# Patient Record
Sex: MALE | Race: WHITE | Employment: OTHER | URBAN - METROPOLITAN AREA
[De-identification: names, ages, dates, MRNs, and addresses within clinical notes are randomized per-mention and may not be internally consistent; named-entity substitution may affect disease eponyms.]

---

## 2017-07-05 PROBLEM — L03.012 CELLULITIS OF LEFT MIDDLE FINGER: Status: ACTIVE | Noted: 2017-07-05

## 2018-05-02 ENCOUNTER — HOSPITAL ENCOUNTER (OUTPATIENT)
Dept: CT IMAGING | Age: 53
Discharge: HOME | End: 2018-05-02
Attending: INTERNAL MEDICINE | Admitting: INTERNAL MEDICINE

## 2018-05-02 DIAGNOSIS — R91.8 LUNG MASS: ICD-10-CM

## 2018-05-27 PROBLEM — A41.9 SEPSIS WITHOUT ACUTE ORGAN DYSFUNCTION (HCC): Status: ACTIVE | Noted: 2018-05-27

## 2018-06-21 PROBLEM — M79.673 FOOT PAIN: Status: ACTIVE | Noted: 2018-06-21

## 2018-07-03 ENCOUNTER — HOSPITAL ENCOUNTER (OUTPATIENT)
Dept: WOUND CARE | Age: 53
Discharge: HOME | End: 2018-07-03
Attending: NURSE PRACTITIONER | Admitting: NURSE PRACTITIONER

## 2018-07-03 VITALS — TEMPERATURE: 98.1 F | RESPIRATION RATE: 16 BRPM

## 2018-07-03 DIAGNOSIS — E08.621 DIABETIC ULCER OF RIGHT MIDFOOT ASSOCIATED WITH DIABETES MELLITUS DUE TO UNDERLYING CONDITION, WITH NECROSIS OF MUSCLE (HCC): Primary | ICD-10-CM

## 2018-07-03 DIAGNOSIS — L97.413 DIABETIC ULCER OF RIGHT MIDFOOT ASSOCIATED WITH DIABETES MELLITUS DUE TO UNDERLYING CONDITION, WITH NECROSIS OF MUSCLE (HCC): Primary | ICD-10-CM

## 2018-07-03 DIAGNOSIS — Z89.421 S/P AMPUTATION OF LESSER TOE, RIGHT (HCC): ICD-10-CM

## 2018-07-03 PROCEDURE — 99213 OFFICE O/P EST LOW 20 MIN: CPT | Performed by: NURSE PRACTITIONER

## 2018-07-03 RX ORDER — ACETAMINOPHEN 500 MG
1000 TABLET ORAL EVERY 6 HOURS PRN
COMMUNITY
End: 2019-06-03

## 2018-07-03 RX ORDER — FLUOXETINE HYDROCHLORIDE 20 MG/1
20 CAPSULE ORAL DAILY
COMMUNITY
End: 2019-06-03

## 2018-07-03 RX ORDER — OXYCODONE HYDROCHLORIDE AND ACETAMINOPHEN 5; 325 MG/1; MG/1
2 TABLET ORAL EVERY 8 HOURS PRN
COMMUNITY
End: 2019-05-15 | Stop reason: ALTCHOICE

## 2018-07-03 RX ORDER — SULFAMETHOXAZOLE AND TRIMETHOPRIM 80; 16 MG/ML; MG/ML
INJECTION INTRAVENOUS DAILY
COMMUNITY
End: 2019-06-03

## 2018-07-03 ASSESSMENT — PAIN DESCRIPTION - PROGRESSION: CLINICAL_PROGRESSION: NOT CHANGED

## 2018-07-03 ASSESSMENT — PAIN DESCRIPTION - ONSET: ONSET: ON-GOING

## 2018-07-03 ASSESSMENT — PAIN DESCRIPTION - PAIN TYPE: TYPE: CHRONIC PAIN

## 2018-07-03 ASSESSMENT — PAIN DESCRIPTION - FREQUENCY: FREQUENCY: CONTINUOUS

## 2018-07-03 ASSESSMENT — PAIN DESCRIPTION - LOCATION: LOCATION: FOOT

## 2018-07-03 NOTE — PROGRESS NOTES
[Ketorolac Tromethamine] Other (See Comments)     Sweats        MEDICATIONS    Current Outpatient Prescriptions on File Prior to Encounter   Medication Sig Dispense Refill    metFORMIN (GLUCOPHAGE) 1000 MG tablet Take 1 tablet by mouth 2 times daily (with meals) 60 tablet 3    tiZANidine (ZANAFLEX) 4 MG tablet Take 4 mg by mouth nightly as needed      pregabalin (LYRICA) 100 MG capsule Take 1 capsule by mouth 2 times daily (Patient taking differently: Take 75 mg by mouth 2 times daily. [place] ) 60 capsule 0    traZODone (DESYREL) 100 MG tablet Take 100 mg by mouth nightly as needed       docusate sodium (COLACE) 100 MG capsule Take 100 mg by mouth daily as needed for Constipation      aspirin 81 MG chewable tablet Take 1 tablet by mouth daily 30 tablet 2    atorvastatin (LIPITOR) 20 MG tablet Take 1 tablet by mouth nightly 30 tablet 3    glyBURIDE (DIABETA) 5 MG tablet Take 1 tablet by mouth 2 times daily (with meals) 60 tablet 1    escitalopram (LEXAPRO) 20 MG tablet Take 20 mg by mouth daily      albuterol sulfate  (90 Base) MCG/ACT inhaler Inhale 2 puffs into the lungs every 6 hours as needed for Wheezing or Shortness of Breath (or cough) Please include spacer with instructions for use.  1 Inhaler 0    naproxen (NAPROSYN) 500 MG tablet Take 1 tablet by mouth 2 times daily as needed for Pain 30 tablet 0     Current Facility-Administered Medications on File Prior to Encounter   Medication Dose Route Frequency Provider Last Rate Last Dose    lidocaine (XYLOCAINE) 4 % external solution   Topical Once Deb Paci, DPM           PROBLEM LIST    Patient Active Problem List   Diagnosis    Finger infection right index finger    Mallet finger    Diabetic foot infection ([address]    Sepsis ([address]    Uncontrolled diabetes mellitus with complications ([address]    Diabetes with ulcer of foot ([address]    Ulcer of other part of foot    Diabetes mellitus with ulcer of heel ([address]    Obesity, Class III, BMI 40-49.9 Addressed This Visit     HBO-WD-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 We would be grateful if you would take the time to fill this survey out. Wound care order history:     ADI's: N/A   Vascular studies: ARTERIAL STUDIES COMPLETE  DONE 5/28/2018                                                   Imaging:                                                                     Date    Cultures:                                                                    Date    Labs/ HbA1c:   8.7                                                          Date 6/22/2018   Grafts:                                                                        Date    HBO:  DISCUSSED POSSIBLE OPTION 7/3/2018   Antibiotics: 7/3/2018-BACTRIM IVPB DAILY              Earlier Wound care treatments:                Authorizations:      Consults:                                                                    Date     Primary care physician: DR Theron CARVAJAL    Continuing wound care orders and information:              Residence:  HOME              Continue home health care with: [place] [address] wound-care supplies will be provided by: [address] [facility] [place]   DME provider: [facility]   Compression with   Off loading: ELEVATE  / OFF-LOADING SHOE                                                               Wound Medications:      Wound cleansing:      Do not scrub or use excessive force. Wash hands with soap and water before and after dressing changes. Prior to applying a clean dressing, cleanse wound with normal saline,          wound cleanser, or mild soap and water. Ask the physician or nurse before getting the wound(s) wet in a shower       Daily Wound management:    Keep weight off wounds and reposition every 2 hours. Avoid standing for long periods of time. Apply wraps/stockings in AM and remove at bedtime. If swelling is present, elevate legs to the level of the heart or above for 30  minutes 4-5 times a day and/or when sitting. When taking antibiotics take entire prescription as ordered by physician do not stop taking until medicine is all gone. Ivy Dobbins (PODIATRIST) DID SURGERY LAST Wednesday AT [facility] [phone] (PHONE) [phone] Urv[address] Sparks)                                    Orders for this week: 7/3/2018    DISCUSSED HBO AND PROPER OFF-LOADING PROCEDURES!!!      RIGHT LATERAL FOOT / RIGHT PLANTAR-- PAINT WITH BETADINE, COVER WITH  ABD, CONFORM AND TAPE. CHANGE DAILY. Follow up with Dr. Mary Gomez in 1 week at the wound care center    Call [phone] for any questions or concerns.     Date__________   Time____________                      Treatment Note Wound 07/03/18 #16 RIGHT LATERAL FOOT SURGICAL INCISION (ONSET 6/25/2018)-Dressing/Treatment:  (BETADINE, COVER WITH  ABD, CONFORM AND TAPE)  Wound 07/03/18 #17 RIGHT PLANTER SURGICAL INCISION (ONSET 6/25/18)-Dressing/Treatment:  (BETADINE, COVER WITH  ABD, CONFORM AND TAPE)    Written Patient Dismissal Instructions Given            Electronically signed by JULITA Sharpe CNP on 7/3/2018 at 5:35 PM

## 2018-07-10 ENCOUNTER — HOSPITAL ENCOUNTER (OUTPATIENT)
Dept: WOUND CARE | Age: 53
Discharge: HOME | End: 2018-07-10
Attending: PODIATRIST | Admitting: PODIATRIST

## 2018-07-10 VITALS
DIASTOLIC BLOOD PRESSURE: 70 MMHG | RESPIRATION RATE: 18 BRPM | HEART RATE: 97 BPM | TEMPERATURE: 97.3 F | SYSTOLIC BLOOD PRESSURE: 113 MMHG

## 2018-07-10 DIAGNOSIS — L97.413 DIABETIC ULCER OF RIGHT MIDFOOT ASSOCIATED WITH DIABETES MELLITUS DUE TO UNDERLYING CONDITION, WITH NECROSIS OF MUSCLE (HCC): ICD-10-CM

## 2018-07-10 DIAGNOSIS — E08.621 DIABETIC ULCER OF RIGHT MIDFOOT ASSOCIATED WITH DIABETES MELLITUS DUE TO UNDERLYING CONDITION, WITH NECROSIS OF MUSCLE (HCC): ICD-10-CM

## 2018-07-10 DIAGNOSIS — A41.9 SEPSIS WITHOUT ACUTE ORGAN DYSFUNCTION (HCC): ICD-10-CM

## 2018-07-10 DIAGNOSIS — Z89.421 S/P AMPUTATION OF LESSER TOE, RIGHT (HCC): ICD-10-CM

## 2018-07-10 DIAGNOSIS — L97.509 DIABETES MELLITUS DUE TO UNDERLYING CONDITION WITH FOOT ULCER, WITHOUT LONG-TERM CURRENT USE OF INSULIN (HCC): Primary | Chronic | ICD-10-CM

## 2018-07-10 DIAGNOSIS — E08.621 DIABETES MELLITUS DUE TO UNDERLYING CONDITION WITH FOOT ULCER, WITHOUT LONG-TERM CURRENT USE OF INSULIN (HCC): Primary | Chronic | ICD-10-CM

## 2018-07-10 ASSESSMENT — PAIN DESCRIPTION - PROGRESSION: CLINICAL_PROGRESSION: NOT CHANGED

## 2018-07-10 ASSESSMENT — PAIN DESCRIPTION - DESCRIPTORS: DESCRIPTORS: THROBBING

## 2018-07-10 ASSESSMENT — PAIN DESCRIPTION - ONSET: ONSET: ON-GOING

## 2018-07-10 ASSESSMENT — PAIN DESCRIPTION - ORIENTATION: ORIENTATION: RIGHT

## 2018-07-10 ASSESSMENT — PAIN DESCRIPTION - PAIN TYPE: TYPE: CHRONIC PAIN

## 2018-07-10 ASSESSMENT — PAIN DESCRIPTION - LOCATION: LOCATION: FOOT

## 2018-07-10 ASSESSMENT — PAIN SCALES - GENERAL: PAINLEVEL_OUTOF10: 8

## 2018-07-10 ASSESSMENT — PAIN DESCRIPTION - FREQUENCY: FREQUENCY: CONTINUOUS

## 2018-07-10 NOTE — PROGRESS NOTES
Procedure Laterality Date    COLONOSCOPY  1990's    DEBRIDEMENT  8/25/2014    left foot    ENDOSCOPY, COLON, DIAGNOSTIC  06-    FINGER SURGERY  9-11-11    Right Index Finger    FINGER SURGERY  01-16-12    RIght Index Finger-Removal of loose body, Reconstruction of Mallet Finger    FOOT SURGERY Right 06/01/2018    I&D right foot    HEMORRHOID SURGERY  2008    OTHER SURGICAL HISTORY Left 10/22/2013    I & D left foot    OTHER SURGICAL HISTORY  07/07/2017    I&D fingers X2 left hand. I&D left forearm    OTHER SURGICAL HISTORY Left 07/08/2017    left hand debridement, left forearm incision and drainage     OTHER SURGICAL HISTORY Left 07/10/2017    Fingers I&D    OTHER SURGICAL HISTORY Left 07/14/2017    middle finger amputation revision    ROTATOR CUFF REPAIR  Last Done 7/18/2011    Left, Done Four Times       FAMILY HISTORY    Family History   Problem Relation Age of Onset    Kidney Disease Mother         \"Kidney Failure, On Dialysis\"    Early Death Mother 39    Diabetes Father         \"Type II\"       SOCIAL HISTORY    Social History   Substance Use Topics    Smoking status: Never Smoker    Smokeless tobacco: Current User     Types: Snuff      Comment: \"Chew Grizzly Snuff Daily\"    Alcohol use No       ALLERGIES    Allergies   Allergen Reactions    Robaxin [Methocarbamol] Swelling    Rocephin [Ceftriaxone Sodium-Lidocaine] Hives     Noted on 10/26 - developed extremity swelling and hives. No anaphylaxis.  Cantaloupe (Diagnostic) Other (See Comments)    Aspirin Nausea Only    Darvon [Propoxyphene] Nausea And Vomiting    Toradol [Ketorolac Tromethamine] Other (See Comments)     Sweats        MEDICATIONS    Current Outpatient Prescriptions on File Prior to Encounter   Medication Sig Dispense Refill    oxyCODONE-acetaminophen (PERCOCET) 5-325 MG per tablet Take 2 tablets by mouth every 8 hours as needed for Pain. Nikkie Lal acetaminophen (TYLENOL) 500 MG tablet Take 1,000 mg by mouth every care.  Dermatologic exam: Visual inspection of the periwound reveals the skin to be edematous. Wound exam:  see wound description below     All active wounds listed below with today's date are evaluated      Wound 07/03/18 #16 RIGHT LATERAL FOOT SURGICAL INCISION (ONSET 6/25/2018) POST SURGICAL (Active)   Wound Image   7/3/2018  3:06 PM   Wound Type Wound 7/10/2018  3:06 PM   Wound Other 7/10/2018  3:06 PM   Dressing Status Clean;Dry; Intact 7/10/2018  4:03 PM   Dressing Changed Changed/New 7/10/2018  4:03 PM   Wound Cleansed Rinsed/Irrigated with saline 7/10/2018  3:06 PM   Wound Length (cm) 9 cm 7/10/2018  3:06 PM   Wound Width (cm) 1 cm 7/10/2018  3:06 PM   Wound Depth (cm)  0.1 7/10/2018  3:06 PM   Calculated Wound Size (cm^2) (l*w) 9 cm^2 7/10/2018  3:06 PM   Change in Wound Size % (l*w) 33.33 7/10/2018  3:06 PM   Distance Tunneling (cm) 0 cm 7/10/2018  3:06 PM   Tunneling Position ___ O'Clock 0 7/10/2018  3:06 PM   Undermining Starts ___ O'Clock 0 7/10/2018  3:06 PM   Undermining Ends___ O'Clock 0 7/10/2018  3:06 PM   Undermining Maxium Distance (cm) 0 7/10/2018  3:06 PM   Wound Assessment Pink;Yellow 7/10/2018  3:06 PM   Drainage Amount Large 7/10/2018  3:06 PM   Drainage Description Serosanguinous 7/10/2018  3:06 PM   Odor None 7/10/2018  3:06 PM   Margins Defined edges 7/10/2018  3:06 PM   Thu-wound Assessment Pink 7/10/2018  3:06 PM   Non-staged Wound Description Full thickness 7/10/2018  3:06 PM   [place]%Wound Bed 50 7/10/2018  3:06 PM   Red%Wound Bed 0 7/10/2018  3:06 PM   Yellow%Wound Bed 50 7/10/2018  3:06 PM   Black%Wound Bed 0 7/10/2018  3:06 PM   Purple%Wound Bed 0 7/10/2018  3:06 PM   Other%Wound Bed 0 7/10/2018  3:06 PM   Number of days: 7       Wound 07/03/18 #17 RIGHT PLANTER SURGICAL INCISION (ONSET 6/25/18)  POST SURGICAL (Active)   Wound Image   7/3/2018  3:06 PM   Wound Type Incision 7/10/2018  3:06 PM   Wound Other 7/10/2018  3:06 PM   Dressing Status Clean;Dry; Intact 7/10/2018  4:03 PM Dressing Changed Changed/New 7/10/2018  4:03 PM   Wound Cleansed Rinsed/Irrigated with saline 7/10/2018  3:06 PM   Wound Length (cm) 2 cm 7/10/2018  3:06 PM   Wound Width (cm) 0.2 cm 7/10/2018  3:06 PM   Wound Depth (cm)  0.1 7/10/2018  3:06 PM   Calculated Wound Size (cm^2) (l*w) 0.4 cm^2 7/10/2018  3:06 PM   Change in Wound Size % (l*w) 0 7/10/2018  3:06 PM   Distance Tunneling (cm) 0 cm 7/10/2018  3:06 PM   Tunneling Position ___ O'Clock 0 7/10/2018  3:06 PM   Undermining Starts ___ O'Clock 0 7/10/2018  3:06 PM   Undermining Ends___ O'Clock 0 7/10/2018  3:06 PM   Undermining Max[place] Distance (cm) 0 7/10/2018  3:06 PM   Wound Assessment Pink;Dry 7/10/2018  3:06 PM   Drainage Amount Small 7/10/2018  3:06 PM   Drainage Description Serosanguinous 7/10/2018  3:06 PM   Odor None 7/10/2018  3:06 PM   Margins Attached edges; Defined edges 7/10/2018  3:06 PM   Thu-wound Assessment [place] 7/10/2018  3:06 PM   Non-staged Wound Description Full thickness 7/10/2018  3:06 PM   [place]%Wound Bed 100 7/10/2018  3:06 PM   Red%Wound Bed 0 7/10/2018  3:06 PM   Yellow%Wound Bed 0 7/10/2018  3:06 PM   Black%Wound Bed 0 7/10/2018  3:06 PM   Purple%Wound Bed 0 7/10/2018  3:06 PM   Other%Wound Bed 0 7/10/2018  3:06 PM   Number of days: 7     Treatment: sutures removed today. 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 This is the first visit in years. .      Plan:     Discharge Instructions       PHYSICIAN ORDERS AND DISCHARGE INSTRUCTIONS    FAX TO [facility]     NOTE: Upon discharge from the [address], you will receive a patient experience

## 2018-07-10 NOTE — PLAN OF CARE
Problem: Pressure Ulcer:  Intervention: Assess signs and symptoms of infection  See flow sheet. Problem: Wound:  Intervention: Assess pain status  See flow sheet. Intervention: Assess wound size, appearance and drainage  See flow sheet. Intervention: Assess pedal pulses bilaterally if patient has a foot or leg ulcer  See flow sheet. Goal: Will show signs of wound healing; wound closure and no evidence of infection  Will show signs of wound healing; wound closure and no evidence of infection   Outcome: Ongoing  See flow sheet.

## 2018-07-17 ENCOUNTER — HOSPITAL ENCOUNTER (OUTPATIENT)
Dept: WOUND CARE | Age: 53
Discharge: HOME | End: 2018-07-17
Attending: PODIATRIST | Admitting: PODIATRIST

## 2018-07-24 ENCOUNTER — HOSPITAL ENCOUNTER (OUTPATIENT)
Dept: WOUND CARE | Age: 53
Discharge: HOME | End: 2018-07-24
Attending: PODIATRIST | Admitting: PODIATRIST

## 2018-07-24 VITALS
RESPIRATION RATE: 16 BRPM | TEMPERATURE: 98.4 F | DIASTOLIC BLOOD PRESSURE: 73 MMHG | SYSTOLIC BLOOD PRESSURE: 101 MMHG | HEART RATE: 96 BPM

## 2018-07-24 DIAGNOSIS — Z89.421 S/P AMPUTATION OF LESSER TOE, RIGHT (HCC): Primary | ICD-10-CM

## 2018-07-24 DIAGNOSIS — L97.413 DIABETIC ULCER OF RIGHT MIDFOOT ASSOCIATED WITH DIABETES MELLITUS DUE TO UNDERLYING CONDITION, WITH NECROSIS OF MUSCLE (HCC): ICD-10-CM

## 2018-07-24 DIAGNOSIS — L97.523 CHRONIC ULCER OF LEFT FOOT WITH NECROSIS OF MUSCLE (HCC): ICD-10-CM

## 2018-07-24 DIAGNOSIS — E08.621 DIABETIC ULCER OF RIGHT MIDFOOT ASSOCIATED WITH DIABETES MELLITUS DUE TO UNDERLYING CONDITION, WITH NECROSIS OF MUSCLE (HCC): ICD-10-CM

## 2018-07-24 ASSESSMENT — PAIN DESCRIPTION - PROGRESSION: CLINICAL_PROGRESSION: NOT CHANGED

## 2018-07-24 ASSESSMENT — PAIN SCALES - GENERAL: PAINLEVEL_OUTOF10: 4

## 2018-07-24 ASSESSMENT — PAIN DESCRIPTION - DESCRIPTORS: DESCRIPTORS: ACHING;SHOOTING;STABBING

## 2018-07-24 ASSESSMENT — PAIN DESCRIPTION - PAIN TYPE: TYPE: CHRONIC PAIN

## 2018-07-24 ASSESSMENT — PAIN DESCRIPTION - ONSET: ONSET: ON-GOING

## 2018-07-24 ASSESSMENT — PAIN DESCRIPTION - LOCATION: LOCATION: FOOT

## 2018-07-24 ASSESSMENT — PAIN DESCRIPTION - ORIENTATION: ORIENTATION: RIGHT

## 2018-07-24 ASSESSMENT — PAIN DESCRIPTION - FREQUENCY: FREQUENCY: INTERMITTENT

## 2018-07-24 NOTE — PROGRESS NOTES
PAST SURGICAL HISTORY    Past Surgical History:   Procedure Laterality Date    COLONOSCOPY  1990's    DEBRIDEMENT  8/25/2014    left foot    ENDOSCOPY, COLON, DIAGNOSTIC  06-    FINGER SURGERY  9-11-11    Right Index Finger    FINGER SURGERY  01-16-12    RIght Index Finger-Removal of loose body, Reconstruction of Mallet Finger    FOOT SURGERY Right 06/01/2018    I&D right foot    HEMORRHOID SURGERY  2008    OTHER SURGICAL HISTORY Left 10/22/2013    I & D left foot    OTHER SURGICAL HISTORY  07/07/2017    I&D fingers X2 left hand. I&D left forearm    OTHER SURGICAL HISTORY Left 07/08/2017    left hand debridement, left forearm incision and drainage     OTHER SURGICAL HISTORY Left 07/10/2017    Fingers I&D    OTHER SURGICAL HISTORY Left 07/14/2017    middle finger amputation revision    ROTATOR CUFF REPAIR  Last Done 7/18/2011    Left, Done Four Times       FAMILY HISTORY    Family History   Problem Relation Age of Onset    Kidney Disease Mother         \"Kidney Failure, On Dialysis\"    Early Death Mother 39    Diabetes Father         \"Type II\"       SOCIAL HISTORY    Social History   Substance Use Topics    Smoking status: Never Smoker    Smokeless tobacco: Current User     Types: Snuff      Comment: \"Chew Grizzly Snuff Daily\"    Alcohol use No       ALLERGIES    Allergies   Allergen Reactions    Robaxin [Methocarbamol] Swelling    Rocephin [Ceftriaxone Sodium-Lidocaine] Hives     Noted on 10/26 - developed extremity swelling and hives. No anaphylaxis.     Cantaloupe (Diagnostic) Other (See Comments)    Aspirin Nausea Only    Darvon [Propoxyphene] Nausea And Vomiting    Toradol [Ketorolac Tromethamine] Other (See Comments)     Sweats        MEDICATIONS    Current Outpatient Prescriptions on File Prior to Encounter   Medication Sig Dispense Refill    FLUoxetine (PROZAC) 20 MG capsule Take 20 mg by mouth daily      docusate sodium (COLACE) 100 MG capsule Take 100 mg by mouth daily prescription as ordered by physician do not stop taking until medicine is all gone.                 Verner Ruder (PODIATRIST) DID SURGERY LAST Wednesday 6/27/2018 AT [facility] [phone] (PHONE) [phone] ([facility]                                     Orders for this week: 7/10/2018     SUTURES REMOVED ON 7/10/2018        DISCUSSED HBO AND PROPER OFF-LOADING PROCEDURES! !!        RIGHT LATERAL FOOT / RIGHT PLANTAR-- PAINT WITH BETADINE, COVER WITH  ABD, CONFORM AND TAPE. LOOSELY WRAP WITH COBAN. CHANGE DAILY.              FOLLOW UP WITH DR BOLTON IN HIS OFFICE  FOR DIABETIC SHOE [phone]  Follow up with Dr. Corbin Vasquez in 1 week at the wound care center     Call [phone] for any questions or concerns.     Date__________   Time____________                             Treatment Note      Written Patient Dismissal Instructions Given            Electronically signed by Dania Schlatter, DPM on 7/24/2018 at 3:21 PM

## 2018-07-24 NOTE — PLAN OF CARE
Problem: Wound:  Intervention: Assess pain status  SEE FLOW SHEET. Intervention: Assess wound size, appearance and drainage  SEE FLOW SHEET. Intervention: Assess pedal pulses bilaterally if patient has a foot or leg ulcer  SEE FLOW SHEET.

## 2018-09-23 ENCOUNTER — HOSPITAL ENCOUNTER (EMERGENCY)
Age: 53
Discharge: HOME | End: 2018-09-23
Attending: EMERGENCY MEDICINE
Payer: MEDICARE

## 2018-09-23 ENCOUNTER — APPOINTMENT (OUTPATIENT)
Dept: GENERAL RADIOLOGY | Age: 53
End: 2018-09-23
Payer: MEDICARE

## 2018-09-23 VITALS
TEMPERATURE: 97.8 F | DIASTOLIC BLOOD PRESSURE: 73 MMHG | HEIGHT: 72 IN | HEART RATE: 82 BPM | WEIGHT: 240 LBS | OXYGEN SATURATION: 99 % | BODY MASS INDEX: 32.51 KG/M2 | SYSTOLIC BLOOD PRESSURE: 120 MMHG | RESPIRATION RATE: 16 BRPM

## 2018-09-23 DIAGNOSIS — S43.401A SPRAIN OF RIGHT SHOULDER, UNSPECIFIED SHOULDER SPRAIN TYPE, INITIAL ENCOUNTER: Primary | ICD-10-CM

## 2018-09-23 PROCEDURE — 99283 EMERGENCY DEPT VISIT LOW MDM: CPT

## 2018-09-23 PROCEDURE — 73030 X-RAY EXAM OF SHOULDER: CPT

## 2018-09-23 RX ORDER — TRAMADOL HYDROCHLORIDE 50 MG/1
25 TABLET ORAL EVERY 8 HOURS PRN
Qty: 9 TABLET | Refills: 0 | Status: SHIPPED | OUTPATIENT
Start: 2018-09-23 | End: 2018-09-26

## 2018-09-23 ASSESSMENT — PAIN SCALES - GENERAL: PAINLEVEL_OUTOF10: 9

## 2018-09-23 ASSESSMENT — PAIN DESCRIPTION - FREQUENCY: FREQUENCY: CONTINUOUS

## 2018-09-23 ASSESSMENT — PAIN DESCRIPTION - PAIN TYPE: TYPE: ACUTE PAIN

## 2018-09-23 ASSESSMENT — PAIN DESCRIPTION - ORIENTATION: ORIENTATION: RIGHT

## 2018-09-23 ASSESSMENT — PAIN DESCRIPTION - DESCRIPTORS: DESCRIPTORS: THROBBING;ACHING

## 2018-09-23 ASSESSMENT — PAIN DESCRIPTION - ONSET: ONSET: SUDDEN

## 2018-09-23 ASSESSMENT — PAIN DESCRIPTION - LOCATION: LOCATION: SHOULDER

## 2018-09-23 ASSESSMENT — PAIN DESCRIPTION - PROGRESSION: CLINICAL_PROGRESSION: NOT CHANGED

## 2018-09-23 NOTE — ED PROVIDER NOTES
Triage Chief Complaint:   Shoulder Injury    [place]:  Robin Parsons is a 47 y.o. male that presents with right shoulder pain after a fall. Patient states that he was walking down the stairs when he tripped. He reached out to grab the railing but grabbed underneath the railing catching the piece that holds the railing to the wall. He was able to hold onto this as he continued to slide down the stairs pulling his shoulder backwards. He did not feel it dislocated but he thought he felt something tear in his right shoulder. Any movement of the shoulder causes some pain. No numbness tingling or weakness in the arm. Range of motion states is limited secondary to pain. Did not hit his head.     ROS:  General:  No fevers, no chills, no weakness  Eyes:  No recent vison changes, no discharge  ENT:  No sore throat, no nasal congestion, no hearing changes  Cardiovascular:  No chest pain, no palpitations  Respiratory:  No shortness of breath, no cough, no wheezing  Gastrointestinal:  No pain, no nausea, no vomiting, no diarrhea  Musculoskeletal:  No muscle pain, no joint pain  Skin:  No rash, no pruritis, no easy bruising  Neurologic:  No speech problems, no headache, no extremity numbness, no extremity tingling, no extremity weakness, right shoulder pain  Psychiatric:  No anxiety  Genitourinary:  No dysuria, no hematuria  Endocrine:  No unexpected weight gain, no unexpected weight loss  Extremities:  no edema, no pain    Past Medical History:   Diagnosis Date    Anesthesia     \"Problems coming out of the anesthesia\"    Anxiety     Arthritis     Asthma     \"As a kid but outgrew this\"    Chronic back pain     Depression     Dizziness     Edema     Fibromyalgia     Fracture     Headache(784.0)     Hemorrhoid     Hernia, abdominal     Hx MRSA infection     positive culture 8/2014 from left foot    Migraine     Nausea & vomiting     Neuropathy     Obesity, Class III, BMI 40-49.9 (morbid obesity) [facility]     Osteomyel[facility] [address]     hx finger    Pneumonia 1995    Poor circulation     Restless leg syndrome     Shortness of breath on exertion     6/14/2016- pt denies any sob with this interview    Type II or unspecified type diabetes mellitus with other specified manifestations, not stated as uncontrolled 4/8/2014    Type II or unspecified type diabetes mellitus without mention of complication, not stated as uncontrolled DX 2007    Ulcer of other part of foot 4/8/2014    'ulcer on left foot healed all up\"     Past Surgical History:   Procedure Laterality Date    COLONOSCOPY  1990's    DEBRIDEMENT  8/25/2014    left foot    ENDOSCOPY, COLON, DIAGNOSTIC  06-    FINGER SURGERY  9-11-11    Right Index Finger    FINGER SURGERY  01-16-12    RIght Index Finger-Removal of loose body, Reconstruction of Mallet Finger    FOOT SURGERY Right 06/01/2018    I&D right foot    HEMORRHOID SURGERY  2008    OTHER SURGICAL HISTORY Left 10/22/2013    I & D left foot    OTHER SURGICAL HISTORY  07/07/2017    I&D fingers X2 left hand. I&D left forearm    OTHER SURGICAL HISTORY Left 07/08/2017    left hand debridement, left forearm incision and drainage     OTHER SURGICAL HISTORY Left 07/10/2017    Fingers I&D    OTHER SURGICAL HISTORY Left 07/14/2017    middle finger amputation revision    ROTATOR CUFF REPAIR  Last Done 7/18/2011    Left, Done Four Times     Family History   Problem Relation Age of Onset    Kidney Disease Mother         \"Kidney Failure, On Dialysis\"   Terrell Rocha Early Death Mother 39    Diabetes Father         \"Type II\"     Social History     Social History    Marital status:      Spouse name: N/A    Number of children: 4    Years of education: N/A     Occupational History    Not on file.      Social History Main Topics    Smoking status: Never Smoker    Smokeless tobacco: Current User     Types: Snuff      Comment: \"Chew Grizzly Snuff Daily\"    Alcohol use No    Drug use: Yes     Types: Marijuana  Sexual activity: Not on file     Other Topics Concern    Not on file     Social History Narrative    No narrative on file     No current facility-administered medications for this encounter. Current Outpatient Prescriptions   Medication Sig Dispense Refill    traMADol (ULTRAM) 50 MG tablet Take 0.5 tablets by mouth every 8 hours as needed for Pain for up to 3 days. Iza Cai 9 tablet 0    oxyCODONE-acetaminophen (PERCOCET) 5-325 MG per tablet Take 2 tablets by mouth every 8 hours as needed for Pain. Darci Choi acetaminophen (TYLENOL) 500 MG tablet Take 1,000 mg by mouth every 6 hours as needed for Pain      FLUoxetine (PROZAC) 20 MG capsule Take 20 mg by mouth daily      sulfamethoxazole-trimethoprim (BACTRIM) 400-80 MG/5ML injection Infuse intravenously daily      docusate sodium (COLACE) 100 MG capsule Take 100 mg by mouth daily as needed for Constipation      aspirin 81 MG chewable tablet Take 1 tablet by mouth daily 30 tablet 2    metFORMIN (GLUCOPHAGE) 1000 MG tablet Take 1 tablet by mouth 2 times daily (with meals) 60 tablet 3    atorvastatin (LIPITOR) 20 MG tablet Take 1 tablet by mouth nightly 30 tablet 3    glyBURIDE (DIABETA) 5 MG tablet Take 1 tablet by mouth 2 times daily (with meals) 60 tablet 1    escitalopram (LEXAPRO) 20 MG tablet Take 20 mg by mouth daily      tiZANidine (ZANAFLEX) 4 MG tablet Take 4 mg by mouth nightly as needed      albuterol sulfate  (90 Base) MCG/ACT inhaler Inhale 2 puffs into the lungs every 6 hours as needed for Wheezing or Shortness of Breath (or cough) Please include spacer with instructions for use. 1 Inhaler 0    naproxen (NAPROSYN) 500 MG tablet Take 1 tablet by mouth 2 times daily as needed for Pain 30 tablet 0    pregabalin (LYRICA) 100 MG capsule Take 1 capsule by mouth 2 times daily (Patient taking differently: Take 75 mg by mouth 2 times daily. Iza Cai ) 60 capsule 0    traZODone (DESYREL) 100 MG tablet Take 100 mg by mouth nightly as needed

## 2018-11-01 ENCOUNTER — HOSPITAL ENCOUNTER (EMERGENCY)
Age: 53
Discharge: HOME | End: 2018-11-02
Payer: MEDICARE

## 2018-11-01 DIAGNOSIS — R10.84 GENERALIZED ABDOMINAL PAIN: Primary | ICD-10-CM

## 2018-11-01 PROCEDURE — 80053 COMPREHEN METABOLIC PANEL: CPT

## 2018-11-01 PROCEDURE — 81001 URINALYSIS AUTO W/SCOPE: CPT

## 2018-11-01 PROCEDURE — 83690 ASSAY OF LIPASE: CPT

## 2018-11-01 PROCEDURE — 99284 EMERGENCY DEPT VISIT MOD MDM: CPT

## 2018-11-01 PROCEDURE — 85025 COMPLETE CBC W/AUTO DIFF WBC: CPT

## 2018-11-01 ASSESSMENT — PAIN SCALES - GENERAL: PAINLEVEL_OUTOF10: 9

## 2018-11-01 ASSESSMENT — PAIN DESCRIPTION - PAIN TYPE: TYPE: ACUTE PAIN

## 2018-11-01 ASSESSMENT — PAIN DESCRIPTION - LOCATION: LOCATION: ABDOMEN

## 2018-11-02 ENCOUNTER — APPOINTMENT (OUTPATIENT)
Dept: CT IMAGING | Age: 53
End: 2018-11-02
Payer: MEDICARE

## 2018-11-02 VITALS
DIASTOLIC BLOOD PRESSURE: 98 MMHG | RESPIRATION RATE: 16 BRPM | WEIGHT: 240 LBS | TEMPERATURE: 98.6 F | HEIGHT: 72 IN | SYSTOLIC BLOOD PRESSURE: 125 MMHG | BODY MASS INDEX: 32.51 KG/M2 | HEART RATE: 78 BPM | OXYGEN SATURATION: 95 %

## 2018-11-02 LAB
ALBUMIN SERPL-MCNC: 4.3 GM/DL (ref 3.4–5)
ALP BLD-CCNC: 107 IU/L (ref 40–129)
ALT SERPL-CCNC: 5 U/L (ref 10–40)
ANION GAP SERPL CALCULATED.3IONS-SCNC: 11 MMOL/L (ref 4–16)
AST SERPL-CCNC: 11 IU/L (ref 15–37)
BACTERIA: NEGATIVE /HPF
BASOPHILS ABSOLUTE: 0.1 K/CU MM
BASOPHILS RELATIVE PERCENT: 0.9 % (ref 0–1)
BILIRUB SERPL-MCNC: 0.6 MG/DL (ref 0–1)
BILIRUBIN URINE: NEGATIVE MG/DL
BLOOD, URINE: NEGATIVE
BUN BLDV-MCNC: 9 MG/DL (ref 6–23)
CALCIUM SERPL-MCNC: 9.4 MG/DL (ref 8.3–10.6)
CHLORIDE BLD-SCNC: 97 MMOL/L (ref 99–110)
CLARITY: CLEAR
CO2: 26 MMOL/L (ref 21–32)
COLOR: YELLOW
CREAT SERPL-MCNC: 0.9 MG/DL (ref 0.9–1.3)
DIFFERENTIAL TYPE: ABNORMAL
EOSINOPHILS ABSOLUTE: 0.2 K/CU MM
EOSINOPHILS RELATIVE PERCENT: 2.6 % (ref 0–3)
GFR AFRICAN AMERICAN: >60 ML/MIN/1.73M2
GFR NON-AFRICAN AMERICAN: >60 ML/MIN/1.73M2
GLUCOSE BLD-MCNC: 245 MG/DL (ref 70–99)
GLUCOSE, URINE: >500 MG/DL
HCT VFR BLD CALC: 47.9 % (ref 42–52)
HEMOGLOBIN: 15.7 GM/DL (ref 13.5–18)
IMMATURE NEUTROPHIL %: 0.3 % (ref 0–0.43)
KETONES, URINE: NEGATIVE MG/DL
LEUKOCYTE ESTERASE, URINE: NEGATIVE
LIPASE: 32 IU/L (ref 13–60)
LYMPHOCYTES ABSOLUTE: 2.8 K/CU MM
LYMPHOCYTES RELATIVE PERCENT: 36.2 % (ref 24–44)
MCH RBC QN AUTO: 28.6 PG (ref 27–31)
MCHC RBC AUTO-ENTMCNC: 32.8 % (ref 32–36)
MCV RBC AUTO: 87.4 FL (ref 78–100)
MONOCYTES ABSOLUTE: 0.6 K/CU MM
MONOCYTES RELATIVE PERCENT: 7.9 % (ref 0–4)
MUCUS: ABNORMAL HPF
NITRITE URINE, QUANTITATIVE: NEGATIVE
NUCLEATED RBC %: 0 %
PDW BLD-RTO: 13.8 % (ref 11.7–14.9)
PH, URINE: 5 (ref 5–8)
PLATELET # BLD: 263 K/CU MM (ref 140–440)
PMV BLD AUTO: 10.3 FL (ref 7.5–11.1)
POTASSIUM SERPL-SCNC: 4.4 MMOL/L (ref 3.5–5.1)
PROTEIN UA: NEGATIVE MG/DL
RBC # BLD: 5.48 M/CU MM (ref 4.6–6.2)
RBC URINE: ABNORMAL /HPF (ref 0–3)
SEGMENTED NEUTROPHILS ABSOLUTE COUNT: 4 K/CU MM
SEGMENTED NEUTROPHILS RELATIVE PERCENT: 52.1 % (ref 36–66)
SODIUM BLD-SCNC: 134 MMOL/L (ref 135–145)
SPECIFIC GRAVITY UA: 1.03 (ref 1–1.03)
TOTAL IMMATURE NEUTOROPHIL: 0.02 K/CU MM
TOTAL NUCLEATED RBC: 0 K/CU MM
TOTAL PROTEIN: 7.5 GM/DL (ref 6.4–8.2)
TRICHOMONAS: ABNORMAL /HPF
UROBILINOGEN, URINE: 1 MG/DL (ref 0.2–1)
WBC # BLD: 7.7 K/CU MM (ref 4–10.5)
WBC UA: ABNORMAL /HPF (ref 0–2)

## 2018-11-02 PROCEDURE — 96374 THER/PROPH/DIAG INJ IV PUSH: CPT

## 2018-11-02 PROCEDURE — 96372 THER/PROPH/DIAG INJ SC/IM: CPT

## 2018-11-02 PROCEDURE — 6360000004 HC RX CONTRAST MEDICATION: Performed by: PHYSICIAN ASSISTANT

## 2018-11-02 PROCEDURE — 74177 CT ABD & PELVIS W/CONTRAST: CPT

## 2018-11-02 PROCEDURE — 6360000002 HC RX W HCPCS: Performed by: PHYSICIAN ASSISTANT

## 2018-11-02 PROCEDURE — 6370000000 HC RX 637 (ALT 250 FOR IP): Performed by: PHYSICIAN ASSISTANT

## 2018-11-02 RX ORDER — SUCRALFATE 1 G/1
1 TABLET ORAL 4 TIMES DAILY
Qty: 40 TABLET | Refills: 0 | Status: SHIPPED | OUTPATIENT
Start: 2018-11-02 | End: 2019-06-03

## 2018-11-02 RX ORDER — DICYCLOMINE HYDROCHLORIDE 10 MG/ML
20 INJECTION INTRAMUSCULAR ONCE
Status: COMPLETED | OUTPATIENT
Start: 2018-11-02 | End: 2018-11-02

## 2018-11-02 RX ORDER — OMEPRAZOLE 40 MG/1
40 CAPSULE, DELAYED RELEASE ORAL DAILY
Qty: 30 CAPSULE | Refills: 0 | Status: SHIPPED | OUTPATIENT
Start: 2018-11-02 | End: 2019-06-10

## 2018-11-02 RX ORDER — ONDANSETRON 4 MG/1
4 TABLET, ORALLY DISINTEGRATING ORAL EVERY 6 HOURS
Qty: 20 TABLET | Refills: 0 | Status: SHIPPED | OUTPATIENT
Start: 2018-11-02 | End: 2019-10-17

## 2018-11-02 RX ORDER — MAGNESIUM HYDROXIDE/ALUMINUM HYDROXICE/SIMETHICONE 120; 1200; 1200 MG/30ML; MG/30ML; MG/30ML
30 SUSPENSION ORAL ONCE
Status: COMPLETED | OUTPATIENT
Start: 2018-11-02 | End: 2018-11-02

## 2018-11-02 RX ORDER — DICYCLOMINE HYDROCHLORIDE 10 MG/1
20 CAPSULE ORAL
Qty: 40 CAPSULE | Refills: 0 | Status: SHIPPED | OUTPATIENT
Start: 2018-11-02 | End: 2019-06-03

## 2018-11-02 RX ORDER — ONDANSETRON 2 MG/ML
4 INJECTION INTRAMUSCULAR; INTRAVENOUS ONCE
Status: COMPLETED | OUTPATIENT
Start: 2018-11-02 | End: 2018-11-02

## 2018-11-02 RX ADMIN — LIDOCAINE HYDROCHLORIDE 15 ML: 20 SOLUTION ORAL; TOPICAL at 00:28

## 2018-11-02 RX ADMIN — IOPAMIDOL 75 ML: 755 INJECTION, SOLUTION INTRAVENOUS at 01:12

## 2018-11-02 RX ADMIN — ONDANSETRON HYDROCHLORIDE 4 MG: 2 INJECTION, SOLUTION INTRAMUSCULAR; INTRAVENOUS at 00:29

## 2018-11-02 RX ADMIN — DICYCLOMINE HYDROCHLORIDE 20 MG: 20 INJECTION, SOLUTION INTRAMUSCULAR at 00:29

## 2018-11-02 RX ADMIN — ALUMINUM HYDROXIDE, MAGNESIUM HYDROXIDE, AND SIMETHICONE 30 ML: 200; 200; 20 SUSPENSION ORAL at 00:28

## 2018-11-02 NOTE — ED PROVIDER NOTES
diverticulitis, incarcerated hernia, pancreatitis, performed bowel, uti, and others   -  Work-up included:  see above  -  ED treatment included:  GI cocktail, Bentyl, Zofran  -  Results discussed with patient. CT shows no acute abnormalities. Stable LLL pulmonary nodule. No leukocytosis. Feels improved with medications. Will start on Omeprazole, Carafate, Zofran, and Bentyl--referred to GI for further evaluation. Return here as needed for new/worsening symptoms. He is agreeable with plan of care and disposition.  -  Disposition:  Home    FINAL IMPRESSION    1.  Generalized abdominal pain              Heide Rios PA-C  11/02/18 0728

## 2018-12-17 ENCOUNTER — HOSPITAL ENCOUNTER (EMERGENCY)
Age: 53
Discharge: OTHER INSTITUTION | End: 2018-12-17
Attending: EMERGENCY MEDICINE
Payer: MEDICARE

## 2018-12-17 ENCOUNTER — APPOINTMENT (OUTPATIENT)
Dept: GENERAL RADIOLOGY | Age: 53
End: 2018-12-17
Payer: MEDICARE

## 2018-12-17 VITALS
SYSTOLIC BLOOD PRESSURE: 124 MMHG | HEART RATE: 84 BPM | BODY MASS INDEX: 32.51 KG/M2 | WEIGHT: 240 LBS | HEIGHT: 72 IN | DIASTOLIC BLOOD PRESSURE: 87 MMHG | TEMPERATURE: 98.4 F | RESPIRATION RATE: 17 BRPM | OXYGEN SATURATION: 99 %

## 2018-12-17 DIAGNOSIS — M86.9 OSTEOMYELITIS OF LEFT HAND, UNSPECIFIED TYPE (HCC): ICD-10-CM

## 2018-12-17 DIAGNOSIS — L08.9 FINGER INFECTION: Primary | ICD-10-CM

## 2018-12-17 LAB
ALBUMIN SERPL-MCNC: 4.3 GM/DL (ref 3.4–5)
ALP BLD-CCNC: 110 IU/L (ref 40–129)
ALT SERPL-CCNC: 7 U/L (ref 10–40)
ANION GAP SERPL CALCULATED.3IONS-SCNC: 14 MMOL/L (ref 4–16)
AST SERPL-CCNC: 12 IU/L (ref 15–37)
BASOPHILS ABSOLUTE: 0.1 K/CU MM
BASOPHILS RELATIVE PERCENT: 0.6 % (ref 0–1)
BILIRUB SERPL-MCNC: 0.4 MG/DL (ref 0–1)
BUN BLDV-MCNC: 7 MG/DL (ref 6–23)
CALCIUM SERPL-MCNC: 8.9 MG/DL (ref 8.3–10.6)
CHLORIDE BLD-SCNC: 98 MMOL/L (ref 99–110)
CO2: 23 MMOL/L (ref 21–32)
CREAT SERPL-MCNC: 0.5 MG/DL (ref 0.9–1.3)
DIFFERENTIAL TYPE: ABNORMAL
EOSINOPHILS ABSOLUTE: 0.1 K/CU MM
EOSINOPHILS RELATIVE PERCENT: 1.7 % (ref 0–3)
GFR AFRICAN AMERICAN: >60 ML/MIN/1.73M2
GFR NON-AFRICAN AMERICAN: >60 ML/MIN/1.73M2
GLUCOSE BLD-MCNC: 248 MG/DL (ref 70–99)
HCT VFR BLD CALC: 46.6 % (ref 42–52)
HEMOGLOBIN: 14.9 GM/DL (ref 13.5–18)
IMMATURE NEUTROPHIL %: 0.4 % (ref 0–0.43)
LACTATE: 1.2 MMOL/L (ref 0.4–2)
LYMPHOCYTES ABSOLUTE: 1.4 K/CU MM
LYMPHOCYTES RELATIVE PERCENT: 17.9 % (ref 24–44)
MCH RBC QN AUTO: 28.4 PG (ref 27–31)
MCHC RBC AUTO-ENTMCNC: 32 % (ref 32–36)
MCV RBC AUTO: 88.8 FL (ref 78–100)
MONOCYTES ABSOLUTE: 0.4 K/CU MM
MONOCYTES RELATIVE PERCENT: 5.2 % (ref 0–4)
NUCLEATED RBC %: 0 %
PDW BLD-RTO: 13.4 % (ref 11.7–14.9)
PLATELET # BLD: 244 K/CU MM (ref 140–440)
PMV BLD AUTO: 10.8 FL (ref 7.5–11.1)
POTASSIUM SERPL-SCNC: 4.3 MMOL/L (ref 3.5–5.1)
RBC # BLD: 5.25 M/CU MM (ref 4.6–6.2)
SEGMENTED NEUTROPHILS ABSOLUTE COUNT: 6 K/CU MM
SEGMENTED NEUTROPHILS RELATIVE PERCENT: 74.2 % (ref 36–66)
SODIUM BLD-SCNC: 135 MMOL/L (ref 135–145)
TOTAL IMMATURE NEUTOROPHIL: 0.03 K/CU MM
TOTAL NUCLEATED RBC: 0 K/CU MM
TOTAL PROTEIN: 6.8 GM/DL (ref 6.4–8.2)
WBC # BLD: 8.1 K/CU MM (ref 4–10.5)

## 2018-12-17 PROCEDURE — 80053 COMPREHEN METABOLIC PANEL: CPT

## 2018-12-17 PROCEDURE — 36415 COLL VENOUS BLD VENIPUNCTURE: CPT

## 2018-12-17 PROCEDURE — 96376 TX/PRO/DX INJ SAME DRUG ADON: CPT

## 2018-12-17 PROCEDURE — 96375 TX/PRO/DX INJ NEW DRUG ADDON: CPT

## 2018-12-17 PROCEDURE — 96365 THER/PROPH/DIAG IV INF INIT: CPT

## 2018-12-17 PROCEDURE — 73140 X-RAY EXAM OF FINGER(S): CPT

## 2018-12-17 PROCEDURE — 6360000002 HC RX W HCPCS: Performed by: PHYSICIAN ASSISTANT

## 2018-12-17 PROCEDURE — 85025 COMPLETE CBC W/AUTO DIFF WBC: CPT

## 2018-12-17 PROCEDURE — 83605 ASSAY OF LACTIC ACID: CPT

## 2018-12-17 PROCEDURE — 99284 EMERGENCY DEPT VISIT MOD MDM: CPT

## 2018-12-17 PROCEDURE — 2580000003 HC RX 258: Performed by: PHYSICIAN ASSISTANT

## 2018-12-17 PROCEDURE — 87073 CULTURE BACTERIA ANAEROBIC: CPT

## 2018-12-17 PROCEDURE — 87071 CULTURE AEROBIC QUANT OTHER: CPT

## 2018-12-17 PROCEDURE — 87077 CULTURE AEROBIC IDENTIFY: CPT

## 2018-12-17 PROCEDURE — 6360000002 HC RX W HCPCS: Performed by: EMERGENCY MEDICINE

## 2018-12-17 PROCEDURE — 87186 SC STD MICRODIL/AGAR DIL: CPT

## 2018-12-17 PROCEDURE — 96367 TX/PROPH/DG ADDL SEQ IV INF: CPT

## 2018-12-17 RX ORDER — MORPHINE SULFATE 4 MG/ML
4 INJECTION, SOLUTION INTRAMUSCULAR; INTRAVENOUS EVERY 30 MIN PRN
Status: DISCONTINUED | OUTPATIENT
Start: 2018-12-17 | End: 2018-12-18 | Stop reason: HOSPADM

## 2018-12-17 RX ORDER — VANCOMYCIN HYDROCHLORIDE 1 G/200ML
1000 INJECTION, SOLUTION INTRAVENOUS ONCE
Status: DISCONTINUED | OUTPATIENT
Start: 2018-12-17 | End: 2018-12-17

## 2018-12-17 RX ADMIN — MORPHINE SULFATE 4 MG: 4 INJECTION, SOLUTION INTRAMUSCULAR; INTRAVENOUS at 21:35

## 2018-12-17 RX ADMIN — VANCOMYCIN HYDROCHLORIDE 2000 MG: 1 INJECTION, POWDER, LYOPHILIZED, FOR SOLUTION INTRAVENOUS at 22:25

## 2018-12-17 RX ADMIN — TAZOBACTAM SODIUM AND PIPERACILLIN SODIUM 3.38 G: 375; 3 INJECTION, SOLUTION INTRAVENOUS at 21:00

## 2018-12-17 RX ADMIN — MORPHINE SULFATE 4 MG: 4 INJECTION, SOLUTION INTRAMUSCULAR; INTRAVENOUS at 23:35

## 2018-12-17 ASSESSMENT — PAIN SCALES - GENERAL
PAINLEVEL_OUTOF10: 10
PAINLEVEL_OUTOF10: 9
PAINLEVEL_OUTOF10: 9

## 2018-12-18 NOTE — ED PROVIDER NOTES
eMERGENCY dEPARTMENT eNCOUnter      PCP: [id number]    Chief Complaint   Patient presents with    Wound Infection     left ring finger       HPI    Ryann Gunn is a 47 y.o. male who presents with Infection to his left hand ring finger. Patient does have history of uncontrolled diabetes does have history of multiple amputations of the past.  Reports that he cut his finger on a sink around 2 weeks ago. Has some tinnitus tissues exposed and increased pain and swelling to the finger. Admits to some subjective fevers yesterday. Does have recurrent history of cellulitic infections. Per review of his chart his tetanus is up-to-date.         REVIEW OF SYSTEMS    Constitutional:  Denies weight loss or weakness   HENT:  Denies sore throat or ear pain   Cardiovascular:  Denies chest pain, palpitations   Respiratory:  Denies cough or shortness of breath    GI:  Denies abdominal pain, nausea, vomiting, or diarrhea  :  Denies any urinary symptoms  Musculoskeletal:  Denies back pain,   Skin:  Denies rash  Finger infection  Neurologic:  Denies headache, focal weakness or sensory changes   Endocrine:  Denies polyuria or polydypsia   Lymphatic:  Denies swollen glands     All other review of systems are negative  See HPI and nursing notes for additional information     PAST MEDICAL AND SURGICAL HISTORY    Past Medical History:   Diagnosis Date    Anesthesia     \"Problems coming out of the anesthesia\"    Anxiety     Arthritis     Asthma     \"As a kid but outgrew this\"    Chronic back pain     Depression     Dizziness     Edema     Fibromyalgia     Fracture     Headache(784.0)     Hemorrhoid     Hernia, abdominal     Hx MRSA infection     positive culture 8/2014 from left foot    Migraine     Nausea & vomiting     Neuropathy     Obesity, Class III, BMI 40-49.9 (morbid obesity) (HCC)     Osteomyelitis (HCC)     hx finger    Pneumonia 1995    Poor circulation     Restless leg - 100 FL    MCH 28.4 27 - 31 PG    MCHC 32.0 32.0 - 36.0 %    RDW 13.4 11.7 - 14.9 %    Platelets 064 134 - 138 K/CU MM    MPV 10.8 7.5 - 11.1 FL    Differential Type AUTOMATED DIFFERENTIAL     Segs Relative 74.2 (H) 36 - 66 %    Lymphocytes % 17.9 (L) 24 - 44 %    Monocytes % 5.2 (H) 0 - 4 %    Eosinophils % 1.7 0 - 3 %    Basophils % 0.6 0 - 1 %    Segs Absolute 6.0 K/CU MM    Lymphocytes # 1.4 K/CU MM    Monocytes # 0.4 K/CU MM    Eosinophils # 0.1 K/CU MM    Basophils # 0.1 K/CU MM    Nucleated RBC % 0.0 %    Total Nucleated RBC 0.0 K/CU MM    Total Immature Neutrophil 0.03 K/CU MM    Immature Neutrophil % 0.4 0 - 0.43 %   CMP   Result Value Ref Range    Sodium 135 135 - 145 MMOL/L    Potassium 4.3 3.5 - 5.1 MMOL/L    Chloride 98 (L) 99 - 110 mMol/L    CO2 23 21 - 32 MMOL/L    BUN 7 6 - 23 MG/DL    CREATININE 0.5 (L) 0.9 - 1.3 MG/DL    Glucose 248 (H) 70 - 99 MG/DL    Calcium 8.9 8.3 - 10.6 MG/DL    Alb 4.3 3.4 - 5.0 GM/DL    Total Protein 6.8 6.4 - 8.2 GM/DL    Total Bilirubin 0.4 0.0 - 1.0 MG/DL    ALT 7 (L) 10 - 40 U/L    AST 12 (L) 15 - 37 IU/L    Alkaline Phosphatase 110 40 - 129 IU/L    GFR Non-African American >60 >60 mL/min/1.73m2    GFR African American >60 >60 mL/min/1.73m2    Anion Gap 14 4 - 16   Lactic Acid, Plasma   Result Value Ref Range    Lactate 1.2 0.4 - 2.0 mMOL/L       RADIOLOGY       XR FINGER LEFT (MIN 2 VIEWS) (Final result)   Result time 12/17/18 20:34:12   Final result by Homar Gandhi MD (12/17/18 20:34:12)                Impression:    1. Diffuse soft tissue swelling  2. Irregular appearance of the tuft at the tip of the 4th digit.  Causes of  acro-osteolysis should be investigated. 3. Small laceration along the radial aspect at the level of the middle phalanx  4. Linear radiopaque foreign body suspected overlying the distal phalanx 1st  digit. Narrative:    EXAMINATION:  3 XRAY VIEWS OF THE LEFT FINGERS    12/17/2018 7:23 pm    COMPARISON:  None.     HISTORY:  ORDERING

## 2018-12-21 LAB
CULTURE: NORMAL
Lab: NORMAL
ORGANISM: NORMAL
REPORT STATUS: NORMAL
SPECIMEN: NORMAL

## 2018-12-29 ENCOUNTER — HOSPITAL ENCOUNTER (EMERGENCY)
Age: 53
Discharge: ANOTHER HOSPITAL | End: 2018-12-29
Attending: EMERGENCY MEDICINE
Payer: MEDICARE

## 2018-12-29 VITALS
RESPIRATION RATE: 16 BRPM | HEART RATE: 102 BPM | DIASTOLIC BLOOD PRESSURE: 75 MMHG | SYSTOLIC BLOOD PRESSURE: 127 MMHG | TEMPERATURE: 98.7 F | OXYGEN SATURATION: 97 %

## 2018-12-29 DIAGNOSIS — T81.49XA SURGICAL WOUND INFECTION: Primary | ICD-10-CM

## 2018-12-29 DIAGNOSIS — L03.114 CELLULITIS OF HAND, LEFT: ICD-10-CM

## 2018-12-29 PROCEDURE — 99283 EMERGENCY DEPT VISIT LOW MDM: CPT

## 2018-12-29 PROCEDURE — 6360000002 HC RX W HCPCS: Performed by: PHYSICIAN ASSISTANT

## 2018-12-29 PROCEDURE — 2580000003 HC RX 258: Performed by: PHYSICIAN ASSISTANT

## 2018-12-29 PROCEDURE — 96374 THER/PROPH/DIAG INJ IV PUSH: CPT

## 2018-12-29 PROCEDURE — 96375 TX/PRO/DX INJ NEW DRUG ADDON: CPT

## 2018-12-29 RX ORDER — MORPHINE SULFATE 4 MG/ML
2 INJECTION, SOLUTION INTRAMUSCULAR; INTRAVENOUS
Status: DISCONTINUED | OUTPATIENT
Start: 2018-12-29 | End: 2018-12-29 | Stop reason: HOSPADM

## 2018-12-29 RX ORDER — ONDANSETRON 2 MG/ML
4 INJECTION INTRAMUSCULAR; INTRAVENOUS ONCE
Status: COMPLETED | OUTPATIENT
Start: 2018-12-29 | End: 2018-12-29

## 2018-12-29 RX ADMIN — VANCOMYCIN HYDROCHLORIDE 2000 MG: 500 INJECTION, POWDER, LYOPHILIZED, FOR SOLUTION INTRAVENOUS at 16:37

## 2018-12-29 RX ADMIN — ONDANSETRON 4 MG: 2 INJECTION INTRAMUSCULAR; INTRAVENOUS at 16:37

## 2018-12-29 RX ADMIN — MORPHINE SULFATE 2 MG: 4 INJECTION INTRAVENOUS at 16:37

## 2018-12-29 ASSESSMENT — PAIN SCALES - GENERAL
PAINLEVEL_OUTOF10: 10
PAINLEVEL_OUTOF10: 10

## 2018-12-29 ASSESSMENT — PAIN DESCRIPTION - PAIN TYPE: TYPE: ACUTE PAIN

## 2019-01-06 ASSESSMENT — PAIN DESCRIPTION - ORIENTATION: ORIENTATION: LEFT

## 2019-01-06 ASSESSMENT — PAIN DESCRIPTION - LOCATION: LOCATION: HAND

## 2019-01-06 ASSESSMENT — PAIN SCALES - GENERAL: PAINLEVEL_OUTOF10: 9

## 2019-01-06 ASSESSMENT — PAIN DESCRIPTION - PAIN TYPE: TYPE: ACUTE PAIN

## 2019-01-07 ENCOUNTER — HOSPITAL ENCOUNTER (EMERGENCY)
Age: 54
Discharge: ANOTHER HOSPITAL | End: 2019-01-07
Attending: EMERGENCY MEDICINE
Payer: MEDICARE

## 2019-01-07 ENCOUNTER — APPOINTMENT (OUTPATIENT)
Dept: CT IMAGING | Age: 54
End: 2019-01-07
Payer: MEDICARE

## 2019-01-07 VITALS
WEIGHT: 260 LBS | HEART RATE: 72 BPM | HEIGHT: 71 IN | OXYGEN SATURATION: 99 % | DIASTOLIC BLOOD PRESSURE: 47 MMHG | SYSTOLIC BLOOD PRESSURE: 114 MMHG | RESPIRATION RATE: 16 BRPM | BODY MASS INDEX: 36.4 KG/M2 | TEMPERATURE: 98.2 F

## 2019-01-07 DIAGNOSIS — L08.9 WOUND INFECTION: Primary | ICD-10-CM

## 2019-01-07 DIAGNOSIS — T14.8XXA WOUND INFECTION: Primary | ICD-10-CM

## 2019-01-07 LAB
ALBUMIN SERPL-MCNC: 3.3 GM/DL (ref 3.4–5)
ALP BLD-CCNC: 80 IU/L (ref 40–128)
ALT SERPL-CCNC: 9 U/L (ref 10–40)
ANION GAP SERPL CALCULATED.3IONS-SCNC: 9 MMOL/L (ref 4–16)
AST SERPL-CCNC: 13 IU/L (ref 15–37)
BASOPHILS ABSOLUTE: 0 K/CU MM
BASOPHILS RELATIVE PERCENT: 0.6 % (ref 0–1)
BILIRUB SERPL-MCNC: 0.2 MG/DL (ref 0–1)
BUN BLDV-MCNC: 8 MG/DL (ref 6–23)
CALCIUM SERPL-MCNC: 8.5 MG/DL (ref 8.3–10.6)
CHLORIDE BLD-SCNC: 102 MMOL/L (ref 99–110)
CO2: 29 MMOL/L (ref 21–32)
CREAT SERPL-MCNC: 0.7 MG/DL (ref 0.9–1.3)
DIFFERENTIAL TYPE: ABNORMAL
EOSINOPHILS ABSOLUTE: 0.2 K/CU MM
EOSINOPHILS RELATIVE PERCENT: 3.8 % (ref 0–3)
ERYTHROCYTE SEDIMENTATION RATE: 60 MM/HR (ref 0–20)
GFR AFRICAN AMERICAN: >60 ML/MIN/1.73M2
GFR NON-AFRICAN AMERICAN: >60 ML/MIN/1.73M2
GLUCOSE BLD-MCNC: 139 MG/DL (ref 70–99)
HCT VFR BLD CALC: 32.3 % (ref 42–52)
HEMOGLOBIN: 10 GM/DL (ref 13.5–18)
HIGH SENSITIVE C-REACTIVE PROTEIN: 19.9 MG/L
IMMATURE NEUTROPHIL %: 0.6 % (ref 0–0.43)
LACTATE: 1.3 MMOL/L (ref 0.4–2)
LYMPHOCYTES ABSOLUTE: 1.7 K/CU MM
LYMPHOCYTES RELATIVE PERCENT: 33.2 % (ref 24–44)
MCH RBC QN AUTO: 28.2 PG (ref 27–31)
MCHC RBC AUTO-ENTMCNC: 31 % (ref 32–36)
MCV RBC AUTO: 91.2 FL (ref 78–100)
MONOCYTES ABSOLUTE: 0.5 K/CU MM
MONOCYTES RELATIVE PERCENT: 9 % (ref 0–4)
NUCLEATED RBC %: 0 %
PDW BLD-RTO: 13 % (ref 11.7–14.9)
PLATELET # BLD: 240 K/CU MM (ref 140–440)
PMV BLD AUTO: 9.5 FL (ref 7.5–11.1)
POTASSIUM SERPL-SCNC: 4.1 MMOL/L (ref 3.5–5.1)
RBC # BLD: 3.54 M/CU MM (ref 4.6–6.2)
SEGMENTED NEUTROPHILS ABSOLUTE COUNT: 2.6 K/CU MM
SEGMENTED NEUTROPHILS RELATIVE PERCENT: 52.8 % (ref 36–66)
SODIUM BLD-SCNC: 140 MMOL/L (ref 135–145)
TOTAL CK: 69 IU/L (ref 38–174)
TOTAL IMMATURE NEUTOROPHIL: 0.03 K/CU MM
TOTAL NUCLEATED RBC: 0 K/CU MM
TOTAL PROTEIN: 6.3 GM/DL (ref 6.4–8.2)
WBC # BLD: 5 K/CU MM (ref 4–10.5)

## 2019-01-07 PROCEDURE — 86141 C-REACTIVE PROTEIN HS: CPT

## 2019-01-07 PROCEDURE — 85652 RBC SED RATE AUTOMATED: CPT

## 2019-01-07 PROCEDURE — 96376 TX/PRO/DX INJ SAME DRUG ADON: CPT

## 2019-01-07 PROCEDURE — 85025 COMPLETE CBC W/AUTO DIFF WBC: CPT

## 2019-01-07 PROCEDURE — 80053 COMPREHEN METABOLIC PANEL: CPT

## 2019-01-07 PROCEDURE — 96375 TX/PRO/DX INJ NEW DRUG ADDON: CPT

## 2019-01-07 PROCEDURE — 99284 EMERGENCY DEPT VISIT MOD MDM: CPT

## 2019-01-07 PROCEDURE — 36415 COLL VENOUS BLD VENIPUNCTURE: CPT

## 2019-01-07 PROCEDURE — 96365 THER/PROPH/DIAG IV INF INIT: CPT

## 2019-01-07 PROCEDURE — 87040 BLOOD CULTURE FOR BACTERIA: CPT

## 2019-01-07 PROCEDURE — 6360000002 HC RX W HCPCS: Performed by: EMERGENCY MEDICINE

## 2019-01-07 PROCEDURE — 6360000004 HC RX CONTRAST MEDICATION: Performed by: EMERGENCY MEDICINE

## 2019-01-07 PROCEDURE — 83605 ASSAY OF LACTIC ACID: CPT

## 2019-01-07 PROCEDURE — 82550 ASSAY OF CK (CPK): CPT

## 2019-01-07 PROCEDURE — 73201 CT UPPER EXTREMITY W/DYE: CPT

## 2019-01-07 RX ORDER — MORPHINE SULFATE 4 MG/ML
4 INJECTION, SOLUTION INTRAMUSCULAR; INTRAVENOUS ONCE
Status: COMPLETED | OUTPATIENT
Start: 2019-01-07 | End: 2019-01-07

## 2019-01-07 RX ORDER — VANCOMYCIN HYDROCHLORIDE 1 G/200ML
1000 INJECTION, SOLUTION INTRAVENOUS ONCE
Status: COMPLETED | OUTPATIENT
Start: 2019-01-07 | End: 2019-01-07

## 2019-01-07 RX ADMIN — IOPAMIDOL 75 ML: 755 INJECTION, SOLUTION INTRAVENOUS at 03:01

## 2019-01-07 RX ADMIN — VANCOMYCIN HYDROCHLORIDE 1000 MG: 1 INJECTION, SOLUTION INTRAVENOUS at 03:15

## 2019-01-07 RX ADMIN — MORPHINE SULFATE 4 MG: 4 INJECTION INTRAVENOUS at 04:26

## 2019-01-07 RX ADMIN — MORPHINE SULFATE 4 MG: 4 INJECTION INTRAVENOUS at 01:43

## 2019-01-07 ASSESSMENT — PAIN SCALES - GENERAL
PAINLEVEL_OUTOF10: 8
PAINLEVEL_OUTOF10: 6
PAINLEVEL_OUTOF10: 9

## 2019-01-12 LAB
CULTURE: NORMAL
CULTURE: NORMAL
Lab: NORMAL
Lab: NORMAL
REPORT STATUS: NORMAL
REPORT STATUS: NORMAL
SPECIMEN: NORMAL
SPECIMEN: NORMAL

## 2019-01-14 ENCOUNTER — HOSPITAL ENCOUNTER (OUTPATIENT)
Age: 54
Setting detail: SPECIMEN
Discharge: HOME | End: 2019-01-14
Payer: MEDICARE

## 2019-01-14 LAB
ALBUMIN SERPL-MCNC: 3.7 GM/DL (ref 3.4–5)
ALP BLD-CCNC: 95 IU/L (ref 40–129)
ALT SERPL-CCNC: 11 U/L (ref 10–40)
ANION GAP SERPL CALCULATED.3IONS-SCNC: 13 MMOL/L (ref 4–16)
AST SERPL-CCNC: 13 IU/L (ref 15–37)
BASOPHILS ABSOLUTE: 0.1 K/CU MM
BASOPHILS RELATIVE PERCENT: 0.8 % (ref 0–1)
BILIRUB SERPL-MCNC: 0.3 MG/DL (ref 0–1)
BUN BLDV-MCNC: 9 MG/DL (ref 6–23)
CALCIUM SERPL-MCNC: 8.9 MG/DL (ref 8.3–10.6)
CHLORIDE BLD-SCNC: 98 MMOL/L (ref 99–110)
CO2: 29 MMOL/L (ref 21–32)
CREAT SERPL-MCNC: 0.7 MG/DL (ref 0.9–1.3)
DIFFERENTIAL TYPE: ABNORMAL
EOSINOPHILS ABSOLUTE: 0.3 K/CU MM
EOSINOPHILS RELATIVE PERCENT: 3.8 % (ref 0–3)
GFR AFRICAN AMERICAN: >60 ML/MIN/1.73M2
GFR NON-AFRICAN AMERICAN: >60 ML/MIN/1.73M2
GLUCOSE BLD-MCNC: 146 MG/DL (ref 70–99)
HCT VFR BLD CALC: 38.1 % (ref 42–52)
HEMOGLOBIN: 11.3 GM/DL (ref 13.5–18)
IMMATURE NEUTROPHIL %: 0.8 % (ref 0–0.43)
LYMPHOCYTES ABSOLUTE: 2.3 K/CU MM
LYMPHOCYTES RELATIVE PERCENT: 32.6 % (ref 24–44)
MCH RBC QN AUTO: 27.6 PG (ref 27–31)
MCHC RBC AUTO-ENTMCNC: 29.7 % (ref 32–36)
MCV RBC AUTO: 93.2 FL (ref 78–100)
MONOCYTES ABSOLUTE: 0.5 K/CU MM
MONOCYTES RELATIVE PERCENT: 7 % (ref 0–4)
NUCLEATED RBC %: 0 %
PDW BLD-RTO: 13.7 % (ref 11.7–14.9)
PLATELET # BLD: 377 K/CU MM (ref 140–440)
PMV BLD AUTO: 9.8 FL (ref 7.5–11.1)
POTASSIUM SERPL-SCNC: 4.6 MMOL/L (ref 3.5–5.1)
RBC # BLD: 4.09 M/CU MM (ref 4.6–6.2)
SEGMENTED NEUTROPHILS ABSOLUTE COUNT: 3.9 K/CU MM
SEGMENTED NEUTROPHILS RELATIVE PERCENT: 55 % (ref 36–66)
SODIUM BLD-SCNC: 140 MMOL/L (ref 135–145)
TOTAL IMMATURE NEUTOROPHIL: 0.06 K/CU MM
TOTAL NUCLEATED RBC: 0 K/CU MM
TOTAL PROTEIN: 6.7 GM/DL (ref 6.4–8.2)
VANCOMYCIN TROUGH: 5.1 UG/ML (ref 10–20)
WBC # BLD: 7.1 K/CU MM (ref 4–10.5)

## 2019-01-14 PROCEDURE — 80202 ASSAY OF VANCOMYCIN: CPT

## 2019-01-14 PROCEDURE — 80053 COMPREHEN METABOLIC PANEL: CPT

## 2019-01-14 PROCEDURE — 85025 COMPLETE CBC W/AUTO DIFF WBC: CPT

## 2019-01-17 ENCOUNTER — HOSPITAL ENCOUNTER (OUTPATIENT)
Age: 54
Setting detail: SPECIMEN
Discharge: HOME | End: 2019-01-17
Payer: MEDICARE

## 2019-01-17 LAB
ALBUMIN SERPL-MCNC: 3.8 GM/DL (ref 3.4–5)
ALP BLD-CCNC: 86 IU/L (ref 40–129)
ALT SERPL-CCNC: 12 U/L (ref 10–40)
ANION GAP SERPL CALCULATED.3IONS-SCNC: 12 MMOL/L (ref 4–16)
AST SERPL-CCNC: 17 IU/L (ref 15–37)
BILIRUB SERPL-MCNC: 0.3 MG/DL (ref 0–1)
BUN BLDV-MCNC: 11 MG/DL (ref 6–23)
CALCIUM SERPL-MCNC: 9 MG/DL (ref 8.3–10.6)
CHLORIDE BLD-SCNC: 99 MMOL/L (ref 99–110)
CO2: 24 MMOL/L (ref 21–32)
CREAT SERPL-MCNC: 0.7 MG/DL (ref 0.9–1.3)
GFR AFRICAN AMERICAN: >60 ML/MIN/1.73M2
GFR NON-AFRICAN AMERICAN: >60 ML/MIN/1.73M2
GLUCOSE BLD-MCNC: 176 MG/DL (ref 70–99)
HCT VFR BLD CALC: 37.2 % (ref 42–52)
HEMOGLOBIN: 11.3 GM/DL (ref 13.5–18)
MCH RBC QN AUTO: 27.6 PG (ref 27–31)
MCHC RBC AUTO-ENTMCNC: 30.4 % (ref 32–36)
MCV RBC AUTO: 90.7 FL (ref 78–100)
PDW BLD-RTO: 13.8 % (ref 11.7–14.9)
PLATELET # BLD: 326 K/CU MM (ref 140–440)
PMV BLD AUTO: 9.8 FL (ref 7.5–11.1)
POTASSIUM SERPL-SCNC: 4.3 MMOL/L (ref 3.5–5.1)
RBC # BLD: 4.1 M/CU MM (ref 4.6–6.2)
SODIUM BLD-SCNC: 135 MMOL/L (ref 135–145)
TOTAL PROTEIN: 6.7 GM/DL (ref 6.4–8.2)
VANCOMYCIN TROUGH: 9.8 UG/ML (ref 10–20)
WBC # BLD: 5.1 K/CU MM (ref 4–10.5)

## 2019-01-17 PROCEDURE — 85027 COMPLETE CBC AUTOMATED: CPT

## 2019-01-17 PROCEDURE — 80202 ASSAY OF VANCOMYCIN: CPT

## 2019-01-17 PROCEDURE — 80053 COMPREHEN METABOLIC PANEL: CPT

## 2019-01-21 ENCOUNTER — HOSPITAL ENCOUNTER (OUTPATIENT)
Age: 54
Setting detail: SPECIMEN
Discharge: HOME | End: 2019-01-21
Payer: MEDICARE

## 2019-01-21 LAB
ALBUMIN SERPL-MCNC: 4.4 GM/DL (ref 3.4–5)
ALP BLD-CCNC: 98 IU/L (ref 40–129)
ALT SERPL-CCNC: 9 U/L (ref 10–40)
ANION GAP SERPL CALCULATED.3IONS-SCNC: 15 MMOL/L (ref 4–16)
AST SERPL-CCNC: 13 IU/L (ref 15–37)
BILIRUB SERPL-MCNC: 0.4 MG/DL (ref 0–1)
BUN BLDV-MCNC: 9 MG/DL (ref 6–23)
CALCIUM SERPL-MCNC: 9.1 MG/DL (ref 8.3–10.6)
CHLORIDE BLD-SCNC: 98 MMOL/L (ref 99–110)
CO2: 24 MMOL/L (ref 21–32)
CREAT SERPL-MCNC: 0.7 MG/DL (ref 0.9–1.3)
GFR AFRICAN AMERICAN: >60 ML/MIN/1.73M2
GFR NON-AFRICAN AMERICAN: >60 ML/MIN/1.73M2
GLUCOSE BLD-MCNC: 223 MG/DL (ref 70–99)
HCT VFR BLD CALC: 41.7 % (ref 42–52)
HEMOGLOBIN: 13 GM/DL (ref 13.5–18)
MCH RBC QN AUTO: 27.8 PG (ref 27–31)
MCHC RBC AUTO-ENTMCNC: 31.2 % (ref 32–36)
MCV RBC AUTO: 89.3 FL (ref 78–100)
PDW BLD-RTO: 14.3 % (ref 11.7–14.9)
PLATELET # BLD: 326 K/CU MM (ref 140–440)
PMV BLD AUTO: 10.2 FL (ref 7.5–11.1)
POTASSIUM SERPL-SCNC: 4.4 MMOL/L (ref 3.5–5.1)
RBC # BLD: 4.67 M/CU MM (ref 4.6–6.2)
SODIUM BLD-SCNC: 137 MMOL/L (ref 135–145)
TOTAL PROTEIN: 7.5 GM/DL (ref 6.4–8.2)
VANCOMYCIN TROUGH: 7.1 UG/ML (ref 10–20)
WBC # BLD: 7.1 K/CU MM (ref 4–10.5)

## 2019-01-21 PROCEDURE — 80202 ASSAY OF VANCOMYCIN: CPT

## 2019-01-21 PROCEDURE — 85027 COMPLETE CBC AUTOMATED: CPT

## 2019-01-21 PROCEDURE — 80053 COMPREHEN METABOLIC PANEL: CPT

## 2019-01-24 ENCOUNTER — HOSPITAL ENCOUNTER (OUTPATIENT)
Age: 54
Setting detail: SPECIMEN
Discharge: HOME | End: 2019-01-24
Payer: MEDICARE

## 2019-01-24 LAB
ALBUMIN SERPL-MCNC: 4 GM/DL (ref 3.4–5)
ALP BLD-CCNC: 114 IU/L (ref 40–129)
ALT SERPL-CCNC: 7 U/L (ref 10–40)
ANION GAP SERPL CALCULATED.3IONS-SCNC: 14 MMOL/L (ref 4–16)
AST SERPL-CCNC: 11 IU/L (ref 15–37)
BILIRUB SERPL-MCNC: 0.3 MG/DL (ref 0–1)
BUN BLDV-MCNC: 13 MG/DL (ref 6–23)
CALCIUM SERPL-MCNC: 9.6 MG/DL (ref 8.3–10.6)
CHLORIDE BLD-SCNC: 101 MMOL/L (ref 99–110)
CO2: 23 MMOL/L (ref 21–32)
CREAT SERPL-MCNC: 0.7 MG/DL (ref 0.9–1.3)
GFR AFRICAN AMERICAN: >60 ML/MIN/1.73M2
GFR NON-AFRICAN AMERICAN: >60 ML/MIN/1.73M2
GLUCOSE BLD-MCNC: 309 MG/DL (ref 70–99)
HCT VFR BLD CALC: 41.4 % (ref 42–52)
HEMOGLOBIN: 12.7 GM/DL (ref 13.5–18)
MCH RBC QN AUTO: 27.9 PG (ref 27–31)
MCHC RBC AUTO-ENTMCNC: 30.7 % (ref 32–36)
MCV RBC AUTO: 91 FL (ref 78–100)
PDW BLD-RTO: 14.4 % (ref 11.7–14.9)
PLATELET # BLD: 260 K/CU MM (ref 140–440)
PMV BLD AUTO: 9.9 FL (ref 7.5–11.1)
POTASSIUM SERPL-SCNC: 4.6 MMOL/L (ref 3.5–5.1)
RBC # BLD: 4.55 M/CU MM (ref 4.6–6.2)
SODIUM BLD-SCNC: 138 MMOL/L (ref 135–145)
TOTAL PROTEIN: 7 GM/DL (ref 6.4–8.2)
VANCOMYCIN TROUGH: 11 UG/ML (ref 10–20)
WBC # BLD: 6.8 K/CU MM (ref 4–10.5)

## 2019-01-24 PROCEDURE — 80202 ASSAY OF VANCOMYCIN: CPT

## 2019-01-24 PROCEDURE — 80053 COMPREHEN METABOLIC PANEL: CPT

## 2019-01-24 PROCEDURE — 85027 COMPLETE CBC AUTOMATED: CPT

## 2019-02-05 ENCOUNTER — HOSPITAL ENCOUNTER (EMERGENCY)
Age: 54
Discharge: HOME | End: 2019-02-06
Attending: EMERGENCY MEDICINE
Payer: MEDICARE

## 2019-02-05 ENCOUNTER — APPOINTMENT (OUTPATIENT)
Dept: GENERAL RADIOLOGY | Age: 54
End: 2019-02-05
Payer: MEDICARE

## 2019-02-05 ENCOUNTER — APPOINTMENT (OUTPATIENT)
Dept: CT IMAGING | Age: 54
End: 2019-02-05
Payer: MEDICARE

## 2019-02-05 DIAGNOSIS — Z51.89 VISIT FOR WOUND CHECK: Primary | ICD-10-CM

## 2019-02-05 DIAGNOSIS — Z48.02 VISIT FOR SUTURE REMOVAL: ICD-10-CM

## 2019-02-05 DIAGNOSIS — T22.291A SECOND DEGREE BURN OF MULTIPLE SITES OF RIGHT SHOULDER AND UPPER EXTREMITY EXCEPT WRIST AND HAND, INITIAL ENCOUNTER: ICD-10-CM

## 2019-02-05 LAB
ALBUMIN SERPL-MCNC: 4.1 GM/DL (ref 3.4–5)
ALP BLD-CCNC: 98 IU/L (ref 40–128)
ALT SERPL-CCNC: 7 U/L (ref 10–40)
ANION GAP SERPL CALCULATED.3IONS-SCNC: 12 MMOL/L (ref 4–16)
AST SERPL-CCNC: 14 IU/L (ref 15–37)
BASOPHILS ABSOLUTE: 0.1 K/CU MM
BASOPHILS RELATIVE PERCENT: 0.9 % (ref 0–1)
BILIRUB SERPL-MCNC: 0.4 MG/DL (ref 0–1)
BUN BLDV-MCNC: 10 MG/DL (ref 6–23)
CALCIUM SERPL-MCNC: 9.1 MG/DL (ref 8.3–10.6)
CHLORIDE BLD-SCNC: 95 MMOL/L (ref 99–110)
CO2: 25 MMOL/L (ref 21–32)
CREAT SERPL-MCNC: 0.8 MG/DL (ref 0.9–1.3)
DIFFERENTIAL TYPE: ABNORMAL
EOSINOPHILS ABSOLUTE: 0.2 K/CU MM
EOSINOPHILS RELATIVE PERCENT: 2.4 % (ref 0–3)
GFR AFRICAN AMERICAN: >60 ML/MIN/1.73M2
GFR NON-AFRICAN AMERICAN: >60 ML/MIN/1.73M2
GLUCOSE BLD-MCNC: 258 MG/DL (ref 70–99)
HCT VFR BLD CALC: 41.3 % (ref 42–52)
HEMOGLOBIN: 13.1 GM/DL (ref 13.5–18)
IMMATURE NEUTROPHIL %: 0.2 % (ref 0–0.43)
LYMPHOCYTES ABSOLUTE: 2.1 K/CU MM
LYMPHOCYTES RELATIVE PERCENT: 33.3 % (ref 24–44)
MCH RBC QN AUTO: 28.3 PG (ref 27–31)
MCHC RBC AUTO-ENTMCNC: 31.7 % (ref 32–36)
MCV RBC AUTO: 89.2 FL (ref 78–100)
MONOCYTES ABSOLUTE: 0.4 K/CU MM
MONOCYTES RELATIVE PERCENT: 6.9 % (ref 0–4)
NUCLEATED RBC %: 0 %
PDW BLD-RTO: 14.7 % (ref 11.7–14.9)
PLATELET # BLD: 209 K/CU MM (ref 140–440)
PMV BLD AUTO: 10.4 FL (ref 7.5–11.1)
POTASSIUM SERPL-SCNC: 4 MMOL/L (ref 3.5–5.1)
RBC # BLD: 4.63 M/CU MM (ref 4.6–6.2)
SEGMENTED NEUTROPHILS ABSOLUTE COUNT: 3.6 K/CU MM
SEGMENTED NEUTROPHILS RELATIVE PERCENT: 56.3 % (ref 36–66)
SODIUM BLD-SCNC: 132 MMOL/L (ref 135–145)
TOTAL IMMATURE NEUTOROPHIL: 0.01 K/CU MM
TOTAL NUCLEATED RBC: 0 K/CU MM
TOTAL PROTEIN: 6.6 GM/DL (ref 6.4–8.2)
WBC # BLD: 6.3 K/CU MM (ref 4–10.5)

## 2019-02-05 PROCEDURE — 87073 CULTURE BACTERIA ANAEROBIC: CPT

## 2019-02-05 PROCEDURE — 87186 SC STD MICRODIL/AGAR DIL: CPT

## 2019-02-05 PROCEDURE — 80053 COMPREHEN METABOLIC PANEL: CPT

## 2019-02-05 PROCEDURE — 2500000003 HC RX 250 WO HCPCS: Performed by: EMERGENCY MEDICINE

## 2019-02-05 PROCEDURE — 85025 COMPLETE CBC W/AUTO DIFF WBC: CPT

## 2019-02-05 PROCEDURE — 87077 CULTURE AEROBIC IDENTIFY: CPT

## 2019-02-05 PROCEDURE — 73201 CT UPPER EXTREMITY W/DYE: CPT

## 2019-02-05 PROCEDURE — 87071 CULTURE AEROBIC QUANT OTHER: CPT

## 2019-02-05 PROCEDURE — 99284 EMERGENCY DEPT VISIT MOD MDM: CPT

## 2019-02-05 PROCEDURE — 73130 X-RAY EXAM OF HAND: CPT

## 2019-02-05 PROCEDURE — 6370000000 HC RX 637 (ALT 250 FOR IP): Performed by: EMERGENCY MEDICINE

## 2019-02-05 RX ORDER — ONDANSETRON 4 MG/1
4 TABLET, ORALLY DISINTEGRATING ORAL ONCE
Status: COMPLETED | OUTPATIENT
Start: 2019-02-05 | End: 2019-02-05

## 2019-02-05 RX ORDER — ACETAMINOPHEN 500 MG
1000 TABLET ORAL ONCE
Status: DISCONTINUED | OUTPATIENT
Start: 2019-02-05 | End: 2019-02-06 | Stop reason: HOSPADM

## 2019-02-05 RX ADMIN — ONDANSETRON 4 MG: 4 TABLET, ORALLY DISINTEGRATING ORAL at 23:25

## 2019-02-05 RX ADMIN — SILVER SULFADIAZINE: 10 CREAM TOPICAL at 23:31

## 2019-02-05 ASSESSMENT — PAIN SCALES - GENERAL: PAINLEVEL_OUTOF10: 9

## 2019-02-05 ASSESSMENT — ENCOUNTER SYMPTOMS
EYES NEGATIVE: 1
GASTROINTESTINAL NEGATIVE: 1
RESPIRATORY NEGATIVE: 1
ALLERGIC/IMMUNOLOGIC NEGATIVE: 1

## 2019-02-06 VITALS
WEIGHT: 260 LBS | TEMPERATURE: 98.4 F | RESPIRATION RATE: 16 BRPM | BODY MASS INDEX: 38.51 KG/M2 | HEIGHT: 69 IN | SYSTOLIC BLOOD PRESSURE: 142 MMHG | OXYGEN SATURATION: 97 % | HEART RATE: 92 BPM | DIASTOLIC BLOOD PRESSURE: 88 MMHG

## 2019-02-06 PROCEDURE — 6360000004 HC RX CONTRAST MEDICATION: Performed by: EMERGENCY MEDICINE

## 2019-02-06 RX ORDER — CLINDAMYCIN HYDROCHLORIDE 150 MG/1
450 CAPSULE ORAL 3 TIMES DAILY
Qty: 90 CAPSULE | Refills: 0 | Status: SHIPPED | OUTPATIENT
Start: 2019-02-06 | End: 2019-02-16

## 2019-02-06 RX ADMIN — IOPAMIDOL 80 ML: 755 INJECTION, SOLUTION INTRAVENOUS at 00:03

## 2019-02-10 LAB
CULTURE: NORMAL
Lab: NORMAL
ORGANISM: NORMAL
ORGANISM: NORMAL
REPORT STATUS: NORMAL
SPECIMEN: NORMAL

## 2019-04-08 ENCOUNTER — APPOINTMENT (OUTPATIENT)
Dept: ULTRASOUND IMAGING | Age: 54
End: 2019-04-08
Payer: MEDICARE

## 2019-04-08 ENCOUNTER — HOSPITAL ENCOUNTER (EMERGENCY)
Age: 54
Discharge: HOME | End: 2019-04-08
Attending: EMERGENCY MEDICINE
Payer: MEDICARE

## 2019-04-08 ENCOUNTER — APPOINTMENT (OUTPATIENT)
Dept: GENERAL RADIOLOGY | Age: 54
End: 2019-04-08
Payer: MEDICARE

## 2019-04-08 ENCOUNTER — APPOINTMENT (OUTPATIENT)
Dept: CT IMAGING | Age: 54
End: 2019-04-08
Payer: MEDICARE

## 2019-04-08 VITALS
TEMPERATURE: 98.4 F | HEIGHT: 71 IN | HEART RATE: 72 BPM | WEIGHT: 260 LBS | OXYGEN SATURATION: 99 % | RESPIRATION RATE: 16 BRPM | SYSTOLIC BLOOD PRESSURE: 112 MMHG | DIASTOLIC BLOOD PRESSURE: 70 MMHG | BODY MASS INDEX: 36.4 KG/M2

## 2019-04-08 DIAGNOSIS — L03.119 CELLULITIS OF LOWER EXTREMITY, UNSPECIFIED LATERALITY: Primary | ICD-10-CM

## 2019-04-08 LAB
ALBUMIN SERPL-MCNC: 3.7 GM/DL (ref 3.4–5)
ALP BLD-CCNC: 100 IU/L (ref 40–129)
ALT SERPL-CCNC: 8 U/L (ref 10–40)
ANION GAP SERPL CALCULATED.3IONS-SCNC: 10 MMOL/L (ref 4–16)
AST SERPL-CCNC: 18 IU/L (ref 15–37)
BASOPHILS ABSOLUTE: 0 K/CU MM
BASOPHILS RELATIVE PERCENT: 0.7 % (ref 0–1)
BILIRUB SERPL-MCNC: 0.3 MG/DL (ref 0–1)
BUN BLDV-MCNC: 8 MG/DL (ref 6–23)
CALCIUM SERPL-MCNC: 8.5 MG/DL (ref 8.3–10.6)
CHLORIDE BLD-SCNC: 95 MMOL/L (ref 99–110)
CO2: 26 MMOL/L (ref 21–32)
CREAT SERPL-MCNC: 0.6 MG/DL (ref 0.9–1.3)
DIFFERENTIAL TYPE: ABNORMAL
EOSINOPHILS ABSOLUTE: 0.2 K/CU MM
EOSINOPHILS RELATIVE PERCENT: 4 % (ref 0–3)
GFR AFRICAN AMERICAN: >60 ML/MIN/1.73M2
GFR NON-AFRICAN AMERICAN: >60 ML/MIN/1.73M2
GLUCOSE BLD-MCNC: 231 MG/DL (ref 70–99)
HCT VFR BLD CALC: 43.1 % (ref 42–52)
HEMOGLOBIN: 13.5 GM/DL (ref 13.5–18)
IMMATURE NEUTROPHIL %: 0.5 % (ref 0–0.43)
LYMPHOCYTES ABSOLUTE: 2 K/CU MM
LYMPHOCYTES RELATIVE PERCENT: 36.3 % (ref 24–44)
MCH RBC QN AUTO: 28 PG (ref 27–31)
MCHC RBC AUTO-ENTMCNC: 31.3 % (ref 32–36)
MCV RBC AUTO: 89.4 FL (ref 78–100)
MONOCYTES ABSOLUTE: 0.5 K/CU MM
MONOCYTES RELATIVE PERCENT: 9.8 % (ref 0–4)
NUCLEATED RBC %: 0 %
PDW BLD-RTO: 14.3 % (ref 11.7–14.9)
PLATELET # BLD: 241 K/CU MM (ref 140–440)
PMV BLD AUTO: 10 FL (ref 7.5–11.1)
POTASSIUM SERPL-SCNC: 4.5 MMOL/L (ref 3.5–5.1)
RBC # BLD: 4.82 M/CU MM (ref 4.6–6.2)
SEGMENTED NEUTROPHILS ABSOLUTE COUNT: 2.7 K/CU MM
SEGMENTED NEUTROPHILS RELATIVE PERCENT: 48.7 % (ref 36–66)
SODIUM BLD-SCNC: 131 MMOL/L (ref 135–145)
TOTAL IMMATURE NEUTOROPHIL: 0.03 K/CU MM
TOTAL NUCLEATED RBC: 0 K/CU MM
TOTAL PROTEIN: 7 GM/DL (ref 6.4–8.2)
WBC # BLD: 5.5 K/CU MM (ref 4–10.5)

## 2019-04-08 PROCEDURE — 73701 CT LOWER EXTREMITY W/DYE: CPT

## 2019-04-08 PROCEDURE — 93970 EXTREMITY STUDY: CPT

## 2019-04-08 PROCEDURE — 87040 BLOOD CULTURE FOR BACTERIA: CPT

## 2019-04-08 PROCEDURE — 6360000004 HC RX CONTRAST MEDICATION: Performed by: EMERGENCY MEDICINE

## 2019-04-08 PROCEDURE — 80053 COMPREHEN METABOLIC PANEL: CPT

## 2019-04-08 PROCEDURE — 73030 X-RAY EXAM OF SHOULDER: CPT

## 2019-04-08 PROCEDURE — 96374 THER/PROPH/DIAG INJ IV PUSH: CPT

## 2019-04-08 PROCEDURE — 99284 EMERGENCY DEPT VISIT MOD MDM: CPT

## 2019-04-08 PROCEDURE — 73590 X-RAY EXAM OF LOWER LEG: CPT

## 2019-04-08 PROCEDURE — 85025 COMPLETE CBC W/AUTO DIFF WBC: CPT

## 2019-04-08 PROCEDURE — 6360000002 HC RX W HCPCS: Performed by: EMERGENCY MEDICINE

## 2019-04-08 RX ORDER — OXYCODONE HYDROCHLORIDE AND ACETAMINOPHEN 5; 325 MG/1; MG/1
2 TABLET ORAL ONCE
Status: DISCONTINUED | OUTPATIENT
Start: 2019-04-08 | End: 2019-04-09 | Stop reason: HOSPADM

## 2019-04-08 RX ORDER — CEPHALEXIN 500 MG/1
500 CAPSULE ORAL 2 TIMES DAILY
Qty: 28 CAPSULE | Refills: 0 | Status: SHIPPED | OUTPATIENT
Start: 2019-04-08 | End: 2019-04-22

## 2019-04-08 RX ORDER — ONDANSETRON 4 MG/1
4 TABLET, ORALLY DISINTEGRATING ORAL ONCE
Status: DISCONTINUED | OUTPATIENT
Start: 2019-04-08 | End: 2019-04-09 | Stop reason: HOSPADM

## 2019-04-08 RX ORDER — MORPHINE SULFATE 4 MG/ML
4 INJECTION, SOLUTION INTRAMUSCULAR; INTRAVENOUS ONCE
Status: COMPLETED | OUTPATIENT
Start: 2019-04-08 | End: 2019-04-08

## 2019-04-08 RX ORDER — SODIUM CHLORIDE 0.9 % (FLUSH) 0.9 %
10 SYRINGE (ML) INJECTION
Status: DISCONTINUED | OUTPATIENT
Start: 2019-04-08 | End: 2019-04-09 | Stop reason: HOSPADM

## 2019-04-08 RX ORDER — OXYCODONE HYDROCHLORIDE AND ACETAMINOPHEN 5; 325 MG/1; MG/1
1 TABLET ORAL ONCE
Status: DISCONTINUED | OUTPATIENT
Start: 2019-04-08 | End: 2019-04-08

## 2019-04-08 RX ORDER — SULFAMETHOXAZOLE AND TRIMETHOPRIM 800; 160 MG/1; MG/1
1 TABLET ORAL 2 TIMES DAILY
Qty: 1 TABLET | Refills: 0 | Status: SHIPPED | OUTPATIENT
Start: 2019-04-08 | End: 2019-04-22

## 2019-04-08 RX ADMIN — MORPHINE SULFATE 4 MG: 4 INJECTION INTRAVENOUS at 20:31

## 2019-04-08 RX ADMIN — IOPAMIDOL 80 ML: 755 INJECTION, SOLUTION INTRAVENOUS at 21:48

## 2019-04-08 ASSESSMENT — PAIN DESCRIPTION - ORIENTATION
ORIENTATION_2: LEFT
ORIENTATION: RIGHT
ORIENTATION: RIGHT
ORIENTATION_2: LEFT

## 2019-04-08 ASSESSMENT — PAIN DESCRIPTION - LOCATION
LOCATION: ARM
LOCATION_2: LEG
LOCATION_2: LEG
LOCATION: ARM

## 2019-04-08 ASSESSMENT — PAIN DESCRIPTION - ONSET
ONSET: ON-GOING
ONSET_2: ON-GOING

## 2019-04-08 ASSESSMENT — PAIN DESCRIPTION - INTENSITY
RATING_2: 7
RATING_2: 8

## 2019-04-08 ASSESSMENT — PAIN DESCRIPTION - PAIN TYPE: TYPE: ACUTE PAIN

## 2019-04-08 ASSESSMENT — PAIN SCALES - GENERAL
PAINLEVEL_OUTOF10: 7
PAINLEVEL_OUTOF10: 10

## 2019-04-08 ASSESSMENT — PAIN DESCRIPTION - PROGRESSION
CLINICAL_PROGRESSION_2: NOT CHANGED
CLINICAL_PROGRESSION: GRADUALLY IMPROVING

## 2019-04-08 NOTE — ED NOTES
Three attempts made for IV access but was unsuccessful therefore US IV will be attempted when pt returns from imaging.        Donna Briceño RN  04/08/19 [id number]

## 2019-04-09 NOTE — ED PROVIDER NOTES
Triage Chief Complaint:   Arm Injury (right) and Leg Swelling (left)    [place]:  Orestes Morales is a 46 y.o. male that presents to the emergency department with bilateral lower extremity redness and swelling. He reports it progressively got worse now endorsing left lower extremity swelling and pain. Endorse fever yesterday and some chills. Having difficulty with ambulation. On review systems also endorsing right shoulder pain after trying to lift his arm he felt a pop in his right shoulder and now having pain with range of motion. Denies any new numbness or tingling. Currently not on any blood thinners.       ROS:  General:  No fevers, no chills, no weakness  Eyes:  No recent vison changes, no discharge  ENT:  No sore throat, no nasal congestion, no hearing changes  Cardiovascular:  No chest pain, no palpitations  Respiratory:  No shortness of breath, no cough, no wheezing  Gastrointestinal:  No pain, no nausea, no vomiting, no diarrhea  Musculoskeletal:  No muscle pain, +joint pain  Skin:  No rash, no pruritis, no easy bruising  Neurologic:  No speech problems, no headache, no extremity numbness, no extremity tingling, no extremity weakness  Psychiatric:  No anxiety  Genitourinary:  No dysuria, no hematuria  Endocrine:  No unexpected weight gain, no unexpected weight loss  Extremities:  +edema, +pain    Past Medical History:   Diagnosis Date    Anesthesia     \"Problems coming out of the anesthesia\"    Anxiety     Arthritis     Asthma     \"As a kid but outgrew this\"    Chronic back pain     Depression     Dizziness     Edema     Fibromyalgia     Fracture     Headache(784.0)     Hemorrhoid     Hernia, abdominal     Hx MRSA infection     positive culture 8/2014 from left foot    Migraine     Nausea & vomiting     Neuropathy     Obesity, Class III, BMI 40-49.9 (morbid obesity) (HCC)     Osteomyelitis (HCC)     hx finger    Pneumonia 1995    Poor circulation     Restless leg syndrome     Shortness of breath on exertion     6/14/2016- pt denies any sob with this interview    Type II or unspecified type diabetes mellitus with other specified manifestations, not stated as uncontrolled 4/8/2014    Type II or unspecified type diabetes mellitus without mention of complication, not stated as uncontrolled DX 2007    Ulcer of other part of foot 4/8/2014    'ulcer on left foot healed all up\"     Past Surgical History:   Procedure Laterality Date    COLONOSCOPY  1990's    DEBRIDEMENT  8/25/2014    left foot    ENDOSCOPY, COLON, DIAGNOSTIC  06-    FINGER SURGERY  9-11-11    Right Index Finger    FINGER SURGERY  01-16-12    RIght Index Finger-Removal of loose body, Reconstruction of Mallet Finger    FOOT SURGERY Right 06/01/2018    I&D right foot    HEMORRHOID SURGERY  2008    OTHER SURGICAL HISTORY Left 10/22/2013    I & D left foot    OTHER SURGICAL HISTORY  07/07/2017    I&D fingers X2 left hand.  I&D left forearm    OTHER SURGICAL HISTORY Left 07/08/2017    left hand debridement, left forearm incision and drainage     OTHER SURGICAL HISTORY Left 07/10/2017    Fingers I&D    OTHER SURGICAL HISTORY Left 07/14/2017    middle finger amputation revision    ROTATOR CUFF REPAIR  Last Done 7/18/2011    Left, Done Four Times     Family History   Problem Relation Age of Onset    Kidney Disease Mother         \"Kidney Failure, On Dialysis\"   [facility] Early Death Mother 39    Diabetes Father         \"Type II\"     Social History     Socioeconomic History    Marital status:      Spouse name: Not on file    Number of children: 3    Years of education: Not on file    Highest education level: Not on file   Occupational History    Not on file   Social Needs    Financial resource strain: Not on file    Food insecurity:     Worry: Not on file     Inability: Not on file    Transportation needs:     Medical: Not on file     Non-medical: Not on file   Tobacco Use    Smoking status: Current Every Day Smoker    Smokeless tobacco: Current User     Types: Snuff    Tobacco comment: \"Chew Grizzly Snuff Daily\"   Substance and Sexual Activity    Alcohol use: No    Drug use: Yes     Types: Marijuana    Sexual activity: Not on file   Lifestyle    Physical activity:     Days per week: Not on file     Minutes per session: Not on file    Stress: Not on file   Relationships    Social connections:     Talks on phone: Not on file     Gets together: Not on file     Attends Gnosticist service: Not on file     Active member of club or organization: Not on file     Attends meetings of clubs or organizations: Not on file     Relationship status: Not on file    Intimate partner violence:     Fear of current or ex partner: Not on file     Emotionally abused: Not on file     Physically abused: Not on file     Forced sexual activity: Not on file   Other Topics Concern    Not on file   Social History Narrative    Not on file     No current facility-administered medications for this encounter. Current Outpatient Medications   Medication Sig Dispense Refill    cephALEXin (KEFLEX) 500 MG capsule Take 1 capsule by mouth 2 times daily for 14 days 28 capsule 0    sulfamethoxazole-trimethoprim (BACTRIM DS) 800-160 MG per tablet Take 1 tablet by mouth 2 times daily for 14 days 1 tablet 0    omeprazole (PRILOSEC) 40 MG delayed release capsule Take 1 capsule by mouth daily 30 capsule 0    sucralfate (CARAFATE) 1 GM tablet Take 1 tablet by mouth 4 times daily 40 tablet 0    ondansetron (ZOFRAN ODT) 4 MG disintegrating tablet Take 1 tablet by mouth every 6 hours 20 tablet 0    dicyclomine (BENTYL) 10 MG capsule Take 2 capsules by mouth 4 times daily (before meals and nightly) 40 capsule 0    oxyCODONE-acetaminophen (PERCOCET) 5-325 MG per tablet Take 2 tablets by mouth every 8 hours as needed for Pain. [employer] acetaminophen (TYLENOL) 500 MG tablet Take 1,000 mg by mouth every 6 hours as needed for Pain      FLUoxetine (PROZAC) 20 MG capsule Take 20 mg by mouth daily      sulfamethoxazole-trimethoprim (BACTRIM) 400-80 MG/5ML injection Infuse intravenously daily      docusate sodium (COLACE) 100 MG capsule Take 100 mg by mouth daily as needed for Constipation      aspirin 81 MG chewable tablet Take 1 tablet by mouth daily 30 tablet 2    metFORMIN (GLUCOPHAGE) 1000 MG tablet Take 1 tablet by mouth 2 times daily (with meals) 60 tablet 3    atorvastatin (LIPITOR) 20 MG tablet Take 1 tablet by mouth nightly 30 tablet 3    glyBURIDE (DIABETA) 5 MG tablet Take 1 tablet by mouth 2 times daily (with meals) 60 tablet 1    escitalopram (LEXAPRO) 20 MG tablet Take 20 mg by mouth daily      tiZANidine (ZANAFLEX) 4 MG tablet Take 4 mg by mouth nightly as needed      albuterol sulfate  (90 Base) MCG/ACT inhaler Inhale 2 puffs into the lungs every 6 hours as needed for Wheezing or Shortness of Breath (or cough) Please include spacer with instructions for use. 1 Inhaler 0    naproxen (NAPROSYN) 500 MG tablet Take 1 tablet by mouth 2 times daily as needed for Pain 30 tablet 0    pregabalin (LYRICA) 100 MG capsule Take 1 capsule by mouth 2 times daily (Patient taking differently: Take 75 mg by mouth 2 times daily. Sara January ) 60 capsule 0    traZODone (DESYREL) 100 MG tablet Take 100 mg by mouth nightly as needed        Facility-Administered Medications Ordered in Other Encounters   Medication Dose Route Frequency Provider Last Rate Last Dose    lidocaine (XYLOCAINE) 4 % external solution   Topical Once Malena Xiong DPM         Allergies   Allergen Reactions    Robaxin [Methocarbamol] Swelling    Rocephin [Ceftriaxone Sodium-Lidocaine] Hives     Noted on 10/26 - developed extremity swelling and hives. No anaphylaxis.     Cantaloupe (Diagnostic) Other (See Comments)    Aspirin Nausea Only    Darvon [Propoxyphene] Nausea And Vomiting    Toradol [Ketorolac Tromethamine] Other (See Comments)     Sweats        Nursing Notes Reviewed    Physical Exam:  ED Triage Vitals [04/08/19 1602]   Enc Vitals Group      BP (!) 144/89      Pulse 75      Resp 17      Temp 98.4 °F (36.9 °C)      Temp Source Oral      SpO2 99 %      Weight 260 lb (117.9 kg)      Height 5' 11\" (1.803 m)      Head Circumference       Peak Flow       Pain Score       Pain Loc       Pain Edu? Excl. in [address]? My pulse ox interpretation is - normal    General appearance:  No acute distress. Skin:  Warm. Dry. Eye:  Extraocular movements intact. Ears, nose, mouth and throat:  Oral mucosa moist   Neck:  Trachea midline. Extremity: Left lower extremity markedly larger than right lower extremity. Erythema on bilateral lower legs tracking up the knee. Non-pitting edema noted. Tender and warm to touch. Right shoulder no deformity noted. Range of motion intact. Tenderness to anterior shoulder near greater trochanter. No erythema noted. Heart:  Regular rate and rhythm, normal S1 & S2, no extra heart sounds. Perfusion:  intact  Respiratory:  Lungs clear to auscultation bilaterally. Respirations nonlabored. Abdominal:  Normal bowel sounds. Soft. Nontender. Non distended. Back:  No CVA tenderness to palpation     Neurological:  Alert and oriented times 3. No focal neuro deficits.              Psychiatric:  Appropriate    I have reviewed and interpreted all of the currently available lab results from this visit (if applicable):  Results for orders placed or performed during the hospital encounter of 04/08/19   Culture blood 2   Result Value Ref Range    Specimen BLOOD     Special Requests NONE     Culture NO GROWTH AT 72 HOURS    CBC Auto Differential   Result Value Ref Range    WBC 5.5 4.0 - 10.5 K/CU MM    RBC 4.82 4.6 - 6.2 M/CU MM    Hemoglobin 13.5 13.5 - 18.0 GM/DL    Hematocrit 43.1 42 - 52 %    MCV 89.4 78 - 100 FL    MCH 28.0 27 - 31 PG    MCHC 31.3 (L) 32.0 - 36.0 %    RDW 14.3 11.7 - 14.9 %    Platelets 192 463 - 096 K/CU MM    MPV 10.0 7.5 - 11.1 FL Differential Type AUTOMATED DIFFERENTIAL     Segs Relative 48.7 36 - 66 %    Lymphocytes % 36.3 24 - 44 %    Monocytes % 9.8 (H) 0 - 4 %    Eosinophils % 4.0 (H) 0 - 3 %    Basophils % 0.7 0 - 1 %    Segs Absolute 2.7 K/CU MM    Lymphocytes # 2.0 K/CU MM    Monocytes # 0.5 K/CU MM    Eosinophils # 0.2 K/CU MM    Basophils # 0.0 K/CU MM    Nucleated RBC % 0.0 %    Total Nucleated RBC 0.0 K/CU MM    Total Immature Neutrophil 0.03 K/CU MM    Immature Neutrophil % 0.5 (H) 0 - 0.43 %   CMP   Result Value Ref Range    Sodium 131 (L) 135 - 145 MMOL/L    Potassium 4.5 3.5 - 5.1 MMOL/L    Chloride 95 (L) 99 - 110 mMol/L    CO2 26 21 - 32 MMOL/L    BUN 8 6 - 23 MG/DL    CREATININE 0.6 (L) 0.9 - 1.3 MG/DL    Glucose 231 (H) 70 - 99 MG/DL    Calcium 8.5 8.3 - 10.6 MG/DL    Alb 3.7 3.4 - 5.0 GM/DL    Total Protein 7.0 6.4 - 8.2 GM/DL    Total Bilirubin 0.3 0.0 - 1.0 MG/DL    ALT 8 (L) 10 - 40 U/L    AST 18 15 - 37 IU/L    Alkaline Phosphatase 100 40 - 129 IU/L    GFR Non-African American >60 >60 mL/min/1.73m2    GFR African American >60 >60 mL/min/1.73m2    Anion Gap 10 4 - 16      Radiographs (if obtained):  [] The following radiograph was interpreted by myself in the absence of a radiologist:   [] Radiologist's Report Reviewed:  CT TIBIA FIBULA LEFT W CONTRAST   Final Result   Diffuse subcutaneous edema and soft tissue swelling. No focal drainable   fluid collection. VL DUP LOWER EXTREMITY VENOUS BILATERAL   Final Result   No evidence of DVT in either lower extremity. XR SHOULDER RIGHT (MIN 2 VIEWS)   Final Result   1. No acute bony or joint abnormality in the right tibia and fibula, left   tibia and fibula or right shoulder   2. Degenerative changes seen in the ankles bilaterally         XR TIBIA FIBULA LEFT (2 VIEWS)   Final Result   1. No acute bony or joint abnormality in the right tibia and fibula, left   tibia and fibula or right shoulder   2.  Degenerative changes seen in the ankles bilaterally XR TIBIA FIBULA RIGHT (2 VIEWS)   Final Result   1. No acute bony or joint abnormality in the right tibia and fibula, left   tibia and fibula or right shoulder   2. Degenerative changes seen in the ankles bilaterally               EKG (if obtained): (All EKG's are interpreted by myself in the absence of a cardiologist)    Chart review shows recent radiographs:  Xr Shoulder Right (min 2 Views)    Result Date: 4/8/2019  EXAMINATION: 4 XRAY VIEWS OF THE LEFT TIBIA AND FIBULA; 4 XRAY VIEWS OF THE RIGHT TIBIA AND FIBULA; 3 XRAY VIEWS OF THE RIGHT SHOULDER 4/8/2019 6:11 pm COMPARISON: None. HISTORY: ORDERING SYSTEM PROVIDED HISTORY: leg pain TECHNOLOGIST PROVIDED HISTORY: Reason for exam:->leg pain Ordering Physician Provided Reason for Exam: edema lower right and left legs; ORDERING SYSTEM PROVIDED HISTORY: pain TECHNOLOGIST PROVIDED HISTORY: Reason for exam:->pain Ordering Physician Provided Reason for Exam: edema lower right and left legs ; ORDERING SYSTEM PROVIDED HISTORY: right shoulder pain TECHNOLOGIST PROVIDED HISTORY: Reason for exam:->right shoulder pain Ordering Physician Provided Reason for Exam: right shoulder pain after helping lift wife out of wheelchair, hx tears FINDINGS: Left tibia and fibula: Anatomic alignment. No fracture. No destructive bony abnormality. Degenerative changes seen in the ankle. Right shoulder: Anatomic alignment. AC joint degenerative changes. No fractures. Right tibia and fibula: Anatomic alignment. No fracture. Degenerative changes seen in the ankle. 1. No acute bony or joint abnormality in the right tibia and fibula, left tibia and fibula or right shoulder 2. Degenerative changes seen in the ankles bilaterally     Xr Tibia Fibula Left (2 Views)    Result Date: 4/8/2019  EXAMINATION: 4 XRAY VIEWS OF THE LEFT TIBIA AND FIBULA; 4 XRAY VIEWS OF THE RIGHT TIBIA AND FIBULA; 3 XRAY VIEWS OF THE RIGHT SHOULDER 4/8/2019 6:11 pm COMPARISON: None.  HISTORY: ORDERING SYSTEM PROVIDED HISTORY: leg pain TECHNOLOGIST PROVIDED HISTORY: Reason for exam:->leg pain Ordering Physician Provided Reason for Exam: edema lower right and left legs; ORDERING SYSTEM PROVIDED HISTORY: pain TECHNOLOGIST PROVIDED HISTORY: Reason for exam:->pain Ordering Physician Provided Reason for Exam: edema lower right and left legs ; ORDERING SYSTEM PROVIDED HISTORY: right shoulder pain TECHNOLOGIST PROVIDED HISTORY: Reason for exam:->right shoulder pain Ordering Physician Provided Reason for Exam: right shoulder pain after helping lift wife out of wheelchair, hx tears FINDINGS: Left tibia and fibula: Anatomic alignment. No fracture. No destructive bony abnormality. Degenerative changes seen in the ankle. Right shoulder: Anatomic alignment. AC joint degenerative changes. No fractures. Right tibia and fibula: Anatomic alignment. No fracture. Degenerative changes seen in the ankle. 1. No acute bony or joint abnormality in the right tibia and fibula, left tibia and fibula or right shoulder 2. Degenerative changes seen in the ankles bilaterally     Xr Tibia Fibula Right (2 Views)    Result Date: 4/8/2019  EXAMINATION: 4 XRAY VIEWS OF THE LEFT TIBIA AND FIBULA; 4 XRAY VIEWS OF THE RIGHT TIBIA AND FIBULA; 3 XRAY VIEWS OF THE RIGHT SHOULDER 4/8/2019 6:11 pm COMPARISON: None. HISTORY: ORDERING SYSTEM PROVIDED HISTORY: leg pain TECHNOLOGIST PROVIDED HISTORY: Reason for exam:->leg pain Ordering Physician Provided Reason for Exam: edema lower right and left legs; ORDERING SYSTEM PROVIDED HISTORY: pain TECHNOLOGIST PROVIDED HISTORY: Reason for exam:->pain Ordering Physician Provided Reason for Exam: edema lower right and left legs ; ORDERING SYSTEM PROVIDED HISTORY: right shoulder pain TECHNOLOGIST PROVIDED HISTORY: Reason for exam:->right shoulder pain Ordering Physician Provided Reason for Exam: right shoulder pain after helping lift wife out of wheelchair, hx tears FINDINGS: Left tibia and fibula:  Anatomic antibiotics and a prescription for diabetic infections. I see no indication for admission given results and physical presentation. Return precautions were given. Clinical Impression:  1. Cellulitis of lower extremity, unspecified laterality      Disposition referral (if applicable): [address] [place] [address]  [phone]          Disposition medications (if applicable):  Discharge Medication List as of 4/8/2019 10:49 PM      START taking these medications    Details   cephALEXin (KEFLEX) 500 MG capsule Take 1 capsule by mouth 2 times daily for 14 days, Disp-28 capsule, R-0Print      sulfamethoxazole-trimethoprim (BACTRIM DS) 800-160 MG per tablet Take 1 tablet by mouth 2 times daily for 14 days, Disp-1 tablet, R-0Print             Comment: Please note this report has been produced using speech recognition software and may contain errors related to that system including errors in grammar, punctuation, and spelling, as well as words and phrases that may be inappropriate. If there are any questions or concerns please feel free to contact the dictating provider for clarification.         Kassandra Peralta MD  04/11/19 8758

## 2019-04-13 LAB
CULTURE: NORMAL
Lab: NORMAL
SPECIMEN: NORMAL

## 2019-05-08 ENCOUNTER — HOSPITAL ENCOUNTER (EMERGENCY)
Age: 54
Discharge: HOME | End: 2019-05-08
Payer: MEDICARE

## 2019-05-08 VITALS
BODY MASS INDEX: 47.2 KG/M2 | HEART RATE: 89 BPM | WEIGHT: 250 LBS | HEIGHT: 61 IN | OXYGEN SATURATION: 98 % | SYSTOLIC BLOOD PRESSURE: 133 MMHG | RESPIRATION RATE: 18 BRPM | TEMPERATURE: 97.6 F | DIASTOLIC BLOOD PRESSURE: 82 MMHG

## 2019-05-08 DIAGNOSIS — S91.109A OPEN WOUND OF TOE, INITIAL ENCOUNTER: Primary | ICD-10-CM

## 2019-05-08 PROCEDURE — 99282 EMERGENCY DEPT VISIT SF MDM: CPT

## 2019-05-08 PROCEDURE — 6370000000 HC RX 637 (ALT 250 FOR IP): Performed by: PHYSICIAN ASSISTANT

## 2019-05-08 RX ORDER — CLINDAMYCIN HYDROCHLORIDE 300 MG/1
300 CAPSULE ORAL 3 TIMES DAILY
Qty: 30 CAPSULE | Refills: 0 | Status: SHIPPED | OUTPATIENT
Start: 2019-05-08 | End: 2019-05-18

## 2019-05-08 RX ORDER — DIAPER,BRIEF,INFANT-TODD,DISP
EACH MISCELLANEOUS ONCE
Status: COMPLETED | OUTPATIENT
Start: 2019-05-08 | End: 2019-05-08

## 2019-05-08 RX ORDER — CLINDAMYCIN HYDROCHLORIDE 150 MG/1
300 CAPSULE ORAL ONCE
Status: COMPLETED | OUTPATIENT
Start: 2019-05-08 | End: 2019-05-08

## 2019-05-08 RX ORDER — LIDOCAINE 4 G/G
1 PATCH TOPICAL ONCE
Status: DISCONTINUED | OUTPATIENT
Start: 2019-05-08 | End: 2019-05-09 | Stop reason: HOSPADM

## 2019-05-08 RX ORDER — LIDOCAINE 50 MG/G
1 PATCH TOPICAL DAILY
Qty: 30 PATCH | Refills: 0 | Status: SHIPPED | OUTPATIENT
Start: 2019-05-08 | End: 2019-06-03

## 2019-05-08 RX ADMIN — BACITRACIN ZINC 1 G: 500 OINTMENT TOPICAL at 22:51

## 2019-05-08 RX ADMIN — CLINDAMYCIN HYDROCHLORIDE 300 MG: 150 CAPSULE ORAL at 22:51

## 2019-05-08 ASSESSMENT — PAIN DESCRIPTION - PAIN TYPE: TYPE: ACUTE PAIN

## 2019-05-08 ASSESSMENT — PAIN DESCRIPTION - ORIENTATION: ORIENTATION: LEFT

## 2019-05-08 ASSESSMENT — PAIN DESCRIPTION - LOCATION: LOCATION: TOE (COMMENT WHICH ONE)

## 2019-05-09 NOTE — DISCHARGE INSTR - COC
Continuity of Care Form    Patient Name: Vidya Johnson   :  1965  MRN:  [id number]    Admit date:  2019  Discharge date:  ***    Code Status Order: Prior   Advance Directives:     Admitting Physician:  No admitting provider for patient encounter. PCP: Sulma Gudino    Discharging Nurse: [facility] Unit/Room#: ED01/ED-01  Discharging Unit Phone Number: ***    Emergency Contact:   Extended Emergency Contact Information  Primary Emergency Contact: [place], [address] [facility] Lauren Clinton of [address] [place] [address] Phone: [phone]  Relation: Child    Past Surgical History:  Past Surgical History:   Procedure Laterality Date    COLONOSCOPY      DEBRIDEMENT  2014    left foot    ENDOSCOPY, COLON, DIAGNOSTIC  2016    FINGER SURGERY  11    Right Index Finger    FINGER SURGERY  12    RIght Index Finger-Removal of loose body, Reconstruction of Mallet Finger    FOOT SURGERY Right 2018    I&D right foot    HEMORRHOID SURGERY      OTHER SURGICAL HISTORY Left 10/22/2013    I & D left foot    OTHER SURGICAL HISTORY  2017    I&D fingers X2 left hand.  I&D left forearm    OTHER SURGICAL HISTORY Left 2017    left hand debridement, left forearm incision and drainage     OTHER SURGICAL HISTORY Left 07/10/2017    Fingers I&D    OTHER SURGICAL HISTORY Left 2017    middle finger amputation revision    ROTATOR CUFF REPAIR  Last Done 2011    Left, Done Four Times       Immunization History:   Immunization History   Administered Date(s) Administered    Influenza Vaccine, unspecified formulation 10/19/2015, 2016    Influenza Virus Vaccine 10/23/2013    Pneumococcal Polysaccharide (Skjkgptyc00) 10/23/2013    Tdap (Boostrix, Adacel) 2016       Active Problems:  Patient Active Problem List   Diagnosis Code    Finger infection right index finger L08.9    Mallet finger M20.019    Diabetic foot infection ([address] 19 250 lb (113.4 kg)     Mental Status:  {IP PT MENTAL STATUS:11185}    IV Access:  { ROHAN IV ACCESS:436812034}    Nursing Mobility/ADLs:  Walking   {CHP DME SLQL:280399172}  Transfer  {CHP DME UBWM:951570789}  Bathing  {CHP DME VSWX:150339044}  Dressing  {CHP DME VGXS:172214649}  Toileting  {CHP DME KIVL:358376794}  Feeding  {CHP DME MNVY:277232294}  Med Admin  {P DME NEDT:199368212}  Med Delivery   { ROHAN MED Delivery:778239713}    Wound Care Documentation and Therapy:        Elimination:  Continence:   · Bowel: {YES / XN:48430}  · Bladder: {YES / KU:35638}  Urinary Catheter: {Urinary Catheter:602433938}   Colostomy/Ileostomy/Ileal Conduit: {YES / JA:29984}       Date of Last BM: ***  No intake or output data in the 24 hours ending 19 2240  No intake/output data recorded.     Safety Concerns:     [address] Safety Concerns:136485437}    Impairments/Disabilities:      [address] Impairments/Disabilities:288952576}    Nutrition Therapy:  Current Nutrition Therapy:   [address] Diet List:113864943}    Routes of Feeding: {P DME Other Feedings:869912770}  Liquids: {Slp liquid thickness:88326}  Daily Fluid Restriction: {CHP DME Yes amt example:369042614}  Last Modified Barium Swallow with Video (Video Swallowing Test): {Done Not Done GHSL:025669068}    Treatments at the Time of Hospital Discharge:   Respiratory Treatments: ***  Oxygen Therapy:  {Therapy; copd oxygen:05519}  Ventilator:    {[facility] Vent KKSI:824322456}    Rehab Therapies: {THERAPEUTIC INTERVENTION:6908179957}  Weight Bearing Status/Restrictions: 508 99 Fahrenheit Weight Bearin}  Other Medical Equipment (for information only, NOT a DME order):  {EQUIPMENT:493170738}  Other Treatments: ***    Patient's personal belongings (please select all that are sent with patient):  {[facility] DME Belongings:732349074}    RN SIGNATURE:  {Esignature:810697111}    CASE MANAGEMENT/SOCIAL WORK SECTION    Inpatient Status Date: ***    Readmission Risk Assessment Score:  Readmission Risk              Risk of Unplanned Readmission:        0           Discharging to Facility/ Agency   · Name:   · Address:  · Phone:  · Fax:    Dialysis Facility (if applicable)   · Name:  · Address:  · Dialysis Schedule:  · Phone:  · Fax:    / signature: {Esiwarrenature:073316305}    PHYSICIAN SECTION    Prognosis: {Prognosis:1678029077}    Condition at Discharge: [address] Patient Condition:026543074}    Rehab Potential (if transferring to Rehab): {Prognosis:6381322138}    Recommended Labs or Other Treatments After Discharge: ***    Physician Certification: I certify the above information and transfer of Robbie Turner  is necessary for the continuing treatment of the diagnosis listed and that he requires {Admit to Appropriate Level of Care:80262} for {GREATER/LESS:562964676} 30 days.      Update Admission H&P: {[facility]}    PHYSICIAN SIGNATURE:  {Esignature:926474986}

## 2019-05-09 NOTE — ED PROVIDER NOTES
eMERGENCY dEPARTMENT eNCOUnter        PCP: Kermit Kenny    CHIEF COMPLAINT    Chief Complaint   Patient presents with    Laceration     left great toe        HPI    Talya Lynch is a 46 y.o. male who presents with concerns for laceration to his left great toe. Onset was prior to arrival. Patient states he is concerned because he is diabetic. Patient is actually to see wound care quite a bit in the past. Has multiple partial finger amputations. Patient states his daughter noted that he had a cut to his toe and suggested he come in. Unsure what he stepped on. Denies any other injuries or trauma. No distal numbness, tingling, weakness, functional/motor deficit. Pt denies foreign body sensation. Up to date Tetanus Status    REVIEW OF SYSTEMS    General:   Denies symptoms preceding injury. Skin: + Laceration. SEE HPI  Musculoskeletal:  No distal numbness, tingling. No obvious tendon or motor deficits. Denies any other musculoskeletal injuries or skin trauma.     All other review of systems are negative  See HPI and nursing notes for additional information     PAST MEDICAL & SURGICAL HISTORY    Past Medical History:   Diagnosis Date    Anesthesia     \"Problems coming out of the anesthesia\"    Anxiety     Arthritis     Asthma     \"As a kid but outgrew this\"    Chronic back pain     Depression     Dizziness     Edema     Fibromyalgia     Fracture     Headache(784.0)     Hemorrhoid     Hernia, abdominal     Hx MRSA infection     positive culture 8/2014 from left foot    Migraine     Nausea & vomiting     Neuropathy     Obesity, Class III, BMI 40-49.9 (morbid obesity) ([facility] [address]     Osteomyelitis (HCC)     hx finger    Pneumonia 1995    Poor circulation     Restless leg syndrome     Shortness of breath on exertion     6/14/2016- pt denies any sob with this interview    Type II or unspecified type diabetes mellitus with other specified manifestations, not stated as uncontrolled 4/8/2014    Type II or unspecified type diabetes mellitus without mention of complication, not stated as uncontrolled DX 2007    Ulcer of other part of foot 4/8/2014    'ulcer on left foot healed all up\"     Past Surgical History:   Procedure Laterality Date    COLONOSCOPY  1990's    DEBRIDEMENT  8/25/2014    left foot    ENDOSCOPY, COLON, DIAGNOSTIC  06-    FINGER SURGERY  9-11-11    Right Index Finger    FINGER SURGERY  01-16-12    RIght Index Finger-Removal of loose body, Reconstruction of Mallet Finger    FOOT SURGERY Right 06/01/2018    I&D right foot    HEMORRHOID SURGERY  2008    OTHER SURGICAL HISTORY Left 10/22/2013    I & D left foot    OTHER SURGICAL HISTORY  07/07/2017    I&D fingers X2 left hand. I&D left forearm    OTHER SURGICAL HISTORY Left 07/08/2017    left hand debridement, left forearm incision and drainage     OTHER SURGICAL HISTORY Left 07/10/2017    Fingers I&D    OTHER SURGICAL HISTORY Left 07/14/2017    middle finger amputation revision    ROTATOR CUFF REPAIR  Last Done 7/18/2011    Left, Done Four Times       CURRENT MEDICATIONS    Current Outpatient Rx   Medication Sig Dispense Refill    clindamycin (CLEOCIN) 300 MG capsule Take 1 capsule by mouth 3 times daily for 10 days 30 capsule 0    lidocaine (LIDODERM) 5 % Place 1 patch onto the skin daily 12 hours on, 12 hours off. 30 patch 0    omeprazole (PRILOSEC) 40 MG delayed release capsule Take 1 capsule by mouth daily 30 capsule 0    sucralfate (CARAFATE) 1 GM tablet Take 1 tablet by mouth 4 times daily 40 tablet 0    ondansetron (ZOFRAN ODT) 4 MG disintegrating tablet Take 1 tablet by mouth every 6 hours 20 tablet 0    dicyclomine (BENTYL) 10 MG capsule Take 2 capsules by mouth 4 times daily (before meals and nightly) 40 capsule 0    oxyCODONE-acetaminophen (PERCOCET) 5-325 MG per tablet Take 2 tablets by mouth every 8 hours as needed for Pain. Felicitas Arnold acetaminophen (TYLENOL) 500 MG tablet Take 1,000 mg by mouth every 6 hours as needed for Pain      FLUoxetine (PROZAC) 20 MG capsule Take 20 mg by mouth daily      sulfamethoxazole-trimethoprim (BACTRIM) 400-80 MG/5ML injection Infuse intravenously daily      docusate sodium (COLACE) 100 MG capsule Take 100 mg by mouth daily as needed for Constipation      aspirin 81 MG chewable tablet Take 1 tablet by mouth daily 30 tablet 2    metFORMIN (GLUCOPHAGE) 1000 MG tablet Take 1 tablet by mouth 2 times daily (with meals) 60 tablet 3    atorvastatin (LIPITOR) 20 MG tablet Take 1 tablet by mouth nightly 30 tablet 3    glyBURIDE (DIABETA) 5 MG tablet Take 1 tablet by mouth 2 times daily (with meals) 60 tablet 1    escitalopram (LEXAPRO) 20 MG tablet Take 20 mg by mouth daily      tiZANidine (ZANAFLEX) 4 MG tablet Take 4 mg by mouth nightly as needed      albuterol sulfate  (90 Base) MCG/ACT inhaler Inhale 2 puffs into the lungs every 6 hours as needed for Wheezing or Shortness of Breath (or cough) Please include spacer with instructions for use. 1 Inhaler 0    naproxen (NAPROSYN) 500 MG tablet Take 1 tablet by mouth 2 times daily as needed for Pain 30 tablet 0    pregabalin (LYRICA) 100 MG capsule Take 1 capsule by mouth 2 times daily (Patient taking differently: Take 75 mg by mouth 2 times daily. Hector Ravin ) 60 capsule 0    traZODone (DESYREL) 100 MG tablet Take 100 mg by mouth nightly as needed          ALLERGIES    Allergies   Allergen Reactions    Robaxin [Methocarbamol] Swelling    Rocephin [Ceftriaxone Sodium-Lidocaine] Hives     Noted on 10/26 - developed extremity swelling and hives. No anaphylaxis.     Cantaloupe (Diagnostic) Other (See Comments)    Aspirin Nausea Only    Darvon [Propoxyphene] Nausea And Vomiting    Toradol [Ketorolac Tromethamine] Other (See Comments)     Sweats        SOCIAL & FAMILY HISTORY    Social History     Socioeconomic History    Marital status:      Spouse name: None    Number of children: 4    Years of education: None    Highest education level: None   Occupational History    None   Social Needs    Financial resource strain: None    Food insecurity:     Worry: None     Inability: None    Transportation needs:     Medical: None     Non-medical: None   Tobacco Use    Smoking status: Current Every Day Smoker    Smokeless tobacco: Current User     Types: Snuff    Tobacco comment: \"Chew Grizzly Snuff Daily\"   Substance and Sexual Activity    Alcohol use: No    Drug use: Yes     Types: Marijuana    Sexual activity: None   Lifestyle    Physical activity:     Days per week: None     Minutes per session: None    Stress: None   Relationships    Social connections:     Talks on phone: None     Gets together: None     Attends Caodaism service: None     Active member of club or organization: None     Attends meetings of clubs or organizations: None     Relationship status: None    Intimate partner violence:     Fear of current or ex partner: None     Emotionally abused: None     Physically abused: None     Forced sexual activity: None   Other Topics Concern    None   Social History Narrative    None     Family History   Problem Relation Age of Onset    Kidney Disease Mother         \"Kidney Failure, On Dialysis\"    Early Death Mother 39    Diabetes Father         \"Type II\"           PHYSICAL EXAM    VITAL SIGNS: /82   Pulse 89   Temp 97.6 °F (36.4 °C)   Resp 18   Ht 5' 1\" (1.549 m)   Wt 250 lb (113.4 kg)   SpO2 98%   BMI 47.24 kg/m²   Constitutional:  Well developed, Appears comfortable. Elevated BMI  HEENT:  Normocephalic, Atraumatic. PERRL, EOMI. Ear canals, nasal passages, oropharynx clear of blood or clear fluid. Musculoskeletal:  No gross deformities. No motor deficits. Distal sensation and capillary refill intact. Vascular: Distal pulses and capillary refill intact. Integument:    + Appears to be a callus to the left great toe that has been deroofed. Not actual deep laceration into the dermis.  This callus is hanging on by just a small attachment point. Underneath this the skin appears to be friable but not actively bleeding. No evidence of surrounding redness or swelling or drainage noted. This is at the plantar aspect of the great toe. Superficial in nature, no evidence of deep soft tissue injury distally neurovascular intact. Full range of motion of all joints in the foot and ankle. Soft compartments to the left lower extremity. Neurologic:  Awake and alert, normal flow of speech. CN 2-12 intact. Psychiatric: Cooperative, pleasant affect        ED COURSE & MEDICAL DECISION MAKING       Vital signs and nursing notes reviewed during ED course. I have independently evaluated this patient. All pertinent Lab data and radiographic results reviewed with patient at bedside. The patient and/or the family were informed of the results of any tests/labs/imaging, the treatment plan, and time was allotted to answer questions. I discussed possibility of infection, retained foreign body, tendon injury, nerve injury. Patient presents as above. Is afebrile 98% on room air. Not tachycardic. Patient presents with wound to the left great toe patient was concerned about laceration actually appears to be a direct callus. Patient has significant history of ear infections. Dissection seen by us previously for cellulitis that he states is greatly resolved. Patient has recently been on Bactrim and Keflex therefore we'll prophylactically put patient on clindamycin discussed expectant management, wound care. Patient is well-known to the wound care clinic. Discussed with him calling tomorrow to make an appointment to be rechecked by them this week to reevaluate the wound to make sure that is healing and not getting worse. These be closed, this is a callus to fall off over time. See no evidence of active infection or cellulitis at this time.     Of no patient did mention while he was here that he has had problems with his right shoulder ongoing for months to years. He was wondering if that helps and give follow-up to an orthopedist. I did refer her to an orthopedist and provided Lidoderm patches because he is to use these for pain relief and he seemed to help quite a bit. This is not new or change. This is been chronic in nature. Clinical  IMPRESSION    1. Open wound of toe, initial encounter        Wound care instructions discussed with patient today. Wound check in 2-3 days. Diagnosis and plan discussed in detail with patient who understands and agrees. Return to emergency Department precautions were discussed in detail with patient who understands and agrees. Comment: Please note this report has been produced using speech recognition software and may contain errors related to that system including errors in grammar, punctuation, and spelling, as well as words and phrases that may be inappropriate. If there are any questions or concerns please feel free to contact the dictating provider for clarification.          Meg German PA-C  05/08/19 [id number]

## 2019-05-15 ENCOUNTER — OFFICE VISIT (OUTPATIENT)
Dept: ORTHOPEDIC SURGERY | Age: 54
End: 2019-05-15
Payer: MEDICARE

## 2019-05-15 VITALS
WEIGHT: 283 LBS | HEART RATE: 88 BPM | OXYGEN SATURATION: 99 % | HEIGHT: 71 IN | RESPIRATION RATE: 14 BRPM | BODY MASS INDEX: 39.62 KG/M2

## 2019-05-15 DIAGNOSIS — M75.101 TEAR OF RIGHT ROTATOR CUFF, UNSPECIFIED TEAR EXTENT: Primary | ICD-10-CM

## 2019-05-15 PROCEDURE — 4004F PT TOBACCO SCREEN RCVD TLK: CPT | Performed by: PHYSICIAN ASSISTANT

## 2019-05-15 PROCEDURE — 3017F COLORECTAL CA SCREEN DOC REV: CPT | Performed by: PHYSICIAN ASSISTANT

## 2019-05-15 PROCEDURE — G8427 DOCREV CUR MEDS BY ELIG CLIN: HCPCS | Performed by: PHYSICIAN ASSISTANT

## 2019-05-15 PROCEDURE — 99202 OFFICE O/P NEW SF 15 MIN: CPT | Performed by: PHYSICIAN ASSISTANT

## 2019-05-15 PROCEDURE — G8417 CALC BMI ABV UP PARAM F/U: HCPCS | Performed by: PHYSICIAN ASSISTANT

## 2019-05-15 RX ORDER — HYDROCODONE BITARTRATE AND ACETAMINOPHEN 5; 325 MG/1; MG/1
1 TABLET ORAL 2 TIMES DAILY PRN
Qty: 14 TABLET | Refills: 0 | Status: SHIPPED | OUTPATIENT
Start: 2019-05-15 | End: 2019-05-22

## 2019-05-15 NOTE — PROGRESS NOTES
I reviewed and agree with the portions of the HPI, review of systems, vital documentation and plan performed by my staff and have added/addended where appropriate. CHIEF COMPLAINT: Right shoulder pain        History:    Severa Gemma is a 46 y.o. right handed male referred by emergency department for evaluation and treatment of Right shoulder pain. This is evaluated as a personal injury. Patient states that he was lifting wife in her wheelchair and felt a popping, and heard a cracking in the shoulder. He has pain radiating down his deltoid to his elbow. The pain is described as aching, sharp and constant. The pain began several weeks ago. There was not an injury. Pain is rated as a 9/10 . Pain is located biceps tendon. The symptoms are worse with lifting. Symptoms improve with cold. Limited activities include lifting. severe weakness reported. The patient does not report night pain. The patient has not had PT. The patient has not had an injection. The patient has tried NSAIDs. The patient has tried ice. 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 Prior rotator cuff surgery on the left shoulder ×3. Outside reports reviewed:  ER records.     Past Medical History:   Diagnosis Date    Anesthesia     \"Problems coming out of the anesthesia\"    Anxiety     Arthritis     Asthma     \"As a kid but outgrew this\"    Chronic back pain     Depression     Dizziness     Edema     Fibromyalgia     Fracture     Headache(784.0)     Hemorrhoid     Hernia, abdominal     Hx MRSA infection     positive culture 8/2014 from left foot    Migraine     Nausea & vomiting     Neuropathy     Obesity, Class III, BMI 40-49.9 (morbid obesity) ([facility] [address]     Osteomyelitis (HCC)     hx finger    Pneumonia 1995    Poor circulation     Restless leg syndrome     Shortness of breath on exertion     6/14/2016- pt denies any sob with this interview    Type II or unspecified type diabetes mellitus with other specified manifestations, not stated as uncontrolled 4/8/2014    Type II or unspecified type diabetes mellitus without mention of complication, not stated as uncontrolled DX 2007    Ulcer of other part of foot 4/8/2014    'ulcer on left foot healed all up\"       Past Surgical History:   Procedure Laterality Date    COLONOSCOPY  1990's    DEBRIDEMENT  8/25/2014    left foot    ENDOSCOPY, COLON, DIAGNOSTIC  06-    FINGER SURGERY  9-11-11    Right Index Finger    FINGER SURGERY  01-16-12    RIght Index Finger-Removal of loose body, Reconstruction of Mallet Finger    FOOT SURGERY Right 06/01/2018    I&D right foot    HEMORRHOID SURGERY  2008    OTHER SURGICAL HISTORY Left 10/22/2013    I & D left foot    OTHER SURGICAL HISTORY  07/07/2017    I&D fingers X2 left hand.  I&D left forearm    OTHER SURGICAL HISTORY Left 07/08/2017    left hand debridement, left forearm incision and drainage     OTHER SURGICAL HISTORY Left 07/10/2017    Fingers I&D    OTHER SURGICAL HISTORY Left 07/14/2017    middle finger amputation revision    ROTATOR CUFF REPAIR  Last Done 7/18/2011    Left, Done Four Times       Current Outpatient Medications on File Prior to Visit   Medication Sig Dispense Refill    omeprazole (PRILOSEC) 40 MG delayed release capsule Take 1 capsule by mouth daily 30 capsule 0    ondansetron (ZOFRAN ODT) 4 MG disintegrating tablet Take 1 tablet by mouth every 6 hours 20 tablet 0    acetaminophen (TYLENOL) 500 MG tablet Take 1,000 mg by mouth every 6 hours as needed for Pain      FLUoxetine (PROZAC) 20 MG capsule Take 20 mg by mouth daily      docusate sodium (COLACE) 100 MG capsule Take 100 mg by mouth daily as needed for Constipation      metFORMIN (GLUCOPHAGE) 1000 MG tablet Take 1 tablet by mouth 2 times daily (with meals) 60 tablet 3    atorvastatin (LIPITOR) 20 MG tablet Take 1 tablet by mouth nightly 30 tablet 3    escitalopram (LEXAPRO) 20 MG tablet Take 20 mg by mouth daily      tiZANidine (ZANAFLEX) 4 MG tablet Take 4 mg by mouth nightly as needed      pregabalin (LYRICA) 100 MG capsule Take 1 capsule by mouth 2 times daily (Patient taking differently: Take 75 mg by mouth 2 times daily. Sussy Patel ) 60 capsule 0    traZODone (DESYREL) 100 MG tablet Take 100 mg by mouth nightly as needed       clindamycin (CLEOCIN) 300 MG capsule Take 1 capsule by mouth 3 times daily for 10 days 30 capsule 0    lidocaine (LIDODERM) 5 % Place 1 patch onto the skin daily 12 hours on, 12 hours off. 30 patch 0    sucralfate (CARAFATE) 1 GM tablet Take 1 tablet by mouth 4 times daily 40 tablet 0    dicyclomine (BENTYL) 10 MG capsule Take 2 capsules by mouth 4 times daily (before meals and nightly) 40 capsule 0    oxyCODONE-acetaminophen (PERCOCET) 5-325 MG per tablet Take 2 tablets by mouth every 8 hours as needed for Pain. [place] sulfamethoxazole-trimethoprim (BACTRIM) 400-80 MG/5ML injection Infuse intravenously daily      aspirin 81 MG chewable tablet Take 1 tablet by mouth daily 30 tablet 2    albuterol sulfate  (90 Base) MCG/ACT inhaler Inhale 2 puffs into the lungs every 6 hours as needed for Wheezing or Shortness of Breath (or cough) Please include spacer with instructions for use. 1 Inhaler 0    naproxen (NAPROSYN) 500 MG tablet Take 1 tablet by mouth 2 times daily as needed for Pain 30 tablet 0     No current facility-administered medications on file prior to visit. Allergies   Allergen Reactions    Robaxin [Methocarbamol] Swelling    Rocephin [Ceftriaxone Sodium-Lidocaine] Hives     Noted on 10/26 - developed extremity swelling and hives. No anaphylaxis.     Cantaloupe (Diagnostic) Other (See Comments)    Aspirin Nausea Only    Darvon [Propoxyphene] Nausea And Vomiting    Toradol [Ketorolac Tromethamine] Other (See Comments)     Sweats        Social History     Socioeconomic History    Marital status:      Spouse name: Not on file    Number of children: 4  Years of education: Not on file    Highest education level: Not on file   Occupational History    Not on file   Social Needs    Financial resource strain: Not on file    Food insecurity:     Worry: Not on file     Inability: Not on file    Transportation needs:     Medical: Not on file     Non-medical: Not on file   Tobacco Use    Smoking status: Current Every Day Smoker    Smokeless tobacco: Current User     Types: Snuff    Tobacco comment: \"Chew Grizzly Snuff Daily\"   Substance and Sexual Activity    Alcohol use: No    Drug use: Yes     Types: Marijuana    Sexual activity: Not on file   Lifestyle    Physical activity:     Days per week: Not on file     Minutes per session: Not on file    Stress: Not on file   Relationships    Social connections:     Talks on phone: Not on file     Gets together: Not on file     Attends Worship service: Not on file     Active member of club or organization: Not on file     Attends meetings of clubs or organizations: Not on file     Relationship status: Not on file    Intimate partner violence:     Fear of current or ex partner: Not on file     Emotionally abused: Not on file     Physically abused: Not on file     Forced sexual activity: Not on file   Other Topics Concern    Not on file   Social History Narrative    Not on file       Family History   Problem Relation Age of Onset    Kidney Disease Mother         \"Kidney Failure, On Dialysis\"    Early Death Mother 39    Diabetes Father         \"Type II\"       Review of Systems:   Review of Systems   Constitutional: Negative for chills and fever. Musculoskeletal: Positive for arthralgias and myalgias. Negative for neck pain. Skin: Negative for rash and wound. Neurological: Negative for numbness.              Physical Examination:      Vital signs:  Pulse 88   Resp 14   Ht 5' 11\" (1.803 m)   Wt 283 lb (128.4 kg)   SpO2 99%   BMI 39.47 kg/m²    General:   alert, cooperative and no distress   Right Shoulder   Active ROM:   forward flexion 110 degrees, external rotation 30°, internal rotation lateral hip      Passive ROM:  forward flexion 170°      Joint Tenderness:   lateral acromial, deltoid muscle   Neer:   positive   [place]:   positive   Strength:   4/5     Drop-arm test:   positive     There are no skin lesions, cellulitis, or extreme edema in the upper extremities. Sensation is grossly intact to light touch bilaterally upper extremity. The patient has warm and well-perfused Bilateral upper extremities with brisk capillary refill. Imaging   Review of shoulder from ER visit    XR shoulder right ( 2 views)  Impression   1. No acute bony or joint abnormality in the right tibia and fibula, left   tibia and fibula or right shoulder   2.  Degenerative changes seen in the ankles bilaterally             Right Shoulder MRI: ordered, but not yet obtained      Assessment:      Right shoulder rotator cuff tear      Plan:    MRI of right shoulder  Avoid overhead lifting pushing or pulling with right arm until MRI  Office once MRI complete for follow-up visit  Take medication up to twice a day as needed

## 2019-05-22 ENCOUNTER — HOSPITAL ENCOUNTER (OUTPATIENT)
Dept: MRI IMAGING | Age: 54
Discharge: HOME | End: 2019-05-22
Payer: MEDICARE

## 2019-05-22 DIAGNOSIS — M75.101 TEAR OF RIGHT ROTATOR CUFF, UNSPECIFIED TEAR EXTENT: ICD-10-CM

## 2019-05-22 PROCEDURE — 73221 MRI JOINT UPR EXTREM W/O DYE: CPT

## 2019-05-30 ENCOUNTER — TELEPHONE (OUTPATIENT)
Dept: ORTHOPEDIC SURGERY | Age: 54
End: 2019-05-30

## 2019-05-31 ENCOUNTER — TELEPHONE (OUTPATIENT)
Dept: ORTHOPEDIC SURGERY | Age: 54
End: 2019-05-31

## 2019-05-31 NOTE — TELEPHONE ENCOUNTER
Patient states that he is in so much pain and he needs pain medication to he is going to the ED for pain medication.  Please Advise

## 2019-06-03 ENCOUNTER — OFFICE VISIT (OUTPATIENT)
Dept: ORTHOPEDIC SURGERY | Age: 54
End: 2019-06-03
Payer: MEDICARE

## 2019-06-03 VITALS — BODY MASS INDEX: 35.21 KG/M2 | HEIGHT: 72 IN | RESPIRATION RATE: 16 BRPM | WEIGHT: 260 LBS

## 2019-06-03 DIAGNOSIS — M75.101 TEAR OF RIGHT ROTATOR CUFF, UNSPECIFIED TEAR EXTENT: Primary | ICD-10-CM

## 2019-06-03 PROCEDURE — G8417 CALC BMI ABV UP PARAM F/U: HCPCS | Performed by: ORTHOPAEDIC SURGERY

## 2019-06-03 PROCEDURE — 99203 OFFICE O/P NEW LOW 30 MIN: CPT | Performed by: ORTHOPAEDIC SURGERY

## 2019-06-03 PROCEDURE — G8427 DOCREV CUR MEDS BY ELIG CLIN: HCPCS | Performed by: ORTHOPAEDIC SURGERY

## 2019-06-03 PROCEDURE — 20610 DRAIN/INJ JOINT/BURSA W/O US: CPT | Performed by: ORTHOPAEDIC SURGERY

## 2019-06-03 PROCEDURE — 4004F PT TOBACCO SCREEN RCVD TLK: CPT | Performed by: ORTHOPAEDIC SURGERY

## 2019-06-03 PROCEDURE — 3017F COLORECTAL CA SCREEN DOC REV: CPT | Performed by: ORTHOPAEDIC SURGERY

## 2019-06-03 ASSESSMENT — ENCOUNTER SYMPTOMS
SHORTNESS OF BREATH: 0
COLOR CHANGE: 0

## 2019-06-03 NOTE — PROGRESS NOTES
Subjective:      Patient ID: Stone Nichols is a 46 y.o. male. Patient states he has burning and pain in the right shoulder. He has hand injuries from working in the yard and has bandages on both hands. Patient states he is not getting any pain relief despite stating he is taking ibuprofen. Patient did not bring med list but medication reviewed with patient and 8 medications removed as patient states he is not taking. Allergies updated. Patient is very lethargic today. He states that he has continued having a sharp, stabbing pain in the top of his right shoulder which will radiate down his right arm for the past 2 months. He states that now he keeps his arm to the side because of the pain. Patient denies any new injury to the involved extremity/ joint, denies numbness or tingling in the involved extremity and denies fever or chills. Review of Systems   Constitutional: Negative for activity change, chills and fever. Respiratory: Negative for shortness of breath. Cardiovascular: Negative for chest pain. Musculoskeletal: Positive for arthralgias and myalgias. Negative for gait problem and joint swelling. Skin: Negative for color change, pallor, rash and wound. Neurological: Positive for weakness. Negative for numbness. Objective:   Physical Exam   Constitutional: He is oriented to person, place, and time. Vital signs are normal. He appears well-developed and well-nourished. HENT:   Head: Normocephalic and atraumatic. Eyes: Pupils are equal, round, and reactive to light. Neck: Normal range of motion. Pulmonary/Chest: Effort normal.   Musculoskeletal: He exhibits tenderness. He exhibits no edema or deformity. Right shoulder: He exhibits decreased range of motion, tenderness, crepitus, pain and decreased strength. He exhibits no bony tenderness, no swelling, no effusion, no deformity, no laceration, no spasm and normal pulse.         Left shoulder: He exhibits normal

## 2019-06-03 NOTE — PROGRESS NOTES
Patient states he has burning and pain in the right shoulder. He has hand injuries from working in the yard and has bandages on both hands. Patient states he is not getting any pain relief despite stating he is taking ibuprofen. Patient did not bring med list but medication reviewed with patient and 8 medications removed as patient states he is not taking. Allergies updated. Patient is very lethargic today. MRI  Impression   1. 10 x 8 mm intermediate-grade partial-thickness articular surface tear at   the junction of the posterior supraspinatus and anterior infraspinatus tendon   footprints.  No full-thickness component or retraction.  Normal muscle   bellies. 2. High-grade partial-thickness articular surface versus interstitial tear of   the subscapularis tendon footprint.  No full-thickness component or   retraction.  Normal muscle belly. 3. Medial displacement of the intact long head biceps tendon out of the   bicipital groove and either deep to or within the substance of the   subscapularis tendon. 4. Mild glenohumeral degenerative changes. 5. Moderate acromioclavicular degenerative changes.

## 2019-06-10 ENCOUNTER — ANESTHESIA EVENT (OUTPATIENT)
Dept: OPERATING ROOM | Age: 54
DRG: 264 | End: 2019-06-10
Payer: MEDICARE

## 2019-06-10 ENCOUNTER — APPOINTMENT (OUTPATIENT)
Dept: GENERAL RADIOLOGY | Age: 54
DRG: 264 | End: 2019-06-10
Payer: MEDICARE

## 2019-06-10 ENCOUNTER — ANESTHESIA (OUTPATIENT)
Dept: OPERATING ROOM | Age: 54
DRG: 264 | End: 2019-06-10
Payer: MEDICARE

## 2019-06-10 ENCOUNTER — APPOINTMENT (OUTPATIENT)
Dept: ULTRASOUND IMAGING | Age: 54
DRG: 264 | End: 2019-06-10
Payer: MEDICARE

## 2019-06-10 ENCOUNTER — HOSPITAL ENCOUNTER (INPATIENT)
Age: 54
LOS: 4 days | Discharge: HOME HEALTH | DRG: 264 | End: 2019-06-14
Attending: EMERGENCY MEDICINE | Admitting: INTERNAL MEDICINE
Payer: MEDICARE

## 2019-06-10 VITALS — DIASTOLIC BLOOD PRESSURE: 60 MMHG | OXYGEN SATURATION: 100 % | SYSTOLIC BLOOD PRESSURE: 93 MMHG

## 2019-06-10 DIAGNOSIS — L08.9 DIABETIC FOOT INFECTION (HCC): ICD-10-CM

## 2019-06-10 DIAGNOSIS — L03.116 CELLULITIS OF LEFT LOWER EXTREMITY: Primary | ICD-10-CM

## 2019-06-10 DIAGNOSIS — D72.829 LEUKOCYTOSIS, UNSPECIFIED TYPE: ICD-10-CM

## 2019-06-10 DIAGNOSIS — E11.628 DIABETIC FOOT INFECTION (HCC): ICD-10-CM

## 2019-06-10 DIAGNOSIS — R59.0 INGUINAL LYMPHADENOPATHY: ICD-10-CM

## 2019-06-10 DIAGNOSIS — L97.509 DIABETES MELLITUS DUE TO UNDERLYING CONDITION WITH FOOT ULCER, WITHOUT LONG-TERM CURRENT USE OF INSULIN (HCC): Chronic | ICD-10-CM

## 2019-06-10 DIAGNOSIS — E08.621 DIABETES MELLITUS DUE TO UNDERLYING CONDITION WITH FOOT ULCER, WITHOUT LONG-TERM CURRENT USE OF INSULIN (HCC): Chronic | ICD-10-CM

## 2019-06-10 LAB
ALBUMIN SERPL-MCNC: 3.5 GM/DL (ref 3.4–5)
ALP BLD-CCNC: 110 IU/L (ref 40–128)
ALT SERPL-CCNC: 9 U/L (ref 10–40)
ANION GAP SERPL CALCULATED.3IONS-SCNC: 15 MMOL/L (ref 4–16)
AST SERPL-CCNC: 13 IU/L (ref 15–37)
BASOPHILS ABSOLUTE: 0 K/CU MM
BASOPHILS RELATIVE PERCENT: 0.2 % (ref 0–1)
BILIRUB SERPL-MCNC: 0.6 MG/DL (ref 0–1)
BUN BLDV-MCNC: 14 MG/DL (ref 6–23)
CALCIUM SERPL-MCNC: 9.5 MG/DL (ref 8.3–10.6)
CHLORIDE BLD-SCNC: 96 MMOL/L (ref 99–110)
CO2: 21 MMOL/L (ref 21–32)
CREAT SERPL-MCNC: 0.6 MG/DL (ref 0.9–1.3)
DIFFERENTIAL TYPE: ABNORMAL
EOSINOPHILS ABSOLUTE: 0.1 K/CU MM
EOSINOPHILS RELATIVE PERCENT: 0.4 % (ref 0–3)
GFR AFRICAN AMERICAN: >60 ML/MIN/1.73M2
GFR NON-AFRICAN AMERICAN: >60 ML/MIN/1.73M2
GLUCOSE BLD-MCNC: 228 MG/DL (ref 70–99)
GLUCOSE BLD-MCNC: 261 MG/DL (ref 70–99)
HCT VFR BLD CALC: 41.2 % (ref 42–52)
HEMOGLOBIN: 13 GM/DL (ref 13.5–18)
IMMATURE NEUTROPHIL %: 0.7 % (ref 0–0.43)
LACTATE: 1.3 MMOL/L (ref 0.4–2)
LACTIC ACID, SEPSIS: 0.8 MMOL/L (ref 0.5–1.9)
LYMPHOCYTES ABSOLUTE: 0.9 K/CU MM
LYMPHOCYTES RELATIVE PERCENT: 6.9 % (ref 24–44)
MCH RBC QN AUTO: 27.6 PG (ref 27–31)
MCHC RBC AUTO-ENTMCNC: 31.6 % (ref 32–36)
MCV RBC AUTO: 87.5 FL (ref 78–100)
MONOCYTES ABSOLUTE: 0.8 K/CU MM
MONOCYTES RELATIVE PERCENT: 6.1 % (ref 0–4)
NUCLEATED RBC %: 0 %
PDW BLD-RTO: 14.4 % (ref 11.7–14.9)
PLATELET # BLD: 189 K/CU MM (ref 140–440)
PMV BLD AUTO: 10 FL (ref 7.5–11.1)
POTASSIUM SERPL-SCNC: 4.2 MMOL/L (ref 3.5–5.1)
RBC # BLD: 4.71 M/CU MM (ref 4.6–6.2)
SEGMENTED NEUTROPHILS ABSOLUTE COUNT: 11 K/CU MM
SEGMENTED NEUTROPHILS RELATIVE PERCENT: 85.7 % (ref 36–66)
SODIUM BLD-SCNC: 132 MMOL/L (ref 135–145)
TOTAL IMMATURE NEUTOROPHIL: 0.09 K/CU MM
TOTAL NUCLEATED RBC: 0 K/CU MM
TOTAL PROTEIN: 7.2 GM/DL (ref 6.4–8.2)
WBC # BLD: 12.8 K/CU MM (ref 4–10.5)

## 2019-06-10 PROCEDURE — 0JBR0ZZ EXCISION OF LEFT FOOT SUBCUTANEOUS TISSUE AND FASCIA, OPEN APPROACH: ICD-10-PCS | Performed by: SURGERY

## 2019-06-10 PROCEDURE — 85025 COMPLETE CBC W/AUTO DIFF WBC: CPT

## 2019-06-10 PROCEDURE — 36415 COLL VENOUS BLD VENIPUNCTURE: CPT

## 2019-06-10 PROCEDURE — 2500000003 HC RX 250 WO HCPCS: Performed by: SURGERY

## 2019-06-10 PROCEDURE — 3600000012 HC SURGERY LEVEL 2 ADDTL 15MIN: Performed by: SURGERY

## 2019-06-10 PROCEDURE — 87205 SMEAR GRAM STAIN: CPT

## 2019-06-10 PROCEDURE — 96375 TX/PRO/DX INJ NEW DRUG ADDON: CPT

## 2019-06-10 PROCEDURE — 1200000000 HC SEMI PRIVATE

## 2019-06-10 PROCEDURE — 3700000000 HC ANESTHESIA ATTENDED CARE: Performed by: SURGERY

## 2019-06-10 PROCEDURE — 83605 ASSAY OF LACTIC ACID: CPT

## 2019-06-10 PROCEDURE — 3700000001 HC ADD 15 MINUTES (ANESTHESIA): Performed by: SURGERY

## 2019-06-10 PROCEDURE — 96376 TX/PRO/DX INJ SAME DRUG ADON: CPT

## 2019-06-10 PROCEDURE — 87147 CULTURE TYPE IMMUNOLOGIC: CPT

## 2019-06-10 PROCEDURE — 87073 CULTURE BACTERIA ANAEROBIC: CPT

## 2019-06-10 PROCEDURE — 96367 TX/PROPH/DG ADDL SEQ IV INF: CPT

## 2019-06-10 PROCEDURE — 2580000003 HC RX 258: Performed by: ANESTHESIOLOGY

## 2019-06-10 PROCEDURE — 87071 CULTURE AEROBIC QUANT OTHER: CPT

## 2019-06-10 PROCEDURE — 2580000003 HC RX 258: Performed by: PHYSICIAN ASSISTANT

## 2019-06-10 PROCEDURE — 6370000000 HC RX 637 (ALT 250 FOR IP): Performed by: INTERNAL MEDICINE

## 2019-06-10 PROCEDURE — 6360000002 HC RX W HCPCS: Performed by: PHYSICIAN ASSISTANT

## 2019-06-10 PROCEDURE — 96361 HYDRATE IV INFUSION ADD-ON: CPT

## 2019-06-10 PROCEDURE — 96365 THER/PROPH/DIAG IV INF INIT: CPT

## 2019-06-10 PROCEDURE — 87186 SC STD MICRODIL/AGAR DIL: CPT

## 2019-06-10 PROCEDURE — 87077 CULTURE AEROBIC IDENTIFY: CPT

## 2019-06-10 PROCEDURE — 99285 EMERGENCY DEPT VISIT HI MDM: CPT

## 2019-06-10 PROCEDURE — 2709999900 HC NON-CHARGEABLE SUPPLY: Performed by: SURGERY

## 2019-06-10 PROCEDURE — 2580000003 HC RX 258

## 2019-06-10 PROCEDURE — 80053 COMPREHEN METABOLIC PANEL: CPT

## 2019-06-10 PROCEDURE — 2580000003 HC RX 258: Performed by: INTERNAL MEDICINE

## 2019-06-10 PROCEDURE — 6360000002 HC RX W HCPCS: Performed by: ANESTHESIOLOGY

## 2019-06-10 PROCEDURE — 93971 EXTREMITY STUDY: CPT

## 2019-06-10 PROCEDURE — 2500000003 HC RX 250 WO HCPCS: Performed by: INTERNAL MEDICINE

## 2019-06-10 PROCEDURE — 87040 BLOOD CULTURE FOR BACTERIA: CPT

## 2019-06-10 PROCEDURE — 3600000002 HC SURGERY LEVEL 2 BASE: Performed by: SURGERY

## 2019-06-10 PROCEDURE — 82962 GLUCOSE BLOOD TEST: CPT

## 2019-06-10 PROCEDURE — 73630 X-RAY EXAM OF FOOT: CPT

## 2019-06-10 RX ORDER — SODIUM CHLORIDE 9 MG/ML
INJECTION, SOLUTION INTRAVENOUS
Status: DISPENSED
Start: 2019-06-10 | End: 2019-06-11

## 2019-06-10 RX ORDER — SODIUM CHLORIDE 9 MG/ML
INJECTION, SOLUTION INTRAVENOUS CONTINUOUS
Status: DISCONTINUED | OUTPATIENT
Start: 2019-06-10 | End: 2019-06-13

## 2019-06-10 RX ORDER — HYDROMORPHONE HCL 110MG/55ML
0.5 PATIENT CONTROLLED ANALGESIA SYRINGE INTRAVENOUS
Status: DISCONTINUED | OUTPATIENT
Start: 2019-06-10 | End: 2019-06-14 | Stop reason: HOSPADM

## 2019-06-10 RX ORDER — MIDAZOLAM HYDROCHLORIDE 1 MG/ML
INJECTION INTRAMUSCULAR; INTRAVENOUS PRN
Status: DISCONTINUED | OUTPATIENT
Start: 2019-06-10 | End: 2019-06-10 | Stop reason: SDUPTHER

## 2019-06-10 RX ORDER — SODIUM CHLORIDE 9 MG/ML
INJECTION, SOLUTION INTRAVENOUS
Status: COMPLETED
Start: 2019-06-10 | End: 2019-06-10

## 2019-06-10 RX ORDER — ONDANSETRON 4 MG/1
4 TABLET, ORALLY DISINTEGRATING ORAL EVERY 6 HOURS
Status: DISCONTINUED | OUTPATIENT
Start: 2019-06-10 | End: 2019-06-14 | Stop reason: HOSPADM

## 2019-06-10 RX ORDER — NICOTINE POLACRILEX 4 MG
15 LOZENGE BUCCAL PRN
Status: DISCONTINUED | OUTPATIENT
Start: 2019-06-10 | End: 2019-06-14 | Stop reason: HOSPADM

## 2019-06-10 RX ORDER — ONDANSETRON 2 MG/ML
4 INJECTION INTRAMUSCULAR; INTRAVENOUS EVERY 30 MIN PRN
Status: DISCONTINUED | OUTPATIENT
Start: 2019-06-10 | End: 2019-06-14 | Stop reason: HOSPADM

## 2019-06-10 RX ORDER — FENTANYL CITRATE 50 UG/ML
50 INJECTION, SOLUTION INTRAMUSCULAR; INTRAVENOUS
Status: COMPLETED | OUTPATIENT
Start: 2019-06-10 | End: 2019-06-10

## 2019-06-10 RX ORDER — DEXTROSE MONOHYDRATE 25 G/50ML
12.5 INJECTION, SOLUTION INTRAVENOUS PRN
Status: DISCONTINUED | OUTPATIENT
Start: 2019-06-10 | End: 2019-06-14 | Stop reason: HOSPADM

## 2019-06-10 RX ORDER — HYDROMORPHONE HCL 110MG/55ML
0.5 PATIENT CONTROLLED ANALGESIA SYRINGE INTRAVENOUS EVERY 4 HOURS PRN
Status: DISCONTINUED | OUTPATIENT
Start: 2019-06-10 | End: 2019-06-14 | Stop reason: HOSPADM

## 2019-06-10 RX ORDER — BACITRACIN, NEOMYCIN, POLYMYXIN B 400; 3.5; 5 [USP'U]/G; MG/G; [USP'U]/G
OINTMENT TOPICAL 2 TIMES DAILY
Status: DISCONTINUED | OUTPATIENT
Start: 2019-06-11 | End: 2019-06-14 | Stop reason: HOSPADM

## 2019-06-10 RX ORDER — 0.9 % SODIUM CHLORIDE 0.9 %
30 INTRAVENOUS SOLUTION INTRAVENOUS ONCE
Status: COMPLETED | OUTPATIENT
Start: 2019-06-10 | End: 2019-06-10

## 2019-06-10 RX ORDER — DEXTROSE MONOHYDRATE 50 MG/ML
100 INJECTION, SOLUTION INTRAVENOUS PRN
Status: DISCONTINUED | OUTPATIENT
Start: 2019-06-10 | End: 2019-06-14 | Stop reason: HOSPADM

## 2019-06-10 RX ORDER — LIDOCAINE HYDROCHLORIDE 10 MG/ML
INJECTION, SOLUTION EPIDURAL; INFILTRATION; INTRACAUDAL; PERINEURAL
Status: COMPLETED | OUTPATIENT
Start: 2019-06-10 | End: 2019-06-10

## 2019-06-10 RX ORDER — MORPHINE SULFATE 4 MG/ML
4 INJECTION, SOLUTION INTRAMUSCULAR; INTRAVENOUS EVERY 4 HOURS PRN
Status: DISCONTINUED | OUTPATIENT
Start: 2019-06-10 | End: 2019-06-14 | Stop reason: HOSPADM

## 2019-06-10 RX ORDER — SODIUM CHLORIDE 9 MG/ML
INJECTION, SOLUTION INTRAVENOUS CONTINUOUS PRN
Status: DISCONTINUED | OUTPATIENT
Start: 2019-06-10 | End: 2019-06-10 | Stop reason: SDUPTHER

## 2019-06-10 RX ORDER — TRAZODONE HYDROCHLORIDE 50 MG/1
50 TABLET ORAL NIGHTLY PRN
Status: DISCONTINUED | OUTPATIENT
Start: 2019-06-10 | End: 2019-06-14 | Stop reason: HOSPADM

## 2019-06-10 RX ORDER — PROPOFOL 10 MG/ML
INJECTION, EMULSION INTRAVENOUS PRN
Status: DISCONTINUED | OUTPATIENT
Start: 2019-06-10 | End: 2019-06-10 | Stop reason: SDUPTHER

## 2019-06-10 RX ORDER — PROCHLORPERAZINE EDISYLATE 5 MG/ML
10 INJECTION INTRAMUSCULAR; INTRAVENOUS EVERY 6 HOURS PRN
Status: DISCONTINUED | OUTPATIENT
Start: 2019-06-10 | End: 2019-06-14 | Stop reason: HOSPADM

## 2019-06-10 RX ORDER — OXYCODONE HYDROCHLORIDE 5 MG/1
5 TABLET ORAL EVERY 4 HOURS PRN
Status: DISCONTINUED | OUTPATIENT
Start: 2019-06-10 | End: 2019-06-11

## 2019-06-10 RX ORDER — FENTANYL CITRATE 50 UG/ML
INJECTION, SOLUTION INTRAMUSCULAR; INTRAVENOUS PRN
Status: DISCONTINUED | OUTPATIENT
Start: 2019-06-10 | End: 2019-06-10 | Stop reason: SDUPTHER

## 2019-06-10 RX ORDER — ESCITALOPRAM OXALATE 10 MG/1
20 TABLET ORAL DAILY
Status: DISCONTINUED | OUTPATIENT
Start: 2019-06-11 | End: 2019-06-14 | Stop reason: HOSPADM

## 2019-06-10 RX ORDER — LIDOCAINE HYDROCHLORIDE 20 MG/ML
INJECTION, SOLUTION INTRAVENOUS PRN
Status: DISCONTINUED | OUTPATIENT
Start: 2019-06-10 | End: 2019-06-10 | Stop reason: SDUPTHER

## 2019-06-10 RX ORDER — ONDANSETRON 2 MG/ML
4 INJECTION INTRAMUSCULAR; INTRAVENOUS EVERY 6 HOURS PRN
Status: DISCONTINUED | OUTPATIENT
Start: 2019-06-10 | End: 2019-06-14 | Stop reason: HOSPADM

## 2019-06-10 RX ORDER — TIZANIDINE 4 MG/1
4 TABLET ORAL EVERY 8 HOURS PRN
Status: DISCONTINUED | OUTPATIENT
Start: 2019-06-10 | End: 2019-06-14 | Stop reason: HOSPADM

## 2019-06-10 RX ORDER — MORPHINE SULFATE 4 MG/ML
4 INJECTION, SOLUTION INTRAMUSCULAR; INTRAVENOUS ONCE
Status: DISCONTINUED | OUTPATIENT
Start: 2019-06-10 | End: 2019-06-14 | Stop reason: HOSPADM

## 2019-06-10 RX ORDER — ACETAMINOPHEN 325 MG/1
650 TABLET ORAL EVERY 4 HOURS PRN
Status: DISCONTINUED | OUTPATIENT
Start: 2019-06-10 | End: 2019-06-14 | Stop reason: HOSPADM

## 2019-06-10 RX ORDER — PREGABALIN 75 MG/1
75 CAPSULE ORAL 2 TIMES DAILY
Status: DISCONTINUED | OUTPATIENT
Start: 2019-06-10 | End: 2019-06-14 | Stop reason: HOSPADM

## 2019-06-10 RX ADMIN — ONDANSETRON 4 MG: 2 INJECTION INTRAMUSCULAR; INTRAVENOUS at 19:30

## 2019-06-10 RX ADMIN — MIDAZOLAM HYDROCHLORIDE 2 MG: 1 INJECTION, SOLUTION INTRAMUSCULAR; INTRAVENOUS at 21:18

## 2019-06-10 RX ADMIN — SODIUM CHLORIDE: 9 INJECTION, SOLUTION INTRAVENOUS at 22:10

## 2019-06-10 RX ADMIN — INSULIN LISPRO 1 UNITS: 100 INJECTION, SOLUTION INTRAVENOUS; SUBCUTANEOUS at 22:41

## 2019-06-10 RX ADMIN — FENTANYL CITRATE 100 MCG: 50 INJECTION INTRAMUSCULAR; INTRAVENOUS at 21:18

## 2019-06-10 RX ADMIN — FENTANYL CITRATE 50 MCG: 50 INJECTION INTRAMUSCULAR; INTRAVENOUS at 18:20

## 2019-06-10 RX ADMIN — VANCOMYCIN HYDROCHLORIDE 2000 MG: 1 INJECTION, POWDER, LYOPHILIZED, FOR SOLUTION INTRAVENOUS at 20:16

## 2019-06-10 RX ADMIN — Medication 3537 ML: at 19:07

## 2019-06-10 RX ADMIN — PROPOFOL 150 MG: 10 INJECTION, EMULSION INTRAVENOUS at 21:18

## 2019-06-10 RX ADMIN — DEXTROSE MONOHYDRATE 600 MG: 50 INJECTION, SOLUTION INTRAVENOUS at 23:47

## 2019-06-10 RX ADMIN — ONDANSETRON 4 MG: 2 INJECTION INTRAMUSCULAR; INTRAVENOUS at 18:20

## 2019-06-10 RX ADMIN — PREGABALIN 75 MG: 75 CAPSULE ORAL at 22:41

## 2019-06-10 RX ADMIN — FENTANYL CITRATE 50 MCG: 50 INJECTION INTRAMUSCULAR; INTRAVENOUS at 19:15

## 2019-06-10 RX ADMIN — SODIUM CHLORIDE: 900 INJECTION INTRAVENOUS at 21:09

## 2019-06-10 RX ADMIN — SODIUM CHLORIDE 3537 ML: 9 INJECTION, SOLUTION INTRAVENOUS at 19:07

## 2019-06-10 RX ADMIN — OXYCODONE HYDROCHLORIDE 5 MG: 5 TABLET ORAL at 22:41

## 2019-06-10 RX ADMIN — LIDOCAINE HYDROCHLORIDE 100 MG: 20 INJECTION, SOLUTION INTRAVENOUS at 21:18

## 2019-06-10 ASSESSMENT — PAIN DESCRIPTION - ORIENTATION
ORIENTATION: RIGHT;LEFT
ORIENTATION: LEFT;RIGHT
ORIENTATION: LEFT

## 2019-06-10 ASSESSMENT — PULMONARY FUNCTION TESTS
PIF_VALUE: 1
PIF_VALUE: 0
PIF_VALUE: 0
PIF_VALUE: 1
PIF_VALUE: 0
PIF_VALUE: 1
PIF_VALUE: 1
PIF_VALUE: 0
PIF_VALUE: 1
PIF_VALUE: 0
PIF_VALUE: 1
PIF_VALUE: 1
PIF_VALUE: 0

## 2019-06-10 ASSESSMENT — PAIN SCALES - GENERAL
PAINLEVEL_OUTOF10: 10
PAINLEVEL_OUTOF10: 8
PAINLEVEL_OUTOF10: 10
PAINLEVEL_OUTOF10: 8
PAINLEVEL_OUTOF10: 10

## 2019-06-10 ASSESSMENT — PAIN DESCRIPTION - LOCATION
LOCATION: FOOT;LEG
LOCATION: LEG;FOOT
LOCATION: LEG;FOOT

## 2019-06-10 ASSESSMENT — PAIN DESCRIPTION - DESCRIPTORS
DESCRIPTORS: STABBING
DESCRIPTORS: STABBING;SHARP

## 2019-06-10 ASSESSMENT — PAIN DESCRIPTION - ONSET: ONSET: ON-GOING

## 2019-06-10 ASSESSMENT — PAIN DESCRIPTION - PROGRESSION: CLINICAL_PROGRESSION: NOT CHANGED

## 2019-06-10 ASSESSMENT — LIFESTYLE VARIABLES: SMOKING_STATUS: 1

## 2019-06-10 ASSESSMENT — PAIN DESCRIPTION - PAIN TYPE
TYPE: ACUTE PAIN

## 2019-06-10 ASSESSMENT — PAIN DESCRIPTION - FREQUENCY
FREQUENCY: CONTINUOUS
FREQUENCY: CONTINUOUS

## 2019-06-10 NOTE — ED PROVIDER NOTES
I independently examined and evaluated Tiki Duque. In brief their history revealed patient with subacute cut to the bottom of his left foot approximately 2-1/2 weeks ago while walking outside in his backyard barefoot. She reports that wounds have been slowly festering and worsening. Over the last 24 to 36 hours patient reports that his left foot into his left leg has gotten markedly swollen. Patient is currently having severe pain that is constant and sharp localized to left lower extremity distally. No new numbness or weakness but patient with baseline neuropathy. Patient does have poorly controlled diabetes and reports that he has been under a lot of stress recently and that is because his blood sugars to be high. Patient does follow with surgeon for his foot wounds. Their focused exam revealed the patient is afebrile, tachycardic but otherwise stable on room air. The patient appears age appropriate, appears well-hydrated, well-nourished. Appears deconditioned and malodorous and disheveled. Mucous membranes are moist. Speech is clear. Breathing is unlabored. Speaking clearly full sentences. Scattered diabetic ulcerations and wounds to bilateral feet and bilateral hands. Large ulcerative wound with some surrounding wet gangrenous changes  just proximal to the first toe of the left foot. Diffuse erythema from the left foot to the left proximal shin with point tenderness to palpation. No palpable crepitance. Skin is dry. Mental status is normal. The patient moves all extremities and is without facial droop.      Results for orders placed or performed during the hospital encounter of 06/10/19   CBC auto diff   Result Value Ref Range    WBC 12.8 (H) 4.0 - 10.5 K/CU MM    RBC 4.71 4.6 - 6.2 M/CU MM    Hemoglobin 13.0 (L) 13.5 - 18.0 GM/DL    Hematocrit 41.2 (L) 42 - 52 %    MCV 87.5 78 - 100 FL    MCH 27.6 27 - 31 PG    MCHC 31.6 (L) 32.0 - 36.0 %    RDW 14.4 11.7 - 14.9 %    Platelets [phone] - hospitalist team and admitted to medicine following surgical debridement. Questions sought and answered with the patient. They voice understanding and agree with plan. CRITICAL CARE NOTE:  There was a high probability of clinically significant life-threatening deterioration of the patient's condition requiring my urgent intervention due to necrotizing infection. IV broad-spectrum antibiotic administration, IV fluid administration, telemetry monitoring and subspecialty consultation was performed to address this. Total critical care time is AT LEAST 35 minutes. This includes vital sign monitoring, pulse oximetry monitoring, telemetry monitoring, clinical response to the IV medications, reviewing the nursing notes, consultation time, dictation/documentation time, and interpretation of the lab work. This time excludes time spent performing procedures and separately billable procedures and family discussion time. All diagnostic, treatment, and disposition decisions were made by myself in conjunction with the Advanced Practice Provider. For all further details of the patient's emergency department visit, please see the Advanced Practice Provider's documentation.        Kristopher Good MD  06/11/19 [id number]

## 2019-06-10 NOTE — ED TRIAGE NOTES
Pt reports possible wound infection to L foot, pain to L inner thigh. Pt also reports fever, and chills.

## 2019-06-10 NOTE — H&P
HISTORY AND PHYSICAL  (Hospitalist, Internal Medicine)  IDENTIFYING INFORMATION   PATIENT:  Mciheal Fofana  MRN:  [id number]  ADMIT DATE: 6/10/2019  TIME OF EVALUATION: 6/10/2019 7:55 PM    CHIEF COMPLAINT   Fever/chills/Left foot swelling, pain, erythema    HISTORY OF PRESENT ILLNESS   Micheal Fofana is a 46 y.o. male with a past medical history of DMII c/b peripheral neuropathy, obesity who presents with 1-2 days hx of progressive Fever/chills/Left foot swelling, pain, erythema. Approx 2-3 weeks ago, he was out barefooted in the shed when he stepped on a glass with laceration over his left plantar surface of his forefoot. The laceration did not improve and over the last 2 days, developed rapid and progressive ascending swelling, erythema and local throbbing pain. Associated with subjective fever , chills and nausea/emesis.  On review, he stubbed his right big toe recently      PMH listed below:    PAST MEDICAL, SURGICAL, FAMILY, and SOCIAL HISTORY     Past Medical History:   Diagnosis Date    Anesthesia     \"Problems coming out of the anesthesia\"    Anxiety     Arthritis     Asthma     \"As a kid but outgrew this\"    Chronic back pain     Depression     Dizziness     Edema     Fibromyalgia     Fracture     Headache(784.0)     Hemorrhoid     Hernia, abdominal     Hx MRSA infection     positive culture 8/2014 from left foot    Migraine     Nausea & vomiting     Neuropathy     Obesity, Class III, BMI 40-49.9 (morbid obesity) ([address]     Osteomyelitis ([facility] [address]     hx finger    Pneumonia 1995    Poor circulation     Restless leg syndrome     Shortness of breath on exertion     6/14/2016- pt denies any sob with this interview    Type II or unspecified type diabetes mellitus with other specified manifestations, not stated as uncontrolled 4/8/2014    Type II or unspecified type diabetes mellitus without mention of complication, not stated as uncontrolled DX 2007    Ulcer of other part of foot 4/8/2014 'ulcer on left foot healed all up\"     Past Surgical History:   Procedure Laterality Date    COLONOSCOPY  1990's    DEBRIDEMENT  8/25/2014    left foot    ENDOSCOPY, COLON, DIAGNOSTIC  06-    FINGER SURGERY  9-11-11    Right Index Finger    FINGER SURGERY  01-16-12    RIght Index Finger-Removal of loose body, Reconstruction of Mallet Finger    FOOT SURGERY Right 06/01/2018    I&D right foot    HEMORRHOID SURGERY  2008    OTHER SURGICAL HISTORY Left 10/22/2013    I & D left foot    OTHER SURGICAL HISTORY  07/07/2017    I&D fingers X2 left hand.  I&D left forearm    OTHER SURGICAL HISTORY Left 07/08/2017    left hand debridement, left forearm incision and drainage     OTHER SURGICAL HISTORY Left 07/10/2017    Fingers I&D    OTHER SURGICAL HISTORY Left 07/14/2017    middle finger amputation revision    ROTATOR CUFF REPAIR  Last Done 7/18/2011    Left, Done Four Times     Family History   Problem Relation Age of Onset    Kidney Disease Mother         \"Kidney Failure, On Dialysis\"   [facility] Early Death Mother 39    Diabetes Father         \"Type II\"     Family Hx of HTN  Family Hx as reviewed above, otherwise non-contributory  Social History     Socioeconomic History    Marital status:      Spouse name: None    Number of children: 4    Years of education: None    Highest education level: None   Occupational History    None   Social Needs    Financial resource strain: None    Food insecurity:     Worry: None     Inability: None    Transportation needs:     Medical: None     Non-medical: None   Tobacco Use    Smoking status: Current Every Day Smoker    Smokeless tobacco: Current User     Types: Snuff    Tobacco comment: \"Chew Grizzly Snuff Daily\"   Substance and Sexual Activity    Alcohol use: No    Drug use: Yes     Types: Marijuana    Sexual activity: None   Lifestyle    Physical activity:     Days per week: None     Minutes per session: None    Stress: None   Relationships    Social connections:     Talks on phone: None     Gets together: None     Attends Denominational service: None     Active member of club or organization: None     Attends meetings of clubs or organizations: None     Relationship status: None    Intimate partner violence:     Fear of current or ex partner: None     Emotionally abused: None     Physically abused: None     Forced sexual activity: None   Other Topics Concern    None   Social History Narrative    None       MEDICATIONS   Medications Prior to Admission  Not in a hospital admission.     Current Medications  Current Facility-Administered Medications   Medication Dose Route Frequency Provider Last Rate Last Dose    ondansetron (ZOFRAN) injection 4 mg  4 mg Intravenous Q30 Min PRN Solo Chapman PA-C   4 mg at 06/10/19 1930    0.9 % sodium chloride bolus  30 mL/kg Intravenous Once Solo Chapman PA-C 3,537 mL/hr at 06/10/19 1907 3,537 mL at 06/10/19 1907    vancomycin (VANCOCIN) 2,000 mg in dextrose 5 % 500 mL IVPB  2,000 mg Intravenous Once Celanese SALOME Villasenor        morphine sulfate (PF) injection 4 mg  4 mg Intravenous Once Jonatan Bustamante MD   Stopped at 06/10/19 1925    HYDROmorphone (DILAUDID) injection 0.5 mg  0.5 mg Intravenous Q2H PRN Solo Chapman PA-C        clindamycin (CLEOCIN) 600 mg in dextrose 5 % 50 mL IVPB  600 mg Intravenous Q8H Emi Gracia MD        [START ON 6/11/2019] piperacillin-tazobactam (ZOSYN) 3.375 g in dextrose 5 % 50 mL IVPB (mini-bag)  3.375 g Intravenous Q6H Emi Gracia MD        0.9 % sodium chloride infusion   Intravenous Continuous Emi Gracia MD        ondansetron [facility]) injection 4 mg  4 mg Intravenous Q6H PRN Emi Gracia MD        prochlorperazine (COMPAZINE) injection 10 mg  10 mg Intravenous Q6H PRANA Gracia MD        morphine sulfate (PF) injection 4 mg  4 mg Intravenous Q4H PRANA Gracia MD        HYDROmorphone (DILAUDID) injection 0.5 mg  0.5 mg Intravenous Q4H PRANA Gracia MD  oxyCODONE (ROXICODONE) immediate release tablet 5 mg  5 mg Oral Q4H PRN Devorah Sanchez MD        acetaminophen (TYLENOL) tablet 650 mg  650 mg Oral Q4H PRN Devorah Sanchez MD        [START ON 6/11/2019] enoxaparin (LOVENOX) injection 40 mg  40 mg Subcutaneous Daily Devorah Sanchez MD        insulin lispro (HUMALOG) injection vial 0-6 Units  0-6 Units Subcutaneous TID WC Devorah Sanchez MD        insulin lispro (HUMALOG) injection vial 0-3 Units  0-3 Units Subcutaneous Nightly Devorah Sanchez MD        glucose (GLUTOSE) 40 % oral gel 15 g  15 g Oral PRN Devorah Sanchez MD        dextrose 50 % IV solution  12.5 g Intravenous PRN Devorah Sanchez MD        glucagon (rDNA) injection 1 mg  1 mg Intramuscular PRN Devorah Sanchez MD        dextrose 5 % solution  100 mL/hr Intravenous PRN Devorah Sanchez MD         Current Outpatient Medications   Medication Sig Dispense Refill    omeprazole (PRILOSEC) 40 MG delayed release capsule Take 1 capsule by mouth daily 30 capsule 0    ondansetron (ZOFRAN ODT) 4 MG disintegrating tablet Take 1 tablet by mouth every 6 hours 20 tablet 0    metFORMIN (GLUCOPHAGE) 1000 MG tablet Take 1 tablet by mouth 2 times daily (with meals) 60 tablet 3    escitalopram (LEXAPRO) 20 MG tablet Take 20 mg by mouth daily      tiZANidine (ZANAFLEX) 4 MG tablet Take 4 mg by mouth nightly as needed      naproxen (NAPROSYN) 500 MG tablet Take 1 tablet by mouth 2 times daily as needed for Pain 30 tablet 0    pregabalin (LYRICA) 100 MG capsule Take 1 capsule by mouth 2 times daily (Patient taking differently: Take 75 mg by mouth 2 times daily. Dewane Newness ) 60 capsule 0    traZODone (DESYREL) 100 MG tablet Take 100 mg by mouth nightly as needed            Allergies  Allergies   Allergen Reactions    Robaxin [Methocarbamol] Swelling    Rocephin [Ceftriaxone Sodium-Lidocaine] Hives     Noted on 10/26 - developed extremity swelling and hives. No anaphylaxis.     Sulfa Antibiotics Hives     Noted on 10/26 - developed extremity swelling and hives. No anaphylaxis.  Cantaloupe (Diagnostic) Other (See Comments)    Ceftriaxone Hives     Tolerated Zosyn well 1/2/2019     Aspirin Nausea Only    Propoxyphene Nausea And Vomiting    Toradol [Ketorolac Tromethamine] Other (See Comments)     Sweats        REVIEW OF SYSTEMS   Within above limitations. 14 point review of systems reviewed. Pertinent positive or negative as per HPI or otherwise negative per 14 point systems review. PHYSICAL EXAM     Wt Readings from Last 3 Encounters:   06/10/19 260 lb (117.9 kg)   06/03/19 260 lb (117.9 kg)   05/15/19 283 lb (128.4 kg)       Blood pressure 126/64, pulse 113, temperature 99.4 °F (37.4 °C), temperature source Oral, resp. rate 19, height 5' 9\" (1.753 m), weight 260 lb (117.9 kg), SpO2 99 %. General - AAO x 3  Psych - Appropriate affect/speech. No agitation  Eyes - Eye lids intact. No scleral icterus  ENT - Lips wnl. External ear clear/dry/intact. No thyromegaly on inspection  Neuro - No gross peripheral or central neuro deficits on inspection  Heart - Sinus. RRR. S1 and S2 present. No elevated JVD appreciated  Lung - Adequate air entry b/l, No crackles/wheezes appreciated  GI - Soft. No guarding/rigidity. BS+   - No CVA/suprapubic tenderness or palpable bladder distension  Skin - Left open wound along the plantar surface of left forefoot associated with +2 edema and ascending erythema.  Right big toe bandaged    LABS AND IMAGING   CBC  [unfilled]    Last 3 Hemoglobin  Lab Results   Component Value Date    HGB 13.0 06/10/2019    HGB 13.5 04/08/2019    HGB 13.1 02/05/2019     Last 3 WBC/ANC  Lab Results   Component Value Date    WBC 12.8 06/10/2019    WBC 5.5 04/08/2019    WBC 6.3 02/05/2019     No components found for: GRNLOCTYABS  Last 3 Platelets  No results found for: PLATELET  Chemistry  [unfilled]  [unfilled]  No results found for: LDH  Coagulation Studies  Lab Results   Component Value Date    INR 1.15 08/31/2014 Liver Function Studies  Lab Results   Component Value Date    ALT 9 06/10/2019    AST 13 06/10/2019    ALKPHOS 110 06/10/2019       Recent Imaging    Deborah Moise #[id number] (RILEY:[id number]) (46 y.o. M) (Adm: 06/10/19)    [address], [address]. \Hospital Has a New Name and Outlook.\"" [id number]         Imaging Results (last 7 days)          Procedure Component Value Ref Range Date/Time     XR FOOT LEFT (MIN 3 VIEWS) [[id number]] Collected: 06/10/19 1914     Order Status: Completed Updated: 06/10/19 1930     Narrative:       EXAMINATION:  3 XRAY VIEWS OF THE LEFT FOOT    6/10/2019 6:11 pm    COMPARISON:  Left foot radiograph May 13, 2017    HISTORY:  ORDERING SYSTEM PROVIDED HISTORY: redness, swelling, eval for gas or  osteomyelitis  TECHNOLOGIST PROVIDED HISTORY:  Reason for exam:->redness, swelling, eval for gas or osteomyelitis  Ordering Physician Provided Reason for Exam: redness, swelling, eval for gas  or osteomyelitis    FINDINGS:  Three views of the left foot demonstrate diffuse soft tissue edema. Juan Alberto Burns is  an apparent ulceration along the plantar foot at the level of the heel. Small amount of gas is seen associated with this ulceration.  No gas  identified throughout the remainder of the foot.  The osseous structures  appear intact.  No radiographic evidence for osteomyelitis.  Mild-to-moderate  degenerative change of the 1st MTP joint and moderate osteoarthritis of the  hindfoot and midfoot articulations.  Large plantar calcaneal enthesophyte  with multiple well corticated fragment ossicles extending along the expected  course of the proximal plantar fascia.     Impression:       1. Diffuse soft tissue edema with apparent ulceration along the plantar foot  at the level of the heel.  There is a small amount of associated gas at the  ulceration site.  No gas identified throughout the remainder of the foot.   2. No radiographic evidence for osteomyelitis.     VL DUP LOWER EXTREMITY VENOUS LEFT [[id number]] Collected: 06/10/19 1915     Order Status: Completed Updated: 06/10/19 1918     Narrative:       EXAMINATION:  DUPLEX VENOUS ULTRASOUND OF THE LEFT LOWER EXTREMITY 6/10/2019 6:18 pm    TECHNIQUE:  Duplex ultrasound and Doppler images were obtained of the left lower  extremity. COMPARISON:  None. HISTORY:  ORDERING SYSTEM PROVIDED HISTORY: pain in leg up to groin  TECHNOLOGIST PROVIDED HISTORY:  Reason for exam:->pain in leg up to groin  Acuity: Acute  Type of Exam: Initial  Relevant Medical/Surgical History: REDNESS SWELLING WARMTH LLE - OPEN WOUNDS  SEEPAGE AND LARGE OPEN WOUND BOTTOM OF GREAT TOE    FINDINGS:  The visualized veins of the left lower extremity are patent and free of  echogenic thrombus. The veins are normally compressible and have normal  phasic flow. Images demonstrate subcutaneous edema.  Incidentally noted left inguinal  lymph node, measuring up to 1.2 cm in short axis.     Impression:       No evidence of DVT in the left lower extremity.     Subcutaneous edema.  Left inguinal lymph nodes, measuring up to 1.2 cm in  short axis, may be reactive.             Relevant labs and imaging reviewed    ASSESSMENT AND PLAN       Ascending left foot infection in setting of DMII associated predisposition  - ED d/w surgery to eval for necrotizing infection  - consult ID  - empiric IV vanco - pharmacy to dose, clinda, zosyn for now  - IVF, NPO pending surgery eval  - IV pain, IV anti-emetic   - blood cx pend in the ED    DMII  - ISS for now    Lovenox ppx    Case d/w ED physician    [address] [place] [address], Internal Medicine  6/10/2019 at 7:55 PM

## 2019-06-10 NOTE — ED NOTES
Went in to room to start IV for patient as Alem Deng, Primary RN is at lunch. Pt states, \"You only get one shot to get my IV. Was able to obtain IV access successfully on first attempt. Dilliner, lactic and blood cultures drawn at this time.       Henrietta Humphreys RN  06/10/19 0661

## 2019-06-11 LAB
ANION GAP SERPL CALCULATED.3IONS-SCNC: 11 MMOL/L (ref 4–16)
BASOPHILS ABSOLUTE: 0 K/CU MM
BASOPHILS RELATIVE PERCENT: 0.3 % (ref 0–1)
BUN BLDV-MCNC: 11 MG/DL (ref 6–23)
CALCIUM SERPL-MCNC: 8 MG/DL (ref 8.3–10.6)
CHLORIDE BLD-SCNC: 99 MMOL/L (ref 99–110)
CO2: 23 MMOL/L (ref 21–32)
CREAT SERPL-MCNC: 0.6 MG/DL (ref 0.9–1.3)
DIFFERENTIAL TYPE: ABNORMAL
EOSINOPHILS ABSOLUTE: 0.1 K/CU MM
EOSINOPHILS RELATIVE PERCENT: 1.1 % (ref 0–3)
GFR AFRICAN AMERICAN: >60 ML/MIN/1.73M2
GFR NON-AFRICAN AMERICAN: >60 ML/MIN/1.73M2
GLUCOSE BLD-MCNC: 191 MG/DL (ref 70–99)
GLUCOSE BLD-MCNC: 212 MG/DL (ref 70–99)
GLUCOSE BLD-MCNC: 217 MG/DL (ref 70–99)
GLUCOSE BLD-MCNC: 254 MG/DL (ref 70–99)
HCT VFR BLD CALC: 34.5 % (ref 42–52)
HEMOGLOBIN: ABNORMAL GM/DL (ref 13.5–18)
IMMATURE NEUTROPHIL %: 0.8 % (ref 0–0.43)
LACTATE: 0.9 MMOL/L (ref 0.4–2)
LYMPHOCYTES ABSOLUTE: 1.2 K/CU MM
LYMPHOCYTES RELATIVE PERCENT: 13.2 % (ref 24–44)
MAGNESIUM: 1.6 MG/DL (ref 1.8–2.4)
MCH RBC QN AUTO: 27.6 PG (ref 27–31)
MCHC RBC AUTO-ENTMCNC: 31 % (ref 32–36)
MCV RBC AUTO: 88.9 FL (ref 78–100)
MONOCYTES ABSOLUTE: 0.7 K/CU MM
MONOCYTES RELATIVE PERCENT: 7.8 % (ref 0–4)
NUCLEATED RBC %: 0 %
PDW BLD-RTO: 14.6 % (ref 11.7–14.9)
PLATELET # BLD: 163 K/CU MM (ref 140–440)
PMV BLD AUTO: 10.2 FL (ref 7.5–11.1)
POTASSIUM SERPL-SCNC: 4 MMOL/L (ref 3.5–5.1)
PROCALCITONIN: 0.43
RBC # BLD: 3.88 M/CU MM (ref 4.6–6.2)
SEGMENTED NEUTROPHILS ABSOLUTE COUNT: 7 K/CU MM
SEGMENTED NEUTROPHILS RELATIVE PERCENT: 76.8 % (ref 36–66)
SODIUM BLD-SCNC: 133 MMOL/L (ref 135–145)
TOTAL IMMATURE NEUTOROPHIL: 0.07 K/CU MM
TOTAL NUCLEATED RBC: 0 K/CU MM
WBC # BLD: 9.1 K/CU MM (ref 4–10.5)

## 2019-06-11 PROCEDURE — 80048 BASIC METABOLIC PNL TOTAL CA: CPT

## 2019-06-11 PROCEDURE — 83605 ASSAY OF LACTIC ACID: CPT

## 2019-06-11 PROCEDURE — 36415 COLL VENOUS BLD VENIPUNCTURE: CPT

## 2019-06-11 PROCEDURE — 2500000003 HC RX 250 WO HCPCS: Performed by: INTERNAL MEDICINE

## 2019-06-11 PROCEDURE — 2580000003 HC RX 258: Performed by: INTERNAL MEDICINE

## 2019-06-11 PROCEDURE — 6360000002 HC RX W HCPCS: Performed by: SURGERY

## 2019-06-11 PROCEDURE — 85025 COMPLETE CBC W/AUTO DIFF WBC: CPT

## 2019-06-11 PROCEDURE — 82962 GLUCOSE BLOOD TEST: CPT

## 2019-06-11 PROCEDURE — 6370000000 HC RX 637 (ALT 250 FOR IP): Performed by: HOSPITALIST

## 2019-06-11 PROCEDURE — 83735 ASSAY OF MAGNESIUM: CPT

## 2019-06-11 PROCEDURE — 6370000000 HC RX 637 (ALT 250 FOR IP): Performed by: INTERNAL MEDICINE

## 2019-06-11 PROCEDURE — 1200000000 HC SEMI PRIVATE

## 2019-06-11 PROCEDURE — 2580000003 HC RX 258: Performed by: SURGERY

## 2019-06-11 PROCEDURE — 6360000002 HC RX W HCPCS: Performed by: INTERNAL MEDICINE

## 2019-06-11 PROCEDURE — 99213 OFFICE O/P EST LOW 20 MIN: CPT

## 2019-06-11 PROCEDURE — 84145 PROCALCITONIN (PCT): CPT

## 2019-06-11 RX ORDER — OXYCODONE HYDROCHLORIDE 10 MG/1
10 TABLET ORAL EVERY 4 HOURS PRN
Status: DISCONTINUED | OUTPATIENT
Start: 2019-06-11 | End: 2019-06-14 | Stop reason: HOSPADM

## 2019-06-11 RX ORDER — OXYCODONE HYDROCHLORIDE 5 MG/1
5 TABLET ORAL EVERY 4 HOURS PRN
Status: DISCONTINUED | OUTPATIENT
Start: 2019-06-11 | End: 2019-06-14 | Stop reason: HOSPADM

## 2019-06-11 RX ORDER — LACTOBACILLUS RHAMNOSUS GG 10B CELL
1 CAPSULE ORAL
Status: DISCONTINUED | OUTPATIENT
Start: 2019-06-11 | End: 2019-06-14 | Stop reason: HOSPADM

## 2019-06-11 RX ADMIN — MAGNESIUM SULFATE HEPTAHYDRATE 3 G: 500 INJECTION, SOLUTION INTRAMUSCULAR; INTRAVENOUS at 09:12

## 2019-06-11 RX ADMIN — PIPERACILLIN SODIUM,TAZOBACTAM SODIUM 3.38 G: 3; .375 INJECTION, POWDER, FOR SOLUTION INTRAVENOUS at 12:56

## 2019-06-11 RX ADMIN — BACITRACIN ZINC, NEOMYCIN SULFATE, POLYMYXIN B SULFATE 2 G: 3.5; 5000; 4 OINTMENT TOPICAL at 08:55

## 2019-06-11 RX ADMIN — OXYCODONE HYDROCHLORIDE 10 MG: 10 TABLET ORAL at 20:31

## 2019-06-11 RX ADMIN — PREGABALIN 75 MG: 75 CAPSULE ORAL at 20:31

## 2019-06-11 RX ADMIN — ONDANSETRON 4 MG: 4 TABLET, ORALLY DISINTEGRATING ORAL at 08:56

## 2019-06-11 RX ADMIN — DEXTROSE MONOHYDRATE 600 MG: 50 INJECTION, SOLUTION INTRAVENOUS at 09:13

## 2019-06-11 RX ADMIN — INSULIN LISPRO 2 UNITS: 100 INJECTION, SOLUTION INTRAVENOUS; SUBCUTANEOUS at 09:17

## 2019-06-11 RX ADMIN — ONDANSETRON 4 MG: 2 INJECTION INTRAMUSCULAR; INTRAVENOUS at 20:30

## 2019-06-11 RX ADMIN — PIPERACILLIN SODIUM,TAZOBACTAM SODIUM 3.38 G: 3; .375 INJECTION, POWDER, FOR SOLUTION INTRAVENOUS at 02:37

## 2019-06-11 RX ADMIN — OXYCODONE HYDROCHLORIDE 5 MG: 5 TABLET ORAL at 02:45

## 2019-06-11 RX ADMIN — INSULIN LISPRO 2 UNITS: 100 INJECTION, SOLUTION INTRAVENOUS; SUBCUTANEOUS at 20:32

## 2019-06-11 RX ADMIN — VANCOMYCIN HYDROCHLORIDE 1750 MG: 5 INJECTION, POWDER, LYOPHILIZED, FOR SOLUTION INTRAVENOUS at 09:13

## 2019-06-11 RX ADMIN — PIPERACILLIN SODIUM,TAZOBACTAM SODIUM 3.38 G: 3; .375 INJECTION, POWDER, FOR SOLUTION INTRAVENOUS at 06:44

## 2019-06-11 RX ADMIN — ENOXAPARIN SODIUM 40 MG: 100 INJECTION SUBCUTANEOUS at 08:55

## 2019-06-11 RX ADMIN — ESCITALOPRAM OXALATE 20 MG: 10 TABLET ORAL at 08:56

## 2019-06-11 RX ADMIN — PREGABALIN 75 MG: 75 CAPSULE ORAL at 08:56

## 2019-06-11 RX ADMIN — BACITRACIN ZINC, NEOMYCIN SULFATE, POLYMYXIN B SULFATE 1 G: 3.5; 5000; 4 OINTMENT TOPICAL at 00:13

## 2019-06-11 RX ADMIN — VANCOMYCIN HYDROCHLORIDE 1750 MG: 5 INJECTION, POWDER, LYOPHILIZED, FOR SOLUTION INTRAVENOUS at 22:07

## 2019-06-11 RX ADMIN — PIPERACILLIN SODIUM,TAZOBACTAM SODIUM 3.38 G: 3; .375 INJECTION, POWDER, FOR SOLUTION INTRAVENOUS at 20:31

## 2019-06-11 RX ADMIN — ONDANSETRON 4 MG: 2 INJECTION INTRAMUSCULAR; INTRAVENOUS at 15:07

## 2019-06-11 RX ADMIN — BACITRACIN ZINC, NEOMYCIN SULFATE, POLYMYXIN B SULFATE 2 G: 3.5; 5000; 4 OINTMENT TOPICAL at 20:31

## 2019-06-11 RX ADMIN — Medication 1 CAPSULE: at 12:58

## 2019-06-11 RX ADMIN — OXYCODONE HYDROCHLORIDE 10 MG: 10 TABLET ORAL at 12:53

## 2019-06-11 ASSESSMENT — PAIN SCALES - GENERAL
PAINLEVEL_OUTOF10: 9
PAINLEVEL_OUTOF10: 0
PAINLEVEL_OUTOF10: 9
PAINLEVEL_OUTOF10: 9

## 2019-06-11 NOTE — CONSULTS
[address] [address] Continence Nurse  Consult Note       Stan Perez  AGE: 46 y.o.    GENDER: male  : 1965  TODAY'S DATE:  2019    Subjective:     Reason for Evaluation and Assessment:  Wound care assessment left foot s/p surgical debridement, rt gr toe, right and left fingers      Stan Perez is a 46 y.o. male referred by:   [x] Physician  [] Nursing  [] Other:     Wound Identification:  Wound Type: diabetic and non-healing surgical  Contributing Factors: diabetes        PAST MEDICAL HISTORY        Diagnosis Date    Anesthesia     \"Problems coming out of the anesthesia\"    Anxiety     Arthritis     Asthma     \"As a kid but outgrew this\"    Chronic back pain     Depression     Dizziness     Edema     Fibromyalgia     Fracture     Headache(784.0)     Hemorrhoid     Hernia, abdominal     Hx MRSA infection     positive culture 2014 from left foot    Migraine     Nausea & vomiting     Neuropathy     Obesity, Class III, BMI 40-49.9 (morbid obesity) ([facility] [address]     Osteomyelitis (HCC)     hx finger    Pneumonia     Poor circulation     Restless leg syndrome     Shortness of breath on exertion     2016- pt denies any sob with this interview    Type II or unspecified type diabetes mellitus with other specified manifestations, not stated as uncontrolled 2014    Type II or unspecified type diabetes mellitus without mention of complication, not stated as uncontrolled DX     Ulcer of other part of foot 2014    'ulcer on left foot healed all up\"       PAST SURGICAL HISTORY    Past Surgical History:   Procedure Laterality Date    COLONOSCOPY      DEBRIDEMENT  2014    left foot    ENDOSCOPY, COLON, DIAGNOSTIC  2016    FINGER SURGERY  11    Right Index Finger    FINGER SURGERY  12    RIght Index Finger-Removal of loose body, Reconstruction of Mallet Finger    FOOT DEBRIDEMENT Left 6/10/2019    FOOT DEBRIDEMENT INCISION AND DRAINAGE performed by Jagjit Hernandez MD at [facility]. Right 06/01/2018    I&D right foot    HEMORRHOID SURGERY  2008    OTHER SURGICAL HISTORY Left 10/22/2013    I & D left foot    OTHER SURGICAL HISTORY  07/07/2017    I&D fingers X2 left hand. I&D left forearm    OTHER SURGICAL HISTORY Left 07/08/2017    left hand debridement, left forearm incision and drainage     OTHER SURGICAL HISTORY Left 07/10/2017    Fingers I&D    OTHER SURGICAL HISTORY Left 07/14/2017    middle finger amputation revision    ROTATOR CUFF REPAIR  Last Done 7/18/2011    Left, Done Four Times       FAMILY HISTORY    Family History   Problem Relation Age of Onset    Kidney Disease Mother         \"Kidney Failure, On Dialysis\"    Early Death Mother 39    Diabetes Father         \"Type II\"       SOCIAL HISTORY    Social History     Tobacco Use    Smoking status: Current Every Day Smoker    Smokeless tobacco: Current User     Types: Snuff    Tobacco comment: \"Chew Grizzly Snuff Daily\"   Substance Use Topics    Alcohol use: No    Drug use: Yes     Types: Marijuana       ALLERGIES    Allergies   Allergen Reactions    Robaxin [Methocarbamol] Swelling    Rocephin [Ceftriaxone Sodium-Lidocaine] Hives     Noted on 10/26 - developed extremity swelling and hives. No anaphylaxis.  Sulfa Antibiotics Hives     Noted on 10/26 - developed extremity swelling and hives. No anaphylaxis.  Cantaloupe (Diagnostic) Other (See Comments)    Ceftriaxone Hives     Tolerated Zosyn well 1/2/2019     Aspirin Nausea Only    Propoxyphene Nausea And Vomiting    Toradol [Ketorolac Tromethamine] Other (See Comments)     Sweats        MEDICATIONS    No current facility-administered medications on file prior to encounter.       Current Outpatient Medications on File Prior to Encounter   Medication Sig Dispense Refill    Dulaglutide (TRULICITY) 4.66 OO/6.1IJ SOPN Inject 0.75 mg into the skin once a week      sertraline (ZOLOFT) 50 MG tablet Take 50 mg by edges 6/11/2019  3:00 PM   Thu-wound Assessment Intact 6/11/2019  3:00 PM   Non-staged Wound Description Full thickness 6/11/2019  3:00 PM   Red%Wound Bed 40 6/11/2019  3:00 PM   Yellow%Wound Bed 30 6/11/2019  3:00 PM   Black%Wound Bed 30 6/11/2019  3:00 PM   Number of days: 0       Wound 06/10/19 Toe (Comment  which one) Plantar;Left open wound noted to plantar aspect of left great toe  (Active)   Wound Diabetic Jiang 1 6/11/2019  3:00 PM   Dressing Status Clean;Dry; Intact 6/11/2019  3:00 PM   Dressing Changed Changed/New 6/11/2019  3:00 PM   Wound Cleansed Rinsed/Irrigated with saline 6/11/2019  3:00 PM   Wound Length (cm) 1 cm 6/11/2019  3:00 PM   Wound Width (cm) 0.8 cm 6/11/2019  3:00 PM   Wound Depth (cm) 0.1 cm 6/11/2019  3:00 PM   Wound Surface Area (cm^2) 0.8 cm^2 6/11/2019  3:00 PM   Wound Volume (cm^3) 0.08 cm^3 6/11/2019  3:00 PM   Distance Tunneling (cm) 0 cm 6/11/2019  3:00 PM   Tunneling Position ___ O'Clock 0 6/11/2019  3:00 PM   Undermining Starts ___ O'Clock 0 6/11/2019  3:00 PM   Undermining Ends___ O'Clock 0 6/11/2019  3:00 PM   Undermining Maxium Distance (cm) 0 6/11/2019  3:00 PM   Wound Assessment Pink;Yellow 6/11/2019  3:00 PM   Drainage Amount Small 6/11/2019  3:00 PM   Drainage Description Serosanguinous; Yellow 6/11/2019  3:00 PM   Odor None 6/11/2019  3:00 PM   Margins Defined edges 6/11/2019  3:00 PM   Thu-wound Assessment Calloused 6/11/2019  3:00 PM   Non-staged Wound Description Full thickness 6/11/2019  3:00 PM   [place]%Wound Bed 50 6/11/2019  3:00 PM   Yellow%Wound Bed 50 6/11/2019  3:00 PM   Number of days: 0       Wound 06/10/19 Toe (Comment  which one) Plantar;Right great toe (Active)   Wound Diabetic Jiang 1 6/11/2019  3:00 PM   Dressing Status Clean;Dry; Intact 6/11/2019  3:00 PM   Dressing Changed Changed/New 6/11/2019  3:00 PM   Wound Length (cm) 1.2 cm 6/11/2019  3:00 PM   Wound Width (cm) 1.4 cm 6/11/2019  3:00 PM   Wound Depth (cm) 0.1 cm 6/11/2019  3:00 PM   Wound Surface Area (cm^2) 1.68 cm^2 6/11/2019  3:00 PM   Wound Volume (cm^3) 0.17 cm^3 6/11/2019  3:00 PM   Distance Tunneling (cm) 0 cm 6/11/2019  3:00 PM   Tunneling Position ___ O'Clock 0 6/11/2019  3:00 PM   Undermining Starts ___ O'Clock 0 6/11/2019  3:00 PM   Undermining Ends___ O'Clock 0 6/11/2019  3:00 PM   Undermining Maxium Distance (cm) 0 6/11/2019  3:00 PM   Wound Assessment Pink;Yellow 6/11/2019  3:00 PM   Drainage Amount Small 6/11/2019  3:00 PM   Drainage Description Serosanguinous 6/11/2019  3:00 PM   Odor None 6/11/2019  3:00 PM   Margins Defined edges 6/11/2019  3:00 PM   Thu-wound Assessment Dry 6/11/2019  3:00 PM   Non-staged Wound Description Full thickness 6/11/2019  3:00 PM   [place]%Wound Bed 50 6/11/2019  3:00 PM   Yellow%Wound Bed 50 6/11/2019  3:00 PM   Number of days: 0       Wound 06/10/19 Finger (Comment which one) Right first finger (Active)   Wound Diabetic Jiang 1 6/11/2019  3:00 PM   Dressing Status Clean;Dry; Intact 6/11/2019  3:00 PM   Dressing Changed Changed/New 6/11/2019  3:00 PM   Wound Cleansed Rinsed/Irrigated with saline 6/11/2019  3:00 PM   Wound Length (cm) 2.5 cm 6/11/2019  3:00 PM   Wound Width (cm) 2.5 cm 6/11/2019  3:00 PM   Wound Depth (cm) 0.3 cm 6/11/2019  3:00 PM   Wound Surface Area (cm^2) 6.25 cm^2 6/11/2019  3:00 PM   Wound Volume (cm^3) 1.88 cm^3 6/11/2019  3:00 PM   Distance Tunneling (cm) 0 cm 6/11/2019  3:00 PM   Tunneling Position ___ O'Clock 0 6/11/2019  3:00 PM   Undermining Starts ___ O'Clock 0 6/11/2019  3:00 PM   Undermining Ends___ O'Clock 0 6/11/2019  3:00 PM   Undermining Maxium Distance (cm) 0 6/11/2019  3:00 PM   Wound Assessment Pink;Yellow 6/11/2019  3:00 PM   Drainage Amount Small 6/11/2019  3:00 PM   Drainage Description Serosanguinous 6/11/2019  3:00 PM   Odor None 6/11/2019  3:00 PM   Margins Defined edges 6/11/2019  3:00 PM   Thu-wound Assessment Calloused 6/11/2019  3:00 PM   Non-staged Wound Description Full thickness 6/11/2019  3:00 PM   [place]%Wound Bed 50 6/11/2019  3:00 PM   Yellow%Wound Bed 50 6/11/2019  3:00 PM   Number of days: 0       Wound 06/10/19 Finger (Comment which one) Right 2nd finger  (Active)   Wound Diabetic Jiang 1 6/11/2019  3:00 PM   Dressing Status Clean;Dry; Intact 6/11/2019  3:00 PM   Dressing Changed Changed/New 6/11/2019  3:00 PM   Wound Cleansed Rinsed/Irrigated with saline 6/11/2019  3:00 PM   Wound Length (cm) 3.5 cm 6/11/2019  3:00 PM   Wound Width (cm) 2 cm 6/11/2019  3:00 PM   Wound Depth (cm) 0.2 cm 6/11/2019  3:00 PM   Wound Surface Area (cm^2) 7 cm^2 6/11/2019  3:00 PM   Wound Volume (cm^3) 1.4 cm^3 6/11/2019  3:00 PM   Distance Tunneling (cm) 0 cm 6/11/2019  3:00 PM   Tunneling Position ___ O'Clock 0 6/11/2019  3:00 PM   Undermining Starts ___ O'Clock 0 6/11/2019  3:00 PM   Undermining Ends___ O'Clock 0 6/11/2019  3:00 PM   Undermining Maxium Distance (cm) 0 6/11/2019  3:00 PM   Wound Assessment Pink;Yellow 6/11/2019  3:00 PM   Drainage Amount Moderate 6/11/2019  3:00 PM   Drainage Description Serosanguinous 6/11/2019  3:00 PM   Odor None 6/11/2019  3:00 PM   Margins Defined edges 6/11/2019  3:00 PM   Thu-wound Assessment Calloused 6/11/2019  3:00 PM   Non-staged Wound Description Full thickness 6/11/2019  3:00 PM   [place]%Wound Bed 50 6/11/2019  3:00 PM   Yellow%Wound Bed 50 6/11/2019  3:00 PM   Number of days: 0       Wound 06/11/19 left plantar toward heel (Active)   Wound Non-Healing Surgical 6/11/2019  3:00 PM   Dressing Status Clean;Dry; Intact 6/11/2019  3:00 PM   Dressing Changed Changed/New 6/11/2019  3:00 PM   Wound Cleansed Rinsed/Irrigated with saline 6/11/2019  3:00 PM   Wound Length (cm) 2.5 cm 6/11/2019  3:00 PM   Wound Width (cm) 3.3 cm 6/11/2019  3:00 PM   Wound Depth (cm) 1.5 cm 6/11/2019  3:00 PM   Wound Surface Area (cm^2) 8.25 cm^2 6/11/2019  3:00 PM   Wound Volume (cm^3) 12.38 cm^3 6/11/2019  3:00 PM   Distance Tunneling (cm) 0 cm 6/11/2019  3:00 PM   Tunneling Position ___ O'Clock 0 6/11/2019  3:00 PM Undermining Starts ___ O'Clock 0 6/11/2019  3:00 PM   Undermining Ends___ O'Clock 0 6/11/2019  3:00 PM   Undermining Maxium Distance (cm) 0 6/11/2019  3:00 PM   Wound Assessment Pink;Red 6/11/2019  3:00 PM   Drainage Amount Large 6/11/2019  3:00 PM   Drainage Description Serosanguinous 6/11/2019  3:00 PM   Odor None 6/11/2019  3:00 PM   Margins Defined edges 6/11/2019  3:00 PM   Thu-wound Assessment Dry 6/11/2019  3:00 PM   Non-staged Wound Description Full thickness 6/11/2019  3:00 PM   [place]%Wound Bed 50 6/11/2019  3:00 PM   Red%Wound Bed 50 6/11/2019  3:00 PM   Number of days: 0       Wound 06/11/19 left medial heel (Active)   Wound Non-Healing Surgical 6/11/2019  3:00 PM   Dressing Status Clean;Dry; Intact 6/11/2019  3:00 PM   Dressing Changed Changed/New 6/11/2019  3:00 PM   Wound Cleansed Rinsed/Irrigated with saline 6/11/2019  3:00 PM   Wound Length (cm) 2.1 cm 6/11/2019  3:00 PM   Wound Width (cm) 1.2 cm 6/11/2019  3:00 PM   Wound Depth (cm) 1 cm 6/11/2019  3:00 PM   Wound Surface Area (cm^2) 2.52 cm^2 6/11/2019  3:00 PM   Wound Volume (cm^3) 2.52 cm^3 6/11/2019  3:00 PM   Distance Tunneling (cm) 0 cm 6/11/2019  3:00 PM   Tunneling Position ___ O'Clock 0 6/11/2019  3:00 PM   Undermining Starts ___ O'Clock 0 6/11/2019  3:00 PM   Undermining Ends___ O'Clock 0 6/11/2019  3:00 PM   Undermining Maxium Distance (cm) 0 6/11/2019  3:00 PM   Wound Assessment Pink;Yellow 6/11/2019  3:00 PM   Drainage Amount Moderate 6/11/2019  3:00 PM   Drainage Description Serosanguinous 6/11/2019  3:00 PM   Odor None 6/11/2019  3:00 PM   Margins Defined edges 6/11/2019  3:00 PM   Thu-wound Assessment Intact 6/11/2019  3:00 PM   Non-staged Wound Description Full thickness 6/11/2019  3:00 PM   [place]%Wound Bed 50 6/11/2019  3:00 PM   Yellow%Wound Bed 50 6/11/2019  3:00 PM   Number of days: 0       Wound 06/11/19 left little finger cluster (Active)   Wound Diabetic [place] 1 6/11/2019  3:00 PM   Dressing Status Clean;Dry; Intact 6/11/2019  3:00 PM   Dressing Changed Changed/New 6/11/2019  3:00 PM   Wound Cleansed Rinsed/Irrigated with saline 6/11/2019  3:00 PM   Wound Length (cm) 4.5 cm 6/11/2019  3:00 PM   Wound Width (cm) 1.7 cm 6/11/2019  3:00 PM   Wound Depth (cm) 0.3 cm 6/11/2019  3:00 PM   Wound Surface Area (cm^2) 7.65 cm^2 6/11/2019  3:00 PM   Wound Volume (cm^3) 2.3 cm^3 6/11/2019  3:00 PM   Distance Tunneling (cm) 0 cm 6/11/2019  3:00 PM   Tunneling Position ___ O'Clock 0 6/11/2019  3:00 PM   Undermining Starts ___ O'Clock 0 6/11/2019  3:00 PM   Undermining Ends___ O'Clock 0 6/11/2019  3:00 PM   Undermining Maxium Distance (cm) 0 6/11/2019  3:00 PM   Wound Assessment Pink;Yellow 6/11/2019  3:00 PM   Drainage Amount Moderate 6/11/2019  3:00 PM   Drainage Description Serosanguinous 6/11/2019  3:00 PM   Odor None 6/11/2019  3:00 PM   Margins Defined edges 6/11/2019  3:00 PM   Thu-wound Assessment Calloused 6/11/2019  3:00 PM   Non-staged Wound Description Full thickness 6/11/2019  3:00 PM   [place]%Wound Bed 50 6/11/2019  3:00 PM   Yellow%Wound Bed 50 6/11/2019  3:00 PM   Number of days: 0       Wound 06/11/19 left middle finger dorsal (Active)   Wound Diabetic Jiang 1 6/11/2019  3:00 PM   Dressing Status Clean;Dry; Intact 6/11/2019  3:00 PM   Dressing Changed Changed/New 6/11/2019  3:00 PM   Wound Cleansed Rinsed/Irrigated with saline 6/11/2019  3:00 PM   Wound Length (cm) 2.5 cm 6/11/2019  3:00 PM   Wound Width (cm) 3 cm 6/11/2019  3:00 PM   Wound Depth (cm) 0.1 cm 6/11/2019  3:00 PM   Wound Surface Area (cm^2) 7.5 cm^2 6/11/2019  3:00 PM   Wound Volume (cm^3) 0.75 cm^3 6/11/2019  3:00 PM   Distance Tunneling (cm) 0 cm 6/11/2019  3:00 PM   Tunneling Position ___ O'Clock 0 6/11/2019  3:00 PM   Undermining Starts ___ O'Clock 0 6/11/2019  3:00 PM   Undermining Ends___ O'Clock 0 6/11/2019  3:00 PM   Undermining Maxium Distance (cm) 0 6/11/2019  3:00 PM   Wound Assessment Pink 6/11/2019  3:00 PM   Drainage Amount Small 6/11/2019  3:00 PM Drainage Description Serosanguinous 6/11/2019  3:00 PM   Odor None 6/11/2019  3:00 PM   Margins Defined edges 6/11/2019  3:00 PM   Thu-wound Assessment Intact 6/11/2019  3:00 PM   Non-staged Wound Description Full thickness 6/11/2019  3:00 PM   [place]%Wound Bed 100 6/11/2019  3:00 PM   Number of days: 0       Wound 06/11/19 left first finger  (Active)   Wound Diabetic Jiang 1 6/11/2019  3:00 PM   Dressing Status Clean;Dry; Intact 6/11/2019  3:00 PM   Dressing Changed Changed/New 6/11/2019  3:00 PM   Wound Cleansed Rinsed/Irrigated with saline 6/11/2019  3:00 PM   Wound Length (cm) 1 cm 6/11/2019  3:00 PM   Wound Width (cm) 1 cm 6/11/2019  3:00 PM   Wound Depth (cm) 0.1 cm 6/11/2019  3:00 PM   Wound Surface Area (cm^2) 1 cm^2 6/11/2019  3:00 PM   Wound Volume (cm^3) 0.1 cm^3 6/11/2019  3:00 PM   Distance Tunneling (cm) 0 cm 6/11/2019  3:00 PM   Tunneling Position ___ O'Clock 0 6/11/2019  3:00 PM   Undermining Starts ___ O'Clock 0 6/11/2019  3:00 PM   Undermining Ends___ O'Clock 0 6/11/2019  3:00 PM   Undermining Maxium Distance (cm) 0 6/11/2019  3:00 PM   Wound Assessment Pink;Yellow 6/11/2019  3:00 PM   Drainage Amount Moderate 6/11/2019  3:00 PM   Drainage Description Serosanguinous 6/11/2019  3:00 PM   Odor None 6/11/2019  3:00 PM   Margins Defined edges 6/11/2019  3:00 PM   Thu-wound Assessment Calloused 6/11/2019  3:00 PM   Non-staged Wound Description Full thickness 6/11/2019  3:00 PM   [place]%Wound Bed 50 6/11/2019  3:00 PM   Yellow%Wound Bed 50 6/11/2019  3:00 PM   Number of days: 0       Wound 06/11/19 left thumb  (Active)   Wound Diabetic Jiang 1 6/11/2019  3:00 PM   Dressing Status Clean;Dry; Intact 6/11/2019  3:00 PM   Dressing Changed Changed/New 6/11/2019  3:00 PM   Wound Cleansed Rinsed/Irrigated with saline 6/11/2019  3:00 PM   Wound Length (cm) 0.7 cm 6/11/2019  3:00 PM   Wound Width (cm) 1 cm 6/11/2019  3:00 PM   Wound Depth (cm) 0.1 cm 6/11/2019  3:00 PM   Wound Surface Area (cm^2) 0.7 cm^2 6/11/2019  3:00 PM   Wound Volume (cm^3) 0.07 cm^3 6/11/2019  3:00 PM   Distance Tunneling (cm) 0 cm 6/11/2019  3:00 PM   Tunneling Position ___ O'Clock 0 6/11/2019  3:00 PM   Undermining Starts ___ O'Clock 0 6/11/2019  3:00 PM   Undermining Ends___ O'Clock 0 6/11/2019  3:00 PM   Undermining Maxium Distance (cm) 0 6/11/2019  3:00 PM   Wound Assessment Pink;Yellow 6/11/2019  3:00 PM   Drainage Amount Moderate 6/11/2019  3:00 PM   Drainage Description Serosanguinous 6/11/2019  3:00 PM   Odor None 6/11/2019  3:00 PM   Margins Defined edges 6/11/2019  3:00 PM   Thu-wound Assessment Calloused 6/11/2019  3:00 PM   Non-staged Wound Description Full thickness 6/11/2019  3:00 PM   [place]%Wound Bed 50 6/11/2019  3:00 PM   Yellow%Wound Bed 50 6/11/2019  3:00 PM   Number of days: 0       Response to treatment:  Well tolerated by patient.      Pain Assessment:  Severity:  0  Quality of pain: none  Wound Pain Timing/Severity:   Premedicated: no    Plan:     Plan of Care: Wound 06/10/19 Foot Plantar;Left base of great toe -Dressing/Treatment: (betadine moist gauze abd kerlix tape)  Wound 06/10/19 Toe (Comment  which one) Plantar;Left open wound noted to plantar aspect of left great toe -Dressing/Treatment: (betadine conform tape )  Wound 06/10/19 Toe (Comment  which one) Plantar;Right great toe-Dressing/Treatment: (betadine conform tape )  Wound 06/10/19 Finger (Comment which one) Right first finger-Dressing/Treatment: (aquacel ag conform tape )  Wound 06/10/19 Finger (Comment which one) Right 2nd finger -Dressing/Treatment: (aquacel ag conform tape )  Wound 06/11/19 left plantar toward heel-Dressing/Treatment: (betadine moist gauze abd kerlix tape )  Wound 06/11/19 left medial heel-Dressing/Treatment: (betadine moist gauze abd kerlix tape )  Wound 06/11/19 left little finger cluster-Dressing/Treatment: (AQUACEL AG CONFORM TAPE)  Wound 06/11/19 left middle finger dorsal-Dressing/Treatment: (aquacel ag conform tape )  Wound 06/11/19 left first finger -Dressing/Treatment: (aquacel ag conform tape )  Wound 06/11/19 left thumb -Dressing/Treatment: (aquacel ag conform tape )    Specialty Bed Required : no  [] Low Air Loss   [] Pressure Redistribution  [] Fluid Immersion  [] Bariatric  [] Total Pressure Relief  [] Other:     Discharge Plan:  Placement for patient upon discharge: to be determined  Hospice Care:no  Patient appropriate for Outpatient [address] [facility] [facility] [address]: pt will follow mireya     Patient/Caregiver Teaching:  Level of patient/caregiver understanding able to: patient verbalized understanding        Electronically signed by Hanh Dobbins.  MEY Cano,  on 6/11/2019 at 4:18 PM

## 2019-06-11 NOTE — CONSULTS
PHARMACY VANCOMYCIN MONITORING SERVICE    Genevieve Alaniz is a 46 y.o. male started on vancomycin for wounds on bottom of left foot. Pharmacy consulted by Dr. Dina Antonio for monitoring and adjustment. Indication for treatment: Extremity wounds and feet sores  Goal trough: 10-15 mcg/mL  Other antimicrobials: Piperacillin-Tazobactam & Clindamycin     Ht Readings from Last 1 Encounters:   06/10/19 5' 9\" (1.753 m)     Wt Readings from Last 3 Encounters:   06/10/19 260 lb (117.9 kg)   06/03/19 260 lb (117.9 kg)   05/15/19 283 lb (128.4 kg)        Pertinent Laboratory Values:   Temp Readings from Last 3 Encounters:   06/10/19 99.4 °F (37.4 °C) (Oral)   05/08/19 97.6 °F (36.4 °C)   04/08/19 98.4 °F (36.9 °C) (Oral)     Recent Labs     06/10/19  1802   WBC 12.8*   LACTATE 1.3     Recent Labs     06/10/19  1802   BUN 14   CREATININE 0.6*     Estimated Creatinine Clearance: 178 mL/min (A) (based on SCr of 0.6 mg/dL (L)). No intake or output data in the 24 hours ending 06/10/19 2005    Pertinent Cultures:  Date    Source    Results  6/10/19  Blood    Collected    Previous vancomycin levels:  TROUGH:  No results for input(s): VANCOTROUGH in the last 72 hours. RANDOM:  No results for input(s): VANCORANDOM in the last 72 hours. Assessment/Plan:  Administer loading dose of vancomycin 2,000mg. Schedule vancomycin regimen of 1,750mg q12h and obtain trough prior to 5th dose. Pharmacy will continue to monitor patient and adjust therapy as indicated. Linwood Barajas SCHEDULED FOR 6-13-19 @ 9473V    Thank you for the consult.   Pop Koch RPh  6/10/2019 8:05 PM

## 2019-06-11 NOTE — PROGRESS NOTES
PHARMACY VANCOMYCIN MONITORING SERVICE    Nikunj Issa is a 46 y.o. male started on vancomycin for wounds on bottom of left foot. Pharmacy consulted by Dr. Brian Mary for monitoring and adjustment. Indication for treatment: Extremity wounds and feet sores  Goal trough: 10-15 mcg/mL  Other antimicrobials: Piperacillin-Tazobactam & Clindamycin     Ht Readings from Last 1 Encounters:   06/10/19 5' 9\" (1.753 m)     Wt Readings from Last 3 Encounters:   06/10/19 260 lb (117.9 kg)   06/03/19 260 lb (117.9 kg)   05/15/19 283 lb (128.4 kg)        Pertinent Laboratory Values:   Temp Readings from Last 3 Encounters:   06/11/19 97.5 °F (36.4 °C) (Oral)   05/08/19 97.6 °F (36.4 °C)   04/08/19 98.4 °F (36.9 °C) (Oral)     Recent Labs     06/10/19  1802 06/11/19  0552   WBC 12.8* 9.1   LACTATE 1.3 0.9     Recent Labs     06/10/19  1802 06/11/19  0552   BUN 14 11   CREATININE 0.6* 0.6*     Estimated Creatinine Clearance: 178 mL/min (A) (based on SCr of 0.6 mg/dL (L)). Intake/Output Summary (Last 24 hours) at 6/11/2019 1154  Last data filed at 6/10/2019 2147  Gross per 24 hour   Intake 200 ml   Output 5 ml   Net 195 ml       Pertinent Cultures:  Date    Source    Results  6/10/19  Blood    Collected  6/10/19                       Wound, Left Foot                    Pending    Previous vancomycin levels:  TROUGH:  No results for input(s): VANCOTROUGH in the last 72 hours. RANDOM:  No results for input(s): VANCORANDOM in the last 72 hours. Assessment/Plan:  WBC wnl today, patient remains afebrile. SCr is stable, low UOP. Day(s) of therapy: # 2  Cultures:  NGTD. Continue Vancomycin 1750mg IVPB every 12 hours. Pharmacy will continue to monitor patient and adjust therapy as indicated. VANCOMYCIN TROUGH SCHEDULED FOR 6-13-19 @ 0049. Thank you for the consult.     Flora Holloway [place]  6/11/2019 11:54 AM

## 2019-06-11 NOTE — ANESTHESIA POSTPROCEDURE EVALUATION
Department of Anesthesiology  Postprocedure Note    Patient: Andrae Tong  MRN: [id number]  YOB: 1965  Date of evaluation: 6/10/2019  Time:  9:49 PM     Procedure Summary     Date:  06/10/19 Room / Location:  [place] [id number] / [facility] OR    Anesthesia Start:  2109 Anesthesia Stop:  2140    Procedure:  FOOT DEBRIDEMENT INCISION AND DRAINAGE (Left ) Diagnosis:  (foot necrotizing fascitis)    Surgeon:  Osiris Aragon MD Responsible Provider:  Mariama Deras DO    Anesthesia Type:  MAC ASA Status:  3 - Emergent          Anesthesia Type: MAC    Ban Phase I:      Ban Phase II:      Last vitals: Reviewed and per EMR flowsheets.        Anesthesia Post Evaluation    Patient location during evaluation: floor  Patient participation: complete - patient participated  Level of consciousness: awake and alert  Airway patency: patent  Nausea & Vomiting: no nausea  Complications: no  Cardiovascular status: hemodynamically stable  Respiratory status: acceptable  Hydration status: euvolemic

## 2019-06-11 NOTE — ED NOTES
Report called to [facility] on 1N at this time. Patient awaiting OR availability.       Yolanda Link RN  06/10/19 9548

## 2019-06-11 NOTE — CONSULTS
[address], [address] [facility]                                  CONSULTATION    PATIENT NAME: Gildardo Varner                       :        1965  MED REC NO:   [id number]                          ROOM:       1569  ACCOUNT NO:   [de-identified]                           ADMIT DATE: 06/10/2019  PROVIDER:     Grant Constantino MD    CONSULT DATE:  06/10/2019    CHIEF COMPLAINT AND HISTORY OF PRESENT ILLNESS:  The patient is a  79-year-old  male who has a history of insulin-dependent  diabetes mellitus along with peripheral neuropathy and obesity. He  presented to the emergency room late this evening due to a couple of  days' history of progressive left foot swelling, erythematous changes,  along with pain. The redness now has extended all the way up to the  knee level and he states he was barefoot and out in the yard and he had  stepped on glass which lacerated the left plantar surface of his foot. He now also reports throbbing pain. The patient has a prior history of  noncompliance, multiple medical comorbidities, he has already had a  right fifth toe amputation within the [facility] [address]. He also has  had some finger amputations as well. He also has a bandage on the right  great toe which he said he stubbed and caused an abrasion. He is  running low grade fevers, slightly tachycardic and does have  leukocytosis of almost 13,000. His blood glucose level was 261. He did  get a left foot x-ray along with an ultrasound of the left lower  extremity and the ultrasound revealed no evidence of any DVT, but he did  have some left inguinal lymphadenopathy and he does also report that he  has some pain in his groin area due to his swollen lymph nodes. The  left foot x-ray did reveal some diffuse soft tissue edema with the  ulceration along the plantar surface of the foot toward the heel.   There  is also a small amount of associated gas at the ulceration site. There  is no radiographic evidence of any osteomyelitis. Mild to moderate  degenerative change is also noted some moderate osteoarthritis of the  hindfoot and midfoot. Based upon examination of the left foot and the  ascending cellulitis, I am concerned that he could have a necrotizing  soft tissue infection occurring within the plantar aspect of his foot. He has already been started on Zosyn along with vancomycin and he was  additionally given a dose of clindamycin. PAST MEDICAL HISTORY:  Anxiety, arthritis, insulin-dependent diabetes  mellitus, fibromyalgia, obesity, restless legs syndrome. PAST SURGICAL HISTORY:  He has had a colonoscopy, prior left foot  debridements, finger amputations bilaterally, hemorrhoidal surgery,  rotator cuff repair. FAMILY HISTORY:  Positive for kidney disease, diabetes. SOCIAL HISTORY:  , every day smoker. Denies alcohol or IV drug  abuse. MEDICATIONS:  Please refer to the medication reconciliation sheet. ALLERGIES:  ROBAXIN ROCEPHIN SULFA ANTIBIOTICS, CEFTRIAXONE, ASPIRIN,  PROPOXYPHENE and TORADOL. REVIEW OF SYSTEMS:  Within the above limitations. A 14 point review of  systems was reviewed. Pertinent positives and negatives are per the  history present illness or otherwise negative for the 14-point system  review. PHYSICAL EXAMINATION:  VITAL SIGNS:  His temperature is 99.4, respirations 16, pulse 103, blood  pressure 111/68, O2 sat is 100% on room air. GENERAL:  No acute distress. Alert, awake and oriented x3. HEAD, EARS, EYES, NOSE AND THROAT:  Normocephalic, atraumatic. Pupils  equal, round, and reactive to light. Extraocular motions intact. Trachea is midline. No lymphadenopathy. Anicteric sclerae. Moist  mucous membranes. NECK:  Supple. HEART:  Sinus tachycardia. Positive S1, S2. No murmurs, rubs or  gallops. CHEST:  Clear to auscultation bilaterally.   No rales, rhonchi, wheezes  or crackles. ABDOMEN:  Obese. No rebound or guarding. Nontender. No CVA  tenderness. No hernias, pulsations, or fluid shifts. Left inguinal  lymphadenopathy appreciated. EXTREMITIES:  Macerated plantar ulcer on the left foot with very  edematous tissue, painful to the touch, erythematous changes over the  foot, ankle and leg extending up to the knee area. I cannot appreciate  any subcutaneous crepitus. Palpable bilateral femoral and popliteal  pulses. ASSESSMENT AND PLAN:  A 21-year-old  male who presents with a  severe skin and soft tissue infection with ulceration of the left foot  due to his diabetes mellitus. I am concerned about possible necrotizing  soft tissue infection and will take him to the operating room tonight  for incision and drainage along with debridement of all necrotic tissue. We will continue with triple antibiotic therapy. He last ate this a.m. I have informed the patient that he needs surgery. His operative  consent has been signed and placed in the chart. I did discuss all  risks, benefits and alternatives of the procedure to the patient. He is  in agreement to proceed with surgical intervention.         Seun Palomo MD    D: 06/10/2019 20:36:49       T: 06/10/2019 20:40:17     [email or web address]  Job#: [id number]     Doc#: [id number]    CC:  <>

## 2019-06-11 NOTE — ED PROVIDER NOTES
Patient Identification  Larry Landis is a 46 y.o. male    Chief Complaint  Wound Check (left foot infection); Fever; Emesis; and Chills      HPI  (History provided by patient)  This is a 46 y.o. male who was brought in by self for chief complaint of wound check, fevers and chills, vomiting. Patient reports that a month ago he was out in the lawn barefoot, cut his left foot at this time. Was seen here, wound is clean, states that \"was doing well until yesterday when he suddenly developed quickly worsening pain, redness, swelling in his left foot that is now just below his left knee. He also notes pain in his left inner thigh but denies rash or swelling in this area. He states that he has had subjective fevers and chills. He has vomited a few times today. REVIEW OF SYSTEMS    Constitutional:  + subjective fever, chills  HENT:  Denies sore throat or ear pain   Eyes: Denies vision changes, eye pain  Cardiovascular:  Denies chest pain, syncope  Respiratory:  Denies shortness of breath, cough   GI:  Denies abdominal pain, nausea, vomiting  :  Denies dysuria, discharge  Musculoskeletal:  Denies back pain.  + foot, lower leg, inner thigh pain. Skin:  + rapidly spreading rash  Neurologic:  Denies headache, focal weakness, or sensory changes     See HPI and nursing notes for additional information     I have reviewed the following nursing documentation:  Allergies: Allergies   Allergen Reactions    Robaxin [Methocarbamol] Swelling    Rocephin [Ceftriaxone Sodium-Lidocaine] Hives     Noted on 10/26 - developed extremity swelling and hives. No anaphylaxis.  Sulfa Antibiotics Hives     Noted on 10/26 - developed extremity swelling and hives. No anaphylaxis.     Cantaloupe (Diagnostic) Other (See Comments)    Ceftriaxone Hives     Tolerated Zosyn well 1/2/2019     Aspirin Nausea Only    Propoxyphene Nausea And Vomiting    Toradol [Ketorolac Tromethamine] Other (See Comments)     Sweats        Past medical history:  has a past medical history of Anesthesia, Anxiety, Arthritis, Asthma, Chronic back pain, Depression, Dizziness, Edema, Fibromyalgia, Fracture, Headache(784.0), Hemorrhoid, Hernia, abdominal, MRSA infection, Migraine, Nausea & vomiting, Neuropathy, Obesity, Class III, BMI 40-49.9 (morbid obesity) ([facility] [address] Osteomyelitis ([facility] [address] Pneumonia (1995), Poor circulation, Restless leg syndrome, Shortness of breath on exertion, Type II or unspecified type diabetes mellitus with other specified manifestations, not stated as uncontrolled (4/8/2014), Type II or unspecified type diabetes mellitus without mention of complication, not stated as uncontrolled (DX 2007), and Ulcer of other part of foot (4/8/2014). Past surgical history:  has a past surgical history that includes Rotator cuff repair (Last Done 7/18/2011); Finger surgery (9-11-11); Colonoscopy (1990's); Hemorrhoid surgery (2008); Finger surgery (01-16-12); other surgical history (Left, 10/22/2013); debridement (8/25/2014); Endoscopy, colon, diagnostic (06-); other surgical history (07/07/2017); other surgical history (Left, 07/08/2017); other surgical history (Left, 07/10/2017); other surgical history (Left, 07/14/2017); and Foot surgery (Right, 06/01/2018). Home medications:   Prior to Admission medications    Medication Sig Start Date End Date Taking? Authorizing Provider   Dulaglutide (TRULICITY) 8.72 MH/7.3LN SOPN Inject 0.75 mg into the skin once a week   Yes Historical Provider, MD   sertraline (ZOLOFT) 50 MG tablet Take 50 mg by mouth daily   Yes Historical Provider, MD   metFORMIN (GLUCOPHAGE) 1000 MG tablet Take 1 tablet by mouth 2 times daily (with meals) 6/5/18  Yes Helio Sherman MD   tiZANidine (ZANAFLEX) 4 MG tablet Take 4 mg by mouth nightly as needed   Yes Historical Provider, MD   pregabalin (LYRICA) 100 MG capsule Take 1 capsule by mouth 2 times daily  Patient taking differently: Take 75 mg by mouth 2 times daily.  . 10/23/16  Yes Suman Garcia Kb Bhakta MD   traZODone (DESYREL) 100 MG tablet Take 100 mg by mouth nightly as needed    Yes Historical Provider, MD   ondansetron (ZOFRAN ODT) 4 MG disintegrating tablet Take 1 tablet by mouth every 6 hours 11/2/18   Nico Robertson PA-C       Social history:  reports that he has been smoking. His smokeless tobacco use includes snuff. He reports that he has current or past drug history. Drug: Marijuana. He reports that he does not drink alcohol. Family history:    Family History   Problem Relation Age of Onset    Kidney Disease Mother         \"Kidney Failure, On Dialysis\"    Early Death Mother 39    Diabetes Father         \"Type II\"         Exam  /68   Pulse 103   Temp 99.4 °F (37.4 °C) (Oral)   Resp 16   Ht 5' 9\" (1.753 m)   Wt 260 lb (117.9 kg)   SpO2 100%   BMI 38.40 kg/m²   Nursing note and vitals reviewed. Constitutional: Well developed, well nourished. No acute distress. HENT:      Head: Normocephalic and atraumatic. Ears: External ears normal.      Nose: Nose normal.     Mouth: Membrane mucosa moist and pink. No posterior oropharynx erythema or tonsillar edema  Eyes: Anicteric sclera. No discharge, PERRL  Neck: Supple. Trachea midline. Cardiovascular: RRR, no murmurs, rubs, or gallops, radial pulses 2+ bilaterally. Unable to palpate dorsalis pedis pulses on left secondary to edema. Able to obtain with ultrasound. Pulmonary/Chest: Effort normal. No respiratory distress. CTAB. No stridor. No wheezes. No rales. Abdominal: Soft. Nontender to palpation. No distension. No guarding, rebound tenderness, or evidence of ascites. : No CVA tenderness. Musculoskeletal: Moves all extremities. No gross deformity. Neurological: Alert and oriented to person, place, and time. Normal muscle tone. Skin: Warm and dry. Moderate pitting edema noted throughout the left foot up to the left proximal shin.   There is diffuse erythema circumferentially around the entire left foot and left lower leg. No crepitus noted. Several ulcers noted on the plantar aspect of the left foot, the ulcer overlying the distal first metatarsal shows some skin pallor and blackening concerning for necrosis. No overlying skin changes, swelling noted with inspection of the left groin. No palpable lymph nodes noted. Psychiatric: Normal mood and affect. Behavior is normal.      Radiographs (if obtained):  [] The following radiograph was interpreted by myself in the absence of a radiologist:   [x] Radiologist's Report Reviewed:  XR FOOT LEFT (MIN 3 VIEWS)   Final Result   1. Diffuse soft tissue edema with apparent ulceration along the plantar foot   at the level of the heel. There is a small amount of associated gas at the   ulceration site. No gas identified throughout the remainder of the foot. 2. No radiographic evidence for osteomyelitis. VL DUP LOWER EXTREMITY VENOUS LEFT   Preliminary Result   No evidence of DVT in the left lower extremity. Subcutaneous edema. Left inguinal lymph nodes, measuring up to 1.2 cm in   short axis, may be reactive.                 Labs  Results for orders placed or performed during the hospital encounter of 06/10/19   CBC auto diff   Result Value Ref Range    WBC 12.8 (H) 4.0 - 10.5 K/CU MM    RBC 4.71 4.6 - 6.2 M/CU MM    Hemoglobin 13.0 (L) 13.5 - 18.0 GM/DL    Hematocrit 41.2 (L) 42 - 52 %    MCV 87.5 78 - 100 FL    MCH 27.6 27 - 31 PG    MCHC 31.6 (L) 32.0 - 36.0 %    RDW 14.4 11.7 - 14.9 %    Platelets 182 859 - 600 K/CU MM    MPV 10.0 7.5 - 11.1 FL    Differential Type AUTOMATED DIFFERENTIAL     Segs Relative 85.7 (H) 36 - 66 %    Lymphocytes % 6.9 (L) 24 - 44 %    Monocytes % 6.1 (H) 0 - 4 %    Eosinophils % 0.4 0 - 3 %    Basophils % 0.2 0 - 1 %    Segs Absolute 11.0 K/CU MM    Lymphocytes # 0.9 K/CU MM    Monocytes # 0.8 K/CU MM    Eosinophils # 0.1 K/CU MM    Basophils # 0.0 K/CU MM    Nucleated RBC % 0.0 %    Total Nucleated RBC 0.0 K/CU MM    Total Immature Neutrophil 0.09 K/CU MM    Immature Neutrophil % 0.7 (H) 0 - 0.43 %   CMP   Result Value Ref Range    Sodium 132 (L) 135 - 145 MMOL/L    Potassium 4.2 3.5 - 5.1 MMOL/L    Chloride 96 (L) 99 - 110 mMol/L    CO2 21 21 - 32 MMOL/L    BUN 14 6 - 23 MG/DL    CREATININE 0.6 (L) 0.9 - 1.3 MG/DL    Glucose 261 (H) 70 - 99 MG/DL    Calcium 9.5 8.3 - 10.6 MG/DL    Alb 3.5 3.4 - 5.0 GM/DL    Total Protein 7.2 6.4 - 8.2 GM/DL    Total Bilirubin 0.6 0.0 - 1.0 MG/DL    ALT 9 (L) 10 - 40 U/L    AST 13 (L) 15 - 37 IU/L    Alkaline Phosphatase 110 40 - 128 IU/L    GFR Non-African American >60 >60 mL/min/1.73m2    GFR African American >60 >60 mL/min/1.73m2    Anion Gap 15 4 - 16   Lactic Acid, Plasma   Result Value Ref Range    Lactate 1.3 0.4 - 2.0 mMOL/L         MDM  Patient presents for rapidly worsening wound infection with cellulitis of left lower leg and foot. There is an area of necrosis on the plantar aspect of the distal first metatarsal.  No crepitus. X-ray shows a small area of gas in ulcer but is otherwise only showing edema. Patient started on Zosyn and vancomycin. Consult was placed to Dr. Kailyn Motley, discussed history and physical exam and labs, discussed my concern for necrotizing fasciitis. He did evaluate patient in the ED and is planning to take patient to or for wound debridement. Patient being given normal saline bolus for possible underlying sepsis given his tachycardia and leukocytosis. Assessment and plan discussed with patient who understands and agrees. This patient was also seen and evaluated by Dr. Randall Naylor. Final Impression  1. Cellulitis of left lower extremity    2. Leukocytosis, unspecified type    3. Inguinal lymphadenopathy        Blood pressure 111/68, pulse 103, temperature 99.4 °F (37.4 °C), temperature source Oral, resp. rate 16, height 5' 9\" (1.753 m), weight 260 lb (117.9 kg), SpO2 100 %. Disposition:  Admit to operating room in fair condition.         Patient was given

## 2019-06-11 NOTE — OP NOTE
[address] [place] [address], [address] [place] [address]                                OPERATIVE REPORT    PATIENT NAME: Lucas Bush                       :        1965  MED REC NO:   [id number]                          ROOM:       9062  ACCOUNT NO:   [de-identified]                           ADMIT DATE: 06/10/2019  PROVIDER:     Alfred Piedra MD    DATE OF PROCEDURE:  06/10/2019    PREOPERATIVE DIAGNOSIS:  Diabetic left foot infection. POSTOPERATIVE DIAGNOSIS:  Diabetic left foot infection. PROCEDURE PERFORMED:  Incision and drainage of left deep foot abscess  with excisional debridement measuring 10 cm in three separate areas. SURGEON:  Alfred Piedra MD    ANESTHESIA:  Monitored anesthesia care with local analgesic. BIOPSY/SPECIMEN:  Aerobic and anaerobic cultures were taken within the  tissue. COMPLICATIONS:  None. DRAINS:  None. ESTIMATED BLOOD LOSS:  Negligible. INTRAOPERATIVE FINDINGS:  As above. INDICATION FOR PROCEDURE:  The patient is a 62-year-old  male  who has diabetes mellitus and presented with a severe left foot  infection and ascending cellulitis. There were three separate areas of  concern with blistering for a skin and soft tissue infection. Based  upon his clinical examination, leukocytosis, and physical examination; I  recommended we proceed to the operating room for incision and drainage  of the abscesses along with excisional debridement of dead skin and soft  tissue. I did discuss all the risks, benefits, and alternatives of the  procedure in detail with the patient. He was in agreement to proceed  with surgical intervention. His operative consent was signed and placed  upon the chart. DESCRIPTION OF PROCEDURE:  The patient was brought to the operating room  in the [facility], placed in the supine position. Conscious sedation was  then given per Anesthesia.   The patient's left foot was then sterilely  prepped and draped in a standard surgical fashion. An universal timeout  was performed verifying the patient, date of birth, site of surgery, and  we were all in agreement to proceed. There was blistering present on  the plantar aspect near the first metatarsophalangeal joint space also  in the midfoot, the arch, and also on the medial aspect of the heel. All of these areas then had local injected into the skin and  subcutaneous tissues. I then used a 15-blade scalpel to incise all  these areas and the only area which really had purulent fluid was the  space at the first metatarsophalangeal joint region. I excisionally  debrided this area as well and this went all the way down to the plantar  fascia. I did have to debride necrotic soft tissue with a 15-blade  scalpel. This did track into the forefoot to about 2 inches. I then  also excisionally debrided the midfoot area and also the heel region. These were smaller than the area in distal most portion of the foot. Once all of the dead tissue was completely excised, I then copiously  irrigated out, made sure it was hemostatic with Bovie electrocautery,  and then packed each space with Betadine-soaked gauze. ABD dressings  along with the Kerlix wrap were then applied. The patient was then  awoken from conscious sedation and transported to the recovery room in  satisfactory and stable condition.         Randa Fuentes MD    D: 06/10/2019 21:46:58       T: 06/11/2019 0:04:07     [email or web address]  Job#: [id number]     Doc#: [id number]    CC:

## 2019-06-11 NOTE — PROGRESS NOTES
Progress Note (Hospitalist, Internal Medicine)  IDENTIFYING INFORMATION   PATIENT:  Darling Mendez  MRN:  [id number]  ADMIT DATE: 6/10/2019  TIME OF EVALUATION: 6/11/2019 12:33 PM      HISTORY OF PRESENT ILLNESS   Darling Mendez is a 46 y.o. male with a past medical history of DMII c/b peripheral neuropathy, obesity who presents with 1-2 days hx of progressive Fever/chills/Left foot swelling, pain, erythema. Approx 2-3 weeks ago, he was out barefooted in the shed when he stepped on a glass with laceration over his left plantar surface of his forefoot. The laceration did not improve and over the last 2 days, developed rapid and progressive ascending swelling, erythema and local throbbing pain. Associated with subjective fever , chills and nausea/emesis. On review, he stubbed his right big toe recently      SUBJECTIVE     Significant improvement in erythema of his left foot  Need more pain control. 5mg oxy not sufficient for adequate relief    MEDICATIONS   Medications Prior to Admission  Medications Prior to Admission: Dulaglutide (TRULICITY) 3.31 OX/9.3GY SOPN, Inject 0.75 mg into the skin once a week  sertraline (ZOLOFT) 50 MG tablet, Take 50 mg by mouth daily  metFORMIN (GLUCOPHAGE) 1000 MG tablet, Take 1 tablet by mouth 2 times daily (with meals)  tiZANidine (ZANAFLEX) 4 MG tablet, Take 4 mg by mouth nightly as needed  pregabalin (LYRICA) 100 MG capsule, Take 1 capsule by mouth 2 times daily (Patient taking differently: Take 75 mg by mouth 2 times daily. Allayne Joselyn )  traZODone (DESYREL) 100 MG tablet, Take 100 mg by mouth nightly as needed   ondansetron (ZOFRAN ODT) 4 MG disintegrating tablet, Take 1 tablet by mouth every 6 hours    Current Medications  Current Facility-Administered Medications   Medication Dose Route Frequency Provider Last Rate Last Dose    magnesium sulfate 3 g in dextrose 5 % 100 mL IVPB  3 g Intravenous Once Ronny Call MD 25 mL/hr at 06/11/19 0912 3 g at 06/11/19 0912    oxyCODONE (Giacomo Sparks) immediate release tablet 5 mg  5 mg Oral Q4H PRN Alejandro Weaver MD        Or   [facility] oxyCODONE HCl (OXY-IR) immediate release tablet 10 mg  10 mg Oral Q4H PRN Alejandro Weaver MD        ondansetron [facility]) injection 4 mg  4 mg Intravenous Q30 Min PRN Alejandro Weaver MD   4 mg at 06/10/19 1930    morphine sulfate (PF) injection 4 mg  4 mg Intravenous Once Alejandro Weaver MD   Stopped at 06/10/19 1925    HYDROmorphone (DILAUDID) injection 0.5 mg  0.5 mg Intravenous Q2H PRN Alejandro Weaver MD        clindamycin (CLEOCIN) 600 mg in dextrose 5 % 50 mL IVPB  600 mg Intravenous Q8H Alejandro Weaver  mL/hr at 06/11/19 0913 600 mg at 06/11/19 0913    piperacillin-tazobactam (ZOSYN) 3.375 g in dextrose 5 % 50 mL IVPB (mini-bag)  3.375 g Intravenous Q6H Alejandro Weaver MD   Stopped at 06/11/19 0857    0.9 % sodium chloride infusion   Intravenous Continuous Alejandro Weaver  mL/hr at 06/10/19 2210      ondansetron (ZOFRAN) injection 4 mg  4 mg Intravenous Q6H PRN Alejandro Weaver MD        prochlorperazine (COMPAZINE) injection 10 mg  10 mg Intravenous Q6H PRN Alejandro Weaver MD        morphine sulfate (PF) injection 4 mg  4 mg Intravenous Q4H PRANA Weaver MD        HYDROmorphone (DILAUDID) injection 0.5 mg  0.5 mg Intravenous Q4H PRN Alejandro Weaver MD        acetaminophen (TYLENOL) tablet 650 mg  650 mg Oral Q4H PRANA Weaver MD        enoxaparin (LOVENOX) injection 40 mg  40 mg Subcutaneous Daily Alejandro Weaver MD   40 mg at 06/11/19 0855    insulin lispro (HUMALOG) injection vial 0-6 Units  0-6 Units Subcutaneous TID WC Alejandro Weaver MD   2 Units at 06/11/19 0917    insulin lispro (HUMALOG) injection vial 0-3 Units  0-3 Units Subcutaneous Nightly Alejandro Weaver MD   1 Units at 06/10/19 3216    glucose (GLUTOSE) 40 % oral gel 15 g  15 g Oral PRANA Weaver MD        dextrose 50 % IV solution  12.5 g Intravenous YUN Weaver MD        glucagon (rDNA) injection 1 mg  1 mg Intramuscular YUN Weaver MD  dextrose 5 % solution  100 mL/hr Intravenous PRN Thor MD Mallika        escitalopram (LEXAPRO) tablet 20 mg  20 mg Oral Daily Thor Tena MD   20 mg at 06/11/19 0856    pregabalin (LYRICA) capsule 75 mg  75 mg Oral BID Thor Tena MD   75 mg at 06/11/19 0856    ondansetron (ZOFRAN-ODT) disintegrating tablet 4 mg  4 mg Oral Q6H Thor Class, MD   4 mg at 06/11/19 0856    traZODone (DESYREL) tablet 50 mg  50 mg Oral Nightly PRN Thor Tena MD        tiZANidine (ZANAFLEX) tablet 4 mg  4 mg Oral Q8H PRN Thor Class, MD        vancomycin (VANCOCIN) 1,750 mg in dextrose 5 % 500 mL IVPB  1,750 mg Intravenous Q12H Trini Merritt  mL/hr at 06/11/19 0913 1,750 mg at 06/11/19 0913    neomycin-bacitracin-polymyxin (NEOSPORIN) ointment   Topical BID Nunu Enriquez MD   2 g at 06/11/19 0855         Allergies  Allergies   Allergen Reactions    Robaxin [Methocarbamol] Swelling    Rocephin [Ceftriaxone Sodium-Lidocaine] Hives     Noted on 10/26 - developed extremity swelling and hives. No anaphylaxis.  Sulfa Antibiotics Hives     Noted on 10/26 - developed extremity swelling and hives. No anaphylaxis.  Cantaloupe (Diagnostic) Other (See Comments)    Ceftriaxone Hives     Tolerated Zosyn well 1/2/2019     Aspirin Nausea Only    Propoxyphene Nausea And Vomiting    Toradol [Ketorolac Tromethamine] Other (See Comments)     Sweats        REVIEW OF SYSTEMS     Within above limitations. 14 point review of systems reviewed. Pertinent positive or negative as per HPI or otherwise negative per 14 point systems review. Reviewed 6/11/2019 at 12:33 PM    PHYSICAL EXAM       Blood pressure 111/78, pulse 85, temperature 97.5 °F (36.4 °C), temperature source Oral, resp. rate 19, height 5' 9\" (1.753 m), weight 260 lb (117.9 kg), SpO2 97 %. General - AAO x 3  Psych - Appropriate affect/speech. No agitation  Eyes - Eye lids intact. No scleral icterus  ENT - Lips wnl. External ear clear/dry/intact.  No thyromegaly on inspection  Neuro - No gross peripheral or central neuro deficits on inspection  Heart - Sinus. RRR. S1 and S2 present. No elevated JVD appreciated  Lung - Adequate air entry b/l, No crackles/wheezes appreciated  GI - Soft. No guarding/rigidity. BS+   - No CVA/suprapubic tenderness or palpable bladder distension  Skin - Left open wound along the plantar surface of left forefoot associated with +2 edema and ascending erythema that is improving. Left foot and Right big toe bandaged           LABS AND IMAGING   CBC  [unfilled]    Last 3 Hemoglobin  Lab Results   Component Value Date    HGB  06/11/2019     10.7  HGB DECREASE CALLED TO RAFAELA BAKER RN ON 6/11/19 AT 0635 BY KJ HAYWOOD  HGB DECREASE CALLED TO RAFAELA BAKER RN ON 6/11/19 AT 0635 BY KJ LIZ CLS  RESULTS READ BACK      HGB 13.0 06/10/2019    HGB 13.5 04/08/2019     Last 3 WBC/ANC  Lab Results   Component Value Date    WBC 9.1 06/11/2019    WBC 12.8 06/10/2019    WBC 5.5 04/08/2019     No components found for: GRNLOCTYABS  Last 3 Platelets  No results found for: PLATELET  Chemistry  [unfilled]  [unfilled]  No results found for: LDH  Coagulation Studies  Lab Results   Component Value Date    INR 1.15 08/31/2014     Liver Function Studies  Lab Results   Component Value Date    ALT 9 06/10/2019    AST 13 06/10/2019    ALKPHOS 110 06/10/2019       Recent Imaging    [facility] #[id number] ([id number]) (46 y.o.  M) (Adm: 06/10/19)    [facility] [id number]         Imaging Results (last 7 days)          Procedure Component Value Ref Range Date/Time     XR FOOT LEFT (MIN 3 VIEWS) [[id number]] Collected: 06/10/19 1914     Order Status: Completed Updated: 06/10/19 1930     Narrative:       EXAMINATION:  3 XRAY VIEWS OF THE LEFT FOOT    6/10/2019 6:11 pm    COMPARISON:  Left foot radiograph May 13, 2017    HISTORY:  ORDERING SYSTEM PROVIDED HISTORY: redness, swelling, eval for gas or  osteomyelitis  TECHNOLOGIST PROVIDED HISTORY:  Reason for exam:->redness, swelling, eval for gas or osteomyelitis  Ordering Physician Provided Reason for Exam: redness, swelling, eval for gas  or osteomyelitis    FINDINGS:  Three views of the left foot demonstrate diffuse soft tissue edema. Donovan Morning is  an apparent ulceration along the plantar foot at the level of the heel. Small amount of gas is seen associated with this ulceration.  No gas  identified throughout the remainder of the foot.  The osseous structures  appear intact.  No radiographic evidence for osteomyelitis.  Mild-to-moderate  degenerative change of the 1st MTP joint and moderate osteoarthritis of the  hindfoot and midfoot articulations.  Large plantar calcaneal enthesophyte  with multiple well corticated fragment ossicles extending along the expected  course of the proximal plantar fascia.     Impression:       1. Diffuse soft tissue edema with apparent ulceration along the plantar foot  at the level of the heel.  There is a small amount of associated gas at the  ulceration site.  No gas identified throughout the remainder of the foot. 2. No radiographic evidence for osteomyelitis.     VL DUP LOWER EXTREMITY VENOUS LEFT [[id number]] Collected: 06/10/19 1915     Order Status: Completed Updated: 06/10/19 1918     Narrative:       EXAMINATION:  DUPLEX VENOUS ULTRASOUND OF THE LEFT LOWER EXTREMITY 6/10/2019 6:18 pm    TECHNIQUE:  Duplex ultrasound and Doppler images were obtained of the left lower  extremity. COMPARISON:  None. HISTORY:  ORDERING SYSTEM PROVIDED HISTORY: pain in leg up to groin  TECHNOLOGIST PROVIDED HISTORY:  Reason for exam:->pain in leg up to groin  Acuity: Acute  Type of Exam: Initial  Relevant Medical/Surgical History: REDNESS SWELLING WARMTH LLE - OPEN WOUNDS  SEEPAGE AND LARGE OPEN WOUND BOTTOM OF GREAT TOE    FINDINGS:  The visualized veins of the left lower extremity are patent and free of  echogenic thrombus.  The veins are normally compressible and have normal  phasic flow.    Images demonstrate subcutaneous edema.  Incidentally noted left inguinal  lymph node, measuring up to 1.2 cm in short axis.     Impression:       No evidence of DVT in the left lower extremity. Subcutaneous edema.  Left inguinal lymph nodes, measuring up to 1.2 cm in  short axis, may be reactive.             Relevant labs and imaging reviewed    ASSESSMENT AND PLAN          Ascending left foot infection in setting of DMII associated with neuropathy and prior puncture wound  - ED d/w surgery to eval for necrotizing infection  - underwent urgent I&D 6/10  - consult ID for antibiotic management  - meanwhile, will keep broad coverage of empiric IV vanco - pharmacy to dose, clinda, zosyn for now.  May consider de-escalating clinda  - decreased IVF  - IV pain, IV anti-emetic - attempt to transition to PO agents   - blood cx pend in the ED  - IV Mg replacement     DMII c/b diabetic neuropathy  - ISS for now          [address] [facility], Internal Medicine  6/11/2019 at 12:33 PM

## 2019-06-11 NOTE — ANESTHESIA PRE PROCEDURE
Department of Anesthesiology  Preprocedure Note       Name:  Sascha Lizarraga   Age:  46 y.o.  :  1965                                          MRN:  [id number]         Date:  6/10/2019      Surgeon: Anthony Landrum):  Van Delaney MD    Procedure: FOOT LESION BIOPSY EXCISION (N/A )    Medications prior to admission:   Prior to Admission medications    Medication Sig Start Date End Date Taking? Authorizing Provider   Dulaglutide (TRULICITY) 3.48 HJ/0.3AS SOPN Inject 0.75 mg into the skin once a week   Yes Historical Provider, MD   sertraline (ZOLOFT) 50 MG tablet Take 50 mg by mouth daily   Yes Historical Provider, MD   metFORMIN (GLUCOPHAGE) 1000 MG tablet Take 1 tablet by mouth 2 times daily (with meals) 18  Yes Stefan Cobb MD   tiZANidine (ZANAFLEX) 4 MG tablet Take 4 mg by mouth nightly as needed   Yes Historical Provider, MD   pregabalin (LYRICA) 100 MG capsule Take 1 capsule by mouth 2 times daily  Patient taking differently: Take 75 mg by mouth 2 times daily.  . 10/23/16  Yes Kellee Alvarenga MD   traZODone (DESYREL) 100 MG tablet Take 100 mg by mouth nightly as needed    Yes Historical Provider, MD   ondansetron (ZOFRAN ODT) 4 MG disintegrating tablet Take 1 tablet by mouth every 6 hours 18   Robbie Higgins PA-C       Current medications:    Current Facility-Administered Medications   Medication Dose Route Frequency Provider Last Rate Last Dose    ondansetron (ZOFRAN) injection 4 mg  4 mg Intravenous Q30 Min PRN Cara Chapman PA-C   4 mg at 06/10/19 193    vancomycin (VANCOCIN) 2,000 mg in dextrose 5 % 500 mL IVPB  2,000 mg Intravenous Once Cara Chapman PA-C 250 mL/hr at 06/10/19 2016 2,000 mg at 06/10/19 2016    morphine sulfate (PF) injection 4 mg  4 mg Intravenous Once Jazmyne Valdez MD   Stopped at 06/10/19 1925    HYDROmorphone (DILAUDID) injection 0.5 mg  0.5 mg Intravenous Q2H PRN Cara Chapman PA-C        clindamycin (CLEOCIN) 600 mg in dextrose 5 % 50 mL IVPB  600 mg Intravenous Q8H Trell Pena MD        [START ON 6/11/2019] piperacillin-tazobactam (ZOSYN) 3.375 g in dextrose 5 % 50 mL IVPB (mini-bag)  3.375 g Intravenous Q6H Trell Pena MD        0.9 % sodium chloride infusion   Intravenous Continuous Trell Pena MD        ondansetron [facility]) injection 4 mg  4 mg Intravenous Q6H PRN Trell Pena MD        prochlorperazine (COMPAZINE) injection 10 mg  10 mg Intravenous Q6H PRN Trell Pena MD        morphine sulfate (PF) injection 4 mg  4 mg Intravenous Q4H PRN Trell Pena MD        HYDROmorphone (DILAUDID) injection 0.5 mg  0.5 mg Intravenous Q4H PRN Trell Pena MD        oxyCODONE (ROXICODONE) immediate release tablet 5 mg  5 mg Oral Q4H PRN Trell Pena MD        acetaminophen (TYLENOL) tablet 650 mg  650 mg Oral Q4H PRN Trell Pena MD        [START ON 6/11/2019] enoxaparin (LOVENOX) injection 40 mg  40 mg Subcutaneous Daily Trell Pena MD        insulin lispro (HUMALOG) injection vial 0-6 Units  0-6 Units Subcutaneous TID WC Trell Pena MD        insulin lispro (HUMALOG) injection vial 0-3 Units  0-3 Units Subcutaneous Nightly Trell Pena MD        glucose (GLUTOSE) 40 % oral gel 15 g  15 g Oral PRN Trell Pena MD        dextrose 50 % IV solution  12.5 g Intravenous PRN Trell Pena MD        glucagon (rDNA) injection 1 mg  1 mg Intramuscular PRN Trell Pena MD        dextrose 5 % solution  100 mL/hr Intravenous PRN Trell Pena MD       [place] [START ON 6/11/2019] vancomycin (VANCOCIN) 1,750 mg in dextrose 5 % 500 mL IVPB  1,750 mg Intravenous Q12H Trell Pena MD         Current Outpatient Medications   Medication Sig Dispense Refill    Dulaglutide (TRULICITY) 0.88 NV/0.3NP SOPN Inject 0.75 mg into the skin once a week      sertraline (ZOLOFT) 50 MG tablet Take 50 mg by mouth daily      metFORMIN (GLUCOPHAGE) 1000 MG tablet Take 1 tablet by mouth 2 times daily (with meals) 60 tablet 3    tiZANidine (ZANAFLEX) 4 MG tablet Take 4 mg by mouth nightly as needed      pregabalin (LYRICA) 100 MG capsule Take 1 capsule by mouth 2 times daily (Patient taking differently: Take 75 mg by mouth 2 times daily. Remona Mellow ) 60 capsule 0    traZODone (DESYREL) 100 MG tablet Take 100 mg by mouth nightly as needed       ondansetron (ZOFRAN ODT) 4 MG disintegrating tablet Take 1 tablet by mouth every 6 hours 20 tablet 0       Allergies: Allergies   Allergen Reactions    Robaxin [Methocarbamol] Swelling    Rocephin [Ceftriaxone Sodium-Lidocaine] Hives     Noted on 10/26 - developed extremity swelling and hives. No anaphylaxis.  Sulfa Antibiotics Hives     Noted on 10/26 - developed extremity swelling and hives. No anaphylaxis.     Cantaloupe (Diagnostic) Other (See Comments)    Ceftriaxone Hives     Tolerated Zosyn well 1/2/2019     Aspirin Nausea Only    Propoxyphene Nausea And Vomiting    Toradol [Ketorolac Tromethamine] Other (See Comments)     Sweats        Problem List:    Patient Active Problem List   Diagnosis Code    Finger infection right index finger L08.9    Mallet finger M20.019    Diabetic foot infection ([address] E11.628, L08.9    Sepsis ([address] A41.9    Uncontrolled diabetes mellitus with complications ([address] B77.0, E11.65    Diabetes with ulcer of foot ([address] E11.621, L97.509    Ulcer of other part of foot L97.509    Diabetes mellitus with ulcer of heel ([address] E11.621, L97.409    Obesity, Class III, BMI 40-49.9 (morbid obesity) ([address] E66.01    Chronic ulcer of left foot with necrosis of muscle ([facility]) L97.523    Skin ulcer of toe of left foot with fat layer exposed ([address] L97.522    Chemical dependency ([facility]) F19.20    Chronic pain syndrome G89.4    Drug abuse ([facility]) F19.10    Cellulitis of left middle finger L03.012    Sepsis without acute organ dysfunction ([facility]) A41.9    Cellulitis of right lower extremity L03.115    Abscess L02.91    HBO-WD-Diabetic ulcer of right midfoot associated with diabetes mellitus due to underlying condition, with necrosis of muscle ([facility] E08.621, L97.413    Foot pain M79.673    WD-S/P amputation of lesser toe, right ([facility] Z89.421       Past Medical History:        Diagnosis Date    Anesthesia     \"Problems coming out of the anesthesia\"    Anxiety     Arthritis     Asthma     \"As a kid but outgrew this\"    Chronic back pain     Depression     Dizziness     Edema     Fibromyalgia     Fracture     Headache(784.0)     Hemorrhoid     Hernia, abdominal     Hx MRSA infection     positive culture 8/2014 from left foot    Migraine     Nausea & vomiting     Neuropathy     Obesity, Class III, BMI 40-49.9 (morbid obesity) ([facility]     [facility]     hx finger    Pneumonia 1995    Poor circulation     Restless leg syndrome     Shortness of breath on exertion     6/14/2016- pt denies any sob with this interview    Type II or unspecified type diabetes mellitus with other specified manifestations, not stated as uncontrolled 4/8/2014    Type II or unspecified type diabetes mellitus without mention of complication, not stated as uncontrolled DX 2007    Ulcer of other part of foot 4/8/2014    'ulcer on left foot healed all up\"       Past Surgical History:        Procedure Laterality Date    COLONOSCOPY  1990's    DEBRIDEMENT  8/25/2014    left foot    ENDOSCOPY, COLON, DIAGNOSTIC  06-    FINGER SURGERY  9-11-11    Right Index Finger    FINGER SURGERY  01-16-12    RIght Index Finger-Removal of loose body, Reconstruction of Mallet Finger    FOOT SURGERY Right 06/01/2018    I&D right foot    HEMORRHOID SURGERY  2008    OTHER SURGICAL HISTORY Left 10/22/2013    I & D left foot    OTHER SURGICAL HISTORY  07/07/2017    I&D fingers X2 left hand.  I&D left forearm    OTHER SURGICAL HISTORY Left 07/08/2017    left hand debridement, left forearm incision and drainage     OTHER SURGICAL HISTORY Left 07/10/2017    Fingers I&D    OTHER SURGICAL HISTORY Left 07/14/2017    middle finger amputation revision    ROTATOR CUFF REPAIR  Last Done 7/18/2011    Left, Done Four Times       Social History:    Social History     Tobacco Use    Smoking status: Current Every Day Smoker    Smokeless tobacco: Current User     Types: Snuff    Tobacco comment: \"Chew Grizzly Snuff Daily\"   Substance Use Topics    Alcohol use: No                                Ready to quit: Not Answered  Counseling given: Not Answered  Comment: \"Chew Grizzly Snuff Daily\"      Vital Signs (Current):   Vitals:    06/10/19 1645 06/10/19 1803 06/10/19 1926 06/10/19 2000   BP: 136/82 126/64 117/68 111/68   Pulse: 118 113 100 103   Resp: 18 19 17 16   Temp: 99.4 °F (37.4 °C)      TempSrc: Oral      SpO2: 95% 99% 95% 100%   Weight: 260 lb (117.9 kg)      Height: 5' 9\" (1.753 m)                                                 BP Readings from Last 3 Encounters:   06/10/19 111/68   05/08/19 133/82   04/08/19 112/70       NPO Status: Time of last liquid consumption: 1730                        Time of last solid consumption: 0700                        Date of last liquid consumption: 06/10/19                        Date of last solid food consumption: 06/10/19    BMI:   Wt Readings from Last 3 Encounters:   06/10/19 260 lb (117.9 kg)   06/03/19 260 lb (117.9 kg)   05/15/19 283 lb (128.4 kg)     Body mass index is 38.4 kg/m².     CBC:   Lab Results   Component Value Date    WBC 12.8 06/10/2019    RBC 4.71 06/10/2019    HGB 13.0 06/10/2019    HCT 41.2 06/10/2019    MCV 87.5 06/10/2019    RDW 14.4 06/10/2019     06/10/2019       CMP:   Lab Results   Component Value Date     06/10/2019    K 4.2 06/10/2019    CL 96 06/10/2019    CO2 21 06/10/2019    BUN 14 06/10/2019    CREATININE 0.6 06/10/2019    GFRAA >60 06/10/2019    LABGLOM >60 06/10/2019    GLUCOSE 261 06/10/2019    PROT 7.2 06/10/2019    PROT 7.3 09/10/2011    CALCIUM 9.5 06/10/2019    BILITOT 0.6 06/10/2019    ALKPHOS 110 06/10/2019    AST 13 06/10/2019    ALT 9 06/10/2019       POC Tests: No results for input(s): POCGLU, POCNA, POCK, POCCL, POCBUN, POCHEMO, POCHCT in the last 72 hours. Coags:   Lab Results   Component Value Date    PROTIME 12.6 08/31/2014    PROTIME 11.2 09/11/2011    INR 1.15 08/31/2014    APTT 26.2 03/17/2014       HCG (If Applicable): No results found for: PREGTESTUR, PREGSERUM, HCG, HCGQUANT     ABGs: No results found for: PHART, PO2ART, LPT1JCX, EME2EDC, BEART, N6PHRPCY     Type & Screen (If Applicable):  No results found for: LABABO, [address]    Anesthesia Evaluation     history of anesthetic complications: PONV and postop delirium. Airway: Mallampati: III  TM distance: >3 FB   Neck ROM: full  Mouth opening: > = 3 FB Dental:      Comment: Poor upper and lower dentition    Pulmonary:   (+) asthma: current smoker                           Cardiovascular:  Exercise tolerance: poor (<4 METS),                     Neuro/Psych:   (+) neuromuscular disease (rls, fibromyalgia):, headaches: migraine headaches, depression/anxiety             GI/Hepatic/Renal: Neg GI/Hepatic/Renal ROS            Endo/Other:    (+) DiabetesType II DM, poorly controlled, , : arthritis: OA., .                 Abdominal:           Vascular:   + PVD, aortic or cerebral, . Anesthesia Plan      MAC     ASA 3 - emergent       Induction: intravenous. Anesthetic plan and risks discussed with patient.                       Ashley Spears DO   6/10/2019

## 2019-06-11 NOTE — PROGRESS NOTES
cost, therapy complete etc.):   Escitalopram no history of patient being on this med, (ordered by admitting physician)  Naproxen last fill date 01/06/19  Omeprazole last fill date 11/03/18    Medications requiring reconciliation with provider:    Sertraline 50 mg daily (added)  Escitalopram no history of patient being on this med, (ordered by admitting physician)  Trulicity weekly (added)    Other Comments:  Medication list reviewed with patient and insurance claims verified. Trulicity weekly injection updated. Tizanidine ordered for TID patient states he only takes this at HS. Patient states he doesn't always take his medications as scheduled. States he has several medications left in his bottles.     To my knowledge the above medication history is accurate as of 6/10/2019 8:34 PM.   Lui Solomon CPhT   6/10/2019 8:34 PM

## 2019-06-12 LAB
ANION GAP SERPL CALCULATED.3IONS-SCNC: 11 MMOL/L (ref 4–16)
BASOPHILS ABSOLUTE: 0 K/CU MM
BASOPHILS RELATIVE PERCENT: 0.3 % (ref 0–1)
BUN BLDV-MCNC: 9 MG/DL (ref 6–23)
CALCIUM SERPL-MCNC: 8.4 MG/DL (ref 8.3–10.6)
CHLORIDE BLD-SCNC: 100 MMOL/L (ref 99–110)
CO2: 24 MMOL/L (ref 21–32)
CREAT SERPL-MCNC: 0.6 MG/DL (ref 0.9–1.3)
DIFFERENTIAL TYPE: ABNORMAL
EOSINOPHILS ABSOLUTE: 0.2 K/CU MM
EOSINOPHILS RELATIVE PERCENT: 2.2 % (ref 0–3)
ERYTHROCYTE SEDIMENTATION RATE: 91 MM/HR (ref 0–20)
GFR AFRICAN AMERICAN: >60 ML/MIN/1.73M2
GFR NON-AFRICAN AMERICAN: >60 ML/MIN/1.73M2
GLUCOSE BLD-MCNC: 184 MG/DL (ref 70–99)
GLUCOSE BLD-MCNC: 192 MG/DL (ref 70–99)
GLUCOSE BLD-MCNC: 197 MG/DL (ref 70–99)
GLUCOSE BLD-MCNC: 211 MG/DL (ref 70–99)
GLUCOSE BLD-MCNC: 238 MG/DL (ref 70–99)
HCT VFR BLD CALC: 36.9 % (ref 42–52)
HEMOGLOBIN: 11.1 GM/DL (ref 13.5–18)
HIGH SENSITIVE C-REACTIVE PROTEIN: 173.9 MG/L
IMMATURE NEUTROPHIL %: 0.6 % (ref 0–0.43)
LACTATE: 0.7 MMOL/L (ref 0.4–2)
LYMPHOCYTES ABSOLUTE: 1.2 K/CU MM
LYMPHOCYTES RELATIVE PERCENT: 17.6 % (ref 24–44)
MAGNESIUM: 2 MG/DL (ref 1.8–2.4)
MCH RBC QN AUTO: 27.3 PG (ref 27–31)
MCHC RBC AUTO-ENTMCNC: 30.1 % (ref 32–36)
MCV RBC AUTO: 90.7 FL (ref 78–100)
MONOCYTES ABSOLUTE: 0.5 K/CU MM
MONOCYTES RELATIVE PERCENT: 7.9 % (ref 0–4)
NUCLEATED RBC %: 0 %
PDW BLD-RTO: 14.3 % (ref 11.7–14.9)
PLATELET # BLD: 181 K/CU MM (ref 140–440)
PMV BLD AUTO: 9.9 FL (ref 7.5–11.1)
POTASSIUM SERPL-SCNC: 4.2 MMOL/L (ref 3.5–5.1)
PROCALCITONIN: 0.26
RBC # BLD: 4.07 M/CU MM (ref 4.6–6.2)
SEGMENTED NEUTROPHILS ABSOLUTE COUNT: 4.8 K/CU MM
SEGMENTED NEUTROPHILS RELATIVE PERCENT: 71.4 % (ref 36–66)
SODIUM BLD-SCNC: 135 MMOL/L (ref 135–145)
TOTAL IMMATURE NEUTOROPHIL: 0.04 K/CU MM
TOTAL NUCLEATED RBC: 0 K/CU MM
WBC # BLD: 6.7 K/CU MM (ref 4–10.5)

## 2019-06-12 PROCEDURE — 85025 COMPLETE CBC W/AUTO DIFF WBC: CPT

## 2019-06-12 PROCEDURE — 94761 N-INVAS EAR/PLS OXIMETRY MLT: CPT

## 2019-06-12 PROCEDURE — 2580000003 HC RX 258: Performed by: SURGERY

## 2019-06-12 PROCEDURE — 6360000002 HC RX W HCPCS: Performed by: INTERNAL MEDICINE

## 2019-06-12 PROCEDURE — 6370000000 HC RX 637 (ALT 250 FOR IP): Performed by: INTERNAL MEDICINE

## 2019-06-12 PROCEDURE — 6370000000 HC RX 637 (ALT 250 FOR IP): Performed by: HOSPITALIST

## 2019-06-12 PROCEDURE — 83605 ASSAY OF LACTIC ACID: CPT

## 2019-06-12 PROCEDURE — 83735 ASSAY OF MAGNESIUM: CPT

## 2019-06-12 PROCEDURE — 99222 1ST HOSP IP/OBS MODERATE 55: CPT | Performed by: INTERNAL MEDICINE

## 2019-06-12 PROCEDURE — 82962 GLUCOSE BLOOD TEST: CPT

## 2019-06-12 PROCEDURE — 2580000003 HC RX 258: Performed by: INTERNAL MEDICINE

## 2019-06-12 PROCEDURE — 86141 C-REACTIVE PROTEIN HS: CPT

## 2019-06-12 PROCEDURE — 84145 PROCALCITONIN (PCT): CPT

## 2019-06-12 PROCEDURE — 80048 BASIC METABOLIC PNL TOTAL CA: CPT

## 2019-06-12 PROCEDURE — 1200000000 HC SEMI PRIVATE

## 2019-06-12 PROCEDURE — 2500000003 HC RX 250 WO HCPCS: Performed by: INTERNAL MEDICINE

## 2019-06-12 PROCEDURE — 36415 COLL VENOUS BLD VENIPUNCTURE: CPT

## 2019-06-12 PROCEDURE — 85652 RBC SED RATE AUTOMATED: CPT

## 2019-06-12 PROCEDURE — 6360000002 HC RX W HCPCS: Performed by: SURGERY

## 2019-06-12 RX ORDER — LINEZOLID 600 MG/1
600 TABLET, FILM COATED ORAL EVERY 12 HOURS SCHEDULED
Status: DISCONTINUED | OUTPATIENT
Start: 2019-06-12 | End: 2019-06-14 | Stop reason: HOSPADM

## 2019-06-12 RX ADMIN — PIPERACILLIN SODIUM,TAZOBACTAM SODIUM 3.38 G: 3; .375 INJECTION, POWDER, FOR SOLUTION INTRAVENOUS at 08:17

## 2019-06-12 RX ADMIN — LINAGLIPTIN 5 MG: 5 TABLET, FILM COATED ORAL at 08:24

## 2019-06-12 RX ADMIN — SODIUM CHLORIDE: 9 INJECTION, SOLUTION INTRAVENOUS at 05:44

## 2019-06-12 RX ADMIN — OXYCODONE HYDROCHLORIDE 10 MG: 10 TABLET ORAL at 22:01

## 2019-06-12 RX ADMIN — Medication 1 CAPSULE: at 08:17

## 2019-06-12 RX ADMIN — OXYCODONE HYDROCHLORIDE 10 MG: 10 TABLET ORAL at 08:24

## 2019-06-12 RX ADMIN — ENOXAPARIN SODIUM 40 MG: 100 INJECTION SUBCUTANEOUS at 08:17

## 2019-06-12 RX ADMIN — BACITRACIN ZINC, NEOMYCIN SULFATE, POLYMYXIN B SULFATE 2 G: 3.5; 5000; 4 OINTMENT TOPICAL at 08:16

## 2019-06-12 RX ADMIN — BACITRACIN ZINC, NEOMYCIN SULFATE, POLYMYXIN B SULFATE 2 G: 3.5; 5000; 4 OINTMENT TOPICAL at 22:01

## 2019-06-12 RX ADMIN — DEXTROSE MONOHYDRATE 600 MG: 50 INJECTION, SOLUTION INTRAVENOUS at 00:23

## 2019-06-12 RX ADMIN — Medication 1 CAPSULE: at 12:12

## 2019-06-12 RX ADMIN — Medication 1 CAPSULE: at 18:06

## 2019-06-12 RX ADMIN — PREGABALIN 75 MG: 75 CAPSULE ORAL at 22:01

## 2019-06-12 RX ADMIN — ESCITALOPRAM OXALATE 20 MG: 10 TABLET ORAL at 08:16

## 2019-06-12 RX ADMIN — PIPERACILLIN SODIUM,TAZOBACTAM SODIUM 3.38 G: 3; .375 INJECTION, POWDER, FOR SOLUTION INTRAVENOUS at 12:12

## 2019-06-12 RX ADMIN — PIPERACILLIN SODIUM,TAZOBACTAM SODIUM 3.38 G: 3; .375 INJECTION, POWDER, FOR SOLUTION INTRAVENOUS at 18:05

## 2019-06-12 RX ADMIN — LINEZOLID 600 MG: 600 TABLET, FILM COATED ORAL at 22:02

## 2019-06-12 RX ADMIN — OXYCODONE HYDROCHLORIDE 10 MG: 10 TABLET ORAL at 02:21

## 2019-06-12 RX ADMIN — ONDANSETRON 4 MG: 4 TABLET, ORALLY DISINTEGRATING ORAL at 08:16

## 2019-06-12 RX ADMIN — PREGABALIN 75 MG: 75 CAPSULE ORAL at 08:16

## 2019-06-12 RX ADMIN — ONDANSETRON 4 MG: 4 TABLET, ORALLY DISINTEGRATING ORAL at 15:30

## 2019-06-12 RX ADMIN — ONDANSETRON 4 MG: 4 TABLET, ORALLY DISINTEGRATING ORAL at 22:01

## 2019-06-12 RX ADMIN — INSULIN LISPRO 1 UNITS: 100 INJECTION, SOLUTION INTRAVENOUS; SUBCUTANEOUS at 23:11

## 2019-06-12 RX ADMIN — PIPERACILLIN SODIUM,TAZOBACTAM SODIUM 3.38 G: 3; .375 INJECTION, POWDER, FOR SOLUTION INTRAVENOUS at 01:29

## 2019-06-12 RX ADMIN — DEXTROSE MONOHYDRATE 600 MG: 50 INJECTION, SOLUTION INTRAVENOUS at 08:17

## 2019-06-12 RX ADMIN — OXYCODONE HYDROCHLORIDE 10 MG: 10 TABLET ORAL at 12:12

## 2019-06-12 ASSESSMENT — PAIN SCALES - GENERAL
PAINLEVEL_OUTOF10: 5
PAINLEVEL_OUTOF10: 9
PAINLEVEL_OUTOF10: 9
PAINLEVEL_OUTOF10: 0
PAINLEVEL_OUTOF10: 7
PAINLEVEL_OUTOF10: 9

## 2019-06-12 NOTE — CONSULTS
Infectious Disease Consult Note  2019   Patient Name: Prashanth Guillen : 1965   Impression   Left foot diabetic ulcer  o Previous history of MRSA and pseudomonas aeruginosa infection of the hand  o Left hallux infected laceration. Surgical notes described lesion on the medial aspect of the first hallux, midfoot and medial heel. They were all debrided. Unknown duration of heel lesion increases possibility of osteomyelitis. o Culture positive for MRSA, group B streptococcus, alpha Streptococcus, Proteus species. Previous MRSA infection was resistant to clindamycin.  Ceftriaxone allergy  o Hives, but tolerates penicillin.  Multi-morbidity: per PMHx  Plan:   Discontinue vancomycin and clindamycin   Start linezolid; continue Zosyn   Check CRP, and ESR.  Discussed with Dr. Bi Seo, MRI of the left foot will be of benefit. Thank you for allowing me to consult in the care of this patient.  ------------------------  REASON FOR CONSULT: Infective syndrome   Requested by: Dr. Amaral Linda is a 46 y. o. white male with diabetes mellitus, previous history of right fifth digit amputation, and finger amputations of both hands who was admitted 6/10/2019 for further evaluation and management of left hallux blister. The patient recounts that about 2 weeks ago he had stepped on the piece of glass. He tried to Dr. it at home but over the course of 4 days it progressively worsened. He had come into the emergency department where it was dressed. He had recently been on Bactrim and Keflex, hence he was prescribed clindamycin. He states that his wound did well. However, he was exposed to quite a bit of moisture. It worsened. And he was back in the emergency department where he was seen to have an infection of his left foot. Left foot x-ray showed diffuse tissue edema with a small amount of associated gas at the ulcerated site. He has undergone debridement at the hands of Dr. Shala Alan. Preliminary superficial wound culture reports show MRSA, beta-hemolytic group B streptococcus. Surgical wound culture showing beta Streptococcus group B.  ? Infectious diseases service was consulted to evaluate the pt, and recommend further investigative and therapeutic measures. ROS: Other systems reviewed Including eyes, ENT, respiratory, cardiovascular, GI, , dermatologic, neurologic, psych, hem/lymphatic, musculoskeletal and endocrine were negative other than what is mentioned above.      Patient Active Problem List    Diagnosis Date Noted    Skin ulcer of toe of left foot with fat layer exposed ([address] 10/13/2015     Priority: High     Class: Chronic    Diabetes with ulcer of foot ([address] 04/08/2014     Priority: High    Ulcer of other part of foot 04/08/2014     Priority: High    WD-S/P amputation of lesser toe, right ([address] 07/03/2018    Foot pain 06/21/2018    Abscess     HBO-WD-Diabetic ulcer of right midfoot associated with diabetes mellitus due to underlying condition, with necrosis of muscle ([address]     Cellulitis of right lower extremity     Sepsis without acute organ dysfunction ([address] 05/27/2018    Cellulitis of left middle finger 07/05/2017    Chemical dependency ([address] 10/19/2016    Chronic pain syndrome 10/19/2016    Drug abuse ([address] 10/19/2016    Chronic ulcer of left foot with necrosis of muscle ([address] 10/06/2015    Obesity, Class III, BMI 40-49.9 (morbid obesity) ([address]     Diabetes mellitus with ulcer of heel ([address] 08/26/2014    Diabetic foot infection ([address] 10/20/2013    Sepsis ([address] 10/20/2013    Uncontrolled diabetes mellitus with complications ([address] 43/64/5287    Mallet finger 11/03/2011    Finger infection right index finger 09/22/2011     Past Medical History:   Diagnosis Date    Anesthesia     \"Problems coming out of the anesthesia\"    Anxiety     Arthritis     Asthma     \"As a kid but outgrew this\"    Chronic back pain     Depression     Dizziness     Edema     Fibromyalgia     Fracture     Headache(784.0)     Hemorrhoid     Hernia, abdominal     Hx MRSA infection     positive culture 8/2014 from left foot    Migraine     Nausea & vomiting     Neuropathy     Obesity, Class III, BMI 40-49.9 (morbid obesity) [facility]     Osteomyelitis (HCC)     hx finger    Pneumonia 1995    Poor circulation     Restless leg syndrome     Shortness of breath on exertion     6/14/2016- pt denies any sob with this interview    Type II or unspecified type diabetes mellitus with other specified manifestations, not stated as uncontrolled 4/8/2014    Type II or unspecified type diabetes mellitus without mention of complication, not stated as uncontrolled DX 2007    Ulcer of other part of foot 4/8/2014    'ulcer on left foot healed all up\"      Past Surgical History:   Procedure Laterality Date    COLONOSCOPY  1990's    DEBRIDEMENT  8/25/2014    left foot    ENDOSCOPY, COLON, DIAGNOSTIC  06-    FINGER SURGERY  9-11-11    Right Index Finger    FINGER SURGERY  01-16-12    RIght Index Finger-Removal of loose body, Reconstruction of Mallet Finger    FOOT DEBRIDEMENT Left 6/10/2019    FOOT DEBRIDEMENT INCISION AND DRAINAGE performed by Nithya Peng MD at [address] [place] [address] Right 06/01/2018    I&D right foot    HEMORRHOID SURGERY  2008    OTHER SURGICAL HISTORY Left 10/22/2013    I & D left foot    OTHER SURGICAL HISTORY  07/07/2017    I&D fingers X2 left hand.  I&D left forearm    OTHER SURGICAL HISTORY Left 07/08/2017    left hand debridement, left forearm incision and drainage     OTHER SURGICAL HISTORY Left 07/10/2017    Fingers I&D    OTHER SURGICAL HISTORY Left 07/14/2017    middle finger amputation revision    ROTATOR CUFF REPAIR  Last Done 7/18/2011    Left, Done Four Times      Family History   Problem Relation Age of Onset    Kidney Disease Mother         \"Kidney Failure, On Dialysis\"    Early Death Mother 39    Diabetes Father         \"Type II\" Infectious disease related family history - not contibutory. SOCIAL HISTORY  Social History     Tobacco Use    Smoking status: Current Every Day Smoker    Smokeless tobacco: Current User     Types: Snuff    Tobacco comment: \"Chew Grizzly Snuff Daily\"   Substance Use Topics    Alcohol use: No       Born:   Lived   Occupation:   No recent travel of significance.  No recent unusual exposures.  NO pets   ? ALLERGIES  Allergies   Allergen Reactions    Robaxin [Methocarbamol] Swelling    Rocephin [Ceftriaxone Sodium-Lidocaine] Hives     Noted on 10/26 - developed extremity swelling and hives. No anaphylaxis.  Sulfa Antibiotics Hives     Noted on 10/26 - developed extremity swelling and hives. No anaphylaxis.  Cantaloupe (Diagnostic) Other (See Comments)    Ceftriaxone Hives     Tolerated Zosyn well 1/2/2019     Aspirin Nausea Only    Propoxyphene Nausea And Vomiting    Toradol [Ketorolac Tromethamine] Other (See Comments)     Sweats       MEDICATIONS  Reviewed and are per the chart/EMR. ? Antibiotics:   Clindamycin 6/10-12  Vancomycin 6/10-11  Zosyn 6/10  ?  -------------------------------------------------------------------------------------------------------------------    Vital Signs:  Vitals:    06/12/19 1200   BP: 133/73   Pulse: 75   Resp:    Temp: 97.8 °F (36.6 °C)   SpO2: 98%         Exam:    VS: noted; wt 117.9 kg  Gen: alert and oriented X3, no distress  Skin: no stigmata of endocarditis  Wounds: Left foot dressing C/D/I  HEMT: AT/NC Oropharynx pink, moist, and without lesions or exudates; dentition in good state of repair  Eyes: PERRLA, EOMI, conjunctiva pink, sclera anicteric. Neck: Supple. Trachea midline. No LAD. Chest: no distress and CTA. Good air movement. Heart: RRR and no MRG. Abd: soft, non-distended, no tenderness, no hepatomegaly. Normoactive bowel sounds. Ext: Right foot fifth toe amputation, missing digits from both hands.   Catheter Site: without erythema

## 2019-06-12 NOTE — PROGRESS NOTES
Progress Note (Hospitalist, Internal Medicine)  IDENTIFYING INFORMATION   PATIENT:  Radha Gutierrez  MRN:  [id number]  ADMIT DATE: 6/10/2019  TIME OF EVALUATION: 6/12/2019 1:28 PM      HISTORY OF PRESENT ILLNESS   Radha Gutierrez is a 46 y.o. male with a past medical history of DMII c/b peripheral neuropathy, obesity who presents with 1-2 days hx of progressive Fever/chills/Left foot swelling, pain, erythema. Approx 2-3 weeks ago, he was out barefooted in the shed when he stepped on a glass with laceration over his left plantar surface of his forefoot. The laceration did not improve and over the last 2 days, developed rapid and progressive ascending swelling, erythema and local throbbing pain. Associated with subjective fever , chills and nausea/emesis. On review, he stubbed his right big toe recently    SUBJECTIVE     Continues to have significant improvement with LE swelling and erythema    MEDICATIONS   Medications Prior to Admission  Medications Prior to Admission: Dulaglutide (TRULICITY) 4.10 TV/1.4KE SOPN, Inject 0.75 mg into the skin once a week  sertraline (ZOLOFT) 50 MG tablet, Take 50 mg by mouth daily  metFORMIN (GLUCOPHAGE) 1000 MG tablet, Take 1 tablet by mouth 2 times daily (with meals)  tiZANidine (ZANAFLEX) 4 MG tablet, Take 4 mg by mouth nightly as needed  pregabalin (LYRICA) 100 MG capsule, Take 1 capsule by mouth 2 times daily (Patient taking differently: Take 75 mg by mouth 2 times daily. Sara January )  traZODone (DESYREL) 100 MG tablet, Take 100 mg by mouth nightly as needed   ondansetron (ZOFRAN ODT) 4 MG disintegrating tablet, Take 1 tablet by mouth every 6 hours    Current Medications  Current Facility-Administered Medications   Medication Dose Route Frequency Provider Last Rate Last Dose    linagliptin (TRADJENTA) tablet 5 mg  5 mg Oral Daily Jacob Du MD   5 mg at 06/12/19 0824    linezolid (ZYVOX) tablet 600 mg  600 mg Oral 2 times per day Vangie Thomas MD        oxyCODONE (ROXICODONE) immediate release tablet 5 mg  5 mg Oral Q4H PRN Gabi Liu MD        Or   [facility] oxyCODONE HCl (OXY-IR) immediate release tablet 10 mg  10 mg Oral Q4H PRN Gabi Liu MD   10 mg at 06/12/19 1212    lactobacillus (CULTURELLE) capsule 1 capsule  1 capsule Oral TID BAYLEE Liu MD   1 capsule at 06/12/19 1212    ondansetron (ZOFRAN) injection 4 mg  4 mg Intravenous Q30 Min PRN Gabi Liu MD   4 mg at 06/10/19 1930    morphine sulfate (PF) injection 4 mg  4 mg Intravenous Once Gabi Liu MD   Stopped at 06/10/19 1925    HYDROmorphone (DILAUDID) injection 0.5 mg  0.5 mg Intravenous Q2H PRN Gabi Liu MD        piperacillin-tazobactam (ZOSYN) 3.375 g in dextrose 5 % 50 mL IVPB (mini-bag)  3.375 g Intravenous Q6H Gabi Liu  mL/hr at 06/12/19 1212 3.375 g at 06/12/19 1212    0.9 % sodium chloride infusion   Intravenous Continuous Gabi Liu MD 50 mL/hr at 06/12/19 0544      ondansetron (ZOFRAN) injection 4 mg  4 mg Intravenous Q6H PRN Gabi Liu MD   4 mg at 06/11/19 2030    prochlorperazine (COMPAZINE) injection 10 mg  10 mg Intravenous Q6H PRN Gabi Liu MD        morphine sulfate (PF) injection 4 mg  4 mg Intravenous Q4H PRN Gabi Liu MD        HYDROmorphone (DILAUDID) injection 0.5 mg  0.5 mg Intravenous Q4H PRN Gabi Liu MD        acetaminophen (TYLENOL) tablet 650 mg  650 mg Oral Q4H PRN Gabi Liu MD        enoxaparin (LOVENOX) injection 40 mg  40 mg Subcutaneous Daily Gabi Liu MD   40 mg at 06/12/19 0817    insulin lispro (HUMALOG) injection vial 0-6 Units  0-6 Units Subcutaneous TID  Gabi Liu MD   2 Units at 06/11/19 0917    insulin lispro (HUMALOG) injection vial 0-3 Units  0-3 Units Subcutaneous Nightly Gabi Liu MD   2 Units at 06/11/19 2032    glucose (GLUTOSE) 40 % oral gel 15 g  15 g Oral PRN Gabi Liu MD        dextrose 50 % IV solution  12.5 g Intravenous PRN Gabi Liu MD        glucagon (rDNA) injection 1 mg  1 mg Intramuscular PRN Paxton Chand MD        dextrose 5 % solution  100 mL/hr Intravenous PRN Paxton Chand MD        escitalopram (LEXAPRO) tablet 20 mg  20 mg Oral Daily Paxton Chand MD   20 mg at 06/12/19 0816    pregabalin (LYRICA) capsule 75 mg  75 mg Oral BID Paxton Chand MD   75 mg at 06/12/19 0816    ondansetron (ZOFRAN-ODT) disintegrating tablet 4 mg  4 mg Oral Q6H Paxton Chand MD   4 mg at 06/12/19 0816    traZODone (DESYREL) tablet 50 mg  50 mg Oral Nightly PRN Paxton Chand MD        tiZANidine (ZANAFLEX) tablet 4 mg  4 mg Oral Q8H PRN MD Filipe Vargas neomycin-bacitracin-polymyxin (NEOSPORIN) ointment   Topical BID Dagmar Montejo MD   2 g at 06/12/19 0816         Allergies  Allergies   Allergen Reactions    Robaxin [Methocarbamol] Swelling    Rocephin [Ceftriaxone Sodium-Lidocaine] Hives     Noted on 10/26 - developed extremity swelling and hives. No anaphylaxis.  Sulfa Antibiotics Hives     Noted on 10/26 - developed extremity swelling and hives. No anaphylaxis.  Cantaloupe (Diagnostic) Other (See Comments)    Ceftriaxone Hives     Tolerated Zosyn well 1/2/2019     Aspirin Nausea Only    Propoxyphene Nausea And Vomiting    Toradol [Ketorolac Tromethamine] Other (See Comments)     Sweats        REVIEW OF SYSTEMS     Within above limitations. 14 point review of systems reviewed. Pertinent positive or negative as per HPI or otherwise negative per 14 point systems review. Reviewed 6/12/2019 at 1:28 PM    PHYSICAL EXAM       Blood pressure 133/73, pulse 75, temperature 97.8 °F (36.6 °C), temperature source Oral, resp. rate 16, height 5' 9\" (1.753 m), weight 260 lb (117.9 kg), SpO2 98 %. General - AAO x 3  Psych - Appropriate affect/speech. No agitation  Eyes - Eye lids intact. No scleral icterus  ENT - Lips wnl. External ear clear/dry/intact. No thyromegaly on inspection  Neuro - No gross peripheral or central neuro deficits on inspection  Heart - Sinus. RRR. S1 and S2 present.   No elevated JVD appreciated  Lung - Adequate air entry b/l, No crackles/wheezes appreciated  GI - Soft. No guarding/rigidity. BS+   - No CVA/suprapubic tenderness or palpable bladder distension  Skin - Left open wound along the plantar surface of left forefoot associated with +2 edema and ascending erythema that is very much improved. Left foot and Right big toe bandaged           LABS AND IMAGING   CBC  [unfilled]    Last 3 Hemoglobin  Lab Results   Component Value Date    HGB 11.1 06/12/2019    HGB  06/11/2019     10.7  HGB DECREASE CALLED TO RAFAELA BAKER RN ON 6/11/19 AT 0635 BY KJ HAYWOOD  HGB DECREASE CALLED TO RAFAELA BAKER RN ON 6/11/19 AT 0635 BY KJ LIZ CLS  RESULTS READ BACK      HGB 13.0 06/10/2019     Last 3 WBC/ANC  Lab Results   Component Value Date    WBC 6.7 06/12/2019    WBC 9.1 06/11/2019    WBC 12.8 06/10/2019     No components found for: GRNLOCTYABS  Last 3 Platelets  No results found for: PLATELET  Chemistry  [unfilled]  [unfilled]  No results found for: LDH  Coagulation Studies  Lab Results   Component Value Date    INR 1.15 08/31/2014     Liver Function Studies  Lab Results   Component Value Date    ALT 9 06/10/2019    AST 13 06/10/2019    ALKPHOS 110 06/10/2019       Recent Imaging    Toño Braun #[id number] ([id number]) (46 y.o.  M) (Adm: 06/10/19)    [address] [place] [address] [id number]         Imaging Results (last 7 days)          Procedure Component Value Ref Range Date/Time     XR FOOT LEFT (MIN 3 VIEWS) [[id number]] Collected: 06/10/19 1914     Order Status: Completed Updated: 06/10/19 1930     Narrative:       EXAMINATION:  3 XRAY VIEWS OF THE LEFT FOOT    6/10/2019 6:11 pm    COMPARISON:  Left foot radiograph May 13, 2017    HISTORY:  ORDERING SYSTEM PROVIDED HISTORY: redness, swelling, eval for gas or  osteomyelitis  TECHNOLOGIST PROVIDED HISTORY:  Reason for exam:->redness, swelling, eval for gas or osteomyelitis  Ordering Physician Provided Reason for Exam: redness, swelling, eval for gas  or osteomyelitis    FINDINGS:  Three views of the left foot demonstrate diffuse soft tissue edema. [place] Plump is  an apparent ulceration along the plantar foot at the level of the heel. Small amount of gas is seen associated with this ulceration.  No gas  identified throughout the remainder of the foot.  The osseous structures  appear intact.  No radiographic evidence for osteomyelitis.  Mild-to-moderate  degenerative change of the 1st MTP joint and moderate osteoarthritis of the  hindfoot and midfoot articulations.  Large plantar calcaneal enthesophyte  with multiple well corticated fragment ossicles extending along the expected  course of the proximal plantar fascia.     Impression:       1. Diffuse soft tissue edema with apparent ulceration along the plantar foot  at the level of the heel.  There is a small amount of associated gas at the  ulceration site.  No gas identified throughout the remainder of the foot. 2. No radiographic evidence for osteomyelitis.     VL DUP LOWER EXTREMITY VENOUS LEFT [[id number]] Collected: 06/10/19 1915     Order Status: Completed Updated: 06/10/19 1918     Narrative:       EXAMINATION:  DUPLEX VENOUS ULTRASOUND OF THE LEFT LOWER EXTREMITY 6/10/2019 6:18 pm    TECHNIQUE:  Duplex ultrasound and Doppler images were obtained of the left lower  extremity. COMPARISON:  None. HISTORY:  ORDERING SYSTEM PROVIDED HISTORY: pain in leg up to groin  TECHNOLOGIST PROVIDED HISTORY:  Reason for exam:->pain in leg up to groin  Acuity: Acute  Type of Exam: Initial  Relevant Medical/Surgical History: REDNESS SWELLING WARMTH LLE - OPEN WOUNDS  SEEPAGE AND LARGE OPEN WOUND BOTTOM OF GREAT TOE    FINDINGS:  The visualized veins of the left lower extremity are patent and free of  echogenic thrombus. The veins are normally compressible and have normal  phasic flow.     Images demonstrate subcutaneous edema.  Incidentally noted left inguinal  lymph node, measuring up to 1.2 cm in short axis.     Impression:       No evidence of DVT in the left lower extremity. Subcutaneous edema.  Left inguinal lymph nodes, measuring up to 1.2 cm in  short axis, may be reactive.             Relevant labs and imaging reviewed    ASSESSMENT AND PLAN          Ascending left foot infection in setting of DMII associated with neuropathy and prior puncture wound  - ED d/w surgery to eval for necrotizing infection  - underwent urgent I&D 6/10  - consult ID for antibiotic management  - meanwhile, will keep broad coverage of empiric IV vanco - pharmacy to dose, clinda, zosyn for now. ID assisting.  On 6/12, D/w ID for MRI of the left foot  - decreased IVF  - IV pain, IV anti-emetic - attempt to transition to PO agents   - blood cx pend in the ED  - surgical wound cx with B/A-strep, MRSA, proteus     DMII c/b diabetic neuropathy  - ISS for now    Lovenox    [address] [place] [address], Internal Medicine  6/12/2019 at 1:28 PM

## 2019-06-12 NOTE — PROGRESS NOTES
Pt seen and examined. AF VSS  Left leg is feeling better. Dressings removed and leg, ankle and foot have decreased pain, swelling and redness. Repack today with betadine per WCN. F/U wound cx's, continue with iv abx's.   CPM

## 2019-06-13 ENCOUNTER — APPOINTMENT (OUTPATIENT)
Dept: MRI IMAGING | Age: 54
DRG: 264 | End: 2019-06-13
Payer: MEDICARE

## 2019-06-13 LAB
ANION GAP SERPL CALCULATED.3IONS-SCNC: 9 MMOL/L (ref 4–16)
ATYPICAL LYMPHOCYTE ABSOLUTE COUNT: ABNORMAL
BANDED NEUTROPHILS ABSOLUTE COUNT: 0.12 K/CU MM
BANDED NEUTROPHILS RELATIVE PERCENT: 2 % (ref 5–11)
BUN BLDV-MCNC: 9 MG/DL (ref 6–23)
CALCIUM SERPL-MCNC: 8.9 MG/DL (ref 8.3–10.6)
CHLORIDE BLD-SCNC: 99 MMOL/L (ref 99–110)
CO2: 28 MMOL/L (ref 21–32)
CREAT SERPL-MCNC: 0.7 MG/DL (ref 0.9–1.3)
DIFFERENTIAL TYPE: ABNORMAL
DOSE AMOUNT: ABNORMAL
DOSE TIME: ABNORMAL
EOSINOPHILS ABSOLUTE: 0.1 K/CU MM
EOSINOPHILS RELATIVE PERCENT: 2 % (ref 0–3)
GFR AFRICAN AMERICAN: >60 ML/MIN/1.73M2
GFR NON-AFRICAN AMERICAN: >60 ML/MIN/1.73M2
GLUCOSE BLD-MCNC: 155 MG/DL (ref 70–99)
GLUCOSE BLD-MCNC: 163 MG/DL (ref 70–99)
GLUCOSE BLD-MCNC: 175 MG/DL (ref 70–99)
GLUCOSE BLD-MCNC: 189 MG/DL (ref 70–99)
GLUCOSE BLD-MCNC: 206 MG/DL (ref 70–99)
HCT VFR BLD CALC: 39.3 % (ref 42–52)
HEMOGLOBIN: 12.2 GM/DL (ref 13.5–18)
LACTATE: 0.7 MMOL/L (ref 0.4–2)
LYMPHOCYTES ABSOLUTE: 1.2 K/CU MM
LYMPHOCYTES RELATIVE PERCENT: 21 % (ref 24–44)
MAGNESIUM: 1.8 MG/DL (ref 1.8–2.4)
MCH RBC QN AUTO: 27.6 PG (ref 27–31)
MCHC RBC AUTO-ENTMCNC: 31 % (ref 32–36)
MCV RBC AUTO: 88.9 FL (ref 78–100)
MONOCYTES ABSOLUTE: 0.2 K/CU MM
MONOCYTES RELATIVE PERCENT: 4 % (ref 0–4)
PDW BLD-RTO: 14.2 % (ref 11.7–14.9)
PLATELET # BLD: 206 K/CU MM (ref 140–440)
PMV BLD AUTO: 9.5 FL (ref 7.5–11.1)
POTASSIUM SERPL-SCNC: 4.4 MMOL/L (ref 3.5–5.1)
PROCALCITONIN: 0.2
RBC # BLD: 4.42 M/CU MM (ref 4.6–6.2)
SEGMENTED NEUTROPHILS ABSOLUTE COUNT: 4.2 K/CU MM
SEGMENTED NEUTROPHILS RELATIVE PERCENT: 71 % (ref 36–66)
SODIUM BLD-SCNC: 136 MMOL/L (ref 135–145)
VANCOMYCIN TROUGH: 5.4 UG/ML (ref 10–20)
WBC # BLD: 5.8 K/CU MM (ref 4–10.5)

## 2019-06-13 PROCEDURE — 82962 GLUCOSE BLOOD TEST: CPT

## 2019-06-13 PROCEDURE — 83735 ASSAY OF MAGNESIUM: CPT

## 2019-06-13 PROCEDURE — 6370000000 HC RX 637 (ALT 250 FOR IP): Performed by: HOSPITALIST

## 2019-06-13 PROCEDURE — 73720 MRI LWR EXTREMITY W/O&W/DYE: CPT

## 2019-06-13 PROCEDURE — 99232 SBSQ HOSP IP/OBS MODERATE 35: CPT | Performed by: INTERNAL MEDICINE

## 2019-06-13 PROCEDURE — 1200000000 HC SEMI PRIVATE

## 2019-06-13 PROCEDURE — 80202 ASSAY OF VANCOMYCIN: CPT

## 2019-06-13 PROCEDURE — 6370000000 HC RX 637 (ALT 250 FOR IP): Performed by: INTERNAL MEDICINE

## 2019-06-13 PROCEDURE — 85027 COMPLETE CBC AUTOMATED: CPT

## 2019-06-13 PROCEDURE — 2580000003 HC RX 258: Performed by: INTERNAL MEDICINE

## 2019-06-13 PROCEDURE — 85007 BL SMEAR W/DIFF WBC COUNT: CPT

## 2019-06-13 PROCEDURE — A9577 INJ MULTIHANCE: HCPCS | Performed by: INTERNAL MEDICINE

## 2019-06-13 PROCEDURE — 36415 COLL VENOUS BLD VENIPUNCTURE: CPT

## 2019-06-13 PROCEDURE — 84145 PROCALCITONIN (PCT): CPT

## 2019-06-13 PROCEDURE — 6360000004 HC RX CONTRAST MEDICATION: Performed by: INTERNAL MEDICINE

## 2019-06-13 PROCEDURE — 6360000002 HC RX W HCPCS: Performed by: INTERNAL MEDICINE

## 2019-06-13 PROCEDURE — 80048 BASIC METABOLIC PNL TOTAL CA: CPT

## 2019-06-13 PROCEDURE — 83605 ASSAY OF LACTIC ACID: CPT

## 2019-06-13 RX ADMIN — ONDANSETRON 4 MG: 4 TABLET, ORALLY DISINTEGRATING ORAL at 04:13

## 2019-06-13 RX ADMIN — LINEZOLID 600 MG: 600 TABLET, FILM COATED ORAL at 21:16

## 2019-06-13 RX ADMIN — PIPERACILLIN SODIUM,TAZOBACTAM SODIUM 3.38 G: 3; .375 INJECTION, POWDER, FOR SOLUTION INTRAVENOUS at 06:43

## 2019-06-13 RX ADMIN — ESCITALOPRAM OXALATE 20 MG: 10 TABLET ORAL at 09:19

## 2019-06-13 RX ADMIN — OXYCODONE HYDROCHLORIDE 10 MG: 10 TABLET ORAL at 21:32

## 2019-06-13 RX ADMIN — INSULIN LISPRO 1 UNITS: 100 INJECTION, SOLUTION INTRAVENOUS; SUBCUTANEOUS at 09:19

## 2019-06-13 RX ADMIN — OXYCODONE HYDROCHLORIDE 10 MG: 10 TABLET ORAL at 12:32

## 2019-06-13 RX ADMIN — ENOXAPARIN SODIUM 40 MG: 100 INJECTION SUBCUTANEOUS at 09:19

## 2019-06-13 RX ADMIN — Medication 1 CAPSULE: at 09:18

## 2019-06-13 RX ADMIN — PIPERACILLIN SODIUM,TAZOBACTAM SODIUM 3.38 G: 3; .375 INJECTION, POWDER, FOR SOLUTION INTRAVENOUS at 21:20

## 2019-06-13 RX ADMIN — INSULIN LISPRO 2 UNITS: 100 INJECTION, SOLUTION INTRAVENOUS; SUBCUTANEOUS at 12:58

## 2019-06-13 RX ADMIN — INSULIN LISPRO 1 UNITS: 100 INJECTION, SOLUTION INTRAVENOUS; SUBCUTANEOUS at 18:17

## 2019-06-13 RX ADMIN — ONDANSETRON 4 MG: 4 TABLET, ORALLY DISINTEGRATING ORAL at 16:26

## 2019-06-13 RX ADMIN — ONDANSETRON 4 MG: 4 TABLET, ORALLY DISINTEGRATING ORAL at 22:29

## 2019-06-13 RX ADMIN — GADOBENATE DIMEGLUMINE 20 ML: 529 INJECTION, SOLUTION INTRAVENOUS at 10:40

## 2019-06-13 RX ADMIN — BACITRACIN ZINC, NEOMYCIN SULFATE, POLYMYXIN B SULFATE: 3.5; 5000; 4 OINTMENT TOPICAL at 15:08

## 2019-06-13 RX ADMIN — PIPERACILLIN SODIUM,TAZOBACTAM SODIUM 3.38 G: 3; .375 INJECTION, POWDER, FOR SOLUTION INTRAVENOUS at 01:34

## 2019-06-13 RX ADMIN — LINEZOLID 600 MG: 600 TABLET, FILM COATED ORAL at 09:18

## 2019-06-13 RX ADMIN — PREGABALIN 75 MG: 75 CAPSULE ORAL at 09:18

## 2019-06-13 RX ADMIN — LINAGLIPTIN 5 MG: 5 TABLET, FILM COATED ORAL at 09:18

## 2019-06-13 RX ADMIN — PIPERACILLIN SODIUM,TAZOBACTAM SODIUM 3.38 G: 3; .375 INJECTION, POWDER, FOR SOLUTION INTRAVENOUS at 15:07

## 2019-06-13 RX ADMIN — PREGABALIN 75 MG: 75 CAPSULE ORAL at 21:17

## 2019-06-13 RX ADMIN — MORPHINE SULFATE 4 MG: 4 INJECTION INTRAVENOUS at 04:13

## 2019-06-13 RX ADMIN — Medication 1 CAPSULE: at 18:29

## 2019-06-13 RX ADMIN — MORPHINE SULFATE 4 MG: 4 INJECTION INTRAVENOUS at 16:22

## 2019-06-13 ASSESSMENT — PAIN SCALES - GENERAL
PAINLEVEL_OUTOF10: 8
PAINLEVEL_OUTOF10: 8
PAINLEVEL_OUTOF10: 1
PAINLEVEL_OUTOF10: 6
PAINLEVEL_OUTOF10: 9
PAINLEVEL_OUTOF10: 7

## 2019-06-13 ASSESSMENT — PAIN DESCRIPTION - LOCATION: LOCATION: FOOT

## 2019-06-13 NOTE — PROGRESS NOTES
600 mg at 06/13/19 0918    oxyCODONE (ROXICODONE) immediate release tablet 5 mg  5 mg Oral Q4H PRN Stefan Cobb MD        Or   [facility] oxyCODONE HCl (OXY-IR) immediate release tablet 10 mg  10 mg Oral Q4H PRN Stefan Cobb MD   10 mg at 06/13/19 1232    lactobacillus (CULTURELLE) capsule 1 capsule  1 capsule Oral TID  Stefan Cobb MD   1 capsule at 06/13/19 0918    ondansetron (ZOFRAN) injection 4 mg  4 mg Intravenous Q30 Min PRN Stefan Cobb MD   4 mg at 06/10/19 1930    morphine sulfate (PF) injection 4 mg  4 mg Intravenous Once Stefan Cobb MD   Stopped at 06/10/19 1925    HYDROmorphone (DILAUDID) injection 0.5 mg  0.5 mg Intravenous Q2H PRN Stefan Cobb MD        piperacillin-tazobactam (ZOSYN) 3.375 g in dextrose 5 % 50 mL IVPB (mini-bag)  3.375 g Intravenous Q6H Stefan Cobb MD   Stopped at 06/13/19 0713    0.9 % sodium chloride infusion   Intravenous Continuous Stefan Cobb MD 50 mL/hr at 06/12/19 0544      ondansetron (ZOFRAN) injection 4 mg  4 mg Intravenous Q6H PRN Stefan Cobb MD   4 mg at 06/11/19 2030    prochlorperazine (COMPAZINE) injection 10 mg  10 mg Intravenous Q6H PRN Stefan Cobb MD        morphine sulfate (PF) injection 4 mg  4 mg Intravenous Q4H PRN Stefan Cobb MD   4 mg at 06/13/19 0413    HYDROmorphone (DILAUDID) injection 0.5 mg  0.5 mg Intravenous Q4H PRN Stefan Cobb MD        acetaminophen (TYLENOL) tablet 650 mg  650 mg Oral Q4H PRN Stefan Cobb MD        enoxaparin (LOVENOX) injection 40 mg  40 mg Subcutaneous Daily Stefan Cobb MD   40 mg at 06/13/19 0919    insulin lispro (HUMALOG) injection vial 0-6 Units  0-6 Units Subcutaneous TID  Stefan Cobb MD   2 Units at 06/13/19 1258    insulin lispro (HUMALOG) injection vial 0-3 Units  0-3 Units Subcutaneous Nightly Stefan Cobb MD   1 Units at 06/12/19 8741    glucose (GLUTOSE) 40 % oral gel 15 g  15 g Oral PRN Stefan Cobb MD        dextrose 50 % IV solution  12.5 g Intravenous PRN Stefan Cobb MD  glucagon (rDNA) injection 1 mg  1 mg Intramuscular PRN Lindsey Josue MD        dextrose 5 % solution  100 mL/hr Intravenous PRN Lindsey Josue MD        escitalopram (LEXAPRO) tablet 20 mg  20 mg Oral Daily Lindsey Josue MD   20 mg at 06/13/19 0919    pregabalin (LYRICA) capsule 75 mg  75 mg Oral BID Lindsey Josue MD   75 mg at 06/13/19 6356    ondansetron (ZOFRAN-ODT) disintegrating tablet 4 mg  4 mg Oral Q6H Lindsey Josue MD   4 mg at 06/13/19 0413    traZODone (DESYREL) tablet 50 mg  50 mg Oral Nightly PRN Lindsey Josue MD        tiZANidine (ZANAFLEX) tablet 4 mg  4 mg Oral Q8H PRN Lindsey Josue MD       [facility] neomycin-bacitracin-polymyxin (NEOSPORIN) ointment   Topical BID Gareth Ortega MD   2 g at 06/12/19 2201         Allergies  Allergies   Allergen Reactions    Robaxin [Methocarbamol] Swelling    Rocephin [Ceftriaxone Sodium-Lidocaine] Hives     Noted on 10/26 - developed extremity swelling and hives. No anaphylaxis.  Sulfa Antibiotics Hives     Noted on 10/26 - developed extremity swelling and hives. No anaphylaxis.  Cantaloupe (Diagnostic) Other (See Comments)    Ceftriaxone Hives     Tolerated Zosyn well 1/2/2019     Aspirin Nausea Only    Propoxyphene Nausea And Vomiting    Toradol [Ketorolac Tromethamine] Other (See Comments)     Sweats        REVIEW OF SYSTEMS     Within above limitations. 14 point review of systems reviewed. Pertinent positive or negative as per HPI or otherwise negative per 14 point systems review. Reviewed 6/13/2019 at 3:04 PM    PHYSICAL EXAM       Blood pressure 109/89, pulse 75, temperature 97.8 °F (36.6 °C), temperature source Oral, resp. rate 16, height 5' 9\" (1.753 m), weight 260 lb (117.9 kg), SpO2 95 %. General - AAO x 3  Psych - Appropriate affect/speech. No agitation  Eyes - Eye lids intact. No scleral icterus  ENT - Lips wnl. External ear clear/dry/intact.  No thyromegaly on inspection  Neuro - No gross peripheral or central neuro deficits on inspection  Heart - Sinus. RRR. S1 and S2 present. No elevated JVD appreciated  Lung - Adequate air entry b/l, No crackles/wheezes appreciated  GI - Soft. No guarding/rigidity. BS+   - No CVA/suprapubic tenderness or palpable bladder distension  Skin - Left open wound along the plantar surface of left forefoot associated with +2 edema and ascending erythema significantly improved. Left foot and Right big toe bandaged           LABS AND IMAGING   CBC  [unfilled]    Last 3 Hemoglobin  Lab Results   Component Value Date    HGB 12.2 06/13/2019    HGB 11.1 06/12/2019    HGB  06/11/2019     10.7  HGB DECREASE CALLED TO RAFAELA BAKER RN ON 6/11/19 AT 0635 BY KJ HAYWOOD  HGB DECREASE CALLED TO RAFAELA BAKER RN ON 6/11/19 AT 0635 BY KJ LIZ CLS  RESULTS READ BACK       Last 3 WBC/ANC  Lab Results   Component Value Date    WBC 5.8 06/13/2019    WBC 6.7 06/12/2019    WBC 9.1 06/11/2019     No components found for: GRNLOCTYABS  Last 3 Platelets  No results found for: PLATELET  Chemistry  [unfilled]  [unfilled]  No results found for: LDH  Coagulation Studies  Lab Results   Component Value Date    INR 1.15 08/31/2014     Liver Function Studies  Lab Results   Component Value Date    ALT 9 06/10/2019    AST 13 06/10/2019    ALKPHOS 110 06/10/2019       Recent Imaging    [place] #[id number] ([id number]) (46 y.o.  M) (Adm: 06/10/19)    Rachael Putnam [id number]         Imaging Results (last 7 days)          Procedure Component Value Ref Range Date/Time     XR FOOT LEFT (MIN 3 VIEWS) [[id number]] Collected: 06/10/19 1914     Order Status: Completed Updated: 06/10/19 1930     Narrative:       EXAMINATION:  3 XRAY VIEWS OF THE LEFT FOOT    6/10/2019 6:11 pm    COMPARISON:  Left foot radiograph May 13, 2017    HISTORY:  ORDERING SYSTEM PROVIDED HISTORY: redness, swelling, eval for gas or  osteomyelitis  TECHNOLOGIST PROVIDED HISTORY:  Reason for exam:->redness, swelling, eval for gas or osteomyelitis  Ordering Physician Provided Reason

## 2019-06-13 NOTE — PROGRESS NOTES
Pt seen and examined. AF VSS  Left leg is feeling better. Dressings removed and leg, ankle and foot have decreased pain, swelling and redness. Looking better each day. Wound cx's seen and appreciate ID input, abx change noted. CPM.  Hope to discharge in 1-2 days.

## 2019-06-13 NOTE — PROGRESS NOTES
Infectious Disease Progress Note  2019   Patient Name: Tae Chatterjee : 1965   Impression  · Left foot diabetic ulcer-hallux and heel  ? Stable, MRI pending, to rule out osteomyelitis  ? Procalcitonin 0.198  ? Elevated CRP and ESR  ? Culture positive for MRSA, group B streptococcus, alpha Streptococcus, Proteus species. · Ceftriaxone allergy  ? Hives, but tolerates penicillin. · Multi-morbidity: per PMHx  Plan:  · continue linezolid; continue Zosyn  · F/u MRI  · Follow susceptibility report for Proteus,      Ongoing Antimicrobial Therapy  Linezolid -  Zosyn 6/10  Completed Antimicrobial Therapy  Clindamycin 6/10-12  Vancomycin 6/10-11? History:? Interval history noted  Denies n/v/d/f or untoward effects of antibiotics  Physical Exam:  Vital Signs: /89   Pulse 75   Temp 97.8 °F (36.6 °C) (Oral)   Resp 16   Ht 5' 9\" (1.753 m)   Wt 260 lb (117.9 kg)   SpO2 95%   BMI 38.40 kg/m²     Gen: alert and oriented X3, no distress  Skin: no stigmata of endocarditis  Wounds: Left foot dressing C/D/I  HEMT: AT/NC Oropharynx pink, moist, and without lesions or exudates; dentition in good state of repair  Eyes: PERRLA, EOMI, conjunctiva pink, sclera anicteric. Neck: Supple. Trachea midline. No LAD. Chest: no distress and CTA. Good air movement. Heart: RRR and no MRG. Abd: soft, non-distended, no tenderness, no hepatomegaly. Normoactive bowel sounds. Ext: Right foot fifth toe amputation, missing digits from both hands. Catheter Site: without erythema or tenderness  Neuro: Mental status intact. CN 2-12 intact and no focal motor deficits         Radiologic / Imaging / TESTING  No results found.      Labs:    Recent Results (from the past 24 hour(s))   POCT Glucose    Collection Time: 19 12:17 PM   Result Value Ref Range    POC Glucose 211 (H) 70 - 99 MG/DL   C-Reactive Protein    Collection Time: 19  3:11 PM   Result Value Ref Range    CRP, High Sensitivity 173.9 mg/L   Sedimentation Rate    Collection Time: 06/12/19  3:11 PM   Result Value Ref Range    Sed Rate 91 (H) 0 - 20 MM/HR   POCT Glucose    Collection Time: 06/12/19  6:12 PM   Result Value Ref Range    POC Glucose 197 (H) 70 - 99 MG/DL   POCT Glucose    Collection Time: 06/12/19  9:59 PM   Result Value Ref Range    POC Glucose 238 (H) 70 - 99 MG/DL   CBC Auto Differential    Collection Time: 06/13/19  7:26 AM   Result Value Ref Range    WBC 5.8 4.0 - 10.5 K/CU MM    RBC 4.42 (L) 4.6 - 6.2 M/CU MM    Hemoglobin 12.2 (L) 13.5 - 18.0 GM/DL    Hematocrit 39.3 (L) 42 - 52 %    MCV 88.9 78 - 100 FL    MCH 27.6 27 - 31 PG    MCHC 31.0 (L) 32.0 - 36.0 %    RDW 14.2 11.7 - 14.9 %    Platelets 910 961 - 765 K/CU MM    MPV 9.5 7.5 - 11.1 FL   Basic Metabolic Panel    Collection Time: 06/13/19  7:26 AM   Result Value Ref Range    Sodium 136 135 - 145 MMOL/L    Potassium 4.4 3.5 - 5.1 MMOL/L    Chloride 99 99 - 110 mMol/L    CO2 28 21 - 32 MMOL/L    Anion Gap 9 4 - 16    BUN 9 6 - 23 MG/DL    CREATININE 0.7 (L) 0.9 - 1.3 MG/DL    Glucose 175 (H) 70 - 99 MG/DL    Calcium 8.9 8.3 - 10.6 MG/DL    GFR Non-African American >60 >60 mL/min/1.73m2    GFR African American >60 >60 mL/min/1.73m2   Magnesium    Collection Time: 06/13/19  7:26 AM   Result Value Ref Range    Magnesium 1.8 1.8 - 2.4 mg/dl   Lactic Acid, Plasma    Collection Time: 06/13/19  7:26 AM   Result Value Ref Range    Lactate 0.7 0.4 - 2.0 mMOL/L   Procalcitonin    Collection Time: 06/13/19  7:26 AM   Result Value Ref Range    Procalcitonin 0.198    Vancomycin, trough    Collection Time: 06/13/19  7:26 AM   Result Value Ref Range    Vancomycin Tr 5.4 (L) 10 - 20 UG/ML    DOSE AMOUNT DOSE AMT.  GIVEN - 1750mg     DOSE TIME DOSE TIME GIVEN - 6/13 @ 0800    POCT Glucose    Collection Time: 06/13/19  9:15 AM   Result Value Ref Range    POC Glucose 163 (H) 70 - 99 MG/DL     CULTURE results: Invalid input(s): BLOOD CULTURE,  URINE CULTURE, SURGICAL CULTURE    Diagnosis:  Patient Active Problem List   Diagnosis    Finger infection right index finger    Mallet finger    Diabetic foot infection ([address]    Sepsis ([address]    Uncontrolled diabetes mellitus with complications ([address]    Diabetes with ulcer of foot ([address]    Ulcer of other part of foot    Diabetes mellitus with ulcer of heel ([address]    Obesity, Class III, BMI 40-49.9 (morbid obesity) ([address]    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)    Skin ulcer of toe of left foot with fat layer exposed ([address]    Chemical dependency (HCC)    Chronic pain syndrome    Drug abuse ([address]    Cellulitis of left middle finger    Sepsis without acute organ dysfunction ([address]    Cellulitis of right lower extremity    Abscess    HBO-WD-Diabetic ulcer of right midfoot associated with diabetes mellitus due to underlying condition, with necrosis of muscle ([address]    Foot pain    WD-S/P amputation of lesser toe, right ([address]       Active Problems  Active Problems:    Diabetic foot infection ([address]  Resolved Problems:    * No resolved hospital problems.  *    Electronically signed by: Electronically signed by Mally Gomez MD on 6/13/2019 at 10:31 AM

## 2019-06-14 VITALS
WEIGHT: 260 LBS | BODY MASS INDEX: 38.51 KG/M2 | SYSTOLIC BLOOD PRESSURE: 108 MMHG | HEIGHT: 69 IN | RESPIRATION RATE: 16 BRPM | OXYGEN SATURATION: 98 % | TEMPERATURE: 98 F | HEART RATE: 88 BPM | DIASTOLIC BLOOD PRESSURE: 76 MMHG

## 2019-06-14 LAB
ANION GAP SERPL CALCULATED.3IONS-SCNC: 11 MMOL/L (ref 4–16)
BANDED NEUTROPHILS ABSOLUTE COUNT: 0.21 K/CU MM
BANDED NEUTROPHILS RELATIVE PERCENT: 3 % (ref 5–11)
BUN BLDV-MCNC: 10 MG/DL (ref 6–23)
CALCIUM SERPL-MCNC: 9.4 MG/DL (ref 8.3–10.6)
CHLORIDE BLD-SCNC: 96 MMOL/L (ref 99–110)
CO2: 25 MMOL/L (ref 21–32)
CREAT SERPL-MCNC: 0.8 MG/DL (ref 0.9–1.3)
CULTURE: ABNORMAL
DIFFERENTIAL TYPE: ABNORMAL
EOSINOPHILS ABSOLUTE: 0.1 K/CU MM
EOSINOPHILS RELATIVE PERCENT: 2 % (ref 0–3)
GFR AFRICAN AMERICAN: >60 ML/MIN/1.73M2
GFR NON-AFRICAN AMERICAN: >60 ML/MIN/1.73M2
GLUCOSE BLD-MCNC: 233 MG/DL (ref 70–99)
GLUCOSE BLD-MCNC: 252 MG/DL (ref 70–99)
GLUCOSE BLD-MCNC: 303 MG/DL (ref 70–99)
GRAM SMEAR: ABNORMAL
HCT VFR BLD CALC: 41.8 % (ref 42–52)
HEMOGLOBIN: 13 GM/DL (ref 13.5–18)
LYMPHOCYTES ABSOLUTE: 2.2 K/CU MM
LYMPHOCYTES RELATIVE PERCENT: 31 % (ref 24–44)
Lab: ABNORMAL
MAGNESIUM: 1.9 MG/DL (ref 1.8–2.4)
MCH RBC QN AUTO: 27.5 PG (ref 27–31)
MCHC RBC AUTO-ENTMCNC: 31.1 % (ref 32–36)
MCV RBC AUTO: 88.6 FL (ref 78–100)
METAMYELOCYTES ABSOLUTE COUNT: 0.07 K/CU MM
METAMYELOCYTES PERCENT: 1 %
MONOCYTES ABSOLUTE: 0.6 K/CU MM
MONOCYTES RELATIVE PERCENT: 8 % (ref 0–4)
MYELOCYTE PERCENT: 1 %
MYELOCYTES ABSOLUTE COUNT: 0.07 K/CU MM
PDW BLD-RTO: 14.1 % (ref 11.7–14.9)
PLATELET # BLD: 281 K/CU MM (ref 140–440)
PLT MORPHOLOGY: ABNORMAL
PMV BLD AUTO: 9.8 FL (ref 7.5–11.1)
POLYCHROMASIA: ABNORMAL
POTASSIUM SERPL-SCNC: 4.5 MMOL/L (ref 3.5–5.1)
RBC # BLD: 4.72 M/CU MM (ref 4.6–6.2)
SEGMENTED NEUTROPHILS ABSOLUTE COUNT: 3.9 K/CU MM
SEGMENTED NEUTROPHILS RELATIVE PERCENT: 54 % (ref 36–66)
SODIUM BLD-SCNC: 132 MMOL/L (ref 135–145)
SPECIMEN: ABNORMAL
WBC # BLD: 7.1 K/CU MM (ref 4–10.5)

## 2019-06-14 PROCEDURE — 6360000002 HC RX W HCPCS: Performed by: INTERNAL MEDICINE

## 2019-06-14 PROCEDURE — 83735 ASSAY OF MAGNESIUM: CPT

## 2019-06-14 PROCEDURE — 85025 COMPLETE CBC W/AUTO DIFF WBC: CPT

## 2019-06-14 PROCEDURE — 2580000003 HC RX 258: Performed by: INTERNAL MEDICINE

## 2019-06-14 PROCEDURE — C1751 CATH, INF, PER/CENT/MIDLINE: HCPCS

## 2019-06-14 PROCEDURE — 99232 SBSQ HOSP IP/OBS MODERATE 35: CPT | Performed by: INTERNAL MEDICINE

## 2019-06-14 PROCEDURE — 6370000000 HC RX 637 (ALT 250 FOR IP): Performed by: INTERNAL MEDICINE

## 2019-06-14 PROCEDURE — 82962 GLUCOSE BLOOD TEST: CPT

## 2019-06-14 PROCEDURE — 36415 COLL VENOUS BLD VENIPUNCTURE: CPT

## 2019-06-14 PROCEDURE — 80048 BASIC METABOLIC PNL TOTAL CA: CPT

## 2019-06-14 PROCEDURE — 02HV33Z INSERTION OF INFUSION DEVICE INTO SUPERIOR VENA CAVA, PERCUTANEOUS APPROACH: ICD-10-PCS | Performed by: INTERNAL MEDICINE

## 2019-06-14 PROCEDURE — 6370000000 HC RX 637 (ALT 250 FOR IP): Performed by: HOSPITALIST

## 2019-06-14 PROCEDURE — 76937 US GUIDE VASCULAR ACCESS: CPT

## 2019-06-14 PROCEDURE — 36569 INSJ PICC 5 YR+ W/O IMAGING: CPT

## 2019-06-14 PROCEDURE — 2500000003 HC RX 250 WO HCPCS: Performed by: INTERNAL MEDICINE

## 2019-06-14 RX ORDER — LACTOBACILLUS RHAMNOSUS GG 10B CELL
1 CAPSULE ORAL 2 TIMES DAILY
Qty: 30 CAPSULE | Refills: 0 | Status: SHIPPED | OUTPATIENT
Start: 2019-06-14 | End: 2020-01-23 | Stop reason: ALTCHOICE

## 2019-06-14 RX ORDER — OXYCODONE AND ACETAMINOPHEN 10; 325 MG/1; MG/1
1 TABLET ORAL EVERY 6 HOURS PRN
Qty: 20 TABLET | Refills: 0 | Status: SHIPPED | OUTPATIENT
Start: 2019-06-14 | End: 2019-06-19

## 2019-06-14 RX ORDER — LIDOCAINE HYDROCHLORIDE 10 MG/ML
5 INJECTION, SOLUTION EPIDURAL; INFILTRATION; INTRACAUDAL; PERINEURAL ONCE
Status: COMPLETED | OUTPATIENT
Start: 2019-06-14 | End: 2019-06-14

## 2019-06-14 RX ORDER — SODIUM CHLORIDE 0.9 % (FLUSH) 0.9 %
10 SYRINGE (ML) INJECTION EVERY 12 HOURS SCHEDULED
Status: DISCONTINUED | OUTPATIENT
Start: 2019-06-14 | End: 2019-06-14 | Stop reason: HOSPADM

## 2019-06-14 RX ORDER — LINEZOLID 600 MG/1
600 TABLET, FILM COATED ORAL EVERY 12 HOURS SCHEDULED
Qty: 24 TABLET | Refills: 0 | Status: SHIPPED | OUTPATIENT
Start: 2019-06-14 | End: 2019-06-26

## 2019-06-14 RX ORDER — SODIUM CHLORIDE 0.9 % (FLUSH) 0.9 %
10 SYRINGE (ML) INJECTION PRN
Status: DISCONTINUED | OUTPATIENT
Start: 2019-06-14 | End: 2019-06-14 | Stop reason: HOSPADM

## 2019-06-14 RX ORDER — PREGABALIN 100 MG/1
75 CAPSULE ORAL 2 TIMES DAILY
Qty: 60 CAPSULE | Refills: 0 | Status: SHIPPED
Start: 2019-06-14 | End: 2019-10-17

## 2019-06-14 RX ADMIN — PIPERACILLIN SODIUM,TAZOBACTAM SODIUM 3.38 G: 3; .375 INJECTION, POWDER, FOR SOLUTION INTRAVENOUS at 01:24

## 2019-06-14 RX ADMIN — OXYCODONE HYDROCHLORIDE 10 MG: 10 TABLET ORAL at 07:44

## 2019-06-14 RX ADMIN — PREGABALIN 75 MG: 75 CAPSULE ORAL at 09:36

## 2019-06-14 RX ADMIN — LIDOCAINE HYDROCHLORIDE 5 ML: 10 INJECTION, SOLUTION EPIDURAL; INFILTRATION; INTRACAUDAL; PERINEURAL at 13:43

## 2019-06-14 RX ADMIN — ENOXAPARIN SODIUM 40 MG: 100 INJECTION SUBCUTANEOUS at 09:36

## 2019-06-14 RX ADMIN — MEROPENEM 500 MG: 500 INJECTION, POWDER, FOR SOLUTION INTRAVENOUS at 10:57

## 2019-06-14 RX ADMIN — ONDANSETRON 4 MG: 4 TABLET, ORALLY DISINTEGRATING ORAL at 09:36

## 2019-06-14 RX ADMIN — LINAGLIPTIN 5 MG: 5 TABLET, FILM COATED ORAL at 09:35

## 2019-06-14 RX ADMIN — ONDANSETRON 4 MG: 4 TABLET, ORALLY DISINTEGRATING ORAL at 04:12

## 2019-06-14 RX ADMIN — BACITRACIN ZINC, NEOMYCIN SULFATE, POLYMYXIN B SULFATE 1 G: 3.5; 5000; 4 OINTMENT TOPICAL at 09:35

## 2019-06-14 RX ADMIN — INSULIN LISPRO 3 UNITS: 100 INJECTION, SOLUTION INTRAVENOUS; SUBCUTANEOUS at 09:36

## 2019-06-14 RX ADMIN — ESCITALOPRAM OXALATE 20 MG: 10 TABLET ORAL at 09:35

## 2019-06-14 RX ADMIN — OXYCODONE HYDROCHLORIDE 10 MG: 10 TABLET ORAL at 03:08

## 2019-06-14 RX ADMIN — LINEZOLID 600 MG: 600 TABLET, FILM COATED ORAL at 09:35

## 2019-06-14 RX ADMIN — PIPERACILLIN SODIUM,TAZOBACTAM SODIUM 3.38 G: 3; .375 INJECTION, POWDER, FOR SOLUTION INTRAVENOUS at 07:06

## 2019-06-14 RX ADMIN — OXYCODONE HYDROCHLORIDE 10 MG: 10 TABLET ORAL at 14:37

## 2019-06-14 RX ADMIN — Medication 1 CAPSULE: at 09:35

## 2019-06-14 RX ADMIN — INSULIN LISPRO 4 UNITS: 100 INJECTION, SOLUTION INTRAVENOUS; SUBCUTANEOUS at 14:40

## 2019-06-14 RX ADMIN — ONDANSETRON 4 MG: 4 TABLET, ORALLY DISINTEGRATING ORAL at 14:37

## 2019-06-14 RX ADMIN — Medication 1 CAPSULE: at 14:37

## 2019-06-14 ASSESSMENT — PAIN SCALES - GENERAL
PAINLEVEL_OUTOF10: 10
PAINLEVEL_OUTOF10: 9
PAINLEVEL_OUTOF10: 10

## 2019-06-14 NOTE — CONSULTS
Education regarding insertion of PICC reviewed with patient. Risk/benefit/and alternatives discussed. verbalized understanding. Consent given by patient. Time out done at 1400. PICC line inserted right upper arm  without difficulty per protocol. Pt tolerated well. Good blood return and flushes easily. ECG tip confirmation system utilized for tip placement with P wave changes noted by RN during insertion and ECG strips left on chart. Educational booklet left at bedside.   Yarelis Mckeon

## 2019-06-14 NOTE — CARE COORDINATION
DIANAW notified that Pt is going to be discharged with IVATB for 14 days. LSW into the room. Pt is requesting [facility]. Referral sent to [facility] via PS. Will await for verification of benefits for IVATB.      Electronically signed by Winona Canavan, LSW on 6/14/2019 at 3:04 PM

## 2019-06-14 NOTE — PROGRESS NOTES
Pt Dc home with hhc and family. PICC present to left upper arm/ no complications. IV removed right RFA without complications. All scripts sent with patient. Dressings applied to all wounds. DC instructions complete. Pt has scheduled follow up apps and states understanding of all instructions.

## 2019-06-14 NOTE — PROGRESS NOTES
or tenderness  Neuro: Mental status intact. CN 2-12 intact and no focal motor deficits       Radiologic / Imaging / TESTING  No results found.      Labs:    Recent Results (from the past 24 hour(s))   POCT Glucose    Collection Time: 06/13/19 12:57 PM   Result Value Ref Range    POC Glucose 206 (H) 70 - 99 MG/DL   POCT Glucose    Collection Time: 06/13/19  6:16 PM   Result Value Ref Range    POC Glucose 189 (H) 70 - 99 MG/DL   POCT Glucose    Collection Time: 06/13/19 11:49 PM   Result Value Ref Range    POC Glucose 155 (H) 70 - 99 MG/DL   CBC Auto Differential    Collection Time: 06/14/19  7:21 AM   Result Value Ref Range    WBC 7.1 4.0 - 10.5 K/CU MM    RBC 4.72 4.6 - 6.2 M/CU MM    Hemoglobin 13.0 (L) 13.5 - 18.0 GM/DL    Hematocrit 41.8 (L) 42 - 52 %    MCV 88.6 78 - 100 FL    MCH 27.5 27 - 31 PG    MCHC 31.1 (L) 32.0 - 36.0 %    RDW 14.1 11.7 - 14.9 %    Platelets 359 859 - 516 K/CU MM    MPV 9.8 7.5 - 11.1 FL    Myelocytes Relative 1 (H) 0.0 %    Metamyelocytes Relative 1 (H) 0.0 %    Bands Relative 3 (L) 5 - 11 %    Segs Relative 54.0 36 - 66 %    Eosinophils % 2.0 0 - 3 %    Lymphocytes % 31.0 24 - 44 %    Monocytes % 8.0 (H) 0 - 4 %    Myelocytes Absolute 0.07 K/CU MM    Metamyelocytes Absolute 0.07 K/CU MM    Bands Absolute 0.21 K/CU MM    Segs Absolute 3.9 K/CU MM    Eosinophils # 0.1 K/CU MM    Lymphocytes # 2.2 K/CU MM    Monocytes # 0.6 K/CU MM    Differential Type MANUAL DIFFERENTIAL     Polychromasia 1+     PLT Morphology FEW  CLUMPED  PLT NOTED ON SCAN      Basic Metabolic Panel    Collection Time: 06/14/19  7:21 AM   Result Value Ref Range    Sodium 132 (L) 135 - 145 MMOL/L    Potassium 4.5 3.5 - 5.1 MMOL/L    Chloride 96 (L) 99 - 110 mMol/L    CO2 25 21 - 32 MMOL/L    Anion Gap 11 4 - 16    BUN 10 6 - 23 MG/DL    CREATININE 0.8 (L) 0.9 - 1.3 MG/DL    Glucose 252 (H) 70 - 99 MG/DL    Calcium 9.4 8.3 - 10.6 MG/DL    GFR Non-African American >60 >60 mL/min/1.73m2    GFR African American >60 >60

## 2019-06-14 NOTE — PROGRESS NOTES
CLINICAL PHARMACY NOTE: MEDS TO [address] Select Patient?: No  Total # of Prescriptions Filled: 2   The following medications were delivered to the patient:  · Oxycodone/APAP 10-325mg  · linezolid 600mg  Total # of Interventions Completed: 0  Time Spent (min): 15    Additional Documentation:  Lactobacillus not covered by insurance- printed prescription returned to patient.      Barry Leal, Pharm[place], [place]  6/14/2019  4:14 PM

## 2019-06-15 LAB
CULTURE: ABNORMAL
CULTURE: NORMAL
CULTURE: NORMAL
GRAM SMEAR: ABNORMAL
Lab: ABNORMAL
Lab: NORMAL
Lab: NORMAL
SPECIMEN: ABNORMAL
SPECIMEN: NORMAL
SPECIMEN: NORMAL

## 2019-06-24 ASSESSMENT — PAIN SCALES - GENERAL: PAINLEVEL_OUTOF10: 8

## 2019-06-24 ASSESSMENT — PAIN DESCRIPTION - ORIENTATION: ORIENTATION: LEFT

## 2019-06-24 ASSESSMENT — PAIN DESCRIPTION - PAIN TYPE: TYPE: ACUTE PAIN

## 2019-06-24 ASSESSMENT — PAIN DESCRIPTION - LOCATION: LOCATION: FOOT;LEG

## 2019-06-25 ENCOUNTER — HOSPITAL ENCOUNTER (EMERGENCY)
Age: 54
Discharge: HOME | End: 2019-06-25
Attending: EMERGENCY MEDICINE
Payer: MEDICARE

## 2019-06-25 ENCOUNTER — APPOINTMENT (OUTPATIENT)
Dept: GENERAL RADIOLOGY | Age: 54
End: 2019-06-25
Payer: MEDICARE

## 2019-06-25 VITALS
TEMPERATURE: 99 F | DIASTOLIC BLOOD PRESSURE: 80 MMHG | HEART RATE: 78 BPM | OXYGEN SATURATION: 96 % | BODY MASS INDEX: 36.4 KG/M2 | HEIGHT: 71 IN | WEIGHT: 260 LBS | SYSTOLIC BLOOD PRESSURE: 121 MMHG | RESPIRATION RATE: 18 BRPM

## 2019-06-25 DIAGNOSIS — L97.529 DIABETIC ULCER OF LEFT FOOT ASSOCIATED WITH TYPE 2 DIABETES MELLITUS, UNSPECIFIED PART OF FOOT, UNSPECIFIED ULCER STAGE (HCC): Primary | ICD-10-CM

## 2019-06-25 DIAGNOSIS — E11.621 DIABETIC ULCER OF LEFT FOOT ASSOCIATED WITH TYPE 2 DIABETES MELLITUS, UNSPECIFIED PART OF FOOT, UNSPECIFIED ULCER STAGE (HCC): Primary | ICD-10-CM

## 2019-06-25 LAB
ALBUMIN SERPL-MCNC: 3.6 GM/DL (ref 3.4–5)
ALP BLD-CCNC: 100 IU/L (ref 40–129)
ALT SERPL-CCNC: 8 U/L (ref 10–40)
ANION GAP SERPL CALCULATED.3IONS-SCNC: 11 MMOL/L (ref 4–16)
AST SERPL-CCNC: 12 IU/L (ref 15–37)
BASOPHILS ABSOLUTE: 0.1 K/CU MM
BASOPHILS RELATIVE PERCENT: 1.1 % (ref 0–1)
BILIRUB SERPL-MCNC: 0.2 MG/DL (ref 0–1)
BUN BLDV-MCNC: 7 MG/DL (ref 6–23)
CALCIUM SERPL-MCNC: 9.2 MG/DL (ref 8.3–10.6)
CHLORIDE BLD-SCNC: 100 MMOL/L (ref 99–110)
CO2: 26 MMOL/L (ref 21–32)
CREAT SERPL-MCNC: 0.6 MG/DL (ref 0.9–1.3)
DIFFERENTIAL TYPE: ABNORMAL
EOSINOPHILS ABSOLUTE: 0.2 K/CU MM
EOSINOPHILS RELATIVE PERCENT: 3.2 % (ref 0–3)
ESTIMATED AVERAGE GLUCOSE: 252 MG/DL
GFR AFRICAN AMERICAN: >60 ML/MIN/1.73M2
GFR NON-AFRICAN AMERICAN: >60 ML/MIN/1.73M2
GLUCOSE BLD-MCNC: 163 MG/DL (ref 70–99)
HBA1C MFR BLD: 10.4 % (ref 4.2–6.3)
HCT VFR BLD CALC: 36.2 % (ref 42–52)
HEMOGLOBIN: 11.5 GM/DL (ref 13.5–18)
IMMATURE NEUTROPHIL %: 0.3 % (ref 0–0.43)
LACTATE: 1.4 MMOL/L (ref 0.4–2)
LYMPHOCYTES ABSOLUTE: 2.3 K/CU MM
LYMPHOCYTES RELATIVE PERCENT: 35.3 % (ref 24–44)
MCH RBC QN AUTO: 27.8 PG (ref 27–31)
MCHC RBC AUTO-ENTMCNC: 31.8 % (ref 32–36)
MCV RBC AUTO: 87.4 FL (ref 78–100)
MONOCYTES ABSOLUTE: 0.5 K/CU MM
MONOCYTES RELATIVE PERCENT: 7.3 % (ref 0–4)
NUCLEATED RBC %: 0 %
PDW BLD-RTO: 13.9 % (ref 11.7–14.9)
PLATELET # BLD: 255 K/CU MM (ref 140–440)
PMV BLD AUTO: 9.5 FL (ref 7.5–11.1)
POTASSIUM SERPL-SCNC: 3.7 MMOL/L (ref 3.5–5.1)
RBC # BLD: 4.14 M/CU MM (ref 4.6–6.2)
SEGMENTED NEUTROPHILS ABSOLUTE COUNT: 3.5 K/CU MM
SEGMENTED NEUTROPHILS RELATIVE PERCENT: 52.8 % (ref 36–66)
SODIUM BLD-SCNC: 137 MMOL/L (ref 135–145)
TOTAL IMMATURE NEUTOROPHIL: 0.02 K/CU MM
TOTAL NUCLEATED RBC: 0 K/CU MM
TOTAL PROTEIN: 7 GM/DL (ref 6.4–8.2)
WBC # BLD: 6.6 K/CU MM (ref 4–10.5)

## 2019-06-25 PROCEDURE — 87040 BLOOD CULTURE FOR BACTERIA: CPT

## 2019-06-25 PROCEDURE — 73630 X-RAY EXAM OF FOOT: CPT

## 2019-06-25 PROCEDURE — 6370000000 HC RX 637 (ALT 250 FOR IP): Performed by: PHYSICIAN ASSISTANT

## 2019-06-25 PROCEDURE — 83605 ASSAY OF LACTIC ACID: CPT

## 2019-06-25 PROCEDURE — 85025 COMPLETE CBC W/AUTO DIFF WBC: CPT

## 2019-06-25 PROCEDURE — 80053 COMPREHEN METABOLIC PANEL: CPT

## 2019-06-25 PROCEDURE — 99283 EMERGENCY DEPT VISIT LOW MDM: CPT

## 2019-06-25 PROCEDURE — 83036 HEMOGLOBIN GLYCOSYLATED A1C: CPT

## 2019-06-25 RX ORDER — OXYCODONE HYDROCHLORIDE AND ACETAMINOPHEN 5; 325 MG/1; MG/1
1 TABLET ORAL ONCE
Status: COMPLETED | OUTPATIENT
Start: 2019-06-25 | End: 2019-06-25

## 2019-06-25 RX ADMIN — OXYCODONE HYDROCHLORIDE AND ACETAMINOPHEN 1 TABLET: 5; 325 TABLET ORAL at 02:58

## 2019-06-25 ASSESSMENT — PAIN SCALES - GENERAL: PAINLEVEL_OUTOF10: 10

## 2019-06-25 NOTE — ED NOTES
Pt has chronic diabetic wounds to L foot, home care nurse noted wounds and pain to L foot were worse today.       Idalmis Drew RN  06/25/19 5941

## 2019-06-25 NOTE — ED PROVIDER NOTES
eMERGENCY dEPARTMENT eNCOUnter      PCP: [id number] [facility]    Chief Complaint   Patient presents with    Foot Pain     left    Leg Swelling     Pt was seen by physician today    HPI    Orestes Morales is a 46 y.o. male who presents with worsening pain redness to left foot left lower extremity associated with diabetic foot ulcers. Patient admitted 6/10/2019 for the same and underwent I&D with Dr. Oneyda Woods 2 of his 3 foot ulcers. surgical wound cx with B/A-strep, MRSA, proteus-ESBL. Patient was discharged on the 14th with a PICC line to receive 14 days of ertapenem and Zyvox. Patient states that he is scheduled to finish this on Friday of this week. Reports that he has only missed 1 dose of Zyvox from Monday. He does report that he has had a lot of diarrhea associated with either his diabetic medications or these antibiotics over the last several days which is why he did not take the one dose. He does admit to some subjective fevers chills. Reports the pain in his foot is worse than it was when he was admitted last.  He reports that he noticed this morning that he was developing some redness that was working up his distal lower extremity. States that he is scheduled to see infectious disease doctor Thursday of this week but has not seen anyone since discharge. Does have history of poorly controlled diabetes and has multiple digits with amputations. Most recent hemoglobin A1c that I am able to view is from June of last year and was 8.7.           REVIEW OF SYSTEMS    Constitutional:  Denies weight loss or weakness   HENT:  Denies sore throat or ear pain   Cardiovascular:  Denies chest pain, palpitations   Respiratory:  Denies cough or shortness of breath    GI:  Denies abdominal pain, nausea, vomiting, or diarrhea  :  Denies any urinary symptoms  Musculoskeletal:  Denies back pain,   Skin:  Denies rash  Neurologic:  Denies headache, focal weakness or sensory changes   Endocrine:  Denies polyuria or polydypsia   Lymphatic:  Denies swollen glands     All other review of systems are negative  See HPI and nursing notes for additional information     PAST MEDICAL AND SURGICAL HISTORY    Past Medical History:   Diagnosis Date    Anesthesia     \"Problems coming out of the anesthesia\"    Anxiety     Arthritis     Asthma     \"As a kid but outgrew this\"    Chronic back pain     Depression     Dizziness     Edema     Fibromyalgia     Fracture     Headache(784.0)     Hemorrhoid     Hernia, abdominal     Hx MRSA infection     positive culture 8/2014 from left foot    Migraine     Nausea & vomiting     Neuropathy     Obesity, Class III, BMI 40-49.9 (morbid obesity) ([address]     Osteomyelitis (HCC)     hx finger    Pneumonia 1995    Poor circulation     Restless leg syndrome     Shortness of breath on exertion     6/14/2016- pt denies any sob with this interview    Type II or unspecified type diabetes mellitus with other specified manifestations, not stated as uncontrolled 4/8/2014    Type II or unspecified type diabetes mellitus without mention of complication, not stated as uncontrolled DX 2007    Ulcer of other part of foot 4/8/2014    'ulcer on left foot healed all up\"     Past Surgical History:   Procedure Laterality Date    COLONOSCOPY  1990's    DEBRIDEMENT  8/25/2014    left foot    ENDOSCOPY, COLON, DIAGNOSTIC  06-    FINGER SURGERY  9-11-11    Right Index Finger    FINGER SURGERY  01-16-12    RIght Index Finger-Removal of loose body, Reconstruction of Mallet Finger    FOOT DEBRIDEMENT Left 6/10/2019    FOOT DEBRIDEMENT INCISION AND DRAINAGE performed by Kye Huitron MD at [address] [place] [address] Right 06/01/2018    I&D right foot    HEMORRHOID SURGERY  2008    OTHER SURGICAL HISTORY Left 10/22/2013    I & D left foot    OTHER SURGICAL HISTORY  07/07/2017    I&D fingers X2 left hand.  I&D left forearm    OTHER SURGICAL HISTORY Left 07/08/2017    left hand debridement, left forearm incision and drainage     OTHER SURGICAL HISTORY Left 07/10/2017    Fingers I&D    OTHER SURGICAL HISTORY Left 07/14/2017    middle finger amputation revision    ROTATOR CUFF REPAIR  Last Done 7/18/2011    Left, Done Four Times       CURRENT MEDICATIONS    Current Outpatient Rx   Medication Sig Dispense Refill    lactobacillus (CULTURELLE) capsule Take 1 capsule by mouth 2 times daily 30 capsule 0    linezolid (ZYVOX) 600 MG tablet Take 1 tablet by mouth every 12 hours for 12 days 24 tablet 0    ertapenem (INVANZ) infusion Infuse 1,000 mg intravenously daily Compound per protocol. 14 g 0    pregabalin (LYRICA) 100 MG capsule Take 1 capsule by mouth 2 times daily for 30 days. 60 capsule 0    Dulaglutide (TRULICITY) 8.62 EQ/5.6FO SOPN Inject 0.75 mg into the skin once a week      sertraline (ZOLOFT) 50 MG tablet Take 50 mg by mouth daily      ondansetron (ZOFRAN ODT) 4 MG disintegrating tablet Take 1 tablet by mouth every 6 hours 20 tablet 0    metFORMIN (GLUCOPHAGE) 1000 MG tablet Take 1 tablet by mouth 2 times daily (with meals) 60 tablet 3    tiZANidine (ZANAFLEX) 4 MG tablet Take 4 mg by mouth nightly as needed      traZODone (DESYREL) 100 MG tablet Take 100 mg by mouth nightly as needed          ALLERGIES    Allergies   Allergen Reactions    Robaxin [Methocarbamol] Swelling    Rocephin [Ceftriaxone Sodium-Lidocaine] Hives     Noted on 10/26 - developed extremity swelling and hives. No anaphylaxis.  Sulfa Antibiotics Hives     Noted on 10/26 - developed extremity swelling and hives. No anaphylaxis.     Cantaloupe (Diagnostic) Other (See Comments)    Ceftriaxone Hives     Tolerated Zosyn well 1/2/2019     Aspirin Nausea Only    Propoxyphene Nausea And Vomiting    Toradol [Ketorolac Tromethamine] Other (See Comments)     Sweats        SOCIAL AND FAMILY HISTORY    Social History     Socioeconomic History    Marital status:      Spouse name: None    Number of children: 4    Years of education: None    Highest education level: None   Occupational History    None   Social Needs    Financial resource strain: None    Food insecurity:     Worry: None     Inability: None    Transportation needs:     Medical: None     Non-medical: None   Tobacco Use    Smoking status: Current Every Day Smoker    Smokeless tobacco: Current User     Types: Snuff    Tobacco comment: \"Chew Grizzly Snuff Daily\"   Substance and Sexual Activity    Alcohol use: No    Drug use: Yes     Types: Marijuana    Sexual activity: None   Lifestyle    Physical activity:     Days per week: None     Minutes per session: None    Stress: None   Relationships    Social connections:     Talks on phone: None     Gets together: None     Attends Sikh service: None     Active member of club or organization: None     Attends meetings of clubs or organizations: None     Relationship status: None    Intimate partner violence:     Fear of current or ex partner: None     Emotionally abused: None     Physically abused: None     Forced sexual activity: None   Other Topics Concern    None   Social History Narrative    None     Family History   Problem Relation Age of Onset    Kidney Disease Mother         \"Kidney Failure, On Dialysis\"    Early Death Mother 39    Diabetes Father         \"Type II\"         PHYSICAL EXAM    VITAL SIGNS: /77   Pulse 95   Temp 99 °F (37.2 °C) (Oral)   Resp 18   Ht 5' 11\" (1.803 m)   Wt 260 lb (117.9 kg)   SpO2 98%   BMI 36.26 kg/m²    Constitutional:  Well developed, Well nourished  HENT:  Normocephalic, Atraumatic, PERRL. EOMI. Sclera clear. Conjunctiva normal, No discharge. Neck/Lymphatics: supple, no JVD, no swollen nodes  Cardiovascular:  Normal heart rate, Normal rhythm, No murmurs  Respiratory:  Nonlabored breathing. Normal breath sounds, No wheezing  Abdomen: Bowel sounds normal, Soft, No tenderness, no masses.   Musculoskeletal: No edema, No tenderness, No cyanosis  Integument:  Warm, Dry  With 3 diabetic foot ulcers. He has a-6 cm in circumference stage II-III ulcer the plantar aspect of the foot near the base of the first toe. He has a another 4-5 meters stage II-III ulcer to his heel. Has a 1 x 2 cm stage II ulcer to the lateral aspect of the heel. Plantar aspect of foot dorsum of foot with some redness and swelling. There is some warmth and redness working at the distal lower extremity. Posterior calf or thigh tenderness to palpation. Neurologic:  Alert & oriented , No focal deficits noted.      Psychiatric:  Affect normal, Mood normal.       Labs:  Results for orders placed or performed during the hospital encounter of 06/25/19   Culture blood 2   Result Value Ref Range    Specimen BLOOD     Special Requests NONE     Culture NO GROWTH AT 5 DAYS    Culture blood 1   Result Value Ref Range    Specimen BLOOD     Special Requests NONE     Culture NO GROWTH AT 5 DAYS    CBC w/ auto diff   Result Value Ref Range    WBC 6.6 4.0 - 10.5 K/CU MM    RBC 4.14 (L) 4.6 - 6.2 M/CU MM    Hemoglobin 11.5 (L) 13.5 - 18.0 GM/DL    Hematocrit 36.2 (L) 42 - 52 %    MCV 87.4 78 - 100 FL    MCH 27.8 27 - 31 PG    MCHC 31.8 (L) 32.0 - 36.0 %    RDW 13.9 11.7 - 14.9 %    Platelets 547 426 - 015 K/CU MM    MPV 9.5 7.5 - 11.1 FL    Differential Type AUTOMATED DIFFERENTIAL     Segs Relative 52.8 36 - 66 %    Lymphocytes % 35.3 24 - 44 %    Monocytes % 7.3 (H) 0 - 4 %    Eosinophils % 3.2 (H) 0 - 3 %    Basophils % 1.1 (H) 0 - 1 %    Segs Absolute 3.5 K/CU MM    Lymphocytes # 2.3 K/CU MM    Monocytes # 0.5 K/CU MM    Eosinophils # 0.2 K/CU MM    Basophils # 0.1 K/CU MM    Nucleated RBC % 0.0 %    Total Nucleated RBC 0.0 K/CU MM    Total Immature Neutrophil 0.02 K/CU MM    Immature Neutrophil % 0.3 0 - 0.43 %   CMP   Result Value Ref Range    Sodium 137 135 - 145 MMOL/L    Potassium 3.7 3.5 - 5.1 MMOL/L    Chloride 100 99 - 110 mMol/L    CO2 26 21 - 32 MMOL/L    BUN 7 6 - 23 MG/DL CREATININE 0.6 (L) 0.9 - 1.3 MG/DL    Glucose 163 (H) 70 - 99 MG/DL    Calcium 9.2 8.3 - 10.6 MG/DL    Alb 3.6 3.4 - 5.0 GM/DL    Total Protein 7.0 6.4 - 8.2 GM/DL    Total Bilirubin 0.2 0.0 - 1.0 MG/DL    ALT 8 (L) 10 - 40 U/L    AST 12 (L) 15 - 37 IU/L    Alkaline Phosphatase 100 40 - 129 IU/L    GFR Non-African American >60 >60 mL/min/1.73m2    GFR African American >60 >60 mL/min/1.73m2    Anion Gap 11 4 - 16   Lactic Acid, Plasma   Result Value Ref Range    Lactate 1.4 0.4 - 2.0 mMOL/L   Hemoglobin A1c   Result Value Ref Range    Hemoglobin A1C 10.4 (H) 4.2 - 6.3 %    eAG 252 mg/dL         RADIOLOGY    Xr Foot Left (min 3 Views)    Result Date: 6/25/2019  EXAMINATION: 3 XRAY VIEWS OF THE LEFT FOOT 6/25/2019 1:27 am COMPARISON: None HISTORY: ORDERING SYSTEM PROVIDED HISTORY: r/o evidence osteo TECHNOLOGIST PROVIDED HISTORY: Reason for exam:->r/o evidence osteo Ordering Physician Provided Reason for Exam: evidence osteo Acuity: Acute Type of Exam: Initial FINDINGS: Soft tissue ulcerations are seen involving the plantar surface of the left foot. There is no evidence of soft tissue gas. Soft tissue swelling is noted. Calcaneal enthesopathy is seen. There is no acute fracture, dislocation, or bone destruction. Soft tissue ulcerations are seen at the plantar aspect of the left foot. No evidence of soft tissue gas or osteomyelitis. Xr Foot Left (min 3 Views)    Result Date: 6/10/2019  EXAMINATION: 3 XRAY VIEWS OF THE LEFT FOOT 6/10/2019 6:11 pm COMPARISON: Left foot radiograph May 13, 2017 HISTORY: ORDERING SYSTEM PROVIDED HISTORY: redness, swelling, eval for gas or osteomyelitis TECHNOLOGIST PROVIDED HISTORY: Reason for exam:->redness, swelling, eval for gas or osteomyelitis Ordering Physician Provided Reason for Exam: redness, swelling, eval for gas or osteomyelitis FINDINGS: Three views of the left foot demonstrate diffuse soft tissue edema.   There is an apparent ulceration along the plantar foot at the level of the heel. Small amount of gas is seen associated with this ulceration. No gas identified throughout the remainder of the foot. The osseous structures appear intact. No radiographic evidence for osteomyelitis. Mild-to-moderate degenerative change of the 1st MTP joint and moderate osteoarthritis of the hindfoot and midfoot articulations. Large plantar calcaneal enthesophyte with multiple well corticated fragment ossicles extending along the expected course of the proximal plantar fascia. 1. Diffuse soft tissue edema with apparent ulceration along the plantar foot at the level of the heel. There is a small amount of associated gas at the ulceration site. No gas identified throughout the remainder of the foot. 2. No radiographic evidence for osteomyelitis. Mri Foot Left W Wo Contrast    Result Date: 6/13/2019  EXAMINATION: MRI OF THE LEFT FOOT WITH AND WITHOUT CONTRAST, 6/13/2019 10:13 am TECHNIQUE: Multiplanar multisequence MRI of the left foot was performed with and without the administration of intravenous contrast. COMPARISON: Left foot radiograph Kassie 10, 2019 HISTORY: ORDERING SYSTEM PROVIDED HISTORY: check infection TECHNOLOGIST PROVIDED HISTORY: Ordering Physician Provided Reason for Exam: FOOT ULCERS/ 20 ML MULTIHANCE FINDINGS: PLANTAR FASCIA: Pronounced thickening of the medial bundle of the plantar fascia with multiple well corticated ossicles along the proximal course of the medial bundle of the plantar fascia. Prominent plantar heel spur. Findings compatible with sequela of chronic plantar fasciitis. No significant surrounding edema identified to suggest acute component. Mild edema along the superficial aspect of the proximal to mid lateral bundle related to adjacent ulceration. ACHILLES TENDON: The Achilles tendon is normal in position, morphology and signal.  No associated bursitis. Small posterior calcaneal heel spur present.  BONE MARROW: No evidence for osseous contusion, fracture, or suspicious marrow occupying lesion. Well-circumscribed cystic changes at the medial cuneiform extend to the articular surface and are most suggestive of small intraosseous ganglia. No marrow edema or postcontrast enhancement of the marrow is identified to suggest osteomyelitis. JOINT SPACES: Moderate hindfoot and moderate to severe midfoot osteoarthritic change. No significant joint effusion. Mild degenerative changes of the 1st MTP joint. Pes planus deformity. Mild hindfoot valgus. SYNDESMOTIC LIGAMENTS:  The anterior-inferior tibiofibular ligament, interosseous membrane and posterior-inferior tibiofibular ligaments are normal. LATERAL COLLATERAL LIGAMENT COMPLEX: Small well corticated ossicle seen adjacent to the lateral talus at the anterior talofibular ligament attachment compatible sequela of remote injury. The anterior talofibular ligament otherwise appears intact (image 7 series 7). The posterior talofibular ligament and calcaneofibular ligaments are intact. DELTOID LIGAMENT COMPLEX: Intrasubstance signal throughout the intact deep fibers of the deltoid ligament complex compatible with sequela of remote sprain. SINUS TARSI AND SPRING LIGAMENT: Near complete loss of the fat signal within the sinus tarsi which can be seen in the clinical setting of sinus tarsi syndrome. The spring ligament is attenuated but remains grossly intact. MEDIAL TENDONS: The posterior tibialis, flexor digitorum longus and flexor hallucis longus tendons are intact. LATERAL TENDONS:  The peroneus longus and brevis tendons are intact. ANTERIOR TENDONS: The tibialis anterior, extensor hallucis longus and extensor digitorum longus tendons are normal in position, morphology and signal. TARSAL TUNNEL: There are no obstructing lesions within the tarsal tunnel. Soft tissues: There is subcutaneous edema with postcontrast enhancement of the subcutaneous tissues at the great toe along the plantar soft tissues.  Findings consistent with cellulitis. Ulcerations are identified along the plantar aspect of the great toe and along the plantar aspect of the hindfoot. No organized drainable fluid collections are identified. There also appears to be a small ulceration along the central midfoot. 1. Multiple ulcerations of the plantar soft tissues with mild subcutaneous edema and associated postcontrast enhancement surrounding the great toe compatible with cellulitis. No organized drainable fluid collection identified. 2. No acute osseous abnormality or evidence for osteomyelitis. 3. Findings compatible with sequela of chronic plantar fasciitis. 4. Evidence for remote sprains of the anterior talofibular ligament and deep fibers of the deltoid ligament complex. 5. Pes planus deformity and mild hindfoot valgus. 6. Complete loss of the normal fat signal within the sinus tarsi which can be seen in the clinical setting of sinus tarsi syndrome. 7. Moderate hindfoot and moderate to severe midfoot osteoarthritis. Mild degenerative changes of the 1st MTP joint. Vl Dup Lower Extremity Venous Left    Result Date: 6/11/2019  EXAMINATION: DUPLEX VENOUS ULTRASOUND OF THE LEFT LOWER EXTREMITY 6/10/2019 6:18 pm TECHNIQUE: Duplex ultrasound and Doppler images were obtained of the left lower extremity. COMPARISON: None. HISTORY: ORDERING SYSTEM PROVIDED HISTORY: pain in leg up to groin TECHNOLOGIST PROVIDED HISTORY: Reason for exam:->pain in leg up to groin Acuity: Acute Type of Exam: Initial Relevant Medical/Surgical History: REDNESS SWELLING WARMTH LLE - OPEN WOUNDS SEEPAGE AND LARGE OPEN WOUND BOTTOM OF GREAT TOE FINDINGS: The visualized veins of the left lower extremity are patent and free of echogenic thrombus. The veins are normally compressible and have normal phasic flow. Images demonstrate subcutaneous edema. Incidentally noted left inguinal lymph node, measuring up to 1.2 cm in short axis.      No evidence of DVT in the left lower

## 2019-06-25 NOTE — ED PROVIDER NOTES
Emergency Department Encounter  [address] [facility], [facility] EMERGENCY DEPARTMENT    Patient: Marco Aguiar  MRN: [id number]  : 1965  Date of Evaluation: 2019  ED Supervising Physician: Rohith Michele MD    I independently examined and evaluated Marco Aguiar. In brief, Marco Aguiar is a 46 y.o. male that presents to the emergency department with increasing left foot pain. He has chronic ulcers of his left foot and is currently on IV antibiotics. States that he has missed a few doses of the antibiotics. States that home health aide did not put Betadine on his foot this morning. Focused exam: LLE: Generalized swelling of foot with clean base ulcerations of the plantar aspect around the first MTP in the heel. No significant erythema or warmth. No purulent drainage. Brief ED course/MDM: Patient with diabetic foot ulcers. Normal vital signs. They do not appear acutely infected or any worse when compared to . Labs are generally unremarkable. Wounds redressed. He has follow-up with podiatry in 2 days. All diagnostic, treatment, and disposition decisions were made by myself in conjunction with the CHAR. For all further details of the patient's emergency department visit, please see their documentation.     (Please note that portions of this note may have been completed with a voice recognition program. Efforts were made to edit the dictations but occasionally words are mis-transcribed.)    MD Man Newberry Cea, MD  [phone]

## 2019-06-30 LAB
CULTURE: NORMAL
CULTURE: NORMAL
Lab: NORMAL
Lab: NORMAL
SPECIMEN: NORMAL
SPECIMEN: NORMAL

## 2019-07-10 ENCOUNTER — HOSPITAL ENCOUNTER (OUTPATIENT)
Dept: WOUND CARE | Age: 54
Discharge: HOME | End: 2019-07-10
Payer: MEDICARE

## 2019-07-10 VITALS
TEMPERATURE: 98.3 F | SYSTOLIC BLOOD PRESSURE: 109 MMHG | HEART RATE: 87 BPM | RESPIRATION RATE: 18 BRPM | DIASTOLIC BLOOD PRESSURE: 71 MMHG

## 2019-07-10 DIAGNOSIS — G89.18 POST-OPERATIVE PAIN: ICD-10-CM

## 2019-07-10 DIAGNOSIS — L97.423 DIABETIC ULCER OF LEFT HEEL ASSOCIATED WITH TYPE 2 DIABETES MELLITUS, WITH NECROSIS OF MUSCLE (HCC): ICD-10-CM

## 2019-07-10 DIAGNOSIS — L97.522 SKIN ULCER OF TOE OF LEFT FOOT WITH FAT LAYER EXPOSED (HCC): Primary | ICD-10-CM

## 2019-07-10 DIAGNOSIS — E11.621 DIABETIC ULCER OF LEFT HEEL ASSOCIATED WITH TYPE 2 DIABETES MELLITUS, WITH NECROSIS OF MUSCLE (HCC): ICD-10-CM

## 2019-07-10 PROBLEM — L03.012 CELLULITIS OF LEFT MIDDLE FINGER: Status: RESOLVED | Noted: 2017-07-05 | Resolved: 2019-07-10

## 2019-07-10 PROCEDURE — 11042 DBRDMT SUBQ TIS 1ST 20SQCM/<: CPT

## 2019-07-10 PROCEDURE — 99214 OFFICE O/P EST MOD 30 MIN: CPT

## 2019-07-10 RX ORDER — HYDROCODONE BITARTRATE AND ACETAMINOPHEN 5; 325 MG/1; MG/1
1 TABLET ORAL EVERY 4 HOURS PRN
Qty: 18 TABLET | Refills: 0 | Status: SHIPPED | OUTPATIENT
Start: 2019-07-10 | End: 2019-07-13

## 2019-07-10 ASSESSMENT — PAIN DESCRIPTION - DESCRIPTORS: DESCRIPTORS: STABBING

## 2019-07-10 ASSESSMENT — PAIN DESCRIPTION - LOCATION: LOCATION: FOOT

## 2019-07-10 ASSESSMENT — PAIN DESCRIPTION - ONSET: ONSET: ON-GOING

## 2019-07-10 ASSESSMENT — PAIN DESCRIPTION - ORIENTATION: ORIENTATION: LEFT

## 2019-07-10 ASSESSMENT — PAIN - FUNCTIONAL ASSESSMENT: PAIN_FUNCTIONAL_ASSESSMENT: PREVENTS OR INTERFERES SOME ACTIVE ACTIVITIES AND ADLS

## 2019-07-10 ASSESSMENT — PAIN SCALES - GENERAL: PAINLEVEL_OUTOF10: 10

## 2019-07-10 ASSESSMENT — PAIN DESCRIPTION - FREQUENCY: FREQUENCY: CONTINUOUS

## 2019-07-10 ASSESSMENT — PAIN DESCRIPTION - PAIN TYPE: TYPE: ACUTE PAIN

## 2019-07-10 ASSESSMENT — PAIN DESCRIPTION - PROGRESSION: CLINICAL_PROGRESSION: NOT CHANGED

## 2019-07-10 NOTE — PROGRESS NOTES
7/10/2019 10:16 AM   Number of days: 0       Wound 07/10/19 Finger (Comment which one) Anterior;Right # 19 R Middle Finger (Active)   Wound Image   7/10/2019 10:16 AM   Wound Cleansed Rinsed/Irrigated with saline 7/10/2019 10:16 AM   Wound Length (cm) 0.5 cm 7/10/2019 10:16 AM   Wound Width (cm) 2.2 cm 7/10/2019 10:16 AM   Wound Depth (cm) 0.2 cm 7/10/2019 10:16 AM   Wound Surface Area (cm^2) 1.1 cm^2 7/10/2019 10:16 AM   Wound Volume (cm^3) 0.22 cm^3 7/10/2019 10:16 AM   Distance Tunneling (cm) 0 cm 7/10/2019 10:16 AM   Tunneling Position ___ O'Clock 0 7/10/2019 10:16 AM   Undermining Starts ___ O'Clock 0 7/10/2019 10:16 AM   Undermining Ends___ O'Clock 0 7/10/2019 10:16 AM   Undermining Maxium Distance (cm) 0 7/10/2019 10:16 AM   Drainage Amount Moderate 7/10/2019 10:16 AM   Drainage Description Serosanguinous 7/10/2019 10:16 AM   Odor None 7/10/2019 10:16 AM   Margins Attached edges 7/10/2019 10:16 AM   Thu-wound Assessment Pink 7/10/2019 10:16 AM   Non-staged Wound Description Full thickness 7/10/2019 10:16 AM   [place]%Wound Bed 0 7/10/2019 10:16 AM   Red%Wound Bed 0 7/10/2019 10:16 AM   Yellow%Wound Bed 0 7/10/2019 10:16 AM   Black%Wound Bed 0 7/10/2019 10:16 AM   Purple%Wound Bed 0 7/10/2019 10:16 AM   Other%Wound Bed 100 7/10/2019 10:16 AM   Number of days: 0       Wound 07/10/19 Finger (Comment which one) Anterior; Left # 20 L Thumb (Active)   Wound Image   7/10/2019 10:16 AM   Wound Cleansed Rinsed/Irrigated with saline 7/10/2019 10:16 AM   Wound Length (cm) 2.2 cm 7/10/2019 10:16 AM   Wound Width (cm) 2 cm 7/10/2019 10:16 AM   Wound Depth (cm) 0.1 cm 7/10/2019 10:16 AM   Wound Surface Area (cm^2) 4.4 cm^2 7/10/2019 10:16 AM   Wound Volume (cm^3) 0.44 cm^3 7/10/2019 10:16 AM   Distance Tunneling (cm) 0 cm 7/10/2019 10:16 AM   Tunneling Position ___ O'Clock 0 7/10/2019 10:16 AM   Undermining Starts ___ O'Clock 0 7/10/2019 10:16 AM   Undermining Ends___ O'Clock 0 7/10/2019 10:16 AM   Undermining Maxium Distance

## 2019-07-14 ENCOUNTER — APPOINTMENT (OUTPATIENT)
Dept: GENERAL RADIOLOGY | Age: 54
DRG: 638 | End: 2019-07-14
Payer: MEDICARE

## 2019-07-14 ENCOUNTER — HOSPITAL ENCOUNTER (INPATIENT)
Age: 54
LOS: 4 days | Discharge: ANOTHER HOSPITAL | DRG: 638 | End: 2019-07-18
Attending: EMERGENCY MEDICINE | Admitting: HOSPITALIST
Payer: MEDICARE

## 2019-07-14 DIAGNOSIS — E11.628 DIABETIC INFECTION OF LEFT FOOT (HCC): Primary | ICD-10-CM

## 2019-07-14 DIAGNOSIS — L97.525 DIABETIC ULCER OF OTHER PART OF LEFT FOOT ASSOCIATED WITH TYPE 2 DIABETES MELLITUS, WITH MUSCLE INVOLVEMENT WITHOUT EVIDENCE OF NECROSIS (HCC): ICD-10-CM

## 2019-07-14 DIAGNOSIS — E11.621 DIABETIC ULCER OF OTHER PART OF LEFT FOOT ASSOCIATED WITH TYPE 2 DIABETES MELLITUS, WITH MUSCLE INVOLVEMENT WITHOUT EVIDENCE OF NECROSIS (HCC): ICD-10-CM

## 2019-07-14 DIAGNOSIS — L08.9 DIABETIC INFECTION OF LEFT FOOT (HCC): Primary | ICD-10-CM

## 2019-07-14 DIAGNOSIS — M86.149: ICD-10-CM

## 2019-07-14 DIAGNOSIS — M86.9 OSTEOMYELITIS OF FINGER OF LEFT HAND (HCC): ICD-10-CM

## 2019-07-14 LAB
ALBUMIN SERPL-MCNC: 3.3 GM/DL (ref 3.4–5)
ALP BLD-CCNC: 122 IU/L (ref 40–129)
ALT SERPL-CCNC: <5 U/L (ref 10–40)
ANION GAP SERPL CALCULATED.3IONS-SCNC: 11 MMOL/L (ref 4–16)
AST SERPL-CCNC: 15 IU/L (ref 15–37)
BASOPHILS ABSOLUTE: 0 K/CU MM
BASOPHILS RELATIVE PERCENT: 0.4 % (ref 0–1)
BILIRUB SERPL-MCNC: 0.5 MG/DL (ref 0–1)
BUN BLDV-MCNC: 10 MG/DL (ref 6–23)
CALCIUM SERPL-MCNC: 9 MG/DL (ref 8.3–10.6)
CHLORIDE BLD-SCNC: 98 MMOL/L (ref 99–110)
CO2: 26 MMOL/L (ref 21–32)
CREAT SERPL-MCNC: 0.6 MG/DL (ref 0.9–1.3)
DIFFERENTIAL TYPE: ABNORMAL
EOSINOPHILS ABSOLUTE: 0.2 K/CU MM
EOSINOPHILS RELATIVE PERCENT: 2.2 % (ref 0–3)
GFR AFRICAN AMERICAN: >60 ML/MIN/1.73M2
GFR NON-AFRICAN AMERICAN: >60 ML/MIN/1.73M2
GLUCOSE BLD-MCNC: 193 MG/DL (ref 70–99)
GLUCOSE BLD-MCNC: 248 MG/DL (ref 70–99)
HCT VFR BLD CALC: 34.2 % (ref 42–52)
HEMOGLOBIN: 10.7 GM/DL (ref 13.5–18)
IMMATURE NEUTROPHIL %: 0.4 % (ref 0–0.43)
LACTATE: 1 MMOL/L (ref 0.4–2)
LYMPHOCYTES ABSOLUTE: 1.2 K/CU MM
LYMPHOCYTES RELATIVE PERCENT: 15.3 % (ref 24–44)
MCH RBC QN AUTO: 27 PG (ref 27–31)
MCHC RBC AUTO-ENTMCNC: 31.3 % (ref 32–36)
MCV RBC AUTO: 86.4 FL (ref 78–100)
MONOCYTES ABSOLUTE: 0.6 K/CU MM
MONOCYTES RELATIVE PERCENT: 7.5 % (ref 0–4)
NUCLEATED RBC %: 0 %
PDW BLD-RTO: 14.4 % (ref 11.7–14.9)
PLATELET # BLD: 240 K/CU MM (ref 140–440)
PMV BLD AUTO: 9.6 FL (ref 7.5–11.1)
POTASSIUM SERPL-SCNC: 4.5 MMOL/L (ref 3.5–5.1)
RBC # BLD: 3.96 M/CU MM (ref 4.6–6.2)
SEGMENTED NEUTROPHILS ABSOLUTE COUNT: 6 K/CU MM
SEGMENTED NEUTROPHILS RELATIVE PERCENT: 74.2 % (ref 36–66)
SODIUM BLD-SCNC: 135 MMOL/L (ref 135–145)
TOTAL IMMATURE NEUTOROPHIL: 0.03 K/CU MM
TOTAL NUCLEATED RBC: 0 K/CU MM
TOTAL PROTEIN: 7.3 GM/DL (ref 6.4–8.2)
WBC # BLD: 8.1 K/CU MM (ref 4–10.5)

## 2019-07-14 PROCEDURE — 73630 X-RAY EXAM OF FOOT: CPT

## 2019-07-14 PROCEDURE — 80053 COMPREHEN METABOLIC PANEL: CPT

## 2019-07-14 PROCEDURE — 82962 GLUCOSE BLOOD TEST: CPT

## 2019-07-14 PROCEDURE — 1200000000 HC SEMI PRIVATE

## 2019-07-14 PROCEDURE — 96366 THER/PROPH/DIAG IV INF ADDON: CPT

## 2019-07-14 PROCEDURE — 85025 COMPLETE CBC W/AUTO DIFF WBC: CPT

## 2019-07-14 PROCEDURE — 83605 ASSAY OF LACTIC ACID: CPT

## 2019-07-14 PROCEDURE — 6360000002 HC RX W HCPCS: Performed by: NURSE PRACTITIONER

## 2019-07-14 PROCEDURE — 87077 CULTURE AEROBIC IDENTIFY: CPT

## 2019-07-14 PROCEDURE — 96376 TX/PRO/DX INJ SAME DRUG ADON: CPT

## 2019-07-14 PROCEDURE — 6370000000 HC RX 637 (ALT 250 FOR IP): Performed by: PHYSICIAN ASSISTANT

## 2019-07-14 PROCEDURE — 6370000000 HC RX 637 (ALT 250 FOR IP): Performed by: NURSE PRACTITIONER

## 2019-07-14 PROCEDURE — 6360000002 HC RX W HCPCS: Performed by: PHYSICIAN ASSISTANT

## 2019-07-14 PROCEDURE — 2580000003 HC RX 258: Performed by: PHYSICIAN ASSISTANT

## 2019-07-14 PROCEDURE — 96367 TX/PROPH/DG ADDL SEQ IV INF: CPT

## 2019-07-14 PROCEDURE — 96365 THER/PROPH/DIAG IV INF INIT: CPT

## 2019-07-14 PROCEDURE — 87186 SC STD MICRODIL/AGAR DIL: CPT

## 2019-07-14 PROCEDURE — 99284 EMERGENCY DEPT VISIT MOD MDM: CPT

## 2019-07-14 PROCEDURE — 87150 DNA/RNA AMPLIFIED PROBE: CPT

## 2019-07-14 PROCEDURE — 2580000003 HC RX 258: Performed by: NURSE PRACTITIONER

## 2019-07-14 PROCEDURE — 96375 TX/PRO/DX INJ NEW DRUG ADDON: CPT

## 2019-07-14 PROCEDURE — 87040 BLOOD CULTURE FOR BACTERIA: CPT

## 2019-07-14 PROCEDURE — 73140 X-RAY EXAM OF FINGER(S): CPT

## 2019-07-14 RX ORDER — PREGABALIN 75 MG/1
75 CAPSULE ORAL 2 TIMES DAILY
Status: DISCONTINUED | OUTPATIENT
Start: 2019-07-14 | End: 2019-07-18 | Stop reason: HOSPADM

## 2019-07-14 RX ORDER — HYDROCODONE BITARTRATE AND ACETAMINOPHEN 7.5; 325 MG/1; MG/1
1 TABLET ORAL EVERY 6 HOURS PRN
COMMUNITY
End: 2019-10-17

## 2019-07-14 RX ORDER — SODIUM CHLORIDE 9 MG/ML
INJECTION, SOLUTION INTRAVENOUS CONTINUOUS
Status: DISCONTINUED | OUTPATIENT
Start: 2019-07-14 | End: 2019-07-15

## 2019-07-14 RX ORDER — MORPHINE SULFATE 4 MG/ML
4 INJECTION, SOLUTION INTRAMUSCULAR; INTRAVENOUS EVERY 30 MIN PRN
Status: DISCONTINUED | OUTPATIENT
Start: 2019-07-14 | End: 2019-07-15 | Stop reason: ALTCHOICE

## 2019-07-14 RX ORDER — SODIUM CHLORIDE 0.9 % (FLUSH) 0.9 %
10 SYRINGE (ML) INJECTION EVERY 12 HOURS SCHEDULED
Status: DISCONTINUED | OUTPATIENT
Start: 2019-07-14 | End: 2019-07-18 | Stop reason: HOSPADM

## 2019-07-14 RX ORDER — SODIUM CHLORIDE 0.9 % (FLUSH) 0.9 %
10 SYRINGE (ML) INJECTION PRN
Status: DISCONTINUED | OUTPATIENT
Start: 2019-07-14 | End: 2019-07-18 | Stop reason: HOSPADM

## 2019-07-14 RX ORDER — ONDANSETRON 4 MG/1
4 TABLET, ORALLY DISINTEGRATING ORAL EVERY 6 HOURS
Status: DISCONTINUED | OUTPATIENT
Start: 2019-07-14 | End: 2019-07-18 | Stop reason: HOSPADM

## 2019-07-14 RX ORDER — HYDROCODONE BITARTRATE AND ACETAMINOPHEN 5; 325 MG/1; MG/1
2 TABLET ORAL ONCE
Status: COMPLETED | OUTPATIENT
Start: 2019-07-14 | End: 2019-07-14

## 2019-07-14 RX ORDER — HYDROCODONE BITARTRATE AND ACETAMINOPHEN 7.5; 325 MG/1; MG/1
1 TABLET ORAL EVERY 6 HOURS PRN
Status: DISCONTINUED | OUTPATIENT
Start: 2019-07-14 | End: 2019-07-15

## 2019-07-14 RX ORDER — TRAZODONE HYDROCHLORIDE 50 MG/1
100 TABLET ORAL NIGHTLY PRN
Status: DISCONTINUED | OUTPATIENT
Start: 2019-07-14 | End: 2019-07-18 | Stop reason: HOSPADM

## 2019-07-14 RX ORDER — LACTOBACILLUS RHAMNOSUS GG 10B CELL
1 CAPSULE ORAL 2 TIMES DAILY
Status: DISCONTINUED | OUTPATIENT
Start: 2019-07-14 | End: 2019-07-18 | Stop reason: HOSPADM

## 2019-07-14 RX ORDER — ACETAMINOPHEN 325 MG/1
650 TABLET ORAL EVERY 4 HOURS PRN
Status: DISCONTINUED | OUTPATIENT
Start: 2019-07-14 | End: 2019-07-18 | Stop reason: HOSPADM

## 2019-07-14 RX ORDER — ONDANSETRON 2 MG/ML
4 INJECTION INTRAMUSCULAR; INTRAVENOUS EVERY 30 MIN PRN
Status: DISCONTINUED | OUTPATIENT
Start: 2019-07-14 | End: 2019-07-17

## 2019-07-14 RX ORDER — PREGABALIN 25 MG/1
100 CAPSULE ORAL ONCE
Status: COMPLETED | OUTPATIENT
Start: 2019-07-14 | End: 2019-07-14

## 2019-07-14 RX ORDER — ONDANSETRON 2 MG/ML
4 INJECTION INTRAMUSCULAR; INTRAVENOUS EVERY 6 HOURS PRN
Status: DISCONTINUED | OUTPATIENT
Start: 2019-07-14 | End: 2019-07-18 | Stop reason: HOSPADM

## 2019-07-14 RX ADMIN — INSULIN LISPRO 1 UNITS: 100 INJECTION, SOLUTION INTRAVENOUS; SUBCUTANEOUS at 21:35

## 2019-07-14 RX ADMIN — ONDANSETRON 4 MG: 4 TABLET, ORALLY DISINTEGRATING ORAL at 21:22

## 2019-07-14 RX ADMIN — PREGABALIN 75 MG: 75 CAPSULE ORAL at 21:22

## 2019-07-14 RX ADMIN — SODIUM CHLORIDE: 9 INJECTION, SOLUTION INTRAVENOUS at 21:23

## 2019-07-14 RX ADMIN — MORPHINE SULFATE 4 MG: 4 INJECTION, SOLUTION INTRAMUSCULAR; INTRAVENOUS at 17:02

## 2019-07-14 RX ADMIN — SERTRALINE HYDROCHLORIDE 50 MG: 50 TABLET ORAL at 21:22

## 2019-07-14 RX ADMIN — Medication 1 CAPSULE: at 21:23

## 2019-07-14 RX ADMIN — PREGABALIN 100 MG: 25 CAPSULE ORAL at 14:55

## 2019-07-14 RX ADMIN — VANCOMYCIN HYDROCHLORIDE 2000 MG: 1 INJECTION, POWDER, LYOPHILIZED, FOR SOLUTION INTRAVENOUS at 15:39

## 2019-07-14 RX ADMIN — HYDROCODONE BITARTRATE AND ACETAMINOPHEN 2 TABLET: 5; 325 TABLET ORAL at 13:13

## 2019-07-14 RX ADMIN — TAZOBACTAM SODIUM AND PIPERACILLIN SODIUM 4.5 G: 500; 4 INJECTION, SOLUTION INTRAVENOUS at 15:01

## 2019-07-14 RX ADMIN — SODIUM CHLORIDE, PRESERVATIVE FREE 10 ML: 5 INJECTION INTRAVENOUS at 21:25

## 2019-07-14 RX ADMIN — PIPERACILLIN SODIUM,TAZOBACTAM SODIUM 3.38 G: 3; .375 INJECTION, POWDER, FOR SOLUTION INTRAVENOUS at 23:26

## 2019-07-14 RX ADMIN — HYDROCODONE BITARTRATE AND ACETAMINOPHEN 1 TABLET: 7.5; 325 TABLET ORAL at 21:23

## 2019-07-14 RX ADMIN — MORPHINE SULFATE 4 MG: 4 INJECTION, SOLUTION INTRAMUSCULAR; INTRAVENOUS at 14:54

## 2019-07-14 RX ADMIN — ONDANSETRON 4 MG: 2 INJECTION INTRAMUSCULAR; INTRAVENOUS at 17:02

## 2019-07-14 ASSESSMENT — PAIN DESCRIPTION - PAIN TYPE
TYPE: CHRONIC PAIN

## 2019-07-14 ASSESSMENT — PAIN DESCRIPTION - LOCATION
LOCATION: FOOT;FINGER (COMMENT WHICH ONE)

## 2019-07-14 ASSESSMENT — PAIN DESCRIPTION - PROGRESSION
CLINICAL_PROGRESSION: NOT CHANGED

## 2019-07-14 ASSESSMENT — PAIN DESCRIPTION - FREQUENCY
FREQUENCY: CONTINUOUS

## 2019-07-14 ASSESSMENT — PAIN SCALES - GENERAL
PAINLEVEL_OUTOF10: 8
PAINLEVEL_OUTOF10: 4
PAINLEVEL_OUTOF10: 10
PAINLEVEL_OUTOF10: 8
PAINLEVEL_OUTOF10: 9

## 2019-07-14 ASSESSMENT — PAIN - FUNCTIONAL ASSESSMENT
PAIN_FUNCTIONAL_ASSESSMENT: PREVENTS OR INTERFERES SOME ACTIVE ACTIVITIES AND ADLS
PAIN_FUNCTIONAL_ASSESSMENT: PREVENTS OR INTERFERES SOME ACTIVE ACTIVITIES AND ADLS

## 2019-07-14 ASSESSMENT — PAIN DESCRIPTION - ORIENTATION
ORIENTATION: LEFT

## 2019-07-14 ASSESSMENT — PAIN DESCRIPTION - DESCRIPTORS
DESCRIPTORS: SHARP;STABBING;PRESSURE
DESCRIPTORS: SORE;SHARP;TENDER
DESCRIPTORS: ACHING;SORE;TENDER
DESCRIPTORS: ACHING;SORE;THROBBING
DESCRIPTORS: ACHING;SORE;TENDER;THROBBING

## 2019-07-14 ASSESSMENT — PAIN DESCRIPTION - ONSET
ONSET: ON-GOING
ONSET: ON-GOING

## 2019-07-14 NOTE — ED PROVIDER NOTES
6/14/19  Yes Walter Avina MD   ertapenem [facility]) infusion Infuse 1,000 mg intravenously daily Compound per protocol. 6/14/19  Yes Walter Avina MD   pregabalin (LYRICA) 100 MG capsule Take 1 capsule by mouth 2 times daily for 30 days. 6/14/19 7/14/19 Yes Walter Avina MD   sertraline (ZOLOFT) 50 MG tablet Take 50 mg by mouth daily   Yes Historical Provider, MD   ondansetron (ZOFRAN ODT) 4 MG disintegrating tablet Take 1 tablet by mouth every 6 hours 11/2/18  Yes Wilmer Putnam PA-C   metFORMIN (GLUCOPHAGE) 1000 MG tablet Take 1 tablet by mouth 2 times daily (with meals) 6/5/18  Yes Walter Avina MD   tiZANidine (ZANAFLEX) 4 MG tablet Take 4 mg by mouth nightly as needed   Yes Historical Provider, MD   traZODone (DESYREL) 100 MG tablet Take 100 mg by mouth nightly as needed    Yes Historical Provider, MD   Dulaglutide (TRULICITY) 4.86 BQ/0.9KL SOPN Inject 0.75 mg into the skin once a week    Historical Provider, MD       Social history:  reports that he has been smoking. His smokeless tobacco use includes snuff. He reports that he has current or past drug history. Drug: Marijuana. He reports that he does not drink alcohol. Family history:    Family History   Problem Relation Age of Onset    Kidney Disease Mother         \"Kidney Failure, On Dialysis\"    Early Death Mother 39    Diabetes Father         \"Type II\"         Exam  BP (!) 108/92   Pulse 86   Temp 98.2 °F (36.8 °C) (Oral)   Resp 17   Ht 5' 11\" (1.803 m)   Wt 260 lb (117.9 kg)   SpO2 96%   BMI 36.26 kg/m²   Nursing note and vitals reviewed. Constitutional: Well developed, well nourished. No acute distress. HENT:      Head: Normocephalic and atraumatic. Ears: External ears normal.      Nose: Nose normal.     Mouth: Membrane mucosa moist and pink. No posterior oropharynx erythema or tonsillar edema  Eyes: Anicteric sclera. No discharge, PERRL  Neck: Supple. Trachea midline.    Cardiovascular: RRR, no murmurs, rubs, or gallops, radial

## 2019-07-14 NOTE — H&P
changes in bowel habit, melena or rectal bleeding  MUSCULOSKELETAL:  Denies any joint swelling,Left plantar foot pain,, or loss of range of motion. NEURO:  Denies any headaches, tremors, dizziness, vertigo, memory loss, confusion, weakness, numbness or tingling. PSYCH:  Denies any sleeping problems, history of abuse, marital discord. HEME/LYMPHATIC/IMMUNO:  Denies , bruising, bleeding abnormalities   ENDO:  Denies any heat or cold intolerance, panemiaolyuria or polydipsia. SKIN: Left thumb swelling with purulent drainage. Objective:   No intake or output data in the 24 hours ending 07/14/19 1834   Vitals:   Vitals:    07/14/19 1616   BP: (!) 108/92   Pulse: 86   Resp: 17   Temp: 98.2 °F (36.8 °C)   SpO2: 96%     Physical Exam:   Gen:  awake, alert, cooperative, no apparent distress  EYES:Lids and lashes normal, pupils equal, round ,extra ocular muscles intact, sclera clear, conjunctiva normal  ENT:  Normocephalic, oral pharynx with moist mucus membranes  NECK:  Supple, symmetrical, trachea midline, no adenopathy,  LUNGS:  Clear to auscultate bilaterally, no rales ronchi or wheezing noted. CARDIOVASCULAR:  regular rate and rhythm, normal S1 and S2,no murmur noted, peripheral pulses 2+, no pitting edema  ABDOMEN: Normal BS, Non tender, non distended, no HSM noted. Obese  MUSCULOSKELETAL:  ROM of all extremities grossly wnl  NEUROLOGIC: AOx 3,  Cranial nerves II-XII are grossly intact. Motor is 5 out of 5 bilaterally. Sensory is intact, no lateralizing findings. SKIN: Left thumb mild erythema and edema over the distal area with ulcer with scant purulent drainage. Multiple left plantar chronic ulceration with no erythema,necrosis, or drainage noted.        Past Medical History:      Past Medical History:   Diagnosis Date    Anesthesia     \"Problems coming out of the anesthesia\"    Anxiety     Arthritis     Asthma     \"As a kid but outgrew this\"    Chronic back pain     Depression     Dizziness    

## 2019-07-14 NOTE — CARE COORDINATION
CM review of pt chart for readmission risk. Pt last admission 6/10-14/19, D/C  with [address] [place] [address] Rd for 14/day/IV/ATB. ER visit with D/C to  6/25/19. PT returns to ER today for C/O chronic cellulitis to hands and left foot, pt sees O/P wound care with Dr Dulce Hughes, last visit 7/10/19, D/C orders state pt to return in one week for next visit. X-ray showed evidence of osteomyelitis, Spoke with Germán Vieyra and Dr Rosalva Agosto, consult to Dr Dluce Hughes, pt will be transferred to [facility] for more advanced treatment of Osteomyelitis to finger.    JENNI,RN/CM

## 2019-07-15 LAB
ANION GAP SERPL CALCULATED.3IONS-SCNC: 10 MMOL/L (ref 4–16)
BUN BLDV-MCNC: 8 MG/DL (ref 6–23)
CALCIUM SERPL-MCNC: 8.5 MG/DL (ref 8.3–10.6)
CHLORIDE BLD-SCNC: 97 MMOL/L (ref 99–110)
CO2: 27 MMOL/L (ref 21–32)
CREAT SERPL-MCNC: 0.7 MG/DL (ref 0.9–1.3)
ERYTHROCYTE SEDIMENTATION RATE: 108 MM/HR (ref 0–20)
GFR AFRICAN AMERICAN: >60 ML/MIN/1.73M2
GFR NON-AFRICAN AMERICAN: >60 ML/MIN/1.73M2
GLUCOSE BLD-MCNC: 184 MG/DL (ref 70–99)
GLUCOSE BLD-MCNC: 215 MG/DL (ref 70–99)
GLUCOSE BLD-MCNC: 218 MG/DL (ref 70–99)
GLUCOSE BLD-MCNC: 224 MG/DL (ref 70–99)
GLUCOSE BLD-MCNC: 272 MG/DL (ref 70–99)
HCT VFR BLD CALC: 34.5 % (ref 42–52)
HEMOGLOBIN: 10.3 GM/DL (ref 13.5–18)
HIGH SENSITIVE C-REACTIVE PROTEIN: 203.1 MG/L
INR BLD: 1.22 INDEX
LV EF: 53 %
LVEF MODALITY: NORMAL
MCH RBC QN AUTO: 26.3 PG (ref 27–31)
MCHC RBC AUTO-ENTMCNC: 29.9 % (ref 32–36)
MCV RBC AUTO: 88.2 FL (ref 78–100)
PDW BLD-RTO: 14.6 % (ref 11.7–14.9)
PLATELET # BLD: 266 K/CU MM (ref 140–440)
PMV BLD AUTO: 9.6 FL (ref 7.5–11.1)
POTASSIUM SERPL-SCNC: 4.4 MMOL/L (ref 3.5–5.1)
PROCALCITONIN: 0.14
PROTHROMBIN TIME: 13.9 SECONDS (ref 9.12–12.5)
RBC # BLD: 3.91 M/CU MM (ref 4.6–6.2)
SODIUM BLD-SCNC: 134 MMOL/L (ref 135–145)
WBC # BLD: 6.8 K/CU MM (ref 4–10.5)

## 2019-07-15 PROCEDURE — 6370000000 HC RX 637 (ALT 250 FOR IP): Performed by: NURSE PRACTITIONER

## 2019-07-15 PROCEDURE — 87071 CULTURE AEROBIC QUANT OTHER: CPT

## 2019-07-15 PROCEDURE — 2580000003 HC RX 258: Performed by: NURSE PRACTITIONER

## 2019-07-15 PROCEDURE — 6360000002 HC RX W HCPCS: Performed by: INTERNAL MEDICINE

## 2019-07-15 PROCEDURE — 6360000002 HC RX W HCPCS: Performed by: NURSE PRACTITIONER

## 2019-07-15 PROCEDURE — 1200000000 HC SEMI PRIVATE

## 2019-07-15 PROCEDURE — 6360000002 HC RX W HCPCS: Performed by: HOSPITALIST

## 2019-07-15 PROCEDURE — 86141 C-REACTIVE PROTEIN HS: CPT

## 2019-07-15 PROCEDURE — 6370000000 HC RX 637 (ALT 250 FOR IP): Performed by: SURGERY

## 2019-07-15 PROCEDURE — 6360000002 HC RX W HCPCS: Performed by: PHYSICIAN ASSISTANT

## 2019-07-15 PROCEDURE — 85027 COMPLETE CBC AUTOMATED: CPT

## 2019-07-15 PROCEDURE — 87077 CULTURE AEROBIC IDENTIFY: CPT

## 2019-07-15 PROCEDURE — 82962 GLUCOSE BLOOD TEST: CPT

## 2019-07-15 PROCEDURE — 85652 RBC SED RATE AUTOMATED: CPT

## 2019-07-15 PROCEDURE — 94761 N-INVAS EAR/PLS OXIMETRY MLT: CPT

## 2019-07-15 PROCEDURE — 2580000003 HC RX 258: Performed by: INTERNAL MEDICINE

## 2019-07-15 PROCEDURE — 84145 PROCALCITONIN (PCT): CPT

## 2019-07-15 PROCEDURE — 80048 BASIC METABOLIC PNL TOTAL CA: CPT

## 2019-07-15 PROCEDURE — 93306 TTE W/DOPPLER COMPLETE: CPT

## 2019-07-15 PROCEDURE — 2580000003 HC RX 258: Performed by: HOSPITALIST

## 2019-07-15 PROCEDURE — 87186 SC STD MICRODIL/AGAR DIL: CPT

## 2019-07-15 PROCEDURE — 87073 CULTURE BACTERIA ANAEROBIC: CPT

## 2019-07-15 PROCEDURE — 85610 PROTHROMBIN TIME: CPT

## 2019-07-15 PROCEDURE — 99222 1ST HOSP IP/OBS MODERATE 55: CPT | Performed by: INTERNAL MEDICINE

## 2019-07-15 RX ORDER — OXYCODONE HYDROCHLORIDE AND ACETAMINOPHEN 5; 325 MG/1; MG/1
2 TABLET ORAL EVERY 4 HOURS PRN
Status: DISCONTINUED | OUTPATIENT
Start: 2019-07-15 | End: 2019-07-18 | Stop reason: HOSPADM

## 2019-07-15 RX ORDER — MORPHINE SULFATE 4 MG/ML
4 INJECTION, SOLUTION INTRAMUSCULAR; INTRAVENOUS
Status: COMPLETED | OUTPATIENT
Start: 2019-07-15 | End: 2019-07-15

## 2019-07-15 RX ORDER — NICOTINE POLACRILEX 4 MG
15 LOZENGE BUCCAL PRN
Status: DISCONTINUED | OUTPATIENT
Start: 2019-07-15 | End: 2019-07-18 | Stop reason: HOSPADM

## 2019-07-15 RX ORDER — DEXTROSE MONOHYDRATE 50 MG/ML
100 INJECTION, SOLUTION INTRAVENOUS PRN
Status: DISCONTINUED | OUTPATIENT
Start: 2019-07-15 | End: 2019-07-18 | Stop reason: HOSPADM

## 2019-07-15 RX ORDER — OXYCODONE HYDROCHLORIDE AND ACETAMINOPHEN 5; 325 MG/1; MG/1
1 TABLET ORAL EVERY 4 HOURS PRN
Status: DISCONTINUED | OUTPATIENT
Start: 2019-07-15 | End: 2019-07-18 | Stop reason: HOSPADM

## 2019-07-15 RX ORDER — DEXTROSE MONOHYDRATE 25 G/50ML
12.5 INJECTION, SOLUTION INTRAVENOUS PRN
Status: DISCONTINUED | OUTPATIENT
Start: 2019-07-15 | End: 2019-07-18 | Stop reason: HOSPADM

## 2019-07-15 RX ADMIN — INSULIN LISPRO 4 UNITS: 100 INJECTION, SOLUTION INTRAVENOUS; SUBCUTANEOUS at 09:25

## 2019-07-15 RX ADMIN — HYDROCODONE BITARTRATE AND ACETAMINOPHEN 1 TABLET: 7.5; 325 TABLET ORAL at 04:35

## 2019-07-15 RX ADMIN — MEROPENEM 2 G: 1 INJECTION, POWDER, FOR SOLUTION INTRAVENOUS at 21:16

## 2019-07-15 RX ADMIN — Medication 1 CAPSULE: at 11:28

## 2019-07-15 RX ADMIN — MEROPENEM 2 G: 1 INJECTION, POWDER, FOR SOLUTION INTRAVENOUS at 17:06

## 2019-07-15 RX ADMIN — ONDANSETRON 4 MG: 2 INJECTION INTRAMUSCULAR; INTRAVENOUS at 09:48

## 2019-07-15 RX ADMIN — VANCOMYCIN HYDROCHLORIDE 1750 MG: 5 INJECTION, POWDER, LYOPHILIZED, FOR SOLUTION INTRAVENOUS at 17:03

## 2019-07-15 RX ADMIN — VANCOMYCIN HYDROCHLORIDE 1750 MG: 5 INJECTION, POWDER, LYOPHILIZED, FOR SOLUTION INTRAVENOUS at 04:08

## 2019-07-15 RX ADMIN — SODIUM CHLORIDE: 9 INJECTION, SOLUTION INTRAVENOUS at 11:30

## 2019-07-15 RX ADMIN — TRAZODONE HYDROCHLORIDE 100 MG: 50 TABLET ORAL at 00:09

## 2019-07-15 RX ADMIN — PREGABALIN 75 MG: 75 CAPSULE ORAL at 21:15

## 2019-07-15 RX ADMIN — SERTRALINE HYDROCHLORIDE 50 MG: 50 TABLET ORAL at 11:29

## 2019-07-15 RX ADMIN — MORPHINE SULFATE 4 MG: 4 INJECTION INTRAVENOUS at 00:48

## 2019-07-15 RX ADMIN — OXYCODONE HYDROCHLORIDE AND ACETAMINOPHEN 2 TABLET: 5; 325 TABLET ORAL at 21:27

## 2019-07-15 RX ADMIN — ENOXAPARIN SODIUM 40 MG: 40 INJECTION SUBCUTANEOUS at 11:30

## 2019-07-15 RX ADMIN — ONDANSETRON 4 MG: 4 TABLET, ORALLY DISINTEGRATING ORAL at 14:27

## 2019-07-15 RX ADMIN — OXYCODONE HYDROCHLORIDE AND ACETAMINOPHEN 2 TABLET: 5; 325 TABLET ORAL at 11:27

## 2019-07-15 RX ADMIN — INSULIN LISPRO 4 UNITS: 100 INJECTION, SOLUTION INTRAVENOUS; SUBCUTANEOUS at 17:54

## 2019-07-15 RX ADMIN — PREGABALIN 75 MG: 75 CAPSULE ORAL at 11:29

## 2019-07-15 RX ADMIN — PIPERACILLIN SODIUM,TAZOBACTAM SODIUM 3.38 G: 3; .375 INJECTION, POWDER, FOR SOLUTION INTRAVENOUS at 06:36

## 2019-07-15 RX ADMIN — OXYCODONE HYDROCHLORIDE AND ACETAMINOPHEN 2 TABLET: 5; 325 TABLET ORAL at 15:37

## 2019-07-15 RX ADMIN — INSULIN LISPRO 6 UNITS: 100 INJECTION, SOLUTION INTRAVENOUS; SUBCUTANEOUS at 11:34

## 2019-07-15 RX ADMIN — INSULIN LISPRO 2 UNITS: 100 INJECTION, SOLUTION INTRAVENOUS; SUBCUTANEOUS at 21:15

## 2019-07-15 RX ADMIN — Medication 1 CAPSULE: at 21:15

## 2019-07-15 ASSESSMENT — PAIN DESCRIPTION - PROGRESSION
CLINICAL_PROGRESSION: NOT CHANGED

## 2019-07-15 ASSESSMENT — PAIN SCALES - GENERAL
PAINLEVEL_OUTOF10: 8
PAINLEVEL_OUTOF10: 6
PAINLEVEL_OUTOF10: 9
PAINLEVEL_OUTOF10: 7
PAINLEVEL_OUTOF10: 9
PAINLEVEL_OUTOF10: 8
PAINLEVEL_OUTOF10: 10
PAINLEVEL_OUTOF10: 10

## 2019-07-15 ASSESSMENT — PAIN DESCRIPTION - LOCATION
LOCATION: FINGER (COMMENT WHICH ONE);FOOT
LOCATION: FOOT;FINGER (COMMENT WHICH ONE)

## 2019-07-15 ASSESSMENT — PAIN DESCRIPTION - ONSET: ONSET: ON-GOING

## 2019-07-15 ASSESSMENT — PAIN DESCRIPTION - PAIN TYPE
TYPE: ACUTE PAIN
TYPE: ACUTE PAIN

## 2019-07-15 ASSESSMENT — PAIN - FUNCTIONAL ASSESSMENT: PAIN_FUNCTIONAL_ASSESSMENT: PREVENTS OR INTERFERES SOME ACTIVE ACTIVITIES AND ADLS

## 2019-07-15 ASSESSMENT — PAIN DESCRIPTION - FREQUENCY: FREQUENCY: CONTINUOUS

## 2019-07-15 ASSESSMENT — PAIN DESCRIPTION - ORIENTATION
ORIENTATION: LEFT
ORIENTATION: LEFT

## 2019-07-15 ASSESSMENT — PAIN DESCRIPTION - DESCRIPTORS: DESCRIPTORS: ACHING;SORE

## 2019-07-15 NOTE — CONSULTS
 Hernia, abdominal     Hx MRSA infection     positive culture 8/2014 from left foot    Migraine     Nausea & vomiting     Neuropathy     Obesity, Class III, BMI 40-49.9 (morbid obesity) ([address]     Osteomyelitis (HCC)     hx finger    Pneumonia 1995    Poor circulation     Restless leg syndrome     Shortness of breath on exertion     6/14/2016- pt denies any sob with this interview    Type II or unspecified type diabetes mellitus with other specified manifestations, not stated as uncontrolled 4/8/2014    Type II or unspecified type diabetes mellitus without mention of complication, not stated as uncontrolled DX 2007    Ulcer of other part of foot 4/8/2014    'ulcer on left foot healed all up\"      Past Surgical History:   Procedure Laterality Date    COLONOSCOPY  1990's    DEBRIDEMENT  8/25/2014    left foot    ENDOSCOPY, COLON, DIAGNOSTIC  06-    FINGER SURGERY  9-11-11    Right Index Finger    FINGER SURGERY  01-16-12    RIght Index Finger-Removal of loose body, Reconstruction of Mallet Finger    FOOT DEBRIDEMENT Left 6/10/2019    FOOT DEBRIDEMENT INCISION AND DRAINAGE performed by Shaggy Wiseman MD at [address] [place] [address] Right 06/01/2018    I&D right foot    HEMORRHOID SURGERY  2008    OTHER SURGICAL HISTORY Left 10/22/2013    I & D left foot    OTHER SURGICAL HISTORY  07/07/2017    I&D fingers X2 left hand.  I&D left forearm    OTHER SURGICAL HISTORY Left 07/08/2017    left hand debridement, left forearm incision and drainage     OTHER SURGICAL HISTORY Left 07/10/2017    Fingers I&D    OTHER SURGICAL HISTORY Left 07/14/2017    middle finger amputation revision    ROTATOR CUFF REPAIR  Last Done 7/18/2011    Left, Done Four Times      Family History   Problem Relation Age of Onset    Kidney Disease Mother         \"Kidney Failure, On Dialysis\"    Early Death Mother 39    Diabetes Father         \"Type II\"      Infectious disease related family history - not

## 2019-07-15 NOTE — PLAN OF CARE
Problem: Pain:  Goal: Pain level will decrease  Description  Pain level will decrease  Outcome: Ongoing  Goal: Control of acute pain  Description  Control of acute pain  Outcome: Ongoing  Goal: Control of chronic pain  Description  Control of chronic pain  Outcome: Ongoing     Problem: Falls - Risk of:  Goal: Will remain free from falls  Description  Will remain free from falls  Outcome: Ongoing  Goal: Absence of physical injury  Description  Absence of physical injury  Outcome: Ongoing     Problem: Infection:  Goal: Will remain free from infection  Description  Will remain free from infection  Outcome: Ongoing     Problem: Safety:  Goal: Free from accidental physical injury  Description  Free from accidental physical injury  Outcome: Ongoing  Goal: Free from intentional harm  Description  Free from intentional harm  Outcome: Ongoing     Problem: Daily Care:  Goal: Daily care needs are met  Description  Daily care needs are met  Outcome: Ongoing     Problem: Skin Integrity:  Goal: Skin integrity will stabilize  Description  Skin integrity will stabilize  Outcome: Ongoing     Problem: Discharge Planning:  Goal: Patients continuum of care needs are met  Description  Patients continuum of care needs are met  Outcome: Ongoing

## 2019-07-15 NOTE — PROGRESS NOTES
PHARMACY TO DOSE VANCOMYCIN PER DR. MAYNARD    INFECTION BEING TREATED = Osteomyelitis of left thumb/Diabetic ulcer  GOAL TROUGH = 15-20 mcg/ml  CULTURES = wound, blood -- both pending  OTHER ABT'S = zosyn    AGE = 46 y.o.     SEX = male  HEIGHT = 5' 11\" (1.803 m)  Wt Readings from Last 3 Encounters:   07/14/19 271 lb 9.6 oz (123.2 kg)   06/24/19 260 lb (117.9 kg)   06/10/19 260 lb (117.9 kg)     Estimated Creatinine Clearance: 188 mL/min (A) (based on SCr of 0.6 mg/dL (L)). Recent Labs     07/14/19  1448   WBC 8.1   BUN 10   CREATININE 0.6*   LACTATE 1.0     DOSING PLAN COMMENTS:  - Patient received a loading dose of vancomycin 2000 mg X1 in the ED.  - Plan to start a scheduled regimen of vancomycin 1750 mg q12h.     VANCO TROUGH SCHEDULED FOR 07/16/2019 @0330    Aleshia Hall [place]  [facility]  7/15/2019   3:32 AM  _______________________________________

## 2019-07-15 NOTE — FLOWSHEET NOTE
Dr Teagan Hussein notified regarding patient surgery. Stated patient could eat today and also changed his pain medication. Stated that he probably would not do surgery today.

## 2019-07-16 ENCOUNTER — APPOINTMENT (OUTPATIENT)
Dept: ULTRASOUND IMAGING | Age: 54
DRG: 638 | End: 2019-07-16
Payer: MEDICARE

## 2019-07-16 LAB
BASOPHILS ABSOLUTE: 0.1 K/CU MM
BASOPHILS RELATIVE PERCENT: 0.8 % (ref 0–1)
DIFFERENTIAL TYPE: ABNORMAL
DOSE AMOUNT: ABNORMAL
DOSE TIME: ABNORMAL
EOSINOPHILS ABSOLUTE: 0.2 K/CU MM
EOSINOPHILS RELATIVE PERCENT: 3.8 % (ref 0–3)
GLUCOSE BLD-MCNC: 147 MG/DL (ref 70–99)
GLUCOSE BLD-MCNC: 162 MG/DL (ref 70–99)
GLUCOSE BLD-MCNC: 163 MG/DL (ref 70–99)
GLUCOSE BLD-MCNC: 319 MG/DL (ref 70–99)
HCT VFR BLD CALC: 39.4 % (ref 42–52)
HEMOGLOBIN: 11.7 GM/DL (ref 13.5–18)
IMMATURE NEUTROPHIL %: 0.8 % (ref 0–0.43)
LV EF: 55 %
LVEF MODALITY: NORMAL
LYMPHOCYTES ABSOLUTE: 1.3 K/CU MM
LYMPHOCYTES RELATIVE PERCENT: 20.6 % (ref 24–44)
MCH RBC QN AUTO: 26.7 PG (ref 27–31)
MCHC RBC AUTO-ENTMCNC: 29.7 % (ref 32–36)
MCV RBC AUTO: 89.7 FL (ref 78–100)
MONOCYTES ABSOLUTE: 0.5 K/CU MM
MONOCYTES RELATIVE PERCENT: 7.7 % (ref 0–4)
NUCLEATED RBC %: 0 %
PDW BLD-RTO: 14.4 % (ref 11.7–14.9)
PLATELET # BLD: 276 K/CU MM (ref 140–440)
PMV BLD AUTO: 9.4 FL (ref 7.5–11.1)
RBC # BLD: 4.39 M/CU MM (ref 4.6–6.2)
REASON FOR REJECTION: NORMAL
REASON FOR REJECTION: NORMAL
REJECTED TEST: NORMAL
SEGMENTED NEUTROPHILS ABSOLUTE COUNT: 4.1 K/CU MM
SEGMENTED NEUTROPHILS RELATIVE PERCENT: 66.3 % (ref 36–66)
TOTAL IMMATURE NEUTOROPHIL: 0.05 K/CU MM
TOTAL NUCLEATED RBC: 0 K/CU MM
VANCOMYCIN TROUGH: 9.8 UG/ML (ref 10–20)
WBC # BLD: 6.1 K/CU MM (ref 4–10.5)

## 2019-07-16 PROCEDURE — 87040 BLOOD CULTURE FOR BACTERIA: CPT

## 2019-07-16 PROCEDURE — 99232 SBSQ HOSP IP/OBS MODERATE 35: CPT | Performed by: INTERNAL MEDICINE

## 2019-07-16 PROCEDURE — 85025 COMPLETE CBC W/AUTO DIFF WBC: CPT

## 2019-07-16 PROCEDURE — 1200000000 HC SEMI PRIVATE

## 2019-07-16 PROCEDURE — 36415 COLL VENOUS BLD VENIPUNCTURE: CPT

## 2019-07-16 PROCEDURE — 6370000000 HC RX 637 (ALT 250 FOR IP): Performed by: NURSE PRACTITIONER

## 2019-07-16 PROCEDURE — 7100000001 HC PACU RECOVERY - ADDTL 15 MIN

## 2019-07-16 PROCEDURE — 6360000002 HC RX W HCPCS: Performed by: HOSPITALIST

## 2019-07-16 PROCEDURE — 6370000000 HC RX 637 (ALT 250 FOR IP): Performed by: SURGERY

## 2019-07-16 PROCEDURE — 6360000002 HC RX W HCPCS: Performed by: INTERNAL MEDICINE

## 2019-07-16 PROCEDURE — 76705 ECHO EXAM OF ABDOMEN: CPT

## 2019-07-16 PROCEDURE — 97162 PT EVAL MOD COMPLEX 30 MIN: CPT

## 2019-07-16 PROCEDURE — 93312 ECHO TRANSESOPHAGEAL: CPT

## 2019-07-16 PROCEDURE — 99221 1ST HOSP IP/OBS SF/LOW 40: CPT | Performed by: INTERNAL MEDICINE

## 2019-07-16 PROCEDURE — 7100000000 HC PACU RECOVERY - FIRST 15 MIN

## 2019-07-16 PROCEDURE — 87077 CULTURE AEROBIC IDENTIFY: CPT

## 2019-07-16 PROCEDURE — 97165 OT EVAL LOW COMPLEX 30 MIN: CPT

## 2019-07-16 PROCEDURE — 87071 CULTURE AEROBIC QUANT OTHER: CPT

## 2019-07-16 PROCEDURE — 87073 CULTURE BACTERIA ANAEROBIC: CPT

## 2019-07-16 PROCEDURE — 93312 ECHO TRANSESOPHAGEAL: CPT | Performed by: INTERNAL MEDICINE

## 2019-07-16 PROCEDURE — 2580000003 HC RX 258: Performed by: HOSPITALIST

## 2019-07-16 PROCEDURE — 94761 N-INVAS EAR/PLS OXIMETRY MLT: CPT

## 2019-07-16 PROCEDURE — 2580000003 HC RX 258: Performed by: NURSE PRACTITIONER

## 2019-07-16 PROCEDURE — 87186 SC STD MICRODIL/AGAR DIL: CPT

## 2019-07-16 PROCEDURE — 80202 ASSAY OF VANCOMYCIN: CPT

## 2019-07-16 PROCEDURE — 2580000003 HC RX 258: Performed by: INTERNAL MEDICINE

## 2019-07-16 PROCEDURE — 82962 GLUCOSE BLOOD TEST: CPT

## 2019-07-16 PROCEDURE — B246ZZ4 ULTRASONOGRAPHY OF RIGHT AND LEFT HEART, TRANSESOPHAGEAL: ICD-10-PCS | Performed by: INTERNAL MEDICINE

## 2019-07-16 RX ADMIN — MEROPENEM 2 G: 1 INJECTION, POWDER, FOR SOLUTION INTRAVENOUS at 13:55

## 2019-07-16 RX ADMIN — ONDANSETRON 4 MG: 4 TABLET, ORALLY DISINTEGRATING ORAL at 10:20

## 2019-07-16 RX ADMIN — SERTRALINE HYDROCHLORIDE 50 MG: 50 TABLET ORAL at 10:20

## 2019-07-16 RX ADMIN — MEROPENEM 2 G: 1 INJECTION, POWDER, FOR SOLUTION INTRAVENOUS at 20:52

## 2019-07-16 RX ADMIN — VANCOMYCIN HYDROCHLORIDE 1750 MG: 5 INJECTION, POWDER, LYOPHILIZED, FOR SOLUTION INTRAVENOUS at 04:17

## 2019-07-16 RX ADMIN — ONDANSETRON 4 MG: 4 TABLET, ORALLY DISINTEGRATING ORAL at 15:43

## 2019-07-16 RX ADMIN — VANCOMYCIN HYDROCHLORIDE 1500 MG: 5 INJECTION, POWDER, LYOPHILIZED, FOR SOLUTION INTRAVENOUS at 15:44

## 2019-07-16 RX ADMIN — SODIUM CHLORIDE, PRESERVATIVE FREE 10 ML: 5 INJECTION INTRAVENOUS at 10:30

## 2019-07-16 RX ADMIN — OXYCODONE HYDROCHLORIDE AND ACETAMINOPHEN 2 TABLET: 5; 325 TABLET ORAL at 02:02

## 2019-07-16 RX ADMIN — OXYCODONE HYDROCHLORIDE AND ACETAMINOPHEN 2 TABLET: 5; 325 TABLET ORAL at 07:14

## 2019-07-16 RX ADMIN — Medication 1 CAPSULE: at 20:52

## 2019-07-16 RX ADMIN — OXYCODONE HYDROCHLORIDE AND ACETAMINOPHEN 2 TABLET: 5; 325 TABLET ORAL at 22:07

## 2019-07-16 RX ADMIN — PREGABALIN 75 MG: 75 CAPSULE ORAL at 20:52

## 2019-07-16 RX ADMIN — SODIUM CHLORIDE, PRESERVATIVE FREE 10 ML: 5 INJECTION INTRAVENOUS at 20:57

## 2019-07-16 RX ADMIN — MEROPENEM 2 G: 1 INJECTION, POWDER, FOR SOLUTION INTRAVENOUS at 04:17

## 2019-07-16 RX ADMIN — OXYCODONE HYDROCHLORIDE AND ACETAMINOPHEN 2 TABLET: 5; 325 TABLET ORAL at 13:03

## 2019-07-16 RX ADMIN — Medication 1 CAPSULE: at 10:20

## 2019-07-16 RX ADMIN — ONDANSETRON 4 MG: 4 TABLET, ORALLY DISINTEGRATING ORAL at 20:52

## 2019-07-16 RX ADMIN — INSULIN LISPRO 1 UNITS: 100 INJECTION, SOLUTION INTRAVENOUS; SUBCUTANEOUS at 20:58

## 2019-07-16 RX ADMIN — PREGABALIN 75 MG: 75 CAPSULE ORAL at 10:19

## 2019-07-16 RX ADMIN — INSULIN LISPRO 8 UNITS: 100 INJECTION, SOLUTION INTRAVENOUS; SUBCUTANEOUS at 18:13

## 2019-07-16 RX ADMIN — OXYCODONE HYDROCHLORIDE AND ACETAMINOPHEN 2 TABLET: 5; 325 TABLET ORAL at 18:05

## 2019-07-16 ASSESSMENT — PAIN - FUNCTIONAL ASSESSMENT
PAIN_FUNCTIONAL_ASSESSMENT: ACTIVITIES ARE NOT PREVENTED
PAIN_FUNCTIONAL_ASSESSMENT: ACTIVITIES ARE NOT PREVENTED

## 2019-07-16 ASSESSMENT — PAIN DESCRIPTION - ONSET
ONSET: ON-GOING
ONSET: ON-GOING

## 2019-07-16 ASSESSMENT — PAIN DESCRIPTION - FREQUENCY
FREQUENCY: CONTINUOUS
FREQUENCY: CONTINUOUS

## 2019-07-16 ASSESSMENT — PAIN DESCRIPTION - PROGRESSION
CLINICAL_PROGRESSION: GRADUALLY WORSENING
CLINICAL_PROGRESSION: GRADUALLY WORSENING

## 2019-07-16 ASSESSMENT — PAIN SCALES - GENERAL
PAINLEVEL_OUTOF10: 10
PAINLEVEL_OUTOF10: 2
PAINLEVEL_OUTOF10: 9
PAINLEVEL_OUTOF10: 10
PAINLEVEL_OUTOF10: 2
PAINLEVEL_OUTOF10: 10

## 2019-07-16 ASSESSMENT — PAIN DESCRIPTION - ORIENTATION
ORIENTATION: LEFT
ORIENTATION: LEFT

## 2019-07-16 ASSESSMENT — PAIN DESCRIPTION - DESCRIPTORS
DESCRIPTORS: SHARP;STABBING
DESCRIPTORS: SHARP;STABBING

## 2019-07-16 ASSESSMENT — PAIN DESCRIPTION - PAIN TYPE
TYPE: ACUTE PAIN
TYPE: ACUTE PAIN

## 2019-07-16 ASSESSMENT — PAIN DESCRIPTION - LOCATION
LOCATION: FOOT;FINGER (COMMENT WHICH ONE)
LOCATION: FINGER (COMMENT WHICH ONE);FOOT

## 2019-07-16 NOTE — PROGRESS NOTES
Miriam Green MD, [address]                Internal Medicine Hospitalist             Daily Progress  Note   Subjective:     Chief Complaint   Patient presents with    Cellulitis     left foot infection    Finger Pain     left thumb    Medication Refill     ran out of pain meds last night. Mr. Reyes [place] of being unhappy as things are not done on time at this facility. Objective:    /65   Pulse 71   Temp 97.8 °F (36.6 °C) (Oral)   Resp 12   Ht 5' 11\" (1.803 m)   Wt 271 lb 4.8 oz (123.1 kg)   SpO2 98%   BMI 37.84 kg/m²      Intake/Output Summary (Last 24 hours) at 7/16/2019 1231  Last data filed at 7/15/2019 1806  Gross per 24 hour   Intake 240 ml   Output --   Net 240 ml      Physical Exam:  Heart:  Regular rate and rhythm, normal S1 and S2 in all 4 auscultatory areas. No rubs  Murmurs or gallops heard. Lungs: Mostly clear to auscultation, decreased breath sounds at bases. No wheezes appreciated no crackles heard. Abdomen: Soft, non distended. Bowel sounds appreciated. No obvious liver or spleen enlargement. Non tender, no rebound noted. Extremities: No  Change. CNS: Grossly intact.     Labs:  CBC with Differential:    Lab Results   Component Value Date    WBC 6.8 07/15/2019    RBC 3.91 07/15/2019    HGB 10.3 07/15/2019    HCT 34.5 07/15/2019     07/15/2019    MCV 88.2 07/15/2019    MCH 26.3 07/15/2019    MCHC 29.9 07/15/2019    RDW 14.6 07/15/2019    SEGSPCT 74.2 07/14/2019    BANDSPCT 3 06/14/2019    LYMPHOPCT 15.3 07/14/2019    PROMYELOPCT 1 10/23/2013    MONOPCT 7.5 07/14/2019    MYELOPCT 1 06/14/2019    EOSPCT 1.4 01/12/2012    BASOPCT 0.4 07/14/2019    MONOSABS 0.6 07/14/2019    LYMPHSABS 1.2 07/14/2019    EOSABS 0.2 07/14/2019    BASOSABS 0.0 07/14/2019    DIFFTYPE AUTOMATED DIFFERENTIAL 07/14/2019     CMP:    Lab Results   Component Value Date     07/15/2019    K 4.4 07/15/2019    CL 97 07/15/2019    CO2 27 07/15/2019    BUN 8 07/15/2019    CREATININE 0.7 07/15/2019    GFRAA >60 07/15/2019    LABGLOM >60 07/15/2019    GLUCOSE 184 07/15/2019    PROT 7.3 07/14/2019    PROT 7.3 09/10/2011    LABALBU 3.3 07/14/2019    CALCIUM 8.5 07/15/2019    BILITOT 0.5 07/14/2019    ALKPHOS 122 07/14/2019    AST 15 07/14/2019    ALT <5 07/14/2019     No results for input(s): TROPONINT in the last 72 hours.   Lab Results   Component Value Date    TSHHS 1.710 08/27/2014         dextrose        vancomycin  1,500 mg Intravenous Q8H    meropenem  2 g Intravenous Q8H    lactobacillus  1 capsule Oral BID    sertraline  50 mg Oral Daily    pregabalin  75 mg Oral BID    ondansetron  4 mg Oral Q6H    insulin lispro  0-12 Units Subcutaneous TID WC    insulin lispro  0-6 Units Subcutaneous Nightly    sodium chloride flush  10 mL Intravenous 2 times per day    enoxaparin  40 mg Subcutaneous Daily         Assessment:       Patient Active Problem List    Diagnosis Date Noted    Skin ulcer of toe of left foot with fat layer exposed ([address] 10/13/2015     Priority: High     Class: Chronic    Diabetes with ulcer of foot ([address] 04/08/2014     Priority: High    Ulcer of other part of foot 04/08/2014     Priority: High    Acute osteomyelitis involving hand ([address] 07/14/2019    WD-S/P amputation of lesser toe, right ([address] 07/03/2018    Foot pain 06/21/2018    Sepsis without acute organ dysfunction ([address] 05/27/2018    Chemical dependency ([address] 10/19/2016    Chronic pain syndrome 10/19/2016    Drug abuse ([address] 10/19/2016    Chronic ulcer of left foot with necrosis of muscle ([address] 10/06/2015    Obesity, Class III, BMI 40-49.9 (morbid obesity) ([address]     Diabetes mellitus with ulcer of heel ([address] 08/26/2014    Diabetic foot infection ([address] 10/20/2013    Uncontrolled diabetes mellitus with complications ([address] 05/07/1615       Plan:     Problems being addressed this admission:     Multiple digital ischemic ulcers (Thumb and foot)  7/15/19- pain mostly in left thumb which is red and swollen-

## 2019-07-16 NOTE — PROGRESS NOTES
pain syndrome    Drug abuse ([facility]    Sepsis without acute organ dysfunction (HCC)    Foot pain    WD-S/P amputation of lesser toe, right (HCC)    Acute osteomyelitis involving hand ([facility]       Active Problems  Principal Problem:    Acute osteomyelitis involving hand ([facility]  Resolved Problems:    * No resolved hospital problems.  *    Electronically signed by: Electronically signed by Lex Concepcion MD on 7/16/2019 at 9:52 AM

## 2019-07-16 NOTE — PROGRESS NOTES
II or unspecified type diabetes mellitus with other specified manifestations, not stated as uncontrolled, Type II or unspecified type diabetes mellitus without mention of complication, not stated as uncontrolled, and Ulcer of other part of foot. has a past surgical history that includes Rotator cuff repair (Last Done 7/18/2011); Finger surgery (9-11-11); Colonoscopy (1990's); Hemorrhoid surgery (2008); Finger surgery (01-16-12); other surgical history (Left, 10/22/2013); debridement (8/25/2014); Endoscopy, colon, diagnostic (06-); other surgical history (07/07/2017); other surgical history (Left, 07/08/2017); other surgical history (Left, 07/10/2017); other surgical history (Left, 07/14/2017); Foot surgery (Right, 06/01/2018); and Foot Debridement (Left, 6/10/2019). Treatment Diagnosis: Acute osteomyelitis involving L hand      Restrictions  Restrictions/Precautions  Restrictions/Precautions: General Precautions, Fall Risk  Required Braces or Orthoses?: No    Subjective   General  Chart Reviewed: Yes  Patient assessed for rehabilitation services?: Yes  Family / Caregiver Present: No  Patient Currently in Pain: Denies    Social/Functional History          Objective   Vision: Within Functional Limits  Hearing: Within functional limits    Orientation  Overall Orientation Status: Within Normal Limits  Observation/Palpation  Posture: Good  Observation: pt received supine in bed, agreeable to therapy  Balance  Sitting Balance: Independent  Standing Balance: Independent  Standing Balance  Time: ~3 minutes  Activity: functional mobility to/from bathroom and in room  Functional Mobility  Functional - Mobility Device: No device  Activity: Other; To/from bathroom  Assist Level:  Independent  Functional Mobility Comments: see PT notes for gait details  Toilet Transfers  Toilet - Technique: Ambulating  Equipment Used: Standard toilet  Toilet Transfer: Independent  ADL  Feeding: Modified independent   Grooming:

## 2019-07-17 ENCOUNTER — HOSPITAL ENCOUNTER (OUTPATIENT)
Dept: WOUND CARE | Age: 54
Discharge: HOME | End: 2019-07-17
Payer: MEDICARE

## 2019-07-17 LAB
BASOPHILS ABSOLUTE: 0 K/CU MM
BASOPHILS RELATIVE PERCENT: 0.7 % (ref 0–1)
CULTURE: ABNORMAL
DIFFERENTIAL TYPE: ABNORMAL
DOSE AMOUNT: NORMAL
DOSE TIME: NORMAL
EOSINOPHILS ABSOLUTE: 0.2 K/CU MM
EOSINOPHILS RELATIVE PERCENT: 4 % (ref 0–3)
GLUCOSE BLD-MCNC: 174 MG/DL (ref 70–99)
GLUCOSE BLD-MCNC: 193 MG/DL (ref 70–99)
GLUCOSE BLD-MCNC: 235 MG/DL (ref 70–99)
GLUCOSE BLD-MCNC: 306 MG/DL (ref 70–99)
HCT VFR BLD CALC: 36.5 % (ref 42–52)
HEMOGLOBIN: 11 GM/DL (ref 13.5–18)
IMMATURE NEUTROPHIL %: 0.7 % (ref 0–0.43)
LYMPHOCYTES ABSOLUTE: 1.5 K/CU MM
LYMPHOCYTES RELATIVE PERCENT: 24.4 % (ref 24–44)
Lab: ABNORMAL
MCH RBC QN AUTO: 26.5 PG (ref 27–31)
MCHC RBC AUTO-ENTMCNC: 30.1 % (ref 32–36)
MCV RBC AUTO: 88 FL (ref 78–100)
MONOCYTES ABSOLUTE: 0.5 K/CU MM
MONOCYTES RELATIVE PERCENT: 7.6 % (ref 0–4)
NUCLEATED RBC %: 0 %
PDW BLD-RTO: 14.4 % (ref 11.7–14.9)
PLATELET # BLD: 300 K/CU MM (ref 140–440)
PMV BLD AUTO: 9.1 FL (ref 7.5–11.1)
RBC # BLD: 4.15 M/CU MM (ref 4.6–6.2)
SEGMENTED NEUTROPHILS ABSOLUTE COUNT: 3.8 K/CU MM
SEGMENTED NEUTROPHILS RELATIVE PERCENT: 62.6 % (ref 36–66)
SPECIMEN: ABNORMAL
TOTAL IMMATURE NEUTOROPHIL: 0.04 K/CU MM
TOTAL NUCLEATED RBC: 0 K/CU MM
VANCOMYCIN TROUGH: 19.9 UG/ML (ref 10–20)
WBC # BLD: 6 K/CU MM (ref 4–10.5)

## 2019-07-17 PROCEDURE — 1200000000 HC SEMI PRIVATE

## 2019-07-17 PROCEDURE — 2580000003 HC RX 258: Performed by: HOSPITALIST

## 2019-07-17 PROCEDURE — 6360000002 HC RX W HCPCS: Performed by: HOSPITALIST

## 2019-07-17 PROCEDURE — 94761 N-INVAS EAR/PLS OXIMETRY MLT: CPT

## 2019-07-17 PROCEDURE — 36415 COLL VENOUS BLD VENIPUNCTURE: CPT

## 2019-07-17 PROCEDURE — 87040 BLOOD CULTURE FOR BACTERIA: CPT

## 2019-07-17 PROCEDURE — 85025 COMPLETE CBC W/AUTO DIFF WBC: CPT

## 2019-07-17 PROCEDURE — 6370000000 HC RX 637 (ALT 250 FOR IP): Performed by: SURGERY

## 2019-07-17 PROCEDURE — 6360000002 HC RX W HCPCS: Performed by: NURSE PRACTITIONER

## 2019-07-17 PROCEDURE — 99211 OFF/OP EST MAY X REQ PHY/QHP: CPT

## 2019-07-17 PROCEDURE — 6360000002 HC RX W HCPCS: Performed by: INTERNAL MEDICINE

## 2019-07-17 PROCEDURE — 82962 GLUCOSE BLOOD TEST: CPT

## 2019-07-17 PROCEDURE — 2580000003 HC RX 258: Performed by: NURSE PRACTITIONER

## 2019-07-17 PROCEDURE — 2580000003 HC RX 258: Performed by: INTERNAL MEDICINE

## 2019-07-17 PROCEDURE — 80202 ASSAY OF VANCOMYCIN: CPT

## 2019-07-17 PROCEDURE — 99232 SBSQ HOSP IP/OBS MODERATE 35: CPT | Performed by: INTERNAL MEDICINE

## 2019-07-17 PROCEDURE — 6370000000 HC RX 637 (ALT 250 FOR IP): Performed by: INTERNAL MEDICINE

## 2019-07-17 PROCEDURE — 6370000000 HC RX 637 (ALT 250 FOR IP): Performed by: NURSE PRACTITIONER

## 2019-07-17 RX ORDER — INSULIN GLARGINE 100 [IU]/ML
15 INJECTION, SOLUTION SUBCUTANEOUS NIGHTLY
Status: DISCONTINUED | OUTPATIENT
Start: 2019-07-17 | End: 2019-07-18

## 2019-07-17 RX ADMIN — INSULIN GLARGINE 15 UNITS: 100 INJECTION, SOLUTION SUBCUTANEOUS at 22:07

## 2019-07-17 RX ADMIN — OXYCODONE HYDROCHLORIDE AND ACETAMINOPHEN 2 TABLET: 5; 325 TABLET ORAL at 17:17

## 2019-07-17 RX ADMIN — OXYCODONE HYDROCHLORIDE AND ACETAMINOPHEN 2 TABLET: 5; 325 TABLET ORAL at 22:18

## 2019-07-17 RX ADMIN — INSULIN LISPRO 4 UNITS: 100 INJECTION, SOLUTION INTRAVENOUS; SUBCUTANEOUS at 12:51

## 2019-07-17 RX ADMIN — VANCOMYCIN HYDROCHLORIDE 1500 MG: 5 INJECTION, POWDER, LYOPHILIZED, FOR SOLUTION INTRAVENOUS at 00:12

## 2019-07-17 RX ADMIN — ONDANSETRON 4 MG: 4 TABLET, ORALLY DISINTEGRATING ORAL at 15:01

## 2019-07-17 RX ADMIN — INSULIN LISPRO 4 UNITS: 100 INJECTION, SOLUTION INTRAVENOUS; SUBCUTANEOUS at 22:06

## 2019-07-17 RX ADMIN — SODIUM CHLORIDE, PRESERVATIVE FREE 10 ML: 5 INJECTION INTRAVENOUS at 13:04

## 2019-07-17 RX ADMIN — VANCOMYCIN HYDROCHLORIDE 1500 MG: 5 INJECTION, POWDER, LYOPHILIZED, FOR SOLUTION INTRAVENOUS at 12:49

## 2019-07-17 RX ADMIN — SERTRALINE HYDROCHLORIDE 50 MG: 50 TABLET ORAL at 10:42

## 2019-07-17 RX ADMIN — SODIUM CHLORIDE, PRESERVATIVE FREE 10 ML: 5 INJECTION INTRAVENOUS at 22:16

## 2019-07-17 RX ADMIN — PREGABALIN 75 MG: 75 CAPSULE ORAL at 10:42

## 2019-07-17 RX ADMIN — INSULIN LISPRO 2 UNITS: 100 INJECTION, SOLUTION INTRAVENOUS; SUBCUTANEOUS at 10:44

## 2019-07-17 RX ADMIN — Medication 1 CAPSULE: at 10:43

## 2019-07-17 RX ADMIN — SODIUM CHLORIDE, PRESERVATIVE FREE 10 ML: 5 INJECTION INTRAVENOUS at 10:49

## 2019-07-17 RX ADMIN — Medication 1 CAPSULE: at 22:05

## 2019-07-17 RX ADMIN — INSULIN LISPRO 2 UNITS: 100 INJECTION, SOLUTION INTRAVENOUS; SUBCUTANEOUS at 19:00

## 2019-07-17 RX ADMIN — MEROPENEM 2 G: 1 INJECTION, POWDER, FOR SOLUTION INTRAVENOUS at 04:30

## 2019-07-17 RX ADMIN — ONDANSETRON 4 MG: 4 TABLET, ORALLY DISINTEGRATING ORAL at 02:37

## 2019-07-17 RX ADMIN — ONDANSETRON 4 MG: 4 TABLET, ORALLY DISINTEGRATING ORAL at 10:43

## 2019-07-17 RX ADMIN — PREGABALIN 75 MG: 75 CAPSULE ORAL at 22:04

## 2019-07-17 RX ADMIN — OXYCODONE HYDROCHLORIDE AND ACETAMINOPHEN 2 TABLET: 5; 325 TABLET ORAL at 12:50

## 2019-07-17 RX ADMIN — OXYCODONE HYDROCHLORIDE AND ACETAMINOPHEN 2 TABLET: 5; 325 TABLET ORAL at 07:39

## 2019-07-17 RX ADMIN — ENOXAPARIN SODIUM 40 MG: 40 INJECTION SUBCUTANEOUS at 10:43

## 2019-07-17 RX ADMIN — VANCOMYCIN HYDROCHLORIDE 1500 MG: 5 INJECTION, POWDER, LYOPHILIZED, FOR SOLUTION INTRAVENOUS at 17:17

## 2019-07-17 RX ADMIN — OXYCODONE HYDROCHLORIDE AND ACETAMINOPHEN 2 TABLET: 5; 325 TABLET ORAL at 02:04

## 2019-07-17 RX ADMIN — ONDANSETRON 4 MG: 4 TABLET, ORALLY DISINTEGRATING ORAL at 22:05

## 2019-07-17 ASSESSMENT — PAIN DESCRIPTION - FREQUENCY
FREQUENCY: CONTINUOUS
FREQUENCY: CONTINUOUS

## 2019-07-17 ASSESSMENT — PAIN DESCRIPTION - ORIENTATION
ORIENTATION: LEFT

## 2019-07-17 ASSESSMENT — PAIN SCALES - GENERAL
PAINLEVEL_OUTOF10: 10
PAINLEVEL_OUTOF10: 10
PAINLEVEL_OUTOF10: 0
PAINLEVEL_OUTOF10: 10
PAINLEVEL_OUTOF10: 0
PAINLEVEL_OUTOF10: 10
PAINLEVEL_OUTOF10: 10

## 2019-07-17 ASSESSMENT — PAIN DESCRIPTION - LOCATION
LOCATION: FOOT;FINGER (COMMENT WHICH ONE)

## 2019-07-17 ASSESSMENT — PAIN DESCRIPTION - DESCRIPTORS
DESCRIPTORS: SHARP;STABBING

## 2019-07-17 ASSESSMENT — PAIN DESCRIPTION - ONSET
ONSET: ON-GOING
ONSET: ON-GOING

## 2019-07-17 ASSESSMENT — PAIN - FUNCTIONAL ASSESSMENT: PAIN_FUNCTIONAL_ASSESSMENT: ACTIVITIES ARE NOT PREVENTED

## 2019-07-17 ASSESSMENT — PAIN DESCRIPTION - PAIN TYPE
TYPE: ACUTE PAIN

## 2019-07-17 ASSESSMENT — PAIN DESCRIPTION - PROGRESSION: CLINICAL_PROGRESSION: NOT CHANGED

## 2019-07-17 NOTE — CONSULTS
[address] [id number] Continence Nurse  Consult Note       Mumtaz Singer  AGE: 46 y.o.    GENDER: male  : 1965  TODAY'S DATE:  2019    Subjective:     Reason for Evaluation and Assessment: alma Singer is a 46 y.o. male referred by:   [x] Physician  [] Nursing  [] Other:     Wound Identification:  Wound Type: diabetic  Contributing Factors: diabetes and poor glucose control        PAST MEDICAL HISTORY        Diagnosis Date    Anesthesia     \"Problems coming out of the anesthesia\"    Anxiety     Arthritis     Asthma     \"As a kid but outgrew this\"    Chronic back pain     Depression     Dizziness     Edema     Fibromyalgia     Fracture     Headache(784.0)     Hemorrhoid     Hernia, abdominal     Hx MRSA infection     positive culture 2014 from left foot    Migraine     Nausea & vomiting     Neuropathy     Obesity, Class III, BMI 40-49.9 (morbid obesity) [facility]     Osteomyelitis (HCC)     hx finger    Pneumonia     Poor circulation     Restless leg syndrome     Shortness of breath on exertion     2016- pt denies any sob with this interview    Type II or unspecified type diabetes mellitus with other specified manifestations, not stated as uncontrolled 2014    Type II or unspecified type diabetes mellitus without mention of complication, not stated as uncontrolled DX     Ulcer of other part of foot 2014    'ulcer on left foot healed all up\"       PAST SURGICAL HISTORY    Past Surgical History:   Procedure Laterality Date    COLONOSCOPY      DEBRIDEMENT  2014    left foot    ENDOSCOPY, COLON, DIAGNOSTIC  2016    FINGER SURGERY  11    Right Index Finger    FINGER SURGERY  12    RIght Index Finger-Removal of loose body, Reconstruction of Mallet Finger    FOOT DEBRIDEMENT Left 6/10/2019    FOOT DEBRIDEMENT INCISION AND DRAINAGE performed by Maren Bryant MD at [address] Right 2018    I&D right [facility]) infusion Infuse 1,000 mg intravenously daily Compound per protocol. 14 g 0    pregabalin (LYRICA) 100 MG capsule Take 1 capsule by mouth 2 times daily for 30 days.  60 capsule 0    sertraline (ZOLOFT) 50 MG tablet Take 50 mg by mouth daily      ondansetron (ZOFRAN ODT) 4 MG disintegrating tablet Take 1 tablet by mouth every 6 hours 20 tablet 0    metFORMIN (GLUCOPHAGE) 1000 MG tablet Take 1 tablet by mouth 2 times daily (with meals) 60 tablet 3    tiZANidine (ZANAFLEX) 4 MG tablet Take 4 mg by mouth nightly as needed      traZODone (DESYREL) 100 MG tablet Take 100 mg by mouth nightly as needed       Dulaglutide (TRULICITY) 0.58 WO/7.5QE SOPN Inject 0.75 mg into the skin once a week           Objective:      /69   Pulse 88   Temp 98.1 °F (36.7 °C) (Oral)   Resp 18   Ht 5' 11\" (1.803 m)   Wt 279 lb 1.6 oz (126.6 kg)   SpO2 97%   BMI 38.93 kg/m²   Finn Risk Score: Finn Scale Score: 21    LABS    CBC:   Lab Results   Component Value Date    WBC 6.0 07/17/2019    RBC 4.15 07/17/2019    HGB 11.0 07/17/2019    HCT 36.5 07/17/2019    MCV 88.0 07/17/2019    MCH 26.5 07/17/2019    MCHC 30.1 07/17/2019    RDW 14.4 07/17/2019     07/17/2019    MPV 9.1 07/17/2019     CMP:    Lab Results   Component Value Date     07/15/2019    K 4.4 07/15/2019    CL 97 07/15/2019    CO2 27 07/15/2019    BUN 8 07/15/2019    CREATININE 0.7 07/15/2019    GFRAA >60 07/15/2019    LABGLOM >60 07/15/2019    GLUCOSE 184 07/15/2019    PROT 7.3 07/14/2019    PROT 7.3 09/10/2011    LABALBU 3.3 07/14/2019    CALCIUM 8.5 07/15/2019    BILITOT 0.5 07/14/2019    ALKPHOS 122 07/14/2019    AST 15 07/14/2019    ALT <5 07/14/2019     Albumin:    Lab Results   Component Value Date    LABALBU 3.3 07/14/2019     PT/INR:    Lab Results   Component Value Date    PROTIME 13.9 07/15/2019    PROTIME 11.2 09/11/2011    INR 1.22 07/15/2019     HgBA1c:    Lab Results   Component Value Date    LABA1C 10.4 06/25/2019 abd, kerlix)  Wound 07/10/19 Finger (Comment which one) Anterior;Right # 18 Right Index Finger Cluster-Dressing/Treatment: (Betadine Gauze, Fluff Gauze, Conform, Tape)  Wound 07/10/19 Finger (Comment which one) Anterior;Right # 19 R Middle Finger-Dressing/Treatment: (Betadine Gauze, Fluff Gauze, Conform, Tape)  Wound 07/10/19 Finger (Comment which one) Anterior; Left # 20 L Thumb-Dressing/Treatment: (sorbact, fibracol, aquacel, 4x4, conform)  Wound 07/10/19 Finger (Comment which one) Anterior; Left # 21 L Little Finger Cluster-Dressing/Treatment: (Betadine Gauze, Fluff Gauze, Conform, Tape)  Wound 07/10/19 Foot Left;Proximal;Plantar # 23 L Proximal Plantar Foot-Dressing/Treatment: (sorbact, fibracol, aquacel, abd, kerlix)    Dressings removed and wounds rinsed and pictured. Wounds redressed with sorbact, fibracol, aquacel, abd, and kerlix. Denies any issues with the buttocks at this time.     Specialty Bed Required : no  [] Low Air Loss   [] Pressure Redistribution  [] Fluid Immersion  [] Bariatric  [] Total Pressure Relief  [] Other:     Discharge Plan:  Placement for patient upon discharge: home  Hospice Care: no  Patient appropriate for Outpatient [address]: yes    Patient/Caregiver Teaching:  Level of patient/caregiver understanding able to:   Verbalized understanding       Electronically signed by Obinna Abdi LPN, on 1/04/7725 at 3:27 PM

## 2019-07-17 NOTE — PROGRESS NOTES
[address] [facility]  consulted by Dr. Ahmet Quintero for monitoring and adjustment. Indication for treatment: MRSA and enterococcus bacteremia, L thumb osteomyelitis, L diabetic foot ulcer, r/o CLABSI  Goal trough: 15-20 mcg/mL     Pertinent Laboratory Values:   Temp Readings from Last 3 Encounters:   07/17/19 98.7 °F (37.1 °C) (Oral)   07/10/19 98.3 °F (36.8 °C) (Temporal)   06/24/19 99 °F (37.2 °C) (Oral)     Recent Labs     07/14/19  1448 07/15/19  0405 07/16/19  1840 07/17/19  0537   WBC 8.1 6.8 6.1 6.0   LACTATE 1.0  --   --   --      Recent Labs     07/14/19  1448 07/15/19  0405   BUN 10 8   CREATININE 0.6* 0.7*     Estimated Creatinine Clearance: 163 mL/min (A) (based on SCr of 0.7 mg/dL (L)). Intake/Output Summary (Last 24 hours) at 7/17/2019 0813  Last data filed at 7/17/2019 0643  Gross per 24 hour   Intake 1312 ml   Output --   Net 1312 ml       Pertinent Cultures:  Date    Source    Results  7/14   Blood    MRSA and Enterococcus  7/15   PICC line   NGTD  7/16   Thumb    Staph aureus    Vancomycin level:   TROUGH:    Recent Labs     07/16/19  0332 07/17/19  0537   VANCOTROUGH 9.8* 19.9     RANDOM:  No results for input(s): VANCORANDOM in the last 72 hours. Previous levels:  · Vancomycin 1750 mg q12h IVPB- Trough of 9.8    Assessment:  · WBC and temperature: WNL  · SCr, BUN, and urine output: no new labs  · Day(s) of therapy: 4   · Vancomycin level: therapeutic (drawn early)    Plan:  · Mr. Buster Burgos continues on an increased dose of vancomycin 1500 mg q8h IVPB   · Trough was drawn early, 5h post-dose on a q8h interval, anticipate true trough to be <19  · Will continue current dosing repeat trough in 24h to ensure it is therapeutic   · Ordered daily renal labs  · MRSA and enterococcus sensitivity pending   · Pharmacy will continue to monitor patient and adjust therapy as indicated    REPEAT VANCOMYCIN TROUGH SCHEDULED FOR 7/18 @4901    Thank you for the consult.   Antoine Bolton,

## 2019-07-17 NOTE — PROGRESS NOTES
Amauri Hansen MD, [address]                Internal Medicine Hospitalist             Daily Progress  Note   Subjective:     Chief Complaint   Patient presents with    Cellulitis     left foot infection    Finger Pain     left thumb    Medication Refill     ran out of pain meds last night. Mr. Pablo Peoples Complains of nil. Objective:    /69   Pulse 88   Temp 98.1 °F (36.7 °C) (Oral)   Resp 18   Ht 5' 11\" (1.803 m)   Wt 279 lb 1.6 oz (126.6 kg)   SpO2 97%   BMI 38.93 kg/m²      Intake/Output Summary (Last 24 hours) at 7/17/2019 1157  Last data filed at 7/17/2019 0643  Gross per 24 hour   Intake 1312 ml   Output --   Net 1312 ml      Physical Exam:  Heart:  Regular rate and rhythm, normal S1 and S2 in all 4 auscultatory areas. No rubs  Murmurs or gallops heard. Lungs: Mostly clear to auscultation, decreased breath sounds at bases. No wheezes appreciated no crackles heard. Abdomen: Soft, non distended. Bowel sounds appreciated. No obvious liver or spleen enlargement. Non tender, no rebound noted. Extremities: No change. CNS: Grossly intact.     Labs:  CBC with Differential:    Lab Results   Component Value Date    WBC 6.0 07/17/2019    RBC 4.15 07/17/2019    HGB 11.0 07/17/2019    HCT 36.5 07/17/2019     07/17/2019    MCV 88.0 07/17/2019    MCH 26.5 07/17/2019    MCHC 30.1 07/17/2019    RDW 14.4 07/17/2019    SEGSPCT 62.6 07/17/2019    BANDSPCT 3 06/14/2019    LYMPHOPCT 24.4 07/17/2019    PROMYELOPCT 1 10/23/2013    MONOPCT 7.6 07/17/2019    MYELOPCT 1 06/14/2019    EOSPCT 1.4 01/12/2012    BASOPCT 0.7 07/17/2019    MONOSABS 0.5 07/17/2019    LYMPHSABS 1.5 07/17/2019    EOSABS 0.2 07/17/2019    BASOSABS 0.0 07/17/2019    DIFFTYPE AUTOMATED DIFFERENTIAL 07/17/2019     BMP:    Lab Results   Component Value Date     07/15/2019    K 4.4 07/15/2019    CL 97 07/15/2019    CO2 27 07/15/2019    BUN 8 07/15/2019    LABALBU 3.3 07/14/2019    CREATININE 0.7 07/15/2019    CALCIUM 8.5 07/15/2019

## 2019-07-18 VITALS
HEART RATE: 88 BPM | HEIGHT: 71 IN | RESPIRATION RATE: 16 BRPM | DIASTOLIC BLOOD PRESSURE: 88 MMHG | TEMPERATURE: 97.6 F | OXYGEN SATURATION: 99 % | BODY MASS INDEX: 38.92 KG/M2 | SYSTOLIC BLOOD PRESSURE: 116 MMHG | WEIGHT: 278 LBS

## 2019-07-18 LAB
ANION GAP SERPL CALCULATED.3IONS-SCNC: 12 MMOL/L (ref 4–16)
BASOPHILS ABSOLUTE: 0.1 K/CU MM
BASOPHILS RELATIVE PERCENT: 0.7 % (ref 0–1)
BUN BLDV-MCNC: 11 MG/DL (ref 6–23)
CALCIUM SERPL-MCNC: 9.9 MG/DL (ref 8.3–10.6)
CHLORIDE BLD-SCNC: 95 MMOL/L (ref 99–110)
CO2: 29 MMOL/L (ref 21–32)
CREAT SERPL-MCNC: 0.7 MG/DL (ref 0.9–1.3)
DIFFERENTIAL TYPE: ABNORMAL
DOSE AMOUNT: ABNORMAL
DOSE TIME: ABNORMAL
EOSINOPHILS ABSOLUTE: 0.2 K/CU MM
EOSINOPHILS RELATIVE PERCENT: 3.3 % (ref 0–3)
GFR AFRICAN AMERICAN: >60 ML/MIN/1.73M2
GFR NON-AFRICAN AMERICAN: >60 ML/MIN/1.73M2
GLUCOSE BLD-MCNC: 183 MG/DL (ref 70–99)
GLUCOSE BLD-MCNC: 187 MG/DL (ref 70–99)
GLUCOSE BLD-MCNC: 206 MG/DL (ref 70–99)
GLUCOSE BLD-MCNC: 210 MG/DL (ref 70–99)
HCT VFR BLD CALC: 40 % (ref 42–52)
HEMOGLOBIN: 12.1 GM/DL (ref 13.5–18)
IMMATURE NEUTROPHIL %: 0.7 % (ref 0–0.43)
LYMPHOCYTES ABSOLUTE: 2.4 K/CU MM
LYMPHOCYTES RELATIVE PERCENT: 34.2 % (ref 24–44)
MCH RBC QN AUTO: 26.4 PG (ref 27–31)
MCHC RBC AUTO-ENTMCNC: 30.3 % (ref 32–36)
MCV RBC AUTO: 87.1 FL (ref 78–100)
MONOCYTES ABSOLUTE: 0.6 K/CU MM
MONOCYTES RELATIVE PERCENT: 8.5 % (ref 0–4)
NUCLEATED RBC %: 0 %
PDW BLD-RTO: 14.5 % (ref 11.7–14.9)
PLATELET # BLD: 417 K/CU MM (ref 140–440)
PMV BLD AUTO: 9.1 FL (ref 7.5–11.1)
POTASSIUM SERPL-SCNC: 4.9 MMOL/L (ref 3.5–5.1)
RBC # BLD: 4.59 M/CU MM (ref 4.6–6.2)
SEGMENTED NEUTROPHILS ABSOLUTE COUNT: 3.7 K/CU MM
SEGMENTED NEUTROPHILS RELATIVE PERCENT: 52.6 % (ref 36–66)
SODIUM BLD-SCNC: 136 MMOL/L (ref 135–145)
TOTAL CK: 41 IU/L (ref 38–174)
TOTAL IMMATURE NEUTOROPHIL: 0.05 K/CU MM
TOTAL NUCLEATED RBC: 0 K/CU MM
VANCOMYCIN TROUGH: 21.2 UG/ML (ref 10–20)
WBC # BLD: 7.1 K/CU MM (ref 4–10.5)

## 2019-07-18 PROCEDURE — 6370000000 HC RX 637 (ALT 250 FOR IP): Performed by: SURGERY

## 2019-07-18 PROCEDURE — 6370000000 HC RX 637 (ALT 250 FOR IP): Performed by: INTERNAL MEDICINE

## 2019-07-18 PROCEDURE — 6370000000 HC RX 637 (ALT 250 FOR IP): Performed by: NURSE PRACTITIONER

## 2019-07-18 PROCEDURE — 82962 GLUCOSE BLOOD TEST: CPT

## 2019-07-18 PROCEDURE — C1751 CATH, INF, PER/CENT/MIDLINE: HCPCS

## 2019-07-18 PROCEDURE — 85025 COMPLETE CBC W/AUTO DIFF WBC: CPT

## 2019-07-18 PROCEDURE — 76937 US GUIDE VASCULAR ACCESS: CPT

## 2019-07-18 PROCEDURE — 6360000002 HC RX W HCPCS: Performed by: HOSPITALIST

## 2019-07-18 PROCEDURE — 82550 ASSAY OF CK (CPK): CPT

## 2019-07-18 PROCEDURE — 99232 SBSQ HOSP IP/OBS MODERATE 35: CPT | Performed by: INTERNAL MEDICINE

## 2019-07-18 PROCEDURE — 36415 COLL VENOUS BLD VENIPUNCTURE: CPT

## 2019-07-18 PROCEDURE — 2580000003 HC RX 258: Performed by: NURSE PRACTITIONER

## 2019-07-18 PROCEDURE — 05HY33Z INSERTION OF INFUSION DEVICE INTO UPPER VEIN, PERCUTANEOUS APPROACH: ICD-10-PCS | Performed by: INTERNAL MEDICINE

## 2019-07-18 PROCEDURE — 36569 INSJ PICC 5 YR+ W/O IMAGING: CPT

## 2019-07-18 PROCEDURE — 6360000002 HC RX W HCPCS: Performed by: NURSE PRACTITIONER

## 2019-07-18 PROCEDURE — 80202 ASSAY OF VANCOMYCIN: CPT

## 2019-07-18 PROCEDURE — 2580000003 HC RX 258: Performed by: HOSPITALIST

## 2019-07-18 PROCEDURE — 80048 BASIC METABOLIC PNL TOTAL CA: CPT

## 2019-07-18 RX ORDER — SODIUM CHLORIDE 0.9 % (FLUSH) 0.9 %
10 SYRINGE (ML) INJECTION EVERY 12 HOURS SCHEDULED
Status: DISCONTINUED | OUTPATIENT
Start: 2019-07-18 | End: 2019-07-18 | Stop reason: HOSPADM

## 2019-07-18 RX ORDER — INSULIN GLARGINE 100 [IU]/ML
20 INJECTION, SOLUTION SUBCUTANEOUS NIGHTLY
Qty: 1 VIAL | Refills: 3 | Status: SHIPPED
Start: 2019-07-18 | End: 2020-01-23 | Stop reason: ALTCHOICE

## 2019-07-18 RX ORDER — SODIUM CHLORIDE 0.9 % (FLUSH) 0.9 %
10 SYRINGE (ML) INJECTION PRN
Status: DISCONTINUED | OUTPATIENT
Start: 2019-07-18 | End: 2019-07-18 | Stop reason: HOSPADM

## 2019-07-18 RX ORDER — INSULIN GLARGINE 100 [IU]/ML
20 INJECTION, SOLUTION SUBCUTANEOUS NIGHTLY
Status: DISCONTINUED | OUTPATIENT
Start: 2019-07-18 | End: 2019-07-18 | Stop reason: HOSPADM

## 2019-07-18 RX ORDER — OXYCODONE HYDROCHLORIDE AND ACETAMINOPHEN 5; 325 MG/1; MG/1
1 TABLET ORAL EVERY 4 HOURS PRN
Qty: 10 TABLET | Refills: 0 | Status: SHIPPED
Start: 2019-07-18 | End: 2019-07-21

## 2019-07-18 RX ADMIN — ONDANSETRON 4 MG: 4 TABLET, ORALLY DISINTEGRATING ORAL at 10:45

## 2019-07-18 RX ADMIN — SERTRALINE HYDROCHLORIDE 50 MG: 50 TABLET ORAL at 09:15

## 2019-07-18 RX ADMIN — OXYCODONE HYDROCHLORIDE AND ACETAMINOPHEN 2 TABLET: 5; 325 TABLET ORAL at 16:38

## 2019-07-18 RX ADMIN — INSULIN LISPRO 4 UNITS: 100 INJECTION, SOLUTION INTRAVENOUS; SUBCUTANEOUS at 12:48

## 2019-07-18 RX ADMIN — SODIUM CHLORIDE, PRESERVATIVE FREE 10 ML: 5 INJECTION INTRAVENOUS at 09:41

## 2019-07-18 RX ADMIN — INSULIN LISPRO 4 UNITS: 100 INJECTION, SOLUTION INTRAVENOUS; SUBCUTANEOUS at 18:26

## 2019-07-18 RX ADMIN — OXYCODONE HYDROCHLORIDE AND ACETAMINOPHEN 2 TABLET: 5; 325 TABLET ORAL at 02:24

## 2019-07-18 RX ADMIN — PREGABALIN 75 MG: 75 CAPSULE ORAL at 09:15

## 2019-07-18 RX ADMIN — VANCOMYCIN HYDROCHLORIDE 1500 MG: 5 INJECTION, POWDER, LYOPHILIZED, FOR SOLUTION INTRAVENOUS at 09:40

## 2019-07-18 RX ADMIN — Medication 1 CAPSULE: at 09:15

## 2019-07-18 RX ADMIN — VANCOMYCIN HYDROCHLORIDE 1500 MG: 5 INJECTION, POWDER, LYOPHILIZED, FOR SOLUTION INTRAVENOUS at 00:23

## 2019-07-18 RX ADMIN — OXYCODONE HYDROCHLORIDE AND ACETAMINOPHEN 2 TABLET: 5; 325 TABLET ORAL at 21:05

## 2019-07-18 RX ADMIN — ONDANSETRON 4 MG: 4 TABLET, ORALLY DISINTEGRATING ORAL at 16:38

## 2019-07-18 RX ADMIN — OXYCODONE HYDROCHLORIDE AND ACETAMINOPHEN 2 TABLET: 5; 325 TABLET ORAL at 06:28

## 2019-07-18 RX ADMIN — INSULIN LISPRO 2 UNITS: 100 INJECTION, SOLUTION INTRAVENOUS; SUBCUTANEOUS at 09:16

## 2019-07-18 RX ADMIN — OXYCODONE HYDROCHLORIDE AND ACETAMINOPHEN 2 TABLET: 5; 325 TABLET ORAL at 10:45

## 2019-07-18 RX ADMIN — INSULIN LISPRO 4 UNITS: 100 INJECTION, SOLUTION INTRAVENOUS; SUBCUTANEOUS at 18:25

## 2019-07-18 RX ADMIN — ENOXAPARIN SODIUM 40 MG: 40 INJECTION SUBCUTANEOUS at 09:15

## 2019-07-18 RX ADMIN — ONDANSETRON 4 MG: 4 TABLET, ORALLY DISINTEGRATING ORAL at 04:28

## 2019-07-18 ASSESSMENT — PAIN SCALES - GENERAL
PAINLEVEL_OUTOF10: 10

## 2019-07-18 ASSESSMENT — PAIN DESCRIPTION - ORIENTATION: ORIENTATION: LEFT

## 2019-07-18 ASSESSMENT — PAIN DESCRIPTION - PROGRESSION: CLINICAL_PROGRESSION: NOT CHANGED

## 2019-07-18 ASSESSMENT — PAIN DESCRIPTION - PAIN TYPE: TYPE: ACUTE PAIN

## 2019-07-18 ASSESSMENT — PAIN DESCRIPTION - FREQUENCY: FREQUENCY: CONTINUOUS

## 2019-07-18 ASSESSMENT — PAIN DESCRIPTION - DESCRIPTORS: DESCRIPTORS: SHARP

## 2019-07-18 ASSESSMENT — PAIN DESCRIPTION - LOCATION: LOCATION: FOOT;FINGER (COMMENT WHICH ONE)

## 2019-07-18 ASSESSMENT — PAIN - FUNCTIONAL ASSESSMENT: PAIN_FUNCTIONAL_ASSESSMENT: ACTIVITIES ARE NOT PREVENTED

## 2019-07-18 ASSESSMENT — PAIN DESCRIPTION - ONSET: ONSET: ON-GOING

## 2019-07-18 NOTE — PLAN OF CARE
Problem: Pain:  Goal: Pain level will decrease  Description  Pain level will decrease  7/18/2019 0412 by Rao Justice RN  Outcome: Ongoing  7/17/2019 1957 by Willie Montelongo RN  Outcome: Ongoing  Goal: Control of acute pain  Description  Control of acute pain  7/18/2019 0412 by Rao Justice RN  Outcome: Ongoing  7/17/2019 1957 by Willie Montelongo RN  Outcome: Ongoing  Goal: Control of chronic pain  Description  Control of chronic pain  7/18/2019 0412 by Rao Justice RN  Outcome: Ongoing  7/17/2019 1957 by Willie Montelongo RN  Outcome: Ongoing     Problem: Infection:  Goal: Will remain free from infection  Description  Will remain free from infection  7/18/2019 0412 by Rao Justice RN  Outcome: Ongoing  7/17/2019 1957 by Willie Montelongo RN  Outcome: Ongoing

## 2019-07-18 NOTE — CONSULTS
Consult completed. Procedure/rationale explained to pt including benefits vs potential risks/complications; questions answered & consent obtained. RUE Basilic Vein accessed easily on first pass, however guidewire will not advance past pt's shoulder despite extensive troubleshooting. PowerMidLine cutdown from [address] SOLO tubing and will meet pt's therapeutic needs at this time. Line returns blood easily and flushes without any resistance/abnormalities noted or reported. Sterile dressing with SkinPrep, StatLock Securing Device, BioPatch, SwabCap, and Limb Precautions band applied. Pt tolerated well & denies other c/o or needs. Bard Zavala, Primary RN notified.

## 2019-07-18 NOTE — PROGRESS NOTES
Miguel A Posada MD, [address]                Internal Medicine Hospitalist             Daily Progress  Note   Subjective:     Chief Complaint   Patient presents with    Cellulitis     left foot infection    Finger Pain     left thumb    Medication Refill     ran out of pain meds last night. Mr. Ilana Victoria Complains of nil. Objective:    /81   Pulse 80   Temp 98 °F (36.7 °C) (Oral)   Resp 20   Ht 5' 11\" (1.803 m)   Wt 278 lb (126.1 kg)   SpO2 98%   BMI 38.77 kg/m²      Intake/Output Summary (Last 24 hours) at 7/18/2019 1335  Last data filed at 7/18/2019 1445  Gross per 24 hour   Intake 1070 ml   Output --   Net 1070 ml      Physical Exam:  Heart:  Regular rate and rhythm, normal S1 and S2 in all 4 auscultatory areas. No rubs  Murmurs or gallops heard. Lungs: Mostly clear to auscultation, decreased breath sounds at bases. No wheezes appreciated no crackles heard. Abdomen: Soft, non distended. Bowel sounds appreciated. No obvious liver or spleen enlargement. Non tender, no rebound noted. Extremities: Left thumb unchanged. CNS: Grossly intact.     Labs:  CBC with Differential:    Lab Results   Component Value Date    WBC 7.1 07/18/2019    RBC 4.59 07/18/2019    HGB 12.1 07/18/2019    HCT 40.0 07/18/2019     07/18/2019    MCV 87.1 07/18/2019    MCH 26.4 07/18/2019    MCHC 30.3 07/18/2019    RDW 14.5 07/18/2019    SEGSPCT 52.6 07/18/2019    BANDSPCT 3 06/14/2019    LYMPHOPCT 34.2 07/18/2019    PROMYELOPCT 1 10/23/2013    MONOPCT 8.5 07/18/2019    MYELOPCT 1 06/14/2019    EOSPCT 1.4 01/12/2012    BASOPCT 0.7 07/18/2019    MONOSABS 0.6 07/18/2019    LYMPHSABS 2.4 07/18/2019    EOSABS 0.2 07/18/2019    BASOSABS 0.1 07/18/2019    DIFFTYPE AUTOMATED DIFFERENTIAL 07/18/2019     CMP:    Lab Results   Component Value Date     07/18/2019    K 4.9 07/18/2019    CL 95 07/18/2019    CO2 29 07/18/2019    BUN 11 07/18/2019    CREATININE 0.7 07/18/2019    GFRAA >60 07/18/2019    LABGLOM >60 07/18/2019

## 2019-07-18 NOTE — PROGRESS NOTES
Pt seen and examined. Left thumb is . No further drainage. Likely to lose nail. He is not able to get Eliza to see a [place] hand surgeon, so Dr. Seamus Painter will see him at the [place] wound care center for further evaluation of the thumb for possible amputation if the osteomyelitis cannot be controlled. I did discuss the pt with Dr. Seamus Painter yesterday. PICC removed and tip sent for cx. NGTD. ID note reviewed along with recs and abx changes noted.  TRAVIS negative for vegetations. Left foot diabetic ulcerations with cellulitis, resolving. Blood cx's with MRSA and enterococcus. Repeat cx's in process. No surgical intervention at this time. Follow wounds closely. Will apply sorbach and fibracol to each ulceration. CPM  Dr. Jeff Mijares will be covering for me this afternoon until next Friday. I will be on vacation. If discharged, follow up next week with Dr. Seamus Painter at the wound care center.

## 2019-07-19 LAB
CULTURE: ABNORMAL
CULTURE: ABNORMAL
Lab: ABNORMAL
SPECIMEN: ABNORMAL

## 2019-07-19 NOTE — DISCHARGE SUMMARY
Information about where to get these medications is not yet available    Ask your nurse or doctor about these medications  · daptomycin  infusion  · enoxaparin 40 MG/0.4ML injection  · insulin glargine 100 UNIT/ML injection vial  · insulin lispro 100 UNIT/ML injection vial  · insulin lispro 100 UNIT/ML injection vial  · insulin lispro 100 UNIT/ML injection vial  · oxyCODONE-acetaminophen 5-325 MG per tablet         Significant Diagnostic Studies:   Blood work reviewed.    CBC with Differential:    Lab Results   Component Value Date    WBC 7.1 07/18/2019    RBC 4.59 07/18/2019    HGB 12.1 07/18/2019    HCT 40.0 07/18/2019     07/18/2019    MCV 87.1 07/18/2019    MCH 26.4 07/18/2019    MCHC 30.3 07/18/2019    RDW 14.5 07/18/2019    SEGSPCT 52.6 07/18/2019    BANDSPCT 3 06/14/2019    LYMPHOPCT 34.2 07/18/2019    PROMYELOPCT 1 10/23/2013    MONOPCT 8.5 07/18/2019    MYELOPCT 1 06/14/2019    EOSPCT 1.4 01/12/2012    BASOPCT 0.7 07/18/2019    MONOSABS 0.6 07/18/2019    LYMPHSABS 2.4 07/18/2019    EOSABS 0.2 07/18/2019    BASOSABS 0.1 07/18/2019    DIFFTYPE AUTOMATED DIFFERENTIAL 07/18/2019     CMP:    Lab Results   Component Value Date     07/18/2019    K 4.9 07/18/2019    CL 95 07/18/2019    CO2 29 07/18/2019    BUN 11 07/18/2019    CREATININE 0.7 07/18/2019    GFRAA >60 07/18/2019    LABGLOM >60 07/18/2019    GLUCOSE 187 07/18/2019    PROT 7.3 07/14/2019    PROT 7.3 09/10/2011    LABALBU 3.3 07/14/2019    CALCIUM 9.9 07/18/2019    BILITOT 0.5 07/14/2019    ALKPHOS 122 07/14/2019    AST 15 07/14/2019    ALT <5 07/14/2019     Echocardiogram transesophageal   Order: [id number]   Status:  Final result   Visible to patient:  No (Not Released) Next appt:  07/25/2019 at 09:15 AM in Wound Ostomy Kyle Pacheco, [facility]   Details     Reading Physician Reading Date Result Priority   Ellyn Lopez MD 7/16/2019       Narrative     Transesophageal Echocardiography Report (TRAVIS)      Demographics      Patient Name 6/10/2019  8:40 PM          Related encounter: ED to Hosp-Admission (Discharged) from 6/10/2019 in [facility]         Signed            Show:Clear all  [x]Manual[]Template[]Copied    Added by:  Randa Khan MD    []Herminio for details                    [address], [address] [place] [address] NAME: Bryson Contreras                       :        1965  MED REC NO:   [id number]                          ROOM:       1173  ACCOUNT NO:   [de-identified]                           ADMIT DATE: 06/10/2019  PROVIDER:     Catherine Fofana MD     CONSULT DATE:  06/10/2019     CHIEF COMPLAINT AND HISTORY OF PRESENT ILLNESS:  The patient is a  44-year-old  male who has a history of insulin-dependent  diabetes mellitus along with peripheral neuropathy and obesity. He  presented to the emergency room late this evening due to a couple of  days' history of progressive left foot swelling, erythematous changes,  along with pain. The redness now has extended all the way up to the  knee level and he states he was barefoot and out in the yard and he had  stepped on glass which lacerated the left plantar surface of his foot. He now also reports throbbing pain. The patient has a prior history of  noncompliance, multiple medical comorbidities, he has already had a  right fifth toe amputation within the [address]. He also has  had some finger amputations as well. He also has a bandage on the right  great toe which he said he stubbed and caused an abrasion. He is  running low grade fevers, slightly tachycardic and does have  leukocytosis of almost 13,000. His blood glucose level was 261.   He did  get a left foot x-ray along with an ultrasound of the left lower  extremity and the ultrasound revealed no evidence of any DVT, but he did  have some left inguinal lymphadenopathy and he does also report Consult Note  7/15/2019   Patient Name: Kesha Lamar : 1965   Impression  · MRSA and Enterococcus bacteremia              Left thumb, ? CLABSI  § Previous hx of MRSA and Pseudomonas infection of the left hand in 2019   § 2 sets of blood cultures positive for MRSA and enterococcus. No stigmata of endocarditis, however this will still need to be ruled out. Probable source is left thumb versus PICC line. · Left Diabetic foot ulcer. § His last admission, culture positive for MRSA, group B streptococcus, alpha viridans group Streptococcus, ESBL Proteus mirabilis. Treated 2-week course of meropenem transition to ertapenem and linezolid. · Multi-morbidity: per PMHx  Plan:  · Discontinue Zosyn  · Continue vancomycin, start meropenem  · Remove left arm PICC line and send tip for culture. · Will need surgical evaluation of left thumb as this may need to be amputated. · Obtain transthoracic echocardiogram, if negative, patient will need a cardiology consult for a transesophageal echocardiogram  · Repeat blood cultures every 48 hours until negative. · Check ESR, procalcitonin and CRP  Thank you for allowing me to consult in the care of this patient.  ------------------------  REASON FOR CONSULT: Infective syndrome   Requested by: Hospitalist service  HPI:Patient is a 46 y. o. white male with diabetes mellitus, previous history of right fifth digit amputation, finger amputations of both hands, who was recently admitted to the hospital for 6/10/2019 for management of left hallux blister. MRI was negative for osteomyelitis, the patient had wound cultures positive for MRSA, beta-hemolytic group B streptococcus, ESBL Proteus mirabilis, alpha viridans group Streptococcus, he was treated with a 2-week course of ertapenem who was admitted 2019 for further evaluation and management of left thumb pain, swelling. He complains of a 3-day history of fever, chills, worsening pain of his left thumb.   He states he was Failure, On Dialysis\"    Early Death Mother 39    Diabetes Father           \"Type II\"         Infectious disease related family history - not contibutory. SOCIAL HISTORY  Social History            Tobacco Use    Smoking status: Current Every Day Smoker    Smokeless tobacco: Current User       Types: Snuff    Tobacco comment: \"Chew Grizzly Snuff Daily\"   Substance Use Topics    Alcohol use: No      · Born:  · Lived  · Occupation:  · No recent travel of significance. · No recent unusual exposures. · NO pets    ? ALLERGIES        Allergies   Allergen Reactions    Robaxin [Methocarbamol] Swelling    Rocephin [Ceftriaxone Sodium-Lidocaine] Hives       Noted on 10/26 - developed extremity swelling and hives. No anaphylaxis.  Sulfa Antibiotics Hives       Noted on 10/26 - developed extremity swelling and hives. No anaphylaxis.  Cantaloupe (Diagnostic) Other (See Comments)    Ceftriaxone Hives       Tolerated Zosyn well 1/2/2019     Aspirin Nausea Only    Propoxyphene Nausea And Vomiting    Toradol [Ketorolac Tromethamine] Other (See Comments)       Sweats       MEDICATIONS  Reviewed and are per the chart/EMR. ? Antibiotics:   Vancomycin   Meropenem  ?  -------------------------------------------------------------------------------------------------------------------     Vital Signs:      Vitals:     07/15/19 0754   BP:     Pulse:     Resp:     Temp:     SpO2: 98%            Exam:     VS: noted; wt 123.1 kg   Gen: alert and oriented X3, no distress  Skin: no stigmata of endocarditis  Wounds: left thumb swelling, redness and scab over the distal phalanx, left foot plantar ulcer at the 1st metatarsal base, and left heal , C/D/I  HEMT: AT/NC Oropharynx pink, moist, and without lesions or exudates; dentition in good state of repair  Eyes: PERRLA, EOMI, conjunctiva pink, sclera anicteric. Neck: Supple. Trachea midline. No LAD. Chest: no distress and CTA. Good air movement. Heart: RRR and no MRG. noted to  have bacteremia with MRSA and enterococci, hence the consult for  transesophageal echocardiographic study to rule out any endocarditis  vegetations. PAST MEDICAL HISTORY:  Significant for known history of adult onset  diabetes, previous osteomyelitis problems in the past.  He is not known  to have any cardiovascular problems so far. He does have fibromyalgia,  arthritis, anxiety, asthma problems, and chronic back pain issues. PAST SURGICAL HISTORY:  Significant for rotator cuff repair, finger  surgery, hemorrhoid surgery, actually multiple surgeries for his fingers  and amputations. SOCIAL HISTORY:  Unfortunately, he still continues to smoke. Also uses  snuff as well as some marijuana, but there is no history of alcohol  abuse. FAMILY HISTORY:  Reportedly, mother had kidney failure and was on  dialysis,  early. Father had adult onset diabetes. REVIEW OF SYSTEMS:  Mainly significant for his multiple finger  infections. In addition, he does have a history of migraines, restless legs syndrome  in addition to the medical and surgical conditions described. ALLERGIES:  He is allergic to ROBAXIN, ROCEPHIN, SULFA ANTIBIOTICS,  CANTALOUPE, CEFTRIAXONE, ASPIRIN, PROPOXYPHENE, and TORADOL. MEDICATIONS:  He is currently being treated with vancomycin and  meropenem antibiotics. He is also on Zoloft, lactobacillus, and Lovenox  subcu prophylaxis, insulin coverage. PHYSICAL EXAMINATION:  GENERAL:  Obese white male, not in acute distress. VITAL SIGNS:  He is afebrile, heart rate is 79 beats per minute and  regular, blood pressure 112/66 mmHg. HEENT:  Reveals head to be atraumatic, normocephalic. Extraocular  movements intact. EYES:  Reveals conjunctivae to be pink. There is no jaundice. SKIN:  Other than hands appears to be healthy. NECK:  Supple. There is no elevation of JVD. Carotid upstrokes are  intact. I did not hear bruits.   HEART:  Reveals regular rate and

## 2019-07-19 NOTE — PROGRESS NOTES
Pt transported via stretcher per [employer]. All questions answered. Vss WNL, Pain medication given at 2105 per pt request. All belonging with pt.

## 2019-07-19 NOTE — DISCHARGE INSTR - COC
Administered Date(s) Administered    Influenza Vaccine, unspecified formulation 10/19/2015, 04/19/2016    Influenza Virus Vaccine 10/23/2013    Pneumococcal Polysaccharide ([id number]) 10/23/2013    Tdap (Boostrix, Adacel) 08/21/2016       Active Problems:  Patient Active Problem List   Diagnosis Code    Diabetic foot infection ([facility] E11.628, L08.9    Uncontrolled diabetes mellitus with complications ([facility] B42.5, E11.65    Diabetes with ulcer of foot ([facility] E11.621, L97.509    Ulcer of other part of foot L97.509    Diabetes mellitus with ulcer of heel ([facility] E11.621, L97.409    Obesity, Class III, BMI 40-49.9 (morbid obesity) ([facility] E66.01    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) L97.523    Skin ulcer of toe of left foot with fat layer exposed ([facility] L97.522    Chemical dependency ([facility] F19.20    Chronic pain syndrome G89.4    Drug abuse ([facility] F19.10    Sepsis without acute organ dysfunction (HCC) A41.9    Foot pain M79.673    WD-S/P amputation of lesser toe, right ([facility] Z89.421    Acute osteomyelitis involving hand ([facility] M86.149    Diabetic infection of left foot ([facility] E11.628, L08.9       Isolation/Infection:   Isolation          Contact        Patient Infection Status     Infection Onset Added Last Indicated Last Indicated By Review Planned Expiration Resolved Resolved By    ESBL (Extended Spectrum Beta Lactamase) 06/10/19 06/14/19 06/10/19 Surgical Culture        6/10/19 Proteus, wound    MRSA  07/10/17 07/15/19 Wound culture        7/15/19 wound; 7/14/19 blood; 6/10/19 wound; 2/5/19 wound; 12/17/18 wound; 6/21/18 wound; 7/8/17 wound    Resolved    ESBL (Extended Spectrum Beta Lactamase)  06/05/15 06/05/15 Linden Banegas   10/19/16 Vibha Irby RN    9/8/15 Proteus mirabilis ESBL foot wound; 5/26/15 Proteus mirabilis ESBL foot wound    MDRO (multi-drug resistant organism)  02/17/14 02/17/14 Osorio Clifton RN   03/17/14 Osorio Clifton RN    wound 2/12/14 Sten 7/10/2019 10:16 AM   Dressing Status Clean;Dry; Intact 7/18/2019  9:15 AM   Dressing Changed Changed/New 7/17/2019  3:17 PM   Dressing/Treatment Open to air 7/16/2019 10:03 AM   Wound Cleansed Rinsed/Irrigated with saline 7/17/2019  3:17 PM   Wound Length (cm) 2.5 cm 7/17/2019  3:17 PM   Wound Width (cm) 3.7 cm 7/17/2019  3:17 PM   Wound Depth (cm) 0.5 cm 7/17/2019  3:17 PM   Wound Surface Area (cm^2) 9.25 cm^2 7/17/2019  3:17 PM   Change in Wound Size % (l*w) 2.12 7/17/2019  3:17 PM   Wound Volume (cm^3) 4.62 cm^3 7/17/2019  3:17 PM   Wound Healing % 30 7/17/2019  3:17 PM   Post-Procedure Length (cm) 2.7 cm 7/10/2019 11:00 AM   Post-Procedure Width (cm) 3.5 cm 7/10/2019 11:00 AM   Post-Procedure Depth (cm) 0.7 cm 7/10/2019 11:00 AM   Post-Procedure Surface Area (cm^2) 9.45 cm^2 7/10/2019 11:00 AM   Post-Procedure Volume (cm^3) 6.62 cm^3 7/10/2019 11:00 AM   Distance Tunneling (cm) 0 cm 7/10/2019 10:16 AM   Tunneling Position ___ O'Clock 0 7/10/2019 10:16 AM   Undermining Starts ___ O'Clock 3 7/17/2019  3:17 PM   Undermining Ends___ O'Clock 6 7/17/2019  3:17 PM   Undermining Max[place] Distance (cm) 1.7 7/17/2019  3:17 PM   Wound Assessment Pink;Red; White;Yellow 7/17/2019  3:17 PM   Drainage Amount Small 7/17/2019  3:17 PM   Drainage Description Serosanguinous 7/17/2019  3:17 PM   Odor None 7/17/2019  3:17 PM   Margins Defined edges 7/17/2019  3:17 PM   Thu-wound Assessment Calloused 7/17/2019  3:17 PM   Non-staged Wound Description Full thickness 7/17/2019  3:17 PM   [place]%Wound Bed 25 7/17/2019  3:17 PM   Red%Wound Bed 25 7/17/2019  3:17 PM   Yellow%Wound Bed 25 7/17/2019  3:17 PM   Black%Wound Bed 0 7/17/2019  3:17 PM   Purple%Wound Bed 0 7/17/2019  3:17 PM   Other%Wound Bed 25 7/17/2019  3:17 PM   Number of days: 8       Wound 07/10/19 Foot Left;Medial # 25 L Medial Foot (Active)   Wound Image   7/10/2019 10:16 AM   Wound Diabetic Jiang 2 7/10/2019 10:16 AM   Dressing Status Clean;Dry; Intact 7/18/2019  9:15 AM Dressing Changed Changed/New 7/17/2019  3:17 PM   Dressing/Treatment Open to air 7/16/2019 10:03 AM   Wound Cleansed Rinsed/Irrigated with saline 7/17/2019  3:17 PM   Wound Length (cm) 1.1 cm 7/17/2019  3:17 PM   Wound Width (cm) 0.5 cm 7/17/2019  3:17 PM   Wound Depth (cm) 0.2 cm 7/17/2019  3:17 PM   Wound Surface Area (cm^2) 0.55 cm^2 7/17/2019  3:17 PM   Change in Wound Size % (l*w) 39.56 7/17/2019  3:17 PM   Wound Volume (cm^3) 0.11 cm^3 7/17/2019  3:17 PM   Wound Healing % 59 7/17/2019  3:17 PM   Post-Procedure Length (cm) 0.7 cm 7/10/2019 11:00 AM   Post-Procedure Width (cm) 1.3 cm 7/10/2019 11:00 AM   Post-Procedure Depth (cm) 0.3 cm 7/10/2019 11:00 AM   Post-Procedure Surface Area (cm^2) 0.91 cm^2 7/10/2019 11:00 AM   Post-Procedure Volume (cm^3) 0.27 cm^3 7/10/2019 11:00 AM   Distance Tunneling (cm) 0 cm 7/10/2019 10:16 AM   Tunneling Position ___ O'Clock 0 7/10/2019 10:16 AM   Undermining Starts ___ O'Clock 0 7/10/2019 10:16 AM   Undermining Ends___ O'Clock 0 7/10/2019 10:16 AM   Undermining Maxium Distance (cm) 0 7/10/2019 10:16 AM   Wound Assessment Pink 7/17/2019  3:17 PM   Drainage Amount Moderate 7/17/2019  3:17 PM   Drainage Description Serosanguinous 7/17/2019  3:17 PM   Odor None 7/17/2019  3:17 PM   Margins Defined edges 7/17/2019  3:17 PM   Thu-wound Assessment Calloused 7/17/2019  3:17 PM   Non-staged Wound Description Full thickness 7/17/2019  3:17 PM   [place]%Wound Bed 100 7/17/2019  3:17 PM   Red%Wound Bed 0 7/17/2019  3:17 PM   Yellow%Wound Bed 0 7/17/2019  3:17 PM   Black%Wound Bed 0 7/17/2019  3:17 PM   Purple%Wound Bed 0 7/17/2019  3:17 PM   Other%Wound Bed 0 7/10/2019 10:16 AM   Number of days: 8        Elimination:  Continence:   · Bowel: {YES / QW:06348}  · Bladder: {YES / PN:37215}  Urinary Catheter: {Urinary Catheter:[id number]}   Colostomy/Ileostomy/Ileal Conduit: {YES / KW:16882}       Date of Last BM: ***    Intake/Output Summary (Last 24 hours) at 7/18/2019 2017  Last data filed

## 2019-07-20 LAB
CULTURE: ABNORMAL
Lab: ABNORMAL
SPECIMEN: ABNORMAL

## 2019-07-22 LAB
CULTURE: NORMAL
CULTURE: NORMAL
Lab: NORMAL
Lab: NORMAL
SPECIMEN: NORMAL
SPECIMEN: NORMAL

## 2019-07-25 ENCOUNTER — HOSPITAL ENCOUNTER (OUTPATIENT)
Dept: WOUND CARE | Age: 54
Discharge: HOME | End: 2019-07-25
Payer: MEDICARE

## 2019-08-20 ENCOUNTER — TELEPHONE (OUTPATIENT)
Dept: WOUND CARE | Age: 54
End: 2019-08-20

## 2019-08-21 ENCOUNTER — TELEPHONE (OUTPATIENT)
Dept: WOUND CARE | Age: 54
End: 2019-08-21

## 2019-10-17 ENCOUNTER — APPOINTMENT (OUTPATIENT)
Dept: INTERVENTIONAL RADIOLOGY/VASCULAR | Age: 54
End: 2019-10-17
Payer: MEDICARE

## 2019-10-17 ENCOUNTER — HOSPITAL ENCOUNTER (EMERGENCY)
Age: 54
Discharge: HOME | End: 2019-10-17
Attending: EMERGENCY MEDICINE
Payer: MEDICARE

## 2019-10-17 VITALS
HEIGHT: 72 IN | BODY MASS INDEX: 35.21 KG/M2 | HEART RATE: 72 BPM | TEMPERATURE: 98.3 F | RESPIRATION RATE: 14 BRPM | DIASTOLIC BLOOD PRESSURE: 74 MMHG | OXYGEN SATURATION: 97 % | WEIGHT: 260 LBS | SYSTOLIC BLOOD PRESSURE: 118 MMHG

## 2019-10-17 DIAGNOSIS — Z45.2 PIC LINE (PERIPHERALLY INSERTED CENTRAL CATHETER) REMOVAL: Primary | ICD-10-CM

## 2019-10-17 LAB
ANION GAP SERPL CALCULATED.3IONS-SCNC: 13 MMOL/L (ref 4–16)
APTT: 28.2 SECONDS (ref 21.2–33)
BASOPHILS ABSOLUTE: 0.1 K/CU MM
BASOPHILS RELATIVE PERCENT: 0.6 % (ref 0–1)
BUN BLDV-MCNC: 14 MG/DL (ref 6–23)
CALCIUM SERPL-MCNC: 9.5 MG/DL (ref 8.3–10.6)
CHLORIDE BLD-SCNC: 98 MMOL/L (ref 99–110)
CO2: 27 MMOL/L (ref 21–32)
CREAT SERPL-MCNC: 0.7 MG/DL (ref 0.9–1.3)
DIFFERENTIAL TYPE: ABNORMAL
EOSINOPHILS ABSOLUTE: 0.2 K/CU MM
EOSINOPHILS RELATIVE PERCENT: 2.8 % (ref 0–3)
GFR AFRICAN AMERICAN: >60 ML/MIN/1.73M2
GFR NON-AFRICAN AMERICAN: >60 ML/MIN/1.73M2
GLUCOSE BLD-MCNC: 182 MG/DL (ref 70–99)
HCT VFR BLD CALC: 44 % (ref 42–52)
HEMOGLOBIN: 13.2 GM/DL (ref 13.5–18)
IMMATURE NEUTROPHIL %: 0.3 % (ref 0–0.43)
INR BLD: 1.06 INDEX
LYMPHOCYTES ABSOLUTE: 2 K/CU MM
LYMPHOCYTES RELATIVE PERCENT: 25.7 % (ref 24–44)
MCH RBC QN AUTO: 25.7 PG (ref 27–31)
MCHC RBC AUTO-ENTMCNC: 30 % (ref 32–36)
MCV RBC AUTO: 85.6 FL (ref 78–100)
MONOCYTES ABSOLUTE: 0.6 K/CU MM
MONOCYTES RELATIVE PERCENT: 7.4 % (ref 0–4)
NUCLEATED RBC %: 0 %
PDW BLD-RTO: 16.8 % (ref 11.7–14.9)
PLATELET # BLD: 247 K/CU MM (ref 140–440)
PMV BLD AUTO: 9.6 FL (ref 7.5–11.1)
POTASSIUM SERPL-SCNC: 4.3 MMOL/L (ref 3.5–5.1)
PROTHROMBIN TIME: 12.3 SECONDS (ref 11.7–14.5)
RBC # BLD: 5.14 M/CU MM (ref 4.6–6.2)
SEGMENTED NEUTROPHILS ABSOLUTE COUNT: 4.9 K/CU MM
SEGMENTED NEUTROPHILS RELATIVE PERCENT: 63.2 % (ref 36–66)
SODIUM BLD-SCNC: 138 MMOL/L (ref 135–145)
TOTAL IMMATURE NEUTOROPHIL: 0.02 K/CU MM
TOTAL NUCLEATED RBC: 0 K/CU MM
WBC # BLD: 7.8 K/CU MM (ref 4–10.5)

## 2019-10-17 PROCEDURE — 99284 EMERGENCY DEPT VISIT MOD MDM: CPT

## 2019-10-17 PROCEDURE — 2709999900 HC NON-CHARGEABLE SUPPLY

## 2019-10-17 PROCEDURE — 85730 THROMBOPLASTIN TIME PARTIAL: CPT

## 2019-10-17 PROCEDURE — 36589 REMOVAL TUNNELED CV CATH: CPT

## 2019-10-17 PROCEDURE — 85610 PROTHROMBIN TIME: CPT

## 2019-10-17 PROCEDURE — 85025 COMPLETE CBC W/AUTO DIFF WBC: CPT

## 2019-10-17 PROCEDURE — 80048 BASIC METABOLIC PNL TOTAL CA: CPT

## 2019-10-17 RX ORDER — ESCITALOPRAM OXALATE 10 MG/1
10 TABLET ORAL DAILY
COMMUNITY

## 2019-10-17 RX ORDER — OMEPRAZOLE 20 MG/1
20 CAPSULE, DELAYED RELEASE ORAL DAILY
COMMUNITY
End: 2020-01-23

## 2019-10-17 RX ORDER — AMMONIUM LACTATE 12 G/100G
LOTION TOPICAL 3 TIMES DAILY
COMMUNITY
End: 2020-01-23 | Stop reason: ALTCHOICE

## 2019-10-17 RX ORDER — BISACODYL 10 MG
10 SUPPOSITORY, RECTAL RECTAL DAILY PRN
COMMUNITY
End: 2020-01-23 | Stop reason: ALTCHOICE

## 2019-10-17 RX ORDER — OXYCODONE HYDROCHLORIDE 5 MG/1
10 TABLET ORAL EVERY 4 HOURS
COMMUNITY
End: 2021-11-06

## 2019-10-17 RX ORDER — SIMETHICONE 80 MG
80 TABLET,CHEWABLE ORAL EVERY 8 HOURS PRN
COMMUNITY
End: 2020-01-23 | Stop reason: ALTCHOICE

## 2019-10-17 ASSESSMENT — ENCOUNTER SYMPTOMS
TROUBLE SWALLOWING: 0
ABDOMINAL PAIN: 0
WHEEZING: 0
EYE PAIN: 0
VOICE CHANGE: 0
STRIDOR: 0
SHORTNESS OF BREATH: 0
VOMITING: 0
BACK PAIN: 0
NAUSEA: 0

## 2019-10-28 ENCOUNTER — HOSPITAL ENCOUNTER (EMERGENCY)
Age: 54
Discharge: HOME | End: 2019-10-28
Attending: EMERGENCY MEDICINE
Payer: MEDICARE

## 2019-10-28 ENCOUNTER — APPOINTMENT (OUTPATIENT)
Dept: GENERAL RADIOLOGY | Age: 54
End: 2019-10-28
Payer: MEDICARE

## 2019-10-28 VITALS
DIASTOLIC BLOOD PRESSURE: 98 MMHG | OXYGEN SATURATION: 97 % | SYSTOLIC BLOOD PRESSURE: 142 MMHG | BODY MASS INDEX: 36.4 KG/M2 | HEIGHT: 71 IN | TEMPERATURE: 98.6 F | HEART RATE: 86 BPM | RESPIRATION RATE: 18 BRPM | WEIGHT: 260 LBS

## 2019-10-28 DIAGNOSIS — M79.675 GREAT TOE PAIN, LEFT: Primary | ICD-10-CM

## 2019-10-28 PROCEDURE — 73630 X-RAY EXAM OF FOOT: CPT

## 2019-10-28 PROCEDURE — 6370000000 HC RX 637 (ALT 250 FOR IP): Performed by: EMERGENCY MEDICINE

## 2019-10-28 PROCEDURE — 99283 EMERGENCY DEPT VISIT LOW MDM: CPT

## 2019-10-28 RX ORDER — OXYCODONE HYDROCHLORIDE AND ACETAMINOPHEN 5; 325 MG/1; MG/1
1-2 TABLET ORAL EVERY 4 HOURS PRN
Qty: 15 TABLET | Refills: 0 | Status: SHIPPED | OUTPATIENT
Start: 2019-10-28 | End: 2019-10-31

## 2019-10-28 RX ORDER — OXYCODONE HYDROCHLORIDE AND ACETAMINOPHEN 5; 325 MG/1; MG/1
2 TABLET ORAL ONCE
Status: COMPLETED | OUTPATIENT
Start: 2019-10-28 | End: 2019-10-28

## 2019-10-28 RX ADMIN — OXYCODONE HYDROCHLORIDE AND ACETAMINOPHEN 2 TABLET: 5; 325 TABLET ORAL at 21:36

## 2019-10-28 ASSESSMENT — PAIN DESCRIPTION - PAIN TYPE: TYPE: ACUTE PAIN

## 2019-10-28 ASSESSMENT — PAIN SCALES - GENERAL
PAINLEVEL_OUTOF10: 8
PAINLEVEL_OUTOF10: 8

## 2019-10-28 ASSESSMENT — PAIN DESCRIPTION - DESCRIPTORS: DESCRIPTORS: ACHING;THROBBING

## 2019-10-28 ASSESSMENT — PAIN DESCRIPTION - ONSET: ONSET: ON-GOING

## 2019-10-28 ASSESSMENT — PAIN DESCRIPTION - FREQUENCY: FREQUENCY: CONTINUOUS

## 2019-10-28 ASSESSMENT — PAIN DESCRIPTION - LOCATION: LOCATION: TOE (COMMENT WHICH ONE)

## 2019-10-28 ASSESSMENT — PAIN DESCRIPTION - ORIENTATION: ORIENTATION: LEFT

## 2019-10-30 ENCOUNTER — HOSPITAL ENCOUNTER (EMERGENCY)
Age: 54
Discharge: ANOTHER HOSPITAL | End: 2019-10-31
Attending: EMERGENCY MEDICINE
Payer: MEDICARE

## 2019-10-30 ENCOUNTER — APPOINTMENT (OUTPATIENT)
Dept: GENERAL RADIOLOGY | Age: 54
End: 2019-10-30
Payer: MEDICARE

## 2019-10-30 VITALS
HEIGHT: 71 IN | WEIGHT: 260 LBS | TEMPERATURE: 99.5 F | SYSTOLIC BLOOD PRESSURE: 119 MMHG | HEART RATE: 86 BPM | RESPIRATION RATE: 22 BRPM | BODY MASS INDEX: 36.4 KG/M2 | OXYGEN SATURATION: 95 % | DIASTOLIC BLOOD PRESSURE: 80 MMHG

## 2019-10-30 DIAGNOSIS — G89.29 CHRONIC PAIN IN LEFT FOOT: Primary | ICD-10-CM

## 2019-10-30 DIAGNOSIS — M79.672 CHRONIC PAIN IN LEFT FOOT: Primary | ICD-10-CM

## 2019-10-30 LAB
ALBUMIN SERPL-MCNC: 3.8 GM/DL (ref 3.4–5)
ALP BLD-CCNC: 97 IU/L (ref 40–129)
ALT SERPL-CCNC: 5 U/L (ref 10–40)
ANION GAP SERPL CALCULATED.3IONS-SCNC: 12 MMOL/L (ref 4–16)
AST SERPL-CCNC: 11 IU/L (ref 15–37)
BASOPHILS ABSOLUTE: 0 K/CU MM
BASOPHILS RELATIVE PERCENT: 0.4 % (ref 0–1)
BILIRUB SERPL-MCNC: 0.5 MG/DL (ref 0–1)
BUN BLDV-MCNC: 12 MG/DL (ref 6–23)
CALCIUM SERPL-MCNC: 10 MG/DL (ref 8.3–10.6)
CHLORIDE BLD-SCNC: 96 MMOL/L (ref 99–110)
CO2: 25 MMOL/L (ref 21–32)
CREAT SERPL-MCNC: 0.9 MG/DL (ref 0.9–1.3)
DIFFERENTIAL TYPE: ABNORMAL
EOSINOPHILS ABSOLUTE: 0 K/CU MM
EOSINOPHILS RELATIVE PERCENT: 0 % (ref 0–3)
GFR AFRICAN AMERICAN: >60 ML/MIN/1.73M2
GFR NON-AFRICAN AMERICAN: >60 ML/MIN/1.73M2
GLUCOSE BLD-MCNC: 274 MG/DL (ref 70–99)
HCT VFR BLD CALC: 40.6 % (ref 42–52)
HEMOGLOBIN: 12.8 GM/DL (ref 13.5–18)
IMMATURE NEUTROPHIL %: 0.3 % (ref 0–0.43)
LACTATE: ABNORMAL MMOL/L (ref 0.4–2)
LYMPHOCYTES ABSOLUTE: 0.8 K/CU MM
LYMPHOCYTES RELATIVE PERCENT: 11.2 % (ref 24–44)
MCH RBC QN AUTO: 26.4 PG (ref 27–31)
MCHC RBC AUTO-ENTMCNC: 31.5 % (ref 32–36)
MCV RBC AUTO: 83.7 FL (ref 78–100)
MONOCYTES ABSOLUTE: 0.6 K/CU MM
MONOCYTES RELATIVE PERCENT: 8.8 % (ref 0–4)
NUCLEATED RBC %: 0 %
PDW BLD-RTO: 16.2 % (ref 11.7–14.9)
PLATELET # BLD: 177 K/CU MM (ref 140–440)
PMV BLD AUTO: 10.9 FL (ref 7.5–11.1)
POTASSIUM SERPL-SCNC: 3.9 MMOL/L (ref 3.5–5.1)
RBC # BLD: 4.85 M/CU MM (ref 4.6–6.2)
SEGMENTED NEUTROPHILS ABSOLUTE COUNT: 5.7 K/CU MM
SEGMENTED NEUTROPHILS RELATIVE PERCENT: 79.3 % (ref 36–66)
SODIUM BLD-SCNC: 133 MMOL/L (ref 135–145)
TOTAL IMMATURE NEUTOROPHIL: 0.02 K/CU MM
TOTAL NUCLEATED RBC: 0 K/CU MM
TOTAL PROTEIN: 7.3 GM/DL (ref 6.4–8.2)
WBC # BLD: 7.2 K/CU MM (ref 4–10.5)

## 2019-10-30 PROCEDURE — 6370000000 HC RX 637 (ALT 250 FOR IP): Performed by: PHYSICIAN ASSISTANT

## 2019-10-30 PROCEDURE — 85025 COMPLETE CBC W/AUTO DIFF WBC: CPT

## 2019-10-30 PROCEDURE — 87040 BLOOD CULTURE FOR BACTERIA: CPT

## 2019-10-30 PROCEDURE — 73630 X-RAY EXAM OF FOOT: CPT

## 2019-10-30 PROCEDURE — 80053 COMPREHEN METABOLIC PANEL: CPT

## 2019-10-30 PROCEDURE — 83605 ASSAY OF LACTIC ACID: CPT

## 2019-10-30 PROCEDURE — 2580000003 HC RX 258: Performed by: PHYSICIAN ASSISTANT

## 2019-10-30 PROCEDURE — 99283 EMERGENCY DEPT VISIT LOW MDM: CPT

## 2019-10-30 RX ORDER — OXYCODONE HYDROCHLORIDE AND ACETAMINOPHEN 5; 325 MG/1; MG/1
1 TABLET ORAL ONCE
Status: COMPLETED | OUTPATIENT
Start: 2019-10-30 | End: 2019-10-30

## 2019-10-30 RX ORDER — OXYCODONE HYDROCHLORIDE AND ACETAMINOPHEN 5; 325 MG/1; MG/1
1 TABLET ORAL ONCE
Status: COMPLETED | OUTPATIENT
Start: 2019-10-31 | End: 2019-10-30

## 2019-10-30 RX ORDER — 0.9 % SODIUM CHLORIDE 0.9 %
1000 INTRAVENOUS SOLUTION INTRAVENOUS ONCE
Status: COMPLETED | OUTPATIENT
Start: 2019-10-31 | End: 2019-10-31

## 2019-10-30 RX ADMIN — OXYCODONE HYDROCHLORIDE AND ACETAMINOPHEN 1 TABLET: 5; 325 TABLET ORAL at 22:39

## 2019-10-30 RX ADMIN — SODIUM CHLORIDE 1000 ML: 9 INJECTION, SOLUTION INTRAVENOUS at 23:53

## 2019-10-30 RX ADMIN — OXYCODONE HYDROCHLORIDE AND ACETAMINOPHEN 1 TABLET: 5; 325 TABLET ORAL at 23:52

## 2019-10-30 ASSESSMENT — PAIN DESCRIPTION - ORIENTATION: ORIENTATION: RIGHT;LEFT;LOWER

## 2019-10-30 ASSESSMENT — PAIN SCALES - GENERAL
PAINLEVEL_OUTOF10: 8
PAINLEVEL_OUTOF10: 10
PAINLEVEL_OUTOF10: 10

## 2019-10-30 ASSESSMENT — PAIN DESCRIPTION - LOCATION: LOCATION: LEG;FOOT

## 2019-10-31 PROCEDURE — 6360000002 HC RX W HCPCS: Performed by: PHYSICIAN ASSISTANT

## 2019-10-31 PROCEDURE — 2580000003 HC RX 258: Performed by: PHYSICIAN ASSISTANT

## 2019-10-31 PROCEDURE — 96365 THER/PROPH/DIAG IV INF INIT: CPT

## 2019-10-31 RX ADMIN — DAPTOMYCIN 740 MG: 500 INJECTION, POWDER, LYOPHILIZED, FOR SOLUTION INTRAVENOUS at 00:14

## 2019-11-04 LAB
CULTURE: NORMAL
CULTURE: NORMAL
Lab: NORMAL
Lab: NORMAL
SPECIMEN: NORMAL
SPECIMEN: NORMAL

## 2020-01-23 ENCOUNTER — APPOINTMENT (OUTPATIENT)
Dept: GENERAL RADIOLOGY | Age: 55
End: 2020-01-23
Payer: MEDICARE

## 2020-01-23 ENCOUNTER — HOSPITAL ENCOUNTER (EMERGENCY)
Age: 55
Discharge: HOME | End: 2020-01-23
Payer: MEDICARE

## 2020-01-23 VITALS
TEMPERATURE: 98.1 F | HEART RATE: 88 BPM | BODY MASS INDEX: 38.74 KG/M2 | SYSTOLIC BLOOD PRESSURE: 133 MMHG | DIASTOLIC BLOOD PRESSURE: 70 MMHG | HEIGHT: 72 IN | RESPIRATION RATE: 17 BRPM | OXYGEN SATURATION: 97 % | WEIGHT: 286 LBS

## 2020-01-23 LAB
ALBUMIN SERPL-MCNC: 4.3 GM/DL (ref 3.4–5)
ALP BLD-CCNC: 95 IU/L (ref 40–129)
ALT SERPL-CCNC: 7 U/L (ref 10–40)
ANION GAP SERPL CALCULATED.3IONS-SCNC: 15 MMOL/L (ref 4–16)
AST SERPL-CCNC: 12 IU/L (ref 15–37)
BASOPHILS ABSOLUTE: 0.1 K/CU MM
BASOPHILS RELATIVE PERCENT: 0.8 % (ref 0–1)
BILIRUB SERPL-MCNC: 0.4 MG/DL (ref 0–1)
BUN BLDV-MCNC: 9 MG/DL (ref 6–23)
CALCIUM SERPL-MCNC: 9.3 MG/DL (ref 8.3–10.6)
CHLORIDE BLD-SCNC: 99 MMOL/L (ref 99–110)
CO2: 21 MMOL/L (ref 21–32)
CREAT SERPL-MCNC: 0.7 MG/DL (ref 0.9–1.3)
D DIMER: 134 NG/ML(DDU)
DIFFERENTIAL TYPE: ABNORMAL
EOSINOPHILS ABSOLUTE: 0.1 K/CU MM
EOSINOPHILS RELATIVE PERCENT: 1.2 % (ref 0–3)
GFR AFRICAN AMERICAN: >60 ML/MIN/1.73M2
GFR NON-AFRICAN AMERICAN: >60 ML/MIN/1.73M2
GLUCOSE BLD-MCNC: 249 MG/DL (ref 70–99)
HCT VFR BLD CALC: 45.8 % (ref 42–52)
HEMOGLOBIN: 14.5 GM/DL (ref 13.5–18)
IMMATURE NEUTROPHIL %: 0.3 % (ref 0–0.43)
LIPASE: 28 IU/L (ref 13–60)
LYMPHOCYTES ABSOLUTE: 1.8 K/CU MM
LYMPHOCYTES RELATIVE PERCENT: 23.6 % (ref 24–44)
MCH RBC QN AUTO: 26.3 PG (ref 27–31)
MCHC RBC AUTO-ENTMCNC: 31.7 % (ref 32–36)
MCV RBC AUTO: 83 FL (ref 78–100)
MONOCYTES ABSOLUTE: 0.5 K/CU MM
MONOCYTES RELATIVE PERCENT: 6.3 % (ref 0–4)
NUCLEATED RBC %: 0 %
PDW BLD-RTO: 16.9 % (ref 11.7–14.9)
PLATELET # BLD: 300 K/CU MM (ref 140–440)
PMV BLD AUTO: 10.4 FL (ref 7.5–11.1)
POTASSIUM SERPL-SCNC: 4.1 MMOL/L (ref 3.5–5.1)
PRO-BNP: 44.98 PG/ML
RBC # BLD: 5.52 M/CU MM (ref 4.6–6.2)
SEGMENTED NEUTROPHILS ABSOLUTE COUNT: 5.1 K/CU MM
SEGMENTED NEUTROPHILS RELATIVE PERCENT: 67.8 % (ref 36–66)
SODIUM BLD-SCNC: 135 MMOL/L (ref 135–145)
TOTAL IMMATURE NEUTOROPHIL: 0.02 K/CU MM
TOTAL NUCLEATED RBC: 0 K/CU MM
TOTAL PROTEIN: 7 GM/DL (ref 6.4–8.2)
TROPONIN T: <0.01 NG/ML
WBC # BLD: 7.5 K/CU MM (ref 4–10.5)

## 2020-01-23 PROCEDURE — 99284 EMERGENCY DEPT VISIT MOD MDM: CPT

## 2020-01-23 PROCEDURE — 83880 ASSAY OF NATRIURETIC PEPTIDE: CPT

## 2020-01-23 PROCEDURE — 85025 COMPLETE CBC W/AUTO DIFF WBC: CPT

## 2020-01-23 PROCEDURE — 80053 COMPREHEN METABOLIC PANEL: CPT

## 2020-01-23 PROCEDURE — 93005 ELECTROCARDIOGRAM TRACING: CPT | Performed by: PHYSICIAN ASSISTANT

## 2020-01-23 PROCEDURE — 85379 FIBRIN DEGRADATION QUANT: CPT

## 2020-01-23 PROCEDURE — 71046 X-RAY EXAM CHEST 2 VIEWS: CPT

## 2020-01-23 PROCEDURE — 6370000000 HC RX 637 (ALT 250 FOR IP): Performed by: PHYSICIAN ASSISTANT

## 2020-01-23 PROCEDURE — 83690 ASSAY OF LIPASE: CPT

## 2020-01-23 PROCEDURE — 84484 ASSAY OF TROPONIN QUANT: CPT

## 2020-01-23 RX ORDER — DICYCLOMINE HYDROCHLORIDE 10 MG/1
20 CAPSULE ORAL 4 TIMES DAILY PRN
Qty: 30 CAPSULE | Refills: 0 | Status: SHIPPED | OUTPATIENT
Start: 2020-01-23 | End: 2020-04-07 | Stop reason: SDUPTHER

## 2020-01-23 RX ORDER — HYDROCODONE BITARTRATE AND ACETAMINOPHEN 5; 325 MG/1; MG/1
1 TABLET ORAL ONCE
Status: COMPLETED | OUTPATIENT
Start: 2020-01-23 | End: 2020-01-23

## 2020-01-23 RX ORDER — LIDOCAINE HYDROCHLORIDE 20 MG/ML
15 SOLUTION OROPHARYNGEAL ONCE
Status: COMPLETED | OUTPATIENT
Start: 2020-01-23 | End: 2020-01-23

## 2020-01-23 RX ORDER — FAMOTIDINE 20 MG/1
20 TABLET, FILM COATED ORAL ONCE
Status: COMPLETED | OUTPATIENT
Start: 2020-01-23 | End: 2020-01-23

## 2020-01-23 RX ORDER — PANTOPRAZOLE SODIUM 40 MG/1
40 TABLET, DELAYED RELEASE ORAL
Qty: 30 TABLET | Refills: 1 | Status: SHIPPED | OUTPATIENT
Start: 2020-01-23 | End: 2020-01-28 | Stop reason: SDUPTHER

## 2020-01-23 RX ORDER — ONDANSETRON 4 MG/1
4 TABLET, ORALLY DISINTEGRATING ORAL ONCE
Status: COMPLETED | OUTPATIENT
Start: 2020-01-23 | End: 2020-01-23

## 2020-01-23 RX ORDER — SUCRALFATE 1 G/1
1 TABLET ORAL 4 TIMES DAILY
Qty: 120 TABLET | Refills: 1 | Status: ON HOLD | OUTPATIENT
Start: 2020-01-23 | End: 2020-02-21

## 2020-01-23 RX ORDER — ONDANSETRON 4 MG/1
4 TABLET, ORALLY DISINTEGRATING ORAL EVERY 6 HOURS
Qty: 10 TABLET | Refills: 0 | Status: SHIPPED | OUTPATIENT
Start: 2020-01-23

## 2020-01-23 RX ORDER — MAGNESIUM HYDROXIDE/ALUMINUM HYDROXICE/SIMETHICONE 120; 1200; 1200 MG/30ML; MG/30ML; MG/30ML
30 SUSPENSION ORAL ONCE
Status: COMPLETED | OUTPATIENT
Start: 2020-01-23 | End: 2020-01-23

## 2020-01-23 RX ADMIN — HYDROCODONE BITARTRATE AND ACETAMINOPHEN 1 TABLET: 5; 325 TABLET ORAL at 22:14

## 2020-01-23 RX ADMIN — LIDOCAINE HYDROCHLORIDE 15 ML: 20 SOLUTION ORAL; TOPICAL at 19:30

## 2020-01-23 RX ADMIN — ALUMINUM HYDROXIDE, MAGNESIUM HYDROXIDE, AND SIMETHICONE 30 ML: 200; 200; 20 SUSPENSION ORAL at 19:30

## 2020-01-23 RX ADMIN — ONDANSETRON 4 MG: 4 TABLET, ORALLY DISINTEGRATING ORAL at 19:30

## 2020-01-23 RX ADMIN — FAMOTIDINE 20 MG: 20 TABLET, FILM COATED ORAL at 22:14

## 2020-01-23 ASSESSMENT — PAIN DESCRIPTION - LOCATION: LOCATION: ABDOMEN

## 2020-01-23 ASSESSMENT — PAIN SCALES - GENERAL
PAINLEVEL_OUTOF10: 10
PAINLEVEL_OUTOF10: 8

## 2020-01-24 PROCEDURE — 93010 ELECTROCARDIOGRAM REPORT: CPT | Performed by: INTERNAL MEDICINE

## 2020-01-24 NOTE — ED PROVIDER NOTES
 Neuropathy     Neuropathy     Obesity, Class III, BMI 40-49.9 (morbid obesity) (HCC)     Osteomyelitis (HCC)     hx finger    Pneumonia 1995    Poor circulation     Restless leg syndrome     Shortness of breath on exertion     6/14/2016- pt denies any sob with this interview    Type II or unspecified type diabetes mellitus with other specified manifestations, not stated as uncontrolled 4/8/2014    Type II or unspecified type diabetes mellitus without mention of complication, not stated as uncontrolled DX 2007    Ulcer of other part of foot 4/8/2014    'ulcer on left foot healed all up\"    Weakness      Past Surgical History:   Procedure Laterality Date    COLONOSCOPY  1990's    DEBRIDEMENT  8/25/2014    left foot    ENDOSCOPY, COLON, DIAGNOSTIC  06-    FINGER SURGERY  9-11-11    Right Index Finger    FINGER SURGERY  01-16-12    RIght Index Finger-Removal of loose body, Reconstruction of Mallet Finger    FOOT DEBRIDEMENT Left 6/10/2019    FOOT DEBRIDEMENT INCISION AND DRAINAGE performed by Jessica Jane MD at [address] [place] [address] Right 06/01/2018    I&D right foot    HEMORRHOID SURGERY  2008    OTHER SURGICAL HISTORY Left 10/22/2013    I & D left foot    OTHER SURGICAL HISTORY  07/07/2017    I&D fingers X2 left hand. I&D left forearm    OTHER SURGICAL HISTORY Left 07/08/2017    left hand debridement, left forearm incision and drainage     OTHER SURGICAL HISTORY Left 07/10/2017    Fingers I&D    OTHER SURGICAL HISTORY Left 07/14/2017    middle finger amputation revision    ROTATOR CUFF REPAIR  Last Done 7/18/2011    Left, Done Four Times       CURRENT MEDICATIONS    Current Outpatient Rx   Medication Sig Dispense Refill    ammonium lactate (LAC-HYDRIN) 12 % lotion Apply topically 3 times daily Indications: bilat legs Apply topically as needed.       bisacodyl (DULCOLAX) 10 MG suppository Place 10 mg rectally daily as needed for Constipation Indications: if no results from oral laxatives      DIMETHICONE, TOPICAL, 5 % CREA Apply topically 2 times daily Indications: upper and lower extremities for dry skin      escitalopram (LEXAPRO) 10 MG tablet Take 10 mg by mouth daily      Psyllium (METAMUCIL FIBER PO) Take 2 Packages by mouth every 8 hours as needed      omeprazole (PRILOSEC) 20 MG delayed release capsule Take 20 mg by mouth daily      oxyCODONE (ROXICODONE) 5 MG immediate release tablet Take 10 mg by mouth every 4 hours.  simethicone (MYLICON) 80 MG chewable tablet Take 80 mg by mouth every 8 hours as needed for Flatulence      daptomycin (CUBICIN) infusion Infuse 1,008.8 mg intravenously every 24 hours Compound per protocol. 10 each 0    insulin glargine (LANTUS) 100 UNIT/ML injection vial Inject 20 Units into the skin nightly Hold for qHS glucose < 150 or NPO after MN (Patient taking differently: Inject 25 Units into the skin nightly Hold for qHS glucose < 150 or NPO after MN) 1 vial 3    insulin lispro (HUMALOG) 100 UNIT/ML injection vial Inject 0-12 Units into the skin 3 times daily (with meals) 1 vial 3    insulin lispro (HUMALOG) 100 UNIT/ML injection vial Inject 0-6 Units into the skin nightly **Medium Dose Corrective Algorithm** Glucose: Dose: If <139             No Insulin 140-199           1 Unit 200-249           2 Units 250-299           3 Units 300-349           4 Units 350-400           5 Units Above 400       6 Units 1 vial 3    lactobacillus (CULTURELLE) capsule Take 1 capsule by mouth 2 times daily 30 capsule 0       ALLERGIES    Allergies   Allergen Reactions    Robaxin [Methocarbamol] Swelling    Rocephin [Ceftriaxone Sodium-Lidocaine] Hives     Noted on 10/26 - developed extremity swelling and hives. No anaphylaxis.  Sulfa Antibiotics Hives     Noted on 10/26 - developed extremity swelling and hives. No anaphylaxis.     Cantaloupe (Diagnostic) Other (See Comments)    Ceftriaxone Hives     Tolerated Zosyn well 1/2/2019     Nsaids     Comprehensive Metabolic Panel   Result Value Ref Range    Sodium 135 135 - 145 MMOL/L    Potassium 4.1 3.5 - 5.1 MMOL/L    Chloride 99 99 - 110 mMol/L    CO2 21 21 - 32 MMOL/L    BUN 9 6 - 23 MG/DL    CREATININE 0.7 (L) 0.9 - 1.3 MG/DL    Glucose 249 (H) 70 - 99 MG/DL    Calcium 9.3 8.3 - 10.6 MG/DL    Alb 4.3 3.4 - 5.0 GM/DL    Total Protein 7.0 6.4 - 8.2 GM/DL    Total Bilirubin 0.4 0.0 - 1.0 MG/DL    ALT 7 (L) 10 - 40 U/L    AST 12 (L) 15 - 37 IU/L    Alkaline Phosphatase 95 40 - 129 IU/L    GFR Non-African American >60 >60 mL/min/1.73m2    GFR African American >60 >60 mL/min/1.73m2    Anion Gap 15 4 - 16   Troponin   Result Value Ref Range    Troponin T <0.010 <0.01 NG/ML   Brain Natriuretic Peptide   Result Value Ref Range    Pro-BNP 44.98 <300 PG/ML   Lipase   Result Value Ref Range    Lipase 28 13 - 60 IU/L   D-dimer, Quantitative   Result Value Ref Range    D-Dimer, Quant 134 <230 NG/mL(DDU)   EKG 12 Lead   Result Value Ref Range    Ventricular Rate 73 BPM    Atrial Rate 73 BPM    P-R Interval 168 ms    QRS Duration 86 ms    Q-T Interval 366 ms    QTc Calculation (Bazett) 403 ms    P Axis 25 degrees    R Axis 14 degrees    T Axis 13 degrees    Diagnosis       Normal sinus rhythm  Normal ECG  When compared with ECG of 15-APR-2018 21:28,  No significant change was found  Confirmed by [facility] KELVIN HAILE ([id number]) on 1/24/2020 3:52:46 PM             RADIOLOGY/PROCEDURES    Xr Chest Standard (2 Vw)    Result Date: 1/23/2020  EXAMINATION: TWO XRAY VIEWS OF THE CHEST 1/23/2020 6:45 pm COMPARISON: April 15, 2018 HISTORY: ORDERING SYSTEM PROVIDED HISTORY: upper abd pain TECHNOLOGIST PROVIDED HISTORY: Reason for exam:->upper abd pain Reason for Exam: upper abd pain Acuity: Acute Type of Exam: Initial FINDINGS: No lines or tubes. Stable cardiomediastinal silhouette. The lungs are clear without focal consolidation or pleural effusion. No suspicious pulmonary nodules.   Stable pulmonary nodules in the left lung measuring note this report has been produced using speech recognition software and may contain errors related to that system including errors in grammar, punctuation, and spelling, as well as words and phrases that may be inappropriate. If there are any questions or concerns please feel free to contact the dictating provider for clarification.       Fausto Garcia PA-C  01/27/20 [id number]

## 2020-01-28 ENCOUNTER — HOSPITAL ENCOUNTER (EMERGENCY)
Age: 55
Discharge: HOME | End: 2020-01-28
Attending: EMERGENCY MEDICINE
Payer: MEDICARE

## 2020-01-28 VITALS
RESPIRATION RATE: 18 BRPM | HEART RATE: 90 BPM | HEIGHT: 72 IN | BODY MASS INDEX: 37.93 KG/M2 | WEIGHT: 280 LBS | OXYGEN SATURATION: 97 % | SYSTOLIC BLOOD PRESSURE: 118 MMHG | TEMPERATURE: 98.3 F | DIASTOLIC BLOOD PRESSURE: 87 MMHG

## 2020-01-28 LAB
ALBUMIN SERPL-MCNC: 4.4 GM/DL (ref 3.4–5)
ALP BLD-CCNC: 91 IU/L (ref 40–129)
ALT SERPL-CCNC: 7 U/L (ref 10–40)
ANION GAP SERPL CALCULATED.3IONS-SCNC: 16 MMOL/L (ref 4–16)
AST SERPL-CCNC: 12 IU/L (ref 15–37)
BASOPHILS ABSOLUTE: 0 K/CU MM
BASOPHILS RELATIVE PERCENT: 0.5 % (ref 0–1)
BILIRUB SERPL-MCNC: 0.6 MG/DL (ref 0–1)
BUN BLDV-MCNC: 9 MG/DL (ref 6–23)
CALCIUM SERPL-MCNC: 9.8 MG/DL (ref 8.3–10.6)
CHLORIDE BLD-SCNC: 98 MMOL/L (ref 99–110)
CO2: 21 MMOL/L (ref 21–32)
CREAT SERPL-MCNC: 0.8 MG/DL (ref 0.9–1.3)
DIFFERENTIAL TYPE: ABNORMAL
EOSINOPHILS ABSOLUTE: 0.1 K/CU MM
EOSINOPHILS RELATIVE PERCENT: 1.2 % (ref 0–3)
GFR AFRICAN AMERICAN: >60 ML/MIN/1.73M2
GFR NON-AFRICAN AMERICAN: >60 ML/MIN/1.73M2
GLUCOSE BLD-MCNC: 232 MG/DL (ref 70–99)
HCT VFR BLD CALC: 49.3 % (ref 42–52)
HEMOGLOBIN: 15.6 GM/DL (ref 13.5–18)
IMMATURE NEUTROPHIL %: 0.1 % (ref 0–0.43)
LIPASE: 22 IU/L (ref 13–60)
LYMPHOCYTES ABSOLUTE: 1.8 K/CU MM
LYMPHOCYTES RELATIVE PERCENT: 24.9 % (ref 24–44)
MCH RBC QN AUTO: 26.4 PG (ref 27–31)
MCHC RBC AUTO-ENTMCNC: 31.6 % (ref 32–36)
MCV RBC AUTO: 83.3 FL (ref 78–100)
MONOCYTES ABSOLUTE: 0.5 K/CU MM
MONOCYTES RELATIVE PERCENT: 6.4 % (ref 0–4)
NUCLEATED RBC %: 0 %
PDW BLD-RTO: 17.1 % (ref 11.7–14.9)
PLATELET # BLD: 282 K/CU MM (ref 140–440)
PMV BLD AUTO: 10.6 FL (ref 7.5–11.1)
POTASSIUM SERPL-SCNC: 4.2 MMOL/L (ref 3.5–5.1)
RBC # BLD: 5.92 M/CU MM (ref 4.6–6.2)
SEGMENTED NEUTROPHILS ABSOLUTE COUNT: 4.9 K/CU MM
SEGMENTED NEUTROPHILS RELATIVE PERCENT: 66.9 % (ref 36–66)
SODIUM BLD-SCNC: 135 MMOL/L (ref 135–145)
TOTAL IMMATURE NEUTOROPHIL: 0.01 K/CU MM
TOTAL NUCLEATED RBC: 0 K/CU MM
TOTAL PROTEIN: 7.7 GM/DL (ref 6.4–8.2)
WBC # BLD: 7.4 K/CU MM (ref 4–10.5)

## 2020-01-28 PROCEDURE — 85025 COMPLETE CBC W/AUTO DIFF WBC: CPT

## 2020-01-28 PROCEDURE — 36415 COLL VENOUS BLD VENIPUNCTURE: CPT

## 2020-01-28 PROCEDURE — 83690 ASSAY OF LIPASE: CPT

## 2020-01-28 PROCEDURE — 80053 COMPREHEN METABOLIC PANEL: CPT

## 2020-01-28 PROCEDURE — 6370000000 HC RX 637 (ALT 250 FOR IP): Performed by: EMERGENCY MEDICINE

## 2020-01-28 PROCEDURE — 99284 EMERGENCY DEPT VISIT MOD MDM: CPT

## 2020-01-28 RX ORDER — LIDOCAINE HYDROCHLORIDE 20 MG/ML
15 SOLUTION OROPHARYNGEAL ONCE
Status: COMPLETED | OUTPATIENT
Start: 2020-01-28 | End: 2020-01-28

## 2020-01-28 RX ORDER — PANTOPRAZOLE SODIUM 40 MG/1
40 TABLET, DELAYED RELEASE ORAL 2 TIMES DAILY
Qty: 28 TABLET | Refills: 0 | Status: SHIPPED | OUTPATIENT
Start: 2020-01-28 | End: 2022-02-02

## 2020-01-28 RX ORDER — ALUMINA, MAGNESIA, AND SIMETHICONE 2400; 2400; 240 MG/30ML; MG/30ML; MG/30ML
15 SUSPENSION ORAL EVERY 6 HOURS PRN
Qty: 355 ML | Refills: 0 | Status: SHIPPED | OUTPATIENT
Start: 2020-01-28 | End: 2020-02-04

## 2020-01-28 RX ORDER — MAGNESIUM HYDROXIDE/ALUMINUM HYDROXICE/SIMETHICONE 120; 1200; 1200 MG/30ML; MG/30ML; MG/30ML
30 SUSPENSION ORAL ONCE
Status: COMPLETED | OUTPATIENT
Start: 2020-01-28 | End: 2020-01-28

## 2020-01-28 RX ADMIN — LIDOCAINE HYDROCHLORIDE 15 ML: 20 SOLUTION ORAL; TOPICAL at 19:44

## 2020-01-28 RX ADMIN — ALUMINUM HYDROXIDE, MAGNESIUM HYDROXIDE, AND SIMETHICONE 30 ML: 200; 200; 20 SUSPENSION ORAL at 19:44

## 2020-01-28 ASSESSMENT — PAIN DESCRIPTION - LOCATION: LOCATION: ABDOMEN

## 2020-01-28 ASSESSMENT — PAIN DESCRIPTION - PAIN TYPE: TYPE: ACUTE PAIN

## 2020-01-28 ASSESSMENT — PAIN SCALES - GENERAL: PAINLEVEL_OUTOF10: 10

## 2020-01-29 LAB
EKG ATRIAL RATE: 73 BPM
EKG DIAGNOSIS: NORMAL
EKG P AXIS: 25 DEGREES
EKG P-R INTERVAL: 168 MS
EKG Q-T INTERVAL: 366 MS
EKG QRS DURATION: 86 MS
EKG QTC CALCULATION (BAZETT): 403 MS
EKG R AXIS: 14 DEGREES
EKG T AXIS: 13 DEGREES
EKG VENTRICULAR RATE: 73 BPM

## 2020-01-29 NOTE — ED PROVIDER NOTES
Triage Chief Complaint:   Abdominal Pain (seen last night for same and states GI cocktail helped, went to pharmacy to have rx filled and only given pills-thought he was getting gi cocktail for at home also, took pills and not helping )    [place]:  Becca Parsons is a 53 y.o. male that presents patient comes today for evaluation of abdominal discomfort in the left lower quadrant has been persistent. Patient reports is worse after he eats. He reports he got better after a GI cocktail. He reports that he was given medications for this previously. He does admit that he is taking medications somewhat all at once. He was not directed on when to take the Carafate he reports. Patient has reported that he has no chest pain or shortness of breath. No cough or congestion. He has had a couple episodes of nonbloody nonbilious emesis but takes the Zofran when needed. He has noticed some darker stools. No lower abdominal discomfort. Denies any fevers or chills. States he is going to call the GI specialist tomorrow morning and has not done so yet. Patient also scheduled an appointment with his primary care doctor. He denies regular NSAID use. He stopped using tobacco products. Denies regular alcohol use. No trauma.     ROS:  General:  No fevers, no chills, no weakness  Eyes:  No recent vison changes, no discharge  ENT:  No sore throat, no nasal congestion, no hearing changes  Cardiovascular:  No chest pain, no palpitations  Respiratory:  No shortness of breath, no cough, no wheezing  Gastrointestinal:  +pain,+ nausea, + vomiting, no diarrhea  Musculoskeletal:  No muscle pain, no joint pain  Skin:  No rash, no pruritis, no easy bruising  Neurologic:  No speech problems, no headache, no extremity numbness, no extremity tingling, no extremity weakness  Psychiatric:  No anxiety  Genitourinary:  No dysuria, no hematuria  Endocrine:  No unexpected weight gain, no unexpected weight loss  Extremities:  no edema, no pain    Past Medical History:   Diagnosis Date    Anesthesia     \"Problems coming out of the anesthesia\"    Anxiety     Arthritis     Asthma     \"As a kid but outgrew this\"    Chronic back pain     Depression     Dizziness     Edema     Fibromyalgia     Fracture     Headache(784.0)     Hemorrhoid     Hernia, abdominal     Hx MRSA infection     positive culture 8/2014 from left foot    Migraine     MRSA cellulitis     Nausea & vomiting     Neuropathy     Neuropathy     Obesity, Class III, BMI 40-49.9 (morbid obesity) ([address]     Osteomyelitis (HCC)     hx finger    Pneumonia 1995    Poor circulation     Restless leg syndrome     Shortness of breath on exertion     6/14/2016- pt denies any sob with this interview    Type II or unspecified type diabetes mellitus with other specified manifestations, not stated as uncontrolled 4/8/2014    Type II or unspecified type diabetes mellitus without mention of complication, not stated as uncontrolled DX 2007    Ulcer of other part of foot 4/8/2014    'ulcer on left foot healed all up\"    Weakness      Past Surgical History:   Procedure Laterality Date    COLONOSCOPY  1990's    DEBRIDEMENT  8/25/2014    left foot    ENDOSCOPY, COLON, DIAGNOSTIC  06-    FINGER SURGERY  9-11-11    Right Index Finger    FINGER SURGERY  01-16-12    RIght Index Finger-Removal of loose body, Reconstruction of Mallet Finger    FOOT DEBRIDEMENT Left 6/10/2019    FOOT DEBRIDEMENT INCISION AND DRAINAGE performed by Thomas Rosen MD at [address] [place] [address] Right 06/01/2018    I&D right foot    HEMORRHOID SURGERY  2008    OTHER SURGICAL HISTORY Left 10/22/2013    I & D left foot    OTHER SURGICAL HISTORY  07/07/2017    I&D fingers X2 left hand.  I&D left forearm    OTHER SURGICAL HISTORY Left 07/08/2017    left hand debridement, left forearm incision and drainage     OTHER SURGICAL HISTORY Left 07/10/2017    Fingers I&D    OTHER SURGICAL HISTORY Left 07/14/2017  lidocaine viscous hcl (XYLOCAINE) 2 % solution 15 mL  15 mL Mouth/Throat Once Katelynn Domingo MD         Current Outpatient Medications   Medication Sig Dispense Refill    aluminum & magnesium hydroxide-simethicone (MAALOX MAX) 400-400-40 MG/5ML SUSP Take 15 mLs by mouth every 6 hours as needed (abdominal pain) 355 mL 0    pantoprazole (PROTONIX) 40 MG tablet Take 1 tablet by mouth 2 times daily for 14 days 28 tablet 0    sucralfate (CARAFATE) 1 GM tablet Take 1 tablet by mouth 4 times daily 120 tablet 1    ondansetron (ZOFRAN ODT) 4 MG disintegrating tablet Take 1 tablet by mouth every 6 hours 10 tablet 0    dicyclomine (BENTYL) 10 MG capsule Take 2 capsules by mouth 4 times daily as needed (abdominal pain) 30 capsule 0    escitalopram (LEXAPRO) 10 MG tablet Take 10 mg by mouth daily      Psyllium (METAMUCIL FIBER PO) Take 2 Packages by mouth every 8 hours as needed      oxyCODONE (ROXICODONE) 5 MG immediate release tablet Take 10 mg by mouth every 4 hours. Allergies   Allergen Reactions    Robaxin [Methocarbamol] Swelling    Rocephin [Ceftriaxone Sodium-Lidocaine] Hives     Noted on 10/26 - developed extremity swelling and hives. No anaphylaxis.  Sulfa Antibiotics Hives     Noted on 10/26 - developed extremity swelling and hives. No anaphylaxis.     Cantaloupe (Diagnostic) Other (See Comments)    Ceftriaxone Hives     Tolerated Zosyn well 1/2/2019     Nsaids     Other      Darvocet- Gi distress  napsalate/acetaminophen    Aspirin Nausea Only    Propoxyphene Nausea And Vomiting    Toradol [Ketorolac Tromethamine] Other (See Comments)     Sweats        Nursing Notes Reviewed    Physical Exam:  ED Triage Vitals [01/28/20 1630]   Enc Vitals Group      /87      Pulse 103      Resp 18      Temp 98.3 °F (36.8 °C)      Temp Source Oral      SpO2 97 %      Weight 280 lb (127 kg)      Height 5' 11.5\" (1.816 m)      Head Circumference       Peak Flow       Pain Score       Pain Loc       Pain wait certain of time after he takes the Carafate in order to take any other medications. He expressed understanding of this. He was given a prescription of Maalox to go home with given that this does help alleviate his symptoms. I do not suspect cardiac etiology in this patient. He does not have any peritoneal signs that warrant imaging. Patient has been able to tolerate oral intake and has reassuring vitals at the time of discharge. Clinical Impression:  1. Abdominal pain, left upper quadrant      Disposition referral (if applicable): [address] [place] [address]  [phone]    Schedule an appointment as soon as possible for a visit       your GI doctor    Schedule an appointment as soon as possible for a visit in 1 day      [facility] Emergency Department  [address] [place] [address]  [phone]  In 1 day  If symptoms worsen    Disposition medications (if applicable):  New Prescriptions    ALUMINUM & MAGNESIUM HYDROXIDE-SIMETHICONE (MAALOX MAX) 400-400-40 MG/5ML SUSP    Take 15 mLs by mouth every 6 hours as needed (abdominal pain)       Comment: Please note this report has been produced using speech recognition software and may contain errors related to that system including errors in grammar, punctuation, and spelling, as well as words and phrases that may be inappropriate. If there are any questions or concerns please feel free to contact the dictating provider for clarification.        Alex Blue MD  01/28/20 7636

## 2020-02-20 ENCOUNTER — ANESTHESIA EVENT (OUTPATIENT)
Dept: OPERATING ROOM | Age: 55
End: 2020-02-20
Payer: MEDICARE

## 2020-02-21 ENCOUNTER — ANESTHESIA (OUTPATIENT)
Dept: OPERATING ROOM | Age: 55
End: 2020-02-21
Payer: MEDICARE

## 2020-02-21 ENCOUNTER — HOSPITAL ENCOUNTER (OUTPATIENT)
Age: 55
Setting detail: OUTPATIENT SURGERY
Discharge: HOME | End: 2020-02-21
Attending: SPECIALIST | Admitting: SPECIALIST
Payer: MEDICARE

## 2020-02-21 VITALS — OXYGEN SATURATION: 93 % | DIASTOLIC BLOOD PRESSURE: 82 MMHG | SYSTOLIC BLOOD PRESSURE: 141 MMHG

## 2020-02-21 VITALS
RESPIRATION RATE: 16 BRPM | BODY MASS INDEX: 39.93 KG/M2 | DIASTOLIC BLOOD PRESSURE: 84 MMHG | HEART RATE: 90 BPM | TEMPERATURE: 98.2 F | SYSTOLIC BLOOD PRESSURE: 134 MMHG | OXYGEN SATURATION: 94 % | HEIGHT: 71 IN | WEIGHT: 285.2 LBS

## 2020-02-21 LAB — GLUCOSE BLD-MCNC: 233 MG/DL (ref 70–99)

## 2020-02-21 PROCEDURE — 3700000000 HC ANESTHESIA ATTENDED CARE: Performed by: SPECIALIST

## 2020-02-21 PROCEDURE — 82962 GLUCOSE BLOOD TEST: CPT

## 2020-02-21 PROCEDURE — 2709999900 HC NON-CHARGEABLE SUPPLY: Performed by: SPECIALIST

## 2020-02-21 PROCEDURE — 3609012400 HC EGD TRANSORAL BIOPSY SINGLE/MULTIPLE: Performed by: SPECIALIST

## 2020-02-21 PROCEDURE — 7100000010 HC PHASE II RECOVERY - FIRST 15 MIN: Performed by: SPECIALIST

## 2020-02-21 PROCEDURE — 2580000003 HC RX 258: Performed by: SPECIALIST

## 2020-02-21 PROCEDURE — 7100000011 HC PHASE II RECOVERY - ADDTL 15 MIN: Performed by: SPECIALIST

## 2020-02-21 PROCEDURE — 3700000001 HC ADD 15 MINUTES (ANESTHESIA): Performed by: SPECIALIST

## 2020-02-21 PROCEDURE — 6360000002 HC RX W HCPCS: Performed by: NURSE ANESTHETIST, CERTIFIED REGISTERED

## 2020-02-21 PROCEDURE — 2500000003 HC RX 250 WO HCPCS: Performed by: NURSE ANESTHETIST, CERTIFIED REGISTERED

## 2020-02-21 RX ORDER — SODIUM CHLORIDE, SODIUM LACTATE, POTASSIUM CHLORIDE, CALCIUM CHLORIDE 600; 310; 30; 20 MG/100ML; MG/100ML; MG/100ML; MG/100ML
INJECTION, SOLUTION INTRAVENOUS CONTINUOUS
Status: DISCONTINUED | OUTPATIENT
Start: 2020-02-21 | End: 2020-02-21 | Stop reason: HOSPADM

## 2020-02-21 RX ORDER — PROPOFOL 10 MG/ML
INJECTION, EMULSION INTRAVENOUS PRN
Status: DISCONTINUED | OUTPATIENT
Start: 2020-02-21 | End: 2020-02-21 | Stop reason: SDUPTHER

## 2020-02-21 RX ORDER — LIDOCAINE HYDROCHLORIDE 20 MG/ML
INJECTION, SOLUTION INFILTRATION; PERINEURAL PRN
Status: DISCONTINUED | OUTPATIENT
Start: 2020-02-21 | End: 2020-02-21 | Stop reason: SDUPTHER

## 2020-02-21 RX ADMIN — PROPOFOL 25 MG: 10 INJECTION, EMULSION INTRAVENOUS at 08:58

## 2020-02-21 RX ADMIN — SODIUM CHLORIDE, POTASSIUM CHLORIDE, SODIUM LACTATE AND CALCIUM CHLORIDE: 600; 310; 30; 20 INJECTION, SOLUTION INTRAVENOUS at 08:45

## 2020-02-21 RX ADMIN — LIDOCAINE HYDROCHLORIDE 5 ML: 20 INJECTION, SOLUTION INFILTRATION; PERINEURAL at 08:46

## 2020-02-21 RX ADMIN — PROPOFOL 50 MG: 10 INJECTION, EMULSION INTRAVENOUS at 08:50

## 2020-02-21 RX ADMIN — PROPOFOL 50 MG: 10 INJECTION, EMULSION INTRAVENOUS at 08:51

## 2020-02-21 RX ADMIN — PROPOFOL 50 MG: 10 INJECTION, EMULSION INTRAVENOUS at 08:54

## 2020-02-21 RX ADMIN — PROPOFOL 25 MG: 10 INJECTION, EMULSION INTRAVENOUS at 09:02

## 2020-02-21 RX ADMIN — SODIUM CHLORIDE, POTASSIUM CHLORIDE, SODIUM LACTATE AND CALCIUM CHLORIDE: 600; 310; 30; 20 INJECTION, SOLUTION INTRAVENOUS at 08:25

## 2020-02-21 RX ADMIN — PROPOFOL 50 MG: 10 INJECTION, EMULSION INTRAVENOUS at 08:56

## 2020-02-21 RX ADMIN — PROPOFOL 25 MG: 10 INJECTION, EMULSION INTRAVENOUS at 09:00

## 2020-02-21 RX ADMIN — PROPOFOL 25 MG: 10 INJECTION, EMULSION INTRAVENOUS at 08:59

## 2020-02-21 ASSESSMENT — PAIN SCALES - GENERAL
PAINLEVEL_OUTOF10: 0
PAINLEVEL_OUTOF10: 0

## 2020-02-21 ASSESSMENT — PAIN - FUNCTIONAL ASSESSMENT: PAIN_FUNCTIONAL_ASSESSMENT: 0-10

## 2020-02-21 ASSESSMENT — ENCOUNTER SYMPTOMS: SHORTNESS OF BREATH: 1

## 2020-02-21 ASSESSMENT — PAIN DESCRIPTION - DESCRIPTORS: DESCRIPTORS: SHARP

## 2020-02-21 ASSESSMENT — LIFESTYLE VARIABLES: SMOKING_STATUS: 1

## 2020-02-21 NOTE — ANESTHESIA PRE PROCEDURE
Department of Anesthesiology  Preprocedure Note       Name:  Rosemary Murdock   Age:  53 y.o.  :  1965                                          MRN:  [id number]         Date:  2020      Surgeon: Shannon Hennessy):  Saji Moreland MD    Procedure: EGD DIAGNOSTIC ONLY (N/A )    Medications prior to admission:   Prior to Admission medications    Medication Sig Start Date End Date Taking? Authorizing Provider   ondansetron (ZOFRAN) 4 MG tablet Take 4 mg by mouth every 8 hours as needed for Nausea or Vomiting   Yes Historical Provider, MD   Dulaglutide (TRULICITY SC) Inject 1.5 mLs into the skin every 7 days    Yes Historical Provider, MD   pantoprazole (PROTONIX) 40 MG tablet Take 1 tablet by mouth 2 times daily for 14 days 20 Yes Mike Bhakta MD   dicyclomine (BENTYL) 10 MG capsule Take 2 capsules by mouth 4 times daily as needed (abdominal pain) 20  Yes Geoff Holm PA-C   oxyCODONE (ROXICODONE) 5 MG immediate release tablet Take 10 mg by mouth every 4 hours. Yes Historical Provider, MD   ondansetron (ZOFRAN ODT) 4 MG disintegrating tablet Take 1 tablet by mouth every 6 hours  Patient not taking: Reported on 2020   Geoff Holm PA-C   escitalopram (LEXAPRO) 10 MG tablet Take 10 mg by mouth daily    Historical Provider, MD       Current medications:    Current Facility-Administered Medications   Medication Dose Route Frequency Provider Last Rate Last Dose    lactated ringers infusion   Intravenous Continuous Saji Moreland  mL/hr at 20 0825         Allergies: Allergies   Allergen Reactions    Cantaloupe (Diagnostic) Hives    Ceftriaxone Hives     Tolerated Zosyn well 2019     Robaxin [Methocarbamol] Swelling    Rocephin [Ceftriaxone Sodium-Lidocaine] Hives     Noted on 10/26 - developed extremity swelling and hives. No anaphylaxis.  Sulfa Antibiotics Hives     Noted on 10/26 - developed extremity swelling and hives. No anaphylaxis.     Aspirin Nausea Only    Nsaids Nausea Only    Other Nausea Only     Darvocet- Gi distress  napsalate/acetaminophen    Propoxyphene Nausea And Vomiting    Toradol [Ketorolac Tromethamine] Other (See Comments)     Sweats        Problem List:    Patient Active Problem List   Diagnosis Code    Diabetic foot infection ([facility] E11.628, L08.9    Uncontrolled diabetes mellitus with complications ([facility] D11.3, E11.65    Diabetes with ulcer of foot ([facility] E11.621, L97.509    Ulcer of other part of foot L97.509    Diabetes mellitus with ulcer of heel ([facility] E11.621, L97.409    Obesity, Class III, BMI 40-49.9 (morbid obesity) ([facility]) E66.01    Chronic ulcer of left foot with necrosis of muscle ([facility]) L97.523    Skin ulcer of toe of left foot with fat layer exposed ([facility]) L97.522    Chemical dependency ([facility]) F19.20    Chronic pain syndrome G89.4    Drug abuse ([facility]) F19.10    Sepsis without acute organ dysfunction ([facility]) A41.9    Foot pain M79.673    WD-S/P amputation of lesser toe, right ([facility]) Z89.421    Acute osteomyelitis involving hand ([facility] M86.149    Diabetic infection of left foot ([facility] E11.628, L08.9       Past Medical History:        Diagnosis Date    Absent finger     Left hand    Anxiety     Arthritis     Hands    Asthma     \"As a kid but outgrew this\" - no medications as of 2/21/20    Chronic back pain     Depression     Edema     Fibromyalgia     Headache(784.0)     Last: 2/19/20    Hernia, abdominal     Hx MRSA infection     positive culture 8/2014 from left foot    Nausea & vomiting     Neuropathy     Obesity, Class III, BMI 40-49.9 (morbid obesity) ([facility]     Osteomyelitis ([facility])     hx finger    Poor circulation     Prolonged emergence from general anesthesia     Restless leg syndrome     Shortness of breath on exertion     6/14/2016- pt denies any sob with this interview    Type II or unspecified type diabetes mellitus with other specified manifestations, not stated as uncontrolled Encounters:   02/21/20 (!) 170/91   02/20/20 128/84   01/28/20 118/87       NPO Status: Time of last liquid consumption: 2215                        Time of last solid consumption: 2100                        Date of last liquid consumption: 02/20/20                        Date of last solid food consumption: 02/20/20    BMI:   Wt Readings from Last 3 Encounters:   02/21/20 285 lb 3.2 oz (129.4 kg)   02/20/20 288 lb (130.6 kg)   01/28/20 280 lb (127 kg)     Body mass index is 39.78 kg/m². CBC:   Lab Results   Component Value Date    WBC 7.4 01/28/2020    RBC 5.92 01/28/2020    HGB 15.6 01/28/2020    HCT 49.3 01/28/2020    MCV 83.3 01/28/2020    RDW 17.1 01/28/2020     01/28/2020       CMP:   Lab Results   Component Value Date     01/28/2020    K 4.2 01/28/2020    CL 98 01/28/2020    CO2 21 01/28/2020    BUN 9 01/28/2020    CREATININE 0.8 01/28/2020    GFRAA >60 01/28/2020    LABGLOM >60 01/28/2020    GLUCOSE 232 01/28/2020    PROT 7.7 01/28/2020    PROT 7.3 09/10/2011    CALCIUM 9.8 01/28/2020    BILITOT 0.6 01/28/2020    ALKPHOS 91 01/28/2020    AST 12 01/28/2020    ALT 7 01/28/2020       POC Tests:   Recent Labs     02/21/20  8205   POCGLU 233*       Coags:   Lab Results   Component Value Date    PROTIME 12.3 10/17/2019    PROTIME 11.2 09/11/2011    INR 1.06 10/17/2019    APTT 28.2 10/17/2019       HCG (If Applicable): No results found for: PREGTESTUR, PREGSERUM, HCG, HCGQUANT     ABGs: No results found for: PHART, PO2ART, XEP3QMF, VOH9RTD, BEART, J5ZNRJRH     Type & Screen (If Applicable):  No results found for: [facility]    Anesthesia Evaluation  Patient summary reviewed and Nursing notes reviewed no history of anesthetic complications:   Airway: Mallampati: III  TM distance: >3 FB   Neck ROM: full  Mouth opening: > = 3 FB Dental:          Pulmonary:   (+) shortness of breath: chronic,  asthma: current smoker          Patient did not smoke on day of surgery. Cardiovascular:  Exercise tolerance: good (>4 METS),   (+) hypertension:,     (-) past MI, CAD and  DUMONT      Rhythm: regular    Echocardiogram reviewed         Beta Blocker:  Not on Beta Blocker      ROS comment:  Summary   Left ventricular systolic function is normal.   Ejection fraction is visually estimated at 50-55%. No significant valvular disease noted. No evidence of any pericardial effusion. Neuro/Psych:   (+) neuromuscular disease:, headaches:, depression/anxiety             GI/Hepatic/Renal:   (+) GERD: well controlled, morbid obesity          Endo/Other:    (+) Diabetes, . Abdominal:           Vascular:                                        Anesthesia Plan      MAC and general     ASA 3       Induction: intravenous. Anesthetic plan and risks discussed with patient. Plan discussed with CRNA and attending.     Attending anesthesiologist reviewed and agrees with Pre Eval content              JULITA Mauricio - CRNA   2/21/2020

## 2020-02-21 NOTE — PROGRESS NOTES
0906-To Pacu, awake, denies any pain, nausea or SOB, updated on plan of care. 0910-Repositioned to semi-fowlers, PO fluids given, wife called to return to [facility]  0930-Per Dr. Holley Bingham, have patient call next week for biopsy results. Instructions reviewed with wife and patient, understanding verbalized. 0936-To car per w/c with written instructions,  present.

## 2021-03-28 ENCOUNTER — APPOINTMENT (OUTPATIENT)
Dept: GENERAL RADIOLOGY | Age: 56
End: 2021-03-28
Payer: MEDICARE

## 2021-03-28 ENCOUNTER — HOSPITAL ENCOUNTER (EMERGENCY)
Age: 56
Discharge: HOME | End: 2021-03-28
Payer: MEDICARE

## 2021-03-28 ENCOUNTER — APPOINTMENT (OUTPATIENT)
Dept: ULTRASOUND IMAGING | Age: 56
End: 2021-03-28
Payer: MEDICARE

## 2021-03-28 VITALS
SYSTOLIC BLOOD PRESSURE: 128 MMHG | OXYGEN SATURATION: 100 % | TEMPERATURE: 98 F | DIASTOLIC BLOOD PRESSURE: 82 MMHG | HEIGHT: 72 IN | BODY MASS INDEX: 28.71 KG/M2 | RESPIRATION RATE: 14 BRPM | HEART RATE: 75 BPM | WEIGHT: 212 LBS

## 2021-03-28 DIAGNOSIS — M25.532 WRIST PAIN, ACUTE, LEFT: Primary | ICD-10-CM

## 2021-03-28 DIAGNOSIS — M79.642 HAND PAIN, LEFT: ICD-10-CM

## 2021-03-28 DIAGNOSIS — L03.116 CELLULITIS OF LEFT LOWER EXTREMITY: ICD-10-CM

## 2021-03-28 PROCEDURE — 73110 X-RAY EXAM OF WRIST: CPT

## 2021-03-28 PROCEDURE — 99284 EMERGENCY DEPT VISIT MOD MDM: CPT

## 2021-03-28 PROCEDURE — 6370000000 HC RX 637 (ALT 250 FOR IP): Performed by: NURSE PRACTITIONER

## 2021-03-28 PROCEDURE — 93971 EXTREMITY STUDY: CPT

## 2021-03-28 PROCEDURE — 73130 X-RAY EXAM OF HAND: CPT

## 2021-03-28 PROCEDURE — 29125 APPL SHORT ARM SPLINT STATIC: CPT

## 2021-03-28 RX ORDER — HYDROCODONE BITARTRATE AND ACETAMINOPHEN 5; 325 MG/1; MG/1
1 TABLET ORAL ONCE
Status: COMPLETED | OUTPATIENT
Start: 2021-03-28 | End: 2021-03-28

## 2021-03-28 RX ORDER — DOXYCYCLINE HYCLATE 100 MG
100 TABLET ORAL 2 TIMES DAILY
Qty: 14 TABLET | Refills: 0 | Status: SHIPPED | OUTPATIENT
Start: 2021-03-28 | End: 2021-04-04

## 2021-03-28 RX ORDER — DOXYCYCLINE HYCLATE 100 MG
100 TABLET ORAL ONCE
Status: COMPLETED | OUTPATIENT
Start: 2021-03-28 | End: 2021-03-28

## 2021-03-28 RX ADMIN — HYDROCODONE BITARTRATE AND ACETAMINOPHEN 1 TABLET: 5; 325 TABLET ORAL at 13:08

## 2021-03-28 RX ADMIN — DOXYCYCLINE HYCLATE 100 MG: 100 TABLET, COATED ORAL at 15:10

## 2021-03-28 ASSESSMENT — PAIN DESCRIPTION - PAIN TYPE: TYPE: ACUTE PAIN

## 2021-03-28 ASSESSMENT — PAIN SCALES - GENERAL
PAINLEVEL_OUTOF10: 10
PAINLEVEL_OUTOF10: 9

## 2021-03-28 ASSESSMENT — PAIN DESCRIPTION - LOCATION: LOCATION: WRIST

## 2021-03-28 NOTE — ED NOTES
Discharge instructions reviewed with pt and questions addressed at this time. Pt alert and oriented x 4 at time of discharge, no signs of acute distress noted. Pt ambulatory to Emergency Department waiting room and steady gait noted.         Benji Rothman RN  03/28/21 0954

## 2021-03-28 NOTE — ED TRIAGE NOTES
Patient was riding son's scooter and slide and hit left wrist on concrete. Left wrist with edema and continued throbbing.

## 2021-03-28 NOTE — PLAN OF CARE
Went to patients room, nurse in room with cart. Will come back. Cosentyx Counseling:  I discussed with the patient the risks of Cosentyx including but not limited to worsening of Crohn's disease, immunosuppression, allergic reactions and infections.  The patient understands that monitoring is required including a PPD at baseline and must alert us or the primary physician if symptoms of infection or other concerning signs are noted.

## 2021-03-28 NOTE — ED PROVIDER NOTES
infection     positive culture 8/2014 from left foot    Nausea & vomiting     Neuropathy     Obesity, Class III, BMI 40-49.9 (morbid obesity) ([address]     Osteomyelitis (HCC)     hx finger    Poor circulation     Prolonged emergence from general anesthesia     Restless leg syndrome     Shortness of breath on exertion     6/14/2016- pt denies any sob with this interview    Type II or unspecified type diabetes mellitus with other specified manifestations, not stated as uncontrolled 4/8/2014    Type II or unspecified type diabetes mellitus without mention of complication, not stated as uncontrolled DX 2007    Follows with PCP    Ulcer of other part of foot 4/8/2014    'ulcer on left foot healed all up\"     Past Surgical History:   Procedure Laterality Date    COLONOSCOPY  1990's    Normal exam per pt    DEBRIDEMENT  8/25/2014    left foot    ENDOSCOPY, COLON, DIAGNOSTIC  06-    Normal exam per pt -     FINGER SURGERY  9-11-11    Right Index Finger    FINGER SURGERY  01-16-12    RIght Index Finger-Removal of loose body, Reconstruction of Mallet Finger    FOOT DEBRIDEMENT Left 6/10/2019    FOOT DEBRIDEMENT INCISION AND DRAINAGE performed by Shu Virgen MD at [address] [place] [address] Right 06/01/2018    I&D right foot    HEMORRHOID SURGERY  2008    OTHER SURGICAL HISTORY Left 10/22/2013    I & D left foot    OTHER SURGICAL HISTORY  07/07/2017    I&D fingers X2 left hand.  I&D left forearm    OTHER SURGICAL HISTORY Left 07/08/2017    left hand debridement, left forearm incision and drainage     OTHER SURGICAL HISTORY Left 07/10/2017    Fingers I&D    OTHER SURGICAL HISTORY Left 07/14/2017    middle finger amputation revision    ROTATOR CUFF REPAIR  Last Done 7/18/2011    Left, Done Four Times    UPPER GASTROINTESTINAL ENDOSCOPY N/A 2/21/2020    EGD BIOPSY performed by Milla Urena MD at [address]    Current Outpatient Rx   Medication Sig Dispense Refill  dicyclomine (BENTYL) 10 MG capsule Take 2 capsules by mouth 4 times daily as needed (abdominal pain) 100 capsule 3    ondansetron (ZOFRAN) 4 MG tablet Take 4 mg by mouth every 8 hours as needed for Nausea or Vomiting      Dulaglutide (TRULICITY SC) Inject 1.5 mLs into the skin every 7 days       pantoprazole (PROTONIX) 40 MG tablet Take 1 tablet by mouth 2 times daily for 14 days 28 tablet 0    ondansetron (ZOFRAN ODT) 4 MG disintegrating tablet Take 1 tablet by mouth every 6 hours (Patient not taking: Reported on 2020) 10 tablet 0    escitalopram (LEXAPRO) 10 MG tablet Take 10 mg by mouth daily      oxyCODONE (ROXICODONE) 5 MG immediate release tablet Take 10 mg by mouth every 4 hours. ALLERGIES    Allergies   Allergen Reactions    Cantaloupe (Diagnostic) Hives    Ceftriaxone Hives     Tolerated Zosyn well 2019     Robaxin [Methocarbamol] Swelling    Rocephin [Ceftriaxone Sodium-Lidocaine] Hives     Noted on 10/26 - developed extremity swelling and hives. No anaphylaxis.  Sulfa Antibiotics Hives     Noted on 10/26 - developed extremity swelling and hives. No anaphylaxis.     Aspirin Nausea Only    Nsaids Nausea Only    Other Nausea Only     Darvocet- Gi distress  napsalate/acetaminophen    Propoxyphene Nausea And Vomiting    Toradol [Ketorolac Tromethamine] Other (See Comments)     Sweats        SOCIAL & FAMILY HISTORY    Social History     Socioeconomic History    Marital status:      Spouse name: None    Number of children: 4    Years of education: None    Highest education level: None   Occupational History    None   Social Needs    Financial resource strain: None    Food insecurity     Worry: None     Inability: None    Transportation needs     Medical: None     Non-medical: None   Tobacco Use    Smoking status: Former Smoker     Types: Cigarettes     Start date:      Quit date:      Years since quittin.2    Smokeless tobacco: Former User Types: Snuff   Substance and Sexual Activity    Alcohol use: Yes     Comment: Rarely - 1-2 a year    Drug use: Yes     Frequency: 7.0 times per week     Types: Marijuana     Comment: occ    Sexual activity: None   Lifestyle    Physical activity     Days per week: None     Minutes per session: None    Stress: None   Relationships    Social connections     Talks on phone: None     Gets together: None     Attends Muslim service: None     Active member of club or organization: None     Attends meetings of clubs or organizations: None     Relationship status: None    Intimate partner violence     Fear of current or ex partner: None     Emotionally abused: None     Physically abused: None     Forced sexual activity: None   Other Topics Concern    None   Social History Narrative    None     Family History   Problem Relation Age of Onset    Kidney Disease Mother         \"Kidney Failure, On Dialysis\"    Early Death Mother 39    Diabetes Father         \"Type II\"           PHYSICAL EXAM    VITAL SIGNS: BP (!) 137/97   Pulse 81   Temp 98 °F (36.7 °C) (Oral)   Resp 16   Ht 5' 11.5\" (1.816 m)   Wt 212 lb (96.2 kg)   SpO2 99%   BMI 29.16 kg/m²   Constitutional:  Well developed, well nourished, no acute distress, non-toxic appearance   HENT:  Atraumatic    Musculoskeletal:    Left  Wrist and hand : There is moderate swelling over the dorsal aspect of the left hand and wrist.  Previous amputation present. This region is tender to palpation. No snuffbox tenderness. Range of motion limited due to pain. Distal sensation and capillary refill intact. Elbow, including radial head is non-tender. No swelling, discoloration, or tenderness to palpation of the ipsilateral elbow and hand/fingers. Distal motor, sensation, capillary refill intact. Left lower extremity has mild nonpitting edema with erythema and warmth. No areas of fluctuance or induration.   Tenderness with palpation or range of motion of the left ankle or knee. Neuroascularly intact distal to area of concern. Integument:  Well hydrated. See above  Vascular: affected extremity distally neurovascularly intact - sensation and capillary refill intact. Neurologic:  Awake alert, normal flow ofspeech   Psychiatric: Cooperative, pleasant affect    RADIOLOGY/PROCEDURES    Left Wrist  Xrays:  XR HAND LEFT (MIN 3 VIEWS)   Final Result   No acute bony or joint abnormality         XR WRIST LEFT (MIN 3 VIEWS)   Final Result   No acute bony or joint abnormality         VL DUP LOWER EXTREMITY VENOUS LEFT    (Results Pending)           PROCEDURES   SPLINT PLACEMENT     A  volar wrist splint was applied by the emergency department technician. The splint is in good position. The patient's extremity is neurovascularly intact after the splint placement. ED COURSE & MEDICAL DECISION MAKING        58-year-old male presents emergency department with complaints of left wrist and hand pain status post a nonsyncopal fall x-rays were obtained and showed no acute findings. The patient did have a moderate amount of pain with range of motion palpation and was placed in a volar splint. Was instructed to alternate Tylenol and ibuprofen as directed for pain, ice, elevate, keep splint clean and dry, follow-up with orthopedic next week, and return here with worsening symptoms. Patient also complained of mild swelling and erythema in the left lower extremity. Etiology of his symptoms are likely due to cellulitis. He was started on doxycycline due to a cephalosporin allergy. Ultrasound Doppler of the left lower extremity was obtained. Results are pending at time of transfer of care. Care was transferred to CAMACHO Hale at 1500. Please see her final diagnoses and plan if Doppler study is positive. Patient agrees to return emergency department if symptoms worsen or any new symptoms develop.     Differential diagnosis: includes but not limited to ligamentous injury, tendon injury, soft tissue contusion/hematoma, fracture, dislocation, Infection, Neurologic Deficit/Injury, DVT, cellulitis. Clinical  IMPRESSION    No diagnosis found. Comment: Please note this report has been produced using speech recognition software and may contain errors related to that system including errors in grammar, punctuation, and spelling, as well as words and phrases that may be inappropriate. If there are any questions or concerns please feel free to contact the dictating provider for clarification.       Garth Morse, JULITA - CNP  03/28/21 [id number]

## 2021-04-06 NOTE — ED PROVIDER NOTES
I did not evaluate or treat this patient. I did review ultrasound of left lower extremity prior to patient discharged which demonstrates no evidence of DVT in the left lower extremity. Patient's provider, Naty Ayala has already prescribed patient doxycycline and treating for possible cellulitic infection.     Final diagnoses:   Wrist pain, acute, left   Hand pain, left   Cellulitis of left lower extremity       CAMACHO Lipscomb  04/06/21 [address], [place]  04/06/21 0016

## 2021-05-24 ENCOUNTER — HOSPITAL ENCOUNTER (EMERGENCY)
Age: 56
Discharge: HOME | End: 2021-05-24
Attending: EMERGENCY MEDICINE
Payer: MEDICARE

## 2021-05-24 VITALS
SYSTOLIC BLOOD PRESSURE: 110 MMHG | RESPIRATION RATE: 16 BRPM | DIASTOLIC BLOOD PRESSURE: 76 MMHG | HEIGHT: 71 IN | HEART RATE: 77 BPM | OXYGEN SATURATION: 96 % | WEIGHT: 220 LBS | TEMPERATURE: 98.9 F | BODY MASS INDEX: 30.8 KG/M2

## 2021-05-24 DIAGNOSIS — R35.0 FREQUENT URINATION: Primary | ICD-10-CM

## 2021-05-24 LAB
BACTERIA: ABNORMAL /HPF
BILIRUBIN URINE: ABNORMAL MG/DL
BLOOD, URINE: NEGATIVE
CALCIUM OXALATE CRYSTALS: ABNORMAL /HPF
CLARITY: ABNORMAL
COLOR: ABNORMAL
GLUCOSE, URINE: NEGATIVE MG/DL
ICTOTEST: NEGATIVE
KETONES, URINE: NEGATIVE MG/DL
LEUKOCYTE ESTERASE, URINE: ABNORMAL
MUCUS: ABNORMAL HPF
NITRITE URINE, QUANTITATIVE: NEGATIVE
PH, URINE: 5 (ref 5–8)
PROTEIN UA: 100 MG/DL
RBC URINE: 12 /HPF (ref 0–3)
SPECIFIC GRAVITY UA: 1.03 (ref 1–1.03)
SQUAMOUS EPITHELIAL: 1 /HPF
TRICHOMONAS: ABNORMAL /HPF
UROBILINOGEN, URINE: 2 MG/DL (ref 0.2–1)
WBC UA: 6 /HPF (ref 0–2)

## 2021-05-24 PROCEDURE — 99283 EMERGENCY DEPT VISIT LOW MDM: CPT

## 2021-05-24 PROCEDURE — 81001 URINALYSIS AUTO W/SCOPE: CPT

## 2021-05-24 RX ORDER — TAMSULOSIN HYDROCHLORIDE 0.4 MG/1
0.4 CAPSULE ORAL DAILY
Qty: 7 CAPSULE | Refills: 0 | Status: SHIPPED | OUTPATIENT
Start: 2021-05-24 | End: 2022-02-02

## 2021-05-24 ASSESSMENT — PAIN SCALES - GENERAL: PAINLEVEL_OUTOF10: 8

## 2021-05-24 ASSESSMENT — PAIN DESCRIPTION - LOCATION: LOCATION: ABDOMEN

## 2021-05-25 NOTE — ED PROVIDER NOTES
Triage Chief Complaint:   Dysuria    [place]:  Patrice Meza is a 63 y.o. male that presents with 2 to 3 days of urinary symptoms. States that he has had increasingly frequent urination, sometimes dribbling when he urinates. States that he occasionally have difficulty initiating stream and feeling of incomplete emptying. States that he does feel like he empties his bladder from time to time. Denies any hematuria, abdominal pain, nausea, vomiting, fevers. Denies any rectal pain. States that occasionally will have some burning right at the end of urination. States that he has had the same sexual partner for the last 30 some years and has not had any penile discharge. States that he has taken saw palmetto before in the past for prostate issues. ROS:  At least 10 systems reviewed and otherwise acutely negative except as in the [address]     Past Medical History:   Diagnosis Date    Absent finger     Left hand    Anxiety     Arthritis     Hands    Asthma     \"As a kid but outgrew this\" - no medications as of 2/21/20    Chronic back pain     Depression     Edema     Fibromyalgia     Headache(784.0)     Last: 2/19/20    Hernia, abdominal     Hx MRSA infection     positive culture 8/2014 from left foot    Nausea & vomiting     Neuropathy     Obesity, Class III, BMI 40-49.9 (morbid obesity) ([facility]     Osteomyelitis (HCC)     hx finger    Poor circulation     Prolonged emergence from general anesthesia     Restless leg syndrome     Shortness of breath on exertion     6/14/2016- pt denies any sob with this interview    Type II or unspecified type diabetes mellitus with other specified manifestations, not stated as uncontrolled 4/8/2014    Type II or unspecified type diabetes mellitus without mention of complication, not stated as uncontrolled DX 2007    Follows with PCP    Ulcer of other part of foot 4/8/2014    'ulcer on left foot healed all up\"     Past Surgical History:   Procedure Laterality Date    file   Other Topics Concern    Not on file   Social History Narrative    Not on file     Social Determinants of Health     Financial Resource Strain:     Difficulty of Paying Living Expenses:    Food Insecurity:     Worried About Running Out of Food in the Last Year:     [address] in the Last Year:    Transportation Needs:     Lack of Transportation (Medical):  Lack of Transportation (Non-Medical):    Physical Activity:     Days of Exercise per Week:     Minutes of Exercise per Session:    Stress:     Feeling of Stress :    Social Connections:     Frequency of Communication with Friends and Family:     Frequency of Social Gatherings with Friends and Family:     Attends Orthodox Services:     Active Member of Clubs or Organizations:     Attends Club or Organization Meetings:     Marital Status:    Intimate Partner Violence:     Fear of Current or Ex-Partner:     Emotionally Abused:     Physically Abused:     Sexually Abused:      No current facility-administered medications for this encounter. Current Outpatient Medications   Medication Sig Dispense Refill    tamsulosin (FLOMAX) 0.4 MG capsule Take 1 capsule by mouth daily for 7 doses 7 capsule 0    dicyclomine (BENTYL) 10 MG capsule Take 2 capsules by mouth 4 times daily as needed (abdominal pain) 100 capsule 3    ondansetron (ZOFRAN) 4 MG tablet Take 4 mg by mouth every 8 hours as needed for Nausea or Vomiting      Dulaglutide (TRULICITY SC) Inject 1.5 mLs into the skin every 7 days       pantoprazole (PROTONIX) 40 MG tablet Take 1 tablet by mouth 2 times daily for 14 days 28 tablet 0    ondansetron (ZOFRAN ODT) 4 MG disintegrating tablet Take 1 tablet by mouth every 6 hours (Patient not taking: Reported on 2/20/2020) 10 tablet 0    escitalopram (LEXAPRO) 10 MG tablet Take 10 mg by mouth daily      oxyCODONE (ROXICODONE) 5 MG immediate release tablet Take 10 mg by mouth every 4 hours.        Allergies   Allergen Reactions  Cantaloupe (Diagnostic) Hives    Ceftriaxone Hives     Tolerated Zosyn well 1/2/2019     Robaxin [Methocarbamol] Swelling    Rocephin [Ceftriaxone Sodium-Lidocaine] Hives     Noted on 10/26 - developed extremity swelling and hives. No anaphylaxis.  Sulfa Antibiotics Hives     Noted on 10/26 - developed extremity swelling and hives. No anaphylaxis.  Aspirin Nausea Only    Nsaids Nausea Only    Other Nausea Only     Darvocet- Gi distress  napsalate/acetaminophen    Propoxyphene Nausea And Vomiting    Toradol [Ketorolac Tromethamine] Other (See Comments)     Sweats        Nursing Notes Reviewed    Physical Exam:  ED Triage Vitals [05/24/21 2026]   Enc Vitals Group      BP (!) 135/91      Pulse 90      Resp 18      Temp 98.9 °F (37.2 °C)      Temp Source Oral      SpO2 98 %      Weight 220 lb (99.8 kg)      Height 5' 11\" (1.803 m)      Head Circumference       Peak Flow       Pain Score       Pain Loc       Pain Edu? Excl. in [address]? GENERAL APPEARANCE: Awake and alert. Cooperative. No acute distress. HEAD: Normocephalic. Atraumatic. EYES: EOM's grossly intact. Sclera anicteric. ENT: Mucous membranes are moist. Tolerates saliva. No trismus. NECK: Supple. Trachea midline. HEART: RRR. Radial pulses 2+. LUNGS: Respirations unlabored. CTAB  ABDOMEN: Soft. Non-tender. No guarding or rebound. EXTREMITIES: No acute deformities. SKIN: Warm and dry. NEUROLOGICAL: No gross facial drooping. Moves all 4 extremities spontaneously. PSYCHIATRIC: Normal mood.     I have reviewed and interpreted all of the currently available lab results from this visit (if applicable):  Results for orders placed or performed during the hospital encounter of 05/24/21   Urinalysis with microscopic   Result Value Ref Range    Color, UA ALEE (A) YELLOW    Clarity, UA HAZY (A) CLEAR    Glucose, Urine NEGATIVE NEGATIVE MG/DL    Bilirubin Urine SMALL (A) NEGATIVE MG/DL    Ketones, Urine NEGATIVE NEGATIVE MG/DL    Specific Gravity, UA 1.034 1.001 - 1.035    Blood, Urine NEGATIVE NEGATIVE    pH, Urine 5.0 5.0 - 8.0    Protein,  (A) NEGATIVE MG/DL    Urobilinogen, Urine 2.0 (H) 0.2 - 1.0 MG/DL    Nitrite Urine, Quantitative NEGATIVE NEGATIVE    Leukocyte Esterase, Urine TRACE (A) NEGATIVE    RBC, UA 12 (H) 0 - 3 /HPF    WBC, UA 6 (H) 0 - 2 /HPF    Bacteria, UA RARE (A) NEGATIVE /HPF    Squam Epithel, UA 1 /HPF    Mucus, UA OCCASIONAL (A) NEGATIVE HPF    Trichomonas, UA NONE SEEN NONE SEEN /HPF    Ca Oxalate Tiera, UA FEW /HPF   ICTOTEST, URINE   Result Value Ref Range    Ictotest NEGATIVE       Radiographs (if obtained):  [] The following radiograph was interpreted by myself in the absence of a radiologist:  [] Radiologist's Report Reviewed:    EKG (if obtained): (All EKG's are interpreted by myself in the absence of a cardiologist)    MDM:  Plan of care is discussed thoroughly with the patient and family if present. If performed, all imaging and lab work also discussed with patient. All relevant prior results and chart reviewed if available. Patient presents as above. He is in no acute distress and has normal vital signs. He has a benign abdominal exam.  Overall symptomatology mostly consistent with likely enlarged prostate. He is afebrile does not complain of any penile discharge or significant dysuria. Urinalysis not convincing of UTI. Urine culture is sent. Patient will be started on Flomax and refer to urology. Patient does have follow-up with family physician tomorrow. Discharged in good condition. Clinical Impression:  1.  Frequent urination      (Please note that portions of this note may have been completed with a voice recognition program. Efforts were made to edit the dictations but occasionally words are mis-transcribed.)    MD Poornima Shelby MD  05/24/21 [id number]

## 2021-09-15 ENCOUNTER — APPOINTMENT (OUTPATIENT)
Dept: GENERAL RADIOLOGY | Age: 56
End: 2021-09-15
Payer: MEDICARE

## 2021-09-15 ENCOUNTER — HOSPITAL ENCOUNTER (EMERGENCY)
Age: 56
Discharge: HOME | End: 2021-09-16
Attending: EMERGENCY MEDICINE
Payer: MEDICARE

## 2021-09-15 VITALS
HEART RATE: 67 BPM | HEIGHT: 71 IN | OXYGEN SATURATION: 95 % | WEIGHT: 240 LBS | RESPIRATION RATE: 20 BRPM | SYSTOLIC BLOOD PRESSURE: 144 MMHG | TEMPERATURE: 98.5 F | BODY MASS INDEX: 33.6 KG/M2 | DIASTOLIC BLOOD PRESSURE: 110 MMHG

## 2021-09-15 DIAGNOSIS — U07.1 COVID-19: Primary | ICD-10-CM

## 2021-09-15 PROCEDURE — 93005 ELECTROCARDIOGRAM TRACING: CPT | Performed by: EMERGENCY MEDICINE

## 2021-09-15 PROCEDURE — 71045 X-RAY EXAM CHEST 1 VIEW: CPT

## 2021-09-15 PROCEDURE — 6370000000 HC RX 637 (ALT 250 FOR IP): Performed by: EMERGENCY MEDICINE

## 2021-09-15 PROCEDURE — 99284 EMERGENCY DEPT VISIT MOD MDM: CPT

## 2021-09-15 RX ORDER — PREDNISONE 20 MG/1
40 TABLET ORAL ONCE
Status: COMPLETED | OUTPATIENT
Start: 2021-09-15 | End: 2021-09-16

## 2021-09-15 RX ORDER — PREDNISONE 10 MG/1
40 TABLET ORAL DAILY
Qty: 16 TABLET | Refills: 0 | Status: SHIPPED | OUTPATIENT
Start: 2021-09-16 | End: 2021-09-20

## 2021-09-15 RX ORDER — GUAIFENESIN AND CODEINE PHOSPHATE 100; 10 MG/5ML; MG/5ML
10 SOLUTION ORAL ONCE
Status: COMPLETED | OUTPATIENT
Start: 2021-09-15 | End: 2021-09-15

## 2021-09-15 RX ORDER — LEVOFLOXACIN 500 MG/1
500 TABLET, FILM COATED ORAL DAILY
Qty: 9 TABLET | Refills: 0 | Status: SHIPPED | OUTPATIENT
Start: 2021-09-15 | End: 2021-09-24

## 2021-09-15 RX ORDER — LEVOFLOXACIN 500 MG/1
500 TABLET, FILM COATED ORAL ONCE
Status: COMPLETED | OUTPATIENT
Start: 2021-09-15 | End: 2021-09-16

## 2021-09-15 RX ORDER — GUAIFENESIN AND CODEINE PHOSPHATE 100; 10 MG/5ML; MG/5ML
10 SOLUTION ORAL 3 TIMES DAILY PRN
Qty: 90 ML | Refills: 0 | Status: SHIPPED | OUTPATIENT
Start: 2021-09-15 | End: 2021-09-18

## 2021-09-15 RX ORDER — ACETAMINOPHEN 325 MG/1
650 TABLET ORAL ONCE
Status: COMPLETED | OUTPATIENT
Start: 2021-09-15 | End: 2021-09-15

## 2021-09-15 RX ORDER — ACETAMINOPHEN 500 MG
1000 TABLET ORAL 3 TIMES DAILY
Qty: 60 TABLET | Refills: 0 | Status: SHIPPED | OUTPATIENT
Start: 2021-09-15 | End: 2022-02-02

## 2021-09-15 RX ADMIN — ACETAMINOPHEN 650 MG: 325 TABLET ORAL at 22:08

## 2021-09-15 RX ADMIN — GUAIFENESIN AND CODEINE PHOSPHATE 10 ML: 100; 10 SOLUTION ORAL at 22:08

## 2021-09-15 ASSESSMENT — PAIN DESCRIPTION - LOCATION: LOCATION: CHEST

## 2021-09-15 ASSESSMENT — PAIN DESCRIPTION - PAIN TYPE: TYPE: ACUTE PAIN

## 2021-09-15 ASSESSMENT — PAIN SCALES - GENERAL
PAINLEVEL_OUTOF10: 10
PAINLEVEL_OUTOF10: 10

## 2021-09-16 ENCOUNTER — CARE COORDINATION (OUTPATIENT)
Dept: CARE COORDINATION | Age: 56
End: 2021-09-16

## 2021-09-16 LAB
EKG ATRIAL RATE: 110 BPM
EKG DIAGNOSIS: NORMAL
EKG P AXIS: 37 DEGREES
EKG P-R INTERVAL: 176 MS
EKG Q-T INTERVAL: 308 MS
EKG QRS DURATION: 88 MS
EKG QTC CALCULATION (BAZETT): 416 MS
EKG R AXIS: 13 DEGREES
EKG T AXIS: 23 DEGREES
EKG VENTRICULAR RATE: 110 BPM

## 2021-09-16 PROCEDURE — 93010 ELECTROCARDIOGRAM REPORT: CPT | Performed by: INTERNAL MEDICINE

## 2021-09-16 PROCEDURE — 6370000000 HC RX 637 (ALT 250 FOR IP): Performed by: EMERGENCY MEDICINE

## 2021-09-16 RX ADMIN — LEVOFLOXACIN 500 MG: 500 TABLET, FILM COATED ORAL at 00:29

## 2021-09-16 RX ADMIN — PREDNISONE 40 MG: 20 TABLET ORAL at 00:29

## 2021-09-16 NOTE — ED PROVIDER NOTES
Triage Chief Complaint:   Shortness of Breath (covid positive x 1 week )    [place]:  Barak Merida is a 63 y.o. male that presents with worsening cough and shortness of breath. Patient reports that he was diagnosed with COVID-19 approximately 1 week ago and developed the symptoms a few days later. Patient reports he has had persistent cough that has been productive of a thick phlegm and coughing fits are causing him to be short of breath. Patient reports cough seems to be worse when he changes positions. Additionally, patient tried to smoke some marijuana and this worsened his cough. Patient denies any fevers or chills. Patient has not had much vomiting or diarrhea. Patient is taking Excedrin for his symptoms with limited relief. Patient denies any known cardiac or pulmonary disease. Patient does have diabetes and has been taking his medications for this. Patient unfortunately is not vaccinated as he does not believe that it will help. ROS:  General:  No fevers, no chills, no weakness  Eyes:  No recent vison changes, no discharge  ENT:  No sore throat, no nasal congestion, no hearing changes  Cardiovascular:  No chest pain, no palpitations  Respiratory:  + shortness of breath, + cough, no appears deconditioned but overall nontoxic.   Wheezing  Gastrointestinal:  No pain, no nausea, no vomiting, no diarrhea  Musculoskeletal:  No muscle pain, no joint pain  Skin:  No rash, no pruritis, no easy bruising  Neurologic:  No speech problems, no headache, no extremity numbness, no extremity tingling, no extremity weakness  Psychiatric:  No anxiety  Genitourinary:  No dysuria, no hematuria  Endocrine:  No unexpected weight gain, no unexpected weight loss  Extremities:  no edema, no pain    Past Medical History:   Diagnosis Date    Absent finger     Left hand    Anxiety     Arthritis     Hands    Asthma     \"As a kid but outgrew this\" - no medications as of 2/21/20    Chronic back pain     Depression     Edema     Fibromyalgia     Headache(784.0)     Last: 2/19/20    Hernia, abdominal     Hx MRSA infection     positive culture 8/2014 from left foot    Nausea & vomiting     Neuropathy     Obesity, Class III, BMI 40-49.9 (morbid obesity) ([address]     Osteomyelitis (HCC)     hx finger    Poor circulation     Prolonged emergence from general anesthesia     Restless leg syndrome     Shortness of breath on exertion     6/14/2016- pt denies any sob with this interview    Type II or unspecified type diabetes mellitus with other specified manifestations, not stated as uncontrolled 4/8/2014    Type II or unspecified type diabetes mellitus without mention of complication, not stated as uncontrolled DX 2007    Follows with PCP    Ulcer of other part of foot 4/8/2014    'ulcer on left foot healed all up\"     Past Surgical History:   Procedure Laterality Date    COLONOSCOPY  1990's    Normal exam per pt    DEBRIDEMENT  8/25/2014    left foot    ENDOSCOPY, COLON, DIAGNOSTIC  06-    Normal exam per pt -     FINGER SURGERY  9-11-11    Right Index Finger    FINGER SURGERY  01-16-12    RIght Index Finger-Removal of loose body, Reconstruction of Mallet Finger    FOOT DEBRIDEMENT Left 6/10/2019    FOOT DEBRIDEMENT INCISION AND DRAINAGE performed by Marino Edwards MD at [address] [place] [address] Right 06/01/2018    I&D right foot    HEMORRHOID SURGERY  2008    OTHER SURGICAL HISTORY Left 10/22/2013    I & D left foot    OTHER SURGICAL HISTORY  07/07/2017    I&D fingers X2 left hand.  I&D left forearm    OTHER SURGICAL HISTORY Left 07/08/2017    left hand debridement, left forearm incision and drainage     OTHER SURGICAL HISTORY Left 07/10/2017    Fingers I&D    OTHER SURGICAL HISTORY Left 07/14/2017    middle finger amputation revision    ROTATOR CUFF REPAIR  Last Done 7/18/2011    Left, Done Four Times    UPPER GASTROINTESTINAL ENDOSCOPY N/A 2/21/2020    EGD BIOPSY performed by Juan Hayward MD at [facility]    predniSONE (DELTASONE) tablet 40 mg  40 mg Oral Once Sekou Richardson MD        levoFLOXacin [facility]) tablet 500 mg  500 mg Oral Once Sekou Richardson MD         Current Outpatient Medications   Medication Sig Dispense Refill    levoFLOXacin (LEVAQUIN) 500 MG tablet Take 1 tablet by mouth daily for 9 days 9 tablet 0    [START ON 9/16/2021] predniSONE (DELTASONE) 10 MG tablet Take 4 tablets by mouth daily for 4 days 16 tablet 0    guaiFENesin-codeine (TUSSI-ORGANIDIN NR) 100-10 MG/5ML syrup Take 10 mLs by mouth 3 times daily as needed for Cough for up to 3 days. 90 mL 0    acetaminophen (TYLENOL) 500 MG tablet Take 2 tablets by mouth 3 times daily for 10 days 60 tablet 0    tamsulosin (FLOMAX) 0.4 MG capsule Take 1 capsule by mouth daily for 7 doses 7 capsule 0    dicyclomine (BENTYL) 10 MG capsule Take 2 capsules by mouth 4 times daily as needed (abdominal pain) 100 capsule 3    ondansetron (ZOFRAN) 4 MG tablet Take 4 mg by mouth every 8 hours as needed for Nausea or Vomiting      Dulaglutide (TRULICITY SC) Inject 1.5 mLs into the skin every 7 days       pantoprazole (PROTONIX) 40 MG tablet Take 1 tablet by mouth 2 times daily for 14 days 28 tablet 0    ondansetron (ZOFRAN ODT) 4 MG disintegrating tablet Take 1 tablet by mouth every 6 hours (Patient not taking: Reported on 2/20/2020) 10 tablet 0    escitalopram (LEXAPRO) 10 MG tablet Take 10 mg by mouth daily      oxyCODONE (ROXICODONE) 5 MG immediate release tablet Take 10 mg by mouth every 4 hours. Allergies   Allergen Reactions    Cantaloupe (Diagnostic) Hives    Ceftriaxone Hives     Tolerated Zosyn well 1/2/2019     Robaxin [Methocarbamol] Swelling    Rocephin [Ceftriaxone Sodium-Lidocaine] Hives     Noted on 10/26 - developed extremity swelling and hives. No anaphylaxis.  Sulfa Antibiotics Hives     Noted on 10/26 - developed extremity swelling and hives. No anaphylaxis.     Aspirin Nausea Only    Nsaids Nausea Only    Other Nausea Only     Darvocet- Gi distress  napsalate/acetaminophen    Propoxyphene Nausea And Vomiting    Toradol [Ketorolac Tromethamine] Other (See Comments)     Sweats        Nursing Notes Reviewed    Physical Exam:  ED Triage Vitals [09/15/21 2045]   Enc Vitals Group      BP (!) 144/110      Pulse 67      Resp 20      Temp 98.5 °F (36.9 °C)      Temp Source Oral      SpO2 95 %      Weight 240 lb (108.9 kg)      Height 5' 11\" (1.803 m)      Head Circumference       Peak Flow       Pain Score       Pain Loc       Pain Edu? Excl. in [address]? My pulse ox interpretation is - normal    General appearance:  No acute distress. No diaphoresis. No rash exposed skin. Skin:  Warm. Dry. Eye:  Extraocular movements intact. Ears, nose, mouth and throat:  Oral mucosa moist   Neck:  Trachea midline. Extremity:  No swelling. Normal ROM     Heart:  Regular rate and rhythm, normal S1 & S2, no extra heart sounds. Perfusion:  Intact. Respiratory: Frequent dry coughing. There is some rhonchi bilaterally. Moving good air. Speaking clearly in full sentences without respiratory distress. Abdominal:  Normal bowel sounds. Soft. Nontender. Non distended. Elevated BMI. Back:  No CVA tenderness to palpation     Neurological:  Alert and oriented times 3. No focal neuro deficits. Psychiatric:  Appropriate    I have reviewed and interpreted all of the currently available lab results from this visit (if applicable):  No results found for this visit on 09/15/21. Radiographs (if obtained):  [] The following radiograph was interpreted by myself in the absence of a radiologist:   [x] Radiologist's Report Reviewed:  XR CHEST PORTABLE   Preliminary Result   1. Patchy airspace opacities compatible with viral pneumonia.                EKG (if obtained): (All EKG's are interpreted by myself in the absence of a cardiologist)  12 lead EKG per my interpretation:  Sinus Tachycardia at 110  Axis is   Normal  QTc is the emergency department as well as the need for close outpatient follow-up. Questions sought and answered with the patient. They voice understanding and agree with plan. I did consider the current climate with the COVID-19 pandemic during medical decision making. I do believe that at this time the patient's risk of staying in the hospital for further evaluation/observation outweigh the benefit and that an outpatient treatment trial/observation trial is the safer decision at this time. This was discussed with the patient and they are in an agreement and feel comfortable with the plan moving forward. Patient was encouraged to return should they change their mind or symptoms worsen. Discussed specific signs and symptoms to watch for on when to return to the ED as well as the need for close primary care follow up. Clinical Impression:  1. COVID-19      Disposition referral (if applicable): Flora Mazariegos  [address] [place], [place] [id number]  Kumar Hay  [phone]    Schedule an appointment as soon as possible for a visit       [facility] Emergency Department  [address] [place] [id number]  [phone]  Today  If symptoms worsen    Disposition medications (if applicable):  New Prescriptions    ACETAMINOPHEN (TYLENOL) 500 MG TABLET    Take 2 tablets by mouth 3 times daily for 10 days    GUAIFENESIN-CODEINE (TUSSI-ORGANIDIN NR) 100-10 MG/5ML SYRUP    Take 10 mLs by mouth 3 times daily as needed for Cough for up to 3 days.     LEVOFLOXACIN (LEVAQUIN) 500 MG TABLET    Take 1 tablet by mouth daily for 9 days    PREDNISONE (DELTASONE) 10 MG TABLET    Take 4 tablets by mouth daily for 4 days       Comment: Please note this report has been produced using speech recognition software and may contain errors related to that system including errors in grammar, punctuation, and spelling, as well as words and phrases that may be

## 2021-09-16 NOTE — ED NOTES
Reviewed discharge information. Patient verbalized understanding of paperwork, medication, and care instructions. Patient denied any questions.      Chip Gonzalez RN  09/16/21 [id number]

## 2021-09-16 NOTE — ED NOTES
Bed: ED-21  Expected date:   Expected time:   Means of arrival:   Comments:  Tr 1     Valerie Payne RN  09/15/21 2051

## 2021-09-17 ENCOUNTER — CARE COORDINATION (OUTPATIENT)
Dept: CARE COORDINATION | Age: 56
End: 2021-09-17

## 2021-10-20 ENCOUNTER — APPOINTMENT (OUTPATIENT)
Dept: GENERAL RADIOLOGY | Age: 56
DRG: 638 | End: 2021-10-20
Payer: MEDICARE

## 2021-10-20 ENCOUNTER — HOSPITAL ENCOUNTER (INPATIENT)
Age: 56
LOS: 7 days | Discharge: HOME HEALTH | DRG: 638 | End: 2021-10-27
Attending: EMERGENCY MEDICINE | Admitting: STUDENT IN AN ORGANIZED HEALTH CARE EDUCATION/TRAINING PROGRAM
Payer: MEDICARE

## 2021-10-20 DIAGNOSIS — S90.852A FOREIGN BODY IN FOOT, LEFT, INFECTED, INITIAL ENCOUNTER: ICD-10-CM

## 2021-10-20 DIAGNOSIS — Z23 TETANUS-DIPHTHERIA (TD) VACCINATION: ICD-10-CM

## 2021-10-20 DIAGNOSIS — L03.114 CELLULITIS OF LEFT HAND: Primary | ICD-10-CM

## 2021-10-20 DIAGNOSIS — L08.9 FOREIGN BODY IN FOOT, LEFT, INFECTED, INITIAL ENCOUNTER: ICD-10-CM

## 2021-10-20 DIAGNOSIS — S89.92XA: ICD-10-CM

## 2021-10-20 DIAGNOSIS — I89.1 LYMPHANGITIS OF UPPER EXTREMITY: ICD-10-CM

## 2021-10-20 PROBLEM — M79.672 LEFT FOOT PAIN: Status: ACTIVE | Noted: 2021-10-20

## 2021-10-20 LAB
ALBUMIN SERPL-MCNC: 4 GM/DL (ref 3.4–5)
ALP BLD-CCNC: 101 IU/L (ref 40–128)
ALT SERPL-CCNC: 7 U/L (ref 10–40)
ANION GAP SERPL CALCULATED.3IONS-SCNC: 11 MMOL/L (ref 4–16)
AST SERPL-CCNC: 12 IU/L (ref 15–37)
BASOPHILS ABSOLUTE: 0.1 K/CU MM
BASOPHILS RELATIVE PERCENT: 0.6 % (ref 0–1)
BILIRUB SERPL-MCNC: 0.6 MG/DL (ref 0–1)
BUN BLDV-MCNC: 12 MG/DL (ref 6–23)
CALCIUM SERPL-MCNC: 9.5 MG/DL (ref 8.3–10.6)
CHLORIDE BLD-SCNC: 98 MMOL/L (ref 99–110)
CO2: 25 MMOL/L (ref 21–32)
CREAT SERPL-MCNC: 0.8 MG/DL (ref 0.9–1.3)
DIFFERENTIAL TYPE: ABNORMAL
EOSINOPHILS ABSOLUTE: 0.2 K/CU MM
EOSINOPHILS RELATIVE PERCENT: 2.2 % (ref 0–3)
GFR AFRICAN AMERICAN: >60 ML/MIN/1.73M2
GFR NON-AFRICAN AMERICAN: >60 ML/MIN/1.73M2
GLUCOSE BLD-MCNC: 264 MG/DL (ref 70–99)
HCT VFR BLD CALC: 41.4 % (ref 42–52)
HEMOGLOBIN: 13.2 GM/DL (ref 13.5–18)
IMMATURE NEUTROPHIL %: 0.3 % (ref 0–0.43)
LACTATE: 1.2 MMOL/L (ref 0.4–2)
LIPASE: 35 IU/L (ref 13–60)
LYMPHOCYTES ABSOLUTE: 1.3 K/CU MM
LYMPHOCYTES RELATIVE PERCENT: 16.2 % (ref 24–44)
MCH RBC QN AUTO: 28.6 PG (ref 27–31)
MCHC RBC AUTO-ENTMCNC: 31.9 % (ref 32–36)
MCV RBC AUTO: 89.6 FL (ref 78–100)
MONOCYTES ABSOLUTE: 0.6 K/CU MM
MONOCYTES RELATIVE PERCENT: 7 % (ref 0–4)
NUCLEATED RBC %: 0 %
PDW BLD-RTO: 14.2 % (ref 11.7–14.9)
PLATELET # BLD: 197 K/CU MM (ref 140–440)
PMV BLD AUTO: 9.9 FL (ref 7.5–11.1)
POTASSIUM SERPL-SCNC: 4.2 MMOL/L (ref 3.5–5.1)
RBC # BLD: 4.62 M/CU MM (ref 4.6–6.2)
SEGMENTED NEUTROPHILS ABSOLUTE COUNT: 5.8 K/CU MM
SEGMENTED NEUTROPHILS RELATIVE PERCENT: 73.7 % (ref 36–66)
SODIUM BLD-SCNC: 134 MMOL/L (ref 135–145)
TOTAL IMMATURE NEUTOROPHIL: 0.02 K/CU MM
TOTAL NUCLEATED RBC: 0 K/CU MM
TOTAL PROTEIN: 7.1 GM/DL (ref 6.4–8.2)
WBC # BLD: 7.8 K/CU MM (ref 4–10.5)

## 2021-10-20 PROCEDURE — 83690 ASSAY OF LIPASE: CPT

## 2021-10-20 PROCEDURE — 87040 BLOOD CULTURE FOR BACTERIA: CPT

## 2021-10-20 PROCEDURE — 36415 COLL VENOUS BLD VENIPUNCTURE: CPT

## 2021-10-20 PROCEDURE — 85025 COMPLETE CBC W/AUTO DIFF WBC: CPT

## 2021-10-20 PROCEDURE — 90471 IMMUNIZATION ADMIN: CPT | Performed by: PHYSICIAN ASSISTANT

## 2021-10-20 PROCEDURE — 83605 ASSAY OF LACTIC ACID: CPT

## 2021-10-20 PROCEDURE — 99285 EMERGENCY DEPT VISIT HI MDM: CPT

## 2021-10-20 PROCEDURE — 96368 THER/DIAG CONCURRENT INF: CPT

## 2021-10-20 PROCEDURE — 73140 X-RAY EXAM OF FINGER(S): CPT

## 2021-10-20 PROCEDURE — 2580000003 HC RX 258: Performed by: CLINICAL NURSE SPECIALIST

## 2021-10-20 PROCEDURE — 73630 X-RAY EXAM OF FOOT: CPT

## 2021-10-20 PROCEDURE — 96365 THER/PROPH/DIAG IV INF INIT: CPT

## 2021-10-20 PROCEDURE — 80053 COMPREHEN METABOLIC PANEL: CPT

## 2021-10-20 PROCEDURE — 6360000002 HC RX W HCPCS: Performed by: PHYSICIAN ASSISTANT

## 2021-10-20 PROCEDURE — 73130 X-RAY EXAM OF HAND: CPT

## 2021-10-20 PROCEDURE — 83036 HEMOGLOBIN GLYCOSYLATED A1C: CPT

## 2021-10-20 PROCEDURE — 96375 TX/PRO/DX INJ NEW DRUG ADDON: CPT

## 2021-10-20 PROCEDURE — 90715 TDAP VACCINE 7 YRS/> IM: CPT | Performed by: PHYSICIAN ASSISTANT

## 2021-10-20 PROCEDURE — 96366 THER/PROPH/DIAG IV INF ADDON: CPT

## 2021-10-20 PROCEDURE — 6370000000 HC RX 637 (ALT 250 FOR IP): Performed by: CLINICAL NURSE SPECIALIST

## 2021-10-20 PROCEDURE — 6360000002 HC RX W HCPCS: Performed by: EMERGENCY MEDICINE

## 2021-10-20 PROCEDURE — 1200000000 HC SEMI PRIVATE

## 2021-10-20 RX ORDER — SODIUM CHLORIDE 9 MG/ML
INJECTION, SOLUTION INTRAVENOUS CONTINUOUS
Status: DISPENSED | OUTPATIENT
Start: 2021-10-20 | End: 2021-10-21

## 2021-10-20 RX ORDER — NICOTINE POLACRILEX 4 MG
15 LOZENGE BUCCAL PRN
Status: DISCONTINUED | OUTPATIENT
Start: 2021-10-20 | End: 2021-10-27 | Stop reason: HOSPADM

## 2021-10-20 RX ORDER — VANCOMYCIN 2 G/400ML
2000 INJECTION, SOLUTION INTRAVENOUS ONCE
Status: COMPLETED | OUTPATIENT
Start: 2021-10-20 | End: 2021-10-20

## 2021-10-20 RX ORDER — SODIUM CHLORIDE 0.9 % (FLUSH) 0.9 %
5-40 SYRINGE (ML) INJECTION EVERY 12 HOURS SCHEDULED
Status: DISCONTINUED | OUTPATIENT
Start: 2021-10-20 | End: 2021-10-27 | Stop reason: HOSPADM

## 2021-10-20 RX ORDER — SODIUM CHLORIDE 9 MG/ML
25 INJECTION, SOLUTION INTRAVENOUS PRN
Status: DISCONTINUED | OUTPATIENT
Start: 2021-10-20 | End: 2021-10-27 | Stop reason: HOSPADM

## 2021-10-20 RX ORDER — DEXTROSE MONOHYDRATE 25 G/50ML
12.5 INJECTION, SOLUTION INTRAVENOUS PRN
Status: DISCONTINUED | OUTPATIENT
Start: 2021-10-20 | End: 2021-10-27 | Stop reason: HOSPADM

## 2021-10-20 RX ORDER — ONDANSETRON 2 MG/ML
4 INJECTION INTRAMUSCULAR; INTRAVENOUS EVERY 6 HOURS PRN
Status: DISCONTINUED | OUTPATIENT
Start: 2021-10-20 | End: 2021-10-27 | Stop reason: HOSPADM

## 2021-10-20 RX ORDER — ONDANSETRON 4 MG/1
4 TABLET, ORALLY DISINTEGRATING ORAL EVERY 8 HOURS PRN
Status: DISCONTINUED | OUTPATIENT
Start: 2021-10-20 | End: 2021-10-27 | Stop reason: HOSPADM

## 2021-10-20 RX ORDER — POLYETHYLENE GLYCOL 3350 17 G/17G
17 POWDER, FOR SOLUTION ORAL DAILY PRN
Status: DISCONTINUED | OUTPATIENT
Start: 2021-10-20 | End: 2021-10-27 | Stop reason: HOSPADM

## 2021-10-20 RX ORDER — DEXTROSE MONOHYDRATE 50 MG/ML
100 INJECTION, SOLUTION INTRAVENOUS PRN
Status: DISCONTINUED | OUTPATIENT
Start: 2021-10-20 | End: 2021-10-27 | Stop reason: HOSPADM

## 2021-10-20 RX ORDER — LEVOFLOXACIN 5 MG/ML
750 INJECTION, SOLUTION INTRAVENOUS EVERY 24 HOURS
Status: DISCONTINUED | OUTPATIENT
Start: 2021-10-20 | End: 2021-10-20 | Stop reason: SDUPTHER

## 2021-10-20 RX ORDER — LEVOFLOXACIN 5 MG/ML
500 INJECTION, SOLUTION INTRAVENOUS EVERY 24 HOURS
Status: DISCONTINUED | OUTPATIENT
Start: 2021-10-21 | End: 2021-10-26

## 2021-10-20 RX ORDER — ACETAMINOPHEN 325 MG/1
650 TABLET ORAL EVERY 6 HOURS PRN
Status: DISCONTINUED | OUTPATIENT
Start: 2021-10-20 | End: 2021-10-24

## 2021-10-20 RX ORDER — ACETAMINOPHEN 650 MG/1
650 SUPPOSITORY RECTAL EVERY 6 HOURS PRN
Status: DISCONTINUED | OUTPATIENT
Start: 2021-10-20 | End: 2021-10-27 | Stop reason: HOSPADM

## 2021-10-20 RX ORDER — MORPHINE SULFATE 2 MG/ML
4 INJECTION, SOLUTION INTRAMUSCULAR; INTRAVENOUS ONCE
Status: COMPLETED | OUTPATIENT
Start: 2021-10-20 | End: 2021-10-20

## 2021-10-20 RX ORDER — TAMSULOSIN HYDROCHLORIDE 0.4 MG/1
0.4 CAPSULE ORAL DAILY
Status: DISCONTINUED | OUTPATIENT
Start: 2021-10-21 | End: 2021-10-27 | Stop reason: HOSPADM

## 2021-10-20 RX ORDER — PANTOPRAZOLE SODIUM 40 MG/1
40 TABLET, DELAYED RELEASE ORAL 2 TIMES DAILY
Status: DISCONTINUED | OUTPATIENT
Start: 2021-10-20 | End: 2021-10-27 | Stop reason: HOSPADM

## 2021-10-20 RX ORDER — SODIUM CHLORIDE 0.9 % (FLUSH) 0.9 %
5-40 SYRINGE (ML) INJECTION PRN
Status: DISCONTINUED | OUTPATIENT
Start: 2021-10-20 | End: 2021-10-27 | Stop reason: HOSPADM

## 2021-10-20 RX ORDER — VANCOMYCIN 1.75 G/350ML
1250 INJECTION, SOLUTION INTRAVENOUS EVERY 12 HOURS
Status: DISCONTINUED | OUTPATIENT
Start: 2021-10-21 | End: 2021-10-27 | Stop reason: HOSPADM

## 2021-10-20 RX ORDER — OXYCODONE HYDROCHLORIDE AND ACETAMINOPHEN 5; 325 MG/1; MG/1
1 TABLET ORAL EVERY 4 HOURS PRN
Status: DISPENSED | OUTPATIENT
Start: 2021-10-20 | End: 2021-10-21

## 2021-10-20 RX ORDER — ESCITALOPRAM OXALATE 10 MG/1
10 TABLET ORAL DAILY
Status: DISCONTINUED | OUTPATIENT
Start: 2021-10-21 | End: 2021-10-27 | Stop reason: HOSPADM

## 2021-10-20 RX ADMIN — MORPHINE SULFATE 4 MG: 2 INJECTION, SOLUTION INTRAMUSCULAR; INTRAVENOUS at 17:36

## 2021-10-20 RX ADMIN — VANCOMYCIN 2000 MG: 2 INJECTION, SOLUTION INTRAVENOUS at 18:38

## 2021-10-20 RX ADMIN — OXYCODONE HYDROCHLORIDE AND ACETAMINOPHEN 1 TABLET: 5; 325 TABLET ORAL at 21:10

## 2021-10-20 RX ADMIN — SODIUM CHLORIDE: 9 INJECTION, SOLUTION INTRAVENOUS at 21:14

## 2021-10-20 RX ADMIN — TETANUS TOXOID, REDUCED DIPHTHERIA TOXOID AND ACELLULAR PERTUSSIS VACCINE, ADSORBED 0.5 ML: 5; 2.5; 8; 8; 2.5 SUSPENSION INTRAMUSCULAR at 17:36

## 2021-10-20 RX ADMIN — LEVOFLOXACIN 750 MG: 5 INJECTION, SOLUTION INTRAVENOUS at 17:35

## 2021-10-20 ASSESSMENT — PAIN SCALES - GENERAL
PAINLEVEL_OUTOF10: 9
PAINLEVEL_OUTOF10: 0
PAINLEVEL_OUTOF10: 10
PAINLEVEL_OUTOF10: 9

## 2021-10-20 ASSESSMENT — ENCOUNTER SYMPTOMS
DIARRHEA: 0
NAUSEA: 0
VOMITING: 1
SHORTNESS OF BREATH: 0
BACK PAIN: 1
ABDOMINAL PAIN: 0
COUGH: 0

## 2021-10-20 NOTE — H&P
History and Physical      Name:  David York /Age/Sex: 1965  (63 y.o. male)   MRN & CSN:  [id number] & [id number] Admission Date/Time: 10/20/2021  3:51 PM   Location:  ED06/ED-06 PCP: Doreen Tony Day: 1    Assessment and Plan:   David York is a 63 y.o.  male  who presents with B/L hand swelling, left foot pain with fishing hook between third and fourth great toe    Assessment and plan:  Left foot pain and bilateral hand swelling with redness most likely cellulitis 2/2 injury    Fish hook foreign body, initial  Encounter    XrayBarbed fish hook in the soft tissues between the third and fourth toes,No radiographic evidence of osteomyelitis   ED treatment-Levaquin and vancomycin, morphine  lactate 1.2, WBC 7.8  VS T 97.9,HR 88, RR 18, /91 mm/hg,  Multiple wound both hands related to fishing injury     Diabetes mellitus type 2 with/long-term insulin medicated with diabetic ulcers:   HbA1c 10.4  on 07  Hx complicated diabetic foot & hand ulcers with osteomyelitis    Chronic Conditions: continue home medication as ordered  Hx Osteomyelitis  Anxiety/depression: Continue Zoloft  Hx MRSA and ESBL infection  Hx COVID-19   Fibromyalgia  Morbid  obesity: BMI: 33.69kg/m2 Life style modifications      Plan:  Pending blood culture  Continue levofloxacin and vancomycin  Oxycodone 5/325 every 6 hours as needed for pain  Inpatient pharmacy consult  Inpatient surgery consult: ED physician discussed the case with Dr. Sharla Falk, agrees to see the patient. Will keep patient n.p.o. after midnight. Accu-Chek before meals and at bedtime  SSI sliding scale  Hold oral diabetic agents  CBC and BMP daily    Discussed assessment and treatment plan with the admitting and supervising physician who agrees with the plan below.       Diet  diabetic diet, n.p.o. after midnight   DVT Prophylaxis [] Lovenox, []  Heparin, [x] SCDs, [] Warfarin  [] NOAC     GI Prophylaxis [x] PPI,  [] H2 Blocker,  [] Carafate,  [] Diet/Tube Feeds   Code Status  full code     History of Present Illness:     Chief Complaint: bilateral hand swelling, left foot pain with fishing hook between third and fourth great toe  David York is a 63 y.o.  male, with past medical history significant for osteomyelitis, diabetes mellitus with foot ulcer,chronic pain syndrome, drug abuse,who presented to ED  With left foot pain and left  upper extremity swelling. He has hx diabetic complicated with diabetic ulcers both upper and lower extremity. He had multiple fingers partially amputated. Patient went for fishing Saturday10/16/21 and has multiple wounds to upper extremity related to fishing injury. He noticed  redness of left upper extremities  this morning. He also had a fish hook stuck in between third and fourth toe of the left foot, associated with pain. The left foot redness extends up to mid shin. He reports fever and chills for one day. Denies chest pain, shortness of breath. On Examination,the patient alert oriented x3. He reports pain left lower extremity pain improved little bit with pain medication. At the ED, vital signs;T 97.9,HR 88, RR 18, /91 mm/hg,SPO2 100 RA/ NC/O2. Significant laboratories were the following: , CL 98, creatinine 0.8, blood glucose 264, lactate 1.2, WBC 7. 8. The patient was brought to the ED and was subsequently admitted for  further evaluation. and management   Review of Systems   Constitutional: Positive for chills and fever. Negative for fatigue. HENT: Negative. Respiratory: Negative for cough and shortness of breath. Cardiovascular: Negative for chest pain and palpitations. Gastrointestinal: Positive for vomiting. Negative for abdominal pain, diarrhea and nausea. Genitourinary: Positive for difficulty urinating and urgency. Musculoskeletal: Positive for back pain. Negative for gait problem and joint swelling. Neurological: Negative for dizziness, weakness and numbness. Psychiatric/Behavioral: The patient is not nervous/anxious. Left thumb  Ten point ROS reviewed negative, unless as noted above    Objective:   No intake or output data in the 24 hours ending 10/20/21 1910   Vitals:   Vitals:    10/20/21 1802   BP: 120/80   Pulse:  88   Resp:  18   Temp:  97.9   SpO2:  100     Physical Exam:   Physical Exam  Constitutional:       General: He is not in acute distress. Appearance: Normal appearance. He is obese. He is not ill-appearing. HENT:      Head: Normocephalic and atraumatic. Nose: Nose normal.      Mouth/Throat:      Mouth: Mucous membranes are moist.      Pharynx: Oropharynx is clear. Eyes:      Extraocular Movements: Extraocular movements intact. Conjunctiva/sclera: Conjunctivae normal.   Cardiovascular:      Rate and Rhythm: Normal rate and regular rhythm. Pulses: Normal pulses. Heart sounds: Normal heart sounds. No murmur heard. No friction rub. No gallop. Pulmonary:      Effort: Pulmonary effort is normal.      Breath sounds: Normal breath sounds. No wheezing, rhonchi or rales. Abdominal:      General: Bowel sounds are normal.      Palpations: Abdomen is soft. Tenderness: There is no abdominal tenderness. There is no guarding or rebound. Musculoskeletal:         General: Normal range of motion. Cervical back: Normal range of motion. Right lower leg: Edema present. Left lower leg: Edema present. Skin:     Capillary Refill: Capillary refill takes 2 to 3 seconds. Findings: Bruising, erythema, lesion and rash present. Neurological:      Mental Status: He is alert and oriented to person, place, and time. Mental status is at baseline. Cranial Nerves: No cranial nerve deficit. Sensory: No sensory deficit.    Psychiatric:         Mood and Affect: Mood normal.         Behavior: Behavior normal.       Left Hand  Left leg              Past Medical History:      Past Medical History:   Diagnosis Date    Absent finger     Left hand    Anxiety     Arthritis     Hands    Asthma     \"As a kid but outgrew this\" - no medications as of 2/21/20    Chronic back pain     Depression     Edema     Fibromyalgia     Headache(784.0)     Last: 2/19/20    Hernia, abdominal     Hx MRSA infection     positive culture 8/2014 from left foot    Nausea & vomiting     Neuropathy     Obesity, Class III, BMI 40-49.9 (morbid obesity) ([address]     Osteomyelitis (HCC)     hx finger    Poor circulation     Prolonged emergence from general anesthesia     Restless leg syndrome     Shortness of breath on exertion     6/14/2016- pt denies any sob with this interview    Type II or unspecified type diabetes mellitus with other specified manifestations, not stated as uncontrolled 4/8/2014    Type II or unspecified type diabetes mellitus without mention of complication, not stated as uncontrolled DX 2007    Follows with PCP    Ulcer of other part of foot 4/8/2014    'ulcer on left foot healed all up\"     PSHX:  has a past surgical history that includes Rotator cuff repair (Last Done 7/18/2011); Finger surgery (9-11-11); Hemorrhoid surgery (2008); Finger surgery (01-16-12); other surgical history (Left, 10/22/2013); debridement (8/25/2014); other surgical history (07/07/2017); other surgical history (Left, 07/08/2017); other surgical history (Left, 07/10/2017); other surgical history (Left, 07/14/2017); Foot surgery (Right, 06/01/2018); Foot Debridement (Left, 6/10/2019); Colonoscopy (1990's); Endoscopy, colon, diagnostic (06-); and Upper gastrointestinal endoscopy (N/A, 2/21/2020). Allergies: Allergies   Allergen Reactions    Cantaloupe (Diagnostic) Hives    Ceftriaxone Hives     Tolerated Zosyn well 1/2/2019     Robaxin [Methocarbamol] Swelling    Rocephin [Ceftriaxone Sodium-Lidocaine] Hives     Noted on 10/26 - developed extremity swelling and hives. No anaphylaxis.     Sulfa Antibiotics Hives     Noted on 10/26 - developed extremity swelling and hives. No anaphylaxis.  Aspirin Nausea Only    Nsaids Nausea Only    Other Nausea Only     Darvocet- Gi distress  napsalate/acetaminophen    Propoxyphene Nausea And Vomiting    Toradol [Ketorolac Tromethamine] Other (See Comments)     Sweats        FAM HX: family history includes Diabetes in his father; Early Death (age of onset: 39) in his mother; Kidney Disease in his mother. Soc HX:   Social History     Socioeconomic History    Marital status:      Spouse name: None    Number of children: 3    Years of education: None    Highest education level: None   Occupational History    None   Tobacco Use    Smoking status: Former Smoker     Types: Cigarettes     Start date:      Quit date:      Years since quittin.8    Smokeless tobacco: Former User     Types: Snuff   Vaping Use    Vaping Use: Never used   Substance and Sexual Activity    Alcohol use: Yes     Comment: Rarely - 1-2 a year    Drug use: Yes     Frequency: 7.0 times per week     Types: Marijuana     Comment: occ    Sexual activity: None   Other Topics Concern    None   Social History Narrative    None     Social Determinants of Health     Financial Resource Strain:     Difficulty of Paying Living Expenses:    Food Insecurity:     Worried About Running Out of Food in the Last Year:     Ran Out of Food in the Last Year:    Transportation Needs:     Lack of Transportation (Medical):      Lack of Transportation (Non-Medical):    Physical Activity:     Days of Exercise per Week:     Minutes of Exercise per Session:    Stress:     Feeling of Stress :    Social Connections:     Frequency of Communication with Friends and Family:     Frequency of Social Gatherings with Friends and Family:     Attends Protestant Services:     Active Member of Clubs or Organizations:     Attends Club or Organization Meetings:     Marital Status:    Intimate Partner Violence:     Fear of Current or Ex-Partner:     Emotionally Abused:     Physically Abused:     Sexually Abused:        Medications:     Home Medication   Prior to Admission medications    Medication Sig Start Date End Date Taking? Authorizing Provider   acetaminophen (TYLENOL) 500 MG tablet Take 2 tablets by mouth 3 times daily for 10 days 9/15/21 9/25/21  Deneen Garibay MD   tamsulosin [facility]) 0.4 MG capsule Take 1 capsule by mouth daily for 7 doses 5/24/21 5/31/21  Sarbjit Gunderson MD   dicyclomine (BENTYL) 10 MG capsule Take 2 capsules by mouth 4 times daily as needed (abdominal pain) 4/7/20   Sergei Morgan MD   ondansetron (ZOFRAN) 4 MG tablet Take 4 mg by mouth every 8 hours as needed for Nausea or Vomiting    Historical Provider, MD   Dulaglutide (TRULICITY SC) Inject 1.5 mLs into the skin every 7 days     Historical Provider, MD   pantoprazole (PROTONIX) 40 MG tablet Take 1 tablet by mouth 2 times daily for 14 days 1/28/20 2/21/20  Julián Paniagua MD   ondansetron (ZOFRAN ODT) 4 MG disintegrating tablet Take 1 tablet by mouth every 6 hours  Patient not taking: Reported on 2/20/2020 1/23/20   Cullen Holm PA-C   escitalopram (LEXAPRO) 10 MG tablet Take 10 mg by mouth daily    Historical Provider, MD   oxyCODONE (ROXICODONE) 5 MG immediate release tablet Take 10 mg by mouth every 4 hours. Historical Provider, MD     Medications:    vancomycin  2,000 mg IntraVENous Once    levofloxacin  750 mg IntraVENous Q24H      Infusions:   PRN Meds:      Recent Labs     10/20/21  1532   WBC 7.8   HGB 13.2*   HCT 41.4*         Recent Labs     10/20/21  1532   *   K 4.2   CL 98*   CO2 25   BUN 12   CREATININE 0.8*     Recent Labs     10/20/21  1532   AST 12*   ALT 7*   BILITOT 0.6   ALKPHOS 101     No results for input(s): INR in the last 72 hours. No results for input(s): CKTOTAL, CKMB, CKMBINDEX, TROPONINT in the last 72 hours.      Imaging reviewed  XR HAND RIGHT (MIN 3 VIEWS)    Result Date: 10/20/2021  EXAMINATION: THREE XRAY VIEWS OF THE RIGHT HAND 10/20/2021 3:29 pm COMPARISON: Right hand radiographs 02/05/2019. 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 No fracture or dislocation. No acute osseous erosion or periostitis identified. Soft tissue ulceration at the distal aspect of the 2nd finger with soft tissue swelling. No radiopaque foreign body. 1. Soft tissue ulceration at the distal aspect of the 2nd finger. 2. No radiographic evidence of osteomyelitis or other acute osseous abnormality. XR FOOT LEFT (MIN 3 VIEWS)    Result Date: 10/20/2021  EXAMINATION: THREE XRAY VIEWS OF THE LEFT FOOT 10/20/2021 3:29 pm COMPARISON: None. HISTORY: ORDERING SYSTEM PROVIDED HISTORY: foreign body btwn 3rd 4th toes, cellulitis TECHNOLOGIST PROVIDED HISTORY: Reason for exam:->foreign body btwn 3rd 4th toes, cellulitis Reason for Exam: foreign body btwn 3rd 4th toes, cellulitis FINDINGS: There is a two-pronged fishhook between the third and fourth toes measuring 17 mm in total length with barbs. It does not estrada the bone. There is no acute fracture. There are chronic surgical changes of the first ray. There is no bone erosion nor periosteal reaction to indicate osteomyelitis. Barbed fish hook in the soft tissues between the third and fourth toes. No radiographic evidence of osteomyelitis.      XR FINGER LEFT (MIN 2 VIEWS)    Result Date: 10/20/2021  EXAMINATION: THREE XRAY VIEWS OF THE LEFT FINGERS 10/20/2021 3:29 pm COMPARISON: 03/28/2021 HISTORY: ORDERING SYSTEM PROVIDED HISTORY: left finger infection TECHNOLOGIST PROVIDED HISTORY: Reason for exam:->left finger infection Reason for Exam: left finger infection FINDINGS: No evidence of acute fracture or acute osseous erosion. Prior 4th ray amputation and partial amputations of the remaining fingers. Index finger soft tissue swelling. No soft tissue gas or radiopaque foreign body. Index finger soft tissue swelling. No radiographic evidence of acute osteomyelitis or soft tissue gas.           Electronically signed by Horace Apley, APRN on 10/20/2021 at 7:10 PM

## 2021-10-20 NOTE — ED NOTES
[facility] ON CALL OFFICE RECORDING ANDREW ARRIETA WAS TAKING CALL BUT NO OTHER NUMBERS GIVEN TO CALL OR FORWARD CALL TO ADVISED ANGEL SAUER     [address] [place] [address]  10/20/21 5003

## 2021-10-20 NOTE — ED PROVIDER NOTES
As provider-in-triage, I performed a medical screening history and physical exam on this patient. HISTORY OF PRESENT ILLNESS  Mary Ann June is a 63 y.o. male who presents to the emergency department today concerned about infection to the right and left hand/fingers as well as a possible retained foreign body in the interdigital webspace of his left foot. He states that he been fishing over the last several days and got several hooks caught in his fingers in his foot and is feels like there become secondary infected. He woke up with swelling and erythema to the left wrist and arm. He has had history of uncontrolled diabetes, he also has history of amputations in his past.  He unsure of his tetanus status. PHYSICAL EXAM  BP (!) 128/91   Pulse 88   Temp 97.9 °F (36.6 °C) (Oral)   Resp 18   Ht 5' 11.5\" (1.816 m)   Wt 245 lb (111.1 kg)   SpO2 100%   BMI 33.69 kg/m²     On exam, the patient appears in no acute distress. Speech is clear. Breathing is unlabored. Moves all extremities    Comment: Please note this report has been produced using speech recognition software and may contain errors related to that system including errors in grammar, punctuation, and spelling, as well as words and phrases that may be inappropriate. If there are any questions or concerns please feel free to contact the dictating provider for clarification.      Juan Collado, PA  10/20/21 4898

## 2021-10-20 NOTE — ED PROVIDER NOTES
abdominal pain. GENITOURINARY: No frequency, urgency, or dysuria. No hematuria. MUSCULOSKELETAL: As above. NEUROLOGICAL: No focal weakness, numbness, or tingling. SKIN: No rashes or other lesions reported. No yellowing of the skin.     Medical history:  Past Medical History:   Diagnosis Date    Absent finger     Left hand    Anxiety     Arthritis     Hands    Asthma     \"As a kid but outgrew this\" - no medications as of 2/21/20    Chronic back pain     Depression     Edema     Fibromyalgia     Headache(784.0)     Last: 2/19/20    Hernia, abdominal     Hx MRSA infection     positive culture 8/2014 from left foot    Nausea & vomiting     Neuropathy     Obesity, Class III, BMI 40-49.9 (morbid obesity) ([facility] [address]     Osteomyelitis (HCC)     hx finger    Poor circulation     Prolonged emergence from general anesthesia     Restless leg syndrome     Shortness of breath on exertion     6/14/2016- pt denies any sob with this interview    Type II or unspecified type diabetes mellitus with other specified manifestations, not stated as uncontrolled 4/8/2014    Type II or unspecified type diabetes mellitus without mention of complication, not stated as uncontrolled DX 2007    Follows with PCP    Ulcer of other part of foot 4/8/2014    'ulcer on left foot healed all up\"     Past Surgical History:   Procedure Laterality Date    COLONOSCOPY  1990's    Normal exam per pt    DEBRIDEMENT  8/25/2014    left foot    ENDOSCOPY, COLON, DIAGNOSTIC  06-    Normal exam per pt -     FINGER SURGERY  9-11-11    Right Index Finger    FINGER SURGERY  01-16-12    RIght Index Finger-Removal of loose body, Reconstruction of Mallet Finger    FOOT DEBRIDEMENT Left 6/10/2019    FOOT DEBRIDEMENT INCISION AND DRAINAGE performed by Chase Arrington MD at [address] [place] [address] Right 06/01/2018    I&D right foot    HEMORRHOID SURGERY  2008    OTHER SURGICAL HISTORY Left 10/22/2013    I & D left foot    OTHER SURGICAL HISTORY  2017    I&D fingers X2 left hand. I&D left forearm    OTHER SURGICAL HISTORY Left 2017    left hand debridement, left forearm incision and drainage     OTHER SURGICAL HISTORY Left 07/10/2017    Fingers I&D    OTHER SURGICAL HISTORY Left 2017    middle finger amputation revision    ROTATOR CUFF REPAIR  Last Done 2011    Left, Done Four Times    UPPER GASTROINTESTINAL ENDOSCOPY N/A 2020    EGD BIOPSY performed by Rod Foley MD at [facility] 12     Family History   Problem Relation Age of Onset    Kidney Disease Mother         \"Kidney Failure, On Dialysis\"   Margaret Lyon Early Death Mother 39    Diabetes Father         \"Type II\"     Social History     Socioeconomic History    Marital status:      Spouse name: Not on file    Number of children: 3    Years of education: Not on file    Highest education level: Not on file   Occupational History    Not on file   Tobacco Use    Smoking status: Former Smoker     Types: Cigarettes     Start date:      Quit date:      Years since quittin.8    Smokeless tobacco: Former User     Types: Snuff   Vaping Use    Vaping Use: Never used   Substance and Sexual Activity    Alcohol use: Yes     Comment: Rarely - 1-2 a year    Drug use: Yes     Frequency: 7.0 times per week     Types: Marijuana     Comment: occ    Sexual activity: Not on file   Other Topics Concern    Not on file   Social History Narrative    Not on file     Social Determinants of Health     Financial Resource Strain:     Difficulty of Paying Living Expenses:    Food Insecurity:     Worried About Running Out of Food in the Last Year:     [address] in the Last Year:    Transportation Needs:     Lack of Transportation (Medical):      Lack of Transportation (Non-Medical):    Physical Activity:     Days of Exercise per Week:     Minutes of Exercise per Session:    Stress:     Feeling of Stress :    Social Connections:     Frequency of Communication with Friends and Family:     Frequency of Social Gatherings with Friends and Family:     Attends Congregational Services:     Active Member of Clubs or Organizations:     Attends Club or Organization Meetings:     Marital Status:    Intimate Partner Violence:     Fear of Current or Ex-Partner:     Emotionally Abused:     Physically Abused:     Sexually Abused:      Current Facility-Administered Medications   Medication Dose Route Frequency Provider Last Rate Last Admin    metFORMIN (GLUCOPHAGE) tablet 500 mg  500 mg Oral BID  Shreya Guerrero MD        insulin lispro (HUMALOG) injection vial 10 Units  10 Units SubCUTAneous TID  Machelle Curry MD   10 Units at 10/25/21 1134    insulin lispro (HUMALOG) injection vial 0-12 Units  0-12 Units SubCUTAneous TID  Machelle Curry MD   2 Units at 10/25/21 1130    insulin lispro (HUMALOG) injection vial 0-6 Units  0-6 Units SubCUTAneous 2 times per day Machelle Curry MD   1 Units at 10/25/21 0209    insulin glargine (LANTUS) injection vial 25 Units  25 Units SubCUTAneous Nightly  Nitza Guerrero MD        docusate sodium (COLACE) capsule 100 mg  100 mg Oral Daily Shreya Guerrero MD   100 mg at 10/25/21 2304    bisacodyl (DULCOLAX) EC tablet 5 mg  5 mg Oral Daily PRN Shreya Guerrero MD        neomycin-bacitracin-polymyxin (NEOSPORIN) ointment   Topical Daily Shreya Guerrero MD   Given at 10/25/21 0957    piperacillin-tazobactam (ZOSYN) 3,375 mg in dextrose 5 % 50 mL IVPB (mini-bag)  3,375 mg IntraVENous Q6H Shreya Guerrero MD   Held at 10/25/21 1110    oxyCODONE-acetaminophen (PERCOCET) 5-325 MG per tablet 2 tablet  2 tablet Oral Q4H PRN Shreya Guerrero MD   2 tablet at 10/25/21 1127    sodium chloride flush 0.9 % injection 5-40 mL  5-40 mL IntraVENous 2 times per day JULITA Henriquez   10 mL at 10/24/21 2008    sodium chloride flush 0.9 % injection 5-40 mL  5-40 mL IntraVENous PRN JULITA Henriquez   5 mL at 10/22/21 1605    0.9 % sodium chloride infusion  25 mL IntraVENous PRN Bindhu Sajay, APRN 100 mL/hr at 10/25/21 0008 25 mL at 10/25/21 0008    ondansetron (ZOFRAN-ODT) disintegrating tablet 4 mg  4 mg Oral Q8H PRN Bindhu Sajay, APRN   4 mg at 10/22/21 3873    Or    ondansetron (ZOFRAN) injection 4 mg  4 mg IntraVENous Q6H PRN Bindhu Sajay, APRN   4 mg at 10/21/21 0056    polyethylene glycol (GLYCOLAX) packet 17 g  17 g Oral Daily PRN Bindhu Sajay, APRN   17 g at 10/23/21 2235    acetaminophen (TYLENOL) suppository 650 mg  650 mg Rectal Q6H PRN Bindhu Sajay, APRN        glucose (GLUTOSE) 40 % oral gel 15 g  15 g Oral PRN Bindhu Sajay, APRN        dextrose 50 % IV solution  12.5 g IntraVENous PRN Bindhu Sajay, APRN        glucagon (rDNA) injection 1 mg  1 mg IntraMUSCular PRN Bindhu Sajay, APRN        dextrose 5 % solution  100 mL/hr IntraVENous PRN Bindhu Sajay, APRN        escitalopram (LEXAPRO) tablet 10 mg  10 mg Oral Daily Bindhu Sajay, APRN   10 mg at 10/25/21 0828    pantoprazole (PROTONIX) tablet 40 mg  40 mg Oral BID Bindhu Sajay, APRN   40 mg at 10/25/21 0829    tamsulosin (FLOMAX) capsule 0.4 mg  0.4 mg Oral Daily Bindhu Sajay, APRN   0.4 mg at 10/25/21 0828    levoFLOXacin (LEVAQUIN) 500 MG/100ML infusion 500 mg  500 mg IntraVENous Q24H Bindhu Sajay, APRN   Stopped at 10/24/21 1731    vancomycin (VANCOCIN) 1250 mg in 250 mL IVPB  1,250 mg IntraVENous Q12H Bindhu Sajay, APRN   Stopped at 10/24/21 2330     Allergies   Allergen Reactions    Cantaloupe (Diagnostic) Hives    Ceftriaxone Hives     Tolerated Zosyn well 1/2/2019     Robaxin [Methocarbamol] Swelling    Rocephin [Ceftriaxone Sodium-Lidocaine] Hives     Noted on 10/26 - developed extremity swelling and hives. No anaphylaxis.  Sulfa Antibiotics Hives     Noted on 10/26 - developed extremity swelling and hives. No anaphylaxis.     Aspirin Nausea Only    Nsaids Nausea Only    Other Nausea Only     Darvocet- Gi distress  napsalate/acetaminophen    Propoxyphene Nausea And Vomiting    Toradol [Ketorolac Tromethamine] Other (See Comments)     Sweats        Nursing Notes Reviewed    Physical Exam:  Triage VS:    ED Triage Vitals [10/20/21 1303]   Enc Vitals Group      BP (!) 128/91      Pulse 88      Resp 18      Temp 97.9 °F (36.6 °C)      Temp Source Oral      SpO2 100 %      Weight 245 lb (111.1 kg)      Height 5' 11.5\" (1.816 m)      Head Circumference       Peak Flow       Pain Score       Pain Loc       Pain Edu? Excl. in [address]? My pulse ox interpretation is -normal on room air    GENERAL: Patient is awake, alert, and oriented appropriately. Patient is resting comfortably in a still position on the exam table. Patient speaking in full and complete sentences. Well-nourished and well-developed. HEENT: Normocephalic and atraumatic. Pupils equal, round, and reactive to light. No redness or matting. Bilateral external ears are unremarkable. Nasal mucosa is pink without purulence. Oral mucosa is moist and pink. NECK: Supple with normal range of motion. No Kernig's or Brudzinski sign. No JVD. RESPIRATORY: Mildly diminished with symmetric aeration. No respiratory distress. No wheeze, stridor, rales, rhonchi. Chest wall stable and nontender. CARDIOVASCULAR: Remittent mild tachycardia. No murmurs, rubs, or gallops. No central or peripheral cyanosis. GASTROINTESTINAL: Protuberant, soft, nontender. No McBurney's or Gonzalez's point tenderness. No other focal tenderness. No involuntary guarding, rebound, or rigidity. No mass or pulsatile mass. Bowel sounds normal.    NEUROLOGICAL: Awake, alert and oriented x 3. Cranial nerves III through XII are grossly intact as tested without facial droop or dermatomal paresthesias. Of note, forehead wrinkles are symmetric and intact. Conjugate gaze without entrapment. No asymmetry of the corners of the mouth or nasolabial folds. No gross motor or cerebellar deficits.     BACK/MUSCULOSKELETAL: Right upper extremity reveals more mild, somewhat poorly demarcated erythema of the distal aspects of the thumb and index fingers. No crepitance or fluctuance. Left thumb and index fingers exhibit more intense erythema. Multiple excoriation and scale crust noted. Spreading, streak-like redness up to the mid forearm. No palpable crepitus or deformity. No specific bony tenderness or deformity. Compartments are soft. Prior amputations noted across multiple digits. Focal examination of the left foot exhibits a retained fishing hook stuck in between the third and fourth toes. The more lateral toe appears to be more superficially involved, but I am concerned that the more medial toe has more deep involvement. SKIN: Normal tone for ethnicity. Normal turgor and brisk capillary refill peripherally. PSYCHIATRIC: Normal mood. Normal affect. Emergency department course. Initial medical screening studies were ordered by the provider in triage. Patient is brought to bed 6 then and assessed and reassessed by me. After initial evaluation, additional orders are placed for vancomycin 2 g and levofloxacin 750 mg. Patient does have listed allergies to cephalosporins. I am concerned about the removal of the fishhook causing unnecessary damage to soft tissues including musculotendinous structures, particular with his history of prior amputations. Consult is placed to podiatry at [phone]. Patient is agreeable to continuing plan. Upon most recent reevaluation, patient remains clinically stable. Health unit coordinator is unable to locate an on-call podiatrist. Patient appears to have seen Dr. Amos Hashimoto for prior amputations, and he is contacted at approximately 1800. We have discussed my concerns about the deep involvement and the possibility of removal of the fishhook in the emergency department causing more deep tissue damage that could lead to complicated healing and potential further amputation.  We have discussed admission to the [facility]. Hospitalist LUCY Campbell is paged at [phone] to discuss admission. Initial verbal orders are discussed shortly after. Decision time to admit: [id number]. Patient seen during [facility], I did don appropriate PPE during my encounters with the patient, including n95 (when appropriate) mask and eye protection as appropriate.     I have reviewed and interpreted all of the currently available lab results from this visit (if applicable):  Results for orders placed or performed during the hospital encounter of 10/20/21   Culture, Blood 1    Specimen: Blood   Result Value Ref Range    Specimen BLOOD     Special Requests NONE     Culture NO GROWTH AT 4 DAYS    Culture, Blood 2    Specimen: Blood   Result Value Ref Range    Specimen BLOOD     Special Requests NONE     Culture NO GROWTH AT 4 DAYS    CBC Auto Differential   Result Value Ref Range    WBC 7.8 4.0 - 10.5 K/CU MM    RBC 4.62 4.6 - 6.2 M/CU MM    Hemoglobin 13.2 (L) 13.5 - 18.0 GM/DL    Hematocrit 41.4 (L) 42 - 52 %    MCV 89.6 78 - 100 FL    MCH 28.6 27 - 31 PG    MCHC 31.9 (L) 32.0 - 36.0 %    RDW 14.2 11.7 - 14.9 %    Platelets 383 115 - 737 K/CU MM    MPV 9.9 7.5 - 11.1 FL    Differential Type AUTOMATED DIFFERENTIAL     Segs Relative 73.7 (H) 36 - 66 %    Lymphocytes % 16.2 (L) 24 - 44 %    Monocytes % 7.0 (H) 0 - 4 %    Eosinophils % 2.2 0 - 3 %    Basophils % 0.6 0 - 1 %    Segs Absolute 5.8 K/CU MM    Lymphocytes Absolute 1.3 K/CU MM    Monocytes Absolute 0.6 K/CU MM    Eosinophils Absolute 0.2 K/CU MM    Basophils Absolute 0.1 K/CU MM    Nucleated RBC % 0.0 %    Total Nucleated RBC 0.0 K/CU MM    Total Immature Neutrophil 0.02 K/CU MM    Immature Neutrophil % 0.3 0 - 0.43 %   CMP   Result Value Ref Range    Sodium 134 (L) 135 - 145 MMOL/L    Potassium 4.2 3.5 - 5.1 MMOL/L    Chloride 98 (L) 99 - 110 mMol/L    CO2 25 21 - 32 MMOL/L    BUN 12 6 - 23 MG/DL    CREATININE 0.8 (L) 0.9 - 1.3 MG/DL    Glucose 264 (H) 70 - 99 MG/DL    Calcium 9.5 8.3 - 10.6 MG/DL    Albumin 4.0 3.4 - 5.0 GM/DL    Total Protein 7.1 6.4 - 8.2 GM/DL    Total Bilirubin 0.6 0.0 - 1.0 MG/DL    ALT 7 (L) 10 - 40 U/L    AST 12 (L) 15 - 37 IU/L    Alkaline Phosphatase 101 40 - 128 IU/L    GFR Non-African American >60 >60 mL/min/1.73m2    GFR African American >60 >60 mL/min/1.73m2    Anion Gap 11 4 - 16   Lactic Acid, Plasma   Result Value Ref Range    Lactate 1.2 0.4 - 2.0 mMOL/L   Lipase   Result Value Ref Range    Lipase 35 13 - 60 IU/L   Vancomycin, random   Result Value Ref Range    Vancomycin Rm 8.4 UG/ML    DOSE AMOUNT DOSE AMT.  GIVEN - 2000mg     DOSE TIME DOSE TIME GIVEN - 2767    Basic Metabolic Panel w/ Reflex to MG   Result Value Ref Range    Sodium 132 (L) 135 - 145 MMOL/L    Potassium 4.0 3.5 - 5.1 MMOL/L    Chloride 101 99 - 110 mMol/L    CO2 21 21 - 32 MMOL/L    Anion Gap 10 4 - 16    BUN 8 6 - 23 MG/DL    CREATININE 0.4 (L) 0.9 - 1.3 MG/DL    Glucose 170 (H) 70 - 99 MG/DL    Calcium 8.6 8.3 - 10.6 MG/DL    GFR Non-African American >60 >60 mL/min/1.73m2    GFR African American >60 >60 mL/min/1.73m2   CBC   Result Value Ref Range    WBC 8.1 4.0 - 10.5 K/CU MM    RBC 4.44 (L) 4.6 - 6.2 M/CU MM    Hemoglobin 12.6 (L) 13.5 - 18.0 GM/DL    Hematocrit 39.2 (L) 42 - 52 %    MCV 88.3 78 - 100 FL    MCH 28.4 27 - 31 PG    MCHC 32.1 32.0 - 36.0 %    RDW 14.2 11.7 - 14.9 %    Platelets 425 240 - 484 K/CU MM    MPV 10.0 7.5 - 11.1 FL   Hemoglobin A1c   Result Value Ref Range    Hemoglobin A1C 8.6 (H) 4.2 - 6.3 %    eAG 200 mg/dL   Renal function panel   Result Value Ref Range    Sodium 134 (L) 135 - 145 MMOL/L    Potassium 4.2 3.5 - 5.1 MMOL/L    Chloride 100 99 - 110 mMol/L    CO2 22 21 - 32 MMOL/L    Anion Gap 12 4 - 16    BUN 9 6 - 23 MG/DL    CREATININE 0.7 (L) 0.9 - 1.3 MG/DL    Glucose 242 (H) 70 - 99 MG/DL    Calcium 8.8 8.3 - 10.6 MG/DL    GFR Non-African American >60 >60 mL/min/1.73m2    GFR African American >60 >60 mL/min/1.73m2    Albumin 3.5 3.4 - 5.0 GM/DL    Phosphorus 3.6 2.5 - 4.9 MG/DL   Renal function panel   Result Value Ref Range    Sodium 132 (L) 135 - 145 MMOL/L    Potassium 4.4 3.5 - 5.1 MMOL/L    Chloride 98 (L) 99 - 110 mMol/L    CO2 22 21 - 32 MMOL/L    Anion Gap 12 4 - 16    BUN 11 6 - 23 MG/DL    CREATININE 0.7 (L) 0.9 - 1.3 MG/DL    Glucose 229 (H) 70 - 99 MG/DL    Calcium 9.2 8.3 - 10.6 MG/DL    GFR Non-African American >60 >60 mL/min/1.73m2    GFR African American >60 >60 mL/min/1.73m2    Albumin 3.9 3.4 - 5.0 GM/DL    Phosphorus 3.1 2.5 - 4.9 MG/DL   Vancomycin, random   Result Value Ref Range    Vancomycin Rm 11.3 UG/ML    DOSE AMOUNT DOSE AMT.  GIVEN - 1250     DOSE TIME DOSE TIME GIVEN - 0800/2000    Renal function panel   Result Value Ref Range    Sodium 131 (L) 135 - 145 MMOL/L    Potassium 4.5 3.5 - 5.1 MMOL/L    Chloride 97 (L) 99 - 110 mMol/L    CO2 21 21 - 32 MMOL/L    Anion Gap 13 4 - 16    BUN 15 6 - 23 MG/DL    CREATININE 0.5 (L) 0.9 - 1.3 MG/DL    Glucose 235 (H) 70 - 99 MG/DL    Calcium 9.1 8.3 - 10.6 MG/DL    GFR Non-African American >60 >60 mL/min/1.73m2    GFR African American >60 >60 mL/min/1.73m2    Albumin 3.9 3.4 - 5.0 GM/DL    Phosphorus 3.5 2.5 - 4.9 MG/DL   Renal function panel   Result Value Ref Range    Sodium 134 (L) 135 - 145 MMOL/L    Potassium 4.5 3.5 - 5.1 MMOL/L    Chloride 98 (L) 99 - 110 mMol/L    CO2 23 21 - 32 MMOL/L    Anion Gap 13 4 - 16    BUN 18 6 - 23 MG/DL    CREATININE 0.8 (L) 0.9 - 1.3 MG/DL    Glucose 179 (H) 70 - 99 MG/DL    Calcium 9.5 8.3 - 10.6 MG/DL    GFR Non-African American >60 >60 mL/min/1.73m2    GFR African American >60 >60 mL/min/1.73m2    Albumin 3.9 3.4 - 5.0 GM/DL    Phosphorus 3.9 2.5 - 4.9 MG/DL   POCT Glucose   Result Value Ref Range    POC Glucose 168 (H) 70 - 99 MG/DL   POCT Glucose   Result Value Ref Range    POC Glucose 230 (H) 70 - 99 MG/DL   POCT Glucose   Result Value Ref Range    POC Glucose 157 (H) 70 - 99 MG/DL   POCT Glucose   Result Value Ref Range    POC Glucose 192 (H) 70 - 99 MG/DL   POCT Glucose   Result Value Ref Range    POC Glucose 266 (H) 70 - 99 MG/DL   POCT Glucose   Result Value Ref Range    POC Glucose 246 (H) 70 - 99 MG/DL   POCT Glucose   Result Value Ref Range    POC Glucose 218 (H) 70 - 99 MG/DL   POCT Glucose   Result Value Ref Range    POC Glucose 197 (H) 70 - 99 MG/DL   POCT Glucose   Result Value Ref Range    POC Glucose 177 (H) 70 - 99 MG/DL   POCT Glucose   Result Value Ref Range    POC Glucose 212 (H) 70 - 99 MG/DL   POCT Glucose   Result Value Ref Range    POC Glucose 202 (H) 70 - 99 MG/DL   POCT Glucose   Result Value Ref Range    POC Glucose 212 (H) 70 - 99 MG/DL   POCT Glucose   Result Value Ref Range    POC Glucose 235 (H) 70 - 99 MG/DL   POCT Glucose   Result Value Ref Range    POC Glucose 195 (H) 70 - 99 MG/DL   POCT Glucose   Result Value Ref Range    POC Glucose 179 (H) 70 - 99 MG/DL   POCT Glucose   Result Value Ref Range    POC Glucose 136 (H) 70 - 99 MG/DL   POCT Glucose   Result Value Ref Range    POC Glucose 181 (H) 70 - 99 MG/DL   POCT Glucose   Result Value Ref Range    POC Glucose 188 (H) 70 - 99 MG/DL   POCT Glucose   Result Value Ref Range    POC Glucose 187 (H) 70 - 99 MG/DL        Radiographs (if obtained):  Radiologist's Report Reviewed:     XR FOOT LEFT (MIN 3 VIEWS) (Final result)  Result time 10/20/21 21:54:43  Final result by Bertha Gale MD (10/20/21 21:54:43)                Impression:    Barbed fish hook in the soft tissues between the third and fourth toes. No radiographic evidence of osteomyelitis. Narrative:    EXAMINATION:   THREE XRAY VIEWS OF THE LEFT FOOT     10/20/2021 3:29 pm     COMPARISON:   None.      HISTORY:   ORDERING SYSTEM PROVIDED HISTORY: foreign body btwn 3rd 4th toes, cellulitis   TECHNOLOGIST PROVIDED HISTORY:   Reason for exam:->foreign body btwn 3rd 4th toes, cellulitis   Reason for Exam: foreign body btwn 3rd 4th toes, cellulitis     FINDINGS:   There is a two-pronged fishhook between the third and fourth toes measuring   17 mm in total length with barbs.  It does not estrada the bone.  There is no   acute fracture.  There are chronic surgical changes of the first ray.  There   is no bone erosion nor periosteal reaction to indicate osteomyelitis.                     XR FINGER LEFT (MIN 2 VIEWS) (Final result)  Result time 10/20/21 16:15:02  Final result by Artur German MD (10/20/21 16:15:02)                Impression:    Index finger soft tissue swelling.  No radiographic evidence of acute   osteomyelitis or soft tissue gas. Narrative:    EXAMINATION:   THREE XRAY VIEWS OF THE LEFT FINGERS     10/20/2021 3:29 pm     COMPARISON:   03/28/2021     HISTORY:   ORDERING SYSTEM PROVIDED HISTORY: left finger infection   TECHNOLOGIST PROVIDED HISTORY:   Reason for exam:->left finger infection   Reason for Exam: left finger infection     FINDINGS:   No evidence of acute fracture or acute osseous erosion.  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  Index finger   soft tissue swelling.  No soft tissue gas or radiopaque foreign body.                     XR HAND RIGHT (MIN 3 VIEWS) (Final result)  Result time 10/20/21 16:37:56  Final result by Adriana Adair MD (10/20/21 16:37:56)                Impression:    1. Soft tissue ulceration at the distal aspect of the 2nd finger. 2. No radiographic evidence of osteomyelitis or other acute osseous   abnormality. Narrative:    EXAMINATION:   THREE XRAY VIEWS OF THE RIGHT HAND     10/20/2021 3:29 pm     COMPARISON:   Right hand radiographs 02/05/2019.      HISTORY:   ORDERING SYSTEM PROVIDED HISTORY: cellulitis of multiple fingers, history of   osteo/amputations   TECHNOLOGIST PROVIDED HISTORY:   Reason for exam:->cellulitis of multiple fingers, history of osteo/amputations   Reason for Exam: cellulitis of multiple fingers, history of osteo/amputations     FINDINGS:   Chronic absence of the 2nd distal phalanx and chronic deformity of the tuft   of the 3rd distal phalanx.  No fracture or dislocation.  No acute osseous   erosion or periostitis identified.  Soft tissue ulceration at the distal   aspect of the 2nd finger with soft tissue swelling.  No radiopaque foreign   body.                   Medical decision making:  Patient has developing cellulitis and lymphangitis of the right arm and forearm. There is more mild involvement of the right hand. Patient likely has developing infection along with a retained fishing hook in the left foot. Fishing hook is stuck between and involving both the third and fourth toes. The more lateral toe has superficial involvement, but I am concerned about deep penetration into the more medial toe. We discussed potential removal while in the emergency department, but I am concerned about causing unnecessary tissue damage, which could then lead to complicated infection and subsequent amputation. Laboratory testing is generally reassuring. Consider developing SIRS and sepsis, but patient has not required more aggressive hydration or pressor support. There has been no respiratory distress, hypoxia, or cyanosis. Procedures: None by me. Consultations: General surgery. Hospitalist service. Clinical Impression:  1. Cellulitis of left hand    2. Lymphangitis of upper extremity    3. Fish hook injury of lower leg, left, initial encounter    4. Tetanus-diphtheria (Td) vaccination      Disposition referral (if applicable):    Disposition medications (if applicable):    ED Provider Disposition Time  DISPOSITION Admitted 10/20/2021 07:09:05 PM      Comment: Please note this report has been produced using speech recognition software and may contain errors related to that system including errors in grammar, punctuation, and spelling, as well as words and phrases that may be inappropriate. Efforts were made to edit the dictations.         Moni Barron MD  10/25/21 4615

## 2021-10-21 PROBLEM — L08.9: Status: ACTIVE | Noted: 2021-10-21

## 2021-10-21 PROBLEM — S90.852A: Status: ACTIVE | Noted: 2021-10-21

## 2021-10-21 LAB
ANION GAP SERPL CALCULATED.3IONS-SCNC: 10 MMOL/L (ref 4–16)
BUN BLDV-MCNC: 8 MG/DL (ref 6–23)
CALCIUM SERPL-MCNC: 8.6 MG/DL (ref 8.3–10.6)
CHLORIDE BLD-SCNC: 101 MMOL/L (ref 99–110)
CO2: 21 MMOL/L (ref 21–32)
CREAT SERPL-MCNC: 0.4 MG/DL (ref 0.9–1.3)
DOSE AMOUNT: NORMAL
DOSE TIME: NORMAL
ESTIMATED AVERAGE GLUCOSE: 200 MG/DL
GFR AFRICAN AMERICAN: >60 ML/MIN/1.73M2
GFR NON-AFRICAN AMERICAN: >60 ML/MIN/1.73M2
GLUCOSE BLD-MCNC: 157 MG/DL (ref 70–99)
GLUCOSE BLD-MCNC: 168 MG/DL (ref 70–99)
GLUCOSE BLD-MCNC: 170 MG/DL (ref 70–99)
GLUCOSE BLD-MCNC: 192 MG/DL (ref 70–99)
GLUCOSE BLD-MCNC: 230 MG/DL (ref 70–99)
HBA1C MFR BLD: 8.6 % (ref 4.2–6.3)
HCT VFR BLD CALC: 39.2 % (ref 42–52)
HEMOGLOBIN: 12.6 GM/DL (ref 13.5–18)
MCH RBC QN AUTO: 28.4 PG (ref 27–31)
MCHC RBC AUTO-ENTMCNC: 32.1 % (ref 32–36)
MCV RBC AUTO: 88.3 FL (ref 78–100)
PDW BLD-RTO: 14.2 % (ref 11.7–14.9)
PLATELET # BLD: 173 K/CU MM (ref 140–440)
PMV BLD AUTO: 10 FL (ref 7.5–11.1)
POTASSIUM SERPL-SCNC: 4 MMOL/L (ref 3.5–5.1)
RBC # BLD: 4.44 M/CU MM (ref 4.6–6.2)
SODIUM BLD-SCNC: 132 MMOL/L (ref 135–145)
VANCOMYCIN RANDOM: 8.4 UG/ML
WBC # BLD: 8.1 K/CU MM (ref 4–10.5)

## 2021-10-21 PROCEDURE — 6360000002 HC RX W HCPCS: Performed by: NURSE PRACTITIONER

## 2021-10-21 PROCEDURE — 6360000002 HC RX W HCPCS: Performed by: CLINICAL NURSE SPECIALIST

## 2021-10-21 PROCEDURE — 96366 THER/PROPH/DIAG IV INF ADDON: CPT

## 2021-10-21 PROCEDURE — 2580000003 HC RX 258: Performed by: CLINICAL NURSE SPECIALIST

## 2021-10-21 PROCEDURE — 82962 GLUCOSE BLOOD TEST: CPT

## 2021-10-21 PROCEDURE — 96375 TX/PRO/DX INJ NEW DRUG ADDON: CPT

## 2021-10-21 PROCEDURE — 80202 ASSAY OF VANCOMYCIN: CPT

## 2021-10-21 PROCEDURE — 85027 COMPLETE CBC AUTOMATED: CPT

## 2021-10-21 PROCEDURE — 80048 BASIC METABOLIC PNL TOTAL CA: CPT

## 2021-10-21 PROCEDURE — 6370000000 HC RX 637 (ALT 250 FOR IP): Performed by: CLINICAL NURSE SPECIALIST

## 2021-10-21 PROCEDURE — 96376 TX/PRO/DX INJ SAME DRUG ADON: CPT

## 2021-10-21 PROCEDURE — 1200000000 HC SEMI PRIVATE

## 2021-10-21 PROCEDURE — 2500000003 HC RX 250 WO HCPCS: Performed by: SURGERY

## 2021-10-21 RX ORDER — MORPHINE SULFATE 4 MG/ML
2 INJECTION INTRAVENOUS EVERY 4 HOURS PRN
Status: DISCONTINUED | OUTPATIENT
Start: 2021-10-21 | End: 2021-10-22

## 2021-10-21 RX ORDER — LIDOCAINE HYDROCHLORIDE 10 MG/ML
10 INJECTION, SOLUTION INFILTRATION; PERINEURAL ONCE
Status: COMPLETED | OUTPATIENT
Start: 2021-10-21 | End: 2021-10-21

## 2021-10-21 RX ADMIN — INSULIN LISPRO 1 UNITS: 100 INJECTION, SOLUTION INTRAVENOUS; SUBCUTANEOUS at 18:03

## 2021-10-21 RX ADMIN — TAMSULOSIN HYDROCHLORIDE 0.4 MG: 0.4 CAPSULE ORAL at 09:52

## 2021-10-21 RX ADMIN — VANCOMYCIN 1250 MG: 1.75 INJECTION, SOLUTION INTRAVENOUS at 22:37

## 2021-10-21 RX ADMIN — ONDANSETRON 4 MG: 2 INJECTION INTRAMUSCULAR; INTRAVENOUS at 00:56

## 2021-10-21 RX ADMIN — MORPHINE SULFATE 2 MG: 4 INJECTION INTRAVENOUS at 21:49

## 2021-10-21 RX ADMIN — ONDANSETRON 4 MG: 4 TABLET, ORALLY DISINTEGRATING ORAL at 22:28

## 2021-10-21 RX ADMIN — PANTOPRAZOLE SODIUM 40 MG: 40 TABLET, DELAYED RELEASE ORAL at 21:52

## 2021-10-21 RX ADMIN — MORPHINE SULFATE 2 MG: 4 INJECTION INTRAVENOUS at 09:39

## 2021-10-21 RX ADMIN — OXYCODONE HYDROCHLORIDE AND ACETAMINOPHEN 1 TABLET: 5; 325 TABLET ORAL at 15:10

## 2021-10-21 RX ADMIN — MORPHINE SULFATE 2 MG: 4 INJECTION INTRAVENOUS at 00:55

## 2021-10-21 RX ADMIN — INSULIN LISPRO 1 UNITS: 100 INJECTION, SOLUTION INTRAVENOUS; SUBCUTANEOUS at 21:54

## 2021-10-21 RX ADMIN — VANCOMYCIN 1250 MG: 1.75 INJECTION, SOLUTION INTRAVENOUS at 09:45

## 2021-10-21 RX ADMIN — SODIUM CHLORIDE, PRESERVATIVE FREE 10 ML: 5 INJECTION INTRAVENOUS at 09:00

## 2021-10-21 RX ADMIN — ESCITALOPRAM OXALATE 10 MG: 10 TABLET ORAL at 10:11

## 2021-10-21 RX ADMIN — INSULIN LISPRO 2 UNITS: 100 INJECTION, SOLUTION INTRAVENOUS; SUBCUTANEOUS at 15:52

## 2021-10-21 RX ADMIN — OXYCODONE HYDROCHLORIDE AND ACETAMINOPHEN 1 TABLET: 5; 325 TABLET ORAL at 08:45

## 2021-10-21 RX ADMIN — LIDOCAINE HYDROCHLORIDE 10 ML: 10 INJECTION, SOLUTION INFILTRATION; PERINEURAL at 09:10

## 2021-10-21 RX ADMIN — PANTOPRAZOLE SODIUM 40 MG: 40 TABLET, DELAYED RELEASE ORAL at 09:52

## 2021-10-21 RX ADMIN — SODIUM CHLORIDE, PRESERVATIVE FREE 10 ML: 5 INJECTION INTRAVENOUS at 21:57

## 2021-10-21 RX ADMIN — MORPHINE SULFATE 2 MG: 4 INJECTION INTRAVENOUS at 05:30

## 2021-10-21 ASSESSMENT — PAIN SCALES - GENERAL
PAINLEVEL_OUTOF10: 7
PAINLEVEL_OUTOF10: 9
PAINLEVEL_OUTOF10: 9
PAINLEVEL_OUTOF10: 8
PAINLEVEL_OUTOF10: 6
PAINLEVEL_OUTOF10: 6
PAINLEVEL_OUTOF10: 9
PAINLEVEL_OUTOF10: 8
PAINLEVEL_OUTOF10: 4
PAINLEVEL_OUTOF10: 5
PAINLEVEL_OUTOF10: 9
PAINLEVEL_OUTOF10: 9

## 2021-10-21 NOTE — PROGRESS NOTES
[address] [facility]  consulted by Francine CANCINO for monitoring and adjustment. Indication for treatment: Skin/soft tissue infection  Goal trough: 10-15 mcg/mL (AUC <500)    Pertinent Laboratory Values:   Temp Readings from Last 3 Encounters:   10/20/21 97.9 °F (36.6 °C) (Oral)   09/15/21 98.5 °F (36.9 °C) (Oral)   05/24/21 98.9 °F (37.2 °C) (Oral)     Recent Labs     10/20/21  1532 10/20/21  1537   WBC 7.8  --    LACTATE  --  1.2     Recent Labs     10/20/21  1532   BUN 12   CREATININE 0.8*     Estimated Creatinine Clearance: 132 mL/min (A) (based on SCr of 0.8 mg/dL (L)). Plan:  Patient received Vancomycin 2gm in ED, will follow with Vancomycin 1250mg Q12h starting at 8am in the morning. Will draw a random Vancomycin level with am labs to verify dose/frequency. Pharmacy will continue to monitor patient and adjust therapy as indicated    [address] 10/20/2021 @0600    Thank you for the consult.   BINTA Andres [facility], [facility]   10/20/2021 9:07 PM

## 2021-10-21 NOTE — ED NOTES
Pt walking in lala sts \"that IV needs turned off I had to go to the bathroom and its unhooked. \" pts IV cap is off and open. IV taken out at this time. RN attempted to place another IV. Pt sts \"you arent putting it there it hurts too bad. You can go in my hand, the neck, jugular or whatever but not in the crease of my arm. \"      Sandi Small RN  10/21/21 7951

## 2021-10-21 NOTE — ED NOTES
Pt stated that his ordered percocet was not helping his pain. Eleonora shay served.       Chuck Alonzo, RN  10/21/21 0028

## 2021-10-21 NOTE — ED NOTES
Dr Taisha Rowley at the bedside. Request for diet order change now that the surgery is completed.       Yoel Mccoy RN  10/21/21 0138

## 2021-10-21 NOTE — ED NOTES
Bed: ED-25  Expected date:   Expected time:   Means of arrival:   Comments:  raz6     Jesus Bermudez RN  10/20/21 4990

## 2021-10-21 NOTE — PROGRESS NOTES
ATTENDING PHYSICIAN'S PROGRESS NOTES    Patient:  Gabriela Field      Unit/Bed:4115/4115-A    YOB: 1965    MRN: [id number]     Acct: [de-identified]     Admit date: 10/20/2021    Patient Seen, Chart, Consults notes, Labs, Radiology studies reviewed. SUBJECTIVE:   Day 1 of stay with pain and swelling of the left foot as a result of a barbed fishhook embedded in the skin, and most recent (in last 24 hours) has had removal of the fishhook and currently being managed with empiric broad-spectrum IV antibiotic. Patient also has multiple amputations of the fingers, with infections involving the right index finger and the left middle finger. Patient indicated he has been getting all these injuries from his fishing. All other ROS negative except noted in HPI    Past, Family, Social History unchanged from admission. Diet:  ADULT DIET;  Regular; 5 carb choices (75 gm/meal)    Medications:  Scheduled Meds:   sodium chloride flush  5-40 mL IntraVENous 2 times per day    insulin lispro  0-6 Units SubCUTAneous TID WC    insulin lispro  0-3 Units SubCUTAneous Nightly    escitalopram  10 mg Oral Daily    pantoprazole  40 mg Oral BID    tamsulosin  0.4 mg Oral Daily    levofloxacin  500 mg IntraVENous Q24H    vancomycin  1,250 mg IntraVENous Q12H     Continuous Infusions:   sodium chloride      dextrose      sodium chloride 75 mL/hr at 10/20/21 2114     PRN Meds:morphine, sodium chloride flush, sodium chloride, ondansetron **OR** ondansetron, polyethylene glycol, acetaminophen **OR** acetaminophen, glucose, dextrose, glucagon (rDNA), dextrose, oxyCODONE-acetaminophen    OBJECTIVE:    CBC:   Recent Labs     10/20/21  1532 10/21/21  0531   WBC 7.8 8.1   HGB 13.2* 12.6*    173     BMP:    Recent Labs     10/20/21  1532 10/21/21  0531   * 132*   K 4.2 4.0   CL 98* 101   CO2 25 21   BUN 12 8   CREATININE 0.8* 0.4*   GLUCOSE 264* 170*     Calcium:  Recent Labs     10/21/21  0531   CALCIUM 8.6     Ionized Calcium:No results for input(s): IONCA in the last 72 hours. Magnesium:No results for input(s): MG in the last 72 hours. Phosphorus:No results for input(s): PHOS in the last 72 hours. BNP:No results for input(s): BNP in the last 72 hours. Glucose:  Recent Labs     10/21/21  1044 10/21/21  1525   POCGLU 168* 230*     HgbA1C:   Recent Labs     10/20/21  1532   LABA1C 8.6*     INR: No results for input(s): INR in the last 72 hours. Hepatic:   Recent Labs     10/20/21  1532   ALKPHOS 101   ALT 7*   AST 12*   PROT 7.1   BILITOT 0.6   LABALBU 4.0     Amylase and Lipase:No results for input(s): LACTA, AMYLASE in the last 72 hours. Lactic Acid: No results for input(s): LACTA in the last 72 hours. Troponin: No results for input(s): CKTOTAL, CKMB, TROPONINT in the last 72 hours. BNP: No results for input(s): BNP in the last 72 hours. Lipids: No results for input(s): CHOL, TRIG, HDL, LDLCALC in the last 72 hours. Invalid input(s): LDL  ABGs: No results found for: PH, PCO2, PO2, HCO3, O2SAT    Radiology reports as per the Radiologist  Radiology: XR HAND RIGHT (MIN 3 VIEWS)    Result Date: 10/20/2021  EXAMINATION: THREE XRAY VIEWS OF THE RIGHT HAND 10/20/2021 3:29 pm COMPARISON: Right hand radiographs 02/05/2019. 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 No fracture or dislocation. No acute osseous erosion or periostitis identified. Soft tissue ulceration at the distal aspect of the 2nd finger with soft tissue swelling. No radiopaque foreign body. 1. Soft tissue ulceration at the distal aspect of the 2nd finger.  2. No radiographic evidence of osteomyelitis or other acute osseous abnormality. XR FOOT LEFT (MIN 3 VIEWS)    Result Date: 10/20/2021  EXAMINATION: THREE XRAY VIEWS OF THE LEFT FOOT 10/20/2021 3:29 pm COMPARISON: None. HISTORY: ORDERING SYSTEM PROVIDED HISTORY: foreign body btwn 3rd 4th toes, cellulitis TECHNOLOGIST PROVIDED HISTORY: Reason for exam:->foreign body btwn 3rd 4th toes, cellulitis Reason for Exam: foreign body btwn 3rd 4th toes, cellulitis FINDINGS: There is a two-pronged fishhook between the third and fourth toes measuring 17 mm in total length with barbs. It does not estrada the bone. There is no acute fracture. There are chronic surgical changes of the first ray. There is no bone erosion nor periosteal reaction to indicate osteomyelitis. Barbed fish hook in the soft tissues between the third and fourth toes. No radiographic evidence of osteomyelitis. XR FINGER LEFT (MIN 2 VIEWS)    Result Date: 10/20/2021  EXAMINATION: THREE XRAY VIEWS OF THE LEFT FINGERS 10/20/2021 3:29 pm COMPARISON: 03/28/2021 HISTORY: ORDERING SYSTEM PROVIDED HISTORY: left finger infection TECHNOLOGIST PROVIDED HISTORY: Reason for exam:->left finger infection Reason for Exam: left finger infection FINDINGS: No evidence of acute fracture or acute osseous erosion. Prior 4th ray amputation and partial amputations of the remaining fingers. Index finger soft tissue swelling. No soft tissue gas or radiopaque foreign body. Index finger soft tissue swelling. No radiographic evidence of acute osteomyelitis or soft tissue gas. Physical Exam:  Vitals: /80   Pulse 92   Temp 98.4 °F (36.9 °C)   Resp 18   Ht 5' 11.5\" (1.816 m)   Wt 245 lb (111.1 kg)   SpO2 97%   BMI 33.69 kg/m²   24 hour intake/output:    Intake/Output Summary (Last 24 hours) at 10/21/2021 1738  Last data filed at 10/21/2021 0900  Gross per 24 hour   Intake 10 ml   Output --   Net 10 ml     Last 3 weights:   Wt Readings from Last 3 Encounters: 10/20/21 245 lb (111.1 kg)   09/15/21 240 lb (108.9 kg)   05/24/21 220 lb (99.8 kg)       General appearance - oriented to person, place, and time, acyanotic, in no respiratory distress and well hydrated  HEENT: Normocephalic, Atraumatic, Conjuctiva pink, PERRL, Oral mucosa normal, Lips, teeth and gums normal, Trachea midline, Thyroid normal and No noted lymphadenopathy  Chest - clear to auscultation, no wheezes, rales or rhonchi, symmetric air entry  Cardiovascular - normal rate, regular rhythm, normal S1, S2, no murmurs, rubs, clicks or gallops  Abdomen - soft, nontender, nondistended, no masses or organomegaly   Neurological - Alert and oriented, Normal speech, No focal findings or movement disorder noted and Motor and sensory grossly normal bilaterally  Integumentary - Skin color, texture, turgor normal. No Rashes or lesions  Musculoskeletal -Multiple amputations of multiple fingers, with infection on a couple of his fingertips, Full ROM times 4 extremities, No clubbing or cyanosis and No peripheral edema      DVT prophylaxis: [x] Lovenox                                 [] SCDs                                 [] SQ Heparin                                 [] Encourage ambulation           [] Already on Anticoagulation                 ASSESSMENT / PLAN :    Principal Problem:    Foreign body in foot, left, infected, initial encounter  Dread was removed from his left foot by general surgery and input noted and appreciated. Optimize pain control, maintain on empiric IV antibiotics overnight for possible discharge with p.o. antibiotics tomorrow. Active Problems:    Finger infection right index finger  Patient indicated he has been following up with a finger specialist in [place] and would want to follow-up with them upon discharge. Uncontrolled diabetes mellitus with complications ([facility] [address]  Home medication regimen as resumed, utilize SSI as needed to optimize blood sugar control, maintain an ADA diet. Obesity, Class III, BMI 40-49.9 (morbid obesity) (HCC)  Weight loss through dietary caloric restrictions and exercise as tolerated advised. Resolved Problems:    * No resolved hospital problems. *    Management as outlined above, maintain patient in-house overnight for possible discharge within the next 24 hours. A.m. labs.     Electronically signed by Alejandra Banuelos MD on 10/21/2021 at 5:38 PM    Rounding Hospitalist

## 2021-10-21 NOTE — ED NOTES
The dressing placed per Dr Armida Wooten remains intact and is clean and dry.       Ibeth West, RN  10/21/21 8696

## 2021-10-21 NOTE — ED NOTES
The patient refuses to allow this nurse to start another IV in his arm, or to use his [facility] areas for an IV.      Estella Russo RN  10/21/21 4667

## 2021-10-21 NOTE — PROGRESS NOTES
[address] [facility]  consulted by JULITA Aguilar for monitoring and adjustment. Indication for treatment: SSTI  Goal trough: 10-15 mcg/mL    Pertinent Laboratory Values:   Temp Readings from Last 3 Encounters:   10/20/21 97.9 °F (36.6 °C) (Oral)   09/15/21 98.5 °F (36.9 °C) (Oral)   05/24/21 98.9 °F (37.2 °C) (Oral)     Recent Labs     10/20/21  1532 10/20/21  1537 10/21/21  0531   WBC 7.8  --  8.1   LACTATE  --  1.2  --      Recent Labs     10/20/21  1532 10/21/21  0531   BUN 12 8   CREATININE 0.8* 0.4*     Estimated Creatinine Clearance: 263 mL/min (A) (based on SCr of 0.4 mg/dL (L)). No intake or output data in the 24 hours ending 10/21/21 0900    Pertinent Cultures:  Date    Source    Results  10/20   Blood    Sent    Vancomycin level:   TROUGH:  No results for input(s): VANCOTROUGH in the last 72 hours. RANDOM:    Recent Labs     10/21/21  0531   VANCORANDOM 8.4       Assessment:  · WBC and temperature: WBC WNL/afebrile  · SCr, BUN, and urine output: Trending down  · Day(s) of therapy: 2   · Vancomycin concentration: 8.4    Plan:  · Continue vancomycin 1250mg q12h   · AUC estimate 439 and trough 14  · Pharmacy will continue to monitor patient and adjust therapy as indicated    Jl 3 10/24 @0600    Thank you for the consult.   Edilson Helton [facility]  10/21/2021 9:00 AM

## 2021-10-22 ENCOUNTER — APPOINTMENT (OUTPATIENT)
Dept: CT IMAGING | Age: 56
DRG: 638 | End: 2021-10-22
Payer: MEDICARE

## 2021-10-22 LAB
ALBUMIN SERPL-MCNC: 3.5 GM/DL (ref 3.4–5)
ANION GAP SERPL CALCULATED.3IONS-SCNC: 12 MMOL/L (ref 4–16)
BUN BLDV-MCNC: 9 MG/DL (ref 6–23)
CALCIUM SERPL-MCNC: 8.8 MG/DL (ref 8.3–10.6)
CHLORIDE BLD-SCNC: 100 MMOL/L (ref 99–110)
CO2: 22 MMOL/L (ref 21–32)
CREAT SERPL-MCNC: 0.7 MG/DL (ref 0.9–1.3)
GFR AFRICAN AMERICAN: >60 ML/MIN/1.73M2
GFR NON-AFRICAN AMERICAN: >60 ML/MIN/1.73M2
GLUCOSE BLD-MCNC: 197 MG/DL (ref 70–99)
GLUCOSE BLD-MCNC: 218 MG/DL (ref 70–99)
GLUCOSE BLD-MCNC: 242 MG/DL (ref 70–99)
GLUCOSE BLD-MCNC: 246 MG/DL (ref 70–99)
GLUCOSE BLD-MCNC: 266 MG/DL (ref 70–99)
PHOSPHORUS: 3.6 MG/DL (ref 2.5–4.9)
POTASSIUM SERPL-SCNC: 4.2 MMOL/L (ref 3.5–5.1)
SODIUM BLD-SCNC: 134 MMOL/L (ref 135–145)

## 2021-10-22 PROCEDURE — 6370000000 HC RX 637 (ALT 250 FOR IP): Performed by: CLINICAL NURSE SPECIALIST

## 2021-10-22 PROCEDURE — 94761 N-INVAS EAR/PLS OXIMETRY MLT: CPT

## 2021-10-22 PROCEDURE — 80069 RENAL FUNCTION PANEL: CPT

## 2021-10-22 PROCEDURE — 82962 GLUCOSE BLOOD TEST: CPT

## 2021-10-22 PROCEDURE — 6370000000 HC RX 637 (ALT 250 FOR IP): Performed by: INTERNAL MEDICINE

## 2021-10-22 PROCEDURE — G0378 HOSPITAL OBSERVATION PER HR: HCPCS

## 2021-10-22 PROCEDURE — 2580000003 HC RX 258: Performed by: CLINICAL NURSE SPECIALIST

## 2021-10-22 PROCEDURE — 96376 TX/PRO/DX INJ SAME DRUG ADON: CPT

## 2021-10-22 PROCEDURE — 99233 SBSQ HOSP IP/OBS HIGH 50: CPT | Performed by: PHYSICIAN ASSISTANT

## 2021-10-22 PROCEDURE — 6360000004 HC RX CONTRAST MEDICATION: Performed by: INTERNAL MEDICINE

## 2021-10-22 PROCEDURE — 6360000002 HC RX W HCPCS: Performed by: CLINICAL NURSE SPECIALIST

## 2021-10-22 PROCEDURE — 36415 COLL VENOUS BLD VENIPUNCTURE: CPT

## 2021-10-22 PROCEDURE — 6360000002 HC RX W HCPCS: Performed by: NURSE PRACTITIONER

## 2021-10-22 PROCEDURE — 73201 CT UPPER EXTREMITY W/DYE: CPT

## 2021-10-22 PROCEDURE — 1200000000 HC SEMI PRIVATE

## 2021-10-22 PROCEDURE — 96366 THER/PROPH/DIAG IV INF ADDON: CPT

## 2021-10-22 RX ORDER — GREEN TEA/HOODIA GORDONII 315-12.5MG
1 CAPSULE ORAL 2 TIMES DAILY
Qty: 60 TABLET | Refills: 0 | Status: SHIPPED | OUTPATIENT
Start: 2021-10-22 | End: 2021-11-21

## 2021-10-22 RX ORDER — 0.9 % SODIUM CHLORIDE 0.9 %
10 VIAL (ML) INJECTION
Status: COMPLETED | OUTPATIENT
Start: 2021-10-22 | End: 2021-10-22

## 2021-10-22 RX ORDER — OXYCODONE HYDROCHLORIDE AND ACETAMINOPHEN 5; 325 MG/1; MG/1
2 TABLET ORAL EVERY 6 HOURS PRN
Status: DISCONTINUED | OUTPATIENT
Start: 2021-10-22 | End: 2021-10-23

## 2021-10-22 RX ORDER — DOXYCYCLINE HYCLATE 100 MG
100 TABLET ORAL 2 TIMES DAILY
Qty: 20 TABLET | Refills: 0 | Status: SHIPPED | OUTPATIENT
Start: 2021-10-22 | End: 2021-11-01

## 2021-10-22 RX ORDER — CIPROFLOXACIN 500 MG/1
500 TABLET, FILM COATED ORAL 2 TIMES DAILY
Qty: 20 TABLET | Refills: 0 | Status: SHIPPED | OUTPATIENT
Start: 2021-10-22 | End: 2021-11-01

## 2021-10-22 RX ADMIN — MORPHINE SULFATE 2 MG: 4 INJECTION INTRAVENOUS at 04:13

## 2021-10-22 RX ADMIN — ONDANSETRON 4 MG: 4 TABLET, ORALLY DISINTEGRATING ORAL at 07:27

## 2021-10-22 RX ADMIN — OXYCODONE AND ACETAMINOPHEN 2 TABLET: 5; 325 TABLET ORAL at 09:18

## 2021-10-22 RX ADMIN — IOPAMIDOL 75 ML: 755 INJECTION, SOLUTION INTRAVENOUS at 16:16

## 2021-10-22 RX ADMIN — SODIUM CHLORIDE 25 ML: 9 INJECTION, SOLUTION INTRAVENOUS at 08:26

## 2021-10-22 RX ADMIN — VANCOMYCIN 1250 MG: 1.75 INJECTION, SOLUTION INTRAVENOUS at 21:17

## 2021-10-22 RX ADMIN — VANCOMYCIN 1250 MG: 1.75 INJECTION, SOLUTION INTRAVENOUS at 08:27

## 2021-10-22 RX ADMIN — INSULIN LISPRO 1 UNITS: 100 INJECTION, SOLUTION INTRAVENOUS; SUBCUTANEOUS at 21:42

## 2021-10-22 RX ADMIN — PANTOPRAZOLE SODIUM 40 MG: 40 TABLET, DELAYED RELEASE ORAL at 21:18

## 2021-10-22 RX ADMIN — TAMSULOSIN HYDROCHLORIDE 0.4 MG: 0.4 CAPSULE ORAL at 08:26

## 2021-10-22 RX ADMIN — INSULIN LISPRO 2 UNITS: 100 INJECTION, SOLUTION INTRAVENOUS; SUBCUTANEOUS at 12:48

## 2021-10-22 RX ADMIN — PANTOPRAZOLE SODIUM 40 MG: 40 TABLET, DELAYED RELEASE ORAL at 08:26

## 2021-10-22 RX ADMIN — OXYCODONE AND ACETAMINOPHEN 2 TABLET: 5; 325 TABLET ORAL at 23:02

## 2021-10-22 RX ADMIN — SODIUM CHLORIDE, PRESERVATIVE FREE 10 ML: 5 INJECTION INTRAVENOUS at 21:18

## 2021-10-22 RX ADMIN — OXYCODONE AND ACETAMINOPHEN 2 TABLET: 5; 325 TABLET ORAL at 15:59

## 2021-10-22 RX ADMIN — INSULIN LISPRO 2 UNITS: 100 INJECTION, SOLUTION INTRAVENOUS; SUBCUTANEOUS at 18:17

## 2021-10-22 RX ADMIN — LEVOFLOXACIN 500 MG: 5 INJECTION, SOLUTION INTRAVENOUS at 16:05

## 2021-10-22 RX ADMIN — SODIUM CHLORIDE, PRESERVATIVE FREE 5 ML: 5 INJECTION INTRAVENOUS at 08:25

## 2021-10-22 RX ADMIN — Medication 10 ML: at 16:16

## 2021-10-22 RX ADMIN — SODIUM CHLORIDE, PRESERVATIVE FREE 5 ML: 5 INJECTION INTRAVENOUS at 16:05

## 2021-10-22 RX ADMIN — INSULIN LISPRO 3 UNITS: 100 INJECTION, SOLUTION INTRAVENOUS; SUBCUTANEOUS at 08:57

## 2021-10-22 RX ADMIN — ESCITALOPRAM OXALATE 10 MG: 10 TABLET ORAL at 08:26

## 2021-10-22 ASSESSMENT — PAIN SCALES - GENERAL
PAINLEVEL_OUTOF10: 5
PAINLEVEL_OUTOF10: 9
PAINLEVEL_OUTOF10: 9
PAINLEVEL_OUTOF10: 6
PAINLEVEL_OUTOF10: 8
PAINLEVEL_OUTOF10: 9
PAINLEVEL_OUTOF10: 9

## 2021-10-22 ASSESSMENT — ENCOUNTER SYMPTOMS
SORE THROAT: 0
COLOR CHANGE: 0
PHOTOPHOBIA: 0
CONSTIPATION: 0
CHOKING: 0
EYE REDNESS: 0
BACK PAIN: 0
RECTAL PAIN: 0
APNEA: 0
STRIDOR: 0
ANAL BLEEDING: 0
EYE ITCHING: 0

## 2021-10-22 NOTE — CARE COORDINATION
Reviewed chart and spoke with pt about discharge needs/plans. Pt lives alone, PTA he was totally indpendent, he has a PCP and insurance. HC order for wound Care, discussed with pt and he has had [facility] in past and would like them again. PS to Jolynn VIDAL. He is also concerned with incr redness, swelling and pain to ISAIAH and KIRSTEN. PS to Dr Sirisha Willis.

## 2021-10-22 NOTE — PROGRESS NOTES
ATTENDING PHYSICIAN'S PROGRESS NOTES    Patient:  Jenae Shin      Unit/Bed:4115/4115-A    YOB: 1965    MRN: [id number]     Acct: [de-identified]     Admit date: 10/20/2021    Patient Seen, Chart, Consults notes, Labs, Radiology studies reviewed. SUBJECTIVE:   Day 2 of stay with pain and swelling of the left foot as a result of a barbed fishhook embedded in the skin, and most recent (in last 24 hours) has had removal of the fishhook and currently being managed with empiric broad-spectrum IV antibiotic. Patient also has multiple amputations of the fingers, with infections involving the right index finger and the left middle finger. Patient indicated he has been getting all these injuries from his fishing. Patient is scheduled to be followed by orthopedic surgery consult. Noted CT scan of the extremities to rule out osteomyelitis    All other ROS negative except noted in HPI    Past, Family, Social History unchanged from admission. Diet:  ADULT DIET;  Regular; 5 carb choices (75 gm/meal)    Medications:  Scheduled Meds:   sodium chloride flush  5-40 mL IntraVENous 2 times per day    insulin lispro  0-6 Units SubCUTAneous TID WC    insulin lispro  0-3 Units SubCUTAneous Nightly    escitalopram  10 mg Oral Daily    pantoprazole  40 mg Oral BID    tamsulosin  0.4 mg Oral Daily    levofloxacin  500 mg IntraVENous Q24H    vancomycin  1,250 mg IntraVENous Q12H     Continuous Infusions:   sodium chloride 25 mL (10/22/21 0826)    dextrose       PRN Meds:oxyCODONE-acetaminophen, sodium chloride flush, sodium chloride, ondansetron **OR** ondansetron, polyethylene glycol, acetaminophen **OR** acetaminophen, glucose, dextrose, glucagon (rDNA), dextrose    OBJECTIVE:    CBC:   Recent Labs     10/20/21  1532 10/21/21  0531   WBC 7.8 8.1   HGB 13.2* 12.6*    173     BMP:    Recent Labs     10/20/21  1532 10/21/21  0531 10/22/21  0550   * 132* 134*   K 4.2 4.0 4.2   CL 98* 101 100 CO2 25 21 22   BUN 12 8 9   CREATININE 0.8* 0.4* 0.7*   GLUCOSE 264* 170* 242*     Calcium:  Recent Labs     10/22/21  0550   CALCIUM 8.8     Ionized Calcium:No results for input(s): IONCA in the last 72 hours. Magnesium:No results for input(s): MG in the last 72 hours. Phosphorus:  Recent Labs     10/22/21  0550   PHOS 3.6     BNP:No results for input(s): BNP in the last 72 hours. Glucose:  Recent Labs     10/21/21  1958 10/22/21  0801 10/22/21  1108   POCGLU 192* 266* 246*     HgbA1C:   Recent Labs     10/20/21  1532   LABA1C 8.6*     INR: No results for input(s): INR in the last 72 hours. Hepatic:   Recent Labs     10/20/21  1532 10/20/21  1532 10/22/21  0550   ALKPHOS 101  --   --    ALT 7*  --   --    AST 12*  --   --    PROT 7.1  --   --    BILITOT 0.6  --   --    LABALBU 4.0   < > 3.5    < > = values in this interval not displayed. Amylase and Lipase:No results for input(s): LACTA, AMYLASE in the last 72 hours. Lactic Acid: No results for input(s): LACTA in the last 72 hours. Troponin: No results for input(s): CKTOTAL, CKMB, TROPONINT in the last 72 hours. BNP: No results for input(s): BNP in the last 72 hours. Lipids: No results for input(s): CHOL, TRIG, HDL, LDLCALC in the last 72 hours. Invalid input(s): LDL  ABGs: No results found for: PH, PCO2, PO2, HCO3, O2SAT    Radiology reports as per the Radiologist  Radiology: XR HAND RIGHT (MIN 3 VIEWS)    Result Date: 10/20/2021  EXAMINATION: THREE XRAY VIEWS OF THE RIGHT HAND 10/20/2021 3:29 pm COMPARISON: Right hand radiographs 02/05/2019. 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   No fracture or dislocation. No acute osseous erosion or periostitis identified. Soft tissue ulceration at the distal aspect of the 2nd finger with soft tissue swelling. No radiopaque foreign body. 1. Soft tissue ulceration at the distal aspect of the 2nd finger. 2. No radiographic evidence of osteomyelitis or other acute osseous abnormality. XR FOOT LEFT (MIN 3 VIEWS)    Result Date: 10/20/2021  EXAMINATION: THREE XRAY VIEWS OF THE LEFT FOOT 10/20/2021 3:29 pm COMPARISON: None. HISTORY: ORDERING SYSTEM PROVIDED HISTORY: foreign body btwn 3rd 4th toes, cellulitis TECHNOLOGIST PROVIDED HISTORY: Reason for exam:->foreign body btwn 3rd 4th toes, cellulitis Reason for Exam: foreign body btwn 3rd 4th toes, cellulitis FINDINGS: There is a two-pronged fishhook between the third and fourth toes measuring 17 mm in total length with barbs. It does not estrada the bone. There is no acute fracture. There are chronic surgical changes of the first ray. There is no bone erosion nor periosteal reaction to indicate osteomyelitis. Barbed fish hook in the soft tissues between the third and fourth toes. No radiographic evidence of osteomyelitis. XR FINGER LEFT (MIN 2 VIEWS)    Result Date: 10/20/2021  EXAMINATION: THREE XRAY VIEWS OF THE LEFT FINGERS 10/20/2021 3:29 pm COMPARISON: 03/28/2021 HISTORY: ORDERING SYSTEM PROVIDED HISTORY: left finger infection TECHNOLOGIST PROVIDED HISTORY: Reason for exam:->left finger infection Reason for Exam: left finger infection FINDINGS: No evidence of acute fracture or acute osseous erosion. Prior 4th ray amputation and partial amputations of the remaining fingers. Index finger soft tissue swelling. No soft tissue gas or radiopaque foreign body. Index finger soft tissue swelling. No radiographic evidence of acute osteomyelitis or soft tissue gas.         Physical Exam:  Vitals: /84   Pulse 100   Temp 98.6 °F (37 °C) (Oral)   Resp 18   Ht 5' 11.5\" (1.816 m)   Wt 245 lb (111.1 kg)   SpO2 98%   BMI 33.69 kg/m²   24 hour intake/output:  No intake or output data in the 24 hours ending 10/22/21 1439  Last 3 weights: Wt Readings from Last 3 Encounters:   10/20/21 245 lb (111.1 kg)   09/15/21 240 lb (108.9 kg)   05/24/21 220 lb (99.8 kg)       General appearance - oriented to person, place, and time, acyanotic, in no respiratory distress and well hydrated  HEENT: Normocephalic, Atraumatic, Conjuctiva pink, PERRL, Oral mucosa normal, Lips, teeth and gums normal, Trachea midline, Thyroid normal and No noted lymphadenopathy  Chest - clear to auscultation, no wheezes, rales or rhonchi, symmetric air entry  Cardiovascular - normal rate, regular rhythm, normal S1, S2, no murmurs, rubs, clicks or gallops  Abdomen - soft, nontender, nondistended, no masses or organomegaly   Neurological - Alert and oriented, Normal speech, No focal findings or movement disorder noted and Motor and sensory grossly normal bilaterally  Integumentary - Skin color, texture, turgor normal. No Rashes or lesions  Musculoskeletal -Multiple amputations of multiple fingers, with infection on a couple of his fingertips, Full ROM times 4 extremities, No clubbing or cyanosis and No peripheral edema      DVT prophylaxis: [x] Lovenox                                 [] SCDs                                 [] SQ Heparin                                 [] Encourage ambulation           [] Already on Anticoagulation                 ASSESSMENT / PLAN :    Principal Problem:    Foreign body in foot, left, infected, initial encounter  Dread was removed from his left foot by general surgery and input noted and appreciated. Optimize pain control, maintain on empiric IV antibiotics overnight for possible discharge with p.o. antibiotics tomorrow.     Active Problems:    Finger infection right index finger  Patient indicated he has been following up with a finger specialist in [facility] and would want to follow-up with them upon discharge. Scheduled for CT scanning to rule out osteomyelitis. Uncontrolled diabetes mellitus with complications ([address]  Home medication regimen as resumed, utilize SSI as needed to optimize blood sugar control, maintain an ADA diet. Obesity, Class III, BMI 40-49.9 (morbid obesity) ([facility])  Weight loss through dietary caloric restrictions and exercise as tolerated advised. Resolved Problems:    * No resolved hospital problems. *    Management as outlined above, patient will be discharged home with follow-up with orthopedic surgery with broad-spectrum p.o. antibiotic if CT scan is negative for osteomyelitis.   Electronically signed by Shayne Rod MD on 10/22/2021 at 2:39 PM    Rounding Hospitalist

## 2021-10-22 NOTE — PROGRESS NOTES
10/22/21 1631   Oxygen Therapy/Pulse Ox   O2 Therapy Room air   O2 Device None (Room air)   Resp 16   SpO2 97 %   Pulse Oximeter Device Mode Intermittent   $Pulse Oximeter $Spot check (multiple/continuous)   Blood Gas  Performed?  No

## 2021-10-22 NOTE — PROGRESS NOTES
General Surgery  Dr. Dona Carranza and Abbey Swenson, [facility] Day: 3    Chief Complaint on Admission: cellulitis - FB of left foot. Subjective:     Selam Eli is a 63 y.o. male who is s/p L foot foreign body removal at the bedside with B hand cellulitis 2/2 multiple injuries while fishing. Patient reports that his pain in the B hands has increased and reports increased redness and swelling to the hands with the L hand being the most painful at this time. Reports that redness pain to the L foot has improved since the bedside procedure yesterday. Tolerating ADULT DIET; Regular; 5 carb choices (75 gm/meal). ROS:  Review of Systems   Constitutional: Negative for chills and fever. HENT: Negative for ear pain, mouth sores, sore throat and tinnitus. Eyes: Negative for photophobia, redness and itching. Respiratory: Negative for apnea, choking and stridor. Cardiovascular: Negative for chest pain and palpitations. Gastrointestinal: Negative for anal bleeding, constipation and rectal pain. Endocrine: Negative for polydipsia. Genitourinary: Negative for enuresis, flank pain and hematuria. Musculoskeletal: Positive for arthralgias, gait problem and joint swelling. Negative for back pain and myalgias. Skin: Positive for wound. Negative for color change and pallor. Allergic/Immunologic: Negative for environmental allergies. Neurological: Negative for syncope and speech difficulty. Psychiatric/Behavioral: Negative for confusion and hallucinations.        Allergies  Cantaloupe (diagnostic), Ceftriaxone, Robaxin [methocarbamol], Rocephin [ceftriaxone sodium-lidocaine], Sulfa antibiotics, Aspirin, Nsaids, Other, Propoxyphene, and Toradol [ketorolac tromethamine]          Diagnosis Date    Absent finger     Left hand    Anxiety     Arthritis     Hands    Asthma     \"As a kid but outgrew this\" - no medications as of 2/21/20    Chronic back pain     Depression     Edema     Fibromyalgia     OFDOAFRM(475.4)     Last: 20    Hernia, abdominal     Hx MRSA infection     positive culture 2014 from left foot    Nausea & vomiting     Neuropathy     Obesity, Class III, BMI 40-49.9 (morbid obesity) ([address]     Osteomyelitis (HCC)     hx finger    Poor circulation     Prolonged emergence from general anesthesia     Restless leg syndrome     Shortness of breath on exertion     2016- pt denies any sob with this interview    Type II or unspecified type diabetes mellitus with other specified manifestations, not stated as uncontrolled 2014    Type II or unspecified type diabetes mellitus without mention of complication, not stated as uncontrolled DX 2007    Follows with PCP    Ulcer of other part of foot 2014    'ulcer on left foot healed all up\"       Objective:     Vitals:    10/22/21 1400   BP: 114/84   Pulse: 100   Resp: 18   Temp: 98.6 °F (37 °C)   SpO2: 98%       TEMPERATURE:  Current - Temp: 98.6 °F (37 °C); Max - Temp  Av.4 °F (36.9 °C)  Min: 98.2 °F (36.8 °C)  Max: 98.8 °F (37.1 °C)    No intake/output data recorded. No intake/output data recorded. Physical Exam:  Physical Exam  Constitutional:       Appearance: He is well-developed. HENT:      Head: Normocephalic. Eyes:      Pupils: Pupils are equal, round, and reactive to light. Cardiovascular:      Rate and Rhythm: Normal rate. Pulmonary:      Effort: Pulmonary effort is normal.   Abdominal:      General: There is no distension. Palpations: Abdomen is soft. There is no mass. Tenderness: There is no abdominal tenderness. There is no guarding or rebound. Musculoskeletal:         General: Normal range of motion. Hands:       Cervical back: Normal range of motion and neck supple. Feet:    Skin:     General: Skin is warm. Neurological:      Mental Status: He is alert and oriented to person, place, and time.            Scheduled Meds:   sodium chloride flush  5-40 mL IntraVENous 2 times infection of left foot ([address]     Left foot pain     Foreign body in foot, left, infected, initial encounter      Plan:     Foot looks much improved since the fish hook was removed. Continue local wound care to L foot. I am worried about the B hands (L>R). XR reviewed, but will get CT of the bilateral hands to r/o any drainable fluid collection or bony involvement.     Nazanin Garzon PA-C

## 2021-10-22 NOTE — PROGRESS NOTES
[address] [facility]  consulted by JULITA Mccabe for monitoring and adjustment. Indication for treatment: SSTI  Goal trough: 10-15 mcg/mL    Pertinent Laboratory Values:   Temp Readings from Last 3 Encounters:   10/22/21 98.6 °F (37 °C) (Oral)   09/15/21 98.5 °F (36.9 °C) (Oral)   05/24/21 98.9 °F (37.2 °C) (Oral)     Recent Labs     10/20/21  1532 10/20/21  1537 10/21/21  0531   WBC 7.8  --  8.1   LACTATE  --  1.2  --      Recent Labs     10/20/21  1532 10/21/21  0531 10/22/21  0550   BUN 12 8 9   CREATININE 0.8* 0.4* 0.7*     Estimated Creatinine Clearance: 151 mL/min (A) (based on SCr of 0.7 mg/dL (L)). No intake or output data in the 24 hours ending 10/22/21 1543    Pertinent Cultures:  Date    Source    Results  10/20   Blood    Sent    Vancomycin level:   TROUGH:  No results for input(s): VANCOTROUGH in the last 72 hours. RANDOM:    Recent Labs     10/21/21  0531   VANCORANDOM 8.4       Assessment:  · WBC and temperature: WBC WNL/afebrile  · SCr, BUN, and urine output: SCR up to baseline  · Day(s) of therapy: 3  · Vancomycin concentration: 8.4    Plan:  · Continue vancomycin 1250mg q12h   · AUC estimate 439 and trough 14  · Pharmacy will continue to monitor patient and adjust therapy as indicated    Jl 3 10/24 @0600    Thank you for the consult. Narendra Martines [facility]  10/22/2021 3:43 PM

## 2021-10-23 LAB
ALBUMIN SERPL-MCNC: 3.9 GM/DL (ref 3.4–5)
ANION GAP SERPL CALCULATED.3IONS-SCNC: 12 MMOL/L (ref 4–16)
BUN BLDV-MCNC: 11 MG/DL (ref 6–23)
CALCIUM SERPL-MCNC: 9.2 MG/DL (ref 8.3–10.6)
CHLORIDE BLD-SCNC: 98 MMOL/L (ref 99–110)
CO2: 22 MMOL/L (ref 21–32)
CREAT SERPL-MCNC: 0.7 MG/DL (ref 0.9–1.3)
GFR AFRICAN AMERICAN: >60 ML/MIN/1.73M2
GFR NON-AFRICAN AMERICAN: >60 ML/MIN/1.73M2
GLUCOSE BLD-MCNC: 177 MG/DL (ref 70–99)
GLUCOSE BLD-MCNC: 202 MG/DL (ref 70–99)
GLUCOSE BLD-MCNC: 212 MG/DL (ref 70–99)
GLUCOSE BLD-MCNC: 212 MG/DL (ref 70–99)
GLUCOSE BLD-MCNC: 229 MG/DL (ref 70–99)
PHOSPHORUS: 3.1 MG/DL (ref 2.5–4.9)
POTASSIUM SERPL-SCNC: 4.4 MMOL/L (ref 3.5–5.1)
SODIUM BLD-SCNC: 132 MMOL/L (ref 135–145)

## 2021-10-23 PROCEDURE — 6370000000 HC RX 637 (ALT 250 FOR IP): Performed by: CLINICAL NURSE SPECIALIST

## 2021-10-23 PROCEDURE — 94761 N-INVAS EAR/PLS OXIMETRY MLT: CPT

## 2021-10-23 PROCEDURE — G0378 HOSPITAL OBSERVATION PER HR: HCPCS

## 2021-10-23 PROCEDURE — 6370000000 HC RX 637 (ALT 250 FOR IP): Performed by: HOSPITALIST

## 2021-10-23 PROCEDURE — 96366 THER/PROPH/DIAG IV INF ADDON: CPT

## 2021-10-23 PROCEDURE — 36415 COLL VENOUS BLD VENIPUNCTURE: CPT

## 2021-10-23 PROCEDURE — 6370000000 HC RX 637 (ALT 250 FOR IP): Performed by: INTERNAL MEDICINE

## 2021-10-23 PROCEDURE — 2580000003 HC RX 258: Performed by: CLINICAL NURSE SPECIALIST

## 2021-10-23 PROCEDURE — 80069 RENAL FUNCTION PANEL: CPT

## 2021-10-23 PROCEDURE — 6360000002 HC RX W HCPCS: Performed by: CLINICAL NURSE SPECIALIST

## 2021-10-23 PROCEDURE — 82962 GLUCOSE BLOOD TEST: CPT

## 2021-10-23 PROCEDURE — 1200000000 HC SEMI PRIVATE

## 2021-10-23 RX ORDER — OXYCODONE HYDROCHLORIDE AND ACETAMINOPHEN 5; 325 MG/1; MG/1
2 TABLET ORAL EVERY 4 HOURS PRN
Status: DISCONTINUED | OUTPATIENT
Start: 2021-10-23 | End: 2021-10-27 | Stop reason: HOSPADM

## 2021-10-23 RX ADMIN — PANTOPRAZOLE SODIUM 40 MG: 40 TABLET, DELAYED RELEASE ORAL at 22:35

## 2021-10-23 RX ADMIN — ESCITALOPRAM OXALATE 10 MG: 10 TABLET ORAL at 11:10

## 2021-10-23 RX ADMIN — INSULIN LISPRO 1 UNITS: 100 INJECTION, SOLUTION INTRAVENOUS; SUBCUTANEOUS at 22:36

## 2021-10-23 RX ADMIN — SODIUM CHLORIDE, PRESERVATIVE FREE 10 ML: 5 INJECTION INTRAVENOUS at 22:41

## 2021-10-23 RX ADMIN — OXYCODONE AND ACETAMINOPHEN 2 TABLET: 5; 325 TABLET ORAL at 11:36

## 2021-10-23 RX ADMIN — PANTOPRAZOLE SODIUM 40 MG: 40 TABLET, DELAYED RELEASE ORAL at 11:10

## 2021-10-23 RX ADMIN — VANCOMYCIN 1250 MG: 1.75 INJECTION, SOLUTION INTRAVENOUS at 22:40

## 2021-10-23 RX ADMIN — VANCOMYCIN 1250 MG: 1.75 INJECTION, SOLUTION INTRAVENOUS at 11:19

## 2021-10-23 RX ADMIN — OXYCODONE AND ACETAMINOPHEN 2 TABLET: 5; 325 TABLET ORAL at 16:34

## 2021-10-23 RX ADMIN — OXYCODONE AND ACETAMINOPHEN 2 TABLET: 5; 325 TABLET ORAL at 22:35

## 2021-10-23 RX ADMIN — INSULIN LISPRO 2 UNITS: 100 INJECTION, SOLUTION INTRAVENOUS; SUBCUTANEOUS at 13:08

## 2021-10-23 RX ADMIN — TAMSULOSIN HYDROCHLORIDE 0.4 MG: 0.4 CAPSULE ORAL at 11:10

## 2021-10-23 RX ADMIN — POLYETHYLENE GLYCOL (3350) 17 G: 17 POWDER, FOR SOLUTION ORAL at 22:35

## 2021-10-23 RX ADMIN — OXYCODONE AND ACETAMINOPHEN 2 TABLET: 5; 325 TABLET ORAL at 06:08

## 2021-10-23 RX ADMIN — LEVOFLOXACIN 500 MG: 5 INJECTION, SOLUTION INTRAVENOUS at 17:12

## 2021-10-23 RX ADMIN — INSULIN LISPRO 2 UNITS: 100 INJECTION, SOLUTION INTRAVENOUS; SUBCUTANEOUS at 16:40

## 2021-10-23 RX ADMIN — SODIUM CHLORIDE, PRESERVATIVE FREE 10 ML: 5 INJECTION INTRAVENOUS at 11:10

## 2021-10-23 ASSESSMENT — PAIN DESCRIPTION - DESCRIPTORS: DESCRIPTORS: ACHING;DULL;BURNING

## 2021-10-23 ASSESSMENT — PAIN DESCRIPTION - FREQUENCY: FREQUENCY: CONTINUOUS

## 2021-10-23 ASSESSMENT — PAIN SCALES - GENERAL
PAINLEVEL_OUTOF10: 3
PAINLEVEL_OUTOF10: 9
PAINLEVEL_OUTOF10: 7
PAINLEVEL_OUTOF10: 9
PAINLEVEL_OUTOF10: 4
PAINLEVEL_OUTOF10: 9
PAINLEVEL_OUTOF10: 9

## 2021-10-23 ASSESSMENT — PAIN DESCRIPTION - ORIENTATION: ORIENTATION: RIGHT;LEFT

## 2021-10-23 ASSESSMENT — PAIN DESCRIPTION - ONSET: ONSET: ON-GOING

## 2021-10-23 ASSESSMENT — PAIN DESCRIPTION - PROGRESSION: CLINICAL_PROGRESSION: NOT CHANGED

## 2021-10-23 ASSESSMENT — PAIN - FUNCTIONAL ASSESSMENT: PAIN_FUNCTIONAL_ASSESSMENT: ACTIVITIES ARE NOT PREVENTED

## 2021-10-23 ASSESSMENT — PAIN DESCRIPTION - LOCATION: LOCATION: HAND;FOOT

## 2021-10-23 ASSESSMENT — PAIN DESCRIPTION - PAIN TYPE: TYPE: CHRONIC PAIN

## 2021-10-23 NOTE — PROGRESS NOTES
Progress Note  Date:10/23/2021       RAUN:[id number]  Patient Name:Toi Myers     YOB: 1965     Age:55 y.o. Has some pain  Subjective    Subjective:  Symptoms:  Stable. Diet:  Adequate intake. Pain:  He complains of pain that is mild. Review of Systems  Objective         Vitals Last 24 Hours:  TEMPERATURE:  Temp  Av.6 °F (37 °C)  Min: 98.5 °F (36.9 °C)  Max: 98.6 °F (37 °C)  RESPIRATIONS RANGE: Resp  Av.3  Min: 16  Max: 18  PULSE OXIMETRY RANGE: SpO2  Av %  Min: 97 %  Max: 99 %  PULSE RANGE: Pulse  Av.5  Min: 85  Max: 100  BLOOD PRESSURE RANGE: Systolic (41JML), PWB:400 , Min:114 , FATEMEH:912   ; Diastolic (04CFJ), LFE:57, Min:83, Max:84    I/O (24Hr): No intake or output data in the 24 hours ending 10/23/21 08  Objective:  General Appearance:  Comfortable. Vital signs: (most recent): Blood pressure 119/83, pulse 85, temperature 98.5 °F (36.9 °C), temperature source Oral, resp. rate 18, height 5' 11.5\" (1.816 m), weight 245 lb (111.1 kg), SpO2 99 %. Vital signs are normal.    HEENT: Normal HEENT exam.    Lungs:  Normal effort. Heart: Normal rate. Abdomen: Abdomen is soft. Bowel sounds are normal.     Extremities: Decreased range of motion. Pupils:  Pupils are equal, round, and reactive to light. Skin:  Warm. (Multiple amputation of digits of fingers and also toes. . Erythema left shin)    Labs/Imaging/Diagnostics    Labs:  CBC:Recent Labs     10/20/21  1532 10/21/21  0531   WBC 7.8 8.1   RBC 4.62 4.44*   HGB 13.2* 12.6*   HCT 41.4* 39.2*   MCV 89.6 88.3   RDW 14.2 14.2    173     CHEMISTRIES:  Recent Labs     10/21/21  0531 10/22/21  0550 10/23/21  0543   * 134* 132*   K 4.0 4.2 4.4    100 98*   CO2 21 22 22   BUN 8 9 11   CREATININE 0.4* 0.7* 0.7*   GLUCOSE 170* 242* 229*   PHOS  --  3.6 3.1     PT/INR:No results for input(s): PROTIME, INR in the last 72 hours. APTT:No results for input(s): APTT in the last 72 hours.   LIVER PROFILE:  Recent Labs     10/20/21  1532   AST 12*   ALT 7*   BILITOT 0.6   ALKPHOS 101       Imaging Last 24 Hours:  CT HAND RIGHT W CONTRAST    Result Date: 10/22/2021  EXAMINATION: CT OF THE LEFT HAND WITH CONTRAST; CT OF THE RIGHT HAND WITH CONTRAST 10/22/2021 4:06 pm; 10/22/2021 4:17 pm TECHNIQUE: CT of the left hand was performed with the administration of intravenous contrast.  Multiplanar reformatted images are provided for review. Dose modulation, iterative reconstruction, and/or weight based adjustment of the mA/kV was utilized to reduce the radiation dose to as low as reasonably achievable.; CT of the right hand was performed with the administration of intravenous contrast.  Multiplanar reformatted images are provided for review. Dose modulation, iterative reconstruction, and/or weight based adjustment of the mA/kV was utilized to reduce the radiation dose to as low as reasonably achievable. COMPARISON: CT left hand February 6, 2019; right hand radiograph and left finger radiographs October 20, 2021 HISTORY ORDERING SYSTEM PROVIDED HISTORY: Hand cellulitis and wound TECHNOLOGIST PROVIDED HISTORY: Reason for exam:->Hand cellulitis and wound Reason for Exam: Hand cellulitis and wound Acuity: Acute Type of Exam: Initial Additional signs and symptoms: none Relevant Medical/Surgical History: 75ml isovue 370/ gfr>60; ORDERING SYSTEM PROVIDED HISTORY: Hand cellulitis and wound TECHNOLOGIST PROVIDED HISTORY: Reason for exam:->Hand cellulitis and wound Reason for Exam: Hand cellulitis and wound Acuity: Acute Type of Exam: Initial Additional signs and symptoms: none Relevant Medical/Surgical History: contrast used from other ct hand FINDINGS: CT LEFT HAND: Bones: Remote fracture involving the left hamate (images 40 series 321; image 83 series 308). Remote chip fracture along the ulnar aspect of the capitate (image 43 series 321; image 80 series 308). No bridging callus identified. No acute fractures are evident. Redemonstration of resection of the 4th metacarpal and 4th finger. Redemonstration of partial amputation of the distal tuft of the 1st and 2nd digits and amputation of the 3rd digit at the level of the PIP joint. No definitive CT evidence for acute osteomyelitis. No suspicious lytic or sclerotic osseous lesions are identified. Benign cystic change of the 3rd metacarpal head. Soft Tissue: Subcutaneous tissues demonstrate mild edema which is more pronounced within the 2nd digit with organized fluid collection identified at the distal tip of the 2nd digit measuring approximately 1.4 x 1.5 x 1.1 cm (images 28 through 31 series 306; image 45 series 317). No subcutaneous gas identified. Joint: Mild osteoarthritic changes of the 1st metacarpophalangeal joint, 1st carpometacarpal joint, and triscaphe joint. CT RIGHT HAND: Bones: No acute fracture identified. Chronic partial amputation/deformities of the distal phil of the 2nd and 3rd digits. No definitive CT evidence for osteomyelitis at this time. Soft Tissue: Soft tissue wound/ulceration at the distal tip of the 2nd digit with associated skin thickening and subcu edema. No organized fluid collection or subcutaneous gas identified. Correlate for cellulitis. Similar subcutaneous edema/soft tissue thickening along the 3rd digits suggesting cellulitis. No organized collection identified at the 3rd digit. Joint: Anatomic alignment with no dislocation identified. Mild-to-moderate osteoarthritis of the 1st carpometacarpal joint. CT left hand: 1. Subcutaneous edema present which is most pronounced within the 2nd digit with organized fluid collection at the distal tip of the left 2nd digit which measures approximately 1.4 x 1.5 x 1.1 cm. While sterility of fluid is indeterminate on imaging, findings are suspicious for abscess. No subcutaneous gas. 2. No definitive CT evidence of osteomyelitis. MRI could be obtained for further evaluation as clinically indicated. 3. Remote fracture involving the left hamate and remote chip fracture of the ulnar aspect of the capitate. No bridging callus identified. No acute fractures are evident. CT right hand: 1. Soft tissue edema and ulceration of the distal tip of the 2nd digit of the right hand with associated skin thickening compatible with cellulitis. No organized drainable collection or subcutaneous gas identified. 2. Skin thickening and edema of the distal 3rd digit of the right hand also most suggestive of cellulitis. 3. Chronic deformities of the distal phil of the 2nd and 3rd digits with no definitive superimposed acute osteomyelitis within limits of CT. MRI could be obtained for further evaluation as clinically indicated. CT HAND LEFT W CONTRAST    Result Date: 10/22/2021  EXAMINATION: CT OF THE LEFT HAND WITH CONTRAST; CT OF THE RIGHT HAND WITH CONTRAST 10/22/2021 4:06 pm; 10/22/2021 4:17 pm TECHNIQUE: CT of the left hand was performed with the administration of intravenous contrast.  Multiplanar reformatted images are provided for review. Dose modulation, iterative reconstruction, and/or weight based adjustment of the mA/kV was utilized to reduce the radiation dose to as low as reasonably achievable.; CT of the right hand was performed with the administration of intravenous contrast.  Multiplanar reformatted images are provided for review. Dose modulation, iterative reconstruction, and/or weight based adjustment of the mA/kV was utilized to reduce the radiation dose to as low as reasonably achievable.  COMPARISON: CT left hand February 6, 2019; right hand radiograph and left finger radiographs October 20, 2021 HISTORY ORDERING SYSTEM PROVIDED HISTORY: Hand cellulitis and wound TECHNOLOGIST PROVIDED HISTORY: Reason for exam:->Hand cellulitis and wound Reason for Exam: Hand cellulitis and wound Acuity: Acute Type of Exam: Initial Additional signs and symptoms: none Relevant Medical/Surgical History: 75ml isovue 370/ gfr>60; ORDERING SYSTEM PROVIDED HISTORY: Hand cellulitis and wound TECHNOLOGIST PROVIDED HISTORY: Reason for exam:->Hand cellulitis and wound Reason for Exam: Hand cellulitis and wound Acuity: Acute Type of Exam: Initial Additional signs and symptoms: none Relevant Medical/Surgical History: contrast used from other ct hand FINDINGS: CT LEFT HAND: Bones: Remote fracture involving the left hamate (images 40 series 321; image 83 series 308). Remote chip fracture along the ulnar aspect of the capitate (image 43 series 321; image 80 series 308). No bridging callus identified. No acute fractures are evident. Redemonstration of resection of the 4th metacarpal and 4th finger. Redemonstration of partial amputation of the distal tuft of the 1st and 2nd digits and amputation of the 3rd digit at the level of the PIP joint. No definitive CT evidence for acute osteomyelitis. No suspicious lytic or sclerotic osseous lesions are identified. Benign cystic change of the 3rd metacarpal head. Soft Tissue: Subcutaneous tissues demonstrate mild edema which is more pronounced within the 2nd digit with organized fluid collection identified at the distal tip of the 2nd digit measuring approximately 1.4 x 1.5 x 1.1 cm (images 28 through 31 series 306; image 45 series 317). No subcutaneous gas identified. Joint: Mild osteoarthritic changes of the 1st metacarpophalangeal joint, 1st carpometacarpal joint, and triscaphe joint. CT RIGHT HAND: Bones: No acute fracture identified. Chronic partial amputation/deformities of the distal phil of the 2nd and 3rd digits. No definitive CT evidence for osteomyelitis at this time. Soft Tissue: Soft tissue wound/ulceration at the distal tip of the 2nd digit with associated skin thickening and subcu edema. No organized fluid collection or subcutaneous gas identified. Correlate for cellulitis. Similar subcutaneous edema/soft tissue thickening along the 3rd digits suggesting cellulitis.   No organized collection identified at the 3rd digit. Joint: Anatomic alignment with no dislocation identified. Mild-to-moderate osteoarthritis of the 1st carpometacarpal joint. CT left hand: 1. Subcutaneous edema present which is most pronounced within the 2nd digit with organized fluid collection at the distal tip of the left 2nd digit which measures approximately 1.4 x 1.5 x 1.1 cm. While sterility of fluid is indeterminate on imaging, findings are suspicious for abscess. No subcutaneous gas. 2. No definitive CT evidence of osteomyelitis. MRI could be obtained for further evaluation as clinically indicated. 3. Remote fracture involving the left hamate and remote chip fracture of the ulnar aspect of the capitate. No bridging callus identified. No acute fractures are evident. CT right hand: 1. Soft tissue edema and ulceration of the distal tip of the 2nd digit of the right hand with associated skin thickening compatible with cellulitis. No organized drainable collection or subcutaneous gas identified. 2. Skin thickening and edema of the distal 3rd digit of the right hand also most suggestive of cellulitis. 3. Chronic deformities of the distal phil of the 2nd and 3rd digits with no definitive superimposed acute osteomyelitis within limits of CT. MRI could be obtained for further evaluation as clinically indicated.      Assessment//Plan           Hospital Problems         Last Modified POA    * (Principal) Foreign body in foot, left, infected, initial encounter 10/21/2021 Yes    Finger infection right index finger 10/21/2021 Yes    Uncontrolled diabetes mellitus with complications ([address] 00/51/1439 Yes    Obesity, Class III, BMI 40-49.9 (morbid obesity) ([address] 10/21/2021 Yes    Left foot pain 10/20/2021 Yes        Assessment & Plan  Left foot diabetic infection  -xray with fish hook 3 and 4th toe and removed   -had fish hook   -bld cx neg 48hrs  -IV abx with levaquin and vanc  DM  -SSI obesity  Finger infection right index  -CT left hand  -abscess 2nd digit with no gas or osteo and 2nd digit right hand with cellultits and await ortho recs   Hyponatremia  -mild and monitor  Electronically signed by Orestes Epstein MD on 10/23/21 at 8:26 AM EDT

## 2021-10-24 LAB
ALBUMIN SERPL-MCNC: 3.9 GM/DL (ref 3.4–5)
ANION GAP SERPL CALCULATED.3IONS-SCNC: 13 MMOL/L (ref 4–16)
BUN BLDV-MCNC: 15 MG/DL (ref 6–23)
CALCIUM SERPL-MCNC: 9.1 MG/DL (ref 8.3–10.6)
CHLORIDE BLD-SCNC: 97 MMOL/L (ref 99–110)
CO2: 21 MMOL/L (ref 21–32)
CREAT SERPL-MCNC: 0.5 MG/DL (ref 0.9–1.3)
DOSE AMOUNT: NORMAL
DOSE TIME: NORMAL
GFR AFRICAN AMERICAN: >60 ML/MIN/1.73M2
GFR NON-AFRICAN AMERICAN: >60 ML/MIN/1.73M2
GLUCOSE BLD-MCNC: 136 MG/DL (ref 70–99)
GLUCOSE BLD-MCNC: 179 MG/DL (ref 70–99)
GLUCOSE BLD-MCNC: 195 MG/DL (ref 70–99)
GLUCOSE BLD-MCNC: 235 MG/DL (ref 70–99)
GLUCOSE BLD-MCNC: 235 MG/DL (ref 70–99)
PHOSPHORUS: 3.5 MG/DL (ref 2.5–4.9)
POTASSIUM SERPL-SCNC: 4.5 MMOL/L (ref 3.5–5.1)
SODIUM BLD-SCNC: 131 MMOL/L (ref 135–145)
VANCOMYCIN RANDOM: 11.3 UG/ML

## 2021-10-24 PROCEDURE — 6370000000 HC RX 637 (ALT 250 FOR IP): Performed by: HOSPITALIST

## 2021-10-24 PROCEDURE — 6360000002 HC RX W HCPCS: Performed by: HOSPITALIST

## 2021-10-24 PROCEDURE — 6370000000 HC RX 637 (ALT 250 FOR IP): Performed by: CLINICAL NURSE SPECIALIST

## 2021-10-24 PROCEDURE — 2580000003 HC RX 258: Performed by: HOSPITALIST

## 2021-10-24 PROCEDURE — 80069 RENAL FUNCTION PANEL: CPT

## 2021-10-24 PROCEDURE — 2580000003 HC RX 258: Performed by: CLINICAL NURSE SPECIALIST

## 2021-10-24 PROCEDURE — 6360000002 HC RX W HCPCS: Performed by: CLINICAL NURSE SPECIALIST

## 2021-10-24 PROCEDURE — 80202 ASSAY OF VANCOMYCIN: CPT

## 2021-10-24 PROCEDURE — 82962 GLUCOSE BLOOD TEST: CPT

## 2021-10-24 PROCEDURE — 94761 N-INVAS EAR/PLS OXIMETRY MLT: CPT

## 2021-10-24 PROCEDURE — 36415 COLL VENOUS BLD VENIPUNCTURE: CPT

## 2021-10-24 PROCEDURE — 1200000000 HC SEMI PRIVATE

## 2021-10-24 RX ORDER — BACITRACIN, NEOMYCIN, POLYMYXIN B 400; 3.5; 5 [USP'U]/G; MG/G; [USP'U]/G
OINTMENT TOPICAL DAILY
Status: DISCONTINUED | OUTPATIENT
Start: 2021-10-24 | End: 2021-10-27 | Stop reason: HOSPADM

## 2021-10-24 RX ORDER — DOCUSATE SODIUM 100 MG/1
100 CAPSULE, LIQUID FILLED ORAL DAILY
Status: DISCONTINUED | OUTPATIENT
Start: 2021-10-24 | End: 2021-10-27 | Stop reason: HOSPADM

## 2021-10-24 RX ORDER — INSULIN GLARGINE 100 [IU]/ML
25 INJECTION, SOLUTION SUBCUTANEOUS NIGHTLY
Status: DISCONTINUED | OUTPATIENT
Start: 2021-10-24 | End: 2021-10-27 | Stop reason: HOSPADM

## 2021-10-24 RX ADMIN — LEVOFLOXACIN 500 MG: 5 INJECTION, SOLUTION INTRAVENOUS at 16:28

## 2021-10-24 RX ADMIN — PIPERACILLIN AND TAZOBACTAM 3375 MG: 3; .375 INJECTION, POWDER, LYOPHILIZED, FOR SOLUTION INTRAVENOUS at 11:56

## 2021-10-24 RX ADMIN — OXYCODONE AND ACETAMINOPHEN 2 TABLET: 5; 325 TABLET ORAL at 13:51

## 2021-10-24 RX ADMIN — SODIUM CHLORIDE, PRESERVATIVE FREE 10 ML: 5 INJECTION INTRAVENOUS at 20:08

## 2021-10-24 RX ADMIN — PANTOPRAZOLE SODIUM 40 MG: 40 TABLET, DELAYED RELEASE ORAL at 09:12

## 2021-10-24 RX ADMIN — VANCOMYCIN 1250 MG: 1.75 INJECTION, SOLUTION INTRAVENOUS at 22:00

## 2021-10-24 RX ADMIN — VANCOMYCIN 1250 MG: 1.75 INJECTION, SOLUTION INTRAVENOUS at 09:12

## 2021-10-24 RX ADMIN — INSULIN LISPRO 2 UNITS: 100 INJECTION, SOLUTION INTRAVENOUS; SUBCUTANEOUS at 09:16

## 2021-10-24 RX ADMIN — TAMSULOSIN HYDROCHLORIDE 0.4 MG: 0.4 CAPSULE ORAL at 09:12

## 2021-10-24 RX ADMIN — OXYCODONE AND ACETAMINOPHEN 2 TABLET: 5; 325 TABLET ORAL at 20:06

## 2021-10-24 RX ADMIN — OXYCODONE AND ACETAMINOPHEN 2 TABLET: 5; 325 TABLET ORAL at 09:12

## 2021-10-24 RX ADMIN — SODIUM CHLORIDE 25 ML: 9 INJECTION, SOLUTION INTRAVENOUS at 20:14

## 2021-10-24 RX ADMIN — PANTOPRAZOLE SODIUM 40 MG: 40 TABLET, DELAYED RELEASE ORAL at 20:08

## 2021-10-24 RX ADMIN — BACITRACIN, NEOMYCIN, POLYMYXIN B: 400; 3.5; 5 OINTMENT TOPICAL at 16:50

## 2021-10-24 RX ADMIN — OXYCODONE AND ACETAMINOPHEN 2 TABLET: 5; 325 TABLET ORAL at 04:50

## 2021-10-24 RX ADMIN — ESCITALOPRAM OXALATE 10 MG: 10 TABLET ORAL at 09:12

## 2021-10-24 RX ADMIN — PIPERACILLIN AND TAZOBACTAM 3375 MG: 3; .375 INJECTION, POWDER, LYOPHILIZED, FOR SOLUTION INTRAVENOUS at 20:19

## 2021-10-24 RX ADMIN — DOCUSATE SODIUM 100 MG: 100 CAPSULE ORAL at 16:29

## 2021-10-24 ASSESSMENT — PAIN DESCRIPTION - ORIENTATION: ORIENTATION: LEFT;RIGHT

## 2021-10-24 ASSESSMENT — PAIN SCALES - GENERAL
PAINLEVEL_OUTOF10: 9
PAINLEVEL_OUTOF10: 10
PAINLEVEL_OUTOF10: 8
PAINLEVEL_OUTOF10: 9

## 2021-10-24 ASSESSMENT — PAIN DESCRIPTION - PAIN TYPE: TYPE: CHRONIC PAIN

## 2021-10-24 ASSESSMENT — PAIN DESCRIPTION - LOCATION: LOCATION: FOOT;HAND

## 2021-10-24 NOTE — PROGRESS NOTES
[address] [facility]  consulted by JULITA Holder for monitoring and adjustment. Indication for treatment: SSTI  Goal trough: 10-15 mcg/mL    Pertinent Laboratory Values:   Temp Readings from Last 3 Encounters:   10/24/21 97.9 °F (36.6 °C) (Oral)   09/15/21 98.5 °F (36.9 °C) (Oral)   05/24/21 98.9 °F (37.2 °C) (Oral)     No results for input(s): WBC, LACTATE in the last 72 hours. Recent Labs     10/22/21  0550 10/23/21  0543 10/24/21  0731   BUN 9 11 15   CREATININE 0.7* 0.7* 0.5*     Estimated Creatinine Clearance: 211 mL/min (A) (based on SCr of 0.5 mg/dL (L)). No intake or output data in the 24 hours ending 10/24/21 1514    Pertinent Cultures:  Date    Source    Results  10/20   Blood    Sent    Vancomycin level:   TROUGH:  No results for input(s): VANCOTROUGH in the last 72 hours. RANDOM:    Recent Labs     10/24/21  0731   VANCORANDOM 11.3       Assessment:  · SCr, BUN, and urine output: SCR at baseline  · Day(s) of therapy: 5  · Vancomycin concentration: 10/21 - 8.4  · 10/24 - 11.3, 9 hour level on 1250mg q12h    Plan:  · Renal trends at baseline  · Vanco level predicts a trough of 11.2, AUC <500 at steady state. · Continue vancomycin 1250mg q12h with a predicted therapeutic level  · Recheck the vanco level in 3 days to monitor for accumulation  · Pharmacy will continue to monitor patient and adjust therapy as indicated    Jl 3 10/27 @0600    Thank you for the consult. Brittney Sibley, [facility]  10/24/2021 3:14 PM

## 2021-10-24 NOTE — PROGRESS NOTES
input(s): AST, ALT, BILIDIR, BILITOT, ALKPHOS in the last 72 hours. Imaging Last 24 Hours:  CT HAND RIGHT W CONTRAST    Result Date: 10/22/2021  EXAMINATION: CT OF THE LEFT HAND WITH CONTRAST; CT OF THE RIGHT HAND WITH CONTRAST 10/22/2021 4:06 pm; 10/22/2021 4:17 pm TECHNIQUE: CT of the left hand was performed with the administration of intravenous contrast.  Multiplanar reformatted images are provided for review. Dose modulation, iterative reconstruction, and/or weight based adjustment of the mA/kV was utilized to reduce the radiation dose to as low as reasonably achievable.; CT of the right hand was performed with the administration of intravenous contrast.  Multiplanar reformatted images are provided for review. Dose modulation, iterative reconstruction, and/or weight based adjustment of the mA/kV was utilized to reduce the radiation dose to as low as reasonably achievable. COMPARISON: CT left hand February 6, 2019; right hand radiograph and left finger radiographs October 20, 2021 HISTORY ORDERING SYSTEM PROVIDED HISTORY: Hand cellulitis and wound TECHNOLOGIST PROVIDED HISTORY: Reason for exam:->Hand cellulitis and wound Reason for Exam: Hand cellulitis and wound Acuity: Acute Type of Exam: Initial Additional signs and symptoms: none Relevant Medical/Surgical History: 75ml isovue 370/ gfr>60; ORDERING SYSTEM PROVIDED HISTORY: Hand cellulitis and wound TECHNOLOGIST PROVIDED HISTORY: Reason for exam:->Hand cellulitis and wound Reason for Exam: Hand cellulitis and wound Acuity: Acute Type of Exam: Initial Additional signs and symptoms: none Relevant Medical/Surgical History: contrast used from other ct hand FINDINGS: CT LEFT HAND: Bones: Remote fracture involving the left hamate (images 40 series 321; image 83 series 308). Remote chip fracture along the ulnar aspect of the capitate (image 43 series 321; image 80 series 308). No bridging callus identified. No acute fractures are evident.   Redemonstration of resection of the 4th metacarpal and 4th finger. Redemonstration of partial amputation of the distal tuft of the 1st and 2nd digits and amputation of the 3rd digit at the level of the PIP joint. No definitive CT evidence for acute osteomyelitis. No suspicious lytic or sclerotic osseous lesions are identified. Benign cystic change of the 3rd metacarpal head. Soft Tissue: Subcutaneous tissues demonstrate mild edema which is more pronounced within the 2nd digit with organized fluid collection identified at the distal tip of the 2nd digit measuring approximately 1.4 x 1.5 x 1.1 cm (images 28 through 31 series 306; image 45 series 317). No subcutaneous gas identified. Joint: Mild osteoarthritic changes of the 1st metacarpophalangeal joint, 1st carpometacarpal joint, and triscaphe joint. CT RIGHT HAND: Bones: No acute fracture identified. Chronic partial amputation/deformities of the distal phil of the 2nd and 3rd digits. No definitive CT evidence for osteomyelitis at this time. Soft Tissue: Soft tissue wound/ulceration at the distal tip of the 2nd digit with associated skin thickening and subcu edema. No organized fluid collection or subcutaneous gas identified. Correlate for cellulitis. Similar subcutaneous edema/soft tissue thickening along the 3rd digits suggesting cellulitis. No organized collection identified at the 3rd digit. Joint: Anatomic alignment with no dislocation identified. Mild-to-moderate osteoarthritis of the 1st carpometacarpal joint. CT left hand: 1. Subcutaneous edema present which is most pronounced within the 2nd digit with organized fluid collection at the distal tip of the left 2nd digit which measures approximately 1.4 x 1.5 x 1.1 cm. While sterility of fluid is indeterminate on imaging, findings are suspicious for abscess. No subcutaneous gas. 2. No definitive CT evidence of osteomyelitis. MRI could be obtained for further evaluation as clinically indicated.  3. Remote fracture involving the left hamate and remote chip fracture of the ulnar aspect of the capitate. No bridging callus identified. No acute fractures are evident. CT right hand: 1. Soft tissue edema and ulceration of the distal tip of the 2nd digit of the right hand with associated skin thickening compatible with cellulitis. No organized drainable collection or subcutaneous gas identified. 2. Skin thickening and edema of the distal 3rd digit of the right hand also most suggestive of cellulitis. 3. Chronic deformities of the distal phil of the 2nd and 3rd digits with no definitive superimposed acute osteomyelitis within limits of CT. MRI could be obtained for further evaluation as clinically indicated. CT HAND LEFT W CONTRAST    Result Date: 10/22/2021  EXAMINATION: CT OF THE LEFT HAND WITH CONTRAST; CT OF THE RIGHT HAND WITH CONTRAST 10/22/2021 4:06 pm; 10/22/2021 4:17 pm TECHNIQUE: CT of the left hand was performed with the administration of intravenous contrast.  Multiplanar reformatted images are provided for review. Dose modulation, iterative reconstruction, and/or weight based adjustment of the mA/kV was utilized to reduce the radiation dose to as low as reasonably achievable.; CT of the right hand was performed with the administration of intravenous contrast.  Multiplanar reformatted images are provided for review. Dose modulation, iterative reconstruction, and/or weight based adjustment of the mA/kV was utilized to reduce the radiation dose to as low as reasonably achievable.  COMPARISON: CT left hand February 6, 2019; right hand radiograph and left finger radiographs October 20, 2021 HISTORY ORDERING SYSTEM PROVIDED HISTORY: Hand cellulitis and wound TECHNOLOGIST PROVIDED HISTORY: Reason for exam:->Hand cellulitis and wound Reason for Exam: Hand cellulitis and wound Acuity: Acute Type of Exam: Initial Additional signs and symptoms: none Relevant Medical/Surgical History: 75ml isovue 370/ gfr>60; ORDERING SYSTEM PROVIDED HISTORY: Hand cellulitis and wound TECHNOLOGIST PROVIDED HISTORY: Reason for exam:->Hand cellulitis and wound Reason for Exam: Hand cellulitis and wound Acuity: Acute Type of Exam: Initial Additional signs and symptoms: none Relevant Medical/Surgical History: contrast used from other ct hand FINDINGS: CT LEFT HAND: Bones: Remote fracture involving the left hamate (images 40 series 321; image 83 series 308). Remote chip fracture along the ulnar aspect of the capitate (image 43 series 321; image 80 series 308). No bridging callus identified. No acute fractures are evident. Redemonstration of resection of the 4th metacarpal and 4th finger. Redemonstration of partial amputation of the distal tuft of the 1st and 2nd digits and amputation of the 3rd digit at the level of the PIP joint. No definitive CT evidence for acute osteomyelitis. No suspicious lytic or sclerotic osseous lesions are identified. Benign cystic change of the 3rd metacarpal head. Soft Tissue: Subcutaneous tissues demonstrate mild edema which is more pronounced within the 2nd digit with organized fluid collection identified at the distal tip of the 2nd digit measuring approximately 1.4 x 1.5 x 1.1 cm (images 28 through 31 series 306; image 45 series 317). No subcutaneous gas identified. Joint: Mild osteoarthritic changes of the 1st metacarpophalangeal joint, 1st carpometacarpal joint, and triscaphe joint. CT RIGHT HAND: Bones: No acute fracture identified. Chronic partial amputation/deformities of the distal phil of the 2nd and 3rd digits. No definitive CT evidence for osteomyelitis at this time. Soft Tissue: Soft tissue wound/ulceration at the distal tip of the 2nd digit with associated skin thickening and subcu edema. No organized fluid collection or subcutaneous gas identified. Correlate for cellulitis. Similar subcutaneous edema/soft tissue thickening along the 3rd digits suggesting cellulitis.   No organized collection identified at the 3rd digit. Joint: Anatomic alignment with no dislocation identified. Mild-to-moderate osteoarthritis of the 1st carpometacarpal joint. CT left hand: 1. Subcutaneous edema present which is most pronounced within the 2nd digit with organized fluid collection at the distal tip of the left 2nd digit which measures approximately 1.4 x 1.5 x 1.1 cm. While sterility of fluid is indeterminate on imaging, findings are suspicious for abscess. No subcutaneous gas. 2. No definitive CT evidence of osteomyelitis. MRI could be obtained for further evaluation as clinically indicated. 3. Remote fracture involving the left hamate and remote chip fracture of the ulnar aspect of the capitate. No bridging callus identified. No acute fractures are evident. CT right hand: 1. Soft tissue edema and ulceration of the distal tip of the 2nd digit of the right hand with associated skin thickening compatible with cellulitis. No organized drainable collection or subcutaneous gas identified. 2. Skin thickening and edema of the distal 3rd digit of the right hand also most suggestive of cellulitis. 3. Chronic deformities of the distal phil of the 2nd and 3rd digits with no definitive superimposed acute osteomyelitis within limits of CT. MRI could be obtained for further evaluation as clinically indicated.      Assessment//Plan           Hospital Problems         Last Modified POA    * (Principal) Foreign body in foot, left, infected, initial encounter 10/21/2021 Yes    Finger infection right index finger 10/21/2021 Yes    Uncontrolled diabetes mellitus with complications ([address] 91/14/5250 Yes    Obesity, Class III, BMI 40-49.9 (morbid obesity) ([address] 10/21/2021 Yes    Left foot pain 10/20/2021 Yes        Assessment & Plan  Left foot diabetic infection  -xray with fish hook 3 and 4th toe and removed   -had fish hook   -bld cx neg 48hrs  -IV abx with levaquin and vanc  DM  -SSI obesity  Finger infection right index  -CT left hand  -abscess 2nd digit with no gas or osteo and 2nd digit right hand with cellultits and await ortho recs   Hyponatremia  -mild and monitor    Labs pending.  High HbA1c and consult endo  Electronically signed by Antonella Oconnor MD on 10/23/21 at 8:26 AM EDT

## 2021-10-24 NOTE — CONSULTS
Endocrinology   Consult Note      Dear Doctor Bayron Hilario for the Consult     Pt. Was Admitted for : Bilateral hand swelling with infection of left finger and also left foot pain    Reason for Consult: Better control of blood glucose      History Obtained From:  Patient/ EMR       HISTORY OF PRESENT ILLNESS:                The patient is a 63 y.o. male with significant past medical history of osteomyelitis, diabetes mellitus, foot ulcers chronic pain syndrome history of drug abuse, history of MRSA infection, history of Covid 19 infection comes in complaining of pain in the left foot as patient was fishing and fishing hook stuck to his left foot in between the toes. Also has blood amputation of both fingers right and left hand and one of the left hand finger is infected discharging pus and painful both hands are swollen tender possibly infected. I was  consulted for better control of blood glucose. ROS:   Pt's ROS done in detail. Abnormal ROS are noted in Medical and Surgical History Section below:      Other Medical History:        Diagnosis Date    Absent finger     Left hand    Anxiety     Arthritis     Hands    Asthma     \"As a kid but outgrew this\" - no medications as of 2/21/20    Chronic back pain     Depression     Edema     Fibromyalgia     Headache(784.0)     Last: 2/19/20    Hernia, abdominal     Hx MRSA infection     positive culture 8/2014 from left foot    Nausea & vomiting     Neuropathy     Obesity, Class III, BMI 40-49.9 (morbid obesity) ([address]     Osteomyelitis (HCC)     hx finger    Poor circulation     Prolonged emergence from general anesthesia     Restless leg syndrome     Shortness of breath on exertion     6/14/2016- pt denies any sob with this interview    Type II or unspecified type diabetes mellitus with other specified manifestations, not stated as uncontrolled 4/8/2014    Type II or unspecified type diabetes mellitus without mention of complication, not stated as uncontrolled DX 2007    Follows with PCP    Ulcer of other part of foot 4/8/2014    'ulcer on left foot healed all up\"     Surgical History:        Procedure Laterality Date    COLONOSCOPY  1990's    Normal exam per pt    DEBRIDEMENT  8/25/2014    left foot    ENDOSCOPY, COLON, DIAGNOSTIC  06-    Normal exam per pt -     FINGER SURGERY  9-11-11    Right Index Finger    FINGER SURGERY  01-16-12    RIght Index Finger-Removal of loose body, Reconstruction of Mallet Finger    FOOT DEBRIDEMENT Left 6/10/2019    FOOT DEBRIDEMENT INCISION AND DRAINAGE performed by Chencho Pardo MD at [address] Right 06/01/2018    I&D right foot    HEMORRHOID SURGERY  2008    OTHER SURGICAL HISTORY Left 10/22/2013    I & D left foot    OTHER SURGICAL HISTORY  07/07/2017    I&D fingers X2 left hand.  I&D left forearm    OTHER SURGICAL HISTORY Left 07/08/2017    left hand debridement, left forearm incision and drainage     OTHER SURGICAL HISTORY Left 07/10/2017    Fingers I&D    OTHER SURGICAL HISTORY Left 07/14/2017    middle finger amputation revision    ROTATOR CUFF REPAIR  Last Done 7/18/2011    Left, Done Four Times    UPPER GASTROINTESTINAL ENDOSCOPY N/A 2/21/2020    EGD BIOPSY performed by Thien Parker MD at [place] [id number]       Allergies:  Cantaloupe (diagnostic), Ceftriaxone, Robaxin [methocarbamol], Rocephin [ceftriaxone sodium-lidocaine], Sulfa antibiotics, Aspirin, Nsaids, Other, Propoxyphene, and Toradol [ketorolac tromethamine]    Family History:       Problem Relation Age of Onset    Kidney Disease Mother         \"Kidney Failure, On Dialysis\"    Early Death Mother 39    Diabetes Father         \"Type II\"     REVIEW OF SYSTEMS:  Review of System Done as noted above     PHYSICAL EXAM:      Vitals:    /78   Pulse 76   Temp 97.8 °F (36.6 °C) (Oral)   Resp 16   Ht 5' 11.5\" (1.816 m)   Wt 245 lb 3.2 oz (111.2 kg)   SpO2 99%   BMI 33.72 kg/m²     CONSTITUTIONAL: awake, alert, cooperative, appears stated age   EYES:  vision intact Fundoscopic Exam not performed   ENT:Normal  NECK:  Supple, No JVD. Thyroid Exam:Normal   LUNGS:  Has Vesicular Breath Sounds,   CARDIOVASCULAR:  Normal apical impulse, regular rate and rhythm, normal S1 and S2, no S3 or S4, and has no  murmur   ABDOMEN:  No scars, normal bowel sounds, soft, non-distended, non-tender, no masses palpated, no hepatolienomegaly  Musculoskeletal: Normal  Extremities: Normal, peripheral pulses normal, , has  Edema of both hands and left leg. Both hands some of the fingers are partially amputated left hand fingers are infected  NEUROLOGIC:  Awake, alert, oriented to name, place and time. Cranial nerves II-XII are grossly intact. Motor is  intact. Sensory neuropathy. ,  and gait is abnormal.  And unstable    DATA:    CBC: No results for input(s): WBC, HGB, PLT in the last 72 hours. CMP:  Recent Labs     10/22/21  0550 10/23/21  0543 10/24/21  0731   * 132* 131*   K 4.2 4.4 4.5    98* 97*   CO2 22 22 21   BUN 9 11 15   CREATININE 0.7* 0.7* 0.5*   CALCIUM 8.8 9.2 9.1   LABALBU 3.5 3.9 3.9     Lipids: No results found for: CHOL, HDL, TRIG  Glucose:   Recent Labs     10/23/21  1628 10/23/21  2234 10/24/21  0743   POCGLU 202* 212* 235*     Hemoglobin A1C:   Lab Results   Component Value Date    LABA1C 8.6 10/20/2021     Free T4: No results found for: T4FREE  Free T3: No results found for: FT3  TSH High Sensitivity:   Lab Results   Component Value Date    TSHHS 1.710 08/27/2014       CT HAND RIGHT W CONTRAST   Final Result   CT left hand:      1. Subcutaneous edema present which is most pronounced within the 2nd digit   with organized fluid collection at the distal tip of the left 2nd digit which   measures approximately 1.4 x 1.5 x 1.1 cm. While sterility of fluid is   indeterminate on imaging, findings are suspicious for abscess. No   subcutaneous gas. 2. No definitive CT evidence of osteomyelitis.   MRI could be obtained for   further evaluation as clinically indicated. 3. Remote fracture involving the left hamate and remote chip fracture of the   ulnar aspect of the capitate. No bridging callus identified. No acute   fractures are evident. CT right hand:      1. Soft tissue edema and ulceration of the distal tip of the 2nd digit of the   right hand with associated skin thickening compatible with cellulitis. No   organized drainable collection or subcutaneous gas identified. 2. Skin thickening and edema of the distal 3rd digit of the right hand also   most suggestive of cellulitis. 3. Chronic deformities of the distal phil of the 2nd and 3rd digits with no   definitive superimposed acute osteomyelitis within limits of CT. MRI could   be obtained for further evaluation as clinically indicated. CT HAND LEFT W CONTRAST   Final Result   CT left hand:      1. Subcutaneous edema present which is most pronounced within the 2nd digit   with organized fluid collection at the distal tip of the left 2nd digit which   measures approximately 1.4 x 1.5 x 1.1 cm. While sterility of fluid is   indeterminate on imaging, findings are suspicious for abscess. No   subcutaneous gas. 2. No definitive CT evidence of osteomyelitis. MRI could be obtained for   further evaluation as clinically indicated. 3. Remote fracture involving the left hamate and remote chip fracture of the   ulnar aspect of the capitate. No bridging callus identified. No acute   fractures are evident. CT right hand:      1. Soft tissue edema and ulceration of the distal tip of the 2nd digit of the   right hand with associated skin thickening compatible with cellulitis. No   organized drainable collection or subcutaneous gas identified. 2. Skin thickening and edema of the distal 3rd digit of the right hand also   most suggestive of cellulitis.    3. Chronic deformities of the distal phil of the 2nd and 3rd digits with no   definitive superimposed acute osteomyelitis within limits of CT. MRI could   be obtained for further evaluation as clinically indicated. XR FOOT LEFT (MIN 3 VIEWS)   Final Result   Barbed fish hook in the soft tissues between the third and fourth toes. No radiographic evidence of osteomyelitis. XR FINGER LEFT (MIN 2 VIEWS)   Final Result   Index finger soft tissue swelling. No radiographic evidence of acute   osteomyelitis or soft tissue gas. XR HAND RIGHT (MIN 3 VIEWS)   Final Result   1. Soft tissue ulceration at the distal aspect of the 2nd finger. 2. No radiographic evidence of osteomyelitis or other acute osseous   abnormality.                 Scheduled Medicines   Medications:    metFORMIN  500 mg Oral BID WC    piperacillin-tazobactam  3,375 mg IntraVENous Q8H    insulin lispro  10 Units SubCUTAneous TID WC    insulin lispro  0-12 Units SubCUTAneous TID WC    insulin lispro  0-6 Units SubCUTAneous 2 times per day    insulin glargine  25 Units SubCUTAneous Nightly    sodium chloride flush  5-40 mL IntraVENous 2 times per day    escitalopram  10 mg Oral Daily    pantoprazole  40 mg Oral BID    tamsulosin  0.4 mg Oral Daily    levofloxacin  500 mg IntraVENous Q24H    vancomycin  1,250 mg IntraVENous Q12H      Infusions:    sodium chloride 25 mL (10/22/21 0826)    dextrose           IMPRESSION    Patient Active Problem List   Diagnosis    Finger infection right index finger    Diabetic foot infection ([address]    Uncontrolled diabetes mellitus with complications ([address]    Diabetes with ulcer of foot ([address]    Ulcer of other part of foot    Diabetes mellitus with ulcer of heel ([address]    Obesity, Class III, BMI 40-49.9 (morbid obesity) ([address]    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 Reviewed POC blood glucose . Labs and X ray results   2. Reviewed Home and Current Medicines   3. Will Start On meal/ Correction bolus Humalog/ Lantus Insulin regime  4. Monitor Blood glucose frequently   5. Modify  the dose of Insulin as needed        Will follow with you  Again thank you for sharing pt's care with me.      Truly yours,       Preeti Quezada MD

## 2021-10-25 LAB
ALBUMIN SERPL-MCNC: 3.9 GM/DL (ref 3.4–5)
ANION GAP SERPL CALCULATED.3IONS-SCNC: 13 MMOL/L (ref 4–16)
BUN BLDV-MCNC: 18 MG/DL (ref 6–23)
CALCIUM SERPL-MCNC: 9.5 MG/DL (ref 8.3–10.6)
CHLORIDE BLD-SCNC: 98 MMOL/L (ref 99–110)
CO2: 23 MMOL/L (ref 21–32)
CREAT SERPL-MCNC: 0.8 MG/DL (ref 0.9–1.3)
CULTURE: NORMAL
CULTURE: NORMAL
GFR AFRICAN AMERICAN: >60 ML/MIN/1.73M2
GFR NON-AFRICAN AMERICAN: >60 ML/MIN/1.73M2
GLUCOSE BLD-MCNC: 123 MG/DL (ref 70–99)
GLUCOSE BLD-MCNC: 156 MG/DL (ref 70–99)
GLUCOSE BLD-MCNC: 160 MG/DL (ref 70–99)
GLUCOSE BLD-MCNC: 179 MG/DL (ref 70–99)
GLUCOSE BLD-MCNC: 181 MG/DL (ref 70–99)
GLUCOSE BLD-MCNC: 187 MG/DL (ref 70–99)
GLUCOSE BLD-MCNC: 188 MG/DL (ref 70–99)
Lab: NORMAL
Lab: NORMAL
PHOSPHORUS: 3.9 MG/DL (ref 2.5–4.9)
POTASSIUM SERPL-SCNC: 4.5 MMOL/L (ref 3.5–5.1)
SODIUM BLD-SCNC: 134 MMOL/L (ref 135–145)
SPECIMEN: NORMAL
SPECIMEN: NORMAL

## 2021-10-25 PROCEDURE — 6360000002 HC RX W HCPCS: Performed by: HOSPITALIST

## 2021-10-25 PROCEDURE — 05HB33Z INSERTION OF INFUSION DEVICE INTO RIGHT BASILIC VEIN, PERCUTANEOUS APPROACH: ICD-10-PCS | Performed by: INTERNAL MEDICINE

## 2021-10-25 PROCEDURE — 36410 VNPNXR 3YR/> PHY/QHP DX/THER: CPT

## 2021-10-25 PROCEDURE — 2580000003 HC RX 258: Performed by: HOSPITALIST

## 2021-10-25 PROCEDURE — C1751 CATH, INF, PER/CENT/MIDLINE: HCPCS

## 2021-10-25 PROCEDURE — 87075 CULTR BACTERIA EXCEPT BLOOD: CPT

## 2021-10-25 PROCEDURE — 6370000000 HC RX 637 (ALT 250 FOR IP): Performed by: CLINICAL NURSE SPECIALIST

## 2021-10-25 PROCEDURE — 6370000000 HC RX 637 (ALT 250 FOR IP): Performed by: HOSPITALIST

## 2021-10-25 PROCEDURE — 36415 COLL VENOUS BLD VENIPUNCTURE: CPT

## 2021-10-25 PROCEDURE — 94761 N-INVAS EAR/PLS OXIMETRY MLT: CPT

## 2021-10-25 PROCEDURE — 2580000003 HC RX 258: Performed by: CLINICAL NURSE SPECIALIST

## 2021-10-25 PROCEDURE — 0JJV0ZZ INSPECTION OF UPPER EXTREMITY SUBCUTANEOUS TISSUE AND FASCIA, OPEN APPROACH: ICD-10-PCS | Performed by: PHYSICIAN ASSISTANT

## 2021-10-25 PROCEDURE — 6370000000 HC RX 637 (ALT 250 FOR IP): Performed by: INTERNAL MEDICINE

## 2021-10-25 PROCEDURE — 6360000002 HC RX W HCPCS: Performed by: CLINICAL NURSE SPECIALIST

## 2021-10-25 PROCEDURE — 76937 US GUIDE VASCULAR ACCESS: CPT

## 2021-10-25 PROCEDURE — 87070 CULTURE OTHR SPECIMN AEROBIC: CPT

## 2021-10-25 PROCEDURE — 80069 RENAL FUNCTION PANEL: CPT

## 2021-10-25 PROCEDURE — 99233 SBSQ HOSP IP/OBS HIGH 50: CPT | Performed by: PHYSICIAN ASSISTANT

## 2021-10-25 PROCEDURE — 82962 GLUCOSE BLOOD TEST: CPT

## 2021-10-25 PROCEDURE — 26010 DRAINAGE OF FINGER ABSCESS: CPT | Performed by: PHYSICIAN ASSISTANT

## 2021-10-25 PROCEDURE — 1200000000 HC SEMI PRIVATE

## 2021-10-25 PROCEDURE — 2500000003 HC RX 250 WO HCPCS: Performed by: PHYSICIAN ASSISTANT

## 2021-10-25 RX ORDER — LIDOCAINE HYDROCHLORIDE 10 MG/ML
2 INJECTION, SOLUTION EPIDURAL; INFILTRATION; INTRACAUDAL; PERINEURAL ONCE
Status: DISCONTINUED | OUTPATIENT
Start: 2021-10-25 | End: 2021-10-25

## 2021-10-25 RX ORDER — LIDOCAINE HYDROCHLORIDE 10 MG/ML
2 INJECTION, SOLUTION EPIDURAL; INFILTRATION; INTRACAUDAL; PERINEURAL ONCE
Status: DISCONTINUED | OUTPATIENT
Start: 2021-10-26 | End: 2021-10-25 | Stop reason: SDUPTHER

## 2021-10-25 RX ORDER — LIDOCAINE HYDROCHLORIDE 10 MG/ML
2 INJECTION, SOLUTION EPIDURAL; INFILTRATION; INTRACAUDAL; PERINEURAL ONCE
Status: COMPLETED | OUTPATIENT
Start: 2021-10-25 | End: 2021-10-25

## 2021-10-25 RX ADMIN — ESCITALOPRAM OXALATE 10 MG: 10 TABLET ORAL at 08:28

## 2021-10-25 RX ADMIN — OXYCODONE AND ACETAMINOPHEN 2 TABLET: 5; 325 TABLET ORAL at 15:32

## 2021-10-25 RX ADMIN — OXYCODONE AND ACETAMINOPHEN 2 TABLET: 5; 325 TABLET ORAL at 20:02

## 2021-10-25 RX ADMIN — LEVOFLOXACIN 500 MG: 5 INJECTION, SOLUTION INTRAVENOUS at 15:35

## 2021-10-25 RX ADMIN — INSULIN GLARGINE 25 UNITS: 100 INJECTION, SOLUTION SUBCUTANEOUS at 20:03

## 2021-10-25 RX ADMIN — VANCOMYCIN 1250 MG: 1.75 INJECTION, SOLUTION INTRAVENOUS at 15:37

## 2021-10-25 RX ADMIN — SODIUM CHLORIDE, PRESERVATIVE FREE 10 ML: 5 INJECTION INTRAVENOUS at 20:02

## 2021-10-25 RX ADMIN — BACITRACIN, NEOMYCIN, POLYMYXIN B: 400; 3.5; 5 OINTMENT TOPICAL at 09:57

## 2021-10-25 RX ADMIN — OXYCODONE AND ACETAMINOPHEN 2 TABLET: 5; 325 TABLET ORAL at 02:20

## 2021-10-25 RX ADMIN — LIDOCAINE HYDROCHLORIDE 2 ML: 10 INJECTION, SOLUTION EPIDURAL; INFILTRATION; INTRACAUDAL; PERINEURAL at 15:01

## 2021-10-25 RX ADMIN — PANTOPRAZOLE SODIUM 40 MG: 40 TABLET, DELAYED RELEASE ORAL at 20:02

## 2021-10-25 RX ADMIN — VANCOMYCIN 1250 MG: 1.75 INJECTION, SOLUTION INTRAVENOUS at 22:09

## 2021-10-25 RX ADMIN — PANTOPRAZOLE SODIUM 40 MG: 40 TABLET, DELAYED RELEASE ORAL at 08:29

## 2021-10-25 RX ADMIN — OXYCODONE AND ACETAMINOPHEN 2 TABLET: 5; 325 TABLET ORAL at 07:12

## 2021-10-25 RX ADMIN — TAMSULOSIN HYDROCHLORIDE 0.4 MG: 0.4 CAPSULE ORAL at 08:28

## 2021-10-25 RX ADMIN — SODIUM CHLORIDE 25 ML: 9 INJECTION, SOLUTION INTRAVENOUS at 00:08

## 2021-10-25 RX ADMIN — PIPERACILLIN AND TAZOBACTAM 3375 MG: 3; .375 INJECTION, POWDER, LYOPHILIZED, FOR SOLUTION INTRAVENOUS at 20:01

## 2021-10-25 RX ADMIN — OXYCODONE AND ACETAMINOPHEN 2 TABLET: 5; 325 TABLET ORAL at 11:27

## 2021-10-25 RX ADMIN — DOCUSATE SODIUM 100 MG: 100 CAPSULE ORAL at 08:28

## 2021-10-25 ASSESSMENT — ENCOUNTER SYMPTOMS
CONSTIPATION: 0
PHOTOPHOBIA: 0
EYE REDNESS: 0
APNEA: 0
CHOKING: 0
SORE THROAT: 0
RECTAL PAIN: 0
BACK PAIN: 0
COLOR CHANGE: 0
ANAL BLEEDING: 0
EYE ITCHING: 0
STRIDOR: 0

## 2021-10-25 ASSESSMENT — PAIN DESCRIPTION - ORIENTATION: ORIENTATION: LEFT;RIGHT

## 2021-10-25 ASSESSMENT — PAIN DESCRIPTION - PAIN TYPE
TYPE: CHRONIC PAIN
TYPE: CHRONIC PAIN

## 2021-10-25 ASSESSMENT — PAIN SCALES - GENERAL
PAINLEVEL_OUTOF10: 10
PAINLEVEL_OUTOF10: 9

## 2021-10-25 ASSESSMENT — PAIN DESCRIPTION - LOCATION: LOCATION: FOOT;HAND

## 2021-10-25 NOTE — PROGRESS NOTES
Progress Note( Dr. Lexii Barnard)  10/25/2021  Subjective:   Admit Date: 10/20/2021  PCP: Sanjana Hernandez    Admitted For :   Bilateral hand swelling with infection of left finger and also left foot pain    Consulted For: Better control of blood glucose    Interval History: Pain in the leg is somewhat worse redness in the leg   hand pains are better    Denies any chest pains,   Denies SOB . Denies nausea or vomiting. No new bowel or bladder symptoms. No intake or output data in the 24 hours ending 10/25/21 0703    DATA    CBC: No results for input(s): WBC, HGB, PLT in the last 72 hours. CMP:Recent Labs     10/23/21  0543 10/24/21  0731   * 131*   K 4.4 4.5   CL 98* 97*   CO2 22 21   BUN 11 15   CREATININE 0.7* 0.5*   CALCIUM 9.2 9.1   LABALBU 3.9 3.9     Lipids: No results found for: CHOL, HDL, TRIG  Glucose:  Recent Labs     10/24/21  1657 10/24/21  1955 10/25/21  0208   POCGLU 179* 136* 181*     [id number]:  Lab Results   Component Value Date    LABA1C 8.6 10/20/2021     High Sensitivity TSH:   Lab Results   Component Value Date    TSHHS 1.710 08/27/2014     Free T3: No results found for: FT3  Free T4:No results found for: T4FREE    CT HAND RIGHT W CONTRAST   Final Result   CT left hand:      1. Subcutaneous edema present which is most pronounced within the 2nd digit   with organized fluid collection at the distal tip of the left 2nd digit which   measures approximately 1.4 x 1.5 x 1.1 cm. While sterility of fluid is   indeterminate on imaging, findings are suspicious for abscess. No   subcutaneous gas. 2. No definitive CT evidence of osteomyelitis. MRI could be obtained for   further evaluation as clinically indicated. 3. Remote fracture involving the left hamate and remote chip fracture of the   ulnar aspect of the capitate. No bridging callus identified. No acute   fractures are evident. CT right hand:      1.  Soft tissue edema and ulceration of the distal tip of the 2nd digit of the   right hand with associated skin thickening compatible with cellulitis. No   organized drainable collection or subcutaneous gas identified. 2. Skin thickening and edema of the distal 3rd digit of the right hand also   most suggestive of cellulitis. 3. Chronic deformities of the distal phil of the 2nd and 3rd digits with no   definitive superimposed acute osteomyelitis within limits of CT. MRI could   be obtained for further evaluation as clinically indicated. CT HAND LEFT W CONTRAST   Final Result   CT left hand:      1. Subcutaneous edema present which is most pronounced within the 2nd digit   with organized fluid collection at the distal tip of the left 2nd digit which   measures approximately 1.4 x 1.5 x 1.1 cm. While sterility of fluid is   indeterminate on imaging, findings are suspicious for abscess. No   subcutaneous gas. 2. No definitive CT evidence of osteomyelitis. MRI could be obtained for   further evaluation as clinically indicated. 3. Remote fracture involving the left hamate and remote chip fracture of the   ulnar aspect of the capitate. No bridging callus identified. No acute   fractures are evident. CT right hand:      1. Soft tissue edema and ulceration of the distal tip of the 2nd digit of the   right hand with associated skin thickening compatible with cellulitis. No   organized drainable collection or subcutaneous gas identified. 2. Skin thickening and edema of the distal 3rd digit of the right hand also   most suggestive of cellulitis. 3. Chronic deformities of the distal phil of the 2nd and 3rd digits with no   definitive superimposed acute osteomyelitis within limits of CT. MRI could   be obtained for further evaluation as clinically indicated. XR FOOT LEFT (MIN 3 VIEWS)   Final Result   Barbed fish hook in the soft tissues between the third and fourth toes. No radiographic evidence of osteomyelitis.          XR FINGER LEFT (MIN 2 VIEWS) Final Result   Index finger soft tissue swelling. No radiographic evidence of acute   osteomyelitis or soft tissue gas. XR HAND RIGHT (MIN 3 VIEWS)   Final Result   1. Soft tissue ulceration at the distal aspect of the 2nd finger. 2. No radiographic evidence of osteomyelitis or other acute osseous   abnormality. Scheduled Medicines   Medications:    metFORMIN  500 mg Oral BID WC    insulin lispro  10 Units SubCUTAneous TID WC    insulin lispro  0-12 Units SubCUTAneous TID WC    insulin lispro  0-6 Units SubCUTAneous 2 times per day    insulin glargine  25 Units SubCUTAneous Nightly    docusate sodium  100 mg Oral Daily    neomycin-bacitracin-polymyxin   Topical Daily    piperacillin-tazobactam  3,375 mg IntraVENous Q6H    sodium chloride flush  5-40 mL IntraVENous 2 times per day    escitalopram  10 mg Oral Daily    pantoprazole  40 mg Oral BID    tamsulosin  0.4 mg Oral Daily    levofloxacin  500 mg IntraVENous Q24H    vancomycin  1,250 mg IntraVENous Q12H      Infusions:    sodium chloride 25 mL (10/25/21 0008)    dextrose           Objective:   Vitals: /82   Pulse 90   Temp 98.1 °F (36.7 °C) (Oral)   Resp 16   Ht 5' 11.5\" (1.816 m)   Wt 245 lb 3.2 oz (111.2 kg)   SpO2 98%   BMI 33.72 kg/m²   General appearance: alert and cooperative with exam  Neck: no JVD or bruit  Thyroid : Normal lobes   Lungs: Has Vesicular Breath sounds   Heart:  regular rate and rhythm  Abdomen: soft, non-tender; bowel sounds normal; no masses,  no organomegaly  Musculoskeletal: Normalhas  Edema of both hands and left leg. Both hands some of the fingers are partially amputated left hand fingers are infected  Extremities: extremities normal, , no edema//   Neurologic:  Awake, alert, oriented to name, place and time. Cranial nerves II-XII are grossly intact. Motor is  intact. Sensory i neuropathy. ,  and gait is abnormal.    Assessment:     Patient Active Problem List:     Finger infection right index finger     Diabetic foot infection ([facility] [address]     Uncontrolled diabetes mellitus with complications ([facility] [address]     Diabetes with ulcer of foot ([address]     Ulcer of other part of foot     Diabetes mellitus with ulcer of heel ([facility])     Obesity, Class III, BMI 40-49.9 (morbid obesity) ([address]     Chronic ulcer of left foot with necrosis of muscle ([facility])     Skin ulcer of toe of left foot with fat layer exposed ([address]     Chemical dependency ([facility])     Chronic pain syndrome     Drug abuse ([facility] [address]     Sepsis without acute organ dysfunction ([facility])     Foot pain     WD-S/P amputation of lesser toe, right ([facility])     Acute osteomyelitis involving hand ([facility] [address]     Diabetic infection of left foot ([facility] [address]     Left foot pain     Foreign body in foot, left, infected, initial encounter      Plan:     1. Reviewed POC blood glucose . Labs and X ray results   2. Reviewed Current Medicines   3. On meal/ Correction bolus Humalog/ Basal Lantus Insulin regime /  4. Monitor Blood glucose frequently   5. Modified  the dose of Insulin/ other medicines as needed   6. Will follow     .      Machelle Curry MD, MD

## 2021-10-25 NOTE — ADT AUTH CERT
Utilization Reviews       Cellulitis - Care Day 5 (10/24/2021) by Margareth Gray RN       Review Status Review Entered   Completed 10/25/2021 09:44      Criteria Review      Care Day: 5 Care Date: 10/24/2021 Level of Care: Telemetry    Guideline Day 2    Level Of Care    (X) Floor    Clinical Status    (X) * Dehydration absent    (X) * Mental status at baseline    (X) * Hemodynamic stability    10/25/2021 9:44 AM EDT by Nirmala Hazel      114/78 76 16 97.8 99% room air    (X) * Afebrile or fever improved    Activity    (X) Activity as tolerated    Routes    (X) * Oral hydration    10/25/2021 9:44 AM EDT by Nirmala Hazel      ADULT DIET; Regular; 5 carb choices (75 gm/meal) [[id number]]    ( ) * Oral medications    10/25/2021 9:44 AM EDT by Nirmala Hazel      levoFLOXacin (LEVAQUIN) 500 MG/100ML infusion 500 mg   Freq: EVERY 24 HOURS Route: IV  piperacillin-tazobactam (ZOSYN) 3,375 mg in dextrose 5 % 50 mL IVPB extended infusion (mini-bag)   Freq: EVERY 8 HOURS Route: IV  vancomycin (VANCOCIN) 1250 mg in 250    (X) Diet as tolerated    10/25/2021 9:44 AM EDT by Nirmala Hazel      ADULT DIET; Regular; 5 carb choices (75 gm/meal) [[id number]]    Interventions    (X) Elevation of affected area, if possible    Medications    (X) IV antibiotics    10/25/2021 9:44 AM EDT by Nirmala Hazel      levoFLOXacin (LEVAQUIN) 500 MG/100ML infusion 500 mg   Freq: EVERY 24 HOURS Route: IV  piperacillin-tazobactam (ZOSYN) 3,375 mg in dextrose 5 % 50 mL IVPB extended infusion (mini-bag)   Freq: EVERY 8 HOURS Route: IV  vancomycin (VANCOCIN) 1250 mg in 250    * Milestone   Additional Notes   10/24/21      IM Note:      General Appearance:  Comfortable.     Vital signs: (most recent): Blood pressure 116/89, pulse 91, temperature 97.6 °F (36.4 °C), temperature source Oral, resp.  rate 18, height 5' 11.5\" (1.816 m), weight 245 lb 3.2 oz (111.2 kg), SpO2 99 %.  Vital signs are normal.     HEENT: Normal HEENT exam.     Lungs:  Normal effort.   Heart: Normal rate.     Abdomen: Abdomen is soft.  Bowel sounds are normal.      Extremities: Decreased range of motion.     Pupils:  Pupils are equal, round, and reactive to light.     Skin:  Warm.  (Multiple amputation of digits of fingers and also toes. . Erythema left shin)      Assessment//Plan             Hospital Problems      Last Modified POA     * (Principal) Foreign body in foot, left, infected, initial encounter 10/21/2021 Yes     Finger infection right index finger 10/21/2021 Yes     Uncontrolled diabetes mellitus with complications ([address] 37/38/7121 Yes     Obesity, Class III, BMI 40-49.9 (morbid obesity) ([address] 10/21/2021 Yes     Left foot pain 10/20/2021 Yes           Assessment & Plan   Left foot diabetic infection   -xray with fish hook 3 and 4th toe and removed    -had fish hook    -bld cx neg 48hrs   -IV abx with levaquin and vanc   DM   -SSI obesity   Finger infection right index   -CT left hand   -abscess 2nd digit with no gas or osteo and 2nd digit right hand with cellultits and await ortho recs    Hyponatremia   -mild and monitor         \Endocrinology Note:      HISTORY OF PRESENT ILLNESS:                   The patient is a 63 y.o. male with significant past medical history of osteomyelitis, diabetes mellitus, foot ulcers chronic pain syndrome history of drug abuse, history of MRSA infection, history of Covid 19 infection comes in complaining of pain in the left foot as patient was fishing and fishing hook stuck to his left foot in between the toes.  Also has blood amputation of both fingers right and left hand and one of the left hand finger is infected discharging pus and painful both hands are swollen tender possibly infected.  I was  consulted for better control of blood glucose.            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)   · Skin ulcer of toe of left foot with fat layer exposed ([facility]   · Chemical dependency (HCC)   · Chronic pain syndrome   · Drug abuse (HCC)   · Sepsis without acute organ dysfunction (HCC)   · Foot pain   · WD-S/P amputation of lesser toe, right (HCC)   · Acute osteomyelitis involving hand ([facility]   · Diabetic infection of left foot [facility]   · Left foot pain   · Foreign body in foot, left, infected, initial encounter               RECOMMENDATIONS:         1. Reviewed POC blood glucose . Labs and X ray results    2. Reviewed Home and Current Medicines    3. Will Start On meal/ Correction bolus Humalog/ Lantus Insulin regime   4. Monitor Blood glucose frequently    5. Modify  the dose of Insulin as needed             Results for Carina Velez (MRN [id number]) as of 10/25/2021 09:25      10/24/2021 07:31   Sodium: 131 (L)   Potassium: 4.5   Chloride: 97 (L)   CO2: 21   BUN: 15   Creatinine: 0.5 (L)   Anion Gap: 13   GFR Non-: >60   GFR African American: >60   Glucose: 235 (H)   Calcium: 9.1   Phosphorus: 3.5   Albumin: 3.9   Vancomycin Rm: 11.3   DOSE AMOUNT: DOSE AMT.  GIVEN - 1250   DOSE TIME: DOSE TIME GIVEN - 0800/2000      10/24/2021 07:43   POC Glucose: 235 (H)      10/24/2021 11:53   POC Glucose: 195 (H)      10/24/2021 16:57   POC Glucose: 179 (H)      10/24/2021 19:55   POC Glucose: 136 (H)      docusate sodium (COLACE) capsule 100 mg    Dose: 100 mg   Freq: DAILY Route: PO      escitalopram (LEXAPRO) tablet 10 mg    Dose: 10 mg   Freq: DAILY Route: PO      insulin lispro (HUMALOG) injection vial 0-12 Units    Dose: 0-12 Units   Freq: 3 TIMES DAILY WITH MEALS Route: SC      insulin lispro (HUMALOG) injection vial 0-6 Units    Dose: 0-6 Units   Freq: 2 times per day Route: SC      insulin lispro (HUMALOG) injection vial 0-6 Units    Dose: 0-6 Units   Freq: 3 TIMES DAILY WITH MEALS Route: SC      insulin lispro (HUMALOG) injection vial 10 Units    Dose: 10 Units   Freq: 3 TIMES DAILY WITH MEALS Route: SC      levoFLOXacin (LEVAQUIN) 500 MG/100ML infusion 500 mg    Dose: 500 mg   Freq: EVERY 24 HOURS Route: IV      neomycin-bacitracin-polymyxin (NEOSPORIN) ointment    Freq: DAILY Route: TOP      piperacillin-tazobactam (ZOSYN) 3,375 mg in dextrose 5 % 50 mL IVPB extended infusion (mini-bag)    Dose: 3,375 mg   Freq: EVERY 8 HOURS Route: IV      tamsulosin (FLOMAX) capsule 0.4 mg    Dose: 0.4 mg   Freq: DAILY Route: PO      vancomycin (VANCOCIN) 1250 mg in 250 mL IVPB    Dose: 1,250 mg   Freq: EVERY 12 HOURS Route: IV      oxyCODONE-acetaminophen (PERCOCET) 5-325 MG per tablet 2 tablet    Dose: 2 tablet   Freq: EVERY 4 HOURS PRN Route: PO x 4          Cellulitis - Care Day 4 (10/23/2021) by Erika Le RN       Review Status Review Entered   Completed 10/25/2021 09:37      Criteria Review      Care Day: 4 Care Date: 10/23/2021 Level of Care: Telemetry    Guideline Day 2    Level Of Care    (X) Floor    Clinical Status    (X) * Dehydration absent    (X) * Mental status at baseline    (X) * Hemodynamic stability    10/25/2021 9:37 AM EDT by [facility]      119/83 85 18 98.6 98% room air    (X) * Afebrile or fever improved    Activity    (X) Activity as tolerated    Routes    (X) * Oral hydration    10/25/2021 9:37 AM EDT by [facility]      ADULT DIET; Regular; 5 carb choices (75 gm/meal) [[id number]]    ( ) * Oral medications    10/25/2021 9:37 AM EDT by [facility]      levoFLOXacin (LEVAQUIN) 500 MG/100ML infusion 500 mg   Dose: 500 mg  Freq: EVERY 24 HOURS Route: IV    vancomycin (VANCOCIN) 1250 mg in 250 mL IVPB   Dose: 1,250 mg  Freq: EVERY 12 HOURS Route: IV    (X) Diet as tolerated    10/25/2021 9:37 AM EDT by [facility]      ADULT DIET;  Regular; 5 carb choices (75 gm/meal) [[id number]]    Interventions    (X) Elevation of affected area, if possible    Medications    (X) IV antibiotics    10/25/2021 9:37 AM EDT by Willy Newsome      levoFLOXacin (LEVAQUIN) 500 MG/100ML infusion 500 mg   Dose: 500 mg  Freq: EVERY 24 HOURS Route: IV    * Milestone   Additional Notes   10/23/21      137/67 84 18 98.6 97% room air         IM Note:      General Appearance:  Comfortable.     Vital signs: (most recent): Blood pressure 119/83, pulse 85, temperature 98.5 °F (36.9 °C), temperature source Oral, resp. rate 18, height 5' 11.5\" (1.816 m), weight 245 lb (111.1 kg), SpO2 99 %.  Vital signs are normal.     HEENT: Normal HEENT exam.     Lungs:  Normal effort.     Heart: Normal rate.     Abdomen: Abdomen is soft.  Bowel sounds are normal.      Extremities: Decreased range of motion.     Pupils:  Pupils are equal, round, and reactive to light.     Skin:  Warm.  (Multiple amputation of digits of fingers and also toes. . Erythema left shin)          Assessment//Plan             Hospital Problems      Last Modified POA     * (Principal) Foreign body in foot, left, infected, initial encounter 10/21/2021 Yes     Finger infection right index finger 10/21/2021 Yes     Uncontrolled diabetes mellitus with complications ([address] 12/50/5109 Yes     Obesity, Class III, BMI 40-49.9 (morbid obesity) ([facility] 10/21/2021 Yes     Left foot pain 10/20/2021 Yes           Assessment & Plan   Left foot diabetic infection   -xray with fish hook 3 and 4th toe and removed    -had fish hook    -bld cx neg 48hrs   -IV abx with levaquin and vanc   DM   -SSI obesity   Finger infection right index   -CT left hand   -abscess 2nd digit with no gas or osteo and 2nd digit right hand with cellultits and await ortho recs    Hyponatremia   -mild and monitor      Results for Armani Jj (MRN [id number]) as of 10/25/2021 09:25      10/23/2021 05:43   Sodium: 132 (L)   Potassium: 4.4   Chloride: 98 (L)   CO2: 22   BUN: 11   Creatinine: 0.7 (L)   Anion Gap: 12   GFR Non-: >60   GFR African American: >60   Glucose: 229 (H)   Calcium: 9.2   Phosphorus: 3.1   Albumin: 3.9      10/23/2021 09:03   POC Glucose: 177 (H)      10/23/2021 12:57   POC Glucose: 212 (H)      10/23/2021 16:28   POC Glucose: 202 (H)      10/23/2021 22:34   POC Glucose: 212 (H)         escitalopram (LEXAPRO) tablet 10 mg    Dose: 10 mg   Freq: DAILY Route: PO      insulin lispro (HUMALOG) injection vial 0-3 Units    Dose: 0-3 Units   Freq: NIGHTLY Route: SC      insulin lispro (HUMALOG) injection vial 0-6 Units    Dose: 0-6 Units   Freq: 3 TIMES DAILY WITH MEALS Route: SC      levoFLOXacin (LEVAQUIN) 500 MG/100ML infusion 500 mg    Dose: 500 mg   Freq: EVERY 24 HOURS Route: IV      pantoprazole (PROTONIX) tablet 40 mg    Dose: 40 mg   Freq: 2 TIMES DAILY Route: PO      tamsulosin (FLOMAX) capsule 0.4 mg    Dose: 0.4 mg   Freq: DAILY Route: PO      vancomycin (VANCOCIN) 1250 mg in 250 mL IVPB    Dose: 1,250 mg   Freq: EVERY 12 HOURS Route: IV         oxyCODONE-acetaminophen (PERCOCET) 5-325 MG per tablet 2 tablet    Dose: 2 tablet   Freq: EVERY 4 HOURS PRN Route: PO x 4      polyethylene glycol (GLYCOLAX) packet 17 g    Dose: 17 g   Freq: DAILY PRN Route: PO x 1       Cellulitis - Care Day 3 (10/22/2021) by Jamilah Jacobo RN       Review Status Review Entered   Completed 10/25/2021 09:27      Criteria Review      Care Day: 3 Care Date: 10/22/2021 Level of Care: Telemetry    Guideline Day 2    Level Of Care    (X) Floor    Clinical Status    (X) * Dehydration absent    (X) * Mental status at baseline    (X) * Hemodynamic stability    10/25/2021 9:27 AM EDT by Isai King      119/65 92 18 98.2 96% room air    (X) * Afebrile or fever improved    Activity    (X) Activity as tolerated    Routes    (X) * Oral hydration    10/25/2021 9:27 AM EDT by Isai King      ADULT DIET;  Regular; 5 carb choices (75 gm/meal) [[id number]]    ( ) * Oral medications    10/25/2021 9: 27 AM EDT by Reinaldo John      see med list    levoFLOXacin (LEVAQUIN) 500 MG/100ML infusion 500 mg   Dose: 500 mg  Freq: EVERY 24 HOURS Route: IV  vancomycin (VANCOCIN) 1250 mg in 250 mL IVPB   Dose: 1,250 mg  Freq: EVERY 12 HOURS Route: IV    (X) Diet as tolerated    10/25/2021 9:27 AM EDT by Reinaldo John      ADULT DIET;  Regular; 5 carb choices (75 gm/meal) [[id number]]    Interventions    (X) Elevation of affected area, if possible    Medications    (X) IV antibiotics    10/25/2021 9:27 AM EDT by Reinaldo John      levoFLOXacin (LEVAQUIN) 500 MG/100ML infusion 500 mg   Dose: 500 mg  Freq: EVERY 24 HOURS Route: IV  vancomycin (VANCOCIN) 1250 mg in 250 mL IVPB   Dose: 1,250 mg  Freq: EVERY 12 HOURS Route: IV    * Milestone   Additional Notes   10/22/21      114/84 100 18 98.6 98% room air      IM Note:          SUBJECTIVE:    Day 2 of stay with pain and swelling of the left foot as a result of a barbed fishhook embedded in the skin, and most recent (in last 24 hours) has had removal of the fishhook and currently being managed with empiric broad-spectrum IV antibiotic.  Patient also has multiple amputations of the fingers, with infections involving the right index finger and the left middle finger.  Patient indicated he has been getting all these injuries from his fishing.  Patient is scheduled to be followed by orthopedic surgery consult.  Noted CT scan of the extremities to rule out osteomyelitis      General appearance - oriented to person, place, and time, acyanotic, in no respiratory distress and well hydrated   HEENT: Normocephalic, Atraumatic, Conjuctiva pink, PERRL, Oral mucosa normal, Lips, teeth and gums normal, Trachea midline, Thyroid normal and No noted lymphadenopathy   Chest - clear to auscultation, no wheezes, rales or rhonchi, symmetric air entry   Cardiovascular - normal rate, regular rhythm, normal S1, S2, no murmurs, rubs, clicks or gallops   Abdomen - soft, nontender, nondistended, no masses or organomegaly    Neurological - Alert and oriented, Normal speech, No focal findings or movement disorder noted and Motor and sensory grossly normal bilaterally   Integumentary - Skin color, texture, turgor normal. No Rashes or lesions   Musculoskeletal -Multiple amputations of multiple fingers, with infection on a couple of his fingertips, Full ROM times 4 extremities, No clubbing or cyanosis and No peripheral edema                                                             ASSESSMENT / PLAN :       Principal Problem:     Foreign body in foot, left, infected, initial encounter   Dread was removed from his left foot by general surgery and input noted and appreciated.  Optimize pain control, maintain on empiric IV antibiotics overnight for possible discharge with p.o. antibiotics tomorrow.       Active Problems:     Finger infection right index finger   Patient indicated he has been following up with a finger specialist in [place] and would want to follow-up with them upon discharge.  Scheduled for CT scanning to rule out osteomyelitis.       Uncontrolled diabetes mellitus with complications ([address]   Home medication regimen as resumed, utilize SSI as needed to optimize blood sugar control, maintain an ADA diet.         Obesity, Class III, BMI 40-49.9 (morbid obesity) (HCC)   Weight loss through dietary caloric restrictions and exercise as tolerated advised.       Resolved Problems:     * No resolved hospital problems. *       Management as outlined above, patient will be discharged home with follow-up with orthopedic surgery with broad-spectrum p.o. antibiotic if CT scan is negative for osteomyelitis.       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)      Skin ulcer of toe of left foot with fat layer exposed ([facility]      Chemical dependency (HCC)      Chronic pain syndrome      Drug abuse ([facility]      Sepsis without acute organ dysfunction (HCC)      Foot pain      WD-S/P amputation of lesser toe, right (HCC)      Acute osteomyelitis involving hand ([facility]      Diabetic infection of left foot ([facility]      Left foot pain      Foreign body in foot, left, infected, initial encounter           Plan:       Foot looks much improved since the fish hook was removed. Continue local wound care to L foot.        I am worried about the B hands (L>R). XR reviewed, but will get CT of the bilateral hands to r/o any drainable fluid collection or bony involvement. CM:      Reviewed chart and spoke with pt about discharge needs/plans. Pt lives alone, PTA he was totally indpendent, he has a PCP and insurance. [facility] order for wound Care, discussed with pt and he has had [facility] in past and would like them again.  PS to [facility]. He is also concerned with incr redness, swelling and pain to LLE and LUE.  PS to Dr Melita Calles.        Results for Kary Black (MRN [id number]) as of 10/25/2021 09:25      10/22/2021 05:50   Sodium: 134 (L)   Potassium: 4.2   Chloride: 100   CO2: 22   BUN: 9   Creatinine: 0.7 (L)   Anion Gap: 12   GFR Non-: >60   GFR African American: >60   Glucose: 242 (H)   Calcium: 8.8   Phosphorus: 3.6   Albumin: 3.5      10/22/2021 08:01   POC Glucose: 266 (H)      10/22/2021 11:08   POC Glucose: 246 (H)      10/22/2021 16:15   CT HAND LEFT W CONTRAST: Rpt   Impression   CT left hand:       1. Subcutaneous edema present which is most pronounced within the 2nd digit   with organized fluid collection at the distal tip of the left 2nd digit which   measures approximately 1.4 x 1.5 x 1.1 cm.  While sterility of fluid is   indeterminate on imaging, findings are suspicious for abscess.  No   subcutaneous gas. 2. No definitive CT evidence of osteomyelitis.  MRI could be obtained for   further evaluation as clinically indicated. 3. Remote fracture involving the left hamate and remote chip fracture of the   ulnar aspect of the capitate.  No bridging callus identified.  No acute   fractures are evident. CT right hand:       1. Soft tissue edema and ulceration of the distal tip of the 2nd digit of the   right hand with associated skin thickening compatible with cellulitis.  No   organized drainable collection or subcutaneous gas identified. 2. Skin thickening and edema of the distal 3rd digit of the right hand also   most suggestive of cellulitis. 3. Chronic deformities of the distal phil of the 2nd and 3rd digits with no   definitive superimposed acute osteomyelitis within limits of CT.  MRI could   be obtained for further evaluation as clinically indicated.             10/22/2021 16:17   CT HAND RIGHT W CONTRAST: Rpt   CT RIGHT HAND:       Bones: No acute fracture identified.  Chronic partial amputation/deformities   of the distal phil of the 2nd and 3rd digits.  No definitive CT evidence for   osteomyelitis at this time.       Soft Tissue: Soft tissue wound/ulceration at the distal tip of the 2nd digit   with associated skin thickening and subcu edema.  No organized fluid   collection or subcutaneous gas identified.  Correlate for cellulitis. Similar subcutaneous edema/soft tissue thickening along the 3rd digits   suggesting cellulitis.  No organized collection identified at the 3rd digit.       Joint: Anatomic alignment with no dislocation identified.  Mild-to-moderate   osteoarthritis of the 1st carpometacarpal joint.          10/22/2021 16:58   POC Glucose: 218 (H)      10/22/2021 21:20   POC Glucose: 197 (H)         escitalopram (LEXAPRO) tablet 10 mg    Dose: 10 mg   Freq: DAILY Route: PO      insulin lispro (HUMALOG) injection vial 0-3 Units    Dose: 0-3 Units   Freq: NIGHTLY Route: SC      insulin lispro (HUMALOG) injection vial 0-6 Units    Dose: 0-6 Units   Freq: 3 TIMES DAILY WITH MEALS Route: SC      levoFLOXacin (LEVAQUIN) 500 MG/100ML infusion 500 mg    Dose: 500 mg   Freq: EVERY 24 HOURS Route: IV      pantoprazole (PROTONIX) tablet 40 mg    Dose: 40 mg   Freq: 2 TIMES DAILY Route: PO      tamsulosin (FLOMAX) capsule 0.4 mg    Dose: 0.4 mg   Freq: DAILY Route: PO      vancomycin (VANCOCIN) 1250 mg in 250 mL IVPB    Dose: 1,250 mg   Freq: EVERY 12 HOURS Route: IV      0.9 % sodium chloride infusion    Dose: 25 mL   Freq: PRN Route: IV      morphine injection 2 mg    Dose: 2 mg   Freq: EVERY 4 HOURS PRN Route: IV x 1          ondansetron (ZOFRAN-ODT) disintegrating tablet 4 mg    Dose: 4 mg   Freq: EVERY 8 HOURS PRN Route: PO x 1       oxyCODONE-acetaminophen (PERCOCET) 5-325 MG per tablet 2 tablet    Dose: 2 tablet   Freq: EVERY 6 HOURS PRN Route: PO x 3       10/24 PA REC IP by Marcello Medina RN       Review Status Review Entered   In Primary 10/25/2021 09:22      Criteria Review            Summary: slr keep in         Note type: Physician Advisor Note level: Hospital Account       Note text: slr keep in       We recommend that the following pt's current hospitalization under INPATIENT status is APPROPRIATE .                        Name: Layne Dobson        : 1965        CSN: [id number]        INSURANCE: [facility] Medicare                               Clinical summary 63year old with diabetes.       Fish hook injury with hand and feet cellulitis       S/p fish hook removal       Continued to be on IV AB       CT hand pending        Surgery concerned about hand cellulitis               Has failed OP management        Vitals Stable        Labs and Imaging Na 131       MCG criteria applies yes,        Comments IP Jeff                       This chart was reviewed at 10:10 AM 10/24/2021               Felipe Holden MD        Physician Advisor

## 2021-10-25 NOTE — PROCEDURES
Incision and Drainage of Procedure Note    Patient ID:  Cristofer Hernández  [id number]  25 y.o.  1965    Indications: Abscess of left 2nd finger    Pre-operative Diagnosis: Abscess of left 2nd finger    Post-operative Diagnosis: same    Procedure: Incision and Drainage of left 2nd finger to the bone    Surgeon: Nazanin Garzon PA-C    Findings:  Abscess cavity with no fluid collection. Small tract to the skin surface - likely drained spontaneously    Estimated Blood Loss:  Minimal          Complications:  None; patient tolerated the procedure well. Condition: stable      Procedure Details: The patient was positioned appropriately and the skin over the left 2nd finger was prepped with betadine and draped in a sterile fashion. Local anesthesia was obtained with a full digital block of the left index finger using 3.0 cc of 1% Lidocaine without epinephrine. An incision was then made over the distal finger stump and no material was expressed. Loculations were not present. Wound was cultured with a swab and sent to lab. The drainage cavity was then packed with 1/4 inch iodoform gauze and covered with sterile dressing. Wound care instructions given. The patient tolerated the procedure well.     Complications: None    Nazanin Garzon PA-C

## 2021-10-25 NOTE — CONSULTS
Consult completed. Procedure/rationale explained to pt & consent obtained. #20ga Arrow Endurance Extended Dwell MidLine Catheter initiated to RUE Basilic Vein using sterile, UltraSound-guided technique without difficulty/complications. Positioning verified via UltraSound visualization of catheter within vessel lumen; site returns blood briskly and flushes without resistance/abnormalities. Sterile dressing with SkinPrep, StatLock Securing Device, BioPatch, SwabCap, and Limb Precautions band applied. Pt tolerated well & no other c/o or needs noted or reported.

## 2021-10-25 NOTE — PROGRESS NOTES
Progress Note  Date:10/25/2021       CVTV:[id number]  Patient Name:Toi Mendez     YOB: 1965     Age:55 y.o. Has some pain fingers  Subjective    Subjective:  Symptoms:  Stable. Diet:  Adequate intake. Pain:  He complains of pain that is mild. Review of Systems  Objective         Vitals Last 24 Hours:  TEMPERATURE:  Temp  Av.1 °F (36.7 °C)  Min: 97.9 °F (36.6 °C)  Max: 98.4 °F (36.9 °C)  RESPIRATIONS RANGE: Resp  Av  Min: 16  Max: 16  PULSE OXIMETRY RANGE: SpO2  Av.3 %  Min: 98 %  Max: 99 %  PULSE RANGE: Pulse  Av.3  Min: 89  Max: 97  BLOOD PRESSURE RANGE: Systolic (48JVG), BVZ:661 , Min:112 , EFA:377   ; Diastolic (56QXM), ALU:04, Min:62, Max:98    I/O (24Hr): No intake or output data in the 24 hours ending 10/25/21 0839  Objective:  General Appearance:  Comfortable. Vital signs: (most recent): Blood pressure (!) 127/98, pulse 97, temperature 98.4 °F (36.9 °C), temperature source Oral, resp. rate 16, height 5' 11.5\" (1.816 m), weight 245 lb 3.2 oz (111.2 kg), SpO2 98 %. Vital signs are normal.    HEENT: Normal HEENT exam.    Lungs:  Normal effort. Heart: Normal rate. Abdomen: Abdomen is soft. Bowel sounds are normal.     Extremities: Decreased range of motion. Pupils:  Pupils are equal, round, and reactive to light. Skin:  Warm. (Multiple amputation of digits of fingers and also toes. . Erythema left shin)    Labs/Imaging/Diagnostics    Labs:  CBC:No results for input(s): WBC, RBC, HGB, HCT, MCV, RDW, PLT in the last 72 hours. CHEMISTRIES:  Recent Labs     10/23/21  0543 10/24/21  0731 10/25/21  0616   * 131* 134*   K 4.4 4.5 4.5   CL 98* 97* 98*   CO2 22 21 23   BUN 11 15 18   CREATININE 0.7* 0.5* 0.8*   GLUCOSE 229* 235* 179*   PHOS 3.1 3.5 3.9     PT/INR:No results for input(s): PROTIME, INR in the last 72 hours. APTT:No results for input(s): APTT in the last 72 hours.   LIVER PROFILE:  No results for input(s): AST, ALT, BILIDIR, and 4th finger. Redemonstration of partial amputation of the distal tuft of the 1st and 2nd digits and amputation of the 3rd digit at the level of the PIP joint. No definitive CT evidence for acute osteomyelitis. No suspicious lytic or sclerotic osseous lesions are identified. Benign cystic change of the 3rd metacarpal head. Soft Tissue: Subcutaneous tissues demonstrate mild edema which is more pronounced within the 2nd digit with organized fluid collection identified at the distal tip of the 2nd digit measuring approximately 1.4 x 1.5 x 1.1 cm (images 28 through 31 series 306; image 45 series 317). No subcutaneous gas identified. Joint: Mild osteoarthritic changes of the 1st metacarpophalangeal joint, 1st carpometacarpal joint, and triscaphe joint. CT RIGHT HAND: Bones: No acute fracture identified. Chronic partial amputation/deformities of the distal phil of the 2nd and 3rd digits. No definitive CT evidence for osteomyelitis at this time. Soft Tissue: Soft tissue wound/ulceration at the distal tip of the 2nd digit with associated skin thickening and subcu edema. No organized fluid collection or subcutaneous gas identified. Correlate for cellulitis. Similar subcutaneous edema/soft tissue thickening along the 3rd digits suggesting cellulitis. No organized collection identified at the 3rd digit. Joint: Anatomic alignment with no dislocation identified. Mild-to-moderate osteoarthritis of the 1st carpometacarpal joint. CT left hand: 1. Subcutaneous edema present which is most pronounced within the 2nd digit with organized fluid collection at the distal tip of the left 2nd digit which measures approximately 1.4 x 1.5 x 1.1 cm. While sterility of fluid is indeterminate on imaging, findings are suspicious for abscess. No subcutaneous gas. 2. No definitive CT evidence of osteomyelitis. MRI could be obtained for further evaluation as clinically indicated.  3. Remote fracture involving the left hamate and remote chip fracture of the ulnar aspect of the capitate. No bridging callus identified. No acute fractures are evident. CT right hand: 1. Soft tissue edema and ulceration of the distal tip of the 2nd digit of the right hand with associated skin thickening compatible with cellulitis. No organized drainable collection or subcutaneous gas identified. 2. Skin thickening and edema of the distal 3rd digit of the right hand also most suggestive of cellulitis. 3. Chronic deformities of the distal phil of the 2nd and 3rd digits with no definitive superimposed acute osteomyelitis within limits of CT. MRI could be obtained for further evaluation as clinically indicated. CT HAND LEFT W CONTRAST    Result Date: 10/22/2021  EXAMINATION: CT OF THE LEFT HAND WITH CONTRAST; CT OF THE RIGHT HAND WITH CONTRAST 10/22/2021 4:06 pm; 10/22/2021 4:17 pm TECHNIQUE: CT of the left hand was performed with the administration of intravenous contrast.  Multiplanar reformatted images are provided for review. Dose modulation, iterative reconstruction, and/or weight based adjustment of the mA/kV was utilized to reduce the radiation dose to as low as reasonably achievable.; CT of the right hand was performed with the administration of intravenous contrast.  Multiplanar reformatted images are provided for review. Dose modulation, iterative reconstruction, and/or weight based adjustment of the mA/kV was utilized to reduce the radiation dose to as low as reasonably achievable.  COMPARISON: CT left hand February 6, 2019; right hand radiograph and left finger radiographs October 20, 2021 HISTORY ORDERING SYSTEM PROVIDED HISTORY: Hand cellulitis and wound TECHNOLOGIST PROVIDED HISTORY: Reason for exam:->Hand cellulitis and wound Reason for Exam: Hand cellulitis and wound Acuity: Acute Type of Exam: Initial Additional signs and symptoms: none Relevant Medical/Surgical History: 75ml isovue 370/ gfr>60; ORDERING SYSTEM PROVIDED HISTORY: Hand cellulitis and wound TECHNOLOGIST PROVIDED HISTORY: Reason for exam:->Hand cellulitis and wound Reason for Exam: Hand cellulitis and wound Acuity: Acute Type of Exam: Initial Additional signs and symptoms: none Relevant Medical/Surgical History: contrast used from other ct hand FINDINGS: CT LEFT HAND: Bones: Remote fracture involving the left hamate (images 40 series 321; image 83 series 308). Remote chip fracture along the ulnar aspect of the capitate (image 43 series 321; image 80 series 308). No bridging callus identified. No acute fractures are evident. Redemonstration of resection of the 4th metacarpal and 4th finger. Redemonstration of partial amputation of the distal tuft of the 1st and 2nd digits and amputation of the 3rd digit at the level of the PIP joint. No definitive CT evidence for acute osteomyelitis. No suspicious lytic or sclerotic osseous lesions are identified. Benign cystic change of the 3rd metacarpal head. Soft Tissue: Subcutaneous tissues demonstrate mild edema which is more pronounced within the 2nd digit with organized fluid collection identified at the distal tip of the 2nd digit measuring approximately 1.4 x 1.5 x 1.1 cm (images 28 through 31 series 306; image 45 series 317). No subcutaneous gas identified. Joint: Mild osteoarthritic changes of the 1st metacarpophalangeal joint, 1st carpometacarpal joint, and triscaphe joint. CT RIGHT HAND: Bones: No acute fracture identified. Chronic partial amputation/deformities of the distal phil of the 2nd and 3rd digits. No definitive CT evidence for osteomyelitis at this time. Soft Tissue: Soft tissue wound/ulceration at the distal tip of the 2nd digit with associated skin thickening and subcu edema. No organized fluid collection or subcutaneous gas identified. Correlate for cellulitis. Similar subcutaneous edema/soft tissue thickening along the 3rd digits suggesting cellulitis. No organized collection identified at the 3rd digit. Joint: Anatomic alignment with no dislocation identified. Mild-to-moderate osteoarthritis of the 1st carpometacarpal joint. CT left hand: 1. Subcutaneous edema present which is most pronounced within the 2nd digit with organized fluid collection at the distal tip of the left 2nd digit which measures approximately 1.4 x 1.5 x 1.1 cm. While sterility of fluid is indeterminate on imaging, findings are suspicious for abscess. No subcutaneous gas. 2. No definitive CT evidence of osteomyelitis. MRI could be obtained for further evaluation as clinically indicated. 3. Remote fracture involving the left hamate and remote chip fracture of the ulnar aspect of the capitate. No bridging callus identified. No acute fractures are evident. CT right hand: 1. Soft tissue edema and ulceration of the distal tip of the 2nd digit of the right hand with associated skin thickening compatible with cellulitis. No organized drainable collection or subcutaneous gas identified. 2. Skin thickening and edema of the distal 3rd digit of the right hand also most suggestive of cellulitis. 3. Chronic deformities of the distal phil of the 2nd and 3rd digits with no definitive superimposed acute osteomyelitis within limits of CT. MRI could be obtained for further evaluation as clinically indicated.      Assessment//Plan           Hospital Problems         Last Modified POA    * (Principal) Foreign body in foot, left, infected, initial encounter 10/21/2021 Yes    Finger infection right index finger 10/21/2021 Yes    Uncontrolled diabetes mellitus with complications ([address] 23/97/8209 Yes    Obesity, Class III, BMI 40-49.9 (morbid obesity) ([address] 10/21/2021 Yes    Left foot pain 10/20/2021 Yes        Assessment & Plan  Left foot diabetic infection  -xray with fish hook 3 and 4th toe and removed   -had fish hook   -bld cx neg 4 days  -IV abx with zoxyn levaquin and vanc  DM  -SSI   Obesity  -dietary education  Finger infection right index  -CT left hand  -abscess 2nd digit with no gas or osteo and 2nd digit right hand with cellultits and await surg recs  Hyponatremia  -mild and monitor    Discussed with surgery and they will try a bedside I&D of abscess of digit and then if stable can be discharged tomorrow.    Electronically signed by Romana Tena MD on 10/23/21 at 8:26 AM EDT

## 2021-10-25 NOTE — PROGRESS NOTES
[address] [facility]  consulted by JULITA Aguilar for monitoring and adjustment. Indication for treatment: SSTI  Goal trough: 10-15 mcg/mL    Pertinent Laboratory Values:   Temp Readings from Last 3 Encounters:   10/25/21 98.4 °F (36.9 °C) (Oral)   09/15/21 98.5 °F (36.9 °C) (Oral)   05/24/21 98.9 °F (37.2 °C) (Oral)     No results for input(s): WBC, LACTATE in the last 72 hours. Recent Labs     10/23/21  0543 10/24/21  0731 10/25/21  0616   BUN 11 15 18   CREATININE 0.7* 0.5* 0.8*     Estimated Creatinine Clearance: 132 mL/min (A) (based on SCr of 0.8 mg/dL (L)). No intake or output data in the 24 hours ending 10/25/21 1457    Pertinent Cultures:  Date    Source    Results  10/20   Blood    Sent    Vancomycin level:   TROUGH:  No results for input(s): VANCOTROUGH in the last 72 hours. RANDOM:    Recent Labs     10/24/21  0731   VANCORANDOM 11.3     Assessment:  · SCr, BUN, and urine output: trending up  · Day(s) of therapy: 6  · Vancomycin concentration: 10/21 - 8.4  · 10/24 - 11.3, 9 hour level on 1250mg q12h    Plan:  · Vanco level predicts a trough of 11.2, AUC <500 at steady state. · Continue vancomycin 1250mg q12h with a predicted therapeutic level  · Pharmacy will continue to monitor patient and adjust therapy as indicated    [address] 10/27 @0600    Thank you for the consult.   Edilson Helton, [facility]  10/25/2021 2:57 PM

## 2021-10-26 LAB
GLUCOSE BLD-MCNC: 112 MG/DL (ref 70–99)
GLUCOSE BLD-MCNC: 121 MG/DL (ref 70–99)
GLUCOSE BLD-MCNC: 191 MG/DL (ref 70–99)
GLUCOSE BLD-MCNC: 197 MG/DL (ref 70–99)
GLUCOSE BLD-MCNC: 206 MG/DL (ref 70–99)

## 2021-10-26 PROCEDURE — 6370000000 HC RX 637 (ALT 250 FOR IP): Performed by: CLINICAL NURSE SPECIALIST

## 2021-10-26 PROCEDURE — 76937 US GUIDE VASCULAR ACCESS: CPT

## 2021-10-26 PROCEDURE — 1200000000 HC SEMI PRIVATE

## 2021-10-26 PROCEDURE — 2580000003 HC RX 258: Performed by: CLINICAL NURSE SPECIALIST

## 2021-10-26 PROCEDURE — 6360000002 HC RX W HCPCS: Performed by: CLINICAL NURSE SPECIALIST

## 2021-10-26 PROCEDURE — 6370000000 HC RX 637 (ALT 250 FOR IP): Performed by: HOSPITALIST

## 2021-10-26 PROCEDURE — 94761 N-INVAS EAR/PLS OXIMETRY MLT: CPT

## 2021-10-26 PROCEDURE — 82962 GLUCOSE BLOOD TEST: CPT

## 2021-10-26 PROCEDURE — 2580000003 HC RX 258: Performed by: HOSPITALIST

## 2021-10-26 PROCEDURE — 6360000002 HC RX W HCPCS: Performed by: HOSPITALIST

## 2021-10-26 PROCEDURE — 80069 RENAL FUNCTION PANEL: CPT

## 2021-10-26 RX ADMIN — SODIUM CHLORIDE, PRESERVATIVE FREE 10 ML: 5 INJECTION INTRAVENOUS at 08:42

## 2021-10-26 RX ADMIN — PIPERACILLIN AND TAZOBACTAM 3375 MG: 3; .375 INJECTION, POWDER, LYOPHILIZED, FOR SOLUTION INTRAVENOUS at 21:46

## 2021-10-26 RX ADMIN — DOCUSATE SODIUM 100 MG: 100 CAPSULE ORAL at 08:40

## 2021-10-26 RX ADMIN — SODIUM CHLORIDE 25 ML: 9 INJECTION, SOLUTION INTRAVENOUS at 12:40

## 2021-10-26 RX ADMIN — POLYETHYLENE GLYCOL (3350) 17 G: 17 POWDER, FOR SOLUTION ORAL at 14:27

## 2021-10-26 RX ADMIN — OXYCODONE AND ACETAMINOPHEN 2 TABLET: 5; 325 TABLET ORAL at 19:37

## 2021-10-26 RX ADMIN — SODIUM CHLORIDE 25 ML: 9 INJECTION, SOLUTION INTRAVENOUS at 08:45

## 2021-10-26 RX ADMIN — SODIUM CHLORIDE 25 ML: 9 INJECTION, SOLUTION INTRAVENOUS at 21:45

## 2021-10-26 RX ADMIN — SODIUM CHLORIDE 25 ML: 9 INJECTION, SOLUTION INTRAVENOUS at 23:28

## 2021-10-26 RX ADMIN — OXYCODONE AND ACETAMINOPHEN 2 TABLET: 5; 325 TABLET ORAL at 14:27

## 2021-10-26 RX ADMIN — ESCITALOPRAM OXALATE 10 MG: 10 TABLET ORAL at 08:41

## 2021-10-26 RX ADMIN — PIPERACILLIN AND TAZOBACTAM 3375 MG: 3; .375 INJECTION, POWDER, LYOPHILIZED, FOR SOLUTION INTRAVENOUS at 12:54

## 2021-10-26 RX ADMIN — OXYCODONE AND ACETAMINOPHEN 2 TABLET: 5; 325 TABLET ORAL at 09:21

## 2021-10-26 RX ADMIN — PANTOPRAZOLE SODIUM 40 MG: 40 TABLET, DELAYED RELEASE ORAL at 21:43

## 2021-10-26 RX ADMIN — VANCOMYCIN 1250 MG: 1.75 INJECTION, SOLUTION INTRAVENOUS at 09:24

## 2021-10-26 RX ADMIN — METFORMIN HYDROCHLORIDE 500 MG: 500 TABLET ORAL at 16:32

## 2021-10-26 RX ADMIN — PIPERACILLIN AND TAZOBACTAM 3375 MG: 3; .375 INJECTION, POWDER, LYOPHILIZED, FOR SOLUTION INTRAVENOUS at 08:46

## 2021-10-26 RX ADMIN — BACITRACIN, NEOMYCIN, POLYMYXIN B: 400; 3.5; 5 OINTMENT TOPICAL at 16:35

## 2021-10-26 RX ADMIN — PANTOPRAZOLE SODIUM 40 MG: 40 TABLET, DELAYED RELEASE ORAL at 08:41

## 2021-10-26 RX ADMIN — PIPERACILLIN AND TAZOBACTAM 3375 MG: 3; .375 INJECTION, POWDER, LYOPHILIZED, FOR SOLUTION INTRAVENOUS at 01:16

## 2021-10-26 RX ADMIN — OXYCODONE AND ACETAMINOPHEN 2 TABLET: 5; 325 TABLET ORAL at 01:15

## 2021-10-26 RX ADMIN — TAMSULOSIN HYDROCHLORIDE 0.4 MG: 0.4 CAPSULE ORAL at 08:40

## 2021-10-26 RX ADMIN — VANCOMYCIN 1250 MG: 1.75 INJECTION, SOLUTION INTRAVENOUS at 23:29

## 2021-10-26 RX ADMIN — METFORMIN HYDROCHLORIDE 500 MG: 500 TABLET ORAL at 08:41

## 2021-10-26 ASSESSMENT — PAIN SCALES - GENERAL
PAINLEVEL_OUTOF10: 9

## 2021-10-26 NOTE — PROGRESS NOTES
callus identified. No acute   fractures are evident. CT right hand:      1. Soft tissue edema and ulceration of the distal tip of the 2nd digit of the   right hand with associated skin thickening compatible with cellulitis. No   organized drainable collection or subcutaneous gas identified. 2. Skin thickening and edema of the distal 3rd digit of the right hand also   most suggestive of cellulitis. 3. Chronic deformities of the distal phil of the 2nd and 3rd digits with no   definitive superimposed acute osteomyelitis within limits of CT. MRI could   be obtained for further evaluation as clinically indicated. CT HAND LEFT W CONTRAST   Final Result   CT left hand:      1. Subcutaneous edema present which is most pronounced within the 2nd digit   with organized fluid collection at the distal tip of the left 2nd digit which   measures approximately 1.4 x 1.5 x 1.1 cm. While sterility of fluid is   indeterminate on imaging, findings are suspicious for abscess. No   subcutaneous gas. 2. No definitive CT evidence of osteomyelitis. MRI could be obtained for   further evaluation as clinically indicated. 3. Remote fracture involving the left hamate and remote chip fracture of the   ulnar aspect of the capitate. No bridging callus identified. No acute   fractures are evident. CT right hand:      1. Soft tissue edema and ulceration of the distal tip of the 2nd digit of the   right hand with associated skin thickening compatible with cellulitis. No   organized drainable collection or subcutaneous gas identified. 2. Skin thickening and edema of the distal 3rd digit of the right hand also   most suggestive of cellulitis. 3. Chronic deformities of the distal phil of the 2nd and 3rd digits with no   definitive superimposed acute osteomyelitis within limits of CT. MRI could   be obtained for further evaluation as clinically indicated.          XR FOOT LEFT (MIN 3 VIEWS)   Final Result   Barbed fish hook in the soft tissues between the third and fourth toes. No radiographic evidence of osteomyelitis. XR FINGER LEFT (MIN 2 VIEWS)   Final Result   Index finger soft tissue swelling. No radiographic evidence of acute   osteomyelitis or soft tissue gas. XR HAND RIGHT (MIN 3 VIEWS)   Final Result   1. Soft tissue ulceration at the distal aspect of the 2nd finger. 2. No radiographic evidence of osteomyelitis or other acute osseous   abnormality. Scheduled Medicines   Medications:    metFORMIN  500 mg Oral BID WC    insulin lispro  10 Units SubCUTAneous TID WC    insulin lispro  0-12 Units SubCUTAneous TID WC    insulin lispro  0-6 Units SubCUTAneous 2 times per day    insulin glargine  25 Units SubCUTAneous Nightly    docusate sodium  100 mg Oral Daily    neomycin-bacitracin-polymyxin   Topical Daily    piperacillin-tazobactam  3,375 mg IntraVENous Q6H    sodium chloride flush  5-40 mL IntraVENous 2 times per day    escitalopram  10 mg Oral Daily    pantoprazole  40 mg Oral BID    tamsulosin  0.4 mg Oral Daily    levofloxacin  500 mg IntraVENous Q24H    vancomycin  1,250 mg IntraVENous Q12H      Infusions:    sodium chloride Stopped (10/25/21 1535)    dextrose           Objective:   Vitals: /81   Pulse 86   Temp 97.4 °F (36.3 °C) (Oral)   Resp 16   Ht 5' 11.5\" (1.816 m)   Wt 245 lb 3.2 oz (111.2 kg)   SpO2 98%   BMI 33.72 kg/m²   General appearance: alert and cooperative with exam  Neck: no JVD or bruit  Thyroid : Normal lobes   Lungs: Has Vesicular Breath sounds   Heart:  regular rate and rhythm  Abdomen: soft, non-tender; bowel sounds normal; no masses,  no organomegaly  Musculoskeletal: Normalhas  Edema of both hands and left leg. Both hands some of the fingers are partially amputated left hand fingers are infected  Extremities: extremities normal, , no edema//   Neurologic:  Awake, alert, oriented to name, place and time.   Cranial nerves II-XII are grossly intact. Motor is  intact. Sensory i neuropathy. ,  and gait is abnormal.    Assessment:     Patient Active Problem List:     Finger infection right index finger     Diabetic foot infection ([address]     Uncontrolled diabetes mellitus with complications ([address]     Diabetes with ulcer of foot ([address]     Ulcer of other part of foot     Diabetes mellitus with ulcer of heel ([address]     Obesity, Class III, BMI 40-49.9 (morbid obesity) ([address]     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)     Skin ulcer of toe of left foot with fat layer exposed ([address]     Chemical dependency ([address]     Chronic pain syndrome     Drug abuse ([address]     Sepsis without acute organ dysfunction (HCC)     Foot pain     WD-S/P amputation of lesser toe, right (HCC)     Acute osteomyelitis involving hand ([address]     Diabetic infection of left foot ([address]     Left foot pain     Foreign body in foot, left, infected, initial encounter      Plan:     1. Reviewed POC blood glucose . Labs and X ray results   2. Reviewed Current Medicines   3. On meal/ Correction bolus Humalog/ Basal Lantus Insulin regime /  4. Monitor Blood glucose frequently   5. Modified  the dose of Insulin/ other medicines as needed   6. Will follow     .      Joshua Chapman MD, MD

## 2021-10-26 NOTE — PROGRESS NOTES
Hospitalist Progress Note      Name:  Patrice Meza /Age/Sex: 1965  (63 y.o. male)   MRN & CSN:  [id number] & [id number] Admission Date/Time: 10/20/2021  3:51 PM   Location:  [address] [place] [address] PCP: Gale Barry Day: 7  Discharge barrier: Wound cultures    Assessment and Plan:   Patrice Meza is a 63 y.o.  male with a past medical history of COVID-19, fibromyalgia, class II obesity, anxiety with depression, type 2 diabetes with diabetic infection/osteomyelitis, who presented to the ED on 10/20/2021 with Foreign body in foot, left, infected, initial encounter    1. [place] impaction in left foot: S/p removal    2. Left leg cellulitis: Improving with antibiotics  On IV vancomycin and Zosyn and Levaquin  DC Levaquin. 3.  Left hand webspaces infection and abscess: S/p I&D by surgery  Continue with vancomycin and Zosyn  Await 48 hours of culture  Appreciate surgery team    4. Type 2 diabetes: Fair control, A1c 8.6  Continue basal bolus insulin. On Trulicity q. 7 weekly at home  Lantus 25 units nightly, moderate dose insulin correctional scale 10 units AC    5. Depression: Continue Lexapro  6. History of diabetic infection with osteomyelitis    >50% of encounter time spent in counseling/coordination of care  Diet ADULT DIET; Regular; 5 carb choices (75 gm/meal)   DVT Prophylaxis [x] Lovenox, []  Heparin, [] SCDs, [] Ambulation   GI Prophylaxis [x] PPI,  [] H2 Blocker,  [] Carafate,  [x] Diet/Tube Feeds   Code Status Full Code   Disposition Patient requires continued admission due to iv abx   MDM [] Low, [] Moderate,[x]  High  Patient's risk as above due to  Poorly controlled diabetes     History of Present Illness:     Chief Complaint: Foreign body in foot, left, infected, initial encounter     Patrice Meza is a 63 y.o.  male  who presents with multiple complaints including infectious complaint of left hand and left leg.   He feels significantly improved since he has been hospitalized 6 days ago. He understands that we will need to wait for cultures. Ten point ROS reviewed negative, unless as noted above    Objective: Intake/Output Summary (Last 24 hours) at 10/26/2021 1424  Last data filed at 10/25/2021 1747  Gross per 24 hour   Intake 4933.65 ml   Output --   Net 4933.65 ml        Vitals:   Vitals:    10/25/21 0737 10/25/21 1945 10/26/21 0158 10/26/21 0834   BP: (!) 127/98 116/75 109/81 130/81   Pulse: 97 97 86 72   Resp: 16 16 16 16   Temp: 98.4 °F (36.9 °C) 97.9 °F (36.6 °C) 97.4 °F (36.3 °C) 97.7 °F (36.5 °C)   TempSrc: Oral Oral Oral Oral   SpO2: 98% 98% 98% 99%   Weight:       Height:            Physical Exam:   GEN: Awake male, alert and oriented x3 in no apparent distress. Appears given age. HEENT: Normal   RESP: Clear lung fields bilaterally. Symmetric chest movement while on RA  CVS: RRR, S1, S2  GI/: Abdomen is soft, nontender, no organomegaly. . Bowel sounds normal, rectal exam deferred. No CVA tenderness. MSK: Bilateral feet deformities from previous surgery. S/p right fifth toe amputation. S/p left hand proximal phalanx amputation including left index, left middle finger, right index finger  SKIN: Normal coloration, warm, dry. NEURO: Cranial nerves appear grossly intact, normal speech, no lateralizing weakness.   PSYCH:  Affect appropriate    Medications:   Medications:    metFORMIN  500 mg Oral BID WC    insulin lispro  10 Units SubCUTAneous TID WC    insulin lispro  0-12 Units SubCUTAneous TID WC    insulin lispro  0-6 Units SubCUTAneous 2 times per day    insulin glargine  25 Units SubCUTAneous Nightly    docusate sodium  100 mg Oral Daily    neomycin-bacitracin-polymyxin   Topical Daily    piperacillin-tazobactam  3,375 mg IntraVENous Q6H    sodium chloride flush  5-40 mL IntraVENous 2 times per day    escitalopram  10 mg Oral Daily    pantoprazole  40 mg Oral BID    tamsulosin  0.4 mg Oral Daily    levofloxacin  500 mg IntraVENous Q24H    vancomycin  1,250 mg IntraVENous Q12H      Infusions:    sodium chloride 25 mL (10/26/21 1240)    dextrose       PRN Meds: bisacodyl, 5 mg, Daily PRN  oxyCODONE-acetaminophen, 2 tablet, Q4H PRN  sodium chloride flush, 5-40 mL, PRN  sodium chloride, 25 mL, PRN  ondansetron, 4 mg, Q8H PRN   Or  ondansetron, 4 mg, Q6H PRN  polyethylene glycol, 17 g, Daily PRN  acetaminophen, 650 mg, Q6H PRN  glucose, 15 g, PRN  dextrose, 12.5 g, PRN  glucagon (rDNA), 1 mg, PRN  dextrose, 100 mL/hr, PRN          Electronically signed by Mae Perla MD on 10/26/2021 at 2:24 PM

## 2021-10-26 NOTE — PROGRESS NOTES
Myself and MEY Jamil, performed a head to toe skin assessment. Dressings bilateral index fingers, bilateral legs red, heeling wounds of bilateral feet.

## 2021-10-26 NOTE — PROGRESS NOTES
[facility]  consulted by JULITA Smalls for monitoring and adjustment. Indication for treatment: SSTI  Goal trough: 10-15 mcg/mL    Pertinent Laboratory Values:   Temp Readings from Last 3 Encounters:   10/26/21 97.3 °F (36.3 °C) (Oral)   09/15/21 98.5 °F (36.9 °C) (Oral)   05/24/21 98.9 °F (37.2 °C) (Oral)     No results for input(s): WBC, LACTATE in the last 72 hours. Recent Labs     10/24/21  0731 10/25/21  0616   BUN 15 18   CREATININE 0.5* 0.8*     Estimated Creatinine Clearance: 132 mL/min (A) (based on SCr of 0.8 mg/dL (L)). Intake/Output Summary (Last 24 hours) at 10/26/2021 1540  Last data filed at 10/25/2021 1747  Gross per 24 hour   Intake 4933.65 ml   Output --   Net 4933.65 ml     Pertinent Cultures:  Date    Source    Results  10/20   Blood    Sent    Vancomycin level:   TROUGH:  No results for input(s): VANCOTROUGH in the last 72 hours. RANDOM:    Recent Labs     10/24/21  0731   VANCORANDOM 11.3     Assessment:  · SCr, BUN, and urine output: trending up  · Day(s) of therapy: 7  · Vancomycin concentration: 10/21 - 8.4  · 10/24 - 11.3, 9 hour level on 1250mg q12h    Plan:  · Vanco level predicts a trough of 11.2, AUC <500 at steady state. · Continue vancomycin 1250mg q12h with a predicted therapeutic level  · Pharmacy will continue to monitor patient and adjust therapy as indicated    [address] 3 10/27 @0600    Thank you for the consult.   Javad Stock, [facility]  10/26/2021 3:40 PM

## 2021-10-27 VITALS
RESPIRATION RATE: 16 BRPM | HEART RATE: 82 BPM | OXYGEN SATURATION: 99 % | TEMPERATURE: 97.5 F | SYSTOLIC BLOOD PRESSURE: 112 MMHG | HEIGHT: 71 IN | DIASTOLIC BLOOD PRESSURE: 74 MMHG | WEIGHT: 245.2 LBS | BODY MASS INDEX: 34.33 KG/M2

## 2021-10-27 LAB
DOSE AMOUNT: NORMAL
DOSE TIME: NORMAL
GLUCOSE BLD-MCNC: 112 MG/DL (ref 70–99)
GLUCOSE BLD-MCNC: 132 MG/DL (ref 70–99)
GLUCOSE BLD-MCNC: 179 MG/DL (ref 70–99)
GLUCOSE BLD-MCNC: 179 MG/DL (ref 70–99)
VANCOMYCIN RANDOM: 17.5 UG/ML

## 2021-10-27 PROCEDURE — 94761 N-INVAS EAR/PLS OXIMETRY MLT: CPT

## 2021-10-27 PROCEDURE — 2580000003 HC RX 258: Performed by: CLINICAL NURSE SPECIALIST

## 2021-10-27 PROCEDURE — 2580000003 HC RX 258: Performed by: HOSPITALIST

## 2021-10-27 PROCEDURE — 6370000000 HC RX 637 (ALT 250 FOR IP): Performed by: CLINICAL NURSE SPECIALIST

## 2021-10-27 PROCEDURE — 6360000002 HC RX W HCPCS: Performed by: HOSPITALIST

## 2021-10-27 PROCEDURE — 82962 GLUCOSE BLOOD TEST: CPT

## 2021-10-27 PROCEDURE — 36415 COLL VENOUS BLD VENIPUNCTURE: CPT

## 2021-10-27 PROCEDURE — 6360000002 HC RX W HCPCS: Performed by: CLINICAL NURSE SPECIALIST

## 2021-10-27 PROCEDURE — 99223 1ST HOSP IP/OBS HIGH 75: CPT | Performed by: SURGERY

## 2021-10-27 PROCEDURE — 6370000000 HC RX 637 (ALT 250 FOR IP): Performed by: HOSPITALIST

## 2021-10-27 PROCEDURE — 80202 ASSAY OF VANCOMYCIN: CPT

## 2021-10-27 RX ORDER — OXYCODONE HYDROCHLORIDE AND ACETAMINOPHEN 5; 325 MG/1; MG/1
2 TABLET ORAL EVERY 4 HOURS PRN
Qty: 12 TABLET | Refills: 0 | Status: SHIPPED | OUTPATIENT
Start: 2021-10-27 | End: 2021-10-30

## 2021-10-27 RX ADMIN — SODIUM CHLORIDE 25 ML: 9 INJECTION, SOLUTION INTRAVENOUS at 05:36

## 2021-10-27 RX ADMIN — SODIUM CHLORIDE 25 ML: 9 INJECTION, SOLUTION INTRAVENOUS at 09:01

## 2021-10-27 RX ADMIN — PIPERACILLIN AND TAZOBACTAM 3375 MG: 3; .375 INJECTION, POWDER, LYOPHILIZED, FOR SOLUTION INTRAVENOUS at 10:39

## 2021-10-27 RX ADMIN — OXYCODONE AND ACETAMINOPHEN 2 TABLET: 5; 325 TABLET ORAL at 14:37

## 2021-10-27 RX ADMIN — VANCOMYCIN 1250 MG: 1.75 INJECTION, SOLUTION INTRAVENOUS at 09:02

## 2021-10-27 RX ADMIN — TAMSULOSIN HYDROCHLORIDE 0.4 MG: 0.4 CAPSULE ORAL at 08:52

## 2021-10-27 RX ADMIN — PIPERACILLIN AND TAZOBACTAM 3375 MG: 3; .375 INJECTION, POWDER, LYOPHILIZED, FOR SOLUTION INTRAVENOUS at 05:36

## 2021-10-27 RX ADMIN — METFORMIN HYDROCHLORIDE 500 MG: 500 TABLET ORAL at 16:50

## 2021-10-27 RX ADMIN — DOCUSATE SODIUM 100 MG: 100 CAPSULE ORAL at 08:52

## 2021-10-27 RX ADMIN — ESCITALOPRAM OXALATE 10 MG: 10 TABLET ORAL at 08:52

## 2021-10-27 RX ADMIN — OXYCODONE AND ACETAMINOPHEN 2 TABLET: 5; 325 TABLET ORAL at 05:34

## 2021-10-27 RX ADMIN — METFORMIN HYDROCHLORIDE 500 MG: 500 TABLET ORAL at 08:52

## 2021-10-27 RX ADMIN — PANTOPRAZOLE SODIUM 40 MG: 40 TABLET, DELAYED RELEASE ORAL at 08:52

## 2021-10-27 RX ADMIN — OXYCODONE AND ACETAMINOPHEN 2 TABLET: 5; 325 TABLET ORAL at 10:36

## 2021-10-27 ASSESSMENT — PAIN SCALES - GENERAL
PAINLEVEL_OUTOF10: 9
PAINLEVEL_OUTOF10: 9
PAINLEVEL_OUTOF10: 10
PAINLEVEL_OUTOF10: 9

## 2021-10-27 NOTE — PROGRESS NOTES
Comprehensive Nutrition Assessment    Type and Reason for Visit:  Initial (length of stay)    Nutrition Recommendations/Plan:   Continue current carb controlled diet   Encourage consistent meal intake  Will continue to follow up during stay     Nutrition Assessment:  Admit with infected wounds, fish hook in foot. Currently on carb controlled diet, 75 g/meal. Reports good appetite meal intake usually 100%. Noted hx DM. Patient aware of diet and CHO control, no diet ed wanted on visit. Provided copy of menu for assist with ordering meals. Will follow at moderate nutrition risk at this time. Malnutrition Assessment:  Malnutrition Status:  No malnutrition    Context:  Acute Illness       Estimated Daily Nutrient Needs:  Energy (kcal):  4879-6860; Weight Used for Energy Requirements:  Current     Protein (g):  79-95 (1-1.2 g/kg); Weight Used for Protein Requirements:  Ideal        Fluid (ml/day):  2000; Method Used for Fluid Requirements:  1 ml/kcal      Nutrition Related Findings:  sitting up at edge of bed, partial finger amputations, hx DM with recent HbA1c 8.6%      Wounds:  Surgical Incision       Current Nutrition Therapies:    ADULT DIET; Regular; 5 carb choices (75 gm/meal)    Anthropometric Measures:  · Height: 5' 11.5\" (181.6 cm)  · Current Body Weight: 245 lb 2.4 oz (111.2 kg)    · Usual Body Weight:  (varying weights this year-212 -286# per hx, some stated)     · Ideal Body Weight: 175 lbs; % Ideal Body Weight 140.1 %   · BMI: 33.7  · Adjusted Body Weight:  ; No Adjustment    · BMI Categories: Obese Class 1 (BMI 30.0-34. 9)       Nutrition Diagnosis:   · Altered nutrition-related lab values related to endocrine dysfuntion as evidenced by lab values      Nutrition Interventions:   Food and/or Nutrient Delivery:  Continue Current Diet  Nutrition Education/Counseling:  Education initiated   Coordination of Nutrition Care:  Continue to monitor while inpatient    Goals:  Patient will comply with carb controlled diet, meal intake at least 75% at meals during stay       Nutrition Monitoring and Evaluation:   Behavioral-Environmental Outcomes:  None Identified   Food/Nutrient Intake Outcomes:  Food and Nutrient Intake  Physical Signs/Symptoms Outcomes:  Biochemical Data, Meal Time Behavior, Skin, Weight     Discharge Planning:    Continue current diet, Recommend pursue outpatient diabetes education     Electronically signed by Tutu Guevara RD, LD on 10/27/21 at 11:47 AM EDT    Contact: [phone]

## 2021-10-27 NOTE — CARE COORDINATION
Spoke with Dr Yonas Cifuentes and plan is home without iv atb but does need HC for wound care. I reached out to Dennis Settler and she states following is wound care needs, daily packing to the L index finger with 1/4 iodoform. Ok to shower. Remove old packing after a shower. Cleanse wound cavity with NS, then repack. Cover with gauze and 1 coban  PS to Jolynn VIDAL and pt agreeable with plan.

## 2021-10-27 NOTE — PROGRESS NOTES
[facility] Liaison aware of discharge & will initiate [facility] AT [facility].

## 2021-10-27 NOTE — CONSULTS
Consult completed. Nexiva 20g 1.75 inch catheter inserted via ultrasound in patient's LFA. Brisk blood return noted and catheter flushes with ease. Extended dwell will not flush after several trouble shooting attempts. Extended dwell DC'd, catheter intact but bend on 2 spots. No bleeding noted at site. Left open to air. Patient tolerated well. Consult IV/PICC team if patient's needs change.

## 2021-10-27 NOTE — PLAN OF CARE
Nutrition Problem #1: Altered nutrition-related lab values  Intervention: Food and/or Nutrient Delivery: Continue Current Diet  Nutritional Goals: Patient will comply with carb controlled diet, meal intake at least 75% at meals during stay

## 2021-10-27 NOTE — DISCHARGE SUMMARY
Discharge Summary    Name:  Daniel Chen /Age/Sex: 1965  (63 y.o. male)   MRN & CSN:  [id number] & [id number] Admission Date/Time: 10/20/2021  3:51 PM   Attending:  Vee Coles MD Discharging Physician: Vee Coles MD     Hospital Course:   Daniel Chen is a 63 y.o.  male with a past medical history of COVID-19, fibromyalgia, class II obesity, anxiety with depression, type 2 diabetes with diabetic infection/osteomyelitis, who presented to the ED on 10/20/2021 with Foreign body in foot, left, infected,  Foreign body was removed by surgical team.  Patient had IV antibiotics for cellulitis involving the left leg. In addition, patient also had left hand webspaces infection and abscess that was I&D by general surgery team.  There was no growth from wound cultures. Patient has been deescalated to ciprofloxacin and doxycycline at discharge. He will follow closely with general surgery for wound care  The patient/family expressed appropriate understanding of and agreement with the discharge recommendations, medications, and plan.      Consults this admission:  IP CONSULT TO PODIATRY  IP CONSULT TO ORTHOPEDIC SURGERY  IP CONSULT TO GENERAL SURGERY  IP CONSULT TO HOSPITALIST  IP CONSULT TO PHARMACY  IP CONSULT TO IV TEAM  IP CONSULT TO HOME CARE NEEDS  IP CONSULT TO ENDOCRINOLOGY  IP CONSULT TO IV TEAM  IP CONSULT TO IV TEAM    Discharge Instruction:   Follow up appointments: gen surgery  Primary care physician:  within 2 weeks    Diet:  regular diet   Activity: activity as tolerated  Disposition: Discharged to:   []Home, [x]HHC, []SNF, []Acute Rehab, []Hospice   Condition on discharge: Stable    Discharge Medications:      Edson Ochoa   Home Medication Instructions QDR:[id number]    Printed on:10/27/21 1153   Medication Information                      acetaminophen (TYLENOL) 500 MG tablet  Take 2 tablets by mouth 3 times daily for 10 days             ciprofloxacin (CIPRO) 500 MG tablet  Take 1 tablet by mouth 2 times daily for 10 days             dicyclomine (BENTYL) 10 MG capsule  Take 2 capsules by mouth 4 times daily as needed (abdominal pain)             doxycycline hyclate (VIBRA-TABS) 100 MG tablet  Take 1 tablet by mouth 2 times daily for 10 days             Dulaglutide (TRULICITY SC)  Inject 1.5 mLs into the skin every 7 days              escitalopram (LEXAPRO) 10 MG tablet  Take 10 mg by mouth daily             ondansetron (ZOFRAN ODT) 4 MG disintegrating tablet  Take 1 tablet by mouth every 6 hours             ondansetron (ZOFRAN) 4 MG tablet  Take 4 mg by mouth every 8 hours as needed for Nausea or Vomiting             oxyCODONE (ROXICODONE) 5 MG immediate release tablet  Take 10 mg by mouth every 4 hours. oxyCODONE-acetaminophen (PERCOCET) 5-325 MG per tablet  Take 2 tablets by mouth every 4 hours as needed for Pain for up to 3 days. pantoprazole (PROTONIX) 40 MG tablet  Take 1 tablet by mouth 2 times daily for 14 days             Probiotic Acidophilus (FLORANEX) TABS  Take 1 tablet by mouth 2 times daily             tamsulosin (FLOMAX) 0.4 MG capsule  Take 1 capsule by mouth daily for 7 doses                 Objective Findings at Discharge:   /85   Pulse 80   Temp 97.5 °F (36.4 °C) (Oral)   Resp 18   Ht 5' 11.5\" (1.816 m)   Wt 245 lb 3.2 oz (111.2 kg)   SpO2 96%   BMI 33.73 kg/m²            PHYSICAL EXAM  GEN:   Awake male, alert and oriented x3 in no apparent distress. Appears given age. HEENT: Normal   RESP: Clear lung fields bilaterally. Symmetric chest movement while on RA  CVS: RRR, S1, S2  GI/: Abdomen is soft, nontender, no organomegaly. . Bowel sounds normal, rectal exam deferred. No CVA tenderness. MSK:   Bilateral feet deformities from previous surgery. S/p right fifth toe amputation.   S/p left hand proximal phalanx amputation including left index, left middle finger, right index finger  SKIN:   Normal coloration, warm, dry.  NEURO: Cranial nerves appear grossly intact, normal speech, no lateralizing weakness.   PSYCH:  Affect appropriate    BMP/CBC  Recent Labs     10/25/21  0616   *   K 4.5   CL 98*   CO2 23   BUN 18   CREATININE 0.8*       Discharge Time of 39 minutes    Electronically signed by Abram Leon MD on 10/27/2021 at 11:53 AM

## 2021-10-28 ENCOUNTER — CARE COORDINATION (OUTPATIENT)
Dept: CASE MANAGEMENT | Age: 56
End: 2021-10-28

## 2021-10-28 NOTE — CARE COORDINATION
Care Transitions Outreach Attempt    Call within 2 business days of discharge: Yes   Attempted to reach patient for transitions of care follow up. Unable to reach patient. Patient: Catheline Felty Patient : 1965 MRN: <[id number]>    Last Discharge [facility]       Complaint Diagnosis Description Type Department Provider    10/20/21 Other; Foot Pain Cellulitis of left hand . .. ED to Hosp-Admission (Discharged) (ADMITTED) Savanna Peres MD; Gia Hathaway. ..        24 Hour Transition of Care Call:    1st Attempt to contact patient for Initial 24 Hour Transition from hospital to home Call:   Patient not reached. Mail Box Full - Unable to leave message at this time. Will continue to follow. [address] [facility] ans spoke to Del. She states they are scheduled to go out and see patient later today. Romina Hernadez, PIERREN    [phone]  William Carlson / [address] Coordinator        Was this an external facility discharge? No Discharge Facility: [facility]    Noted following upcoming appointments from discharge chart review:   [facility] follow up appointment(s): No future appointments.   [facility] follow up appointment(s):

## 2021-10-28 NOTE — PROGRESS NOTES
Progress Note( Dr. Se Bonilla)  10/27/2021  Subjective:   Admit Date: 10/20/2021  PCP: Ovidio Cadena    Admitted For :   Bilateral hand swelling with infection of left finger and also left foot pain    Consulted For: Better control of blood glucose    Interval History: Pain in the leg is somewhat worse redness in the leg  I & D done left 2nd finger  Done 10/25/2021   hand pains are better    Denies any chest pains,   Denies SOB . Denies nausea or vomiting. No new bowel or bladder symptoms. No intake or output data in the 24 hours ending 10/27/21 2216    DATA    CBC: No results for input(s): WBC, HGB, PLT in the last 72 hours. CMP:  Recent Labs     10/25/21  0616   *   K 4.5   CL 98*   CO2 23   BUN 18   CREATININE 0.8*   CALCIUM 9.5   LABALBU 3.9     Lipids: No results found for: CHOL, HDL, TRIG  Glucose:  Recent Labs     10/27/21  0726 10/27/21  1155 10/27/21  1645   POCGLU 179* 112* 179*     [id number]:  Lab Results   Component Value Date    LABA1C 8.6 10/20/2021     High Sensitivity TSH:   Lab Results   Component Value Date    TSHHS 1.710 08/27/2014     Free T3: No results found for: FT3  Free T4:No results found for: T4FREE    CT HAND RIGHT W CONTRAST   Final Result   CT left hand:      1. Subcutaneous edema present which is most pronounced within the 2nd digit   with organized fluid collection at the distal tip of the left 2nd digit which   measures approximately 1.4 x 1.5 x 1.1 cm. While sterility of fluid is   indeterminate on imaging, findings are suspicious for abscess. No   subcutaneous gas. 2. No definitive CT evidence of osteomyelitis. MRI could be obtained for   further evaluation as clinically indicated. 3. Remote fracture involving the left hamate and remote chip fracture of the   ulnar aspect of the capitate. No bridging callus identified. No acute   fractures are evident. CT right hand:      1.  Soft tissue edema and ulceration of the distal tip of the 2nd digit of the   right hand with associated skin thickening compatible with cellulitis. No   organized drainable collection or subcutaneous gas identified. 2. Skin thickening and edema of the distal 3rd digit of the right hand also   most suggestive of cellulitis. 3. Chronic deformities of the distal phil of the 2nd and 3rd digits with no   definitive superimposed acute osteomyelitis within limits of CT. MRI could   be obtained for further evaluation as clinically indicated. CT HAND LEFT W CONTRAST   Final Result   CT left hand:      1. Subcutaneous edema present which is most pronounced within the 2nd digit   with organized fluid collection at the distal tip of the left 2nd digit which   measures approximately 1.4 x 1.5 x 1.1 cm. While sterility of fluid is   indeterminate on imaging, findings are suspicious for abscess. No   subcutaneous gas. 2. No definitive CT evidence of osteomyelitis. MRI could be obtained for   further evaluation as clinically indicated. 3. Remote fracture involving the left hamate and remote chip fracture of the   ulnar aspect of the capitate. No bridging callus identified. No acute   fractures are evident. CT right hand:      1. Soft tissue edema and ulceration of the distal tip of the 2nd digit of the   right hand with associated skin thickening compatible with cellulitis. No   organized drainable collection or subcutaneous gas identified. 2. Skin thickening and edema of the distal 3rd digit of the right hand also   most suggestive of cellulitis. 3. Chronic deformities of the distal phil of the 2nd and 3rd digits with no   definitive superimposed acute osteomyelitis within limits of CT. MRI could   be obtained for further evaluation as clinically indicated. XR FOOT LEFT (MIN 3 VIEWS)   Final Result   Barbed fish hook in the soft tissues between the third and fourth toes. No radiographic evidence of osteomyelitis.          XR FINGER LEFT (MIN 2 VIEWS) Final Result   Index finger soft tissue swelling. No radiographic evidence of acute   osteomyelitis or soft tissue gas. XR HAND RIGHT (MIN 3 VIEWS)   Final Result   1. Soft tissue ulceration at the distal aspect of the 2nd finger. 2. No radiographic evidence of osteomyelitis or other acute osseous   abnormality. Scheduled Medicines   Medications:      Infusions:         Objective:   Vitals: /74   Pulse 82   Temp 97.5 °F (36.4 °C) (Oral)   Resp 16   Ht 5' 11.5\" (1.816 m)   Wt 245 lb 3.2 oz (111.2 kg)   SpO2 99%   BMI 33.73 kg/m²   General appearance: alert and cooperative with exam  Neck: no JVD or bruit  Thyroid : Normal lobes   Lungs: Has Vesicular Breath sounds   Heart:  regular rate and rhythm  Abdomen: soft, non-tender; bowel sounds normal; no masses,  no organomegaly  Musculoskeletal: Normalhas  Edema of both hands and left leg. Both hands some of the fingers are partially amputated left hand fingers are infected  Extremities: extremities normal, , no edema//   Neurologic:  Awake, alert, oriented to name, place and time. Cranial nerves II-XII are grossly intact. Motor is  intact. Sensory i neuropathy. ,  and gait is abnormal.    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 Reviewed POC blood glucose . Labs and X ray results   2. Reviewed Current Medicines   3. On meal/ Correction bolus Humalog/ Basal Lantus Insulin regime /  4. Monitor Blood glucose frequently   5. Modified  the dose of Insulin/ other medicines as needed   6. Will follow     .      Jose Zheng MD, MD

## 2021-10-29 ENCOUNTER — CARE COORDINATION (OUTPATIENT)
Dept: CASE MANAGEMENT | Age: 56
End: 2021-10-29

## 2021-10-29 NOTE — CARE COORDINATION
[address] Transitions Initial Follow Up Call    Call within 2 business days of discharge: Yes    Patient: Manuel Watson Patient : 1965   MRN: <[id number]>  Reason for Admission: Cellulitis of left hand  Discharge Date: 10/27/21 RARS: Readmission Risk Score: 8.7      Last Discharge [facility]       Complaint Diagnosis Description Type Department Provider    10/20/21 Other; Foot Pain Cellulitis of left hand . .. ED to Hosp-Admission (Discharged) (ADMITTED) Day Vang MD; Heena Aguillon. ..        24 Hour Transition of Care Call:    2nd Attempt to contact patient for 24 Hour Transition Call - (Discharged from Hospital to Home)  Patient was not reached. Mail Box Full - Unable to leave message at this time. FINAL CALL    Robinson Curry LPN    [phone]  [facility] / [address] [place] [address] Transitions 24 Hour Call    Care Transitions Interventions         Follow Up  No future appointments.     Robinson Curry LPN

## 2021-10-30 LAB
CULTURE: NORMAL
Lab: NORMAL
SPECIMEN: NORMAL

## 2021-11-06 ENCOUNTER — HOSPITAL ENCOUNTER (INPATIENT)
Age: 56
LOS: 2 days | Discharge: ANOTHER HOSPITAL | DRG: 603 | End: 2021-11-08
Attending: INTERNAL MEDICINE | Admitting: INTERNAL MEDICINE
Payer: MEDICARE

## 2021-11-06 ENCOUNTER — APPOINTMENT (OUTPATIENT)
Dept: GENERAL RADIOLOGY | Age: 56
DRG: 603 | End: 2021-11-06
Payer: MEDICARE

## 2021-11-06 ENCOUNTER — APPOINTMENT (OUTPATIENT)
Dept: ULTRASOUND IMAGING | Age: 56
DRG: 603 | End: 2021-11-06
Payer: MEDICARE

## 2021-11-06 DIAGNOSIS — R79.89 ELEVATED LACTIC ACID LEVEL: ICD-10-CM

## 2021-11-06 DIAGNOSIS — L03.116 CELLULITIS OF LEFT LOWER EXTREMITY: Primary | ICD-10-CM

## 2021-11-06 PROBLEM — L03.90 CELLULITIS: Status: ACTIVE | Noted: 2021-11-06

## 2021-11-06 LAB
ALBUMIN SERPL-MCNC: 3.7 GM/DL (ref 3.4–5)
ALP BLD-CCNC: 96 IU/L (ref 40–129)
ALT SERPL-CCNC: 8 U/L (ref 10–40)
ANION GAP SERPL CALCULATED.3IONS-SCNC: 11 MMOL/L (ref 4–16)
AST SERPL-CCNC: 12 IU/L (ref 15–37)
BASOPHILS ABSOLUTE: 0 K/CU MM
BASOPHILS RELATIVE PERCENT: 0.8 % (ref 0–1)
BILIRUB SERPL-MCNC: 0.3 MG/DL (ref 0–1)
BUN BLDV-MCNC: 8 MG/DL (ref 6–23)
CALCIUM SERPL-MCNC: 9 MG/DL (ref 8.3–10.6)
CHLORIDE BLD-SCNC: 103 MMOL/L (ref 99–110)
CO2: 22 MMOL/L (ref 21–32)
CREAT SERPL-MCNC: 0.5 MG/DL (ref 0.9–1.3)
DIFFERENTIAL TYPE: ABNORMAL
EOSINOPHILS ABSOLUTE: 0.2 K/CU MM
EOSINOPHILS RELATIVE PERCENT: 3.7 % (ref 0–3)
GFR AFRICAN AMERICAN: >60 ML/MIN/1.73M2
GFR NON-AFRICAN AMERICAN: >60 ML/MIN/1.73M2
GLUCOSE BLD-MCNC: 180 MG/DL (ref 70–99)
GLUCOSE BLD-MCNC: 245 MG/DL (ref 70–99)
HCT VFR BLD CALC: 36.9 % (ref 42–52)
HEMOGLOBIN: 11.6 GM/DL (ref 13.5–18)
IMMATURE NEUTROPHIL %: 0.2 % (ref 0–0.43)
LACTATE: 2.2 MMOL/L (ref 0.4–2)
LACTATE: 2.3 MMOL/L (ref 0.4–2)
LYMPHOCYTES ABSOLUTE: 1.3 K/CU MM
LYMPHOCYTES RELATIVE PERCENT: 25.7 % (ref 24–44)
MCH RBC QN AUTO: 28.4 PG (ref 27–31)
MCHC RBC AUTO-ENTMCNC: 31.4 % (ref 32–36)
MCV RBC AUTO: 90.4 FL (ref 78–100)
MONOCYTES ABSOLUTE: 0.4 K/CU MM
MONOCYTES RELATIVE PERCENT: 7 % (ref 0–4)
NUCLEATED RBC %: 0 %
PDW BLD-RTO: 14.3 % (ref 11.7–14.9)
PLATELET # BLD: 199 K/CU MM (ref 140–440)
PMV BLD AUTO: 9.6 FL (ref 7.5–11.1)
POTASSIUM SERPL-SCNC: 4 MMOL/L (ref 3.5–5.1)
PRO-BNP: 782.3 PG/ML
RBC # BLD: 4.08 M/CU MM (ref 4.6–6.2)
SEGMENTED NEUTROPHILS ABSOLUTE COUNT: 3.2 K/CU MM
SEGMENTED NEUTROPHILS RELATIVE PERCENT: 62.6 % (ref 36–66)
SODIUM BLD-SCNC: 136 MMOL/L (ref 135–145)
TOTAL IMMATURE NEUTOROPHIL: 0.01 K/CU MM
TOTAL NUCLEATED RBC: 0 K/CU MM
TOTAL PROTEIN: 6.4 GM/DL (ref 6.4–8.2)
WBC # BLD: 5.1 K/CU MM (ref 4–10.5)

## 2021-11-06 PROCEDURE — 73590 X-RAY EXAM OF LOWER LEG: CPT

## 2021-11-06 PROCEDURE — G0378 HOSPITAL OBSERVATION PER HR: HCPCS

## 2021-11-06 PROCEDURE — 2580000003 HC RX 258: Performed by: INTERNAL MEDICINE

## 2021-11-06 PROCEDURE — 85025 COMPLETE CBC W/AUTO DIFF WBC: CPT

## 2021-11-06 PROCEDURE — 96366 THER/PROPH/DIAG IV INF ADDON: CPT

## 2021-11-06 PROCEDURE — 80053 COMPREHEN METABOLIC PANEL: CPT

## 2021-11-06 PROCEDURE — 1200000000 HC SEMI PRIVATE

## 2021-11-06 PROCEDURE — 36415 COLL VENOUS BLD VENIPUNCTURE: CPT

## 2021-11-06 PROCEDURE — 83880 ASSAY OF NATRIURETIC PEPTIDE: CPT

## 2021-11-06 PROCEDURE — 87040 BLOOD CULTURE FOR BACTERIA: CPT

## 2021-11-06 PROCEDURE — 6360000002 HC RX W HCPCS: Performed by: PHYSICIAN ASSISTANT

## 2021-11-06 PROCEDURE — 96367 TX/PROPH/DG ADDL SEQ IV INF: CPT

## 2021-11-06 PROCEDURE — 83605 ASSAY OF LACTIC ACID: CPT

## 2021-11-06 PROCEDURE — 96365 THER/PROPH/DIAG IV INF INIT: CPT

## 2021-11-06 PROCEDURE — 82962 GLUCOSE BLOOD TEST: CPT

## 2021-11-06 PROCEDURE — 94761 N-INVAS EAR/PLS OXIMETRY MLT: CPT

## 2021-11-06 PROCEDURE — 6370000000 HC RX 637 (ALT 250 FOR IP): Performed by: INTERNAL MEDICINE

## 2021-11-06 PROCEDURE — 6370000000 HC RX 637 (ALT 250 FOR IP): Performed by: PHYSICIAN ASSISTANT

## 2021-11-06 PROCEDURE — 93971 EXTREMITY STUDY: CPT

## 2021-11-06 PROCEDURE — 2580000003 HC RX 258: Performed by: PHYSICIAN ASSISTANT

## 2021-11-06 PROCEDURE — 2500000003 HC RX 250 WO HCPCS: Performed by: PHYSICIAN ASSISTANT

## 2021-11-06 PROCEDURE — 99285 EMERGENCY DEPT VISIT HI MDM: CPT

## 2021-11-06 RX ORDER — NICOTINE POLACRILEX 4 MG
15 LOZENGE BUCCAL PRN
Status: DISCONTINUED | OUTPATIENT
Start: 2021-11-06 | End: 2021-11-09 | Stop reason: HOSPADM

## 2021-11-06 RX ORDER — ATORVASTATIN CALCIUM 10 MG/1
10 TABLET, FILM COATED ORAL DAILY
Status: DISCONTINUED | OUTPATIENT
Start: 2021-11-07 | End: 2021-11-09 | Stop reason: HOSPADM

## 2021-11-06 RX ORDER — SODIUM CHLORIDE 9 MG/ML
25 INJECTION, SOLUTION INTRAVENOUS PRN
Status: DISCONTINUED | OUTPATIENT
Start: 2021-11-06 | End: 2021-11-09 | Stop reason: HOSPADM

## 2021-11-06 RX ORDER — ATORVASTATIN CALCIUM 10 MG/1
10 TABLET, FILM COATED ORAL DAILY
COMMUNITY

## 2021-11-06 RX ORDER — TAMSULOSIN HYDROCHLORIDE 0.4 MG/1
0.4 CAPSULE ORAL DAILY
Status: DISCONTINUED | OUTPATIENT
Start: 2021-11-07 | End: 2021-11-09 | Stop reason: HOSPADM

## 2021-11-06 RX ORDER — ONDANSETRON 4 MG/1
4 TABLET, ORALLY DISINTEGRATING ORAL EVERY 8 HOURS PRN
Status: DISCONTINUED | OUTPATIENT
Start: 2021-11-06 | End: 2021-11-09 | Stop reason: HOSPADM

## 2021-11-06 RX ORDER — 0.9 % SODIUM CHLORIDE 0.9 %
1000 INTRAVENOUS SOLUTION INTRAVENOUS ONCE
Status: COMPLETED | OUTPATIENT
Start: 2021-11-06 | End: 2021-11-06

## 2021-11-06 RX ORDER — DEXTROSE MONOHYDRATE 50 MG/ML
100 INJECTION, SOLUTION INTRAVENOUS PRN
Status: DISCONTINUED | OUTPATIENT
Start: 2021-11-06 | End: 2021-11-09 | Stop reason: HOSPADM

## 2021-11-06 RX ORDER — VANCOMYCIN 2 G/400ML
2000 INJECTION, SOLUTION INTRAVENOUS ONCE
Status: COMPLETED | OUTPATIENT
Start: 2021-11-06 | End: 2021-11-07

## 2021-11-06 RX ORDER — OXYCODONE HYDROCHLORIDE AND ACETAMINOPHEN 5; 325 MG/1; MG/1
1 TABLET ORAL EVERY 6 HOURS PRN
Status: DISCONTINUED | OUTPATIENT
Start: 2021-11-06 | End: 2021-11-07

## 2021-11-06 RX ORDER — HYDROCODONE BITARTRATE AND ACETAMINOPHEN 5; 325 MG/1; MG/1
1 TABLET ORAL ONCE
Status: COMPLETED | OUTPATIENT
Start: 2021-11-06 | End: 2021-11-06

## 2021-11-06 RX ORDER — SODIUM CHLORIDE 0.9 % (FLUSH) 0.9 %
5-40 SYRINGE (ML) INJECTION PRN
Status: DISCONTINUED | OUTPATIENT
Start: 2021-11-06 | End: 2021-11-09 | Stop reason: HOSPADM

## 2021-11-06 RX ORDER — ACETAMINOPHEN 650 MG/1
650 SUPPOSITORY RECTAL EVERY 6 HOURS PRN
Status: DISCONTINUED | OUTPATIENT
Start: 2021-11-06 | End: 2021-11-09 | Stop reason: HOSPADM

## 2021-11-06 RX ORDER — CLINDAMYCIN PHOSPHATE 600 MG/50ML
600 INJECTION INTRAVENOUS ONCE
Status: COMPLETED | OUTPATIENT
Start: 2021-11-06 | End: 2021-11-06

## 2021-11-06 RX ORDER — PREGABALIN 75 MG/1
75 CAPSULE ORAL 2 TIMES DAILY
Status: DISCONTINUED | OUTPATIENT
Start: 2021-11-07 | End: 2021-11-09 | Stop reason: HOSPADM

## 2021-11-06 RX ORDER — ESCITALOPRAM OXALATE 10 MG/1
10 TABLET ORAL DAILY
Status: DISCONTINUED | OUTPATIENT
Start: 2021-11-07 | End: 2021-11-09 | Stop reason: HOSPADM

## 2021-11-06 RX ORDER — PANTOPRAZOLE SODIUM 40 MG/1
40 TABLET, DELAYED RELEASE ORAL
Status: DISCONTINUED | OUTPATIENT
Start: 2021-11-07 | End: 2021-11-09 | Stop reason: HOSPADM

## 2021-11-06 RX ORDER — POLYETHYLENE GLYCOL 3350 17 G/17G
17 POWDER, FOR SOLUTION ORAL DAILY PRN
Status: DISCONTINUED | OUTPATIENT
Start: 2021-11-06 | End: 2021-11-09 | Stop reason: HOSPADM

## 2021-11-06 RX ORDER — DEXTROSE MONOHYDRATE 25 G/50ML
12.5 INJECTION, SOLUTION INTRAVENOUS PRN
Status: DISCONTINUED | OUTPATIENT
Start: 2021-11-06 | End: 2021-11-09 | Stop reason: HOSPADM

## 2021-11-06 RX ORDER — SODIUM CHLORIDE 0.9 % (FLUSH) 0.9 %
5-40 SYRINGE (ML) INJECTION EVERY 12 HOURS SCHEDULED
Status: DISCONTINUED | OUTPATIENT
Start: 2021-11-06 | End: 2021-11-09 | Stop reason: HOSPADM

## 2021-11-06 RX ORDER — ACETAMINOPHEN 325 MG/1
650 TABLET ORAL EVERY 6 HOURS PRN
Status: DISCONTINUED | OUTPATIENT
Start: 2021-11-06 | End: 2021-11-09 | Stop reason: HOSPADM

## 2021-11-06 RX ORDER — PREGABALIN 75 MG/1
75 CAPSULE ORAL 2 TIMES DAILY
COMMUNITY

## 2021-11-06 RX ORDER — ONDANSETRON 2 MG/ML
4 INJECTION INTRAMUSCULAR; INTRAVENOUS EVERY 6 HOURS PRN
Status: DISCONTINUED | OUTPATIENT
Start: 2021-11-06 | End: 2021-11-09 | Stop reason: HOSPADM

## 2021-11-06 RX ADMIN — SODIUM CHLORIDE, PRESERVATIVE FREE 10 ML: 5 INJECTION INTRAVENOUS at 22:30

## 2021-11-06 RX ADMIN — HYDROCODONE BITARTRATE AND ACETAMINOPHEN 1 TABLET: 5; 325 TABLET ORAL at 16:44

## 2021-11-06 RX ADMIN — CLINDAMYCIN PHOSPHATE 600 MG: 600 INJECTION, SOLUTION INTRAVENOUS at 21:30

## 2021-11-06 RX ADMIN — SODIUM CHLORIDE 1000 ML: 9 INJECTION, SOLUTION INTRAVENOUS at 21:32

## 2021-11-06 RX ADMIN — PREGABALIN 75 MG: 75 CAPSULE ORAL at 23:35

## 2021-11-06 RX ADMIN — VANCOMYCIN 2000 MG: 2 INJECTION, SOLUTION INTRAVENOUS at 22:03

## 2021-11-06 ASSESSMENT — PAIN DESCRIPTION - ORIENTATION
ORIENTATION: LEFT
ORIENTATION: LEFT

## 2021-11-06 ASSESSMENT — PAIN DESCRIPTION - LOCATION
LOCATION: LEG
LOCATION: LEG

## 2021-11-06 ASSESSMENT — PAIN DESCRIPTION - DESCRIPTORS: DESCRIPTORS: ACHING

## 2021-11-06 ASSESSMENT — PAIN DESCRIPTION - PROGRESSION: CLINICAL_PROGRESSION: NOT CHANGED

## 2021-11-06 ASSESSMENT — PAIN - FUNCTIONAL ASSESSMENT: PAIN_FUNCTIONAL_ASSESSMENT: PREVENTS OR INTERFERES SOME ACTIVE ACTIVITIES AND ADLS

## 2021-11-06 ASSESSMENT — PAIN SCALES - GENERAL
PAINLEVEL_OUTOF10: 8
PAINLEVEL_OUTOF10: 9
PAINLEVEL_OUTOF10: 8

## 2021-11-06 ASSESSMENT — PAIN DESCRIPTION - FREQUENCY: FREQUENCY: CONTINUOUS

## 2021-11-06 ASSESSMENT — PAIN DESCRIPTION - PAIN TYPE: TYPE: ACUTE PAIN

## 2021-11-06 ASSESSMENT — PAIN DESCRIPTION - ONSET: ONSET: ON-GOING

## 2021-11-06 NOTE — ED PROVIDER NOTES
EMERGENCY DEPARTMENT ENCOUNTER      PCP: [id number] [facility]    Chief Complaint   Patient presents with    Leg Swelling     left, started PTA       This patient was not evaluated by the attending physician. I have independently evaluated this patient. HPI    Terell Cox is a 63 y.o. male who presents with redness of the skin and swelling to left lower leg. Onset prior to arrival.  Patient states he noticed swelling to left lower leg earlier today. Patient denies any injury. Patient denies history of blood clots, recent travel or surgery. Patient was admitted at the end of last month for cellulitis to bilateral hands. Patient has history of diabetes. Patient denies fever, chest pain, shortness breath, abdominal pain, vomiting or diarrhea.     REVIEW OF SYSTEMS    Constitutional: Denies fever  Respiratory:  No cough or shortness of breath   Cardiovascular:  No chest pain  GI: No nausea, vomiting, abdominal pain  Musculoskeletal:  See HPI   Skin:  See HPI      All other review of systems are negative  See HPI and nursing notes for additional information     PAST MEDICAL HISTORY/SURGICAL HISTORY     Past Medical History:   Diagnosis Date    Absent finger     Left hand    Anxiety     Arthritis     Hands    Asthma     \"As a kid but outgrew this\" - no medications as of 2/21/20    Chronic back pain     Depression     Edema     Fibromyalgia     Headache(784.0)     Last: 2/19/20    Hernia, abdominal     Hx MRSA infection     positive culture 8/2014 from left foot    Nausea & vomiting     Neuropathy     Obesity, Class III, BMI 40-49.9 (morbid obesity) ([facility] [address]     Osteomyelitis (HCC)     hx finger    Poor circulation     Prolonged emergence from general anesthesia     Restless leg syndrome     Shortness of breath on exertion     6/14/2016- pt denies any sob with this interview    Type II or unspecified type diabetes mellitus with other specified manifestations, not stated as uncontrolled 4/8/2014    Type II or unspecified type diabetes mellitus without mention of complication, not stated as uncontrolled DX 2007    Follows with PCP    Ulcer of other part of foot 4/8/2014    'ulcer on left foot healed all up\"     Past Surgical History:   Procedure Laterality Date    COLONOSCOPY  1990's    Normal exam per pt    DEBRIDEMENT  8/25/2014    left foot    ENDOSCOPY, COLON, DIAGNOSTIC  06-    Normal exam per pt -     FINGER SURGERY  9-11-11    Right Index Finger    FINGER SURGERY  01-16-12    RIght Index Finger-Removal of loose body, Reconstruction of Mallet Finger    FOOT DEBRIDEMENT Left 6/10/2019    FOOT DEBRIDEMENT INCISION AND DRAINAGE performed by Aylin Starkey MD at [address] Right 06/01/2018    I&D right foot    HEMORRHOID SURGERY  2008    OTHER SURGICAL HISTORY Left 10/22/2013    I & D left foot    OTHER SURGICAL HISTORY  07/07/2017    I&D fingers X2 left hand.  I&D left forearm    OTHER SURGICAL HISTORY Left 07/08/2017    left hand debridement, left forearm incision and drainage     OTHER SURGICAL HISTORY Left 07/10/2017    Fingers I&D    OTHER SURGICAL HISTORY Left 07/14/2017    middle finger amputation revision    ROTATOR CUFF REPAIR  Last Done 7/18/2011    Left, Done Four Times    UPPER GASTROINTESTINAL ENDOSCOPY N/A 2/21/2020    EGD BIOPSY performed by Allison Raza MD at [address]    Current Outpatient Rx   Medication Sig Dispense Refill    Probiotic Acidophilus (FLORANEX) TABS Take 1 tablet by mouth 2 times daily 60 tablet 0    acetaminophen (TYLENOL) 500 MG tablet Take 2 tablets by mouth 3 times daily for 10 days 60 tablet 0    tamsulosin (FLOMAX) 0.4 MG capsule Take 1 capsule by mouth daily for 7 doses 7 capsule 0    dicyclomine (BENTYL) 10 MG capsule Take 2 capsules by mouth 4 times daily as needed (abdominal pain) 100 capsule 3    ondansetron (ZOFRAN) 4 MG tablet Take 4 mg by mouth every 8 hours as needed for Nausea or Vomiting      Dulaglutide (TRULICITY SC) Inject 1.5 mLs into the skin every 7 days       pantoprazole (PROTONIX) 40 MG tablet Take 1 tablet by mouth 2 times daily for 14 days 28 tablet 0    ondansetron (ZOFRAN ODT) 4 MG disintegrating tablet Take 1 tablet by mouth every 6 hours (Patient not taking: Reported on 2020) 10 tablet 0    escitalopram (LEXAPRO) 10 MG tablet Take 10 mg by mouth daily      oxyCODONE (ROXICODONE) 5 MG immediate release tablet Take 10 mg by mouth every 4 hours. ALLERGIES    Allergies   Allergen Reactions    Cantaloupe (Diagnostic) Hives    Ceftriaxone Hives     Tolerated Zosyn well 2019     Robaxin [Methocarbamol] Swelling    Rocephin [Ceftriaxone Sodium-Lidocaine] Hives     Noted on 10/26 - developed extremity swelling and hives. No anaphylaxis.  Sulfa Antibiotics Hives     Noted on 10/26 - developed extremity swelling and hives. No anaphylaxis.     Aspirin Nausea Only    Nsaids Nausea Only    Other Nausea Only     Darvocet- Gi distress  napsalate/acetaminophen    Propoxyphene Nausea And Vomiting    Toradol [Ketorolac Tromethamine] Other (See Comments)     Sweats        FAMILY HISTORY/SOCIAL HISTORY    Family History   Problem Relation Age of Onset    Kidney Disease Mother         \"Kidney Failure, On Dialysis\"   [facility] Early Death Mother 39    Diabetes Father         \"Type II\"     Social History     Socioeconomic History    Marital status:      Spouse name: None    Number of children: 4    Years of education: None    Highest education level: None   Occupational History    None   Tobacco Use    Smoking status: Former Smoker     Types: Cigarettes     Start date:      Quit date:      Years since quittin.8    Smokeless tobacco: Former User     Types: Snuff   Vaping Use    Vaping Use: Never used   Substance and Sexual Activity    Alcohol use: Yes     Comment: Rarely - 1-2 a year    Drug use: Yes     Frequency: 7.0 times per week     Types: Marijuana Gillian Landon)     Comment: occ    Sexual activity: None   Other Topics Concern    None   Social History Narrative    None     Social Determinants of Health     Financial Resource Strain:     Difficulty of Paying Living Expenses: Not on file   Food Insecurity:     Worried About Running Out of Food in the Last Year: Not on file    Ludivina of Food in the Last Year: Not on file   Transportation Needs:     Lack of Transportation (Medical): Not on file    Lack of Transportation (Non-Medical): Not on file   Physical Activity:     Days of Exercise per Week: Not on file    Minutes of Exercise per Session: Not on file   Stress:     Feeling of Stress : Not on file   Social Connections:     Frequency of Communication with Friends and Family: Not on file    Frequency of Social Gatherings with Friends and Family: Not on file    Attends Muslim Services: Not on file    Active Member of [address] [place] [address] or Organizations: Not on file    Attends Club or Organization Meetings: Not on file    Marital Status: Not on file   Intimate Partner Violence:     Fear of Current or Ex-Partner: Not on file    Emotionally Abused: Not on file    Physically Abused: Not on file    Sexually Abused: Not on file   Housing Stability:     Unable to Pay for Housing in the Last Year: Not on file    Number of [place] in the Last Year: Not on file    Unstable Housing in the Last Year: Not on file       PHYSICAL EXAM    VITAL SIGNS: /80   Pulse 95   Temp 98.1 °F (36.7 °C)   Resp 20   SpO2 98%    Constitutional:  Well developed, Well nourished  HENT:  Atraumatic, Normocephalic, PERRL  Respiratory:  No retractions, lungs are clear   Cardiovascular: Normal rate and rhythm  GI:  Soft, No tenderness   Musculoskeletal: Left lower extremity with mild swelling and erythema to anterior lower leg. There is mild warmth to palpation. No significant induration. No palpable fluctuance. Compartments soft.   Skin intact. Distal sensation and pulses intact. No thigh tenderness. Integument:  Left lower extremity with mild swelling and erythema to anterior lower leg. There is mild warmth to palpation. No significant induration. No palpable fluctuance. Compartments soft. Skin intact. Distal sensation and pulses intact. No thigh tenderness. Vascular: Dorsalis pedis pulses 2+   Neurologic:  Alert & oriented, no slurred speech. IMAGING:  VL DUP LOWER EXTREMITY VENOUS LEFT   Final Result   No evidence of DVT in the left lower extremity. XR TIBIA FIBULA LEFT (2 VIEWS)   Final Result   1. No acute osseous abnormality of the left tibia or fibula. 2. Pretibial soft tissue swelling.              Labs:    Results for orders placed or performed during the hospital encounter of 11/06/21   CBC with Auto Diff   Result Value Ref Range    WBC 5.1 4.0 - 10.5 K/CU MM    RBC 4.08 (L) 4.6 - 6.2 M/CU MM    Hemoglobin 11.6 (L) 13.5 - 18.0 GM/DL    Hematocrit 36.9 (L) 42 - 52 %    MCV 90.4 78 - 100 FL    MCH 28.4 27 - 31 PG    MCHC 31.4 (L) 32.0 - 36.0 %    RDW 14.3 11.7 - 14.9 %    Platelets 284 116 - 938 K/CU MM    MPV 9.6 7.5 - 11.1 FL    Differential Type AUTOMATED DIFFERENTIAL     Segs Relative 62.6 36 - 66 %    Lymphocytes % 25.7 24 - 44 %    Monocytes % 7.0 (H) 0 - 4 %    Eosinophils % 3.7 (H) 0 - 3 %    Basophils % 0.8 0 - 1 %    Segs Absolute 3.2 K/CU MM    Lymphocytes Absolute 1.3 K/CU MM    Monocytes Absolute 0.4 K/CU MM    Eosinophils Absolute 0.2 K/CU MM    Basophils Absolute 0.0 K/CU MM    Nucleated RBC % 0.0 %    Total Nucleated RBC 0.0 K/CU MM    Total Immature Neutrophil 0.01 K/CU MM    Immature Neutrophil % 0.2 0 - 0.43 %   Comprehensive Metabolic Panel   Result Value Ref Range    Sodium 136 135 - 145 MMOL/L    Potassium 4.0 3.5 - 5.1 MMOL/L    Chloride 103 99 - 110 mMol/L    CO2 22 21 - 32 MMOL/L    BUN 8 6 - 23 MG/DL    CREATININE 0.5 (L) 0.9 - 1.3 MG/DL    Glucose 180 (H) 70 - 99 MG/DL    Calcium 9.0 8.3 - 10.6 MG/DL    Albumin 3.7 3.4 - 5.0 GM/DL    Total Protein 6.4 6.4 - 8.2 GM/DL    Total Bilirubin 0.3 0.0 - 1.0 MG/DL    ALT 8 (L) 10 - 40 U/L    AST 12 (L) 15 - 37 IU/L    Alkaline Phosphatase 96 40 - 129 IU/L    GFR Non-African American >60 >60 mL/min/1.73m2    GFR African American >60 >60 mL/min/1.73m2    Anion Gap 11 4 - 16   Brain Natriuretic Peptide   Result Value Ref Range    Pro-.3 (H) <300 PG/ML   Lactic Acid, Plasma   Result Value Ref Range    Lactate 2.2 (HH) 0.4 - 2.0 mMOL/L   Lactic Acid, Plasma   Result Value Ref Range    Lactate 2.3 (HH) 0.4 - 2.0 mMOL/L         ED COURSE & MEDICAL DECISION MAKING    Patient presents as above. Patient provided Imguel Tyra for pain. Distal sensation and pulses intact. Compartments soft. I do not believe patient has compartment syndrome. Left lower extremity venous ultrasound is negative for DVT. Left tibia-fibula x-ray shows swelling without acute osseous abnormality. CBC hemoglobin 11.6 hematocrit of 36.9 with normal white blood cell count. CMP grossly within normal limits. BNP is 782. Lactic acid is 2.2. Patient given fluids and repeat lactic acid drawn after which increased to 2.3. Blood cultures added and IV vancomycin and clindamycin is ordered due to patient's multiple drug allergies. I believe patient requires admission for further evaluation treatment. Consult hospitalist who accepts admission. Clinical  IMPRESSION    Cellulitis of left lower extremity  Elevated lactic acid    Patient admitted    Comment: Please note this report has been produced using speech recognition software and may contain errors related to that system including errors in grammar, punctuation, and spelling, as well as words and phrases that may be inappropriate. If there are any questions or concerns please feel free to contact the dictating provider for clarification.       Caitlin Holman PA-C  11/06/21 2003

## 2021-11-06 NOTE — H&P
HISTORY AND PHYSICAL  (Hospitalist, Internal Medicine)  IDENTIFYING INFORMATION   PATIENT:  Estee Malhotra  MRN:  [id number]  ADMIT DATE: 11/6/2021      CHIEF COMPLAINT   Left lower extremity swelling    HISTORY OF PRESENT ILLNESS   Estee Malhotra is a 63 y.o. male with diabetes mellitus type 2, on insulin, diabetic neuropathy, BPH, history of cellulitis, history of COVID-19, anxiety/depression, chronic pain, history of osteomyelitis, recent admission 10/20-10/27/2021 for left foot and bilateral hand swelling presented to ED with complaints of left lower extremity swelling, redness. Patient reported that he had redness in his left lower extremity when he was admitted to the hospital recently, which got better with the antibiotics. Patient again noticed redness, swelling in his left lower extremity since yesterday, got worse today which prompted him to come to the ER. Patient reports being compliant with his antibiotics. Patient denied any fever, chills. Denied any other complaints no chest pain, shortness of breath, cough, denied any abdominal pain, denied any urinary complaints, denied any constipation or diarrhea. At presentation patient was noted to have /80, HR 95, RR 20, temp 98.1, saturating 98% on room air. Lab work significant for sodium 136, lactic acid 2.2, random glucose 180, proBNP 72, WBC 5.1, hemoglobin 11.6, platelets 155. Repeat lactic acid was trending up and hence a decision was made to admit the patient. Blood cultures were ordered in ER. Patient received IV fluids, vancomycin, clindamycin in ER.     PAST MEDICAL HISTORY PAST SURGICAL HISTORY   diabetes mellitus type 2, on insulin, diabetic neuropathy, BPH, history of cellulitis, history of COVID-19, anxiety/depression, chronic pain, history of osteomyelitis, recent admission 10/20-10/27/2021 for left foot and bilateral hand swelling Amputation of right index finger, left index finger, debridement of necrotizing infection of the left foot (10/2013)   FAMILY HISTORY SOCIAL HISTORY    Reviewed and noncontributory  Denies smoking tobacco, admits to smoking marijuana, denies any alcohol or illicit drug use. MEDICATIONS ALLERGIES    Reviewed medications with patient-atorvastatin 10 mg daily, Flexeril 5 mg twice daily, fluticasone as needed, Lyrica 75 mg twice daily, Trulicity 1.5 q. weekly, Protonix 40 mg daily, tamsulosin 0.4 mg daily, Metformin 100 mg twice daily, vitamin D, vitamin B12, vitamin C. Patient was discharged on doxycycline, ciprofloxacin.  Ceftriaxone, Robaxin, sulfa antibiotics, NSAIDs, aspirin, propoxyphene, Toradol       PAST MEDICAL, SURGICAL, FAMILY, and SOCIAL HISTORY         Past Medical History:   Diagnosis Date    Absent finger     Left hand    Anxiety     Arthritis     Hands    Asthma     \"As a kid but outgrew this\" - no medications as of 2/21/20    Chronic back pain     Depression     Edema     Fibromyalgia     Headache(784.0)     Last: 2/19/20    Hernia, abdominal     Hx MRSA infection     positive culture 8/2014 from left foot    Nausea & vomiting     Neuropathy     Obesity, Class III, BMI 40-49.9 (morbid obesity) ([facility] [address]     Osteomyelitis (HCC)     hx finger    Poor circulation     Prolonged emergence from general anesthesia     Restless leg syndrome     Shortness of breath on exertion     6/14/2016- pt denies any sob with this interview    Type II or unspecified type diabetes mellitus with other specified manifestations, not stated as uncontrolled 4/8/2014    Type II or unspecified type diabetes mellitus without mention of complication, not stated as uncontrolled DX 2007    Follows with PCP    Ulcer of other part of foot 4/8/2014    'ulcer on left foot healed all up\"     Past Surgical History:   Procedure Laterality Date    COLONOSCOPY  1990's    Normal exam per pt    DEBRIDEMENT  8/25/2014 left foot    ENDOSCOPY, COLON, DIAGNOSTIC  2016    Normal exam per pt -     FINGER SURGERY  11    Right Index Finger    FINGER SURGERY  12    RIght Index Finger-Removal of loose body, Reconstruction of Mallet Finger    FOOT DEBRIDEMENT Left 6/10/2019    FOOT DEBRIDEMENT INCISION AND DRAINAGE performed by Maryan Kaufman MD at [address] 2018    I&D right foot    HEMORRHOID SURGERY      OTHER SURGICAL HISTORY Left 10/22/2013    I & D left foot    OTHER SURGICAL HISTORY  2017    I&D fingers X2 left hand.  I&D left forearm    OTHER SURGICAL HISTORY Left 2017    left hand debridement, left forearm incision and drainage     OTHER SURGICAL HISTORY Left 07/10/2017    Fingers I&D    OTHER SURGICAL HISTORY Left 2017    middle finger amputation revision    ROTATOR CUFF REPAIR  Last Done 2011    Left, Done Four Times    UPPER GASTROINTESTINAL ENDOSCOPY N/A 2020    EGD BIOPSY performed by Edmund Paulino MD at [place] [address]     Family History   Problem Relation Age of Onset    Kidney Disease Mother         \"Kidney Failure, On Dialysis\"    Early Death Mother 39    Diabetes Father         \"Type II\"     Family Hx of HTN  Family Hx as reviewed above, otherwise non-contributory  Social History     Socioeconomic History    Marital status:      Spouse name: None    Number of children: 4    Years of education: None    Highest education level: None   Occupational History    None   Tobacco Use    Smoking status: Former Smoker     Types: Cigarettes     Start date:      Quit date:      Years since quittin.8    Smokeless tobacco: Former User     Types: Snuff   Vaping Use    Vaping Use: Never used   Substance and Sexual Activity    Alcohol use: Yes     Comment: Rarely - 1-2 a year    Drug use: Yes     Frequency: 7.0 times per week     Types: Marijuana Kailyn Amble)     Comment: occ    Sexual activity: None   Other Topics Concern    None   Social History Narrative    None     Social Determinants of Health     Financial Resource Strain:     Difficulty of Paying Living Expenses: Not on file   Food Insecurity:     Worried About Running Out of Food in the Last Year: Not on file    Ludivina of Food in the Last Year: Not on file   Transportation Needs:     Lack of Transportation (Medical): Not on file    Lack of Transportation (Non-Medical): Not on file   Physical Activity:     Days of Exercise per Week: Not on file    Minutes of Exercise per Session: Not on file   Stress:     Feeling of Stress : Not on file   Social Connections:     Frequency of Communication with Friends and Family: Not on file    Frequency of Social Gatherings with Friends and Family: Not on file    Attends Anabaptist Services: Not on file    Active Member of [address] [place] [address] [employer] or Organizations: Not on file    Attends Club or Organization Meetings: Not on file    Marital Status: Not on file   Intimate Partner Violence:     Fear of Current or Ex-Partner: Not on file    Emotionally Abused: Not on file    Physically Abused: Not on file    Sexually Abused: Not on file   Housing Stability:     Unable to Pay for Housing in the Last Year: Not on file    Number of [place] in the Last Year: Not on file    Unstable Housing in the Last Year: Not on file       MEDICATIONS   Medications Prior to Admission  Not in a hospital admission.     Current Medications  Current Facility-Administered Medications   Medication Dose Route Frequency Provider Last Rate Last Admin    clindamycin (CLEOCIN) 600 mg in dextrose 5 % 50 mL IVPB  600 mg IntraVENous Once Salima Ocampo PA-C        0.9 % sodium chloride bolus  1,000 mL IntraVENous Once Salima Ocampo PA-C        vancomycin (VANCOCIN) 2000 mg in 400 mL IVPB  2,000 mg IntraVENous Once Salima Ocampo PA-C         Current Outpatient Medications   Medication Sig Dispense Refill    Probiotic Acidophilus [facility]) TABS Take 1 tablet by mouth 2 times daily 60 tablet 0    acetaminophen (TYLENOL) 500 MG tablet Take 2 tablets by mouth 3 times daily for 10 days 60 tablet 0    tamsulosin (FLOMAX) 0.4 MG capsule Take 1 capsule by mouth daily for 7 doses 7 capsule 0    dicyclomine (BENTYL) 10 MG capsule Take 2 capsules by mouth 4 times daily as needed (abdominal pain) 100 capsule 3    ondansetron (ZOFRAN) 4 MG tablet Take 4 mg by mouth every 8 hours as needed for Nausea or Vomiting      Dulaglutide (TRULICITY SC) Inject 1.5 mLs into the skin every 7 days       pantoprazole (PROTONIX) 40 MG tablet Take 1 tablet by mouth 2 times daily for 14 days 28 tablet 0    ondansetron (ZOFRAN ODT) 4 MG disintegrating tablet Take 1 tablet by mouth every 6 hours (Patient not taking: Reported on 2/20/2020) 10 tablet 0    escitalopram (LEXAPRO) 10 MG tablet Take 10 mg by mouth daily      oxyCODONE (ROXICODONE) 5 MG immediate release tablet Take 10 mg by mouth every 4 hours. Allergies  Allergies   Allergen Reactions    Cantaloupe (Diagnostic) Hives    Ceftriaxone Hives     Tolerated Zosyn well 1/2/2019     Robaxin [Methocarbamol] Swelling    Rocephin [Ceftriaxone Sodium-Lidocaine] Hives     Noted on 10/26 - developed extremity swelling and hives. No anaphylaxis.  Sulfa Antibiotics Hives     Noted on 10/26 - developed extremity swelling and hives. No anaphylaxis.  Aspirin Nausea Only    Nsaids Nausea Only    Other Nausea Only     Darvocet- Gi distress  napsalate/acetaminophen    Propoxyphene Nausea And Vomiting    Toradol [Ketorolac Tromethamine] Other (See Comments)     Sweats        REVIEW OF SYSTEMS   10 point review of systems conducted and pertinent positives and negatives as per HPI.     PHYSICAL EXAM     Wt Readings from Last 3 Encounters:   10/24/21 245 lb 3.2 oz (111.2 kg)   09/15/21 240 lb (108.9 kg)   05/24/21 220 lb (99.8 kg)       Blood pressure 132/80, pulse 95, temperature 98.1 °F (36.7 °C), Hemoglobin Quant Latest Ref Range: 13.5 - 18.0 GM/DL 11.6 (L)   Hematocrit Latest Ref Range: 42 - 52 % 36.9 (L)   MCV Latest Ref Range: 78 - 100 FL 90.4   MCH Latest Ref Range: 27 - 31 PG 28.4   MCHC Latest Ref Range: 32.0 - 36.0 % 31.4 (L)   MPV Latest Ref Range: 7.5 - 11.1 FL 9.6   RDW Latest Ref Range: 11.7 - 14.9 % 14.3   Platelet Count Latest Ref Range: 140 - 440 K/CU    Lymphocyte % Latest Ref Range: 24 - 44 % 25.7   Monocytes % Latest Ref Range: 0 - 4 % 7.0 (H)   Eosinophils % Latest Ref Range: 0 - 3 % 3.7 (H)   Basophils % Latest Ref Range: 0 - 1 % 0.8   Lymphocytes Absolute Latest Units: K/CU MM 1.3   Monocytes Absolute Latest Units: K/CU MM 0.4   Eosinophils Absolute Latest Units: K/CU MM 0.2   Basophils Absolute Latest Units: K/CU MM 0.0   Differential Type Unknown AUTOMATED DIFFERENTIAL   Segs Relative Latest Ref Range: 36 - 66 % 62.6   Segs Absolute Latest Units: K/CU MM 3.2   Nucleated RBC % Latest Units: % 0.0   Immature Neutrophil % Latest Ref Range: 0 - 0.43 % 0.2   Total Immature Neutrophil Latest Units: K/CU MM 0.01   Total Nucleated RBC Latest Units: K/CU MM 0.0     Recent Imaging    VL DUP LOWER EXTREMITY VENOUS LEFT [[id number]] Collected: 11/06/21 1909      Order Status: Completed Updated: 11/06/21 1916     Narrative:       EXAMINATION:   DUPLEX VENOUS ULTRASOUND OF THE LEFT LOWER EXTREMITY     11/6/2021 5:57 pm     TECHNIQUE:   Duplex ultrasound using B-mode/gray scaled imaging and Doppler spectral   analysis and color flow was obtained of the deep venous structures of the   left extremity. COMPARISON:   None.      HISTORY:   ORDERING SYSTEM PROVIDED HISTORY: Left lower leg pain, no known injury   TECHNOLOGIST PROVIDED HISTORY:   Reason for exam:->Left lower leg pain, no known injury   Reason for Exam: Left lower leg pain, no known injury   Acuity: Unknown   Type of Exam: Initial     FINDINGS:   The visualized veins of the left lower extremity are patent and free of   echogenic thrombus. The visualized veins demonstrate good compressibility   with normal color flow study and spectral analysis.  Sub visualization of the   calf veins due to swelling.      Impression:       No evidence of DVT in the left lower extremity.      XR TIBIA FIBULA LEFT (2 VIEWS) [[id number]] Collected: 11/06/21 1721     Order Status: Completed Updated: 11/06/21 1726     Narrative:       EXAMINATION:   4 XRAY VIEWS OF THE LEFT TIBIA AND FIBULA     11/6/2021 4:47 pm     COMPARISON:   None. HISTORY:   ORDERING SYSTEM PROVIDED HISTORY: pain nki   TECHNOLOGIST PROVIDED HISTORY:   Reason for exam:->pain nki   Reason for Exam: leg swelling, NKI redness present   Acuity: Acute   Type of Exam: Initial     FINDINGS:   There is no acute fracture or suspect osseous lesion.  Alignment of the knee   and ankle is maintained.  Pretibial soft tissue swelling is present. [place]   is no radiopaque foreign body or soft tissue gas.  There are prominent   enthesophytic changes along the posterior aspect of the plantar fascia.      Impression:       1. No acute osseous abnormality of the left tibia or fibula. 2. Pretibial soft tissue swelling.          Relevant labs and imaging reviewed    ASSESSMENT AND PLAN   #. Left lower extremity cellulitis:  -Patient afebrile, no leukocytosis   -Continue IV vancomycin   -Blood cultures ordered in ER   -Venous Doppler negative for DVT. -Monitor for improvement, markings in place     #. Uncontrolled diabetes mellitus type 2, on insulin  -HbA1c 8.6-10/20/2021  -Patient reports he is on Trulicity q. weekly, Metformin(I could not see Metformin on his medication list)  -Continue insulin sliding scale with hypoglycemia protocol. #.  Chronic anemia  #. BPH-tamsulosin  #. hx Osteomyelitis  #. Anxiety/depression-Escitalopram  #. Hx MRSA and ESBL infection  #. Hx COVID-19   #. Fibromyalgia  #. obesity: BMI: 33.73kg/m2   #.   Marijuana use     DVT Prophylaxis: Lovenox  GI Prophylaxis: Protonix code Status: FULL.       Case d/w ED physician      Brandon Linares MD  Hospitalist, Internal Medicine  11/6/2021 at 7:50 PM

## 2021-11-07 PROBLEM — L03.116 CELLULITIS OF LEFT LOWER EXTREMITY: Status: ACTIVE | Noted: 2021-11-07

## 2021-11-07 LAB
ANION GAP SERPL CALCULATED.3IONS-SCNC: 8 MMOL/L (ref 4–16)
BASOPHILS ABSOLUTE: 0 K/CU MM
BASOPHILS RELATIVE PERCENT: 0.8 % (ref 0–1)
BUN BLDV-MCNC: 8 MG/DL (ref 6–23)
CALCIUM SERPL-MCNC: 8.7 MG/DL (ref 8.3–10.6)
CHLORIDE BLD-SCNC: 100 MMOL/L (ref 99–110)
CO2: 25 MMOL/L (ref 21–32)
CREAT SERPL-MCNC: 0.6 MG/DL (ref 0.9–1.3)
DIFFERENTIAL TYPE: ABNORMAL
EOSINOPHILS ABSOLUTE: 0.2 K/CU MM
EOSINOPHILS RELATIVE PERCENT: 4.4 % (ref 0–3)
GFR AFRICAN AMERICAN: >60 ML/MIN/1.73M2
GFR NON-AFRICAN AMERICAN: >60 ML/MIN/1.73M2
GLUCOSE BLD-MCNC: 149 MG/DL (ref 70–99)
GLUCOSE BLD-MCNC: 151 MG/DL (ref 70–99)
GLUCOSE BLD-MCNC: 168 MG/DL (ref 70–99)
GLUCOSE BLD-MCNC: 174 MG/DL (ref 70–99)
GLUCOSE BLD-MCNC: 195 MG/DL (ref 70–99)
HCT VFR BLD CALC: 37 % (ref 42–52)
HEMOGLOBIN: 11.4 GM/DL (ref 13.5–18)
IMMATURE NEUTROPHIL %: 0.2 % (ref 0–0.43)
LACTATE: 1.5 MMOL/L (ref 0.4–2)
LYMPHOCYTES ABSOLUTE: 1.7 K/CU MM
LYMPHOCYTES RELATIVE PERCENT: 31.7 % (ref 24–44)
MCH RBC QN AUTO: 28.4 PG (ref 27–31)
MCHC RBC AUTO-ENTMCNC: 30.8 % (ref 32–36)
MCV RBC AUTO: 92 FL (ref 78–100)
MONOCYTES ABSOLUTE: 0.4 K/CU MM
MONOCYTES RELATIVE PERCENT: 6.7 % (ref 0–4)
NUCLEATED RBC %: 0 %
PDW BLD-RTO: 14.5 % (ref 11.7–14.9)
PLATELET # BLD: 183 K/CU MM (ref 140–440)
PMV BLD AUTO: 9.9 FL (ref 7.5–11.1)
POTASSIUM SERPL-SCNC: 4 MMOL/L (ref 3.5–5.1)
RBC # BLD: 4.02 M/CU MM (ref 4.6–6.2)
SEGMENTED NEUTROPHILS ABSOLUTE COUNT: 3 K/CU MM
SEGMENTED NEUTROPHILS RELATIVE PERCENT: 56.2 % (ref 36–66)
SODIUM BLD-SCNC: 133 MMOL/L (ref 135–145)
TOTAL IMMATURE NEUTOROPHIL: 0.01 K/CU MM
TOTAL NUCLEATED RBC: 0 K/CU MM
WBC # BLD: 5.3 K/CU MM (ref 4–10.5)

## 2021-11-07 PROCEDURE — 36415 COLL VENOUS BLD VENIPUNCTURE: CPT

## 2021-11-07 PROCEDURE — 94761 N-INVAS EAR/PLS OXIMETRY MLT: CPT

## 2021-11-07 PROCEDURE — G0378 HOSPITAL OBSERVATION PER HR: HCPCS

## 2021-11-07 PROCEDURE — 1200000000 HC SEMI PRIVATE

## 2021-11-07 PROCEDURE — 6370000000 HC RX 637 (ALT 250 FOR IP): Performed by: INTERNAL MEDICINE

## 2021-11-07 PROCEDURE — 96366 THER/PROPH/DIAG IV INF ADDON: CPT

## 2021-11-07 PROCEDURE — 2580000003 HC RX 258: Performed by: INTERNAL MEDICINE

## 2021-11-07 PROCEDURE — 6360000002 HC RX W HCPCS: Performed by: INTERNAL MEDICINE

## 2021-11-07 PROCEDURE — 82962 GLUCOSE BLOOD TEST: CPT

## 2021-11-07 PROCEDURE — 83605 ASSAY OF LACTIC ACID: CPT

## 2021-11-07 PROCEDURE — 85025 COMPLETE CBC W/AUTO DIFF WBC: CPT

## 2021-11-07 PROCEDURE — 80048 BASIC METABOLIC PNL TOTAL CA: CPT

## 2021-11-07 PROCEDURE — 6370000000 HC RX 637 (ALT 250 FOR IP): Performed by: NURSE PRACTITIONER

## 2021-11-07 RX ORDER — OXYCODONE HYDROCHLORIDE AND ACETAMINOPHEN 5; 325 MG/1; MG/1
2 TABLET ORAL EVERY 4 HOURS PRN
Status: DISCONTINUED | OUTPATIENT
Start: 2021-11-07 | End: 2021-11-09 | Stop reason: HOSPADM

## 2021-11-07 RX ORDER — OXYCODONE AND ACETAMINOPHEN 10; 325 MG/1; MG/1
1 TABLET ORAL EVERY 4 HOURS PRN
Status: ON HOLD | COMMUNITY
End: 2021-12-21 | Stop reason: SDUPTHER

## 2021-11-07 RX ORDER — VANCOMYCIN 1.75 G/350ML
1250 INJECTION, SOLUTION INTRAVENOUS EVERY 12 HOURS
Status: DISCONTINUED | OUTPATIENT
Start: 2021-11-07 | End: 2021-11-09 | Stop reason: HOSPADM

## 2021-11-07 RX ADMIN — INSULIN LISPRO 2 UNITS: 100 INJECTION, SOLUTION INTRAVENOUS; SUBCUTANEOUS at 17:44

## 2021-11-07 RX ADMIN — INSULIN LISPRO 2 UNITS: 100 INJECTION, SOLUTION INTRAVENOUS; SUBCUTANEOUS at 12:52

## 2021-11-07 RX ADMIN — OXYCODONE AND ACETAMINOPHEN 1 TABLET: 5; 325 TABLET ORAL at 01:53

## 2021-11-07 RX ADMIN — OXYCODONE AND ACETAMINOPHEN 2 TABLET: 5; 325 TABLET ORAL at 16:00

## 2021-11-07 RX ADMIN — PREGABALIN 75 MG: 75 CAPSULE ORAL at 19:59

## 2021-11-07 RX ADMIN — SODIUM CHLORIDE, PRESERVATIVE FREE 10 ML: 5 INJECTION INTRAVENOUS at 10:03

## 2021-11-07 RX ADMIN — INSULIN LISPRO 2 UNITS: 100 INJECTION, SOLUTION INTRAVENOUS; SUBCUTANEOUS at 10:04

## 2021-11-07 RX ADMIN — VANCOMYCIN 1250 MG: 1.75 INJECTION, SOLUTION INTRAVENOUS at 10:12

## 2021-11-07 RX ADMIN — OXYCODONE AND ACETAMINOPHEN 2 TABLET: 5; 325 TABLET ORAL at 19:59

## 2021-11-07 RX ADMIN — OXYCODONE AND ACETAMINOPHEN 2 TABLET: 5; 325 TABLET ORAL at 23:51

## 2021-11-07 RX ADMIN — OXYCODONE AND ACETAMINOPHEN 2 TABLET: 5; 325 TABLET ORAL at 12:09

## 2021-11-07 RX ADMIN — PREGABALIN 75 MG: 75 CAPSULE ORAL at 10:02

## 2021-11-07 RX ADMIN — PANTOPRAZOLE SODIUM 40 MG: 40 TABLET, DELAYED RELEASE ORAL at 06:08

## 2021-11-07 RX ADMIN — OXYCODONE AND ACETAMINOPHEN 2 TABLET: 5; 325 TABLET ORAL at 07:03

## 2021-11-07 RX ADMIN — SODIUM CHLORIDE 25 ML: 9 INJECTION, SOLUTION INTRAVENOUS at 23:46

## 2021-11-07 RX ADMIN — VANCOMYCIN 1250 MG: 1.75 INJECTION, SOLUTION INTRAVENOUS at 23:47

## 2021-11-07 RX ADMIN — ATORVASTATIN CALCIUM 10 MG: 10 TABLET, FILM COATED ORAL at 10:02

## 2021-11-07 RX ADMIN — SODIUM CHLORIDE 25 ML: 9 INJECTION, SOLUTION INTRAVENOUS at 10:12

## 2021-11-07 RX ADMIN — ENOXAPARIN SODIUM 40 MG: 100 INJECTION SUBCUTANEOUS at 10:02

## 2021-11-07 RX ADMIN — ESCITALOPRAM OXALATE 10 MG: 10 TABLET ORAL at 10:02

## 2021-11-07 RX ADMIN — TAMSULOSIN HYDROCHLORIDE 0.4 MG: 0.4 CAPSULE ORAL at 10:02

## 2021-11-07 ASSESSMENT — PAIN SCALES - GENERAL
PAINLEVEL_OUTOF10: 9
PAINLEVEL_OUTOF10: 10

## 2021-11-07 ASSESSMENT — PAIN DESCRIPTION - DURATION: DURATION_2: CONTINUOUS

## 2021-11-07 ASSESSMENT — PAIN DESCRIPTION - PAIN TYPE
TYPE_2: ACUTE PAIN
TYPE: ACUTE PAIN

## 2021-11-07 ASSESSMENT — PAIN DESCRIPTION - LOCATION
LOCATION: LEG
LOCATION: LEG
LOCATION: FINGER (COMMENT WHICH ONE)
LOCATION_2: LEG

## 2021-11-07 ASSESSMENT — PAIN DESCRIPTION - ORIENTATION
ORIENTATION: LEFT
ORIENTATION: LEFT
ORIENTATION_2: LEFT
ORIENTATION: LEFT

## 2021-11-07 ASSESSMENT — PAIN DESCRIPTION - FREQUENCY: FREQUENCY: CONTINUOUS

## 2021-11-07 ASSESSMENT — PAIN DESCRIPTION - DESCRIPTORS
DESCRIPTORS_2: SHARP
DESCRIPTORS: SORE;THROBBING

## 2021-11-07 ASSESSMENT — PAIN DESCRIPTION - INTENSITY: RATING_2: 8

## 2021-11-07 NOTE — PROGRESS NOTES
ATTENDING PHYSICIAN'S PROGRESS NOTES    Patient:  Gisela Woods      Unit/Bed:4116/4116-A    YOB: 1965    MRN: [id number]     Acct: [de-identified]     Admit date: 11/6/2021    Patient Seen, Chart, Consults notes, Labs, Radiology studies reviewed. SUBJECTIVE:   Day 1 of stay with cellulitis of the left lower extremity and worsening swelling, pain and redness of the left index finger and most recent (in last 24 hours) has had no improvement. Patient was discharged from this facility on 10/27/2021 after he presented with a fishhook in his left foot which was removed. He also had infection of various fingers which was seen by orthopedic surgery and general surgery, had an I&D and was discharged home. All other ROS negative except noted in HPI    Past, Family, Social History unchanged from admission. Diet:  ADULT DIET;  Regular; 4 carb choices (60 gm/meal)    Medications:  Scheduled Meds:   vancomycin  1,250 mg IntraVENous Q12H    sodium chloride flush  5-40 mL IntraVENous 2 times per day    enoxaparin  40 mg SubCUTAneous Daily    insulin lispro  0-12 Units SubCUTAneous TID WC    insulin lispro  0-6 Units SubCUTAneous Nightly    escitalopram  10 mg Oral Daily    pantoprazole  40 mg Oral QAM AC    tamsulosin  0.4 mg Oral Daily    atorvastatin  10 mg Oral Daily    pregabalin  75 mg Oral BID     Continuous Infusions:   sodium chloride 25 mL (11/07/21 1012)    dextrose       PRN Meds:oxyCODONE-acetaminophen, sodium chloride flush, sodium chloride, ondansetron **OR** ondansetron, polyethylene glycol, acetaminophen **OR** acetaminophen, glucose, dextrose, glucagon (rDNA), dextrose    OBJECTIVE:    CBC:   Recent Labs     11/06/21  1624 11/07/21  0010   WBC 5.1 5.3   HGB 11.6* 11.4*    183     BMP:    Recent Labs     11/06/21  1624 11/07/21  0010    133*   K 4.0 4.0    100   CO2 22 25   BUN 8 8   CREATININE 0.5* 0.6*   GLUCOSE 180* 195*     Calcium:  Recent Labs 11/07/21  0010   CALCIUM 8.7     Ionized Calcium:No results for input(s): IONCA in the last 72 hours. Magnesium:No results for input(s): MG in the last 72 hours. Phosphorus:No results for input(s): PHOS in the last 72 hours. BNP:No results for input(s): BNP in the last 72 hours. Glucose:  Recent Labs     11/06/21  2346 11/07/21  0807 11/07/21  1200   POCGLU 245* 149* 168*     HgbA1C: No results for input(s): LABA1C in the last 72 hours. INR: No results for input(s): INR in the last 72 hours. Hepatic:   Recent Labs     11/06/21  1624   ALKPHOS 96   ALT 8*   AST 12*   PROT 6.4   BILITOT 0.3   LABALBU 3.7     Amylase and Lipase:No results for input(s): LACTA, AMYLASE in the last 72 hours. Lactic Acid: No results for input(s): LACTA in the last 72 hours. Troponin: No results for input(s): CKTOTAL, CKMB, TROPONINT in the last 72 hours. BNP: No results for input(s): BNP in the last 72 hours. Lipids: No results for input(s): CHOL, TRIG, HDL, LDLCALC in the last 72 hours. Invalid input(s): LDL  ABGs: No results found for: PH, PCO2, PO2, HCO3, O2SAT    Radiology reports as per the Radiologist  Radiology: XR TIBIA FIBULA LEFT (2 VIEWS)    Result Date: 11/6/2021  EXAMINATION: 4 XRAY VIEWS OF THE LEFT TIBIA AND FIBULA 11/6/2021 4:47 pm COMPARISON: None. HISTORY: ORDERING SYSTEM PROVIDED HISTORY: pain nki TECHNOLOGIST PROVIDED HISTORY: Reason for exam:->pain nki Reason for Exam: leg swelling, NKI redness present Acuity: Acute Type of Exam: Initial FINDINGS: There is no acute fracture or suspect osseous lesion. Alignment of the knee and ankle is maintained. Pretibial soft tissue swelling is present. There is no radiopaque foreign body or soft tissue gas. There are prominent enthesophytic changes along the posterior aspect of the plantar fascia. 1. No acute osseous abnormality of the left tibia or fibula. 2. Pretibial soft tissue swelling.      VL DUP LOWER EXTREMITY VENOUS LEFT    Result Date: 11/6/2021  EXAMINATION: DUPLEX VENOUS ULTRASOUND OF THE LEFT LOWER EXTREMITY 11/6/2021 5:57 pm TECHNIQUE: Duplex ultrasound using B-mode/gray scaled imaging and Doppler spectral analysis and color flow was obtained of the deep venous structures of the left extremity. COMPARISON: None. HISTORY: ORDERING SYSTEM PROVIDED HISTORY: Left lower leg pain, no known injury TECHNOLOGIST PROVIDED HISTORY: Reason for exam:->Left lower leg pain, no known injury Reason for Exam: Left lower leg pain, no known injury Acuity: Unknown Type of Exam: Initial FINDINGS: The visualized veins of the left lower extremity are patent and free of echogenic thrombus. The visualized veins demonstrate good compressibility with normal color flow study and spectral analysis. Sub visualization of the calf veins due to swelling. No evidence of DVT in the left lower extremity. Physical Exam:  Vitals: /86   Pulse 82   Temp 97.6 °F (36.4 °C) (Oral)   Resp 16   Wt 245 lb 2.4 oz (111.2 kg)   SpO2 100%   BMI 33.72 kg/m²   24 hour intake/output:No intake or output data in the 24 hours ending 11/07/21 1509  Last 3 weights:   Wt Readings from Last 3 Encounters:   11/07/21 245 lb 2.4 oz (111.2 kg)   10/24/21 245 lb 3.2 oz (111.2 kg)   09/15/21 240 lb (108.9 kg)       General appearance - alert, well appearing, and in no distress and acyanotic, in no respiratory distress  HEENT: Normocephalic, Atraumatic, Conjuctiva pink, PERRL, Oral mucosa normal, Lips, teeth and gums normal, Trachea midline, Thyroid normal and No noted lymphadenopathy  Chest - clear to auscultation, no wheezes, rales or rhonchi, symmetric air entry  Cardiovascular - normal rate, regular rhythm, normal S1, S2, no murmurs, rubs, clicks or gallops  Abdomen - soft, nontender, nondistended, no masses or organomegaly   Neurological - Alert and oriented, Normal speech, No focal findings or movement disorder noted and Motor and sensory grossly normal bilaterally  Integumentary - Skin color, texture, turgor normal. No Rashes or lesions  Musculoskeletal -Multiple fingertip amputations with swelling left middle finger, redness involving the left lower extremity and the distal portion of the right lower extremity, Full ROM times 4 extremities and No clubbing or cyanosis      DVT prophylaxis: [x] Lovenox                                 [] SCDs                                 [] SQ Heparin                                 [] Encourage ambulation           [] Already on Anticoagulation                 ASSESSMENT / PLAN :    Principal Problem:    Cellulitis of left lower extremity  Maintain on current broad-spectrum IV antibiotic awaiting blood culture and sensitivity report. Cellulitis of left middle finger  Scheduled for an MRI of the left hand to rule out osteomyelitis. Orthopedic surgery consult called to assess the patient for their input. Continue with IV antibiotic as ordered. Active Problems:    Finger infection right index finger  With a dressing on and patient indicates this time is not bothering him as much as the left      Uncontrolled diabetes mellitus with complications ([address]  Continue with current diabetes management, utilize SSI as needed to optimize blood sugar control, maintain on ADA diet. To address compliance with medications with patient. Resolved Problems:    * No resolved hospital problems. *    Management as outlined above, maintain patient in-house pending evaluation by orthopedic surgery, continue with IV antibiotics, a.m. labs.     Electronically signed by Osmin Retana MD on 11/7/2021 at 3:09 PM    Rounding Hospitalist

## 2021-11-07 NOTE — ED NOTES
Report called to [facility] floor nurse, all questions answered      Marleen Mondragon RN  11/06/21 9975

## 2021-11-07 NOTE — PROGRESS NOTES
[address] [facility]  consulted by Dr. Unique Rocha for monitoring and adjustment. Indication for treatment: LLE Cellulitis  Goal AUC: Less than 500  Goal trough: 10 - 15 mcg/mL     Pertinent Laboratory Values:       Temp Readings from Last 3 Encounters:   11/06/21 98.1 °F (36.7 °C) (Oral)   10/27/21 97.5 °F (36.4 °C) (Oral)   09/15/21 98.5 °F (36.9 °C) (Oral)            Recent Labs     11/06/21  1624 11/06/21  1843 11/07/21  0010   WBC 5.1  --  5.3   LACTATE 2.2* 2.3* 1.5           Recent Labs     11/06/21  1624 11/07/21  0010   BUN 8 8   CREATININE 0.5* 0.6*      Estimated Creatinine Clearance: 176 mL/min (A) (based on SCr of 0.6 mg/dL (L)). No intake or output data in the 24 hours ending 11/07/21 6722     Pertinent Cultures:  Date                            Source                                     Results  11/06                           Blood                                       Collected                                                                                        Assessment:  WBC WNL at 5.3; Afebrile  SCr = 0.6, BUN = 8, No I/O data  Day(s) of therapy: 1  Vancomycin concentration:   11/08 - To be collected     Plan:  Vancomycin 2,000 mg IV given in ED; Follow with Vancomycin 1,250 mg IV Q 12 Hours  Predicted AUC: 372; Predicted Trough: 10.4  Pharmacy will continue to monitor patient and adjust therapy as indicated     [address] 11/08/2021 @ 9 am     Thank you for the consult.   Josue Mullen [facility]   11/7/2021 6:09 AM

## 2021-11-08 ENCOUNTER — APPOINTMENT (OUTPATIENT)
Dept: MRI IMAGING | Age: 56
DRG: 603 | End: 2021-11-08
Payer: MEDICARE

## 2021-11-08 VITALS
RESPIRATION RATE: 16 BRPM | DIASTOLIC BLOOD PRESSURE: 99 MMHG | WEIGHT: 245.15 LBS | HEART RATE: 84 BPM | OXYGEN SATURATION: 96 % | BODY MASS INDEX: 33.72 KG/M2 | SYSTOLIC BLOOD PRESSURE: 132 MMHG | TEMPERATURE: 98.1 F

## 2021-11-08 PROBLEM — L02.512 ABSCESS OF LEFT INDEX FINGER: Status: ACTIVE | Noted: 2021-11-08

## 2021-11-08 LAB
ALBUMIN SERPL-MCNC: 3.8 GM/DL (ref 3.4–5)
ANION GAP SERPL CALCULATED.3IONS-SCNC: 13 MMOL/L (ref 4–16)
BASOPHILS ABSOLUTE: 0 K/CU MM
BASOPHILS RELATIVE PERCENT: 0.6 % (ref 0–1)
BUN BLDV-MCNC: 12 MG/DL (ref 6–23)
CALCIUM SERPL-MCNC: 8.7 MG/DL (ref 8.3–10.6)
CHLORIDE BLD-SCNC: 102 MMOL/L (ref 99–110)
CO2: 21 MMOL/L (ref 21–32)
CREAT SERPL-MCNC: 0.6 MG/DL (ref 0.9–1.3)
DIFFERENTIAL TYPE: ABNORMAL
DOSE AMOUNT: ABNORMAL
DOSE TIME: ABNORMAL
EOSINOPHILS ABSOLUTE: 0.2 K/CU MM
EOSINOPHILS RELATIVE PERCENT: 3.4 % (ref 0–3)
GFR AFRICAN AMERICAN: >60 ML/MIN/1.73M2
GFR NON-AFRICAN AMERICAN: >60 ML/MIN/1.73M2
GLUCOSE BLD-MCNC: 157 MG/DL (ref 70–99)
GLUCOSE BLD-MCNC: 164 MG/DL (ref 70–99)
GLUCOSE BLD-MCNC: 168 MG/DL (ref 70–99)
GLUCOSE BLD-MCNC: 169 MG/DL (ref 70–99)
GLUCOSE BLD-MCNC: 192 MG/DL (ref 70–99)
HCT VFR BLD CALC: 37.5 % (ref 42–52)
HEMOGLOBIN: 11.6 GM/DL (ref 13.5–18)
IMMATURE NEUTROPHIL %: 0.4 % (ref 0–0.43)
LYMPHOCYTES ABSOLUTE: 1.4 K/CU MM
LYMPHOCYTES RELATIVE PERCENT: 27.3 % (ref 24–44)
MAGNESIUM: 1.7 MG/DL (ref 1.8–2.4)
MCH RBC QN AUTO: 28.3 PG (ref 27–31)
MCHC RBC AUTO-ENTMCNC: 30.9 % (ref 32–36)
MCV RBC AUTO: 91.5 FL (ref 78–100)
MONOCYTES ABSOLUTE: 0.4 K/CU MM
MONOCYTES RELATIVE PERCENT: 7.4 % (ref 0–4)
NUCLEATED RBC %: 0 %
PDW BLD-RTO: 14.6 % (ref 11.7–14.9)
PHOSPHORUS: 3.3 MG/DL (ref 2.5–4.9)
PLATELET # BLD: 204 K/CU MM (ref 140–440)
PMV BLD AUTO: 10 FL (ref 7.5–11.1)
POTASSIUM SERPL-SCNC: 4.4 MMOL/L (ref 3.5–5.1)
RBC # BLD: 4.1 M/CU MM (ref 4.6–6.2)
SEGMENTED NEUTROPHILS ABSOLUTE COUNT: 3 K/CU MM
SEGMENTED NEUTROPHILS RELATIVE PERCENT: 60.9 % (ref 36–66)
SODIUM BLD-SCNC: 136 MMOL/L (ref 135–145)
TOTAL IMMATURE NEUTOROPHIL: 0.02 K/CU MM
TOTAL NUCLEATED RBC: 0 K/CU MM
VANCOMYCIN TROUGH: 9.6 UG/ML (ref 10–20)
WBC # BLD: 5 K/CU MM (ref 4–10.5)

## 2021-11-08 PROCEDURE — G0378 HOSPITAL OBSERVATION PER HR: HCPCS

## 2021-11-08 PROCEDURE — 6370000000 HC RX 637 (ALT 250 FOR IP): Performed by: NURSE PRACTITIONER

## 2021-11-08 PROCEDURE — 80069 RENAL FUNCTION PANEL: CPT

## 2021-11-08 PROCEDURE — 2580000003 HC RX 258: Performed by: INTERNAL MEDICINE

## 2021-11-08 PROCEDURE — 96366 THER/PROPH/DIAG IV INF ADDON: CPT

## 2021-11-08 PROCEDURE — 6370000000 HC RX 637 (ALT 250 FOR IP): Performed by: INTERNAL MEDICINE

## 2021-11-08 PROCEDURE — 83735 ASSAY OF MAGNESIUM: CPT

## 2021-11-08 PROCEDURE — 36415 COLL VENOUS BLD VENIPUNCTURE: CPT

## 2021-11-08 PROCEDURE — 94761 N-INVAS EAR/PLS OXIMETRY MLT: CPT

## 2021-11-08 PROCEDURE — 85025 COMPLETE CBC W/AUTO DIFF WBC: CPT

## 2021-11-08 PROCEDURE — 73218 MRI UPPER EXTREMITY W/O DYE: CPT

## 2021-11-08 PROCEDURE — 82962 GLUCOSE BLOOD TEST: CPT

## 2021-11-08 PROCEDURE — 80202 ASSAY OF VANCOMYCIN: CPT

## 2021-11-08 PROCEDURE — 6360000002 HC RX W HCPCS: Performed by: INTERNAL MEDICINE

## 2021-11-08 RX ORDER — SENNA AND DOCUSATE SODIUM 50; 8.6 MG/1; MG/1
2 TABLET, FILM COATED ORAL 2 TIMES DAILY
Status: DISCONTINUED | OUTPATIENT
Start: 2021-11-08 | End: 2021-11-09 | Stop reason: HOSPADM

## 2021-11-08 RX ADMIN — SENNOSIDES AND DOCUSATE SODIUM 2 TABLET: 50; 8.6 TABLET ORAL at 20:37

## 2021-11-08 RX ADMIN — ACETAMINOPHEN 650 MG: 325 TABLET ORAL at 08:39

## 2021-11-08 RX ADMIN — ATORVASTATIN CALCIUM 10 MG: 10 TABLET, FILM COATED ORAL at 08:40

## 2021-11-08 RX ADMIN — SODIUM CHLORIDE, PRESERVATIVE FREE 5 ML: 5 INJECTION INTRAVENOUS at 20:38

## 2021-11-08 RX ADMIN — OXYCODONE AND ACETAMINOPHEN 2 TABLET: 5; 325 TABLET ORAL at 20:36

## 2021-11-08 RX ADMIN — ENOXAPARIN SODIUM 40 MG: 100 INJECTION SUBCUTANEOUS at 08:40

## 2021-11-08 RX ADMIN — INSULIN LISPRO 2 UNITS: 100 INJECTION, SOLUTION INTRAVENOUS; SUBCUTANEOUS at 14:58

## 2021-11-08 RX ADMIN — OXYCODONE AND ACETAMINOPHEN 2 TABLET: 5; 325 TABLET ORAL at 05:46

## 2021-11-08 RX ADMIN — INSULIN LISPRO 2 UNITS: 100 INJECTION, SOLUTION INTRAVENOUS; SUBCUTANEOUS at 08:40

## 2021-11-08 RX ADMIN — SODIUM CHLORIDE, PRESERVATIVE FREE 10 ML: 5 INJECTION INTRAVENOUS at 08:40

## 2021-11-08 RX ADMIN — VANCOMYCIN 1250 MG: 1.75 INJECTION, SOLUTION INTRAVENOUS at 20:55

## 2021-11-08 RX ADMIN — PREGABALIN 75 MG: 75 CAPSULE ORAL at 08:40

## 2021-11-08 RX ADMIN — INSULIN LISPRO 2 UNITS: 100 INJECTION, SOLUTION INTRAVENOUS; SUBCUTANEOUS at 18:18

## 2021-11-08 RX ADMIN — OXYCODONE AND ACETAMINOPHEN 2 TABLET: 5; 325 TABLET ORAL at 09:52

## 2021-11-08 RX ADMIN — PREGABALIN 75 MG: 75 CAPSULE ORAL at 20:37

## 2021-11-08 RX ADMIN — ESCITALOPRAM OXALATE 10 MG: 10 TABLET ORAL at 08:40

## 2021-11-08 RX ADMIN — OXYCODONE AND ACETAMINOPHEN 2 TABLET: 5; 325 TABLET ORAL at 14:55

## 2021-11-08 RX ADMIN — TAMSULOSIN HYDROCHLORIDE 0.4 MG: 0.4 CAPSULE ORAL at 08:39

## 2021-11-08 RX ADMIN — PANTOPRAZOLE SODIUM 40 MG: 40 TABLET, DELAYED RELEASE ORAL at 05:46

## 2021-11-08 RX ADMIN — VANCOMYCIN 1250 MG: 1.75 INJECTION, SOLUTION INTRAVENOUS at 11:59

## 2021-11-08 RX ADMIN — POLYETHYLENE GLYCOL (3350) 17 G: 17 POWDER, FOR SOLUTION ORAL at 12:04

## 2021-11-08 ASSESSMENT — PAIN SCALES - GENERAL
PAINLEVEL_OUTOF10: 2
PAINLEVEL_OUTOF10: 9
PAINLEVEL_OUTOF10: 2
PAINLEVEL_OUTOF10: 10

## 2021-11-08 ASSESSMENT — PAIN DESCRIPTION - LOCATION: LOCATION: FINGER (COMMENT WHICH ONE);LEG

## 2021-11-08 ASSESSMENT — PAIN DESCRIPTION - DESCRIPTORS: DESCRIPTORS: ACHING;THROBBING

## 2021-11-08 ASSESSMENT — PAIN DESCRIPTION - FREQUENCY: FREQUENCY: CONTINUOUS

## 2021-11-08 ASSESSMENT — PAIN DESCRIPTION - ONSET: ONSET: ON-GOING

## 2021-11-08 ASSESSMENT — PAIN - FUNCTIONAL ASSESSMENT: PAIN_FUNCTIONAL_ASSESSMENT: PREVENTS OR INTERFERES SOME ACTIVE ACTIVITIES AND ADLS

## 2021-11-08 ASSESSMENT — PAIN DESCRIPTION - PAIN TYPE: TYPE: ACUTE PAIN

## 2021-11-08 NOTE — PROGRESS NOTES
ATTENDING PHYSICIAN'S PROGRESS NOTES    Patient:  Mackenzie Abel      Unit/Bed:4116/4116-A    YOB: 1965    MRN: [id number]     Acct: [de-identified]     Admit date: 11/6/2021    Patient Seen, Chart, Consults notes, Labs, Radiology studies reviewed. SUBJECTIVE:   Day 2 of stay with cellulitis of the left lower extremity and worsening swelling, pain and redness of the left index finger and most recent (in last 24 hours) has had no improvement. Patient was discharged from this facility on 10/27/2021 after he presented with a fishhook in his left foot which was removed. He also had infection of various fingers which was seen by orthopedic surgery and general surgery, had an I&D and was discharged home. Work-up with MRI has shown abscess of the left index finger stump with marrow reaction and no osteomyelitis at the moment. Of note is no hand surgery consult in this facility to assist in this patient's management. All other ROS negative except noted in HPI    Past, Family, Social History unchanged from admission. Diet:  ADULT DIET;  Regular; 4 carb choices (60 gm/meal)    Medications:  Scheduled Meds:   sennosides-docusate sodium  2 tablet Oral BID    vancomycin  1,250 mg IntraVENous Q12H    sodium chloride flush  5-40 mL IntraVENous 2 times per day    enoxaparin  40 mg SubCUTAneous Daily    insulin lispro  0-12 Units SubCUTAneous TID WC    insulin lispro  0-6 Units SubCUTAneous Nightly    escitalopram  10 mg Oral Daily    pantoprazole  40 mg Oral QAM AC    tamsulosin  0.4 mg Oral Daily    atorvastatin  10 mg Oral Daily    pregabalin  75 mg Oral BID     Continuous Infusions:   sodium chloride 25 mL (11/07/21 2306)    dextrose       PRN Meds:oxyCODONE-acetaminophen, sodium chloride flush, sodium chloride, ondansetron **OR** ondansetron, polyethylene glycol, acetaminophen **OR** acetaminophen, glucose, dextrose, glucagon (rDNA), dextrose    OBJECTIVE:    CBC:   Recent Labs 11/06/21  1624 11/07/21  0010 11/08/21  1056   WBC 5.1 5.3 5.0   HGB 11.6* 11.4* 11.6*    183 204     BMP:    Recent Labs     11/06/21  1624 11/07/21  0010 11/08/21  1056    133* 136   K 4.0 4.0 4.4    100 102   CO2 22 25 21   BUN 8 8 12   CREATININE 0.5* 0.6* 0.6*   GLUCOSE 180* 195* 192*     Calcium:  Recent Labs     11/08/21  1056   CALCIUM 8.7     Ionized Calcium:No results for input(s): IONCA in the last 72 hours. Magnesium:  Recent Labs     11/08/21  1056   MG 1.7*     Phosphorus:  Recent Labs     11/08/21  1056   PHOS 3.3     BNP:No results for input(s): BNP in the last 72 hours. Glucose:  Recent Labs     11/07/21  1957 11/08/21  0752 11/08/21  1205   POCGLU 174* 168* 164*     HgbA1C: No results for input(s): LABA1C in the last 72 hours. INR: No results for input(s): INR in the last 72 hours. Hepatic:   Recent Labs     11/06/21  1624 11/06/21  1624 11/08/21  1056   ALKPHOS 96  --   --    ALT 8*  --   --    AST 12*  --   --    PROT 6.4  --   --    BILITOT 0.3  --   --    LABALBU 3.7   < > 3.8    < > = values in this interval not displayed. Amylase and Lipase:No results for input(s): LACTA, AMYLASE in the last 72 hours. Lactic Acid: No results for input(s): LACTA in the last 72 hours. Troponin: No results for input(s): CKTOTAL, CKMB, TROPONINT in the last 72 hours. BNP: No results for input(s): BNP in the last 72 hours. Lipids: No results for input(s): CHOL, TRIG, HDL, LDLCALC in the last 72 hours. Invalid input(s): LDL  ABGs: No results found for: PH, PCO2, PO2, HCO3, O2SAT    Radiology reports as per the Radiologist  Radiology: XR TIBIA FIBULA LEFT (2 VIEWS)    Result Date: 11/6/2021  EXAMINATION: 4 XRAY VIEWS OF THE LEFT TIBIA AND FIBULA 11/6/2021 4:47 pm COMPARISON: None.  HISTORY: ORDERING SYSTEM PROVIDED HISTORY: pain nki TECHNOLOGIST PROVIDED HISTORY: Reason for exam:->pain nki Reason for Exam: leg swelling, NKI redness present Acuity: Acute Type of Exam: Initial FINDINGS: There is no acute fracture or suspect osseous lesion. Alignment of the knee and ankle is maintained. Pretibial soft tissue swelling is present. There is no radiopaque foreign body or soft tissue gas. There are prominent enthesophytic changes along the posterior aspect of the plantar fascia. 1. No acute osseous abnormality of the left tibia or fibula. 2. Pretibial soft tissue swelling. VL DUP LOWER EXTREMITY VENOUS LEFT    Result Date: 11/6/2021  EXAMINATION: DUPLEX VENOUS ULTRASOUND OF THE LEFT LOWER EXTREMITY 11/6/2021 5:57 pm TECHNIQUE: Duplex ultrasound using B-mode/gray scaled imaging and Doppler spectral analysis and color flow was obtained of the deep venous structures of the left extremity. COMPARISON: None. HISTORY: ORDERING SYSTEM PROVIDED HISTORY: Left lower leg pain, no known injury TECHNOLOGIST PROVIDED HISTORY: Reason for exam:->Left lower leg pain, no known injury Reason for Exam: Left lower leg pain, no known injury Acuity: Unknown Type of Exam: Initial FINDINGS: The visualized veins of the left lower extremity are patent and free of echogenic thrombus. The visualized veins demonstrate good compressibility with normal color flow study and spectral analysis. Sub visualization of the calf veins due to swelling. No evidence of DVT in the left lower extremity. Physical Exam:  Vitals: BP (!) 141/90   Pulse 76   Temp 97.5 °F (36.4 °C) (Oral)   Resp 18   Wt 245 lb 2.4 oz (111.2 kg)   SpO2 100%   BMI 33.72 kg/m²   24 hour intake/output:No intake or output data in the 24 hours ending 11/08/21 1657  Last 3 weights:   Wt Readings from Last 3 Encounters:   11/07/21 245 lb 2.4 oz (111.2 kg)   10/24/21 245 lb 3.2 oz (111.2 kg)   09/15/21 240 lb (108.9 kg)       General appearance - alert, well appearing, and in no distress and acyanotic, in no respiratory distress  HEENT: Normocephalic, Atraumatic, Conjuctiva pink, PERRL, Oral mucosa normal, Lips, teeth and gums normal, Trachea midline, Thyroid normal and No noted lymphadenopathy  Chest - clear to auscultation, no wheezes, rales or rhonchi, symmetric air entry  Cardiovascular - normal rate, regular rhythm, normal S1, S2, no murmurs, rubs, clicks or gallops  Abdomen - soft, nontender, nondistended, no masses or organomegaly   Neurological - Alert and oriented, Normal speech, No focal findings or movement disorder noted and Motor and sensory grossly normal bilaterally  Integumentary - Skin color, texture, turgor normal. No Rashes or lesions  Musculoskeletal -Multiple fingertip amputations with swelling left middle finger, redness involving the left lower extremity and the distal portion of the right lower extremity, Full ROM times 4 extremities and No clubbing or cyanosis      DVT prophylaxis: [x] Lovenox                                 [] SCDs                                 [] SQ Heparin                                 [] Encourage ambulation           [] Already on Anticoagulation                 ASSESSMENT / PLAN :    Principal Problem:    Cellulitis of left lower extremity  Maintain on current broad-spectrum IV antibiotic awaiting blood culture and sensitivity report. Cellulitis of left index finger  MRI of the left hand showing abscess of the left index finger stump with no osteomyelitis. Patient will be discharged and transferred to the [facility], where patient has had most of his hand surgery stay. Continue with IV antibiotic as ordered until discharge. Active Problems:    Finger infection right index finger  With a dressing on and patient indicates this time is not bothering him as much as the left      Uncontrolled diabetes mellitus with complications ([address]  Continue with current diabetes management, utilize SSI as needed to optimize blood sugar control, maintain on ADA diet. To address compliance with medications with patient. Resolved Problems:    * No resolved hospital problems.  *    Echo has already been placed through the [facility], and upon discussion with the hand surgeon, patient has been accepted by the hand surgeon. However he would want the patient to be admitted onto the hospitalist service and currently awaiting a callback with the hospitalist for further discussion and transfer. We will in the interim proceed with the discharge of the patient. Patient has already been notified of the discharge for the hand surgery evaluation and further recommendations.     Electronically signed by Daniel Ruff MD on 11/8/2021 at 4:57 PM    Rounding Hospitalist

## 2021-11-08 NOTE — DISCHARGE SUMMARY
noted to have /80, HR 95, RR 20, temp 98.1, saturating 98% on room air. Lab work significant for sodium 136, lactic acid 2.2, random glucose 180, proBNP 72, WBC 5.1, hemoglobin 11.6, platelets 375. Repeat lactic acid was trending up and hence a decision was made to admit the patient. Blood cultures were ordered in ER. Patient received IV fluids, vancomycin, clindamycin in ER. Hospital Course: Patient has had multiple hospital visits and admissions for similar presentation. He had presented as indicated above, initially with redness swelling and warmth of the left lower extremity, was maintained on broad-spectrum IV antibiotic, subsequently currently being maintained on IV vancomycin. Patient had been complaining of worsening swelling, redness and warmth of the left hand more pronounced on the left index finger stump. He did have multiple electrolyte imbalances that were corrected per protocol. An MRI was obtained today that shows an abscess of the stump with marrow reaction but no osteomyelitis currently. We have the absence of a hand surgeon in this facility, [facility] was called, initially talking to the hand surgeon on-call, and subsequently the hospitalist Dr. Ben Oropeza, who has accepted the patient in transfer. Patient will be transferred out there whenever a bed becomes available. He has been discharged in a stable medical state.     Prognosis: Fair to good    Physical Examination:   General appearance - alert, well appearing, and in no distress and acyanotic, in no respiratory distress  HEENT: Normocephalic, Atraumatic, Conjuctiva pink, PERRL, Oral mucosa normal, Lips, teeth and gums normal, Trachea midline, Thyroid normal and No noted lymphadenopathy  Chest - clear to auscultation, no wheezes, rales or rhonchi, symmetric air entry  Cardiovascular - normal rate, regular rhythm, normal S1, S2, no murmurs, rubs, clicks or gallops  Abdomen - soft, nontender, nondistended, no masses or organomegaly   Neurological - Alert and oriented, Normal speech, No focal findings or movement disorder noted and Motor and sensory grossly normal bilaterally  Integumentary - Skin color, texture, turgor normal. No Rashes or lesions  Musculoskeletal -Multiple fingertip amputations with swelling left index finger, redness involving the left lower extremity and the distal portion of the right lower extremity, Full ROM times 4 extremities and No clubbing or cyanosis    Significant Diagnostic Studies: radiology: MRI:   Impression:        Soft tissue abscess measuring 1.9 x 5.3 x 1.4 cm with cellulitis at the tip   of the index finger stump.  There is reactive bone marrow edema at the   adjacent middle phalanx without osteomyelitis at this time.         Pending Investigation/labs: Blood culture and sensitivity    Recent Labs:  CBC with Differential:    Lab Results   Component Value Date    WBC 5.0 11/08/2021    RBC 4.10 11/08/2021    HGB 11.6 11/08/2021    HCT 37.5 11/08/2021     11/08/2021    MCV 91.5 11/08/2021    MCH 28.3 11/08/2021    MCHC 30.9 11/08/2021    RDW 14.6 11/08/2021    SEGSPCT 60.9 11/08/2021    BANDSPCT 3 06/14/2019    LYMPHOPCT 27.3 11/08/2021    PROMYELOPCT 1 10/23/2013    MONOPCT 7.4 11/08/2021    MYELOPCT 1 06/14/2019    EOSPCT 1.4 01/12/2012    BASOPCT 0.6 11/08/2021    MONOSABS 0.4 11/08/2021    LYMPHSABS 1.4 11/08/2021    EOSABS 0.2 11/08/2021    BASOSABS 0.0 11/08/2021    DIFFTYPE AUTOMATED DIFFERENTIAL 11/08/2021     CMP:    Lab Results   Component Value Date     11/08/2021    K 4.4 11/08/2021     11/08/2021    CO2 21 11/08/2021    BUN 12 11/08/2021    CREATININE 0.6 11/08/2021    GFRAA >60 11/08/2021    LABGLOM >60 11/08/2021    GLUCOSE 192 11/08/2021    PROT 6.4 11/06/2021    PROT 7.3 09/10/2011    LABALBU 3.8 11/08/2021    CALCIUM 8.7 11/08/2021    BILITOT 0.3 11/06/2021    ALKPHOS 96 11/06/2021    AST 12 11/06/2021    ALT 8 11/06/2021     Magnesium:    Lab Results Component Value Date    MG 1.7 11/08/2021     Phosphorus:    Lab Results   Component Value Date    PHOS 3.3 11/08/2021       Patient Instructions  Medications:     Medication List      CONTINUE taking these medications    acetaminophen 500 MG tablet  Commonly known as: TYLENOL  Take 2 tablets by mouth 3 times daily for 10 days     atorvastatin 10 MG tablet  Commonly known as: LIPITOR     dicyclomine 10 MG capsule  Commonly known as: Bentyl  Take 2 capsules by mouth 4 times daily as needed (abdominal pain)     Lexapro 10 MG tablet  Generic drug: escitalopram     ondansetron 4 MG disintegrating tablet  Commonly known as: Zofran ODT  Take 1 tablet by mouth every 6 hours     ondansetron 4 MG tablet  Commonly known as: ZOFRAN     oxyCODONE-acetaminophen  MG per tablet  Commonly known as: PERCOCET     pantoprazole 40 MG tablet  Commonly known as: PROTONIX  Take 1 tablet by mouth 2 times daily for 14 days     pregabalin 75 MG capsule  Commonly known as: LYRICA     Probiotic Acidophilus Tabs  Take 1 tablet by mouth 2 times daily     tamsulosin 0.4 MG capsule  Commonly known as: FLOMAX  Take 1 capsule by mouth daily for 7 doses     [facility]        STOP taking these medications    oxyCODONE 5 MG immediate release tablet  Commonly known as: ROXICODONE            Activity: activity as tolerated  Diet: diabetic diet  Wound Care: Be determined by the hand surgeon upon discharge from the [place]    Follow-up with:   No follow-up provider specified.   Services: None      Electronically Signed by: Mattie Pinon MD, MD.    Time spent on discharge/dictation: 40 minutes

## 2021-11-08 NOTE — CARE COORDINATION
Spoke with Dr. Emanuel Lopez, he plans to transfer pt to another hospital for hand surgeon consult. CM to sign off.

## 2021-11-08 NOTE — PROGRESS NOTES
[address] [facility]  consulted by Dr. Felicitas Gonzales for monitoring and adjustment. Indication for treatment: LLE Cellulitis  Goal AUC: Less than 500  Goal trough: 10 - 15 mcg/mL     Pertinent Laboratory Values:       Temp Readings from Last 3 Encounters:   11/06/21 98.1 °F (36.7 °C) (Oral)   10/27/21 97.5 °F (36.4 °C) (Oral)   09/15/21 98.5 °F (36.9 °C) (Oral)            Recent Labs     11/06/21  1624 11/06/21  1843 11/07/21  0010   WBC 5.1  --  5.3   LACTATE 2.2* 2.3* 1.5           Recent Labs     11/06/21  1624 11/07/21  0010   BUN 8 8   CREATININE 0.5* 0.6*      Estimated Creatinine Clearance: 176 mL/min (A) (based on SCr of 0.6 mg/dL (L)). No intake or output data in the 24 hours ending 11/07/21 0609     Pertinent Cultures:  Date                            Source                                     Results  11/06                           Blood                                       No growth     VANCOMYCIN TROUGH:  Recent Labs     11/08/21  1056   VANCOTROUGH 9.6*     VANCOMYCIN RANDOM:  No results for input(s): VANCORANDOM in the last 72 hours. Assessment:  1. WBC normal, pt afebrile  2. SCR at baseline, no UOP data  3. Day(s) of therapy: 2  4. Vancomycin concentration:   ? 11/08 - 9.6, 11 hour level on 1250mg q12h     Plan:  · Renal trends remain at baseline. · Vanco level today predicts a therapeutic trough at steady state. · Continue vancomycin 1250mg ivpb q12h for a precited trough of 12, AUC <500. · Recheck the vanco level in 3 days to monitor for accumulation 2/2 BMI  · Pharmacy will continue to monitor patient and adjust therapy as indicated     Mehreenu 3 11/11/2021 @ 06:00     Thank you for the consult. Donna Sánchez, [facility]  3:00 PM  11/08/21

## 2021-11-09 NOTE — DISCHARGE INSTR - COC
Continuity of Care Form    Patient Name: Oliver Pathak   :  1965  MRN:  [id number]    Admit date:  2021  Discharge date:2021    Code Status Order: Full Code   Advance Directives:      Admitting Physician:  Sera Wong MD  PCP: Stacey Ferrer    Discharging Nurse: [facility] Unit/Room#: 4245/0370-U  Discharging Unit Phone Number: [phone]    Emergency Contact:   Extended Emergency Contact Information  Primary Emergency Contact: [place], [address] Phone: [phone]  Work Phone: [phone]  Mobile Phone: [phone]  Relation: Child  Secondary Emergency Contact: [facility]  Mobile Phone: [phone]  Relation: Spouse    Past Surgical History:  Past Surgical History:   Procedure Laterality Date    COLONOSCOPY      Normal exam per pt    DEBRIDEMENT  2014    left foot    ENDOSCOPY, COLON, DIAGNOSTIC  2016    Normal exam per pt -     FINGER SURGERY  11    Right Index Finger    FINGER SURGERY  12    RIght Index Finger-Removal of loose body, Reconstruction of Mallet Finger    FOOT DEBRIDEMENT Left 6/10/2019    FOOT DEBRIDEMENT INCISION AND DRAINAGE performed by Neo Gordillo MD at [address] [place] [address] Right 2018    I&D right foot    HEMORRHOID SURGERY      OTHER SURGICAL HISTORY Left 10/22/2013    I & D left foot    OTHER SURGICAL HISTORY  2017    I&D fingers X2 left hand.  I&D left forearm    OTHER SURGICAL HISTORY Left 2017    left hand debridement, left forearm incision and drainage     OTHER SURGICAL HISTORY Left 07/10/2017    Fingers I&D    OTHER SURGICAL HISTORY Left 2017    middle finger amputation revision    ROTATOR CUFF REPAIR  Last Done 2011    Left, Done Four Times    UPPER GASTROINTESTINAL ENDOSCOPY N/A 2020    EGD BIOPSY performed by Anderson Gaston MD at [place] [address]       Immunization History:   Immunization History   Administered Date(s) Administered    Influenza Vaccine, unspecified formulation 10/19/2015, 04/19/2016    Influenza Virus Vaccine 10/23/2013    Pneumococcal Polysaccharide ([id number]) 10/23/2013    Tdap (Boostrix, Adacel) 08/21/2016, 10/20/2021       Active Problems:  Patient Active Problem List   Diagnosis Code    Finger infection right index finger L08.9    Diabetic foot infection ([facility] E11.628, L08.9    Uncontrolled diabetes mellitus with complications ([address] K20.5, E11.65    Diabetes with ulcer of foot ([address] E11.621, L97.509    Ulcer of other part of foot L97.509    Diabetes mellitus with ulcer of heel ([address] E11.621, L97.409    Obesity, Class III, BMI 40-49.9 (morbid obesity) ([address] E66.01    Chronic ulcer of left foot with necrosis of muscle ([address] L97.523    Skin ulcer of toe of left foot with fat layer exposed ([address] L97.522    Chemical dependency ([facility]) F19.20    Chronic pain syndrome G89.4    Drug abuse ([facility]) F19.10    Cellulitis of left middle finger L03.012    Sepsis without acute organ dysfunction ([facility]) A41.9    Foot pain M79.673    WD-S/P amputation of lesser toe, right ([address] Z89.421    Acute osteomyelitis involving hand ([facility]) M86.149    Diabetic infection of left foot ([facility]) E11.628, L08.9    Left foot pain M79.672    Foreign body in foot, left, infected, initial encounter C18.408I, L08.9    Cellulitis L03.90    Cellulitis of left lower extremity L03.116    Abscess of left index finger L02.512       Isolation/Infection:   Isolation            No Isolation          Patient Infection Status       Infection Onset Added Last Indicated Last Indicated By Review Planned Expiration Resolved Resolved By    None active    Resolved    ESBL (Extended Spectrum Beta Lactamase) 06/10/19 06/14/19 06/10/19 Surgical Culture   10/21/21 Josefina Shepherd RN    6/10/19 Proteus, wound    MRSA  07/10/17 07/16/19 Wound culture   10/21/21 Josefina Shepherd RN    7/15/19 wound; 7/14/19 blood; 6/10/19 wound; 2/5/19 wound; 12/17/18 wound; 6/21/18 wound; 7/8/17 wound    ESBL (Extended Spectrum Beta Lactamase)  06/05/15 06/05/15 Jackson Merritt   10/19/16 Sandra Corrigan RN    9/8/15 Proteus mirabilis ESBL foot wound; 5/26/15 Proteus mirabilis ESBL foot wound    MDRO (multi-drug resistant organism)  02/17/14 02/17/14 Brandy Dsouza RN   03/17/14 Brandy Dsouza RN    wound 2/12/14 Sten malt - review 2/26/14    MRSA  01/06/14 01/06/14 Brandy Dsouza RN   06/16/16 Jackson Merritt    Surgical 8/27/14            Nurse Assessment:  Last Vital Signs: BP (!) 132/99   Pulse 84   Temp 98.1 °F (36.7 °C) (Oral)   Resp 16   Wt 245 lb 2.4 oz (111.2 kg)   SpO2 96%   BMI 33.72 kg/m²     Last documented pain score (0-10 scale): Pain Level: 9  Last Weight:   Wt Readings from Last 1 Encounters:   11/07/21 245 lb 2.4 oz (111.2 kg)     Mental Status:  Alert and oriented x4     IV Access:  - Peripheral IV - site  L forearm, insertion date: 11/06/2021    Nursing Mobility/ADLs:  Walking   Independent  Transfer  Independent  Bathing  Independent  Dressing  Independent  Toileting  Independent  Feeding  Assisted  Med Admin  Independent  Med Delivery   whole    Wound Care Documentation and Therapy:  Wound 10/26/21 Finger (Comment which one) Right (Active)   Wound Etiology Traumatic 10/26/21 0215   Dressing Status Other (Comment) 10/27/21 1610   Wound Cleansed Betadine/povidone iodine 10/26/21 0215   Dressing/Treatment Packing; Coban/self-adherent bandages; Gauze dressing/dressing sponge 10/26/21 0215   Wound Assessment Pink/red;  Exposed structure fascia 10/26/21 0215   Drainage Amount None 10/26/21 0215   Odor None 10/26/21 0215   Thu-wound Assessment Denuded 10/26/21 0215   Number of days: 13       Wound 10/26/21 Finger (Comment which one) Left (Active)   Dressing Status Other (Comment) 10/27/21 1610   Wound Cleansed Betadine/povidone iodine 10/26/21 0215   Dressing/Treatment Gauze dressing/dressing sponge; Coban/self-adherent bandages Discharge: ***    Physician Certification: I certify the above information and transfer of Bekah Nash  is necessary for the continuing treatment of the diagnosis listed and that he requires {Admit to Appropriate Level of Care:00674} for {GREATER/LESS:150269288} 30 days.      Update Admission H&P: {CHP DME Changes in VBD:[id number]}    PHYSICIAN SIGNATURE:  {Esignature:[id number]}

## 2021-11-09 NOTE — PROGRESS NOTES
Report called to Marilyn Bowen RN at [facility]. [employer] transportation to transport patient with belongings there to room 5522.

## 2021-11-11 LAB
CULTURE: NORMAL
CULTURE: NORMAL
Lab: NORMAL
Lab: NORMAL
SPECIMEN: NORMAL
SPECIMEN: NORMAL

## 2021-11-20 ENCOUNTER — HOSPITAL ENCOUNTER (EMERGENCY)
Age: 56
Discharge: ANOTHER HOSPITAL | End: 2021-11-20
Attending: STUDENT IN AN ORGANIZED HEALTH CARE EDUCATION/TRAINING PROGRAM
Payer: MEDICARE

## 2021-11-20 VITALS
DIASTOLIC BLOOD PRESSURE: 73 MMHG | OXYGEN SATURATION: 98 % | SYSTOLIC BLOOD PRESSURE: 133 MMHG | RESPIRATION RATE: 18 BRPM | HEIGHT: 72 IN | HEART RATE: 94 BPM | TEMPERATURE: 98 F | WEIGHT: 240 LBS | BODY MASS INDEX: 32.51 KG/M2

## 2021-11-20 DIAGNOSIS — L08.9 FINGER INFECTION: Primary | ICD-10-CM

## 2021-11-20 LAB
ANION GAP SERPL CALCULATED.3IONS-SCNC: 12 MMOL/L (ref 4–16)
BASOPHILS ABSOLUTE: 0.1 K/CU MM
BASOPHILS RELATIVE PERCENT: 0.5 % (ref 0–1)
BUN BLDV-MCNC: 11 MG/DL (ref 6–23)
CALCIUM SERPL-MCNC: 9.3 MG/DL (ref 8.3–10.6)
CHLORIDE BLD-SCNC: 95 MMOL/L (ref 99–110)
CO2: 24 MMOL/L (ref 21–32)
CREAT SERPL-MCNC: 0.6 MG/DL (ref 0.9–1.3)
DIFFERENTIAL TYPE: ABNORMAL
EOSINOPHILS ABSOLUTE: 0.1 K/CU MM
EOSINOPHILS RELATIVE PERCENT: 1.1 % (ref 0–3)
ERYTHROCYTE SEDIMENTATION RATE: 30 MM/HR (ref 0–20)
GFR AFRICAN AMERICAN: >60 ML/MIN/1.73M2
GFR NON-AFRICAN AMERICAN: >60 ML/MIN/1.73M2
GLUCOSE BLD-MCNC: 157 MG/DL (ref 70–99)
HCT VFR BLD CALC: 43.6 % (ref 42–52)
HEMOGLOBIN: 13.7 GM/DL (ref 13.5–18)
HIGH SENSITIVE C-REACTIVE PROTEIN: 41.4 MG/L
IMMATURE NEUTROPHIL %: 0.3 % (ref 0–0.43)
LACTATE: 1.7 MMOL/L (ref 0.4–2)
LYMPHOCYTES ABSOLUTE: 2 K/CU MM
LYMPHOCYTES RELATIVE PERCENT: 20.2 % (ref 24–44)
MCH RBC QN AUTO: 27.8 PG (ref 27–31)
MCHC RBC AUTO-ENTMCNC: 31.4 % (ref 32–36)
MCV RBC AUTO: 88.4 FL (ref 78–100)
MONOCYTES ABSOLUTE: 0.8 K/CU MM
MONOCYTES RELATIVE PERCENT: 8.1 % (ref 0–4)
NUCLEATED RBC %: 0 %
PDW BLD-RTO: 14.3 % (ref 11.7–14.9)
PLATELET # BLD: 213 K/CU MM (ref 140–440)
PMV BLD AUTO: 9.8 FL (ref 7.5–11.1)
POTASSIUM SERPL-SCNC: 4.3 MMOL/L (ref 3.5–5.1)
RBC # BLD: 4.93 M/CU MM (ref 4.6–6.2)
SEGMENTED NEUTROPHILS ABSOLUTE COUNT: 6.8 K/CU MM
SEGMENTED NEUTROPHILS RELATIVE PERCENT: 69.8 % (ref 36–66)
SODIUM BLD-SCNC: 131 MMOL/L (ref 135–145)
TOTAL IMMATURE NEUTOROPHIL: 0.03 K/CU MM
TOTAL NUCLEATED RBC: 0 K/CU MM
WBC # BLD: 9.7 K/CU MM (ref 4–10.5)

## 2021-11-20 PROCEDURE — 6360000002 HC RX W HCPCS: Performed by: STUDENT IN AN ORGANIZED HEALTH CARE EDUCATION/TRAINING PROGRAM

## 2021-11-20 PROCEDURE — 99283 EMERGENCY DEPT VISIT LOW MDM: CPT

## 2021-11-20 PROCEDURE — 83605 ASSAY OF LACTIC ACID: CPT

## 2021-11-20 PROCEDURE — 87040 BLOOD CULTURE FOR BACTERIA: CPT

## 2021-11-20 PROCEDURE — C1751 CATH, INF, PER/CENT/MIDLINE: HCPCS

## 2021-11-20 PROCEDURE — 80048 BASIC METABOLIC PNL TOTAL CA: CPT

## 2021-11-20 PROCEDURE — 85652 RBC SED RATE AUTOMATED: CPT

## 2021-11-20 PROCEDURE — 85025 COMPLETE CBC W/AUTO DIFF WBC: CPT

## 2021-11-20 PROCEDURE — 76937 US GUIDE VASCULAR ACCESS: CPT

## 2021-11-20 PROCEDURE — 86141 C-REACTIVE PROTEIN HS: CPT

## 2021-11-20 PROCEDURE — 96375 TX/PRO/DX INJ NEW DRUG ADDON: CPT

## 2021-11-20 PROCEDURE — 36410 VNPNXR 3YR/> PHY/QHP DX/THER: CPT

## 2021-11-20 PROCEDURE — 96374 THER/PROPH/DIAG INJ IV PUSH: CPT

## 2021-11-20 RX ORDER — MORPHINE SULFATE 2 MG/ML
4 INJECTION, SOLUTION INTRAMUSCULAR; INTRAVENOUS ONCE
Status: COMPLETED | OUTPATIENT
Start: 2021-11-20 | End: 2021-11-20

## 2021-11-20 RX ORDER — VANCOMYCIN 1.25 G/250ML
15 INJECTION, SOLUTION INTRAVENOUS ONCE
Status: COMPLETED | OUTPATIENT
Start: 2021-11-20 | End: 2021-11-20

## 2021-11-20 RX ORDER — SODIUM CHLORIDE, SODIUM LACTATE, POTASSIUM CHLORIDE, AND CALCIUM CHLORIDE .6; .31; .03; .02 G/100ML; G/100ML; G/100ML; G/100ML
2000 INJECTION, SOLUTION INTRAVENOUS ONCE
Status: DISCONTINUED | OUTPATIENT
Start: 2021-11-20 | End: 2021-11-20 | Stop reason: HOSPADM

## 2021-11-20 RX ADMIN — MORPHINE SULFATE 4 MG: 2 INJECTION, SOLUTION INTRAMUSCULAR; INTRAVENOUS at 17:59

## 2021-11-20 RX ADMIN — VANCOMYCIN 1750 MG: 1.25 INJECTION, SOLUTION INTRAVENOUS at 18:01

## 2021-11-20 ASSESSMENT — PAIN DESCRIPTION - PAIN TYPE: TYPE: ACUTE PAIN

## 2021-11-20 ASSESSMENT — PAIN SCALES - GENERAL
PAINLEVEL_OUTOF10: 9
PAINLEVEL_OUTOF10: 9

## 2021-11-20 ASSESSMENT — PAIN DESCRIPTION - ORIENTATION: ORIENTATION: LEFT

## 2021-11-20 ASSESSMENT — PAIN DESCRIPTION - LOCATION: LOCATION: FINGER (COMMENT WHICH ONE)

## 2021-11-20 NOTE — ED NOTES
Patient is very specific on where he would like his IV. This RN, and another have looked. Another RN to look.       Lorrie Ngo RN  11/20/21 9810

## 2021-11-20 NOTE — CONSULTS
Consult completed. Procedure/rationale explained to pt & consent obtained. #18ga Arrow Endurance Extended Dwell Catheter initiated to RUE Basilic Vein using sterile, UltraSound-guided technique without difficulty/complications. Positioning verified via UltraSound visualization of catheter within vessel lumen; site returns blood briskly and flushes without resistance/abnormalities. Lab specimens collected & sent per orders. Sterile dressing with SkinPrep, WinGuard Securing Device, BioPatch, SwabCap, and Limb Precautions band applied. Pt tolerated well and RN notified.

## 2021-11-20 NOTE — ED PROVIDER NOTES
[address] COMPLAINT    Chief Complaint   Patient presents with    Finger Pain     left 2nd digit, recent debridement; wants transferred to [facility]       HPI    Phuc Barker is a 63 y.o. male with history significant for multiple medical problems including type 2 diabetes and multiple digit amputations, obesity, recent left second digit abscess status post debridement at [facility] (11/9, Dr. Sujata Ames) who presents with finger pain. Patient has been doing well after the operation after discharge from [facility] been taking her antibiotics however over the last day, patient noticed rapid significant worsening swelling over the left second digit now is about a twice the size compared to yesterday's, patient also noticed like redness streaking up his arm as well, fever chills, but denies any true fever, patient does have nausea but denies any vomiting. Patient stated that he has been compliant with his medication. Patient did not have any transportation needs to get himself to [facility] therefore he came to be here instead. REVIEW OF SYSTEMS    Constitutional: Chills, no fever  HENT: Denies sore throat or rhinorrhea. Eyes: Denies vision changes. Respiratory: Denies shortness of breath or cough. Cardiovascular: Denies chest pain, leg swelling or palpitations. Gastrointestinal: Denies abdominal pain, diarrhea, nausea and vomiting. Genitourinary: Denies dysuria or hematuria. Skin: Denies rashes or wounds. MSK: Finger pain, swelling, redness up to the arm  Neurologic: Denies headache, lightheadedness, numbness, or weakness.    Hematologic/lymphatic: Denies unexpected weight loss, night sweats  Endocrine: No polyuria, polydipsia, or polyphagia      Pertinent positives and negatives are delineated in HPI and ROS above, all other systems are reviewed and are negative    PAST MEDICAL HISTORY    Past Medical History:   Diagnosis Date    Absent finger     Left hand    Anxiety     Arthritis     Hands    Asthma     \"As a kid but outgrew this\" - no medications as of 2/21/20    Chronic back pain     Depression     Edema     Fibromyalgia     Headache(784.0)     Last: 2/19/20    Hernia, abdominal     Hx MRSA infection     positive culture 8/2014 from left foot    Nausea & vomiting     Neuropathy     Obesity, Class III, BMI 40-49.9 (morbid obesity) ([address]     Osteomyelitis (HCC)     hx finger    Poor circulation     Prolonged emergence from general anesthesia     Restless leg syndrome     Shortness of breath on exertion     6/14/2016- pt denies any sob with this interview    Type II or unspecified type diabetes mellitus with other specified manifestations, not stated as uncontrolled 4/8/2014    Type II or unspecified type diabetes mellitus without mention of complication, not stated as uncontrolled DX 2007    Follows with PCP    Ulcer of other part of foot 4/8/2014    'ulcer on left foot healed all up\"     Medical history reviewed and no pertinent past medical history other than the ones mentioned above    SURGICAL HISTORY    Past Surgical History:   Procedure Laterality Date    COLONOSCOPY  1990's    Normal exam per pt    DEBRIDEMENT  8/25/2014    left foot    ENDOSCOPY, COLON, DIAGNOSTIC  06-    Normal exam per pt -     FINGER SURGERY  9-11-11    Right Index Finger    FINGER SURGERY  01-16-12    RIght Index Finger-Removal of loose body, Reconstruction of Mallet Finger    FOOT DEBRIDEMENT Left 6/10/2019    FOOT DEBRIDEMENT INCISION AND DRAINAGE performed by Tomy Glover MD at [address] [place] [address] Drive Right 06/01/2018    I&D right foot    HEMORRHOID SURGERY  2008    OTHER SURGICAL HISTORY Left 10/22/2013    I & D left foot    OTHER SURGICAL HISTORY  07/07/2017    I&D fingers X2 left hand.  I&D left forearm    OTHER SURGICAL HISTORY Left 07/08/2017    left hand debridement, left forearm incision and drainage     OTHER SURGICAL HISTORY Left 07/10/2017    Fingers I&D    OTHER SURGICAL HISTORY Left 07/14/2017    middle finger amputation revision    ROTATOR CUFF REPAIR  Last Done 7/18/2011    Left, Done Four Times    UPPER GASTROINTESTINAL ENDOSCOPY N/A 2/21/2020    EGD BIOPSY performed by Kian Lind MD at [facility]     Surgical history reviewed and no pertinent surgical history other than the ones mentioned above    CURRENT MEDICATIONS    Current Outpatient Rx   Medication Sig Dispense Refill    oxyCODONE-acetaminophen (PERCOCET)  MG per tablet Take 1 tablet by mouth every 4 hours as needed for Pain.  pregabalin (LYRICA) 75 MG capsule Take 75 mg by mouth 2 times daily.       atorvastatin (LIPITOR) 10 MG tablet Take 10 mg by mouth daily      Probiotic Acidophilus (FLORANEX) TABS Take 1 tablet by mouth 2 times daily 60 tablet 0    acetaminophen (TYLENOL) 500 MG tablet Take 2 tablets by mouth 3 times daily for 10 days 60 tablet 0    tamsulosin (FLOMAX) 0.4 MG capsule Take 1 capsule by mouth daily for 7 doses 7 capsule 0    dicyclomine (BENTYL) 10 MG capsule Take 2 capsules by mouth 4 times daily as needed (abdominal pain) 100 capsule 3    ondansetron (ZOFRAN) 4 MG tablet Take 4 mg by mouth every 8 hours as needed for Nausea or Vomiting      Dulaglutide (TRULICITY SC) Inject 1.5 mLs into the skin every 7 days       pantoprazole (PROTONIX) 40 MG tablet Take 1 tablet by mouth 2 times daily for 14 days 28 tablet 0    ondansetron (ZOFRAN ODT) 4 MG disintegrating tablet Take 1 tablet by mouth every 6 hours (Patient not taking: Reported on 2/20/2020) 10 tablet 0    escitalopram (LEXAPRO) 10 MG tablet Take 10 mg by mouth daily       Medication is reviewed    ALLERGIES    Allergies   Allergen Reactions    Cantaloupe (Diagnostic) Hives    Ceftriaxone Hives     Tolerated Zosyn well 1/2/2019     Robaxin [Methocarbamol] Swelling    Rocephin [Ceftriaxone Sodium-Lidocaine] Hives     Noted on 10/26 - developed extremity swelling and hives. No anaphylaxis.  Sulfa Antibiotics Hives     Noted on 10/26 - developed extremity swelling and hives. No anaphylaxis.  Aspirin Nausea Only    Nsaids Nausea Only    Other Nausea Only     Darvocet- Gi distress  napsalate/acetaminophen    Propoxyphene Nausea And Vomiting    Toradol [Ketorolac Tromethamine] Other (See Comments)     Sweats      Allergy is reviewed    FAMILY HISTORY    Family History   Problem Relation Age of Onset    Kidney Disease Mother         \"Kidney Failure, On Dialysis\"   Bell Hedger Early Death Mother 39    Diabetes Father         \"Type II\"     Family history reviewed and no pertinent family history other than the ones mentioned above    SOCIAL HISTORY    Social History     Socioeconomic History    Marital status:      Spouse name: Not on file    Number of children: 3    Years of education: Not on file    Highest education level: Not on file   Occupational History    Not on file   Tobacco Use    Smoking status: Former Smoker     Types: Cigarettes     Start date:      Quit date:      Years since quittin.5    Smokeless tobacco: Former User     Types: Snuff   Vaping Use    Vaping Use: Never used   Substance and Sexual Activity    Alcohol use: Yes     Comment: Rarely - 1-2 a year    Drug use: Yes     Frequency: 7.0 times per week     Types: Marijuana Estil Catena)     Comment: occ    Sexual activity: Not on file   Other Topics Concern    Not on file   Social History Narrative    Not on file     Social Determinants of Health     Financial Resource Strain:     Difficulty of Paying Living Expenses: Not on file   Food Insecurity:     Worried About Running Out of Food in the Last Year: Not on file    Ludivina of Food in the Last Year: Not on file   Transportation Needs:     Lack of Transportation (Medical): Not on file    Lack of Transportation (Non-Medical):  Not on file   Physical Activity:     Days of Exercise per Week: Not on file    Minutes of Exercise per Session: Not on file   Stress:     Feeling of Stress : Not on file   Social Connections:     Frequency of Communication with Friends and Family: Not on file    Frequency of Social Gatherings with Friends and Family: Not on file    Attends Restoration Services: Not on file    Active Member of [address] [place] [address] or Organizations: Not on file    Attends Club or Organization Meetings: Not on file    Marital Status: Not on file   Intimate Partner Violence:     Fear of Current or Ex-Partner: Not on file    Emotionally Abused: Not on file    Physically Abused: Not on file    Sexually Abused: Not on file   Housing Stability:     Unable to Pay for Housing in the Last Year: Not on file    Number of Wilbert in the Last Year: Not on file    Unstable Housing in the Last Year: Not on file     Live with self  Alcohol and recreational drug use: denies  Social history reviewed and no pertinent social history other than the ones mentioned above    PHYSICAL EXAM    Vital Signs:/73   Pulse 94   Temp 98 °F (36.7 °C) (Oral)   Resp 18   Ht 5' 11.5\" (1.816 m)   Wt 240 lb (108.9 kg)   SpO2 98%   BMI 33.01 kg/m²   I have reviewed the triage vital signs. Constitutional: Chronically ill-appearing, obese  Eyes: PERRL, no conjunctival injection  HENT: NCAT, Neck supple without meningismus   CV: RRR, Warm, no edema  RESP: Normal RR, no increased respiratory efforts  GI: soft, non-distended  MSK: Left second digit is markedly swollen, significantly tender to palpation with some fluctuance appreciated both in the volar and the dorsal area of the finger involving the DIP and the PIP joints, the redness more in the extensor area than the flexor area that is streaking up to almost the elbow, patient's finger is held in the neutral but slightly flexed position and does have significant flexor tendon sheath tenderness as well but the swelling is nonfusiform is more localized in the DIP and PIP area  Skin: Warm, dry.  No rashes  Neuro: Alert, CNs II-XII grossly intact. Moving all 4 extremities  Psych: Appropriate mood and affect. Labs:   Labs Reviewed   CULTURE, BLOOD 1   CULTURE, BLOOD 2   CBC WITH AUTO DIFFERENTIAL   BASIC METABOLIC PANEL W/ REFLEX TO MG FOR LOW K   SEDIMENTATION RATE   C-REACTIVE PROTEIN   LACTIC ACID, PLASMA       Radiology:  No orders to display       I directly reviewed the images and radiology interpretation    EKG interpreted by myself: NA    ED COURSE  Assessment & Medical Decision Making:  Kortney Judge is a 63 y.o. male who presents with finger swelling and pain. Patient recently had abscess draining area now is having significant worsening swelling despite outpatient antibiotics, patient certainly has signs of cellulitis on exam about so possible abscess, patient has a few positives Knievel signs however not all 4 of them, at this point the flexor sheath can certainly be involved however it was difficult to determine given this patient's significant amount of swelling that volar and the dorsum area with lymphatic streaking. At this point patient's failed outpatient antibiotics will require IV antibiotics. Given patient was recently operated by hand surgeon, will discuss with hand surgery [facility] likely need to transfer for further care. ED Course as of 11/23/21 2304   Sat Nov 20, 2021   1537 Discussed with Dr. Fredy Penaloza at [facility], hand surgeon on-call for Dr. Roxi Campos, patient will need to be evaluated by hand surgeon, patient will be transferred to [facility] ED. [YQ]      ED Course User Index  [YQ] Zurdo Moreno MD        DDx includes but not limited to:  Abscess, cellulitis, flexor tenosynovitis, extensor infection, septic joint of the finger, normal postop change    Workup includes but not limited to: Labs, cultures, lactate    Treatment includes but not limited to: Fluids, IV antibiotics    Critical care time: NA    Impression:   Finger infection    Disposition: Pending work-up and discussion with [facility] hand surgery    This note dictated using Dragon medical voice recognition software. Attempts at proofreading were made, but errors may occasionally still occur.            Cristian Presley MD  11/20/21 2203       Cristian Presley MD  11/23/21 2287

## 2021-11-20 NOTE — ED NOTES
MEY Jamil attempted x2 with ultrasound, no success. PICC consult placed.       Lorrie Ngo RN  11/20/21 4030

## 2021-11-25 LAB
CULTURE: NORMAL
Lab: NORMAL
SPECIMEN: NORMAL

## 2021-12-02 ASSESSMENT — ENCOUNTER SYMPTOMS
PHOTOPHOBIA: 0
EYE ITCHING: 0
CHOKING: 0
RECTAL PAIN: 0
ANAL BLEEDING: 0
BACK PAIN: 0
APNEA: 0
STRIDOR: 0
SORE THROAT: 0
EYE REDNESS: 0
COLOR CHANGE: 0
CONSTIPATION: 0
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
## 2021-12-13 ENCOUNTER — APPOINTMENT (OUTPATIENT)
Dept: GENERAL RADIOLOGY | Age: 56
DRG: 565 | End: 2021-12-13
Payer: MEDICARE

## 2021-12-13 PROCEDURE — 96365 THER/PROPH/DIAG IV INF INIT: CPT

## 2021-12-13 PROCEDURE — 96366 THER/PROPH/DIAG IV INF ADDON: CPT

## 2021-12-13 PROCEDURE — 73130 X-RAY EXAM OF HAND: CPT

## 2021-12-13 PROCEDURE — 96372 THER/PROPH/DIAG INJ SC/IM: CPT

## 2021-12-13 PROCEDURE — 96376 TX/PRO/DX INJ SAME DRUG ADON: CPT

## 2021-12-13 PROCEDURE — 99284 EMERGENCY DEPT VISIT MOD MDM: CPT

## 2021-12-13 PROCEDURE — 96375 TX/PRO/DX INJ NEW DRUG ADDON: CPT

## 2021-12-13 RX ORDER — ONDANSETRON 2 MG/ML
4 INJECTION INTRAMUSCULAR; INTRAVENOUS ONCE
Status: COMPLETED | OUTPATIENT
Start: 2021-12-13 | End: 2021-12-14

## 2021-12-13 RX ORDER — MORPHINE SULFATE 2 MG/ML
4 INJECTION, SOLUTION INTRAMUSCULAR; INTRAVENOUS ONCE
Status: COMPLETED | OUTPATIENT
Start: 2021-12-13 | End: 2021-12-14

## 2021-12-13 RX ORDER — 0.9 % SODIUM CHLORIDE 0.9 %
500 INTRAVENOUS SOLUTION INTRAVENOUS ONCE
Status: COMPLETED | OUTPATIENT
Start: 2021-12-13 | End: 2021-12-14

## 2021-12-13 ASSESSMENT — PAIN SCALES - GENERAL: PAINLEVEL_OUTOF10: 8

## 2021-12-14 ENCOUNTER — HOSPITAL ENCOUNTER (INPATIENT)
Age: 56
LOS: 7 days | Discharge: HOME HEALTH | DRG: 565 | End: 2021-12-21
Attending: EMERGENCY MEDICINE | Admitting: INTERNAL MEDICINE
Payer: MEDICARE

## 2021-12-14 DIAGNOSIS — L08.9 WOUND INFECTION: Primary | ICD-10-CM

## 2021-12-14 DIAGNOSIS — T14.8XXA WOUND INFECTION: Primary | ICD-10-CM

## 2021-12-14 DIAGNOSIS — M86.9 OSTEOMYELITIS OF FINGER OF LEFT HAND (HCC): ICD-10-CM

## 2021-12-14 DIAGNOSIS — L03.012 CELLULITIS OF FINGER OF LEFT HAND: ICD-10-CM

## 2021-12-14 LAB
ANION GAP SERPL CALCULATED.3IONS-SCNC: 11 MMOL/L (ref 4–16)
BASOPHILS ABSOLUTE: 0 K/CU MM
BASOPHILS RELATIVE PERCENT: 0.4 % (ref 0–1)
BUN BLDV-MCNC: 18 MG/DL (ref 6–23)
CALCIUM SERPL-MCNC: 9.6 MG/DL (ref 8.3–10.6)
CHLORIDE BLD-SCNC: 96 MMOL/L (ref 99–110)
CO2: 24 MMOL/L (ref 21–32)
CREAT SERPL-MCNC: 0.8 MG/DL (ref 0.9–1.3)
DIFFERENTIAL TYPE: ABNORMAL
EOSINOPHILS ABSOLUTE: 0.2 K/CU MM
EOSINOPHILS RELATIVE PERCENT: 2.8 % (ref 0–3)
ERYTHROCYTE SEDIMENTATION RATE: 31 MM/HR (ref 0–20)
ESTIMATED AVERAGE GLUCOSE: 171 MG/DL
GFR AFRICAN AMERICAN: >60 ML/MIN/1.73M2
GFR NON-AFRICAN AMERICAN: >60 ML/MIN/1.73M2
GLUCOSE BLD-MCNC: 164 MG/DL (ref 70–99)
GLUCOSE BLD-MCNC: 188 MG/DL (ref 70–99)
GLUCOSE BLD-MCNC: 280 MG/DL (ref 70–99)
GLUCOSE BLD-MCNC: 331 MG/DL (ref 70–99)
HBA1C MFR BLD: 7.6 % (ref 4.2–6.3)
HCT VFR BLD CALC: 40.2 % (ref 42–52)
HEMOGLOBIN: 12.9 GM/DL (ref 13.5–18)
HIGH SENSITIVE C-REACTIVE PROTEIN: 43.5 MG/L
IMMATURE NEUTROPHIL %: 0.3 % (ref 0–0.43)
LYMPHOCYTES ABSOLUTE: 2.1 K/CU MM
LYMPHOCYTES RELATIVE PERCENT: 28.6 % (ref 24–44)
MCH RBC QN AUTO: 28.3 PG (ref 27–31)
MCHC RBC AUTO-ENTMCNC: 32.1 % (ref 32–36)
MCV RBC AUTO: 88.2 FL (ref 78–100)
MONOCYTES ABSOLUTE: 0.6 K/CU MM
MONOCYTES RELATIVE PERCENT: 7.8 % (ref 0–4)
NUCLEATED RBC %: 0 %
PDW BLD-RTO: 15.2 % (ref 11.7–14.9)
PLATELET # BLD: 216 K/CU MM (ref 140–440)
PMV BLD AUTO: 9.8 FL (ref 7.5–11.1)
POTASSIUM SERPL-SCNC: 4.5 MMOL/L (ref 3.5–5.1)
RBC # BLD: 4.56 M/CU MM (ref 4.6–6.2)
SEGMENTED NEUTROPHILS ABSOLUTE COUNT: 4.4 K/CU MM
SEGMENTED NEUTROPHILS RELATIVE PERCENT: 60.1 % (ref 36–66)
SODIUM BLD-SCNC: 131 MMOL/L (ref 135–145)
TOTAL IMMATURE NEUTOROPHIL: 0.02 K/CU MM
TOTAL NUCLEATED RBC: 0 K/CU MM
WBC # BLD: 7.3 K/CU MM (ref 4–10.5)

## 2021-12-14 PROCEDURE — 6360000002 HC RX W HCPCS: Performed by: INTERNAL MEDICINE

## 2021-12-14 PROCEDURE — 87077 CULTURE AEROBIC IDENTIFY: CPT

## 2021-12-14 PROCEDURE — 2580000003 HC RX 258: Performed by: EMERGENCY MEDICINE

## 2021-12-14 PROCEDURE — 87186 SC STD MICRODIL/AGAR DIL: CPT

## 2021-12-14 PROCEDURE — 1200000000 HC SEMI PRIVATE

## 2021-12-14 PROCEDURE — 87075 CULTR BACTERIA EXCEPT BLOOD: CPT

## 2021-12-14 PROCEDURE — 2500000003 HC RX 250 WO HCPCS: Performed by: STUDENT IN AN ORGANIZED HEALTH CARE EDUCATION/TRAINING PROGRAM

## 2021-12-14 PROCEDURE — 82962 GLUCOSE BLOOD TEST: CPT

## 2021-12-14 PROCEDURE — 86141 C-REACTIVE PROTEIN HS: CPT

## 2021-12-14 PROCEDURE — 87147 CULTURE TYPE IMMUNOLOGIC: CPT

## 2021-12-14 PROCEDURE — 85652 RBC SED RATE AUTOMATED: CPT

## 2021-12-14 PROCEDURE — 6370000000 HC RX 637 (ALT 250 FOR IP): Performed by: INTERNAL MEDICINE

## 2021-12-14 PROCEDURE — 80048 BASIC METABOLIC PNL TOTAL CA: CPT

## 2021-12-14 PROCEDURE — 87070 CULTURE OTHR SPECIMN AEROBIC: CPT

## 2021-12-14 PROCEDURE — 6360000002 HC RX W HCPCS: Performed by: EMERGENCY MEDICINE

## 2021-12-14 PROCEDURE — 85025 COMPLETE CBC W/AUTO DIFF WBC: CPT

## 2021-12-14 PROCEDURE — 83036 HEMOGLOBIN GLYCOSYLATED A1C: CPT

## 2021-12-14 PROCEDURE — 2580000003 HC RX 258: Performed by: INTERNAL MEDICINE

## 2021-12-14 RX ORDER — ONDANSETRON 4 MG/1
4 TABLET, ORALLY DISINTEGRATING ORAL EVERY 8 HOURS PRN
Status: DISCONTINUED | OUTPATIENT
Start: 2021-12-14 | End: 2021-12-21 | Stop reason: HOSPADM

## 2021-12-14 RX ORDER — SODIUM CHLORIDE 0.9 % (FLUSH) 0.9 %
5-40 SYRINGE (ML) INJECTION PRN
Status: DISCONTINUED | OUTPATIENT
Start: 2021-12-14 | End: 2021-12-21 | Stop reason: HOSPADM

## 2021-12-14 RX ORDER — OXYCODONE HYDROCHLORIDE AND ACETAMINOPHEN 5; 325 MG/1; MG/1
2 TABLET ORAL EVERY 4 HOURS PRN
Status: DISCONTINUED | OUTPATIENT
Start: 2021-12-14 | End: 2021-12-21 | Stop reason: HOSPADM

## 2021-12-14 RX ORDER — ATORVASTATIN CALCIUM 20 MG/1
10 TABLET, FILM COATED ORAL NIGHTLY
Status: DISCONTINUED | OUTPATIENT
Start: 2021-12-14 | End: 2021-12-21 | Stop reason: HOSPADM

## 2021-12-14 RX ORDER — MORPHINE SULFATE 2 MG/ML
4 INJECTION, SOLUTION INTRAMUSCULAR; INTRAVENOUS
Status: DISCONTINUED | OUTPATIENT
Start: 2021-12-14 | End: 2021-12-21 | Stop reason: HOSPADM

## 2021-12-14 RX ORDER — ONDANSETRON 2 MG/ML
4 INJECTION INTRAMUSCULAR; INTRAVENOUS EVERY 6 HOURS PRN
Status: DISCONTINUED | OUTPATIENT
Start: 2021-12-14 | End: 2021-12-21 | Stop reason: HOSPADM

## 2021-12-14 RX ORDER — DEXTROSE MONOHYDRATE 50 MG/ML
100 INJECTION, SOLUTION INTRAVENOUS PRN
Status: DISCONTINUED | OUTPATIENT
Start: 2021-12-14 | End: 2021-12-21 | Stop reason: HOSPADM

## 2021-12-14 RX ORDER — DEXTROSE MONOHYDRATE 25 G/50ML
12.5 INJECTION, SOLUTION INTRAVENOUS PRN
Status: DISCONTINUED | OUTPATIENT
Start: 2021-12-14 | End: 2021-12-21 | Stop reason: HOSPADM

## 2021-12-14 RX ORDER — BUPIVACAINE HYDROCHLORIDE AND EPINEPHRINE 5; 5 MG/ML; UG/ML
30 INJECTION, SOLUTION EPIDURAL; INTRACAUDAL; PERINEURAL ONCE
Status: COMPLETED | OUTPATIENT
Start: 2021-12-14 | End: 2021-12-14

## 2021-12-14 RX ORDER — POLYETHYLENE GLYCOL 3350 17 G/17G
17 POWDER, FOR SOLUTION ORAL DAILY PRN
Status: DISCONTINUED | OUTPATIENT
Start: 2021-12-14 | End: 2021-12-18

## 2021-12-14 RX ORDER — PREGABALIN 75 MG/1
75 CAPSULE ORAL 2 TIMES DAILY
Status: DISCONTINUED | OUTPATIENT
Start: 2021-12-14 | End: 2021-12-21 | Stop reason: HOSPADM

## 2021-12-14 RX ORDER — MORPHINE SULFATE 2 MG/ML
2 INJECTION, SOLUTION INTRAMUSCULAR; INTRAVENOUS
Status: DISCONTINUED | OUTPATIENT
Start: 2021-12-14 | End: 2021-12-21 | Stop reason: HOSPADM

## 2021-12-14 RX ORDER — SODIUM CHLORIDE 0.9 % (FLUSH) 0.9 %
5-40 SYRINGE (ML) INJECTION EVERY 12 HOURS SCHEDULED
Status: DISCONTINUED | OUTPATIENT
Start: 2021-12-14 | End: 2021-12-21 | Stop reason: HOSPADM

## 2021-12-14 RX ORDER — ACETAMINOPHEN 650 MG/1
650 SUPPOSITORY RECTAL EVERY 6 HOURS PRN
Status: DISCONTINUED | OUTPATIENT
Start: 2021-12-14 | End: 2021-12-21 | Stop reason: HOSPADM

## 2021-12-14 RX ORDER — TAMSULOSIN HYDROCHLORIDE 0.4 MG/1
0.4 CAPSULE ORAL DAILY
Status: DISCONTINUED | OUTPATIENT
Start: 2021-12-14 | End: 2021-12-21 | Stop reason: HOSPADM

## 2021-12-14 RX ORDER — VANCOMYCIN 1.25 G/250ML
15 INJECTION, SOLUTION INTRAVENOUS EVERY 12 HOURS
Status: DISCONTINUED | OUTPATIENT
Start: 2021-12-14 | End: 2021-12-14

## 2021-12-14 RX ORDER — SODIUM CHLORIDE 9 MG/ML
25 INJECTION, SOLUTION INTRAVENOUS PRN
Status: DISCONTINUED | OUTPATIENT
Start: 2021-12-14 | End: 2021-12-21 | Stop reason: HOSPADM

## 2021-12-14 RX ORDER — ESCITALOPRAM OXALATE 10 MG/1
10 TABLET ORAL DAILY
Status: DISCONTINUED | OUTPATIENT
Start: 2021-12-14 | End: 2021-12-21 | Stop reason: HOSPADM

## 2021-12-14 RX ORDER — NICOTINE POLACRILEX 4 MG
15 LOZENGE BUCCAL PRN
Status: DISCONTINUED | OUTPATIENT
Start: 2021-12-14 | End: 2021-12-21 | Stop reason: HOSPADM

## 2021-12-14 RX ORDER — ACETAMINOPHEN 325 MG/1
650 TABLET ORAL EVERY 6 HOURS PRN
Status: DISCONTINUED | OUTPATIENT
Start: 2021-12-14 | End: 2021-12-21 | Stop reason: HOSPADM

## 2021-12-14 RX ORDER — PANTOPRAZOLE SODIUM 40 MG/1
40 TABLET, DELAYED RELEASE ORAL 2 TIMES DAILY
Status: DISCONTINUED | OUTPATIENT
Start: 2021-12-14 | End: 2021-12-21 | Stop reason: HOSPADM

## 2021-12-14 RX ORDER — MORPHINE SULFATE 2 MG/ML
4 INJECTION, SOLUTION INTRAMUSCULAR; INTRAVENOUS ONCE
Status: COMPLETED | OUTPATIENT
Start: 2021-12-14 | End: 2021-12-14

## 2021-12-14 RX ADMIN — TAMSULOSIN HYDROCHLORIDE 0.4 MG: 0.4 CAPSULE ORAL at 15:55

## 2021-12-14 RX ADMIN — SODIUM CHLORIDE 500 ML: 9 INJECTION, SOLUTION INTRAVENOUS at 05:35

## 2021-12-14 RX ADMIN — MORPHINE SULFATE 4 MG: 2 INJECTION, SOLUTION INTRAMUSCULAR; INTRAVENOUS at 05:31

## 2021-12-14 RX ADMIN — PIPERACILLIN AND TAZOBACTAM 3375 MG: 3; .375 INJECTION, POWDER, LYOPHILIZED, FOR SOLUTION INTRAVENOUS at 14:01

## 2021-12-14 RX ADMIN — VANCOMYCIN HYDROCHLORIDE 1250 MG: 5 INJECTION, POWDER, LYOPHILIZED, FOR SOLUTION INTRAVENOUS at 05:35

## 2021-12-14 RX ADMIN — VANCOMYCIN HYDROCHLORIDE 1500 MG: 5 INJECTION, POWDER, LYOPHILIZED, FOR SOLUTION INTRAVENOUS at 17:05

## 2021-12-14 RX ADMIN — ONDANSETRON 4 MG: 2 INJECTION INTRAMUSCULAR; INTRAVENOUS at 05:31

## 2021-12-14 RX ADMIN — ENOXAPARIN SODIUM 40 MG: 100 INJECTION SUBCUTANEOUS at 15:55

## 2021-12-14 RX ADMIN — INSULIN LISPRO 2 UNITS: 100 INJECTION, SOLUTION INTRAVENOUS; SUBCUTANEOUS at 19:57

## 2021-12-14 RX ADMIN — MORPHINE SULFATE 4 MG: 2 INJECTION, SOLUTION INTRAMUSCULAR; INTRAVENOUS at 10:23

## 2021-12-14 RX ADMIN — MORPHINE SULFATE 4 MG: 2 INJECTION, SOLUTION INTRAMUSCULAR; INTRAVENOUS at 22:33

## 2021-12-14 RX ADMIN — ESCITALOPRAM OXALATE 10 MG: 10 TABLET ORAL at 15:54

## 2021-12-14 RX ADMIN — PREGABALIN 75 MG: 75 CAPSULE ORAL at 22:33

## 2021-12-14 RX ADMIN — PANTOPRAZOLE SODIUM 40 MG: 40 TABLET, DELAYED RELEASE ORAL at 17:04

## 2021-12-14 RX ADMIN — BUPIVACAINE HYDROCHLORIDE AND EPINEPHRINE BITARTRATE 30 ML: 5; .005 INJECTION, SOLUTION EPIDURAL; INTRACAUDAL; PERINEURAL at 13:15

## 2021-12-14 RX ADMIN — PREGABALIN 75 MG: 75 CAPSULE ORAL at 15:55

## 2021-12-14 ASSESSMENT — PAIN DESCRIPTION - LOCATION
LOCATION: FINGER (COMMENT WHICH ONE)
LOCATION: FINGER (COMMENT WHICH ONE)

## 2021-12-14 ASSESSMENT — PAIN DESCRIPTION - DESCRIPTORS
DESCRIPTORS: ACHING
DESCRIPTORS: ACHING

## 2021-12-14 ASSESSMENT — PAIN SCALES - GENERAL
PAINLEVEL_OUTOF10: 10
PAINLEVEL_OUTOF10: 7
PAINLEVEL_OUTOF10: 9
PAINLEVEL_OUTOF10: 8
PAINLEVEL_OUTOF10: 8

## 2021-12-14 ASSESSMENT — PAIN DESCRIPTION - ORIENTATION
ORIENTATION: LEFT
ORIENTATION: LEFT

## 2021-12-14 NOTE — ED PROVIDER NOTES
Mitzi Ray was checked out to me by Dr. Eloise Cordero. Please see his/her initial documentation for details of the patient's ED presentation, physical exam and completed studies. In brief, Mitzi Ray is a 63 y.o. male that presents with increased left index finger stump swelling and pain and drainage. Patient with history of numerous partial amputations to several digits of bilateral hands secondary to his poor diabetic control. Patient's most recent procedure was on his left index finger at [facility] with Dr. Marcial Richards, which was actually revision. Patient has been on doxycycline for postoperative infection. Over the last 24 hours his finger has ballooned up and is now having redness up into the hand and the forearm. Additionally, patient with some chills and sweats.     Labs  Results for orders placed or performed during the hospital encounter of 12/14/21   CBC auto diff   Result Value Ref Range    WBC 7.3 4.0 - 10.5 K/CU MM    RBC 4.56 (L) 4.6 - 6.2 M/CU MM    Hemoglobin 12.9 (L) 13.5 - 18.0 GM/DL    Hematocrit 40.2 (L) 42 - 52 %    MCV 88.2 78 - 100 FL    MCH 28.3 27 - 31 PG    MCHC 32.1 32.0 - 36.0 %    RDW 15.2 (H) 11.7 - 14.9 %    Platelets 419 017 - 387 K/CU MM    MPV 9.8 7.5 - 11.1 FL    Differential Type AUTOMATED DIFFERENTIAL     Segs Relative 60.1 36 - 66 %    Lymphocytes % 28.6 24 - 44 %    Monocytes % 7.8 (H) 0 - 4 %    Eosinophils % 2.8 0 - 3 %    Basophils % 0.4 0 - 1 %    Segs Absolute 4.4 K/CU MM    Lymphocytes Absolute 2.1 K/CU MM    Monocytes Absolute 0.6 K/CU MM    Eosinophils Absolute 0.2 K/CU MM    Basophils Absolute 0.0 K/CU MM    Nucleated RBC % 0.0 %    Total Nucleated RBC 0.0 K/CU MM    Total Immature Neutrophil 0.02 K/CU MM    Immature Neutrophil % 0.3 0 - 0.43 %   BMP   Result Value Ref Range    Sodium 131 (L) 135 - 145 MMOL/L    Potassium 4.5 3.5 - 5.1 MMOL/L    Chloride 96 (L) 99 - 110 mMol/L    CO2 24 21 - 32 MMOL/L    Anion Gap 11 4 - 16    BUN 18 6 - 23 MG/DL CREATININE 0.8 (L) 0.9 - 1.3 MG/DL    Glucose 331 (H) 70 - 99 MG/DL    Calcium 9.6 8.3 - 10.6 MG/DL    GFR Non-African American >60 >60 mL/min/1.73m2    GFR African American >60 >60 mL/min/1.73m2   C-reactive protein   Result Value Ref Range    CRP, High Sensitivity 43.5 mg/L   Sedimentation Rate   Result Value Ref Range    Sed Rate 31 (H) 0 - 20 MM/HR       MDM:  Patient endorsed to me pending transfer to facility with hand capability for his postoperative infection. On my assessment this morning at 715 patient is updated regarding the above and is agreeable with the plan for transfer. Patient is resting comfortably with stable vital signs with antibiotics infusing. Will trial call again to [facility] this morning to see if bed availability has improved as this would be ideal to get patient there as he is established there. [facility] was also paged, but they cannot take transfers at this time. [address] contacted throughout the morning and they are unable to take the patient. Did contact our chief medical officer and they also called [facility] leadership and at this time transfer is just not an option. Our plastic surgeon, Dr. All Knight, was contacted by Dr. Mikel Novoa and he was able to come in and see the patient. He was graciously willing to incise and drain patient's operative site with wound culture sent. He is recommending IV antibiotics to be continued at admission to the hospitalist service and he will follow the patient. Hospitalist is consulted and patient to be admitted to medicine for further evaluation and treatment. Final Impression:  1. Wound infection    2. Cellulitis of finger of left hand          Please note that portions of this note may have been complete with a voice recognition program.  Efforts were made to edit the dictations, but occasional words are mis-transcribed.          Darron Ray MD  12/14/21 [id number]

## 2021-12-14 NOTE — CONSULTS
[address], [address] [facility]                                  CONSULTATION    PATIENT NAME: Yumiko Erickson                       :        1965  MED REC NO:   [id number]                          ROOM:       CT48  ACCOUNT NO:   [de-identified]                           ADMIT DATE: 2021  PROVIDER:     Raymond Cruz MD    CONSULT DATE:  2021    CHIEF COMPLAINT:  Infection, left index finger with lymphangitis. HISTORY OF PRESENT ILLNESS:  A 69-year-old left-hand dominant, ,  disabled white male with history of diabetes, apparently came to the  hospital with the left index finger swelling. 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 He apparently  believes he got the left index finger infected with a skin hook at least  several months ago. He was apparently seen at [facility]  where he was hospitalized for infection and apparently underwent an  amputation of the left index finger down to the proximal portion of the  middle phalanx. He reports he has been on some antibiotics. He  apparently presented to the emergency room yesterday with acute  fluctuant swelling of the left index finger with discomfort and redness  including the left volar forearm. They apparently tried to contact his  hand surgeon at [facility], but because of COVID  restrictions, he was then able to be transferred. I was consulted for  assistance. PAST MEDICAL HISTORY:  Significant for diabetes mellitus type 2,  chemical dependency, multiple diabetic ulcers and infections including  the hands and feet. PAST SURGICAL HISTORY:  The 2021 amputation revision and closure,  foot debridement, previous amputations.     MEDICATIONS: Oxycodone, Lyrica, Lipitor, doxycycline, Cipro, Floranex,  Levaquin, Deltasone. ALLERGIES:  CEFTRIAXONE, CANTALOUPE, ROBAXIN, ROCEPHIN, LIDOCAINE,  ASPIRIN, NSAIDS, PROPOXYPHENE, TORADOL. SOCIAL HISTORY:  Tobacco use, apparent substance abuse. PHYSICAL EXAMINATION:  GENERAL:  Overweight white male, in no apparent distress, slightly  disheveled, but pleasant. EXTREMITIES:  Physical examination of his hands reveal significant  abnormalities with significant bulbous and fluctuant swelling of the  left index finger stump through the middle phalanx. There was an intact  suture line with a small amount of drainage through the central portion. He has a stumped amputation of the left middle finger, apparent ray  amputation of the left ring finger, and intact left index finger. The  right hand reveals an amputation stump at the level of the PIP joint. Apparent injury to the right middle finger with a stump and some nail  deformity. The patient has an area of pink erythema and tenderness on  the left volar forearm consistent with lymphangitis. X-ray of the left hand today is consistent with osteomyelitis involving  the middle phalanx of the second digit with diffuse soft tissue  swelling. LABS:  Reveal a glucose of 331. A1c on 10/20/2021 was 8.6. White blood  cell count 7.3, hemoglobin 12.9, hematocrit 40.2. Prothrombin 12.3, INR  1.06, PTT 28.2, D-dimer 134. Culture and sensitivity taken by me today,  pending. ASSESSMENT:  Infection, left index finger amputation stump with  osteomyelitis and secondary lymphangitis. I removed some of the sutures  from the central portion of the left index finger stump,  the  skin, and took a culture of some sticky serous fluid. I performed a  digital block of the left index finger for patient comfort using 0.5%  Marcaine with epinephrine. I would recommend hospitalization to control  infection.   The patient will probably require amputation of the left  index finger at the level of PIP joint or distal proximal phalanx in the  near future.         Nguyễn Sahu MD    D: 12/14/2021 13:37:46       T: 12/14/2021 13:45:18     [email or web address]  Job#: [id number]     Doc#: [id number]    CC:

## 2021-12-14 NOTE — ED TRIAGE NOTES
Patient presents to the ER with c/o of a worsening infection in his L index finger. Patient has hx of amputation to his fingers. Patient has hx of MRSA.

## 2021-12-14 NOTE — H&P
History and Physical      Name:  Ector Malik /Age/Sex: 1965  (63 y.o. male)   MRN & CSN:  [id number] & [id number] Admission Date/Time: 2021  1:24 AM   Location:  ED04/ED-04 PCP: Carol Myrick Day: 1    Assessment and Plan:   Ector Malik is a 63 y.o.  male  who presents with Osteomyelitis of finger of left hand ([facility]    Principal Problem:    Osteomyelitis of finger of left hand ([facility]  Active Problems:    Uncontrolled diabetes mellitus with complications ([facility]    Obesity, Class III, BMI 40-49.9 (morbid obesity) ([facility]  Resolved Problems:    * No resolved hospital problems. *    Continue empiric antibiotic coverage with vancomycin and Zosyn  Pharmacy consult for vancomycin dosage  Plastics following post I&D  Pain management with as needed IV and oral narcotics  Sliding scale insulin for glycemic control  Continue other chronic home medication    Diet ADULT DIET; Regular; 5 carb choices (75 gm/meal)   DVT Prophylaxis [x] Lovenox, []  Heparin, [] SCDs, [] Ambulation   GI Prophylaxis [] PPI,  [] H2 Blocker,  [] Carafate,  [] Diet/Tube Feeds   Code Status Full Code   Disposition Patient requires continued admission due to osteomyelitis of the left finger   MDM [] Low, [x] Moderate,[]  High  Patient's risk as above due to osteomyelitis of left finger     History of Present Illness:     Chief Complaint: Osteomyelitis of finger of left hand ([facility]  Ector Malik is a 63 y.o.  male  who presents with with history of uncontrolled diabetes with neuropathy, obesity, depression/anxiety, history of diabetic ulcers with digital amputations recently had a procedure on his left index finger at [facility] and discharged on doxycycline postoperatively who presented with acute swelling and pain of left index finger stump with drainage with associated fevers and chills with cold sweats.   X-ray of the left hand showed findings consistent with osteomyelitis involving the middle phalanx of the second digit with diffuse soft tissue swelling. Initially patient was a transfer back to his hand surgeon at [facility], however they are not accepting any direct admit at this time due to bleeding capacity. The ER, plastic surgery was consulted and did an I&D with recommendations for admission for IV antibiotics and plastics consult       Ten point ROS reviewed negative, unless as noted above    Objective:   No intake or output data in the 24 hours ending 12/14/21 1456   Vitals:   Vitals:    12/14/21 1024   BP: (!) 148/102   Pulse: 79   Resp: 20   Temp:    SpO2: 100%     Physical Exam:   GEN Awake male, sitting upright in bed in no apparent distress. Appears given age. EYES Pupils are equally round. No scleral erythema, discharge, or conjunctivitis. HENT Mucous membranes are moist. Oral pharynx without exudates, no evidence of thrush. NECK Supple, no apparent thyromegaly or masses. RESP Clear to auscultation, no wheezes, rales or rhonchi. Symmetric chest movement while on room air. CARDIO/VASC S1/S2 auscultated. Regular rate without appreciable murmurs, rubs, or gallops. No JVD or carotid bruits. Peripheral pulses equal bilaterally and palpable. No peripheral edema. GI Abdomen is soft without significant tenderness, masses, or guarding. Bowel sounds are normoactive. Rectal exam deferred.  No costovertebral angle tenderness. Normal appearing external genitalia. Coronado catheter is not present. HEME/LYMPH No palpable cervical lymphadenopathy and no hepatosplenomegaly. No petechiae or ecchymoses. MSK No gross joint deformities. Dressed left index finger post I&D  SKIN Normal coloration, warm, dry. NEURO Cranial nerves appear grossly intact, normal speech, no lateralizing weakness. PSYCH Awake, alert, oriented x 4. Affect appropriate.     Past Medical History:      Past Medical History:   Diagnosis Date    Absent finger     Left hand    Anxiety     Arthritis     Hands    Asthma     \"As a kid but outgrew this\" - no medications as of 2/21/20    Chronic back pain     Depression     Edema     Fibromyalgia     Headache(784.0)     Last: 2/19/20    Hernia, abdominal     Hx MRSA infection     positive culture 8/2014 from left foot    Nausea & vomiting     Neuropathy     Obesity, Class III, BMI 40-49.9 (morbid obesity) ([facility]     Osteomyelitis (HCC)     hx finger    Poor circulation     Prolonged emergence from general anesthesia     Restless leg syndrome     Shortness of breath on exertion     6/14/2016- pt denies any sob with this interview    Type II or unspecified type diabetes mellitus with other specified manifestations, not stated as uncontrolled 4/8/2014    Type II or unspecified type diabetes mellitus without mention of complication, not stated as uncontrolled DX 2007    Follows with PCP    Ulcer of other part of foot 4/8/2014    'ulcer on left foot healed all up\"     PSHX:  has a past surgical history that includes Rotator cuff repair (Last Done 7/18/2011); Finger surgery (9-11-11); Hemorrhoid surgery (2008); Finger surgery (01-16-12); other surgical history (Left, 10/22/2013); debridement (8/25/2014); other surgical history (07/07/2017); other surgical history (Left, 07/08/2017); other surgical history (Left, 07/10/2017); other surgical history (Left, 07/14/2017); Foot surgery (Right, 06/01/2018); Foot Debridement (Left, 6/10/2019); Colonoscopy (1990's); Endoscopy, colon, diagnostic (06-); and Upper gastrointestinal endoscopy (N/A, 2/21/2020). Allergies: Allergies   Allergen Reactions    Cantaloupe (Diagnostic) Hives    Ceftriaxone Hives     Tolerated Zosyn well 1/2/2019     Robaxin [Methocarbamol] Swelling    Rocephin [Ceftriaxone Sodium-Lidocaine] Hives     Noted on 10/26 - developed extremity swelling and hives. No anaphylaxis.  Sulfa Antibiotics Hives     Noted on 10/26 - developed extremity swelling and hives. No anaphylaxis.     Aspirin Nausea Only    Nsaids Nausea Only    Other Nausea Only     Darvocet- Gi distress  napsalate/acetaminophen    Propoxyphene Nausea And Vomiting    Toradol [Ketorolac Tromethamine] Other (See Comments)     Sweats        FAM HX: family history includes Diabetes in his father; Early Death (age of onset: 39) in his mother; Kidney Disease in his mother. Soc HX:   Social History     Socioeconomic History    Marital status:      Spouse name: Not on file    Number of children: 3    Years of education: Not on file    Highest education level: Not on file   Occupational History    Not on file   Tobacco Use    Smoking status: Former Smoker     Types: Cigarettes     Start date:      Quit date:      Years since quittin.5    Smokeless tobacco: Former User     Types: Snuff   Vaping Use    Vaping Use: Never used   Substance and Sexual Activity    Alcohol use: Yes     Comment: Rarely - 1-2 a year    Drug use: Yes     Frequency: 7.0 times per week     Types: Marijuana Gisell Shaheed)     Comment: occ    Sexual activity: Not on file   Other Topics Concern    Not on file   Social History Narrative    Not on file     Social Determinants of Health     Financial Resource Strain:     Difficulty of Paying Living Expenses: Not on file   Food Insecurity:     Worried About [address] in the Last Year: Not on file    [address] in the Last Year: Not on file   Transportation Needs:     Lack of Transportation (Medical): Not on file    Lack of Transportation (Non-Medical):  Not on file   Physical Activity:     Days of Exercise per Week: Not on file    Minutes of Exercise per Session: Not on file   Stress:     Feeling of Stress : Not on file   Social Connections:     Frequency of Communication with Friends and Family: Not on file    Frequency of Social Gatherings with Friends and Family: Not on file    Attends Jew Services: Not on file    Active Member of Clubs or Organizations: Not on file    Attends Club or Organization Meetings: Not on file    Marital Status: Not on file   Intimate Partner Violence:     Fear of Current or Ex-Partner: Not on file    Emotionally Abused: Not on file    Physically Abused: Not on file    Sexually Abused: Not on file   Housing Stability:     Unable to Pay for Housing in the Last Year: Not on file    Number of Places Lived in the Last Year: Not on file    Unstable Housing in the Last Year: Not on file       Medications:   Medications:    atorvastatin  10 mg Oral Nightly    escitalopram  10 mg Oral Daily    pantoprazole  40 mg Oral BID    pregabalin  75 mg Oral BID    tamsulosin  0.4 mg Oral Daily    sodium chloride flush  5-40 mL IntraVENous 2 times per day    enoxaparin  40 mg SubCUTAneous Daily    piperacillin-tazobactam  3,375 mg IntraVENous Q8H    vancomycin  15 mg/kg IntraVENous Q12H    insulin lispro  0-12 Units SubCUTAneous TID WC    insulin lispro  0-6 Units SubCUTAneous Nightly      Infusions:    sodium chloride      dextrose       PRN Meds: oxyCODONE-acetaminophen, 2 tablet, Q4H PRN  sodium chloride flush, 5-40 mL, PRN  sodium chloride, 25 mL, PRN  ondansetron, 4 mg, Q8H PRN   Or  ondansetron, 4 mg, Q6H PRN  polyethylene glycol, 17 g, Daily PRN  acetaminophen, 650 mg, Q6H PRN   Or  acetaminophen, 650 mg, Q6H PRN  glucose, 15 g, PRN  dextrose, 12.5 g, PRN  glucagon (rDNA), 1 mg, PRN  dextrose, 100 mL/hr, PRN  morphine, 2 mg, Q2H PRN   Or  morphine, 4 mg, Q2H PRN          Electronically signed by Luis Alfredo Snow MD on 12/14/2021 at 2:56 PM

## 2021-12-14 NOTE — CONSULTS
[address] [facility]  consulted by Dr. Sarmad Martínez for monitoring and adjustment. Indication for treatment: Osteomyelitis of finger  Goal trough: 15-20 mcg/mL  AUC Goal:  400-600    Pertinent Laboratory Values:   Temp Readings from Last 3 Encounters:   12/13/21 99.1 °F (37.3 °C) (Oral)   11/20/21 98 °F (36.7 °C) (Oral)   11/08/21 98.1 °F (36.7 °C) (Oral)     Recent Labs     12/14/21  0045   WBC 7.3     Recent Labs     12/14/21  0045   BUN 18   CREATININE 0.8*     Estimated Creatinine Clearance: 131 mL/min (A) (based on SCr of 0.8 mg/dL (L)). No intake or output data in the 24 hours ending 12/14/21 1632    Pertinent Cultures:  Date    Source    Results  12/14   Wound    Ordered             Vancomycin level:   TROUGH:  No results for input(s): VANCOTROUGH in the last 72 hours. RANDOM:  No results for input(s): VANCORANDOM in the last 72 hours. Assessment:  WBC and temperature: WBC normal, pt with a slight fever of 99.1  SCr, BUN, and urine output: SCR close to baseline, no UOP data  Hx of MRSA in wound  Day(s) of therapy:  1  Vancomycin concentration:   12/16 - trough level scheduled for 05:30    Plan:  The patient received vancomycin 1250mg ivpb x1 dose this morning. Start vancomycin 1500mg ivpb q12h for a predicted AUC of 485 at steady state  Check the vanco trough before the 4th 1500mg dose. Pharmacy will continue to monitor patient and adjust therapy as indicated    Jl 3 12/16 @05:30    Thank you for the consult. Venus Moreno, [facility]  12/14/2021 4:32 PM

## 2021-12-14 NOTE — ED PROVIDER NOTES
CHIEF COMPLAINT    Chief Complaint   Patient presents with    Wound Check     L index finger; hx of amputation     HPI  Catheline Felty is a 63 y.o. male with history of asthma, neuropathy, osteomyelitis, diabetes who presents to the ED with complaints of worsening pain, redness, and swelling to left index finger. Patient has history of wound infection to the left index finger. On 11/09/2021 he had incision and drainage of abscess of the left index finger and amputation revision with Dr. Hesham Carrillo of orthopedic hand surgery at [facility].  He subsequently had revision performed 11/21/2021 amputation revision. Patient discharged home on Bactrim his cultures demonstrated MRSA. However, he presents today with worsening pain and swelling over the last 24 hours to left index finger. He has pain in this finger describes a throbbing stabbing pain rated as severe and exacerbated palpation. He noticed that he has redness streaking from the left index finger into the left forearm. Symptoms are constant. Nothing make symptoms better. He has experienced chills as well but no measured fevers. Denies dizziness, lightheadedness, nausea, vomiting. REVIEW OF SYSTEMS  Constitutional: No fever, chills or recent illness. Eye: No visual changes  HENT: No earache or sore throat. Resp: No SOB or productive cough. Cardio: No chest pain or palpitations. GI: No abdominal pain, nausea, vomiting, constipation or diarrhea. No melena. : No dysuria, urgency or frequency. Endocrine: No heat intolerance, no cold intolerance, no polydipsia   Lymphatics: No adenopathy  Musculoskeletal: Complains of pain, redness, and swelling to left index finger  Neuro: No headaches. Psych: No homicidal or suicidal thoughts  Skin: No rash, No itching. ?  ?   PAST MEDICAL HISTORY  Past Medical History:   Diagnosis Date    Absent finger     Left hand    Anxiety     Arthritis     Hands    Asthma     \"As a kid but outgrew this\" - no medications as of 20    Chronic back pain     Depression     Edema     Fibromyalgia     Headache(784.0)     Last: 20    Hernia, abdominal     Hx MRSA infection     positive culture 2014 from left foot    Nausea & vomiting     Neuropathy     Obesity, Class III, BMI 40-49.9 (morbid obesity) ([address]     Osteomyelitis (HCC)     hx finger    Poor circulation     Prolonged emergence from general anesthesia     Restless leg syndrome     Shortness of breath on exertion     2016- pt denies any sob with this interview    Type II or unspecified type diabetes mellitus with other specified manifestations, not stated as uncontrolled 2014    Type II or unspecified type diabetes mellitus without mention of complication, not stated as uncontrolled DX 2007    Follows with PCP    Ulcer of other part of foot 2014    'ulcer on left foot healed all up\"     FAMILY HISTORY  Family History   Problem Relation Age of Onset    Kidney Disease Mother         \"Kidney Failure, On Dialysis\"   Theodoro Milder Early Death Mother 39    Diabetes Father         \"Type II\"     SOCIAL HISTORY  Social History     Socioeconomic History    Marital status:      Spouse name: Not on file    Number of children: 3    Years of education: Not on file    Highest education level: Not on file   Occupational History    Not on file   Tobacco Use    Smoking status: Former Smoker     Types: Cigarettes     Start date:      Quit date:      Years since quittin.5    Smokeless tobacco: Former User     Types: Snuff   Vaping Use    Vaping Use: Never used   Substance and Sexual Activity    Alcohol use: Yes     Comment: Rarely - 1-2 a year    Drug use: Yes     Frequency: 7.0 times per week     Types: Marijuana Georgiana Gallegos)     Comment: occ    Sexual activity: Not on file   Other Topics Concern    Not on file   Social History Narrative    Not on file     Social Determinants of Health     Financial Resource Strain:    [place] Difficulty of Paying Living Expenses: Not on file   Food Insecurity:     Worried About Running Out of Food in the Last Year: Not on file    Ran Out of Food in the Last Year: Not on file   Transportation Needs:     Lack of Transportation (Medical): Not on file    Lack of Transportation (Non-Medical): Not on file   Physical Activity:     Days of Exercise per Week: Not on file    Minutes of Exercise per Session: Not on file   Stress:     Feeling of Stress : Not on file   Social Connections:     Frequency of Communication with Friends and Family: Not on file    Frequency of Social Gatherings with Friends and Family: Not on file    Attends Christian Services: Not on file    Active Member of [address] [place] [address] [employer] or Organizations: Not on file    Attends Club or Organization Meetings: Not on file    Marital Status: Not on file   Intimate Partner Violence:     Fear of Current or Ex-Partner: Not on file    Emotionally Abused: Not on file    Physically Abused: Not on file    Sexually Abused: Not on file   Housing Stability:     Unable to Pay for Housing in the Last Year: Not on file    Number of [place] in the Last Year: Not on file    Unstable Housing in the Last Year: Not on file       SURGICAL HISTORY  Past Surgical History:   Procedure Laterality Date    COLONOSCOPY  1990's    Normal exam per pt    DEBRIDEMENT  8/25/2014    left foot    ENDOSCOPY, COLON, DIAGNOSTIC  06-    Normal exam per pt -     FINGER SURGERY  9-11-11    Right Index Finger    FINGER SURGERY  01-16-12    RIght Index Finger-Removal of loose body, Reconstruction of Mallet Finger    FOOT DEBRIDEMENT Left 6/10/2019    FOOT DEBRIDEMENT INCISION AND DRAINAGE performed by Surekha Castrejon MD at [address] [place] [address] Right 06/01/2018    I&D right foot    HEMORRHOID SURGERY  2008    OTHER SURGICAL HISTORY Left 10/22/2013    I & D left foot    OTHER SURGICAL HISTORY  07/07/2017    I&D fingers X2 left hand.  I&D left forearm    OTHER SURGICAL HISTORY Left 07/08/2017    left hand debridement, left forearm incision and drainage     OTHER SURGICAL HISTORY Left 07/10/2017    Fingers I&D    OTHER SURGICAL HISTORY Left 07/14/2017    middle finger amputation revision    ROTATOR CUFF REPAIR  Last Done 7/18/2011    Left, Done Four Times    UPPER GASTROINTESTINAL ENDOSCOPY N/A 2/21/2020    EGD BIOPSY performed by Gail Schilling MD at [facility]  Previous Medications    ACETAMINOPHEN (TYLENOL) 500 MG TABLET    Take 2 tablets by mouth 3 times daily for 10 days    ATORVASTATIN (LIPITOR) 10 MG TABLET    Take 10 mg by mouth daily    DICYCLOMINE (BENTYL) 10 MG CAPSULE    Take 2 capsules by mouth 4 times daily as needed (abdominal pain)    DULAGLUTIDE (TRULICITY SC)    Inject 1.5 mLs into the skin every 7 days     ESCITALOPRAM (LEXAPRO) 10 MG TABLET    Take 10 mg by mouth daily    ONDANSETRON (ZOFRAN ODT) 4 MG DISINTEGRATING TABLET    Take 1 tablet by mouth every 6 hours    ONDANSETRON (ZOFRAN) 4 MG TABLET    Take 4 mg by mouth every 8 hours as needed for Nausea or Vomiting    OXYCODONE-ACETAMINOPHEN (PERCOCET)  MG PER TABLET    Take 1 tablet by mouth every 4 hours as needed for Pain. PANTOPRAZOLE (PROTONIX) 40 MG TABLET    Take 1 tablet by mouth 2 times daily for 14 days    PREGABALIN (LYRICA) 75 MG CAPSULE    Take 75 mg by mouth 2 times daily. TAMSULOSIN (FLOMAX) 0.4 MG CAPSULE    Take 1 capsule by mouth daily for 7 doses     ALLERGIES  Allergies   Allergen Reactions    Cantaloupe (Diagnostic) Hives    Ceftriaxone Hives     Tolerated Zosyn well 1/2/2019     Robaxin [Methocarbamol] Swelling    Rocephin [Ceftriaxone Sodium-Lidocaine] Hives     Noted on 10/26 - developed extremity swelling and hives. No anaphylaxis.  Sulfa Antibiotics Hives     Noted on 10/26 - developed extremity swelling and hives. No anaphylaxis.     Aspirin Nausea Only    Nsaids Nausea Only    Other Nausea Only     Darvocet- Gi distress  napsalate/acetaminophen    Propoxyphene Nausea And Vomiting    Toradol [Ketorolac Tromethamine] Other (See Comments)     Sweats        Nursing notes reviewed by myself for past medical history, family history, social history, surgical history, current medications, and allergies. PHYSICAL EXAM  VITAL SIGNS: Triage VS:    ED Triage Vitals   Enc Vitals Group      BP 12/13/21 2256 90/66      Pulse 12/13/21 2256 94      Resp 12/13/21 2256 16      Temp 12/13/21 2256 99.1 °F (37.3 °C)      Temp Source 12/13/21 2256 Oral      SpO2 12/13/21 2256 99 %      Weight 12/14/21 0145 245 lb (111.1 kg)      Height 12/14/21 0145 5' 11\" (1.803 m)      Head Circumference --       Peak Flow --       Pain Score --       Pain Loc --       Pain Edu? --       Excl. in [address]? --      Constitutional: Well developed, Well nourished, nontoxic appearing  HENT: Normocephalic, Atraumatic, Bilateral external ears normal, Oropharynx moist, No oral exudates, Nose normal.   Eyes: PERRL, EOMI, Conjunctiva normal, No discharge. No scleral icterus. Neck: Normal range of motion, No tenderness, Supple. Lymphatic: No lymphadenopathy noted. Cardiovascular: Normal heart rate, Normal rhythm, No murmurs, gallops or rubs. Thorax & Lungs: Normal breath sounds, No respiratory distress, No wheezing. Abdomen: Soft, No tenderness, No masses, No pulsatile masses, No distention, Normal bowel sounds  Skin: Warm, Dry, Pink, No mottling, No erythema, No rash. Back: No tenderness, No CVA tenderness. Extremities: No cyanosis, Normal perfusion, No clubbing. Musculoskeletal: Patient with absent left ring finger. He has partially potation noted to the left index finger with sutures still in place along the phalanx stump. There is some purulent discharge along the middle phalanx stump and palpable fluctuance of the finger. Erythema extends to the base of the proximal phalanx. Has some erythematous streaking into the left forearm as well.   Full range of motion to flexion extension of the left index finger joints currently. Neurologic: Alert & oriented x 3,  No focal deficits noted. Psychiatric: Affect normal, Judgment normal, Mood normal.     RADIOLOGY  Labs Reviewed   CBC WITH AUTO DIFFERENTIAL - Abnormal; Notable for the following components:       Result Value    RBC 4.56 (*)     Hemoglobin 12.9 (*)     Hematocrit 40.2 (*)     RDW 15.2 (*)     Monocytes % 7.8 (*)     All other components within normal limits   BASIC METABOLIC PANEL - Abnormal; Notable for the following components:    Sodium 131 (*)     Chloride 96 (*)     CREATININE 0.8 (*)     Glucose 331 (*)     All other components within normal limits   SEDIMENTATION RATE - Abnormal; Notable for the following components:    Sed Rate 31 (*)     All other components within normal limits   C-REACTIVE PROTEIN     I personally reviewed the images. The radiologist's interpretation reveals:  Last Imaging results   XR HAND LEFT (MIN 3 VIEWS)   Preliminary Result   Findings are consistent with osteomyelitis involving the middle phalanx of   the 2nd digit with diffuse soft tissue swelling. MEDS GIVEN IN ED:  Medications   morphine (PF) injection 4 mg (4 mg IntraVENous Given 12/14/21 0531)   ondansetron (ZOFRAN) injection 4 mg (4 mg IntraVENous Given 12/14/21 0531)   0.9 % sodium chloride bolus (500 mLs IntraVENous New Bag 12/14/21 0535)   vancomycin (VANCOCIN) 1,250 mg in dextrose 5 % 250 mL IVPB (1,250 mg IntraVENous New Bag 12/14/21 0535)     COURSE & MEDICAL DECISION MAKING  50-year-old male presents emergency department complains of increasing pain, redness, and swelling to left index finger. Has underwent incision and drainage and amputation revisions x2 in November of this year at [facility] with orthopedic hand surgery team there. Initial vital signs here are reassuring. Is currently afebrile.   However on exam, the patient has significant swelling of fluctuance to the middle phalanx stump of the left index finger with sutures still in place. There is some purulent discharge along the suture sites. He does have some erythematous streaking into the left forearm as well. X-ray of the hand have been ordered by previous physician during triage which demonstrates findings consistent with osteomyelitis involving the middle phalanx of the second digit. IV vancomycin ordered for the patient. His CBC is current without leukocytosis. BMP is reassuring. At this time I did call and speak with Dr. Abel Ontiveros on call for the orthopedic hand surgery team at [facility] who informed that the hospital is on divert and patient cannot be accepted there. He recommended patient be discharged evaluate the office by Dr. Shakeel Felder or Dr. Lindsey Giron operated previously on him. I verified with Flori Sessions at the transfer center that [facility] is on divert and only excepting traumas. She also informed there is no waitlist available for this facility and that we will need to call daily for openings. In discussion with patient he states he would be open to evaluation by surgeon other facility although he has received all of his orthopedic hand surgery care at [facility] over the last 4 years. At this time we have attempted to page [facility] network. Patient signed out to daytime physician with final disposition pending for the patient. Appropriate PPE utilized as indicated for entire patient encounter? Time of Disposition: See timeline  ? New Prescriptions    No medications on file     FINAL IMPRESSION  1. Wound infection    2. Cellulitis of finger of left hand        Electronically signed by:  [address], DO, 12/14/2021         [address], DO  12/14/21 [id number]

## 2021-12-14 NOTE — ED NOTES
@[id number] called [facility] transfer spoke to [place] for Ortho hand surgery Dr Josh Cooley, transferred call to Dr Ghassan Jalloh

## 2021-12-14 NOTE — ED NOTES
[facility] CALLED AT 1130 STILL FULL UNABLE TO ACCEPT PT NOW     Maryse Lopez.  Hans  12/14/21 1133

## 2021-12-14 NOTE — ED PROVIDER NOTES
As physician-in-triage, I performed a medical screening history and physical exam on this patient. HISTORY OF PRESENT ILLNESS  Mackenzie Abel is a 63 y.o. male hx of finger amputation at [facility] . [place] 11/9, revise 11/21. Tonight increased swelling and lymphagitis up the arm. Increased pain, on Doxycycline still but worsening. Pain moderate and persistent. +Chills and fatigue. PHYSICAL EXAM  BP 90/66   Pulse 94   Temp 99.1 °F (37.3 °C) (Oral)   Resp 16   SpO2 99%     On exam, the patient appears in no acute distress. Speech is clear. Breathing is unlabored. Moves all extremities    Left index finger sutures intact with erythema and serous fluid at suture line. Erythema with lymphangitis left forearm. No crepitus. Palm without swelling, no dorsal hand swelling. Comment: Please note this report has been produced using speech recognition software and may contain errors related to that system including errors in grammar, punctuation, and spelling, as well as words and phrases that may be inappropriate. If there are any questions or concerns please feel free to contact the dictating provider for clarification.         Anson Shanks MD  12/13/21 [id number]

## 2021-12-15 LAB
ALBUMIN SERPL-MCNC: 4.1 GM/DL (ref 3.4–5)
ALP BLD-CCNC: 87 IU/L (ref 40–128)
ALT SERPL-CCNC: 6 U/L (ref 10–40)
ANION GAP SERPL CALCULATED.3IONS-SCNC: 13 MMOL/L (ref 4–16)
AST SERPL-CCNC: 10 IU/L (ref 15–37)
BASOPHILS ABSOLUTE: 0 K/CU MM
BASOPHILS RELATIVE PERCENT: 0.5 % (ref 0–1)
BILIRUB SERPL-MCNC: 0.6 MG/DL (ref 0–1)
BUN BLDV-MCNC: 18 MG/DL (ref 6–23)
CALCIUM SERPL-MCNC: 9.4 MG/DL (ref 8.3–10.6)
CHLORIDE BLD-SCNC: 100 MMOL/L (ref 99–110)
CO2: 23 MMOL/L (ref 21–32)
CREAT SERPL-MCNC: 0.8 MG/DL (ref 0.9–1.3)
DIFFERENTIAL TYPE: ABNORMAL
EOSINOPHILS ABSOLUTE: 0.3 K/CU MM
EOSINOPHILS RELATIVE PERCENT: 3.9 % (ref 0–3)
GFR AFRICAN AMERICAN: >60 ML/MIN/1.73M2
GFR NON-AFRICAN AMERICAN: >60 ML/MIN/1.73M2
GLUCOSE BLD-MCNC: 156 MG/DL (ref 70–99)
GLUCOSE BLD-MCNC: 179 MG/DL (ref 70–99)
GLUCOSE BLD-MCNC: 184 MG/DL (ref 70–99)
GLUCOSE BLD-MCNC: 184 MG/DL (ref 70–99)
GLUCOSE BLD-MCNC: 211 MG/DL (ref 70–99)
HCT VFR BLD CALC: 38.7 % (ref 42–52)
HEMOGLOBIN: 12.3 GM/DL (ref 13.5–18)
IMMATURE NEUTROPHIL %: 0.3 % (ref 0–0.43)
LYMPHOCYTES ABSOLUTE: 2 K/CU MM
LYMPHOCYTES RELATIVE PERCENT: 26.4 % (ref 24–44)
MCH RBC QN AUTO: 28 PG (ref 27–31)
MCHC RBC AUTO-ENTMCNC: 31.8 % (ref 32–36)
MCV RBC AUTO: 88 FL (ref 78–100)
MONOCYTES ABSOLUTE: 0.6 K/CU MM
MONOCYTES RELATIVE PERCENT: 7.5 % (ref 0–4)
NUCLEATED RBC %: 0 %
PDW BLD-RTO: 15 % (ref 11.7–14.9)
PLATELET # BLD: 208 K/CU MM (ref 140–440)
PMV BLD AUTO: 10 FL (ref 7.5–11.1)
POTASSIUM SERPL-SCNC: 4.2 MMOL/L (ref 3.5–5.1)
RBC # BLD: 4.4 M/CU MM (ref 4.6–6.2)
SEGMENTED NEUTROPHILS ABSOLUTE COUNT: 4.7 K/CU MM
SEGMENTED NEUTROPHILS RELATIVE PERCENT: 61.4 % (ref 36–66)
SODIUM BLD-SCNC: 136 MMOL/L (ref 135–145)
TOTAL IMMATURE NEUTOROPHIL: 0.02 K/CU MM
TOTAL NUCLEATED RBC: 0 K/CU MM
TOTAL PROTEIN: 6.6 GM/DL (ref 6.4–8.2)
WBC # BLD: 7.7 K/CU MM (ref 4–10.5)

## 2021-12-15 PROCEDURE — 6370000000 HC RX 637 (ALT 250 FOR IP): Performed by: INTERNAL MEDICINE

## 2021-12-15 PROCEDURE — 94761 N-INVAS EAR/PLS OXIMETRY MLT: CPT

## 2021-12-15 PROCEDURE — 6360000002 HC RX W HCPCS: Performed by: INTERNAL MEDICINE

## 2021-12-15 PROCEDURE — 1200000000 HC SEMI PRIVATE

## 2021-12-15 PROCEDURE — 36415 COLL VENOUS BLD VENIPUNCTURE: CPT

## 2021-12-15 PROCEDURE — 2580000003 HC RX 258: Performed by: INTERNAL MEDICINE

## 2021-12-15 PROCEDURE — 99211 OFF/OP EST MAY X REQ PHY/QHP: CPT

## 2021-12-15 PROCEDURE — 82962 GLUCOSE BLOOD TEST: CPT

## 2021-12-15 PROCEDURE — 80053 COMPREHEN METABOLIC PANEL: CPT

## 2021-12-15 PROCEDURE — 85025 COMPLETE CBC W/AUTO DIFF WBC: CPT

## 2021-12-15 RX ADMIN — OXYCODONE AND ACETAMINOPHEN 2 TABLET: 5; 325 TABLET ORAL at 02:24

## 2021-12-15 RX ADMIN — SODIUM CHLORIDE 25 ML: 9 INJECTION, SOLUTION INTRAVENOUS at 06:00

## 2021-12-15 RX ADMIN — INSULIN LISPRO 2 UNITS: 100 INJECTION, SOLUTION INTRAVENOUS; SUBCUTANEOUS at 09:56

## 2021-12-15 RX ADMIN — TAMSULOSIN HYDROCHLORIDE 0.4 MG: 0.4 CAPSULE ORAL at 07:53

## 2021-12-15 RX ADMIN — OXYCODONE AND ACETAMINOPHEN 2 TABLET: 5; 325 TABLET ORAL at 17:39

## 2021-12-15 RX ADMIN — OXYCODONE AND ACETAMINOPHEN 2 TABLET: 5; 325 TABLET ORAL at 07:53

## 2021-12-15 RX ADMIN — SODIUM CHLORIDE, PRESERVATIVE FREE 10 ML: 5 INJECTION INTRAVENOUS at 22:50

## 2021-12-15 RX ADMIN — SODIUM CHLORIDE 25 ML: 9 INJECTION, SOLUTION INTRAVENOUS at 00:19

## 2021-12-15 RX ADMIN — OXYCODONE AND ACETAMINOPHEN 2 TABLET: 5; 325 TABLET ORAL at 13:31

## 2021-12-15 RX ADMIN — VANCOMYCIN HYDROCHLORIDE 1500 MG: 5 INJECTION, POWDER, LYOPHILIZED, FOR SOLUTION INTRAVENOUS at 10:01

## 2021-12-15 RX ADMIN — PREGABALIN 75 MG: 75 CAPSULE ORAL at 07:53

## 2021-12-15 RX ADMIN — VANCOMYCIN HYDROCHLORIDE 1500 MG: 5 INJECTION, POWDER, LYOPHILIZED, FOR SOLUTION INTRAVENOUS at 22:51

## 2021-12-15 RX ADMIN — PIPERACILLIN SODIUM AND TAZOBACTAM SODIUM 3375 MG: 3; .375 INJECTION, POWDER, LYOPHILIZED, FOR SOLUTION INTRAVENOUS at 12:13

## 2021-12-15 RX ADMIN — INSULIN LISPRO 2 UNITS: 100 INJECTION, SOLUTION INTRAVENOUS; SUBCUTANEOUS at 17:02

## 2021-12-15 RX ADMIN — PANTOPRAZOLE SODIUM 40 MG: 40 TABLET, DELAYED RELEASE ORAL at 16:55

## 2021-12-15 RX ADMIN — PIPERACILLIN SODIUM AND TAZOBACTAM SODIUM 3375 MG: 3; .375 INJECTION, POWDER, LYOPHILIZED, FOR SOLUTION INTRAVENOUS at 16:58

## 2021-12-15 RX ADMIN — OXYCODONE AND ACETAMINOPHEN 2 TABLET: 5; 325 TABLET ORAL at 22:05

## 2021-12-15 RX ADMIN — PREGABALIN 75 MG: 75 CAPSULE ORAL at 20:25

## 2021-12-15 RX ADMIN — SODIUM CHLORIDE, PRESERVATIVE FREE 10 ML: 5 INJECTION INTRAVENOUS at 10:03

## 2021-12-15 RX ADMIN — ATORVASTATIN CALCIUM 10 MG: 20 TABLET, FILM COATED ORAL at 20:24

## 2021-12-15 RX ADMIN — PIPERACILLIN SODIUM AND TAZOBACTAM SODIUM 3375 MG: 3; .375 INJECTION, POWDER, LYOPHILIZED, FOR SOLUTION INTRAVENOUS at 06:00

## 2021-12-15 RX ADMIN — INSULIN LISPRO 4 UNITS: 100 INJECTION, SOLUTION INTRAVENOUS; SUBCUTANEOUS at 12:16

## 2021-12-15 RX ADMIN — PIPERACILLIN SODIUM AND TAZOBACTAM SODIUM 3375 MG: 3; .375 INJECTION, POWDER, LYOPHILIZED, FOR SOLUTION INTRAVENOUS at 00:19

## 2021-12-15 RX ADMIN — ESCITALOPRAM OXALATE 10 MG: 10 TABLET ORAL at 07:53

## 2021-12-15 RX ADMIN — ATORVASTATIN CALCIUM 10 MG: 20 TABLET, FILM COATED ORAL at 00:19

## 2021-12-15 RX ADMIN — ENOXAPARIN SODIUM 40 MG: 100 INJECTION SUBCUTANEOUS at 07:52

## 2021-12-15 RX ADMIN — PANTOPRAZOLE SODIUM 40 MG: 40 TABLET, DELAYED RELEASE ORAL at 07:53

## 2021-12-15 ASSESSMENT — PAIN DESCRIPTION - DESCRIPTORS
DESCRIPTORS: ACHING
DESCRIPTORS: ACHING

## 2021-12-15 ASSESSMENT — PAIN SCALES - GENERAL
PAINLEVEL_OUTOF10: 5
PAINLEVEL_OUTOF10: 8
PAINLEVEL_OUTOF10: 5
PAINLEVEL_OUTOF10: 5
PAINLEVEL_OUTOF10: 6
PAINLEVEL_OUTOF10: 9
PAINLEVEL_OUTOF10: 9
PAINLEVEL_OUTOF10: 8
PAINLEVEL_OUTOF10: 5
PAINLEVEL_OUTOF10: 9

## 2021-12-15 ASSESSMENT — PAIN DESCRIPTION - ORIENTATION
ORIENTATION: LEFT
ORIENTATION: LEFT

## 2021-12-15 ASSESSMENT — PAIN DESCRIPTION - LOCATION
LOCATION: CHEST;FINGER (COMMENT WHICH ONE)
LOCATION: FINGER (COMMENT WHICH ONE)
LOCATION: FINGER (COMMENT WHICH ONE)

## 2021-12-15 ASSESSMENT — PAIN DESCRIPTION - PAIN TYPE
TYPE: ACUTE PAIN

## 2021-12-15 NOTE — CONSULTS
[address] [id number] Continence Nurse  Consult Note       Yin Landrum  AGE: 63 y.o.    GENDER: male  : 1965  TODAY'S DATE:  12/15/2021    Subjective:     Reason for  Evaluation and Assessment: wound care evalSommer Landrum is a 63 y.o. male referred by:   [x] Physician  [] Nursing  [] Other:     Wound Identification:  Wound Type: diabetic and non-healing surgical  Contributing Factors: diabetes        PAST MEDICAL HISTORY        Diagnosis Date    Absent finger     Left hand    Anxiety     Arthritis     Hands    Asthma     \"As a kid but outgrew this\" - no medications as of 20    Chronic back pain     Depression     Edema     Fibromyalgia     Headache(784.0)     Last: 20    Hernia, abdominal     Hx MRSA infection     positive culture 2014 from left foot    Nausea & vomiting     Neuropathy     Obesity, Class III, BMI 40-49.9 (morbid obesity) ([facility] [address]     Osteomyelitis (HCC)     hx finger    Poor circulation     Prolonged emergence from general anesthesia     Restless leg syndrome     Shortness of breath on exertion     2016- pt denies any sob with this interview    Type II or unspecified type diabetes mellitus with other specified manifestations, not stated as uncontrolled 2014    Type II or unspecified type diabetes mellitus without mention of complication, not stated as uncontrolled DX 2007    Follows with PCP    Ulcer of other part of foot 2014    'ulcer on left foot healed all up\"       PAST SURGICAL HISTORY    Past Surgical History:   Procedure Laterality Date    COLONOSCOPY      Normal exam per pt    DEBRIDEMENT  2014    left foot    ENDOSCOPY, COLON, DIAGNOSTIC  2016    Normal exam per pt -     FINGER SURGERY  11    Right Index Finger    FINGER SURGERY  12    RIght Index Finger-Removal of loose body, Reconstruction of Mallet Finger    FOOT DEBRIDEMENT Left 6/10/2019    FOOT DEBRIDEMENT INCISION AND DRAINAGE performed by Eli Oconnor MD at [facility] Right 2018    I&D right foot    HEMORRHOID SURGERY  2008    OTHER SURGICAL HISTORY Left 10/22/2013    I & D left foot    OTHER SURGICAL HISTORY  2017    I&D fingers X2 left hand. I&D left forearm    OTHER SURGICAL HISTORY Left 2017    left hand debridement, left forearm incision and drainage     OTHER SURGICAL HISTORY Left 07/10/2017    Fingers I&D    OTHER SURGICAL HISTORY Left 2017    middle finger amputation revision    ROTATOR CUFF REPAIR  Last Done 2011    Left, Done Four Times    UPPER GASTROINTESTINAL ENDOSCOPY N/A 2020    EGD BIOPSY performed by Gail Schilling MD at [address] [place] [address]    Family History   Problem Relation Age of Onset    Kidney Disease Mother         \"Kidney Failure, On Dialysis\"   Vickie Mcintosh Early Death Mother 39    Diabetes Father         \"Type II\"       SOCIAL HISTORY    Social History     Tobacco Use    Smoking status: Former Smoker     Types: Cigarettes     Start date:      Quit date:      Years since quittin.5    Smokeless tobacco: Former User     Types: Snuff   Vaping Use    Vaping Use: Never used   Substance Use Topics    Alcohol use: Yes     Comment: Rarely - 1-2 a year    Drug use: Yes     Frequency: 7.0 times per week     Types: Marijuana (Weed)     Comment: occ       ALLERGIES    Allergies   Allergen Reactions    Cantaloupe (Diagnostic) Hives    Ceftriaxone Hives     Tolerated Zosyn well 2019     Robaxin [Methocarbamol] Swelling    Rocephin [Ceftriaxone Sodium-Lidocaine] Hives     Noted on 10/26 - developed extremity swelling and hives. No anaphylaxis.  Sulfa Antibiotics Hives     Noted on 10/26 - developed extremity swelling and hives. No anaphylaxis.     Aspirin Nausea Only    Nsaids Nausea Only    Other Nausea Only     Darvocet- Gi distress  napsalate/acetaminophen    Propoxyphene Nausea And Vomiting    Toradol [Ketorolac Tromethamine] Other (See Comments)     Sweats        MEDICATIONS    No current facility-administered medications on file prior to encounter. Current Outpatient Medications on File Prior to Encounter   Medication Sig Dispense Refill    oxyCODONE-acetaminophen (PERCOCET)  MG per tablet Take 1 tablet by mouth every 4 hours as needed for Pain.  pregabalin (LYRICA) 75 MG capsule Take 75 mg by mouth 2 times daily.       atorvastatin (LIPITOR) 10 MG tablet Take 10 mg by mouth daily      acetaminophen (TYLENOL) 500 MG tablet Take 2 tablets by mouth 3 times daily for 10 days 60 tablet 0    tamsulosin (FLOMAX) 0.4 MG capsule Take 1 capsule by mouth daily for 7 doses 7 capsule 0    dicyclomine (BENTYL) 10 MG capsule Take 2 capsules by mouth 4 times daily as needed (abdominal pain) 100 capsule 3    ondansetron (ZOFRAN) 4 MG tablet Take 4 mg by mouth every 8 hours as needed for Nausea or Vomiting      Dulaglutide (TRULICITY SC) Inject 1.5 mLs into the skin every 7 days       pantoprazole (PROTONIX) 40 MG tablet Take 1 tablet by mouth 2 times daily for 14 days 28 tablet 0    ondansetron (ZOFRAN ODT) 4 MG disintegrating tablet Take 1 tablet by mouth every 6 hours (Patient not taking: Reported on 2/20/2020) 10 tablet 0    escitalopram (LEXAPRO) 10 MG tablet Take 10 mg by mouth daily           Objective:      BP (!) 141/93   Pulse 75   Temp 97.5 °F (36.4 °C) (Oral)   Resp 18   Ht 5' 11\" (1.803 m)   Wt 274 lb 7.6 oz (124.5 kg)   SpO2 100%   BMI 38.28 kg/m²   Finn Risk Score: Finn Scale Score: 21    LABS    CBC:   Lab Results   Component Value Date    WBC 7.7 12/15/2021    RBC 4.40 12/15/2021    HGB 12.3 12/15/2021    HCT 38.7 12/15/2021    MCV 88.0 12/15/2021    MCH 28.0 12/15/2021    MCHC 31.8 12/15/2021    RDW 15.0 12/15/2021     12/15/2021    MPV 10.0 12/15/2021     CMP:    Lab Results   Component Value Date     12/15/2021    K 4.2 12/15/2021     12/15/2021    CO2 23 12/15/2021    BUN 18 12/15/2021    CREATININE 0.8 12/15/2021    GFRAA >60 12/15/2021    LABGLOM >60 12/15/2021    GLUCOSE 184 12/15/2021    PROT 6.6 12/15/2021    PROT 7.3 09/10/2011    LABALBU 4.1 12/15/2021    CALCIUM 9.4 12/15/2021    BILITOT 0.6 12/15/2021    ALKPHOS 87 12/15/2021    AST 10 12/15/2021    ALT 6 12/15/2021     Albumin:    Lab Results   Component Value Date    LABALBU 4.1 12/15/2021     PT/INR:    Lab Results   Component Value Date    PROTIME 12.3 10/17/2019    PROTIME 11.2 09/11/2011    INR 1.06 10/17/2019     HgBA1c:    Lab Results   Component Value Date    LABA1C 7.6 12/14/2021         Assessment:     Patient Active Problem List   Diagnosis    Finger infection right index finger    Diabetic foot infection ([address]    Uncontrolled diabetes mellitus with complications ([address]    Diabetes with ulcer of foot ([address]    Ulcer of other part of foot    Diabetes mellitus with ulcer of heel ([address]    Obesity, Class III, BMI 40-49.9 (morbid obesity) ([address]    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)    Skin ulcer of toe of left foot with fat layer exposed ([address]    Chemical dependency ([address]    Chronic pain syndrome    Drug abuse ([address]    Cellulitis of left middle finger    Sepsis without acute organ dysfunction ([address]    Foot pain    WD-S/P amputation of lesser toe, right ([address]    Acute osteomyelitis involving hand ([address]    Diabetic infection of left foot ([address]    Left foot pain    Foreign body in foot, left, infected, initial encounter    Cellulitis    Cellulitis of left lower extremity    Abscess of left index finger    Osteomyelitis of finger of left hand ([address]       Measurements:  Wound 10/26/21 Finger (Comment which one) Right (Active)   Number of days: 50       Wound 10/26/21 Finger (Comment which one) Left (Active)   Number of days: 50       Wound 10/26/21 Other (Comment) Right (Active)   Number of days: 49       Wound 12/15/21 Finger (Comment which one) Left index (Active)   Wound Image Relief  [] Other:     Discharge Plan:  Placement for patient upon discharge:  home  Hospice Care: no  Patient appropriate for Outpatient [address]: [facility]    Patient/Caregiver Teaching:  Level of patient/caregiver understanding able to: wound care explained as done. Pt verbalized understanding. Electronically signed by Arabella Gregorio.  MEY Cano, on 12/15/2021 at 11:56 AM

## 2021-12-15 NOTE — PROGRESS NOTES
Comfortable except for left index finger. AVSS  Left index finger iodophor guaze advanced out; No purulence. Less fluctuance. Kanavels signs negative. Left forearm improved. C&S: pending. BS: 184  WBC: 7.7  Continue antibiotics. Will probably require revisional shortening amputation in future.

## 2021-12-15 NOTE — PROGRESS NOTES
Hospitalist Progress Note      Name:  Ector Malik /Age/Sex: 1965  (63 y.o. male)   MRN & CSN:  [id number] & [id number] Admission Date/Time: 2021  1:24 AM   Location:  [facility] PCP: Angeline Prasad Day: 2    Assessment and Plan:     63 y.o.  male  who presents with Osteomyelitis of finger of left hand ([facility]     Principal Problem:    Osteomyelitis of finger of left hand ([facility]  Active Problems:    Uncontrolled diabetes mellitus with complications ([facility]    Obesity, Class III, BMI 40-49.9 (morbid obesity) ([facility]  Resolved Problems:    * No resolved hospital problems. *    Has infection of the left index finger amputation stump with osteomyelitis and secondary lymphangitis  Some of the sutures removed by the plastic surgeon from the central portion of the left index finger stump  May be will require amputation of the left index finger at the level of the PIP joint or distal proximal phalanx in the near future as per plastic surgery note  Follow culture  Continue empiric antibiotic coverage with vancomycin and [address] [address] consult for vancomycin dosage  Plastics following post I&D  Pain management with as needed IV and oral narcotics  Sliding scale insulin for glycemic control  Continue other chronic home medication    Diet ADULT DIET; Regular; 5 carb choices (75 gm/meal)   DVT Prophylaxis [] Lovenox, []  Heparin, [] SCDs, [] Ambulation   GI Prophylaxis [] PPI,  [] H2 Blocker,  [] Carafate,  [] Diet/Tube Feeds   Code Status Full Code   Disposition Patient requires continued admission due to    MDM [] Low, [] Moderate,[]  High  Patient's risk as above due to      History of Present Illness:    The patient was seen and examined at the bedside patient denies chest pain or shortness of breath    Objective:   No intake or output data in the 24 hours ending 12/15/21 1117   Vitals:   Vitals:    12/15/21 0736   BP: (!) 141/93   Pulse: 75   Resp: 18   Temp: 97.5 °F (36.4 °C)   SpO2: 100%     Physical Exam:   GEN Awake. Alert , not in respiratory distress, not in pain  HEENT: PEERLA, , supple neck,   Chest: air entry equal bilaterally, no wheezing or crepitation  Heart: S1 and S2 heard, no murmur, no gallop or rub, regular rate  Abdomen: soft, ND , Nt, +BS  Extremities:  There is swelling erythema and discoloration of left index finger stump  Neurology: alert, oriented x3, able to move 4 limbs    Medications:   Medications:    atorvastatin  10 mg Oral Nightly    escitalopram  10 mg Oral Daily    pantoprazole  40 mg Oral BID    pregabalin  75 mg Oral BID    tamsulosin  0.4 mg Oral Daily    sodium chloride flush  5-40 mL IntraVENous 2 times per day    enoxaparin  40 mg SubCUTAneous Daily    insulin lispro  0-12 Units SubCUTAneous TID WC    insulin lispro  0-6 Units SubCUTAneous Nightly    vancomycin  1,500 mg IntraVENous Q12H    piperacillin-tazobactam  3,375 mg IntraVENous Q6H      Infusions:    sodium chloride 25 mL (12/15/21 0600)    dextrose       PRN Meds: oxyCODONE-acetaminophen, 2 tablet, Q4H PRN  sodium chloride flush, 5-40 mL, PRN  sodium chloride, 25 mL, PRN  ondansetron, 4 mg, Q8H PRN   Or  ondansetron, 4 mg, Q6H PRN  polyethylene glycol, 17 g, Daily PRN  acetaminophen, 650 mg, Q6H PRN   Or  acetaminophen, 650 mg, Q6H PRN  glucose, 15 g, PRN  dextrose, 12.5 g, PRN  glucagon (rDNA), 1 mg, PRN  dextrose, 100 mL/hr, PRN  morphine, 2 mg, Q2H PRN   Or  morphine, 4 mg, Q2H PRN          Electronically signed by Gomez Tapia MD on 12/15/2021 at 11:17 AM

## 2021-12-16 LAB
ALBUMIN SERPL-MCNC: 4.2 GM/DL (ref 3.4–5)
ALP BLD-CCNC: 87 IU/L (ref 40–128)
ALT SERPL-CCNC: 5 U/L (ref 10–40)
ANION GAP SERPL CALCULATED.3IONS-SCNC: 12 MMOL/L (ref 4–16)
AST SERPL-CCNC: 10 IU/L (ref 15–37)
BILIRUB SERPL-MCNC: 0.7 MG/DL (ref 0–1)
BUN BLDV-MCNC: 18 MG/DL (ref 6–23)
CALCIUM SERPL-MCNC: 9.5 MG/DL (ref 8.3–10.6)
CHLORIDE BLD-SCNC: 100 MMOL/L (ref 99–110)
CO2: 24 MMOL/L (ref 21–32)
CREAT SERPL-MCNC: 0.9 MG/DL (ref 0.9–1.3)
DOSE AMOUNT: NORMAL
DOSE TIME: NORMAL
GFR AFRICAN AMERICAN: >60 ML/MIN/1.73M2
GFR NON-AFRICAN AMERICAN: >60 ML/MIN/1.73M2
GLUCOSE BLD-MCNC: 142 MG/DL (ref 70–99)
GLUCOSE BLD-MCNC: 150 MG/DL (ref 70–99)
GLUCOSE BLD-MCNC: 162 MG/DL (ref 70–99)
GLUCOSE BLD-MCNC: 180 MG/DL (ref 70–99)
GLUCOSE BLD-MCNC: 221 MG/DL (ref 70–99)
HCT VFR BLD CALC: 39.6 % (ref 42–52)
HEMOGLOBIN: 12.7 GM/DL (ref 13.5–18)
MCH RBC QN AUTO: 28.3 PG (ref 27–31)
MCHC RBC AUTO-ENTMCNC: 32.1 % (ref 32–36)
MCV RBC AUTO: 88.4 FL (ref 78–100)
PDW BLD-RTO: 15 % (ref 11.7–14.9)
PLATELET # BLD: 223 K/CU MM (ref 140–440)
PMV BLD AUTO: 9.9 FL (ref 7.5–11.1)
POTASSIUM SERPL-SCNC: 4.6 MMOL/L (ref 3.5–5.1)
RBC # BLD: 4.48 M/CU MM (ref 4.6–6.2)
SODIUM BLD-SCNC: 136 MMOL/L (ref 135–145)
TOTAL PROTEIN: 6.9 GM/DL (ref 6.4–8.2)
VANCOMYCIN TROUGH: 14.2 UG/ML (ref 10–20)
WBC # BLD: 6.1 K/CU MM (ref 4–10.5)

## 2021-12-16 PROCEDURE — 80053 COMPREHEN METABOLIC PANEL: CPT

## 2021-12-16 PROCEDURE — 80202 ASSAY OF VANCOMYCIN: CPT

## 2021-12-16 PROCEDURE — 6370000000 HC RX 637 (ALT 250 FOR IP): Performed by: INTERNAL MEDICINE

## 2021-12-16 PROCEDURE — 82962 GLUCOSE BLOOD TEST: CPT

## 2021-12-16 PROCEDURE — 2580000003 HC RX 258: Performed by: INTERNAL MEDICINE

## 2021-12-16 PROCEDURE — 1200000000 HC SEMI PRIVATE

## 2021-12-16 PROCEDURE — 36415 COLL VENOUS BLD VENIPUNCTURE: CPT

## 2021-12-16 PROCEDURE — 85027 COMPLETE CBC AUTOMATED: CPT

## 2021-12-16 PROCEDURE — 6360000002 HC RX W HCPCS: Performed by: INTERNAL MEDICINE

## 2021-12-16 RX ADMIN — PREGABALIN 75 MG: 75 CAPSULE ORAL at 08:38

## 2021-12-16 RX ADMIN — PIPERACILLIN SODIUM AND TAZOBACTAM SODIUM 3375 MG: 3; .375 INJECTION, POWDER, LYOPHILIZED, FOR SOLUTION INTRAVENOUS at 17:41

## 2021-12-16 RX ADMIN — PANTOPRAZOLE SODIUM 40 MG: 40 TABLET, DELAYED RELEASE ORAL at 08:38

## 2021-12-16 RX ADMIN — PREGABALIN 75 MG: 75 CAPSULE ORAL at 21:03

## 2021-12-16 RX ADMIN — INSULIN LISPRO 2 UNITS: 100 INJECTION, SOLUTION INTRAVENOUS; SUBCUTANEOUS at 17:30

## 2021-12-16 RX ADMIN — ESCITALOPRAM OXALATE 10 MG: 10 TABLET ORAL at 08:38

## 2021-12-16 RX ADMIN — PIPERACILLIN SODIUM AND TAZOBACTAM SODIUM 3375 MG: 3; .375 INJECTION, POWDER, LYOPHILIZED, FOR SOLUTION INTRAVENOUS at 11:53

## 2021-12-16 RX ADMIN — TAMSULOSIN HYDROCHLORIDE 0.4 MG: 0.4 CAPSULE ORAL at 08:39

## 2021-12-16 RX ADMIN — OXYCODONE AND ACETAMINOPHEN 2 TABLET: 5; 325 TABLET ORAL at 08:38

## 2021-12-16 RX ADMIN — POLYETHYLENE GLYCOL (3350) 17 G: 17 POWDER, FOR SOLUTION ORAL at 10:24

## 2021-12-16 RX ADMIN — VANCOMYCIN HYDROCHLORIDE 1500 MG: 5 INJECTION, POWDER, LYOPHILIZED, FOR SOLUTION INTRAVENOUS at 19:04

## 2021-12-16 RX ADMIN — ATORVASTATIN CALCIUM 10 MG: 20 TABLET, FILM COATED ORAL at 21:03

## 2021-12-16 RX ADMIN — INSULIN LISPRO 4 UNITS: 100 INJECTION, SOLUTION INTRAVENOUS; SUBCUTANEOUS at 12:45

## 2021-12-16 RX ADMIN — INSULIN LISPRO 1 UNITS: 100 INJECTION, SOLUTION INTRAVENOUS; SUBCUTANEOUS at 08:42

## 2021-12-16 RX ADMIN — PIPERACILLIN SODIUM AND TAZOBACTAM SODIUM 3375 MG: 3; .375 INJECTION, POWDER, LYOPHILIZED, FOR SOLUTION INTRAVENOUS at 01:07

## 2021-12-16 RX ADMIN — ENOXAPARIN SODIUM 40 MG: 100 INJECTION SUBCUTANEOUS at 08:38

## 2021-12-16 RX ADMIN — OXYCODONE AND ACETAMINOPHEN 2 TABLET: 5; 325 TABLET ORAL at 13:23

## 2021-12-16 RX ADMIN — OXYCODONE AND ACETAMINOPHEN 2 TABLET: 5; 325 TABLET ORAL at 03:00

## 2021-12-16 RX ADMIN — POLYETHYLENE GLYCOL (3350) 17 G: 17 POWDER, FOR SOLUTION ORAL at 21:42

## 2021-12-16 RX ADMIN — OXYCODONE AND ACETAMINOPHEN 2 TABLET: 5; 325 TABLET ORAL at 17:28

## 2021-12-16 RX ADMIN — PANTOPRAZOLE SODIUM 40 MG: 40 TABLET, DELAYED RELEASE ORAL at 17:42

## 2021-12-16 RX ADMIN — PIPERACILLIN SODIUM AND TAZOBACTAM SODIUM 3375 MG: 3; .375 INJECTION, POWDER, LYOPHILIZED, FOR SOLUTION INTRAVENOUS at 06:52

## 2021-12-16 ASSESSMENT — PAIN DESCRIPTION - PAIN TYPE: TYPE: ACUTE PAIN

## 2021-12-16 ASSESSMENT — PAIN SCALES - GENERAL
PAINLEVEL_OUTOF10: 9
PAINLEVEL_OUTOF10: 7
PAINLEVEL_OUTOF10: 7
PAINLEVEL_OUTOF10: 9
PAINLEVEL_OUTOF10: 5
PAINLEVEL_OUTOF10: 9
PAINLEVEL_OUTOF10: 8
PAINLEVEL_OUTOF10: 9

## 2021-12-16 ASSESSMENT — PAIN DESCRIPTION - DESCRIPTORS: DESCRIPTORS: ACHING

## 2021-12-16 ASSESSMENT — PAIN DESCRIPTION - ONSET: ONSET: ON-GOING

## 2021-12-16 ASSESSMENT — PAIN DESCRIPTION - LOCATION: LOCATION: FINGER (COMMENT WHICH ONE)

## 2021-12-16 ASSESSMENT — PAIN - FUNCTIONAL ASSESSMENT: PAIN_FUNCTIONAL_ASSESSMENT: PREVENTS OR INTERFERES SOME ACTIVE ACTIVITIES AND ADLS

## 2021-12-16 ASSESSMENT — PAIN DESCRIPTION - FREQUENCY: FREQUENCY: CONTINUOUS

## 2021-12-16 ASSESSMENT — PAIN DESCRIPTION - ORIENTATION: ORIENTATION: LEFT

## 2021-12-16 NOTE — PROGRESS NOTES
Patient informed of transfer to ARU. All belongings gathered to transport. Patient states he will inform family of the move.

## 2021-12-16 NOTE — CONSULTS
[facility] [facility]  consulted by Dr. Julee Quiroga for monitoring and adjustment. Indication for treatment: Osteomyelitis of finger  Goal trough: 15-20 mcg/mL  AUC Goal:  400-600    Pertinent Laboratory Values:   Temp Readings from Last 3 Encounters:   12/16/21 97.9 °F (36.6 °C) (Oral)   11/20/21 98 °F (36.7 °C) (Oral)   11/08/21 98.1 °F (36.7 °C) (Oral)     Recent Labs     12/14/21  0045 12/15/21  0541 12/16/21  0612   WBC 7.3 7.7 6.1     Recent Labs     12/14/21  0045 12/15/21  0541 12/16/21  0612   BUN 18 18 18   CREATININE 0.8* 0.8* 0.9     Estimated Creatinine Clearance: 123 mL/min (based on SCr of 0.9 mg/dL). No intake or output data in the 24 hours ending 12/16/21 1530    Pertinent Cultures:  Date    Source    Results  12/14   Wound    MRSA             Vancomycin level:   TROUGH:    Recent Labs     12/16/21  0612   VANCOTROUGH 14.2     RANDOM:  No results for input(s): VANCORANDOM in the last 72 hours. Assessment:  · WBC and temperature: WBC normal, patient remains afebrile  · SCr, BUN, and urine output: SCR close to baseline, no UOP data  · Hx of MRSA in wound  · Day(s) of therapy:  3  · Vancomycin concentration:   · 12/16 - 14.2, therapeutic    Plan:  · Continue on Vancomycin 1500mg ivpb q12h for a predicted AUC of 485 at steady state  · Repeat level in 2 days   · Pharmacy will continue to monitor patient and adjust therapy as indicated    Jl 3 12/18 @ 0900  Thank you for the consult.   Caitlin Muniz, [facility]  12/16/2021 3:30 PM

## 2021-12-16 NOTE — PROGRESS NOTES
Hospitalist Progress Note      Name:  Sung León /Age/Sex: 1965  (63 y.o. male)   MRN & CSN:  [id number] & [id number] Admission Date/Time: 2021  1:24 AM   Location:  410[address]4102-B PCP: Félix Ferreira Day: 3    Assessment and Plan:     63 y.o.  male  who presents with Osteomyelitis of finger of left hand ([facility]     Principal Problem:    Osteomyelitis of finger of left hand ([facility]  Active Problems:    Uncontrolled diabetes mellitus with complications ([facility]    Obesity, Class III, BMI 40-49.9 (morbid obesity) ([facility]  Resolved Problems:    * No resolved hospital problems. *    Has infection of the left index finger amputation stump with osteomyelitis and secondary lymphangitis  Some of the sutures removed by the plastic surgeon from the central portion of the left index finger stump  May be will require amputation of the left index finger at the level of the PIP joint or distal proximal phalanx in the near future as per plastic surgery note  Surgical culture growing MRSA follow ID and sensitivity  Consider consult ID  Continue empiric antibiotic coverage with vancomycin and Zosyn  Pharmacy consult for vancomycin dosage  Plastics following post I&D  Pain management with as needed IV and oral narcotics  Sliding scale insulin for glycemic control  Continue other chronic home medication    Diet ADULT DIET; Regular; 5 carb choices (75 gm/meal)   DVT Prophylaxis [] Lovenox, []  Heparin, [] SCDs, [] Ambulation   GI Prophylaxis [] PPI,  [] H2 Blocker,  [] Carafate,  [] Diet/Tube Feeds   Code Status Full Code   Disposition Patient requires continued admission due to    MDM [] Low, [] Moderate,[]  High  Patient's risk as above due to      History of Present Illness:    The patient was seen and examined at the bedside patient denies chest pain or shortness of breath    Objective:   No intake or output data in the 24 hours ending 21 0855   Vitals:   Vitals:    21 0831   BP: (!) 125/90   Pulse: 77   Resp: 16   Temp: 97.7 °F (36.5 °C)   SpO2: 99%     Physical Exam:   GEN Awake. Alert , not in respiratory distress, not in pain  HEENT: PEERLA, , supple neck,   Chest: air entry equal bilaterally, no wheezing or crepitation  Heart: S1 and S2 heard, no murmur, no gallop or rub, regular rate  Abdomen: soft, ND , Nt, +BS  Extremities:  There is swelling erythema and discoloration of left index finger stump  Neurology: alert, oriented x3, able to move 4 limbs    Medications:   Medications:    atorvastatin  10 mg Oral Nightly    escitalopram  10 mg Oral Daily    pantoprazole  40 mg Oral BID    pregabalin  75 mg Oral BID    tamsulosin  0.4 mg Oral Daily    sodium chloride flush  5-40 mL IntraVENous 2 times per day    enoxaparin  40 mg SubCUTAneous Daily    insulin lispro  0-12 Units SubCUTAneous TID WC    insulin lispro  0-6 Units SubCUTAneous Nightly    vancomycin  1,500 mg IntraVENous Q12H    piperacillin-tazobactam  3,375 mg IntraVENous Q6H      Infusions:    sodium chloride 25 mL (12/15/21 0600)    dextrose       PRN Meds: oxyCODONE-acetaminophen, 2 tablet, Q4H PRN  sodium chloride flush, 5-40 mL, PRN  sodium chloride, 25 mL, PRN  ondansetron, 4 mg, Q8H PRN   Or  ondansetron, 4 mg, Q6H PRN  polyethylene glycol, 17 g, Daily PRN  acetaminophen, 650 mg, Q6H PRN   Or  acetaminophen, 650 mg, Q6H PRN  glucose, 15 g, PRN  dextrose, 12.5 g, PRN  glucagon (rDNA), 1 mg, PRN  dextrose, 100 mL/hr, PRN  morphine, 2 mg, Q2H PRN   Or  morphine, 4 mg, Q2H PRN          Electronically signed by Dae Costa MD on 12/16/2021 at 8:55 AM

## 2021-12-17 LAB
ALBUMIN SERPL-MCNC: 4.2 GM/DL (ref 3.4–5)
ALP BLD-CCNC: 87 IU/L (ref 40–128)
ALT SERPL-CCNC: 6 U/L (ref 10–40)
ANION GAP SERPL CALCULATED.3IONS-SCNC: 12 MMOL/L (ref 4–16)
AST SERPL-CCNC: 12 IU/L (ref 15–37)
BILIRUB SERPL-MCNC: 0.4 MG/DL (ref 0–1)
BUN BLDV-MCNC: 20 MG/DL (ref 6–23)
CALCIUM SERPL-MCNC: 9.2 MG/DL (ref 8.3–10.6)
CHLORIDE BLD-SCNC: 99 MMOL/L (ref 99–110)
CO2: 23 MMOL/L (ref 21–32)
CREAT SERPL-MCNC: 0.8 MG/DL (ref 0.9–1.3)
GFR AFRICAN AMERICAN: >60 ML/MIN/1.73M2
GFR NON-AFRICAN AMERICAN: >60 ML/MIN/1.73M2
GLUCOSE BLD-MCNC: 165 MG/DL (ref 70–99)
GLUCOSE BLD-MCNC: 168 MG/DL (ref 70–99)
GLUCOSE BLD-MCNC: 172 MG/DL (ref 70–99)
GLUCOSE BLD-MCNC: 183 MG/DL (ref 70–99)
GLUCOSE BLD-MCNC: 193 MG/DL (ref 70–99)
HCT VFR BLD CALC: 38.4 % (ref 42–52)
HEMOGLOBIN: 12.4 GM/DL (ref 13.5–18)
MCH RBC QN AUTO: 28.2 PG (ref 27–31)
MCHC RBC AUTO-ENTMCNC: 32.3 % (ref 32–36)
MCV RBC AUTO: 87.3 FL (ref 78–100)
PDW BLD-RTO: 14.7 % (ref 11.7–14.9)
PLATELET # BLD: 222 K/CU MM (ref 140–440)
PMV BLD AUTO: 9.7 FL (ref 7.5–11.1)
POTASSIUM SERPL-SCNC: 4.5 MMOL/L (ref 3.5–5.1)
RBC # BLD: 4.4 M/CU MM (ref 4.6–6.2)
SODIUM BLD-SCNC: 134 MMOL/L (ref 135–145)
TOTAL PROTEIN: 6.8 GM/DL (ref 6.4–8.2)
WBC # BLD: 5.8 K/CU MM (ref 4–10.5)

## 2021-12-17 PROCEDURE — 94761 N-INVAS EAR/PLS OXIMETRY MLT: CPT

## 2021-12-17 PROCEDURE — 36415 COLL VENOUS BLD VENIPUNCTURE: CPT

## 2021-12-17 PROCEDURE — 6370000000 HC RX 637 (ALT 250 FOR IP): Performed by: INTERNAL MEDICINE

## 2021-12-17 PROCEDURE — 6360000002 HC RX W HCPCS: Performed by: INTERNAL MEDICINE

## 2021-12-17 PROCEDURE — 76937 US GUIDE VASCULAR ACCESS: CPT

## 2021-12-17 PROCEDURE — 82962 GLUCOSE BLOOD TEST: CPT

## 2021-12-17 PROCEDURE — 80053 COMPREHEN METABOLIC PANEL: CPT

## 2021-12-17 PROCEDURE — 2580000003 HC RX 258: Performed by: INTERNAL MEDICINE

## 2021-12-17 PROCEDURE — 85027 COMPLETE CBC AUTOMATED: CPT

## 2021-12-17 PROCEDURE — 1200000000 HC SEMI PRIVATE

## 2021-12-17 RX ADMIN — INSULIN LISPRO 2 UNITS: 100 INJECTION, SOLUTION INTRAVENOUS; SUBCUTANEOUS at 18:13

## 2021-12-17 RX ADMIN — OXYCODONE AND ACETAMINOPHEN 2 TABLET: 5; 325 TABLET ORAL at 08:37

## 2021-12-17 RX ADMIN — SODIUM CHLORIDE, PRESERVATIVE FREE 10 ML: 5 INJECTION INTRAVENOUS at 08:38

## 2021-12-17 RX ADMIN — ESCITALOPRAM OXALATE 10 MG: 10 TABLET ORAL at 08:38

## 2021-12-17 RX ADMIN — INSULIN LISPRO 2 UNITS: 100 INJECTION, SOLUTION INTRAVENOUS; SUBCUTANEOUS at 12:45

## 2021-12-17 RX ADMIN — SODIUM CHLORIDE, PRESERVATIVE FREE 10 ML: 5 INJECTION INTRAVENOUS at 20:17

## 2021-12-17 RX ADMIN — ATORVASTATIN CALCIUM 10 MG: 20 TABLET, FILM COATED ORAL at 20:17

## 2021-12-17 RX ADMIN — PIPERACILLIN SODIUM AND TAZOBACTAM SODIUM 3375 MG: 3; .375 INJECTION, POWDER, LYOPHILIZED, FOR SOLUTION INTRAVENOUS at 18:10

## 2021-12-17 RX ADMIN — PIPERACILLIN SODIUM AND TAZOBACTAM SODIUM 3375 MG: 3; .375 INJECTION, POWDER, LYOPHILIZED, FOR SOLUTION INTRAVENOUS at 23:09

## 2021-12-17 RX ADMIN — OXYCODONE AND ACETAMINOPHEN 2 TABLET: 5; 325 TABLET ORAL at 12:44

## 2021-12-17 RX ADMIN — OXYCODONE AND ACETAMINOPHEN 2 TABLET: 5; 325 TABLET ORAL at 18:13

## 2021-12-17 RX ADMIN — PANTOPRAZOLE SODIUM 40 MG: 40 TABLET, DELAYED RELEASE ORAL at 15:33

## 2021-12-17 RX ADMIN — ENOXAPARIN SODIUM 40 MG: 100 INJECTION SUBCUTANEOUS at 08:38

## 2021-12-17 RX ADMIN — INSULIN LISPRO 2 UNITS: 100 INJECTION, SOLUTION INTRAVENOUS; SUBCUTANEOUS at 08:39

## 2021-12-17 RX ADMIN — PREGABALIN 75 MG: 75 CAPSULE ORAL at 08:38

## 2021-12-17 RX ADMIN — PIPERACILLIN SODIUM AND TAZOBACTAM SODIUM 3375 MG: 3; .375 INJECTION, POWDER, LYOPHILIZED, FOR SOLUTION INTRAVENOUS at 12:44

## 2021-12-17 RX ADMIN — OXYCODONE AND ACETAMINOPHEN 2 TABLET: 5; 325 TABLET ORAL at 00:52

## 2021-12-17 RX ADMIN — PANTOPRAZOLE SODIUM 40 MG: 40 TABLET, DELAYED RELEASE ORAL at 08:38

## 2021-12-17 RX ADMIN — PIPERACILLIN SODIUM AND TAZOBACTAM SODIUM 3375 MG: 3; .375 INJECTION, POWDER, LYOPHILIZED, FOR SOLUTION INTRAVENOUS at 05:22

## 2021-12-17 RX ADMIN — OXYCODONE AND ACETAMINOPHEN 2 TABLET: 5; 325 TABLET ORAL at 23:02

## 2021-12-17 RX ADMIN — PREGABALIN 75 MG: 75 CAPSULE ORAL at 20:17

## 2021-12-17 RX ADMIN — TAMSULOSIN HYDROCHLORIDE 0.4 MG: 0.4 CAPSULE ORAL at 08:38

## 2021-12-17 RX ADMIN — VANCOMYCIN HYDROCHLORIDE 1500 MG: 5 INJECTION, POWDER, LYOPHILIZED, FOR SOLUTION INTRAVENOUS at 15:33

## 2021-12-17 ASSESSMENT — PAIN DESCRIPTION - PAIN TYPE
TYPE: ACUTE PAIN
TYPE: ACUTE PAIN

## 2021-12-17 ASSESSMENT — PAIN SCALES - GENERAL
PAINLEVEL_OUTOF10: 5
PAINLEVEL_OUTOF10: 9
PAINLEVEL_OUTOF10: 0
PAINLEVEL_OUTOF10: 9
PAINLEVEL_OUTOF10: 9
PAINLEVEL_OUTOF10: 8
PAINLEVEL_OUTOF10: 0
PAINLEVEL_OUTOF10: 9

## 2021-12-17 ASSESSMENT — PAIN DESCRIPTION - ONSET
ONSET: ON-GOING
ONSET: ON-GOING

## 2021-12-17 ASSESSMENT — PAIN DESCRIPTION - LOCATION
LOCATION: FINGER (COMMENT WHICH ONE)
LOCATION: FINGER (COMMENT WHICH ONE)

## 2021-12-17 ASSESSMENT — PAIN DESCRIPTION - ORIENTATION
ORIENTATION: LEFT
ORIENTATION: LEFT

## 2021-12-17 ASSESSMENT — PAIN DESCRIPTION - FREQUENCY
FREQUENCY: CONTINUOUS
FREQUENCY: CONTINUOUS

## 2021-12-17 ASSESSMENT — PAIN DESCRIPTION - DESCRIPTORS
DESCRIPTORS: ACHING
DESCRIPTORS: ACHING

## 2021-12-17 NOTE — FLOWSHEET NOTE
12/16/21 2053   Patient Observation   Observations pt refuses this RN to place iv. pt states wating for iv team.

## 2021-12-17 NOTE — PROGRESS NOTES
Pt comfortable. AVSS  C&S left index finger: MRSA  Left forearm: lymphangitis almost completely resolved.  ---I spoke with Dr. Joon Watters MD (Hand Surgeon [facility]) who requests pt call his office [phone] for follow up/treatment after discharge. --I am out of town till after the new year.

## 2021-12-17 NOTE — PROGRESS NOTES
Hospitalist Progress Note      Name:  Manuel Watson /Age/Sex: 1965  (63 y.o. male)   MRN & CSN:  [id number] & [id number] Admission Date/Time: 2021  1:24 AM   Location:  [facility]-A PCP: Kumar Notice Day: 4    Assessment and Plan:     63 y.o.  male  who presents with Osteomyelitis of finger of left hand ([facility]     Principal Problem:    Osteomyelitis of finger of left hand ([facility]  Active Problems:    Uncontrolled diabetes mellitus with complications ([facility]    Obesity, Class III, BMI 40-49.9 (morbid obesity) ([facility]  Resolved Problems:    * No resolved hospital problems. *    Has infection of the left index finger amputation stump with osteomyelitis and secondary lymphangitis  Some of the sutures removed by the plastic surgeon from the central portion of the left index finger stump  May be will require amputation of the left index finger at the level of the PIP joint or distal proximal phalanx in the near future as per plastic surgery note  Surgical culture growing MRSA follow ID and sensitivity   consult ID  Continue on pain medication as needed  Continue empiric antibiotic coverage with vancomycin and Zosyn  Pharmacy consult for vancomycin dosage  Plastics following post I&D  Pain management with as needed IV and oral narcotics  Sliding scale insulin for glycemic control  Continue other chronic home medication    Diet ADULT DIET; Regular; 5 carb choices (75 gm/meal)   DVT Prophylaxis [] Lovenox, []  Heparin, [] SCDs, [] Ambulation   GI Prophylaxis [] PPI,  [] H2 Blocker,  [] Carafate,  [] Diet/Tube Feeds   Code Status Full Code   Disposition Patient requires continued admission due to    MDM [] Low, [] Moderate,[]  High  Patient's risk as above due to      History of Present Illness:    The patient was seen and examined at the bedside patient denies chest pain or shortness of breath  Patient doing well overall he reports throbbing pain of the left index finger after dressing change  He is currently on Percocet  I did consult ID for the MRSA osteomyelitis of the left index finger    Objective: Intake/Output Summary (Last 24 hours) at 12/17/2021 0858  Last data filed at 12/17/2021 0558  Gross per 24 hour   Intake 50 ml   Output    Net 50 ml      Vitals:   Vitals:    12/17/21 0830   BP: 120/82   Pulse: 85   Resp: 18   Temp: 97.5 °F (36.4 °C)   SpO2: 98%     Physical Exam:   GEN Awake. Alert , not in respiratory distress, not in pain  HEENT: PEERLA, , supple neck,   Chest: air entry equal bilaterally, no wheezing or crepitation  Heart: S1 and S2 heard, no murmur, no gallop or rub, regular rate  Abdomen: soft, ND , Nt, +BS  Extremities:  There is swelling erythema and discoloration of left index finger stump  Neurology: alert, oriented x3, able to move 4 limbs    Medications:   Medications:    atorvastatin  10 mg Oral Nightly    escitalopram  10 mg Oral Daily    pantoprazole  40 mg Oral BID    pregabalin  75 mg Oral BID    tamsulosin  0.4 mg Oral Daily    sodium chloride flush  5-40 mL IntraVENous 2 times per day    enoxaparin  40 mg SubCUTAneous Daily    insulin lispro  0-12 Units SubCUTAneous TID WC    insulin lispro  0-6 Units SubCUTAneous Nightly    vancomycin  1,500 mg IntraVENous Q12H    piperacillin-tazobactam  3,375 mg IntraVENous Q6H      Infusions:    sodium chloride 25 mL (12/15/21 0600)    dextrose       PRN Meds: oxyCODONE-acetaminophen, 2 tablet, Q4H PRN  sodium chloride flush, 5-40 mL, PRN  sodium chloride, 25 mL, PRN  ondansetron, 4 mg, Q8H PRN   Or  ondansetron, 4 mg, Q6H PRN  polyethylene glycol, 17 g, Daily PRN  acetaminophen, 650 mg, Q6H PRN   Or  acetaminophen, 650 mg, Q6H PRN  glucose, 15 g, PRN  dextrose, 12.5 g, PRN  glucagon (rDNA), 1 mg, PRN  dextrose, 100 mL/hr, PRN  morphine, 2 mg, Q2H PRN   Or  morphine, 4 mg, Q2H PRN          Electronically signed by Karen Hoffman MD on 12/17/2021 at 8:58 AM

## 2021-12-17 NOTE — CONSULTS
[facility] [facility]  consulted by Dr. Jose D Veronica for monitoring and adjustment. Indication for treatment: Osteomyelitis of finger  Goal trough: 15-20 mcg/mL  AUC Goal:  400-600    Pertinent Laboratory Values:   Temp Readings from Last 3 Encounters:   12/17/21 97.4 °F (36.3 °C) (Oral)   11/20/21 98 °F (36.7 °C) (Oral)   11/08/21 98.1 °F (36.7 °C) (Oral)     Recent Labs     12/15/21  0541 12/16/21  0612 12/17/21  1054   WBC 7.7 6.1 5.8     Recent Labs     12/15/21  0541 12/16/21  0612 12/17/21  1054   BUN 18 18 20   CREATININE 0.8* 0.9 0.8*     Estimated Creatinine Clearance: 138 mL/min (A) (based on SCr of 0.8 mg/dL (L)). Intake/Output Summary (Last 24 hours) at 12/17/2021 1733  Last data filed at 12/17/2021 1254  Gross per 24 hour   Intake 290 ml   Output    Net 290 ml       Pertinent Cultures:  Date    Source    Results  12/14   Wound    MRSA             Vancomycin level:   TROUGH:    Recent Labs     12/16/21  0612   VANCOTROUGH 14.2     RANDOM:  No results for input(s): VANCORANDOM in the last 72 hours.     Assessment:  · WBC and temperature: WBC WNL; afebrile  · SCr, BUN, and urine output:   · Scr WNL, at baseline  · Day(s) of therapy: 4  · Vancomycin concentration:   · 12/16 - 14.2, therapeutic    Plan:  · Continue on Vancomycin 1500mg ivpb q12h for a predicted AUC of 485 at steady state  · Repeat level in 48h to monitor for accumulation  · Pharmacy will continue to monitor patient and adjust therapy as indicated    VANCOMYCIN CONCENTRATION SCHEDULED FOR 12/19 @ 0400  Thank you for the consult,  Maria E Machado, [facility]  12/17/2021 5:33 PM

## 2021-12-17 NOTE — FLOWSHEET NOTE
12/17/21 0030   Patient Observation   Observations pt allows this Rn to attempt to get iv placed. attempted x2 and unsuccessful. called ICU Charge Rn to come and attempt IV.

## 2021-12-17 NOTE — FLOWSHEET NOTE
12/17/21 0226   Patient Observation   Observations Oval Organ Supervisor called and updated about pt IV issue. she recommends a task nurse from IV team working on 3N. 3N called and asked if this RN could come to start the IV. charge rn states she will send her down to attempt iv.

## 2021-12-18 LAB
ALBUMIN SERPL-MCNC: 4.4 GM/DL (ref 3.4–5)
ALP BLD-CCNC: 90 IU/L (ref 40–129)
ALT SERPL-CCNC: 7 U/L (ref 10–40)
ANION GAP SERPL CALCULATED.3IONS-SCNC: 12 MMOL/L (ref 4–16)
AST SERPL-CCNC: 14 IU/L (ref 15–37)
BILIRUB SERPL-MCNC: 0.4 MG/DL (ref 0–1)
BUN BLDV-MCNC: 24 MG/DL (ref 6–23)
CALCIUM SERPL-MCNC: 9.6 MG/DL (ref 8.3–10.6)
CHLORIDE BLD-SCNC: 99 MMOL/L (ref 99–110)
CO2: 22 MMOL/L (ref 21–32)
CREAT SERPL-MCNC: 0.9 MG/DL (ref 0.9–1.3)
GFR AFRICAN AMERICAN: >60 ML/MIN/1.73M2
GFR NON-AFRICAN AMERICAN: >60 ML/MIN/1.73M2
GLUCOSE BLD-MCNC: 129 MG/DL (ref 70–99)
GLUCOSE BLD-MCNC: 146 MG/DL (ref 70–99)
GLUCOSE BLD-MCNC: 191 MG/DL (ref 70–99)
GLUCOSE BLD-MCNC: 230 MG/DL (ref 70–99)
GLUCOSE BLD-MCNC: 259 MG/DL (ref 70–99)
HCT VFR BLD CALC: 39.5 % (ref 42–52)
HEMOGLOBIN: 12.7 GM/DL (ref 13.5–18)
MCH RBC QN AUTO: 28.3 PG (ref 27–31)
MCHC RBC AUTO-ENTMCNC: 32.2 % (ref 32–36)
MCV RBC AUTO: 88.2 FL (ref 78–100)
PDW BLD-RTO: 14.7 % (ref 11.7–14.9)
PLATELET # BLD: 239 K/CU MM (ref 140–440)
PMV BLD AUTO: 9.9 FL (ref 7.5–11.1)
POTASSIUM SERPL-SCNC: 4.6 MMOL/L (ref 3.5–5.1)
RBC # BLD: 4.48 M/CU MM (ref 4.6–6.2)
SODIUM BLD-SCNC: 133 MMOL/L (ref 135–145)
TOTAL PROTEIN: 6.7 GM/DL (ref 6.4–8.2)
WBC # BLD: 5.9 K/CU MM (ref 4–10.5)

## 2021-12-18 PROCEDURE — 6360000002 HC RX W HCPCS: Performed by: INTERNAL MEDICINE

## 2021-12-18 PROCEDURE — 82962 GLUCOSE BLOOD TEST: CPT

## 2021-12-18 PROCEDURE — 6370000000 HC RX 637 (ALT 250 FOR IP): Performed by: NURSE PRACTITIONER

## 2021-12-18 PROCEDURE — 1200000000 HC SEMI PRIVATE

## 2021-12-18 PROCEDURE — 2580000003 HC RX 258: Performed by: INTERNAL MEDICINE

## 2021-12-18 PROCEDURE — 36415 COLL VENOUS BLD VENIPUNCTURE: CPT

## 2021-12-18 PROCEDURE — 80053 COMPREHEN METABOLIC PANEL: CPT

## 2021-12-18 PROCEDURE — 6370000000 HC RX 637 (ALT 250 FOR IP): Performed by: INTERNAL MEDICINE

## 2021-12-18 PROCEDURE — 85027 COMPLETE CBC AUTOMATED: CPT

## 2021-12-18 RX ORDER — POLYETHYLENE GLYCOL 3350 17 G/17G
17 POWDER, FOR SOLUTION ORAL DAILY
Status: DISCONTINUED | OUTPATIENT
Start: 2021-12-18 | End: 2021-12-18 | Stop reason: SDUPTHER

## 2021-12-18 RX ORDER — POLYETHYLENE GLYCOL 3350 17 G/17G
17 POWDER, FOR SOLUTION ORAL DAILY
Status: DISCONTINUED | OUTPATIENT
Start: 2021-12-18 | End: 2021-12-21 | Stop reason: HOSPADM

## 2021-12-18 RX ADMIN — PANTOPRAZOLE SODIUM 40 MG: 40 TABLET, DELAYED RELEASE ORAL at 06:15

## 2021-12-18 RX ADMIN — PREGABALIN 75 MG: 75 CAPSULE ORAL at 09:02

## 2021-12-18 RX ADMIN — TAMSULOSIN HYDROCHLORIDE 0.4 MG: 0.4 CAPSULE ORAL at 09:02

## 2021-12-18 RX ADMIN — VANCOMYCIN HYDROCHLORIDE 1500 MG: 5 INJECTION, POWDER, LYOPHILIZED, FOR SOLUTION INTRAVENOUS at 06:05

## 2021-12-18 RX ADMIN — ATORVASTATIN CALCIUM 10 MG: 20 TABLET, FILM COATED ORAL at 20:07

## 2021-12-18 RX ADMIN — PREGABALIN 75 MG: 75 CAPSULE ORAL at 20:07

## 2021-12-18 RX ADMIN — SODIUM CHLORIDE, PRESERVATIVE FREE 10 ML: 5 INJECTION INTRAVENOUS at 09:03

## 2021-12-18 RX ADMIN — ENOXAPARIN SODIUM 40 MG: 100 INJECTION SUBCUTANEOUS at 09:02

## 2021-12-18 RX ADMIN — ESCITALOPRAM OXALATE 10 MG: 10 TABLET ORAL at 09:02

## 2021-12-18 RX ADMIN — INSULIN LISPRO 6 UNITS: 100 INJECTION, SOLUTION INTRAVENOUS; SUBCUTANEOUS at 16:58

## 2021-12-18 RX ADMIN — OXYCODONE AND ACETAMINOPHEN 2 TABLET: 5; 325 TABLET ORAL at 20:13

## 2021-12-18 RX ADMIN — PIPERACILLIN SODIUM AND TAZOBACTAM SODIUM 3375 MG: 3; .375 INJECTION, POWDER, LYOPHILIZED, FOR SOLUTION INTRAVENOUS at 05:21

## 2021-12-18 RX ADMIN — VANCOMYCIN HYDROCHLORIDE 1500 MG: 5 INJECTION, POWDER, LYOPHILIZED, FOR SOLUTION INTRAVENOUS at 16:06

## 2021-12-18 RX ADMIN — SODIUM CHLORIDE, PRESERVATIVE FREE 10 ML: 5 INJECTION INTRAVENOUS at 20:07

## 2021-12-18 RX ADMIN — OXYCODONE AND ACETAMINOPHEN 2 TABLET: 5; 325 TABLET ORAL at 03:40

## 2021-12-18 RX ADMIN — PANTOPRAZOLE SODIUM 40 MG: 40 TABLET, DELAYED RELEASE ORAL at 15:41

## 2021-12-18 RX ADMIN — INSULIN LISPRO 4 UNITS: 100 INJECTION, SOLUTION INTRAVENOUS; SUBCUTANEOUS at 09:03

## 2021-12-18 RX ADMIN — PIPERACILLIN SODIUM AND TAZOBACTAM SODIUM 3375 MG: 3; .375 INJECTION, POWDER, LYOPHILIZED, FOR SOLUTION INTRAVENOUS at 11:28

## 2021-12-18 RX ADMIN — BISACODYL 5 MG: 5 TABLET, COATED ORAL at 21:29

## 2021-12-18 RX ADMIN — OXYCODONE AND ACETAMINOPHEN 2 TABLET: 5; 325 TABLET ORAL at 15:41

## 2021-12-18 RX ADMIN — OXYCODONE AND ACETAMINOPHEN 2 TABLET: 5; 325 TABLET ORAL at 09:02

## 2021-12-18 ASSESSMENT — PAIN SCALES - GENERAL
PAINLEVEL_OUTOF10: 6
PAINLEVEL_OUTOF10: 7
PAINLEVEL_OUTOF10: 9
PAINLEVEL_OUTOF10: 7
PAINLEVEL_OUTOF10: 6
PAINLEVEL_OUTOF10: 0
PAINLEVEL_OUTOF10: 9

## 2021-12-18 ASSESSMENT — PAIN DESCRIPTION - ONSET
ONSET: ON-GOING
ONSET: ON-GOING

## 2021-12-18 ASSESSMENT — PAIN DESCRIPTION - DESCRIPTORS
DESCRIPTORS: DISCOMFORT
DESCRIPTORS: ACHING

## 2021-12-18 ASSESSMENT — PAIN DESCRIPTION - ORIENTATION
ORIENTATION: LEFT
ORIENTATION: LEFT

## 2021-12-18 ASSESSMENT — PAIN DESCRIPTION - LOCATION
LOCATION: FINGER (COMMENT WHICH ONE)
LOCATION: FINGER (COMMENT WHICH ONE)

## 2021-12-18 ASSESSMENT — PAIN DESCRIPTION - PAIN TYPE
TYPE: SURGICAL PAIN
TYPE: SURGICAL PAIN

## 2021-12-18 ASSESSMENT — PAIN DESCRIPTION - FREQUENCY
FREQUENCY: INTERMITTENT
FREQUENCY: INTERMITTENT

## 2021-12-18 NOTE — PROGRESS NOTES
Hospitalist Progress Note      Name:  Jose Daugherty /Age/Sex: 1965  (63 y.o. male)   MRN & CSN:  [id number] & [id number]    Location:  [address] [place] [address] PCP: Ge Sahu Day: 5    Assessment and Plan:   Jose Daugherty is a 63 y.o.  male  who presents with Osteomyelitis of finger of left hand ([facility]    Osteomyelitis of the left index finger with lymphangitis: Wound culture is growing MRSA, on IV vancomycin and Zosyn. Seen by plastic surgery, referred to see hand surgeon at [place] as outpatient. Patient requested transfer to [address] [place] [address] Drive is not accepting patients at this time. ID has been consulted, evaluation is pending. Meanwhile continue IV vancomycin and discontinue Zosyn. Pain management. Diabetes mellitus type 2 with hyperglycemia without long-term insulin use: Insulin regimen, monitor blood sugar. Comorbidities  BPH  Hyperlipidemia   Obesity    Diet ADULT DIET; Regular; 5 carb choices (75 gm/meal)   DVT Prophylaxis [x] Lovenox, []  Heparin, [] SCDs, [] Ambulation   GI Prophylaxis [x] PPI,  [] H2 Blocker,  [] Carafate,  [] Diet/Tube Feeds   Code Status Full Code   Disposition Patient requires continued admission due to osteomyelitis. MDM [] Low, [x] Moderate,[]  High  Patient's risk as above due to      History of Present Illness:     Chief Complaint: Osteomyelitis of finger of left hand ([facility]  Jose Daugherty is a 63 y.o.  male  who presents with osteomyelitis of the left index finger. He complained of throbbing with pain. He requested transfer to [facility] where he had amputation of the fingers in the past. I called transfer center but [facility] is not accepting patient at this time including established patient. Objective:        Intake/Output Summary (Last 24 hours) at 2021 1326  Last data filed at 2021 0598  Gross per 24 hour   Intake 1090 ml   Output 900 ml   Net 190 ml      Vitals:   Vitals:    12/18/21 0902   BP: 121/79   Pulse: 77   Resp:    Temp: 97.5 °F (36.4 °C)   SpO2: 97%       Physical Exam:   General appearance:  Appears uncomfortable from pain. Eyes: Sclera clear. Pupils equal.  ENT: Moist oral mucosa. Trachea midline, no adenopathy. Cardiovascular: Regular rhythm, normal S1, S2. No murmur. No edema in lower extremities  Respiratory: Not using accessory muscles. Clear to auscultation bilaterally, no wheeze or crackles. GI: Abdomen soft, no tenderness, not distended, normal bowel sounds  Musculoskeletal: No cyanosis in digits, neck supple, left index finger surgical dressing. Neurology: Alert and oriented in time, place and person,  No speech or motor deficits  Psych: Normal affect. Not anxious. Skin: Warm, dry, normal turgor    Medications:   Medications:    atorvastatin  10 mg Oral Nightly    escitalopram  10 mg Oral Daily    pantoprazole  40 mg Oral BID    pregabalin  75 mg Oral BID    tamsulosin  0.4 mg Oral Daily    sodium chloride flush  5-40 mL IntraVENous 2 times per day    enoxaparin  40 mg SubCUTAneous Daily    insulin lispro  0-12 Units SubCUTAneous TID WC    insulin lispro  0-6 Units SubCUTAneous Nightly    vancomycin  1,500 mg IntraVENous Q12H    piperacillin-tazobactam  3,375 mg IntraVENous Q6H      Infusions:    sodium chloride 25 mL (12/15/21 0600)    dextrose       PRN Meds: oxyCODONE-acetaminophen, 2 tablet, Q4H PRN  sodium chloride flush, 5-40 mL, PRN  sodium chloride, 25 mL, PRN  ondansetron, 4 mg, Q8H PRN   Or  ondansetron, 4 mg, Q6H PRN  polyethylene glycol, 17 g, Daily PRN  acetaminophen, 650 mg, Q6H PRN   Or  acetaminophen, 650 mg, Q6H PRN  glucose, 15 g, PRN  dextrose, 12.5 g, PRN  glucagon (rDNA), 1 mg, PRN  dextrose, 100 mL/hr, PRN  morphine, 2 mg, Q2H PRN   Or  morphine, 4 mg, Q2H PRN      No results found. The anticipated discharge is in greater than 24 hours.      Electronically signed by Toby Bray MD on 12/18/2021 at 1:26 PM

## 2021-12-19 LAB
DOSE AMOUNT: NORMAL
DOSE TIME: NORMAL
GLUCOSE BLD-MCNC: 158 MG/DL (ref 70–99)
GLUCOSE BLD-MCNC: 174 MG/DL (ref 70–99)
GLUCOSE BLD-MCNC: 236 MG/DL (ref 70–99)
GLUCOSE BLD-MCNC: 244 MG/DL (ref 70–99)
VANCOMYCIN TROUGH: 14.7 UG/ML (ref 10–20)

## 2021-12-19 PROCEDURE — 1200000000 HC SEMI PRIVATE

## 2021-12-19 PROCEDURE — 82962 GLUCOSE BLOOD TEST: CPT

## 2021-12-19 PROCEDURE — 2580000003 HC RX 258: Performed by: INTERNAL MEDICINE

## 2021-12-19 PROCEDURE — 80202 ASSAY OF VANCOMYCIN: CPT

## 2021-12-19 PROCEDURE — 6360000002 HC RX W HCPCS: Performed by: INTERNAL MEDICINE

## 2021-12-19 PROCEDURE — 36415 COLL VENOUS BLD VENIPUNCTURE: CPT

## 2021-12-19 PROCEDURE — 76937 US GUIDE VASCULAR ACCESS: CPT

## 2021-12-19 PROCEDURE — 94761 N-INVAS EAR/PLS OXIMETRY MLT: CPT

## 2021-12-19 PROCEDURE — 6370000000 HC RX 637 (ALT 250 FOR IP): Performed by: INTERNAL MEDICINE

## 2021-12-19 RX ADMIN — OXYCODONE AND ACETAMINOPHEN 2 TABLET: 5; 325 TABLET ORAL at 20:12

## 2021-12-19 RX ADMIN — PREGABALIN 75 MG: 75 CAPSULE ORAL at 09:01

## 2021-12-19 RX ADMIN — OXYCODONE AND ACETAMINOPHEN 2 TABLET: 5; 325 TABLET ORAL at 10:19

## 2021-12-19 RX ADMIN — ENOXAPARIN SODIUM 40 MG: 100 INJECTION SUBCUTANEOUS at 09:01

## 2021-12-19 RX ADMIN — INSULIN LISPRO 2 UNITS: 100 INJECTION, SOLUTION INTRAVENOUS; SUBCUTANEOUS at 13:01

## 2021-12-19 RX ADMIN — PANTOPRAZOLE SODIUM 40 MG: 40 TABLET, DELAYED RELEASE ORAL at 17:40

## 2021-12-19 RX ADMIN — SODIUM CHLORIDE, PRESERVATIVE FREE 10 ML: 5 INJECTION INTRAVENOUS at 09:02

## 2021-12-19 RX ADMIN — ATORVASTATIN CALCIUM 10 MG: 20 TABLET, FILM COATED ORAL at 20:12

## 2021-12-19 RX ADMIN — OXYCODONE AND ACETAMINOPHEN 2 TABLET: 5; 325 TABLET ORAL at 05:34

## 2021-12-19 RX ADMIN — PREGABALIN 75 MG: 75 CAPSULE ORAL at 20:11

## 2021-12-19 RX ADMIN — TAMSULOSIN HYDROCHLORIDE 0.4 MG: 0.4 CAPSULE ORAL at 09:01

## 2021-12-19 RX ADMIN — PANTOPRAZOLE SODIUM 40 MG: 40 TABLET, DELAYED RELEASE ORAL at 05:34

## 2021-12-19 RX ADMIN — OXYCODONE AND ACETAMINOPHEN 2 TABLET: 5; 325 TABLET ORAL at 00:32

## 2021-12-19 RX ADMIN — OXYCODONE AND ACETAMINOPHEN 2 TABLET: 5; 325 TABLET ORAL at 14:55

## 2021-12-19 RX ADMIN — VANCOMYCIN HYDROCHLORIDE 1500 MG: 5 INJECTION, POWDER, LYOPHILIZED, FOR SOLUTION INTRAVENOUS at 17:40

## 2021-12-19 RX ADMIN — VANCOMYCIN HYDROCHLORIDE 1500 MG: 5 INJECTION, POWDER, LYOPHILIZED, FOR SOLUTION INTRAVENOUS at 05:33

## 2021-12-19 RX ADMIN — INSULIN LISPRO 4 UNITS: 100 INJECTION, SOLUTION INTRAVENOUS; SUBCUTANEOUS at 09:03

## 2021-12-19 RX ADMIN — SODIUM CHLORIDE, PRESERVATIVE FREE 10 ML: 5 INJECTION INTRAVENOUS at 20:54

## 2021-12-19 RX ADMIN — ESCITALOPRAM OXALATE 10 MG: 10 TABLET ORAL at 09:01

## 2021-12-19 RX ADMIN — INSULIN LISPRO 2 UNITS: 100 INJECTION, SOLUTION INTRAVENOUS; SUBCUTANEOUS at 17:40

## 2021-12-19 ASSESSMENT — PAIN SCALES - GENERAL
PAINLEVEL_OUTOF10: 9
PAINLEVEL_OUTOF10: 9
PAINLEVEL_OUTOF10: 6
PAINLEVEL_OUTOF10: 6
PAINLEVEL_OUTOF10: 0
PAINLEVEL_OUTOF10: 0
PAINLEVEL_OUTOF10: 8
PAINLEVEL_OUTOF10: 10

## 2021-12-19 ASSESSMENT — PAIN DESCRIPTION - ONSET
ONSET: ON-GOING
ONSET: ON-GOING

## 2021-12-19 ASSESSMENT — PAIN DESCRIPTION - FREQUENCY
FREQUENCY: INTERMITTENT
FREQUENCY: CONTINUOUS

## 2021-12-19 ASSESSMENT — PAIN - FUNCTIONAL ASSESSMENT: PAIN_FUNCTIONAL_ASSESSMENT: PREVENTS OR INTERFERES SOME ACTIVE ACTIVITIES AND ADLS

## 2021-12-19 ASSESSMENT — PAIN DESCRIPTION - DESCRIPTORS
DESCRIPTORS: DISCOMFORT
DESCRIPTORS: SHARP

## 2021-12-19 ASSESSMENT — PAIN DESCRIPTION - LOCATION
LOCATION: FINGER (COMMENT WHICH ONE)
LOCATION: FINGER (COMMENT WHICH ONE)

## 2021-12-19 ASSESSMENT — PAIN DESCRIPTION - PAIN TYPE
TYPE: SURGICAL PAIN
TYPE: SURGICAL PAIN

## 2021-12-19 ASSESSMENT — PAIN DESCRIPTION - ORIENTATION: ORIENTATION: LEFT

## 2021-12-19 NOTE — CONSULTS
Consult completed. Nexiva 18g 1.25 inch catheter inserted via ultrasound in patient's HARMEET. Brisk blood return noted and catheter flushes with ease. Patient tolerated well. Consult IV/PICC team if patient's needs change.

## 2021-12-19 NOTE — PROGRESS NOTES
Hospitalist Progress Note      Name:  Catheline Felty /Age/Sex: 1965  (63 y.o. male)   MRN & CSN:  [id number] & [id number]    Location:  [address] [place] [address] PCP: Ge Sahu Day: 6    Assessment and Plan:   Catheline Felty is a 63 y.o.  male  who presents with Osteomyelitis of finger of left hand ([facility]    Osteomyelitis of the left index finger with lymphangitis: Wound culture is growing MRSA. continue IV vancomycin. Seen by plastic surgery, referred to see hand surgeon at [place] as outpatient. Patient requested transfer to [address] is not accepting patients at this time. ID elevation is pending. Pain management. Diabetes mellitus type 2 with hyperglycemia without long-term insulin use: Insulin regimen, monitor blood sugar. Comorbidities  BPH  Hyperlipidemia   Obesity    Diet ADULT DIET; Regular; 5 carb choices (75 gm/meal)   DVT Prophylaxis [x] Lovenox, []  Heparin, [] SCDs, [] Ambulation   GI Prophylaxis [x] PPI,  [] H2 Blocker,  [] Carafate,  [] Diet/Tube Feeds   Code Status Full Code   Disposition Patient requires continued admission due to osteomyelitis. MDM [] Low, [x] Moderate,[]  High  Patient's risk as above due to      History of Present Illness:     Chief Complaint: Osteomyelitis of finger of left hand ([facility]  Catheline Felty is a 63 y.o.  male  who presents with osteomyelitis of the left index finger. He complained of throbbing with pain. He requested transfer to [facility] where he had amputation of the fingers in the past. I called transfer center but [facility] is not accepting patient at this time including established patient. Discussed the difficulty in transfer with patient, patient verbalized understanding. Would like to be seen by ID prior to discharge. Objective:        Intake/Output Summary (Last 24 hours) at 2021 7239  Last data filed at 2021 7007  Gross per 24 hour   Intake 392.11 ml   Output 200 ml   Net 192.11 ml      Vitals:   Vitals:    12/19/21 0747   BP: (!) 113/56   Pulse: 73   Resp: 18   Temp: 95.7 °F (35.4 °C)   SpO2: 98%       Physical Exam:   General appearance:  Appears uncomfortable from pain. Eyes: Sclera clear. Pupils equal.  ENT: Moist oral mucosa. Trachea midline, no adenopathy. Cardiovascular: Regular rhythm, normal S1, No edema in lower extremities  Respiratory: Clear to auscultation bilaterally, no wheeze or crackles. GI: Abdomen soft, no tenderness, not distended, normal bowel sounds  Musculoskeletal: No cyanosis in digits, neck supple, left index finger surgical dressing. Neurology: Alert and oriented in time, place and person,  No speech or motor deficits  Psych: Normal affect. Not anxious. Skin: Warm, dry, normal turgor    Medications:   Medications:    polyethylene glycol  17 g Oral Daily    atorvastatin  10 mg Oral Nightly    escitalopram  10 mg Oral Daily    pantoprazole  40 mg Oral BID    pregabalin  75 mg Oral BID    tamsulosin  0.4 mg Oral Daily    sodium chloride flush  5-40 mL IntraVENous 2 times per day    enoxaparin  40 mg SubCUTAneous Daily    insulin lispro  0-12 Units SubCUTAneous TID WC    insulin lispro  0-6 Units SubCUTAneous Nightly    vancomycin  1,500 mg IntraVENous Q12H      Infusions:    sodium chloride 25 mL (12/15/21 0600)    dextrose       PRN Meds: bisacodyl, 5 mg, Daily PRN  oxyCODONE-acetaminophen, 2 tablet, Q4H PRN  sodium chloride flush, 5-40 mL, PRN  sodium chloride, 25 mL, PRN  ondansetron, 4 mg, Q8H PRN   Or  ondansetron, 4 mg, Q6H PRN  acetaminophen, 650 mg, Q6H PRN   Or  acetaminophen, 650 mg, Q6H PRN  glucose, 15 g, PRN  dextrose, 12.5 g, PRN  glucagon (rDNA), 1 mg, PRN  dextrose, 100 mL/hr, PRN  morphine, 2 mg, Q2H PRN   Or  morphine, 4 mg, Q2H PRN      No results found. The anticipated discharge is in greater than 24 hours.      Electronically signed by Jasmyne Boo MD on 12/19/2021 at 2:28 PM

## 2021-12-19 NOTE — PROGRESS NOTES
[facility]  consulted by Dr. Branson Fabry for monitoring and adjustment. Indication for treatment: Osteomyelitis of finger  Goal trough: 15-20 mcg/mL  AUC Goal:  400-600    Pertinent Laboratory Values:   Temp Readings from Last 3 Encounters:   12/19/21 95.7 °F (35.4 °C) (Oral)   11/20/21 98 °F (36.7 °C) (Oral)   11/08/21 98.1 °F (36.7 °C) (Oral)     Recent Labs     12/17/21  1054 12/18/21  0524   WBC 5.8 5.9     Recent Labs     12/17/21  1054 12/18/21  0524   BUN 20 24*   CREATININE 0.8* 0.9     Estimated Creatinine Clearance: 123 mL/min (based on SCr of 0.9 mg/dL). Intake/Output Summary (Last 24 hours) at 12/19/2021 1244  Last data filed at 12/19/2021 0534  Gross per 24 hour   Intake 392.11 ml   Output 200 ml   Net 192.11 ml     Pertinent Cultures:  Date    Source    Results  12/14   Wound    MRSA           Vancomycin level:   TROUGH:    Recent Labs     12/19/21  0356   VANCOTROUGH 14.7     RANDOM:  No results for input(s): VANCORANDOM in the last 72 hours.     Assessment:  · WBC and temperature: WBC WNL; afebrile  · SCr, BUN, and urine output:   · Scr WNL, at baseline  · Day(s) of therapy: 6  · Vancomycin concentration: 14.7    Plan:  · Continue on Vancomycin 1500mg ivpb q12h for a predicted AUC of 502 at steady state  · Pharmacy will continue to monitor patient and adjust therapy as indicated    [address] 12/24 @ 0600  Thank you for the consult,  Migdalia Dozier, [facility]  12/19/2021 12:44 PM

## 2021-12-20 LAB
CULTURE: ABNORMAL
CULTURE: ABNORMAL
GLUCOSE BLD-MCNC: 159 MG/DL (ref 70–99)
GLUCOSE BLD-MCNC: 160 MG/DL (ref 70–99)
GLUCOSE BLD-MCNC: 234 MG/DL (ref 70–99)
GLUCOSE BLD-MCNC: 268 MG/DL (ref 70–99)
Lab: ABNORMAL
SPECIMEN: ABNORMAL

## 2021-12-20 PROCEDURE — 6360000002 HC RX W HCPCS: Performed by: INTERNAL MEDICINE

## 2021-12-20 PROCEDURE — 99232 SBSQ HOSP IP/OBS MODERATE 35: CPT | Performed by: NURSE PRACTITIONER

## 2021-12-20 PROCEDURE — 86141 C-REACTIVE PROTEIN HS: CPT

## 2021-12-20 PROCEDURE — 99223 1ST HOSP IP/OBS HIGH 75: CPT | Performed by: INTERNAL MEDICINE

## 2021-12-20 PROCEDURE — 1200000000 HC SEMI PRIVATE

## 2021-12-20 PROCEDURE — 82962 GLUCOSE BLOOD TEST: CPT

## 2021-12-20 PROCEDURE — 6370000000 HC RX 637 (ALT 250 FOR IP): Performed by: INTERNAL MEDICINE

## 2021-12-20 PROCEDURE — 94761 N-INVAS EAR/PLS OXIMETRY MLT: CPT

## 2021-12-20 PROCEDURE — 2580000003 HC RX 258: Performed by: INTERNAL MEDICINE

## 2021-12-20 RX ORDER — LIDOCAINE HYDROCHLORIDE 10 MG/ML
5 INJECTION, SOLUTION EPIDURAL; INFILTRATION; INTRACAUDAL; PERINEURAL ONCE
Status: DISCONTINUED | OUTPATIENT
Start: 2021-12-20 | End: 2021-12-21 | Stop reason: HOSPADM

## 2021-12-20 RX ORDER — SODIUM CHLORIDE 9 MG/ML
25 INJECTION, SOLUTION INTRAVENOUS PRN
Status: DISCONTINUED | OUTPATIENT
Start: 2021-12-20 | End: 2021-12-21 | Stop reason: HOSPADM

## 2021-12-20 RX ORDER — SODIUM CHLORIDE 0.9 % (FLUSH) 0.9 %
5-40 SYRINGE (ML) INJECTION PRN
Status: DISCONTINUED | OUTPATIENT
Start: 2021-12-20 | End: 2021-12-21 | Stop reason: HOSPADM

## 2021-12-20 RX ORDER — SODIUM CHLORIDE 0.9 % (FLUSH) 0.9 %
5-40 SYRINGE (ML) INJECTION EVERY 12 HOURS SCHEDULED
Status: DISCONTINUED | OUTPATIENT
Start: 2021-12-20 | End: 2021-12-21 | Stop reason: HOSPADM

## 2021-12-20 RX ADMIN — PANTOPRAZOLE SODIUM 40 MG: 40 TABLET, DELAYED RELEASE ORAL at 05:36

## 2021-12-20 RX ADMIN — VANCOMYCIN HYDROCHLORIDE 1500 MG: 5 INJECTION, POWDER, LYOPHILIZED, FOR SOLUTION INTRAVENOUS at 05:31

## 2021-12-20 RX ADMIN — INSULIN LISPRO 2 UNITS: 100 INJECTION, SOLUTION INTRAVENOUS; SUBCUTANEOUS at 13:25

## 2021-12-20 RX ADMIN — PREGABALIN 75 MG: 75 CAPSULE ORAL at 08:05

## 2021-12-20 RX ADMIN — ESCITALOPRAM OXALATE 10 MG: 10 TABLET ORAL at 08:05

## 2021-12-20 RX ADMIN — OXYCODONE AND ACETAMINOPHEN 2 TABLET: 5; 325 TABLET ORAL at 08:01

## 2021-12-20 RX ADMIN — VANCOMYCIN HYDROCHLORIDE 1500 MG: 5 INJECTION, POWDER, LYOPHILIZED, FOR SOLUTION INTRAVENOUS at 17:01

## 2021-12-20 RX ADMIN — TAMSULOSIN HYDROCHLORIDE 0.4 MG: 0.4 CAPSULE ORAL at 08:05

## 2021-12-20 RX ADMIN — INSULIN LISPRO 6 UNITS: 100 INJECTION, SOLUTION INTRAVENOUS; SUBCUTANEOUS at 08:06

## 2021-12-20 RX ADMIN — PREGABALIN 75 MG: 75 CAPSULE ORAL at 20:22

## 2021-12-20 RX ADMIN — OXYCODONE AND ACETAMINOPHEN 2 TABLET: 5; 325 TABLET ORAL at 18:16

## 2021-12-20 RX ADMIN — INSULIN LISPRO 4 UNITS: 100 INJECTION, SOLUTION INTRAVENOUS; SUBCUTANEOUS at 17:02

## 2021-12-20 RX ADMIN — OXYCODONE AND ACETAMINOPHEN 2 TABLET: 5; 325 TABLET ORAL at 13:24

## 2021-12-20 RX ADMIN — ENOXAPARIN SODIUM 40 MG: 100 INJECTION SUBCUTANEOUS at 08:03

## 2021-12-20 RX ADMIN — PANTOPRAZOLE SODIUM 40 MG: 40 TABLET, DELAYED RELEASE ORAL at 17:02

## 2021-12-20 RX ADMIN — OXYCODONE AND ACETAMINOPHEN 2 TABLET: 5; 325 TABLET ORAL at 04:18

## 2021-12-20 RX ADMIN — OXYCODONE AND ACETAMINOPHEN 2 TABLET: 5; 325 TABLET ORAL at 00:14

## 2021-12-20 RX ADMIN — SODIUM CHLORIDE, PRESERVATIVE FREE 10 ML: 5 INJECTION INTRAVENOUS at 20:23

## 2021-12-20 RX ADMIN — ATORVASTATIN CALCIUM 10 MG: 20 TABLET, FILM COATED ORAL at 20:21

## 2021-12-20 RX ADMIN — OXYCODONE AND ACETAMINOPHEN 2 TABLET: 5; 325 TABLET ORAL at 22:17

## 2021-12-20 ASSESSMENT — PAIN SCALES - GENERAL
PAINLEVEL_OUTOF10: 5
PAINLEVEL_OUTOF10: 8
PAINLEVEL_OUTOF10: 9
PAINLEVEL_OUTOF10: 9
PAINLEVEL_OUTOF10: 4
PAINLEVEL_OUTOF10: 9
PAINLEVEL_OUTOF10: 9
PAINLEVEL_OUTOF10: 7
PAINLEVEL_OUTOF10: 4
PAINLEVEL_OUTOF10: 9
PAINLEVEL_OUTOF10: 3
PAINLEVEL_OUTOF10: 9

## 2021-12-20 ASSESSMENT — PAIN DESCRIPTION - DESCRIPTORS
DESCRIPTORS: SHARP
DESCRIPTORS: THROBBING
DESCRIPTORS: ACHING;SORE
DESCRIPTORS: SHARP
DESCRIPTORS: STABBING

## 2021-12-20 ASSESSMENT — PAIN DESCRIPTION - PROGRESSION
CLINICAL_PROGRESSION: GRADUALLY WORSENING
CLINICAL_PROGRESSION: NOT CHANGED
CLINICAL_PROGRESSION: NOT CHANGED
CLINICAL_PROGRESSION: GRADUALLY WORSENING
CLINICAL_PROGRESSION: GRADUALLY WORSENING

## 2021-12-20 ASSESSMENT — PAIN DESCRIPTION - ONSET
ONSET: ON-GOING

## 2021-12-20 ASSESSMENT — PAIN DESCRIPTION - PAIN TYPE
TYPE: ACUTE PAIN;SURGICAL PAIN
TYPE: SURGICAL PAIN
TYPE: ACUTE PAIN;SURGICAL PAIN
TYPE: SURGICAL PAIN
TYPE: ACUTE PAIN;SURGICAL PAIN

## 2021-12-20 ASSESSMENT — PAIN DESCRIPTION - LOCATION
LOCATION: ARM;HAND
LOCATION: ARM;HAND
LOCATION: FINGER (COMMENT WHICH ONE)
LOCATION: ARM;HAND
LOCATION: FINGER (COMMENT WHICH ONE)

## 2021-12-20 ASSESSMENT — PAIN - FUNCTIONAL ASSESSMENT
PAIN_FUNCTIONAL_ASSESSMENT: PREVENTS OR INTERFERES SOME ACTIVE ACTIVITIES AND ADLS

## 2021-12-20 ASSESSMENT — PAIN DESCRIPTION - FREQUENCY
FREQUENCY: CONTINUOUS

## 2021-12-20 ASSESSMENT — PAIN DESCRIPTION - ORIENTATION
ORIENTATION: LEFT

## 2021-12-20 NOTE — PROGRESS NOTES
Hospitalist Progress Note      Name:  Phuc Barker /Age/Sex: 1965  (63 y.o. male)   MRN & CSN:  [id number] & [id number]    Location:  [address] [place] [address] PCP: Ge Sahu Day: 7    Assessment and Plan:   Phuc Barker is a 63 y.o.  male  who presents with Osteomyelitis of finger of left hand ([facility] [address] [address]    Osteomyelitis of the left index finger with lymphangitis: Wound culture + MRSA. On IV vancomycin. Seen by plastic surgery, referred to see hand surgeon at [facility] as outpatient. Patient requested transfer to [address] is not accepting patients at this time. Pain management, ID consulted for antibiotics recommendation. I discussed with Brendon Bonilla. Diabetes mellitus type 2 with hyperglycemia without long-term insulin use:  monitor blood sugar. Continue sliding scale insulin. Comorbidities  BPH on Flomax  Hyperlipidemia on statin  Obesity    Diet ADULT DIET; Regular; 5 carb choices (75 gm/meal)   DVT Prophylaxis [x] Lovenox, []  Heparin, [] SCDs, [] Ambulation   GI Prophylaxis [x] PPI,  [] H2 Blocker,  [] Carafate,  [] Diet/Tube Feeds   Code Status Full Code   Disposition Patient requires continued admission due to osteomyelitis. MDM [] Low, [x] Moderate,[]  High  Patient's risk as above due to      History of Present Illness:     Chief Complaint: Osteomyelitis of finger of left hand ([facility] [address]  Phuc Barker is a 63 y.o.  male  who presents with osteomyelitis of the left index finger. He complained of throbbing with pain. He requested transfer to [facility] where he had amputation of the fingers in the past. I called transfer center but [facility] is not accepting patient at this time including established patient. Patient understands why he is unable to transfer to [facility]. He agreed to stay to be evaluated by ID physician for discharge antibiotics. Objective:        Intake/Output Summary (Last 24 hours) at 12/20/2021 1645  Last data filed at 12/20/2021 0906  Gross per 24 hour   Intake 560 ml   Output    Net 560 ml      Vitals:   Vitals:    12/20/21 0745   BP: 108/64   Pulse: 73   Resp: 16   Temp: 97.6 °F (36.4 °C)   SpO2: 99%     Physical Exam:   General appearance: Appears comfortable. Eyes: Sclera clear. Pupils equal.  ENT: Moist oral mucosa. Trachea midline, no adenopathy. Cardiovascular: Regular rhythm, normal S1, No edema in lower extremities  Respiratory: Clear to auscultation bilaterally, no wheeze or crackles. GI: Abdomen soft, no tenderness, not distended, + bowel sounds  Musculoskeletal: No cyanosis in digits, neck supple, left index finger surgical dressing amount of bleeding/discharge. Neurology: Alert and oriented in time, place and person, No speech or motor deficits  Psych: Normal affect. Not anxious. Skin: Warm, dry, normal turgor    Medications:   Medications:    polyethylene glycol  17 g Oral Daily    atorvastatin  10 mg Oral Nightly    escitalopram  10 mg Oral Daily    pantoprazole  40 mg Oral BID    pregabalin  75 mg Oral BID    tamsulosin  0.4 mg Oral Daily    sodium chloride flush  5-40 mL IntraVENous 2 times per day    enoxaparin  40 mg SubCUTAneous Daily    insulin lispro  0-12 Units SubCUTAneous TID WC    insulin lispro  0-6 Units SubCUTAneous Nightly    vancomycin  1,500 mg IntraVENous Q12H      Infusions:    sodium chloride 25 mL (12/15/21 0600)    dextrose       PRN Meds: bisacodyl, 5 mg, Daily PRN  oxyCODONE-acetaminophen, 2 tablet, Q4H PRN  sodium chloride flush, 5-40 mL, PRN  sodium chloride, 25 mL, PRN  ondansetron, 4 mg, Q8H PRN   Or  ondansetron, 4 mg, Q6H PRN  acetaminophen, 650 mg, Q6H PRN   Or  acetaminophen, 650 mg, Q6H PRN  glucose, 15 g, PRN  dextrose, 12.5 g, PRN  glucagon (rDNA), 1 mg, PRN  dextrose, 100 mL/hr, PRN  morphine, 2 mg, Q2H PRN   Or  morphine, 4 mg, Q2H PRN      No results found. The anticipated discharge is in 24 hrs. Electronically signed by Alcides White MD on 12/20/2021 at 4:45 PM

## 2021-12-20 NOTE — CONSULTS
Infectious Disease Consult Note  2021   Patient Name: Randa Stinson : 1965   Impression  MRSA OM of Left Index Finger:  Afebrile, no leukocytosis  -Abscess left index finger wound culture: MRSA  -XR Hand Left: findings are consistent with OM involving the middle phalanx of the 2nd digit with diffuse soft tissue swelling  Patient reports he was initially treated by Dr. Rosie Briones after admission     Allergy to ceftriaxone (hives), but tolerates PCN    DMII    Previous history of MRSA, known to ID    Morbid Obesity: BMI 38.31    Fibromyalgia    Tobacco Abuse/ Marijuana Use    Multi-morbidity: per PMHx: anxiety, arthritis, asthma, depression, fibromyalgia, OM of the fingers  Plan:  Continue IV vancomycin per pharmacy dosing, x 6 weeks (end date 22)  Trend CRP, ordered  Place PICC line for IV ABX therapy, orders placed  ID recommends patient to follow up with hand surgeon  Follow up in ID clinic in 2 weeks please  Weekly labs drawn on Monday during the course of treatment  CBC with differential, CMP, ESR, CRP,Vancomycin Trough  Fax results to Attn: [facility] Infectious Diseases Staff  # [phone]  OK from ID standpoint to DC after PICC line placed and when ready    Thank you for allowing me to consult in the care of this patient.  ------------------------  REASON FOR CONSULT: Infective syndrome     Requested by: Dr. Carmina Chao is a 63 y.o.  male with PMH of DMII, previous history of right 5th digit amputation, and finger amputations of both hands who was admitted 2021 for further evaluation and management of acute edema and pain of his left index finger stump with drainage and associated fever/chills which started the day prior to admission. He underwent a left hand XR which showed an impression of OM involving the middle phalanx of the second digit with diffuse soft tissue swelling.   He was transferred back to his hand surgeon at [facility] when he was initially admitted, but [address] [place] [address] is not accepting any transfers at this time. His wound culture was positive for MRSA and he has been started on IV vancomycin. The ER, plastic surgery was consulted and did an I&D with recommendations for admission for IV antibiotics and plastics consult   Infectious diseases service was consulted to evaluate the pt, and recommend further investigative and therapeutic measures. ROS: Other systems reviewed Including eyes, ENT, respiratory, cardiovascular, GI, , dermatologic, neurologic, psych, hem/lymphatic, musculoskeletal and endocrine were negative other than what is mentioned above.      Patient Active Problem List    Diagnosis Date Noted    Skin ulcer of toe of left foot with fat layer exposed ([address] 10/13/2015    Diabetes with ulcer of foot ([address] 04/08/2014    Ulcer of other part of foot 04/08/2014    Osteomyelitis of finger of left hand ([address] 12/14/2021    Abscess of left index finger 11/08/2021    Cellulitis of left lower extremity 11/07/2021    Cellulitis 11/06/2021    Foreign body in foot, left, infected, initial encounter 10/21/2021    Left foot pain 10/20/2021    Diabetic infection of left foot ([address]     Acute osteomyelitis involving hand ([address] 07/14/2019    WD-S/P amputation of lesser toe, right ([address] 07/03/2018    Foot pain 06/21/2018    Sepsis without acute organ dysfunction ([address] 05/27/2018    Cellulitis of left middle finger 07/05/2017    Chemical dependency ([address] 10/19/2016    Chronic pain syndrome 10/19/2016    Drug abuse ([address] 10/19/2016    Chronic ulcer of left foot with necrosis of muscle ([address] 10/06/2015    Obesity, Class III, BMI 40-49.9 (morbid obesity) ([address]     Diabetes mellitus with ulcer of heel ([address] 08/26/2014    Diabetic foot infection ([address] 10/20/2013    Uncontrolled diabetes mellitus with complications ([address] 92/47/6917    Finger infection right index finger 09/22/2011     Past Medical History:   Diagnosis Date    Absent finger Left hand    Anxiety     Arthritis     Hands    Asthma     \"As a kid but outgrew this\" - no medications as of 2/21/20    Chronic back pain     Depression     Edema     Fibromyalgia     Headache(784.0)     Last: 2/19/20    Hernia, abdominal     Hx MRSA infection     positive culture 8/2014 from left foot    Nausea & vomiting     Neuropathy     Obesity, Class III, BMI 40-49.9 (morbid obesity) ([address]     Osteomyelitis (HCC)     hx finger    Poor circulation     Prolonged emergence from general anesthesia     Restless leg syndrome     Shortness of breath on exertion     6/14/2016- pt denies any sob with this interview    Type II or unspecified type diabetes mellitus with other specified manifestations, not stated as uncontrolled 4/8/2014    Type II or unspecified type diabetes mellitus without mention of complication, not stated as uncontrolled DX 2007    Follows with PCP    Ulcer of other part of foot 4/8/2014    'ulcer on left foot healed all up\"      Past Surgical History:   Procedure Laterality Date    COLONOSCOPY  1990's    Normal exam per pt    DEBRIDEMENT  8/25/2014    left foot    ENDOSCOPY, COLON, DIAGNOSTIC  06-    Normal exam per pt -     FINGER SURGERY  9-11-11    Right Index Finger    FINGER SURGERY  01-16-12    RIght Index Finger-Removal of loose body, Reconstruction of Mallet Finger    FOOT DEBRIDEMENT Left 6/10/2019    FOOT DEBRIDEMENT INCISION AND DRAINAGE performed by Yudith Metcalf MD at [address] [place] [address] Right 06/01/2018    I&D right foot    HEMORRHOID SURGERY  2008    OTHER SURGICAL HISTORY Left 10/22/2013    I & D left foot    OTHER SURGICAL HISTORY  07/07/2017    I&D fingers X2 left hand.  I&D left forearm    OTHER SURGICAL HISTORY Left 07/08/2017    left hand debridement, left forearm incision and drainage     OTHER SURGICAL HISTORY Left 07/10/2017    Fingers I&D    OTHER SURGICAL HISTORY Left 07/14/2017    middle finger amputation revision    ROTATOR CUFF REPAIR  Last Done 2011    Left, Done Four Times    UPPER GASTROINTESTINAL ENDOSCOPY N/A 2020    EGD BIOPSY performed by Pauline Ferris MD at [facility] 12      Family History   Problem Relation Age of Onset    Kidney Disease Mother         \"Kidney Failure, On Dialysis\"    Early Death Mother 39    Diabetes Father         \"Type II\"      Infectious disease related family history - not contibutory. SOCIAL HISTORY  Social History     Tobacco Use    Smoking status: Former Smoker     Types: Cigarettes     Start date:      Quit date:      Years since quittin.5    Smokeless tobacco: Former User     Types: Snuff   Substance Use Topics    Alcohol use: Yes     Comment: Rarely - 1-2 a year      Born:[place], [place] with daughter  Occupation:Disabled  No recent travel of significance. No recent unusual exposures. NO pets       ALLERGIES  Allergies   Allergen Reactions    Cantaloupe (Diagnostic) Hives    Ceftriaxone Hives     Tolerated Zosyn well 2019     Robaxin [Methocarbamol] Swelling    Rocephin [Ceftriaxone Sodium-Lidocaine] Hives     Noted on 10/26 - developed extremity swelling and hives. No anaphylaxis.  Sulfa Antibiotics Hives     Noted on 10/26 - developed extremity swelling and hives. No anaphylaxis.  Aspirin Nausea Only    Nsaids Nausea Only    Other Nausea Only     Darvocet- Gi distress  napsalate/acetaminophen    Propoxyphene Nausea And Vomiting    Toradol [Ketorolac Tromethamine] Other (See Comments)     Sweats       MEDICATIONS  Reviewed and are per the chart/EMR.       Antibiotics:   Present:  Vancomycin -    Past:  Vancomycin   Zosyn      -------------------------------------------------------------------------------------------------------------------    Vital Signs:  Vitals:    21 0745   BP: 108/64   Pulse: 73   Resp: 16   Temp: 97.6 °F (36.4 °C)   SpO2: 99%         Exam:    VS: noted; wt 274 lb (124.6 kg) Height 5'11\"  Gen: alert and oriented X3, no distress  Skin: no stigmata of endocarditis  Wounds: C/D/I left index finger with erythema, edema and tenderness to light palpation  HEMT: AT/NC Oropharynx pink, moist, and without lesions or exudates; dentition in good state of repair  Eyes: PERRLA, EOMI, conjunctiva pink, sclera anicteric. Neck: Supple. Trachea midline. No LAD. Chest: no distress and CTA. Good air movement. Heart: RRR and no MRG. Abd: soft, non-distended, no tenderness, no hepatomegaly. Normoactive bowel sounds. Ext: no clubbing, cyanosis, or edema  Neuro: Mental status intact. CN 2-12 intact and no focal sensory or motor deficits     Diagnostic Studies: reviewed  12/13/21 XR Hand Left:  Impression   Findings are consistent with osteomyelitis involving the middle phalanx of   the 2nd digit with diffuse soft tissue swelling.             I have examined this patient and available medical records on this date and have made the above observations, conclusions and recommendations. Electronically signed by: Electronically signed by Hernandez Gaytan.  JULITA Lao CNP on 12/20/2021 at 11:30 AM

## 2021-12-21 VITALS
OXYGEN SATURATION: 97 % | BODY MASS INDEX: 38.36 KG/M2 | HEIGHT: 71 IN | DIASTOLIC BLOOD PRESSURE: 60 MMHG | SYSTOLIC BLOOD PRESSURE: 118 MMHG | HEART RATE: 84 BPM | RESPIRATION RATE: 16 BRPM | WEIGHT: 274 LBS | TEMPERATURE: 98.7 F

## 2021-12-21 LAB
APTT: 28.5 SECONDS (ref 25.1–37.1)
GLUCOSE BLD-MCNC: 141 MG/DL (ref 70–99)
GLUCOSE BLD-MCNC: 215 MG/DL (ref 70–99)
GLUCOSE BLD-MCNC: 218 MG/DL (ref 70–99)
HIGH SENSITIVE C-REACTIVE PROTEIN: 4.6 MG/L
INR BLD: 0.91 INDEX
PROTHROMBIN TIME: 11.7 SECONDS (ref 11.7–14.5)
SARS-COV-2, NAAT: NOT DETECTED
SOURCE: NORMAL

## 2021-12-21 PROCEDURE — 6370000000 HC RX 637 (ALT 250 FOR IP): Performed by: INTERNAL MEDICINE

## 2021-12-21 PROCEDURE — 85730 THROMBOPLASTIN TIME PARTIAL: CPT

## 2021-12-21 PROCEDURE — 76937 US GUIDE VASCULAR ACCESS: CPT

## 2021-12-21 PROCEDURE — 36415 COLL VENOUS BLD VENIPUNCTURE: CPT

## 2021-12-21 PROCEDURE — 2580000003 HC RX 258

## 2021-12-21 PROCEDURE — 82962 GLUCOSE BLOOD TEST: CPT

## 2021-12-21 PROCEDURE — 02HV33Z INSERTION OF INFUSION DEVICE INTO SUPERIOR VENA CAVA, PERCUTANEOUS APPROACH: ICD-10-PCS | Performed by: HOSPITALIST

## 2021-12-21 PROCEDURE — 2580000003 HC RX 258: Performed by: INTERNAL MEDICINE

## 2021-12-21 PROCEDURE — 6360000002 HC RX W HCPCS: Performed by: INTERNAL MEDICINE

## 2021-12-21 PROCEDURE — 94761 N-INVAS EAR/PLS OXIMETRY MLT: CPT

## 2021-12-21 PROCEDURE — C1751 CATH, INF, PER/CENT/MIDLINE: HCPCS

## 2021-12-21 PROCEDURE — 36558 INSERT TUNNELED CV CATH: CPT

## 2021-12-21 PROCEDURE — 2709999900 HC NON-CHARGEABLE SUPPLY

## 2021-12-21 PROCEDURE — 85610 PROTHROMBIN TIME: CPT

## 2021-12-21 PROCEDURE — 77001 FLUOROGUIDE FOR VEIN DEVICE: CPT

## 2021-12-21 PROCEDURE — 2580000003 HC RX 258: Performed by: NURSE PRACTITIONER

## 2021-12-21 PROCEDURE — 87635 SARS-COV-2 COVID-19 AMP PRB: CPT

## 2021-12-21 PROCEDURE — 99232 SBSQ HOSP IP/OBS MODERATE 35: CPT | Performed by: NURSE PRACTITIONER

## 2021-12-21 PROCEDURE — 86141 C-REACTIVE PROTEIN HS: CPT

## 2021-12-21 PROCEDURE — C1894 INTRO/SHEATH, NON-LASER: HCPCS

## 2021-12-21 PROCEDURE — 6360000002 HC RX W HCPCS

## 2021-12-21 RX ORDER — OXYCODONE AND ACETAMINOPHEN 10; 325 MG/1; MG/1
1 TABLET ORAL EVERY 6 HOURS PRN
Qty: 12 TABLET | Refills: 0 | Status: SHIPPED | OUTPATIENT
Start: 2021-12-21 | End: 2021-12-24

## 2021-12-21 RX ADMIN — TAMSULOSIN HYDROCHLORIDE 0.4 MG: 0.4 CAPSULE ORAL at 09:57

## 2021-12-21 RX ADMIN — PREGABALIN 75 MG: 75 CAPSULE ORAL at 09:57

## 2021-12-21 RX ADMIN — INSULIN LISPRO 2 UNITS: 100 INJECTION, SOLUTION INTRAVENOUS; SUBCUTANEOUS at 12:23

## 2021-12-21 RX ADMIN — ENOXAPARIN SODIUM 40 MG: 100 INJECTION SUBCUTANEOUS at 12:34

## 2021-12-21 RX ADMIN — VANCOMYCIN HYDROCHLORIDE 1500 MG: 5 INJECTION, POWDER, LYOPHILIZED, FOR SOLUTION INTRAVENOUS at 05:35

## 2021-12-21 RX ADMIN — SODIUM CHLORIDE, PRESERVATIVE FREE 5 ML: 5 INJECTION INTRAVENOUS at 10:06

## 2021-12-21 RX ADMIN — OXYCODONE AND ACETAMINOPHEN 2 TABLET: 5; 325 TABLET ORAL at 09:57

## 2021-12-21 RX ADMIN — PANTOPRAZOLE SODIUM 40 MG: 40 TABLET, DELAYED RELEASE ORAL at 16:18

## 2021-12-21 RX ADMIN — VANCOMYCIN HYDROCHLORIDE 1500 MG: 5 INJECTION, POWDER, LYOPHILIZED, FOR SOLUTION INTRAVENOUS at 14:07

## 2021-12-21 RX ADMIN — ESCITALOPRAM OXALATE 10 MG: 10 TABLET ORAL at 12:22

## 2021-12-21 RX ADMIN — OXYCODONE AND ACETAMINOPHEN 2 TABLET: 5; 325 TABLET ORAL at 06:29

## 2021-12-21 RX ADMIN — PANTOPRAZOLE SODIUM 40 MG: 40 TABLET, DELAYED RELEASE ORAL at 06:27

## 2021-12-21 RX ADMIN — OXYCODONE AND ACETAMINOPHEN 2 TABLET: 5; 325 TABLET ORAL at 13:56

## 2021-12-21 RX ADMIN — OXYCODONE AND ACETAMINOPHEN 2 TABLET: 5; 325 TABLET ORAL at 02:25

## 2021-12-21 RX ADMIN — SODIUM CHLORIDE, PRESERVATIVE FREE 10 ML: 5 INJECTION INTRAVENOUS at 12:31

## 2021-12-21 RX ADMIN — SODIUM CHLORIDE, PRESERVATIVE FREE 10 ML: 5 INJECTION INTRAVENOUS at 14:01

## 2021-12-21 RX ADMIN — INSULIN LISPRO 4 UNITS: 100 INJECTION, SOLUTION INTRAVENOUS; SUBCUTANEOUS at 09:59

## 2021-12-21 ASSESSMENT — PAIN DESCRIPTION - PROGRESSION
CLINICAL_PROGRESSION: GRADUALLY WORSENING
CLINICAL_PROGRESSION: GRADUALLY WORSENING

## 2021-12-21 ASSESSMENT — PAIN SCALES - GENERAL
PAINLEVEL_OUTOF10: 4
PAINLEVEL_OUTOF10: 8
PAINLEVEL_OUTOF10: 9
PAINLEVEL_OUTOF10: 8
PAINLEVEL_OUTOF10: 8

## 2021-12-21 ASSESSMENT — PAIN DESCRIPTION - LOCATION
LOCATION: HAND
LOCATION: ARM;HAND

## 2021-12-21 ASSESSMENT — PAIN DESCRIPTION - FREQUENCY
FREQUENCY: CONTINUOUS
FREQUENCY: CONTINUOUS

## 2021-12-21 ASSESSMENT — PAIN DESCRIPTION - PAIN TYPE
TYPE: ACUTE PAIN;SURGICAL PAIN
TYPE: ACUTE PAIN;SURGICAL PAIN

## 2021-12-21 ASSESSMENT — PAIN DESCRIPTION - ORIENTATION
ORIENTATION: LEFT
ORIENTATION: LEFT

## 2021-12-21 ASSESSMENT — PAIN DESCRIPTION - ONSET
ONSET: ON-GOING
ONSET: ON-GOING

## 2021-12-21 ASSESSMENT — PAIN DESCRIPTION - DESCRIPTORS
DESCRIPTORS: SHARP
DESCRIPTORS: SHARP

## 2021-12-21 NOTE — PROGRESS NOTES
[facility] Liaison spoke with pt and is aware of discharge & will initiate [address]. [facility] will start tomorrow [place] [facility] will give pt evening dose prior to dc). Asked [employer] to deliver tonight as pt IV atbs is BID.

## 2021-12-21 NOTE — CONSULTS
Multiple failed PICC attempts on this patient, more recent have resulted in cutdown to midline. Midline does not meet therapeutic need for Vancomycin 6 weeks. IR Consulted for tunneled PICC placement.

## 2021-12-21 NOTE — DISCHARGE SUMMARY
Discharge Summary    Name:  Dave Boucher /Age/Sex: 1965  (63 y.o. male)   MRN & CSN:  [id number] & [id number] Admission Date/Time: 2021  1:24 AM   Attending:  Becca Lombardi MD Discharging Physician: Becca Lombardi MD     Hospital Course:   Dave Boucher is a 63 y.o.  male  who presents with Osteomyelitis of finger of left hand [facility]    Hospital Course. Patient is a 18-year-old male past medical history of diabetes, who was admitted for suspected osteomyelitis of the left index finger with lymphangitis with wound culture positive for MRSA. Patient was seen by plastic surgery here at [facility]. Per plastic surgery note they have spoken to hand surgeon at [facility] Ginny Genao. He had requested patient call his office at [phone] for follow-up/treatment after discharge. Patient was seen by ID and they recommended vancomycin for 6 weeks with end of therapy being 2022. Plastic surgery, ID recommendations were extensively discussed with patient at bedside on the day of discharge. He expressed verbal processing more discussion. Prescription of vancomycin until 2214. Patient was counseled to follow-up with Dr. Nura Aguiar the [facility] for further management. The patient expressed appropriate understanding of and agreement with the discharge recommendations, medications, and plan.      Consults this admission:  IP CONSULT TO ORTHOPEDIC SURGERY  IP CONSULT TO HOSPITALIST  IP CONSULT TO PHARMACY  IP CONSULT TO IV TEAM  IP CONSULT TO IV TEAM  IP CONSULT TO INFECTIOUS DISEASES  IP CONSULT TO CASE MANAGEMENT  IP CONSULT TO INTERVENTIONAL RADIOLOGY  IP CONSULT TO [address]    Discharge Instruction:   Follow up appointments:   Primary care physician:  within 2 weeks    Diet:  cardiac diet   Activity: activity as tolerated  Disposition: Discharged to:   []Home, [x]HHC, []SNF, []Acute Rehab, []Hospice   Condition on discharge: Stable    Discharge Medications:        Medication List      START taking these medications    vancomycin  infusion  Commonly known as: VANCOCIN  Infuse 1,500 mg intravenously every 12 hours Compound per protocol. CHANGE how you take these medications    oxyCODONE-acetaminophen  MG per tablet  Commonly known as: PERCOCET  Take 1 tablet by mouth every 6 hours as needed for Pain for up to 3 days. What changed: when to take this        CONTINUE taking these medications    acetaminophen 500 MG tablet  Commonly known as: TYLENOL  Take 2 tablets by mouth 3 times daily for 10 days     atorvastatin 10 MG tablet  Commonly known as: LIPITOR     dicyclomine 10 MG capsule  Commonly known as: Bentyl  Take 2 capsules by mouth 4 times daily as needed (abdominal pain)     Lexapro 10 MG tablet  Generic drug: escitalopram     ondansetron 4 MG disintegrating tablet  Commonly known as: Zofran ODT  Take 1 tablet by mouth every 6 hours     ondansetron 4 MG tablet  Commonly known as: ZOFRAN     pantoprazole 40 MG tablet  Commonly known as: PROTONIX  Take 1 tablet by mouth 2 times daily for 14 days     pregabalin 75 MG capsule  Commonly known as: LYRICA     tamsulosin 0.4 MG capsule  Commonly known as: FLOMAX  Take 1 capsule by mouth daily for 7 doses     [facility]           Where to Get Your Medications      You can get these medications from any pharmacy    Bring a paper prescription for each of these medications  oxyCODONE-acetaminophen  MG per tablet  vancomycin  infusion         Objective Findings at Discharge:   /82   Pulse 68   Temp 98.2 °F (36.8 °C) (Oral)   Resp 18   Ht 5' 11\" (1.803 m)   Wt 274 lb (124.3 kg)   SpO2 98%   BMI 38.22 kg/m²            PHYSICAL EXAM   GEN Awake, Alert, in no apparent distress  HEENT Atraumatic, nomocephalic, PERRLA, EOMI  NECK Supple, no lymphadenopaty  RESP Clear lungs to auscultation bilaterally  CARDIO/VASC Normal S1/S2. RRRR. No mumurs.    GI Abdomen is soft without significant tenderness, masses, or guarding. Bowel sounds +  MSK No gross joint deformities. SKIN Normal coloration, warm, dry. NEURO Cranial nerves appear grossly intact, normal speech, no lateralizing weakness. PSYCH Awake, alert, oriented x 3. Affect appropriate. BMP/CBC  No results for input(s): NA, K, CL, CO2, BUN, CREATININE, WBC, HEMOGLOBIN, HCT, PLT in the last 72 hours.     Invalid input(s): GLU    IMAGING:      Discharge Time of 35 minutes    Electronically signed by Gino Perea MD on 12/21/2021 at 12:28 PM

## 2021-12-21 NOTE — CONSULTS
Unable to place PICC. Vessel accessed successfully, but line would not advance past shoulder and into subclavian space despite extensive troubleshooting. Will leave consult on the board for day shift. Primary nurse notified.

## 2021-12-21 NOTE — PROGRESS NOTES
PROCEDURE PERFORMED: Tunneled PICC placement    PRIMARY INDICATION FOR PROCEDURE:  Medication administration    PT TRANSPORTED FROM:     1016         TO THE IR ROOM:    Large    PT IN THE ROOM AT WHAT TIME:     1054                      INFORMED CONSENT:  Patient signed consent. Consent placed in chart. RONALD SCORE PRE PROCEDURE:  10    NURSING ASSESSMENT:   Alert and oriented. TIME OUT COMPLETE:   1126    BARRIER PRECAUTIONS & STERILE TECHNIQUE  Pt transferred to the table and positioned for comfort. Pt placed on cardiac Mmnitor. Pt  placed in the in supine position. Site prepped with chlorhexadine and draped in a sterile fashion. PAIN/LOCAL ANESTHESIA/SEDATION MANAGEMENT:  Lidocaine 1% without Epinephrine administered by Dr Morris Most:   US 4 images    FLUORO: 0.8     CATHETER USED: 6 Slovak x 60 cm  Pro-Line. Internal length 25 cm. FINAL IMAGE TAKEN TO CONFIRM PLACEMENT OF:  Floroscopy    FLUID RETURN: Aspirated and flushed    Tunneled PICC placement. Patient tolerated the procedure well. Fluoroscopy used to confirm PICC placement. [facility]     STERILE DRESSINGS:   Biopatch, gauze, and Tegaderm    SPECIMENS:  None    EBL:  Less then 1 ml    FOLLOW- UP X-RAY:  Fluoroscopy     COMPLICATIONS:  None    STAFF PRESENT DURING PROCEDURE:  Dr Kevin Love RN, Madeline Calvillo RN, Warren Green RN    RONALD SCORE POST PROCEDURE:  10    REPORT CALLED TO: Myla Hsu RN    PT LEFT ROOM AT WHAT TIME:

## 2021-12-21 NOTE — PROGRESS NOTES
Infectious Disease Progress Note  2021   Patient Name: Desi Pal : 1965   Impression  · MRSA OM of Left Index Finger:  § Afebrile, no leukocytosis  § -Abscess left index finger wound culture: MRSA  § -XR Hand Left: findings are consistent with OM involving the middle phalanx of the 2nd digit with diffuse soft tissue swelling  § Patient reports he was initially treated by Dr. Olga Anguiano      · Allergy to ceftriaxone (hives), but tolerates PCN     ? DMII     ? Previous history of MRSA, known to ID     ? Morbid Obesity: BMI 38.31     ? Fibromyalgia     ? Tobacco Abuse/ Marijuana Use     · Multi-morbidity: per PMHx: anxiety, arthritis, asthma, depression, fibromyalgia, OM of the fingers    Plan:  · Continue IV vancomycin 1500 mg IV q12h x 6 weeks (end date 22)  · Trend CRP, low number of 4.6, repeat in am  ? Place PICC line for IV ABX therapy, appreciate PICC team, unable to place due to poor vasculature, order placed for IR tunneled PICC  ? ID recommends patient to follow up with hand surgeon  ? Follow up in ID clinic in 2 weeks please  · Weekly labs drawn on Monday during the course of treatment  · CBC with differential, CMP, ESR, CRP,Vancomycin Trough  · Fax results to Attn: Paxton Osuna Infectious Diseases Staff  ? # [phone] OK from ID standpoint to DC after PICC line placed and when ready      Ongoing Antimicrobial Therapy  Vancomycin -? Completed Antimicrobial Therapy  Vancomycin   Zosyn -18  ? History:? Interval history noted. Chief complaint: MRSA OM of the left index finger. Denies n/v/d/f or untoward effects of antibiotics. Resting quietly, c/o left index finger tenderness.      Physical Exam:  Vital Signs: /82   Pulse 73   Temp 97.9 °F (36.6 °C) (Oral)   Resp 18   Ht 5' 11\" (1.803 m)   Wt 274 lb (124.3 kg)   SpO2 98%   BMI 38.22 kg/m²     Gen: alert and oriented X3, no distress, c/o left index finger tenderness  Wounds: C/D/I left index finger  Sepsis without acute organ dysfunction (HCC)    Foot pain    WD-S/P amputation of lesser toe, right (HCC)    [facility]omyel[facility] involving hand [facility]    Diabetic infection of left foot ([address]    Left foot pain    Foreign body in foot, left, infected, initial encounter    Cellulitis    Cellulitis of left lower extremity    Abscess of left index finger    Osteomyelitis of finger of left hand ([facility] [address] [facility]       Active Problems  Principal Problem:    Osteomyelitis of finger of left hand ([facility])  Active Problems:    Uncontrolled diabetes mellitus with complications ([facility])    Obesity, Class III, BMI 40-49.9 (morbid obesity) ([facility] [address]  Resolved Problems:    * No resolved hospital problems. *    Electronically signed by: Electronically signed by Jasepr Astorga.  JULITA Lao CNP on 12/21/2021 at 8:11 AM

## 2021-12-22 ENCOUNTER — CARE COORDINATION (OUTPATIENT)
Dept: CASE MANAGEMENT | Age: 56
End: 2021-12-22

## 2021-12-22 NOTE — CARE COORDINATION
Care Transitions Outreach Attempt    Call within 2 business days of discharge: Yes   Attempted to reach patient for transitions of care follow up. Unable to reach patient. Patient: Desi Pal Patient : 1965 MRN: [id number]    Last Discharge [facility]       Complaint Diagnosis Description Type Department Provider    21 Wound Check Wound infection . .. ED to Hosp-Admission (Discharged) (ADMITTED) [place] MD [place]; HARRY Chacko.        24 Hour Transition of Care Call:    1st Attempt to contact patient for Initial 24 Hour Transition from hospital to home Call:   Patient not reached. Left HIPAA Compliant message on Voice Mail to call CTN (number given) with any questions and an update on patient's condition since discharge. Called [facility] OF [facility], spoke to [employer]. They will be following patient for [address]. Will continue to follow. Mp Gonzalez, CHIP    [phone]  Leesa Pinzon / Nirmal Curtis Coordinator        Was this an external facility discharge? No Discharge Facility: [facility]    Noted following upcoming appointments from discharge chart review:   REHABILITATION [facility] follow up appointment(s): No future appointments.   Non-[facility] follow up appointment(s):

## 2021-12-23 ENCOUNTER — CARE COORDINATION (OUTPATIENT)
Dept: CASE MANAGEMENT | Age: 56
End: 2021-12-23

## 2021-12-23 NOTE — CARE COORDINATION
Nirmal Curtis Transitions Initial Follow Up Call    Call within 2 business days of discharge: Yes    Patient: Jailyn Stone Patient : 1965   MRN: [id number]  Reason for Admission: Cellulitis left index finger. Discharge Date: 21 RARS: Readmission Risk Score: 18 ( )      Last Discharge [id number] [id number]       Complaint Diagnosis Description Type Department Provider    21 Wound Check Wound infection . .. ED to Hosp-Admission (Discharged) (ADMITTED) Ashley Coelho MD; CHRIS Fry.. Patient on his way to [place] for left Index finger. He states, \"I don't want this whole hand to get infected\" then hung up. Will continue to follow. Denae Felix LPN    [phone]  Ivan Smart / Nirmal Curtis Coordinator]\       Care Transitions 24 Hour Call    Care Transitions Interventions         Follow Up  No future appointments.     Denae Felix LPN

## 2022-01-04 ENCOUNTER — HOSPITAL ENCOUNTER (OUTPATIENT)
Age: 57
Setting detail: SPECIMEN
Discharge: HOME | End: 2022-01-04
Payer: MEDICARE

## 2022-01-04 LAB
ALBUMIN SERPL-MCNC: 4 GM/DL (ref 3.4–5)
ALP BLD-CCNC: 97 IU/L (ref 40–128)
ALT SERPL-CCNC: 10 U/L (ref 10–40)
ANION GAP SERPL CALCULATED.3IONS-SCNC: 13 MMOL/L (ref 4–16)
AST SERPL-CCNC: 12 IU/L (ref 15–37)
BASOPHILS ABSOLUTE: 0.1 K/CU MM
BASOPHILS RELATIVE PERCENT: 0.7 % (ref 0–1)
BILIRUB SERPL-MCNC: 0.2 MG/DL (ref 0–1)
BUN BLDV-MCNC: 14 MG/DL (ref 6–23)
CALCIUM SERPL-MCNC: 9.1 MG/DL (ref 8.3–10.6)
CHLORIDE BLD-SCNC: 103 MMOL/L (ref 99–110)
CO2: 26 MMOL/L (ref 21–32)
CREAT SERPL-MCNC: 0.6 MG/DL (ref 0.9–1.3)
DIFFERENTIAL TYPE: ABNORMAL
DOSE AMOUNT: ABNORMAL
DOSE TIME: ABNORMAL
EOSINOPHILS ABSOLUTE: 0.6 K/CU MM
EOSINOPHILS RELATIVE PERCENT: 9 % (ref 0–3)
GFR AFRICAN AMERICAN: >60 ML/MIN/1.73M2
GFR NON-AFRICAN AMERICAN: >60 ML/MIN/1.73M2
GLUCOSE BLD-MCNC: 211 MG/DL (ref 70–99)
HCT VFR BLD CALC: 38.9 % (ref 42–52)
HEMOGLOBIN: 12.2 GM/DL (ref 13.5–18)
IMMATURE NEUTROPHIL %: 1 % (ref 0–0.43)
LYMPHOCYTES ABSOLUTE: 1.8 K/CU MM
LYMPHOCYTES RELATIVE PERCENT: 25.4 % (ref 24–44)
MCH RBC QN AUTO: 28 PG (ref 27–31)
MCHC RBC AUTO-ENTMCNC: 31.4 % (ref 32–36)
MCV RBC AUTO: 89.2 FL (ref 78–100)
MONOCYTES ABSOLUTE: 0.5 K/CU MM
MONOCYTES RELATIVE PERCENT: 6.7 % (ref 0–4)
NUCLEATED RBC %: 0 %
PDW BLD-RTO: 14.8 % (ref 11.7–14.9)
PLATELET # BLD: 225 K/CU MM (ref 140–440)
PMV BLD AUTO: 9.9 FL (ref 7.5–11.1)
POTASSIUM SERPL-SCNC: 4.1 MMOL/L (ref 3.5–5.1)
RBC # BLD: 4.36 M/CU MM (ref 4.6–6.2)
SEGMENTED NEUTROPHILS ABSOLUTE COUNT: 4 K/CU MM
SEGMENTED NEUTROPHILS RELATIVE PERCENT: 57.2 % (ref 36–66)
SODIUM BLD-SCNC: 142 MMOL/L (ref 135–145)
TOTAL IMMATURE NEUTOROPHIL: 0.07 K/CU MM
TOTAL NUCLEATED RBC: 0 K/CU MM
TOTAL PROTEIN: 6.4 GM/DL (ref 6.4–8.2)
VANCOMYCIN TROUGH: 9.4 UG/ML (ref 10–20)
WBC # BLD: 7 K/CU MM (ref 4–10.5)

## 2022-01-04 PROCEDURE — 80202 ASSAY OF VANCOMYCIN: CPT

## 2022-01-04 PROCEDURE — 80053 COMPREHEN METABOLIC PANEL: CPT

## 2022-01-04 PROCEDURE — 85025 COMPLETE CBC W/AUTO DIFF WBC: CPT

## 2022-01-04 PROCEDURE — 86140 C-REACTIVE PROTEIN: CPT

## 2022-01-04 PROCEDURE — 85652 RBC SED RATE AUTOMATED: CPT

## 2022-01-05 LAB — C-REACTIVE PROTEIN, HIGH SENSITIVITY: 3.6 MG/L

## 2022-01-07 ENCOUNTER — TELEPHONE (OUTPATIENT)
Dept: INFECTIOUS DISEASES | Age: 57
End: 2022-01-07

## 2022-01-10 ENCOUNTER — HOSPITAL ENCOUNTER (OUTPATIENT)
Age: 57
Setting detail: SPECIMEN
Discharge: HOME | End: 2022-01-10
Payer: MEDICARE

## 2022-01-10 LAB
ALBUMIN SERPL-MCNC: 4 GM/DL (ref 3.4–5)
ALP BLD-CCNC: 113 IU/L (ref 40–128)
ALT SERPL-CCNC: 11 U/L (ref 10–40)
ANION GAP SERPL CALCULATED.3IONS-SCNC: 11 MMOL/L (ref 4–16)
AST SERPL-CCNC: 13 IU/L (ref 15–37)
BASOPHILS ABSOLUTE: 0.1 K/CU MM
BASOPHILS RELATIVE PERCENT: 0.8 % (ref 0–1)
BILIRUB SERPL-MCNC: 0.3 MG/DL (ref 0–1)
BUN BLDV-MCNC: 12 MG/DL (ref 6–23)
C-REACTIVE PROTEIN, HIGH SENSITIVITY: 11.7 MG/L
CALCIUM SERPL-MCNC: 8.9 MG/DL (ref 8.3–10.6)
CHLORIDE BLD-SCNC: 100 MMOL/L (ref 99–110)
CO2: 24 MMOL/L (ref 21–32)
CREAT SERPL-MCNC: 0.6 MG/DL (ref 0.9–1.3)
DIFFERENTIAL TYPE: ABNORMAL
DOSE AMOUNT: NORMAL
DOSE TIME: NORMAL
EOSINOPHILS ABSOLUTE: 0.4 K/CU MM
EOSINOPHILS RELATIVE PERCENT: 6.3 % (ref 0–3)
ERYTHROCYTE SEDIMENTATION RATE: 14 MM/HR (ref 0–20)
GFR AFRICAN AMERICAN: >60 ML/MIN/1.73M2
GFR NON-AFRICAN AMERICAN: >60 ML/MIN/1.73M2
GLUCOSE BLD-MCNC: 331 MG/DL (ref 70–99)
HCT VFR BLD CALC: 38.7 % (ref 42–52)
HEMOGLOBIN: 12.2 GM/DL (ref 13.5–18)
IMMATURE NEUTROPHIL %: 0.3 % (ref 0–0.43)
LYMPHOCYTES ABSOLUTE: 1.4 K/CU MM
LYMPHOCYTES RELATIVE PERCENT: 22 % (ref 24–44)
MCH RBC QN AUTO: 28.2 PG (ref 27–31)
MCHC RBC AUTO-ENTMCNC: 31.5 % (ref 32–36)
MCV RBC AUTO: 89.4 FL (ref 78–100)
MONOCYTES ABSOLUTE: 0.5 K/CU MM
MONOCYTES RELATIVE PERCENT: 8.1 % (ref 0–4)
NUCLEATED RBC %: 0 %
PDW BLD-RTO: 14.8 % (ref 11.7–14.9)
PLATELET # BLD: 178 K/CU MM (ref 140–440)
PMV BLD AUTO: 10.2 FL (ref 7.5–11.1)
POTASSIUM SERPL-SCNC: 4.1 MMOL/L (ref 3.5–5.1)
RBC # BLD: 4.33 M/CU MM (ref 4.6–6.2)
SEGMENTED NEUTROPHILS ABSOLUTE COUNT: 3.9 K/CU MM
SEGMENTED NEUTROPHILS RELATIVE PERCENT: 62.5 % (ref 36–66)
SODIUM BLD-SCNC: 135 MMOL/L (ref 135–145)
TOTAL IMMATURE NEUTOROPHIL: 0.02 K/CU MM
TOTAL NUCLEATED RBC: 0 K/CU MM
TOTAL PROTEIN: 6.3 GM/DL (ref 6.4–8.2)
VANCOMYCIN TROUGH: 11.6 UG/ML (ref 10–20)
WBC # BLD: 6.2 K/CU MM (ref 4–10.5)

## 2022-01-10 PROCEDURE — 85652 RBC SED RATE AUTOMATED: CPT

## 2022-01-10 PROCEDURE — 85025 COMPLETE CBC W/AUTO DIFF WBC: CPT

## 2022-01-10 PROCEDURE — 86140 C-REACTIVE PROTEIN: CPT

## 2022-01-10 PROCEDURE — 80053 COMPREHEN METABOLIC PANEL: CPT

## 2022-01-10 PROCEDURE — 80202 ASSAY OF VANCOMYCIN: CPT

## 2022-01-17 ENCOUNTER — HOSPITAL ENCOUNTER (OUTPATIENT)
Age: 57
Setting detail: SPECIMEN
Discharge: HOME | End: 2022-01-17
Payer: MEDICARE

## 2022-01-17 LAB
ALBUMIN SERPL-MCNC: 4.1 GM/DL (ref 3.4–5)
ALP BLD-CCNC: 101 IU/L (ref 40–128)
ALT SERPL-CCNC: 11 U/L (ref 10–40)
ANION GAP SERPL CALCULATED.3IONS-SCNC: 10 MMOL/L (ref 4–16)
AST SERPL-CCNC: 17 IU/L (ref 15–37)
BASOPHILS ABSOLUTE: 0 K/CU MM
BASOPHILS RELATIVE PERCENT: 0.4 % (ref 0–1)
BILIRUB SERPL-MCNC: 0.3 MG/DL (ref 0–1)
BUN BLDV-MCNC: 11 MG/DL (ref 6–23)
C-REACTIVE PROTEIN, HIGH SENSITIVITY: 8 MG/L
CALCIUM SERPL-MCNC: 9.3 MG/DL (ref 8.3–10.6)
CHLORIDE BLD-SCNC: 102 MMOL/L (ref 99–110)
CO2: 25 MMOL/L (ref 21–32)
CREAT SERPL-MCNC: 0.7 MG/DL (ref 0.9–1.3)
DIFFERENTIAL TYPE: ABNORMAL
DOSE AMOUNT: ABNORMAL
DOSE TIME: ABNORMAL
EOSINOPHILS ABSOLUTE: 0.3 K/CU MM
EOSINOPHILS RELATIVE PERCENT: 3.9 % (ref 0–3)
ERYTHROCYTE SEDIMENTATION RATE: 22 MM/HR (ref 0–20)
GFR AFRICAN AMERICAN: >60 ML/MIN/1.73M2
GFR NON-AFRICAN AMERICAN: >60 ML/MIN/1.73M2
GLUCOSE BLD-MCNC: 184 MG/DL (ref 70–99)
HCT VFR BLD CALC: 42.8 % (ref 42–52)
HEMOGLOBIN: 13.8 GM/DL (ref 13.5–18)
IMMATURE NEUTROPHIL %: 0.3 % (ref 0–0.43)
LYMPHOCYTES ABSOLUTE: 2 K/CU MM
LYMPHOCYTES RELATIVE PERCENT: 29.3 % (ref 24–44)
MCH RBC QN AUTO: 28 PG (ref 27–31)
MCHC RBC AUTO-ENTMCNC: 32.2 % (ref 32–36)
MCV RBC AUTO: 87 FL (ref 78–100)
MONOCYTES ABSOLUTE: 0.5 K/CU MM
MONOCYTES RELATIVE PERCENT: 7.3 % (ref 0–4)
NUCLEATED RBC %: 0 %
PDW BLD-RTO: 14.8 % (ref 11.7–14.9)
PLATELET # BLD: 190 K/CU MM (ref 140–440)
PMV BLD AUTO: 10.1 FL (ref 7.5–11.1)
POTASSIUM SERPL-SCNC: 4.5 MMOL/L (ref 3.5–5.1)
RBC # BLD: 4.92 M/CU MM (ref 4.6–6.2)
SEGMENTED NEUTROPHILS ABSOLUTE COUNT: 3.9 K/CU MM
SEGMENTED NEUTROPHILS RELATIVE PERCENT: 58.8 % (ref 36–66)
SODIUM BLD-SCNC: 137 MMOL/L (ref 135–145)
TOTAL IMMATURE NEUTOROPHIL: 0.02 K/CU MM
TOTAL NUCLEATED RBC: 0 K/CU MM
TOTAL PROTEIN: 6.5 GM/DL (ref 6.4–8.2)
VANCOMYCIN TROUGH: 7.1 UG/ML (ref 10–20)
WBC # BLD: 6.7 K/CU MM (ref 4–10.5)

## 2022-01-17 PROCEDURE — 80053 COMPREHEN METABOLIC PANEL: CPT

## 2022-01-17 PROCEDURE — 80202 ASSAY OF VANCOMYCIN: CPT

## 2022-01-17 PROCEDURE — 86140 C-REACTIVE PROTEIN: CPT

## 2022-01-17 PROCEDURE — 85652 RBC SED RATE AUTOMATED: CPT

## 2022-01-17 PROCEDURE — 85025 COMPLETE CBC W/AUTO DIFF WBC: CPT

## 2022-01-24 ENCOUNTER — HOSPITAL ENCOUNTER (OUTPATIENT)
Age: 57
Setting detail: SPECIMEN
Discharge: HOME | End: 2022-01-24
Payer: MEDICARE

## 2022-01-24 ENCOUNTER — TELEPHONE (OUTPATIENT)
Dept: INFECTIOUS DISEASES | Age: 57
End: 2022-01-24

## 2022-01-24 LAB
ALBUMIN SERPL-MCNC: 4.2 GM/DL (ref 3.4–5)
ALP BLD-CCNC: 97 IU/L (ref 40–128)
ALT SERPL-CCNC: 9 U/L (ref 10–40)
ANION GAP SERPL CALCULATED.3IONS-SCNC: 14 MMOL/L (ref 4–16)
AST SERPL-CCNC: 16 IU/L (ref 15–37)
BILIRUB SERPL-MCNC: 0.4 MG/DL (ref 0–1)
BUN BLDV-MCNC: 11 MG/DL (ref 6–23)
C-REACTIVE PROTEIN, HIGH SENSITIVITY: 5 MG/L
CALCIUM SERPL-MCNC: 8.9 MG/DL (ref 8.3–10.6)
CHLORIDE BLD-SCNC: 101 MMOL/L (ref 99–110)
CO2: 22 MMOL/L (ref 21–32)
CREAT SERPL-MCNC: 0.8 MG/DL (ref 0.9–1.3)
DOSE AMOUNT: ABNORMAL
DOSE TIME: ABNORMAL
GFR AFRICAN AMERICAN: >60 ML/MIN/1.73M2
GFR NON-AFRICAN AMERICAN: >60 ML/MIN/1.73M2
GLUCOSE BLD-MCNC: 260 MG/DL (ref 70–99)
POTASSIUM SERPL-SCNC: 4.6 MMOL/L (ref 3.5–5.1)
SODIUM BLD-SCNC: 137 MMOL/L (ref 135–145)
TOTAL PROTEIN: 6.8 GM/DL (ref 6.4–8.2)
VANCOMYCIN TROUGH: 35.2 UG/ML (ref 10–20)

## 2022-01-24 PROCEDURE — 85652 RBC SED RATE AUTOMATED: CPT

## 2022-01-24 PROCEDURE — 80053 COMPREHEN METABOLIC PANEL: CPT

## 2022-01-24 PROCEDURE — 80202 ASSAY OF VANCOMYCIN: CPT

## 2022-01-24 PROCEDURE — 86140 C-REACTIVE PROTEIN: CPT

## 2022-01-24 NOTE — TELEPHONE ENCOUNTER
Called from [place] states pt is finished with ABX. Can the PICC line be pulled? Advised to maintain the line until she hears back. Please advise.

## 2022-01-25 DIAGNOSIS — T14.8XXA WOUND INFECTION: Primary | ICD-10-CM

## 2022-01-25 DIAGNOSIS — L08.9 WOUND INFECTION: Primary | ICD-10-CM

## 2022-01-26 ENCOUNTER — TELEPHONE (OUTPATIENT)
Dept: INFECTIOUS DISEASES | Age: 57
End: 2022-01-26

## 2022-01-26 NOTE — TELEPHONE ENCOUNTER
PICC line was placed in the jugular and was trying to set up appt to have it removed at the hospital.  Was told by Scottie Pinzon that when she asked the IR dept to remove the line it was stated to Scottie Pinzon that because they didn't place it they won't remove it. Can we send the pt somewhere else to have it pulled? If so, where did you want me to send him? Please advise.

## 2022-01-27 NOTE — TELEPHONE ENCOUNTER
I checked the notes, Dr. Lincoln Martinez placed the PICC line at [facility] on 12/21/21. IR should be able to remove the line.

## 2022-01-27 NOTE — TELEPHONE ENCOUNTER
Called scheduling pt is set up for a CoVid test 1/28/22 @ 10:20 am and the procedure is at 1:30 pm on 2/2/22 with an arrival time of 11:30 am.

## 2022-01-28 DIAGNOSIS — Z01.818 PREPROCEDURAL EXAMINATION: Primary | ICD-10-CM

## 2022-02-02 ENCOUNTER — HOSPITAL ENCOUNTER (OUTPATIENT)
Dept: INTERVENTIONAL RADIOLOGY/VASCULAR | Age: 57
Discharge: HOME | End: 2022-02-02
Payer: MEDICARE

## 2022-02-02 VITALS
RESPIRATION RATE: 18 BRPM | DIASTOLIC BLOOD PRESSURE: 66 MMHG | TEMPERATURE: 97.2 F | SYSTOLIC BLOOD PRESSURE: 123 MMHG | OXYGEN SATURATION: 97 % | HEART RATE: 66 BPM

## 2022-02-02 DIAGNOSIS — L08.9 WOUND INFECTION: ICD-10-CM

## 2022-02-02 DIAGNOSIS — T14.8XXA WOUND INFECTION: ICD-10-CM

## 2022-02-02 LAB
APTT: 25.6 SECONDS (ref 25.1–37.1)
HCT VFR BLD CALC: 44.1 % (ref 42–52)
HEMOGLOBIN: 14 GM/DL (ref 13.5–18)
INR BLD: 0.82 INDEX
MCH RBC QN AUTO: 28.2 PG (ref 27–31)
MCHC RBC AUTO-ENTMCNC: 31.7 % (ref 32–36)
MCV RBC AUTO: 88.9 FL (ref 78–100)
PDW BLD-RTO: 14.6 % (ref 11.7–14.9)
PLATELET # BLD: 195 K/CU MM (ref 140–440)
PMV BLD AUTO: 10 FL (ref 7.5–11.1)
PROTHROMBIN TIME: 10.6 SECONDS (ref 11.7–14.5)
RBC # BLD: 4.96 M/CU MM (ref 4.6–6.2)
SARS-COV-2, NAAT: NOT DETECTED
SOURCE: NORMAL
WBC # BLD: 6.9 K/CU MM (ref 4–10.5)

## 2022-02-02 PROCEDURE — 7100000011 HC PHASE II RECOVERY - ADDTL 15 MIN

## 2022-02-02 PROCEDURE — 85027 COMPLETE CBC AUTOMATED: CPT

## 2022-02-02 PROCEDURE — 2709999900 HC NON-CHARGEABLE SUPPLY

## 2022-02-02 PROCEDURE — 2580000003 HC RX 258: Performed by: RADIOLOGY

## 2022-02-02 PROCEDURE — 87635 SARS-COV-2 COVID-19 AMP PRB: CPT

## 2022-02-02 PROCEDURE — 85610 PROTHROMBIN TIME: CPT

## 2022-02-02 PROCEDURE — 7100000010 HC PHASE II RECOVERY - FIRST 15 MIN

## 2022-02-02 PROCEDURE — U0003 INFECTIOUS AGENT DETECTION BY NUCLEIC ACID (DNA OR RNA); SEVERE ACUTE RESPIRATORY SYNDROME CORONAVIRUS 2 (SARS-COV-2) (CORONAVIRUS DISEASE [COVID-19]), AMPLIFIED PROBE TECHNIQUE, MAKING USE OF HIGH THROUGHPUT TECHNOLOGIES AS DESCRIBED BY CMS-2020-01-R: HCPCS

## 2022-02-02 PROCEDURE — 36589 REMOVAL TUNNELED CV CATH: CPT

## 2022-02-02 PROCEDURE — 85730 THROMBOPLASTIN TIME PARTIAL: CPT

## 2022-02-02 RX ORDER — SODIUM CHLORIDE 0.9 % (FLUSH) 0.9 %
10 SYRINGE (ML) INJECTION PRN
Status: DISCONTINUED | OUTPATIENT
Start: 2022-02-02 | End: 2022-02-03 | Stop reason: HOSPADM

## 2022-02-02 RX ADMIN — SODIUM CHLORIDE, PRESERVATIVE FREE 10 ML: 5 INJECTION INTRAVENOUS at 12:27

## 2022-02-02 ASSESSMENT — PAIN SCALES - GENERAL: PAINLEVEL_OUTOF10: 0

## 2022-02-02 ASSESSMENT — PAIN - FUNCTIONAL ASSESSMENT: PAIN_FUNCTIONAL_ASSESSMENT: 0-10

## 2022-02-02 NOTE — PROGRESS NOTES
Spoke to Leonel in lab, labs were received, Juanita MATHIAS  in [employer] made aware that they were sent.

## 2022-02-02 NOTE — PROGRESS NOTES
Pt brought from [facility] to  Small room. Time out completed at 1348. Pts dressing removed. Site cleansed with chlorhexadine. Pt draped with sterile towels & sutures removed. Lidocaine !% injected. Catheter cuff dissected & entire catheter removed with tip intact with no complications by Deejay Petty RT. Pressure held for several minutes. Guaze & tegaderm applied.  Report called to [facility]

## 2022-02-02 NOTE — PROGRESS NOTES
1435:  Discharge instructions discussed with pt, verbalized an understanding. Pt discharged to home alert and oriented with no c/o discomfort. Bandage in place to right upper chest with no bleeding or drainage present.

## 2022-03-11 ENCOUNTER — APPOINTMENT (OUTPATIENT)
Dept: CT IMAGING | Age: 57
End: 2022-03-11
Payer: MEDICARE

## 2022-03-11 ENCOUNTER — HOSPITAL ENCOUNTER (EMERGENCY)
Age: 57
Discharge: HOME | End: 2022-03-12
Payer: MEDICARE

## 2022-03-11 ENCOUNTER — APPOINTMENT (OUTPATIENT)
Dept: GENERAL RADIOLOGY | Age: 57
End: 2022-03-11
Payer: MEDICARE

## 2022-03-11 VITALS
TEMPERATURE: 98.9 F | WEIGHT: 240 LBS | OXYGEN SATURATION: 97 % | DIASTOLIC BLOOD PRESSURE: 105 MMHG | SYSTOLIC BLOOD PRESSURE: 137 MMHG | HEART RATE: 94 BPM | HEIGHT: 71 IN | RESPIRATION RATE: 16 BRPM | BODY MASS INDEX: 33.6 KG/M2

## 2022-03-11 DIAGNOSIS — J18.9 PNEUMONIA DUE TO INFECTIOUS ORGANISM, UNSPECIFIED LATERALITY, UNSPECIFIED PART OF LUNG: Primary | ICD-10-CM

## 2022-03-11 DIAGNOSIS — R10.9 ABDOMINAL PAIN, UNSPECIFIED ABDOMINAL LOCATION: ICD-10-CM

## 2022-03-11 LAB
ALBUMIN SERPL-MCNC: 3.7 GM/DL (ref 3.4–5)
ALP BLD-CCNC: 99 IU/L (ref 40–129)
ALT SERPL-CCNC: 7 U/L (ref 10–40)
ANION GAP SERPL CALCULATED.3IONS-SCNC: 13 MMOL/L (ref 4–16)
AST SERPL-CCNC: 13 IU/L (ref 15–37)
BASOPHILS ABSOLUTE: 0 K/CU MM
BASOPHILS RELATIVE PERCENT: 0.4 % (ref 0–1)
BILIRUB SERPL-MCNC: 0.4 MG/DL (ref 0–1)
BUN BLDV-MCNC: 11 MG/DL (ref 6–23)
CALCIUM SERPL-MCNC: 8.8 MG/DL (ref 8.3–10.6)
CHLORIDE BLD-SCNC: 100 MMOL/L (ref 99–110)
CO2: 25 MMOL/L (ref 21–32)
CREAT SERPL-MCNC: 0.8 MG/DL (ref 0.9–1.3)
DIFFERENTIAL TYPE: ABNORMAL
EOSINOPHILS ABSOLUTE: 0.2 K/CU MM
EOSINOPHILS RELATIVE PERCENT: 2.1 % (ref 0–3)
GFR AFRICAN AMERICAN: >60 ML/MIN/1.73M2
GFR NON-AFRICAN AMERICAN: >60 ML/MIN/1.73M2
GLUCOSE BLD-MCNC: 332 MG/DL (ref 70–99)
HCT VFR BLD CALC: 42.8 % (ref 42–52)
HEMOGLOBIN: 14 GM/DL (ref 13.5–18)
IMMATURE NEUTROPHIL %: 0.4 % (ref 0–0.43)
LIPASE: 31 IU/L (ref 13–60)
LYMPHOCYTES ABSOLUTE: 2 K/CU MM
LYMPHOCYTES RELATIVE PERCENT: 28.7 % (ref 24–44)
MCH RBC QN AUTO: 28 PG (ref 27–31)
MCHC RBC AUTO-ENTMCNC: 32.7 % (ref 32–36)
MCV RBC AUTO: 85.6 FL (ref 78–100)
MONOCYTES ABSOLUTE: 0.5 K/CU MM
MONOCYTES RELATIVE PERCENT: 6.8 % (ref 0–4)
NUCLEATED RBC %: 0 %
PDW BLD-RTO: 13.8 % (ref 11.7–14.9)
PLATELET # BLD: 225 K/CU MM (ref 140–440)
PMV BLD AUTO: 10 FL (ref 7.5–11.1)
POTASSIUM SERPL-SCNC: 3.9 MMOL/L (ref 3.5–5.1)
PRO-BNP: 365.7 PG/ML
RBC # BLD: 5 M/CU MM (ref 4.6–6.2)
SARS-COV-2, NAAT: NOT DETECTED
SEGMENTED NEUTROPHILS ABSOLUTE COUNT: 4.4 K/CU MM
SEGMENTED NEUTROPHILS RELATIVE PERCENT: 61.6 % (ref 36–66)
SODIUM BLD-SCNC: 138 MMOL/L (ref 135–145)
SOURCE: NORMAL
TOTAL IMMATURE NEUTOROPHIL: 0.03 K/CU MM
TOTAL NUCLEATED RBC: 0 K/CU MM
TOTAL PROTEIN: 6.7 GM/DL (ref 6.4–8.2)
TROPONIN T: <0.01 NG/ML
WBC # BLD: 7.1 K/CU MM (ref 4–10.5)

## 2022-03-11 PROCEDURE — 71046 X-RAY EXAM CHEST 2 VIEWS: CPT

## 2022-03-11 PROCEDURE — 85025 COMPLETE CBC W/AUTO DIFF WBC: CPT

## 2022-03-11 PROCEDURE — 83690 ASSAY OF LIPASE: CPT

## 2022-03-11 PROCEDURE — 6360000004 HC RX CONTRAST MEDICATION: Performed by: PHYSICIAN ASSISTANT

## 2022-03-11 PROCEDURE — 83880 ASSAY OF NATRIURETIC PEPTIDE: CPT

## 2022-03-11 PROCEDURE — 80053 COMPREHEN METABOLIC PANEL: CPT

## 2022-03-11 PROCEDURE — 6370000000 HC RX 637 (ALT 250 FOR IP): Performed by: PHYSICIAN ASSISTANT

## 2022-03-11 PROCEDURE — 74177 CT ABD & PELVIS W/CONTRAST: CPT

## 2022-03-11 PROCEDURE — 94640 AIRWAY INHALATION TREATMENT: CPT

## 2022-03-11 PROCEDURE — 84484 ASSAY OF TROPONIN QUANT: CPT

## 2022-03-11 PROCEDURE — 99284 EMERGENCY DEPT VISIT MOD MDM: CPT

## 2022-03-11 PROCEDURE — 2580000003 HC RX 258: Performed by: PHYSICIAN ASSISTANT

## 2022-03-11 PROCEDURE — 71275 CT ANGIOGRAPHY CHEST: CPT

## 2022-03-11 PROCEDURE — 87635 SARS-COV-2 COVID-19 AMP PRB: CPT

## 2022-03-11 RX ORDER — GUAIFENESIN/DEXTROMETHORPHAN 100-10MG/5
5 SYRUP ORAL ONCE
Status: COMPLETED | OUTPATIENT
Start: 2022-03-11 | End: 2022-03-11

## 2022-03-11 RX ORDER — ALBUTEROL SULFATE 90 UG/1
2 AEROSOL, METERED RESPIRATORY (INHALATION) ONCE
Status: COMPLETED | OUTPATIENT
Start: 2022-03-11 | End: 2022-03-11

## 2022-03-11 RX ORDER — HYDROCODONE BITARTRATE AND ACETAMINOPHEN 5; 325 MG/1; MG/1
1 TABLET ORAL ONCE
Status: DISCONTINUED | OUTPATIENT
Start: 2022-03-11 | End: 2022-03-11

## 2022-03-11 RX ORDER — OXYCODONE HYDROCHLORIDE AND ACETAMINOPHEN 5; 325 MG/1; MG/1
1 TABLET ORAL ONCE
Status: COMPLETED | OUTPATIENT
Start: 2022-03-11 | End: 2022-03-11

## 2022-03-11 RX ORDER — 0.9 % SODIUM CHLORIDE 0.9 %
1000 INTRAVENOUS SOLUTION INTRAVENOUS ONCE
Status: COMPLETED | OUTPATIENT
Start: 2022-03-11 | End: 2022-03-12

## 2022-03-11 RX ADMIN — SODIUM CHLORIDE 1000 ML: 9 INJECTION, SOLUTION INTRAVENOUS at 23:27

## 2022-03-11 RX ADMIN — OXYCODONE HYDROCHLORIDE AND ACETAMINOPHEN 1 TABLET: 5; 325 TABLET ORAL at 23:27

## 2022-03-11 RX ADMIN — IOPAMIDOL 90 ML: 755 INJECTION, SOLUTION INTRAVENOUS at 23:18

## 2022-03-11 RX ADMIN — GUAIFENESIN AND DEXTROMETHORPHAN 5 ML: 100; 10 SYRUP ORAL at 23:27

## 2022-03-11 RX ADMIN — ALBUTEROL SULFATE 2 PUFF: 90 AEROSOL, METERED RESPIRATORY (INHALATION) at 23:45

## 2022-03-11 ASSESSMENT — PAIN SCALES - GENERAL
PAINLEVEL_OUTOF10: 10
PAINLEVEL_OUTOF10: 10

## 2022-03-11 ASSESSMENT — PAIN DESCRIPTION - LOCATION: LOCATION: ABDOMEN

## 2022-03-11 ASSESSMENT — PAIN - FUNCTIONAL ASSESSMENT: PAIN_FUNCTIONAL_ASSESSMENT: 0-10

## 2022-03-11 ASSESSMENT — PAIN DESCRIPTION - PAIN TYPE: TYPE: ACUTE PAIN

## 2022-03-11 NOTE — ED PROVIDER NOTES
As provider-in-triage, I performed a medical screening history and physical exam on this patient. HISTORY OF PRESENT ILLNESS  Cydney Alcala is a 61 y.o. male presents to the emergency department with c/o chest pain, abdominal pain, and cough. He states his symptoms have been present for 1 week. He states it feels like pressure on his chest. He has a cough with white/green phlegm that is worse at night. He states he is also having ruq pain which may be related to his coughing. His pain is 10/10, aching and pressure like, and constant. Nothing has alleviated. Palpation exacerbates. He denies fever, chills, nausea, or vomiting. PHYSICAL EXAM  /84   Pulse 98   Temp 98.7 °F (37.1 °C) (Oral)   Resp 20   Ht 5' 11\" (1.803 m)   Wt 240 lb (108.9 kg)   SpO2 96%   BMI 33.47 kg/m²     On exam, the patient appears in no acute distress. Speech is clear. Breathing is unlabored. Moves all extremities    Comment: Please note this report has been produced using speech recognition software and may contain errors related to that system including errors in grammar, punctuation, and spelling, as well as words and phrases that may be inappropriate. If there are any questions or concerns please feel free to contact the dictating provider for clarification.          JULITA Rios - DEMARCO  03/11/22 [id number]

## 2022-03-12 PROCEDURE — 6370000000 HC RX 637 (ALT 250 FOR IP): Performed by: PHYSICIAN ASSISTANT

## 2022-03-12 RX ORDER — DOXYCYCLINE HYCLATE 100 MG
100 TABLET ORAL 2 TIMES DAILY
Qty: 14 TABLET | Refills: 0 | Status: SHIPPED | OUTPATIENT
Start: 2022-03-12 | End: 2022-03-19

## 2022-03-12 RX ORDER — GUAIFENESIN 600 MG/1
1200 TABLET, EXTENDED RELEASE ORAL 2 TIMES DAILY PRN
Qty: 40 TABLET | Refills: 0 | Status: SHIPPED | OUTPATIENT
Start: 2022-03-12 | End: 2022-03-22

## 2022-03-12 RX ORDER — DOXYCYCLINE HYCLATE 100 MG
100 TABLET ORAL ONCE
Status: COMPLETED | OUTPATIENT
Start: 2022-03-12 | End: 2022-03-12

## 2022-03-12 RX ADMIN — DOXYCYCLINE HYCLATE 100 MG: 100 TABLET, COATED ORAL at 00:27

## 2022-03-12 NOTE — ED NOTES
This RN attempted to discharge patient. Patient stating he is very upset with the physician overseeing his care and is requesting a second physician opinion. JAIME and attending MD notified.      Augusto Olivares RN  03/12/22 Gabby Radford RN  03/12/22 [id number]

## 2022-03-12 NOTE — ED PROVIDER NOTES
EMERGENCY DEPARTMENT ENCOUNTER      PCP: [id number]    Chief Complaint   Patient presents with    Cough     reports cough for a couple weeks    Abdominal Pain    Headache       This patient was not evaluated by the attending physician. I have independently evaluated this patient. HPI    Herb Padilla is a 61 y.o. male who presents with cough for the past week. Patient has had associated headache, chest pain or shortness of breath. Patient has history of asthma. Patient states he smokes marijuana. Patient has had associated wheezing and right-sided abdominal pain. Patient has history of diabetes. Patient denies fever, sore throat, vomiting or diarrhea. Patient denies any urinary symptoms. Patient denies lower extremity pain or swelling. Patient describes pain as pressure and worsens with deep inspiration.       REVIEW OF SYSTEMS    Constitutional:  Denies fever  HENT:  Denies sore throat or ear pain   Cardiovascular:  See HPI  Respiratory:  See HPI  GI:  See HPI  :  Denies any urinary symptoms   Musculoskeletal:  Denies lower extremity pain or swelling  Skin:  Denies rash  Neurologic:  Denies focal weakness or sensory changes   Lymphatic:  Denies swollen glands     All other review of systems are negative  See HPI and nursing notes for additional information     PAST MEDICAL AND SURGICAL HISTORY    Past Medical History:   Diagnosis Date    Absent finger     Left hand    Anxiety     Arthritis     Hands    Asthma     \"As a kid but outgrew this\" - no medications as of 2/21/20    Chronic back pain     Depression     Edema     Fibromyalgia     Headache(784.0)     Last: 2/19/20    Hernia, abdominal     History of difficult venous access 12/21/2021    No longer a candidate for bedside PICC placement, multiple failed attempts    Hx MRSA infection     positive culture 8/2014 from left foot    Nausea & vomiting     Neuropathy     Obesity, Class III, BMI 40-49.9 (morbid obesity) ([facility]     Osteomyelitis ([facility] [address] [facility]     hx finger    Poor circulation     Prolonged emergence from general anesthesia     Restless leg syndrome     Shortness of breath on exertion     6/14/2016- pt denies any sob with this interview    Type II or unspecified type diabetes mellitus with other specified manifestations, not stated as uncontrolled 4/8/2014    Type II or unspecified type diabetes mellitus without mention of complication, not stated as uncontrolled DX 2007    Follows with PCP    Ulcer of other part of foot 4/8/2014    'ulcer on left foot healed all up\"     Past Surgical History:   Procedure Laterality Date    COLONOSCOPY  1990's    Normal exam per pt    DEBRIDEMENT  8/25/2014    left foot    ENDOSCOPY, COLON, DIAGNOSTIC  06-    Normal exam per pt -     FINGER SURGERY  9-11-11    Right Index Finger    FINGER SURGERY  01-16-12    RIght Index Finger-Removal of loose body, Reconstruction of Mallet Finger    FOOT DEBRIDEMENT Left 6/10/2019    FOOT DEBRIDEMENT INCISION AND DRAINAGE performed by Rich Huizar MD at [address] Right 06/01/2018    I&D right foot    HEMORRHOID SURGERY  2008    IR TUNNELED CATHETER PLACEMENT GREATER THAN 5 YEARS  12/21/2021    IR TUNNELED CATHETER PLACEMENT GREATER THAN 5 YEARS 12/21/2021 [facility] SPECIAL PROCEDURES    OTHER SURGICAL HISTORY Left 10/22/2013    I & D left foot    OTHER SURGICAL HISTORY  07/07/2017    I&D fingers X2 left hand.  I&D left forearm    OTHER SURGICAL HISTORY Left 07/08/2017    left hand debridement, left forearm incision and drainage     OTHER SURGICAL HISTORY Left 07/10/2017    Fingers I&D    OTHER SURGICAL HISTORY Left 07/14/2017    middle finger amputation revision    ROTATOR CUFF REPAIR  Last Done 7/18/2011    Left, Done Four Times    UPPER GASTROINTESTINAL ENDOSCOPY N/A 2/21/2020    EGD BIOPSY performed by Claire Romero MD at [address]    Current Outpatient Rx Medication Sig Dispense Refill    doxycycline hyclate (VIBRA-TABS) 100 MG tablet Take 1 tablet by mouth 2 times daily for 7 days 14 tablet 0    guaiFENesin (MUCINEX) 600 MG extended release tablet Take 2 tablets by mouth 2 times daily as needed for Congestion 40 tablet 0    pregabalin (LYRICA) 75 MG capsule Take 75 mg by mouth 2 times daily.  atorvastatin (LIPITOR) 10 MG tablet Take 10 mg by mouth daily      acetaminophen (TYLENOL) 500 MG tablet Take 2 tablets by mouth 3 times daily for 10 days 60 tablet 0    tamsulosin (FLOMAX) 0.4 MG capsule Take 1 capsule by mouth daily for 7 doses 7 capsule 0    dicyclomine (BENTYL) 10 MG capsule Take 2 capsules by mouth 4 times daily as needed (abdominal pain) 100 capsule 3    ondansetron (ZOFRAN) 4 MG tablet Take 4 mg by mouth every 8 hours as needed for Nausea or Vomiting      Dulaglutide (TRULICITY SC) Inject 1.5 mLs into the skin every 7 days       pantoprazole (PROTONIX) 40 MG tablet Take 1 tablet by mouth 2 times daily for 14 days 28 tablet 0    ondansetron (ZOFRAN ODT) 4 MG disintegrating tablet Take 1 tablet by mouth every 6 hours (Patient not taking: Reported on 2/20/2020) 10 tablet 0    escitalopram (LEXAPRO) 10 MG tablet Take 10 mg by mouth daily         ALLERGIES    Allergies   Allergen Reactions    Cantaloupe (Diagnostic) Hives    Ceftriaxone Hives     Tolerated Zosyn well 1/2/2019     Robaxin [Methocarbamol] Swelling    Rocephin [Ceftriaxone Sodium-Lidocaine] Hives     Noted on 10/26 - developed extremity swelling and hives. No anaphylaxis.  Sulfa Antibiotics Hives     Noted on 10/26 - developed extremity swelling and hives. No anaphylaxis.     Aspirin Nausea Only    Nsaids Nausea Only    Other Nausea Only     Darvocet- Gi distress  napsalate/acetaminophen    Propoxyphene Nausea And Vomiting    Toradol [Ketorolac Tromethamine] Other (See Comments)     Sweats        SOCIAL AND FAMILY HISTORY    Social History     Socioeconomic History  Marital status:      Spouse name: None    Number of children: 4    Years of education: None    Highest education level: None   Occupational History    None   Tobacco Use    Smoking status: Former Smoker     Types: Cigarettes     Start date:      Quit date:      Years since quittin.2    Smokeless tobacco: Former User     Types: Snuff   Vaping Use    Vaping Use: Never used   Substance and Sexual Activity    Alcohol use: Yes     Comment: Rarely - 1-2 a year    Drug use: Yes     Frequency: 7.0 times per week     Types: Marijuana Juanita Echeverria)     Comment: occ    Sexual activity: None   Other Topics Concern    None   Social History Narrative    None     Social Determinants of Health     Financial Resource Strain:     Difficulty of Paying Living Expenses: Not on file   Food Insecurity:     Worried About Running Out of Food in the Last Year: Not on file    Ludivina of Food in the Last Year: Not on file   Transportation Needs:     Lack of Transportation (Medical): Not on file    Lack of Transportation (Non-Medical):  Not on file   Physical Activity:     Days of Exercise per Week: Not on file    Minutes of Exercise per Session: Not on file   Stress:     Feeling of Stress : Not on file   Social Connections:     Frequency of Communication with Friends and Family: Not on file    Frequency of Social Gatherings with Friends and Family: Not on file    Attends Anglican Services: Not on file    Active Member of [address] [place] [address] or Organizations: Not on file    Attends Club or Organization Meetings: Not on file    Marital Status: Not on file   Intimate Partner Violence:     Fear of Current or Ex-Partner: Not on file    Emotionally Abused: Not on file    Physically Abused: Not on file    Sexually Abused: Not on file   Housing Stability:     Unable to Pay for Housing in the Last Year: Not on file    Number of [place] in the Last Year: Not on file    Unstable Housing in the Last Year: Not on file     Family History   Problem Relation Age of Onset    Kidney Disease Mother         \"Kidney Failure, On Dialysis\"    Early Death Mother 39    Diabetes Father         \"Type II\"         PHYSICAL EXAM    VITAL SIGNS: BP (!) 137/105   Pulse 94   Temp 98.9 °F (37.2 °C) (Oral)   Resp 16   Ht 5' 11\" (1.803 m)   Wt 240 lb (108.9 kg)   SpO2 97%   BMI 33.47 kg/m²    Constitutional:  Well developed, Well nourished  HENT:  Normocephalic, Atraumatic, PERRL. EOMI. Neck/Lymphatics: supple, no JVD, no swollen nodes  Cardiovascular:  Normal heart rate, Normal rhythm  Respiratory:  Nonlabored breathing. Decreased lung sounds bilaterally, expiratory wheezes noted  Abdomen: Bowel sounds normal, Soft, right-sided abdominal tenderness. Right-sided periumbilical hernia. Musculoskeletal: No edema, No tenderness, No cyanosis  Integument:  Warm, Dry  Neurologic:  Alert & oriented , No focal deficits noted. Sensation intact.   Psychiatric:  Affect normal, Mood normal.       Labs:  Results for orders placed or performed during the hospital encounter of 03/11/22   COVID-19, Rapid    Specimen: Nasopharyngeal   Result Value Ref Range    Source UNKNOWN     SARS-CoV-2, NAAT NOT DETECTED NOT DETECTED   CBC with Auto Differential   Result Value Ref Range    WBC 7.1 4.0 - 10.5 K/CU MM    RBC 5.00 4.6 - 6.2 M/CU MM    Hemoglobin 14.0 13.5 - 18.0 GM/DL    Hematocrit 42.8 42 - 52 %    MCV 85.6 78 - 100 FL    MCH 28.0 27 - 31 PG    MCHC 32.7 32.0 - 36.0 %    RDW 13.8 11.7 - 14.9 %    Platelets 415 862 - 215 K/CU MM    MPV 10.0 7.5 - 11.1 FL    Differential Type AUTOMATED DIFFERENTIAL     Segs Relative 61.6 36 - 66 %    Lymphocytes % 28.7 24 - 44 %    Monocytes % 6.8 (H) 0 - 4 %    Eosinophils % 2.1 0 - 3 %    Basophils % 0.4 0 - 1 %    Segs Absolute 4.4 K/CU MM    Lymphocytes Absolute 2.0 K/CU MM    Monocytes Absolute 0.5 K/CU MM    Eosinophils Absolute 0.2 K/CU MM    Basophils Absolute 0.0 K/CU MM    Nucleated RBC % 0.0 %    Total Nucleated RBC 0.0 K/CU MM    Total Immature Neutrophil 0.03 K/CU MM    Immature Neutrophil % 0.4 0 - 0.43 %   Comprehensive Metabolic Panel w/ Reflex to MG   Result Value Ref Range    Sodium 138 135 - 145 MMOL/L    Potassium 3.9 3.5 - 5.1 MMOL/L    Chloride 100 99 - 110 mMol/L    CO2 25 21 - 32 MMOL/L    BUN 11 6 - 23 MG/DL    CREATININE 0.8 (L) 0.9 - 1.3 MG/DL    Glucose 332 (H) 70 - 99 MG/DL    Calcium 8.8 8.3 - 10.6 MG/DL    Albumin 3.7 3.4 - 5.0 GM/DL    Total Protein 6.7 6.4 - 8.2 GM/DL    Total Bilirubin 0.4 0.0 - 1.0 MG/DL    ALT 7 (L) 10 - 40 U/L    AST 13 (L) 15 - 37 IU/L    Alkaline Phosphatase 99 40 - 129 IU/L    GFR Non-African American >60 >60 mL/min/1.73m2    GFR African American >60 >60 mL/min/1.73m2    Anion Gap 13 4 - 16   Lipase   Result Value Ref Range    Lipase 31 13 - 60 IU/L   Brain Natriuretic Peptide   Result Value Ref Range    Pro-.7 (H) <300 PG/ML   Troponin   Result Value Ref Range    Troponin T <0.010 <0.01 NG/ML           EKG      EKG Interpretation  Please see ED physician's note for EKG interpretation        RADIOLOGY    CT ABDOMEN PELVIS W IV CONTRAST Additional Contrast? None   Final Result   No acute finding in the abdomen or pelvis. Mild gallbladder wall thickening versus artifact related to motion. If there   is clinical concern for gallbladder disease, follow-up gallbladder ultrasound   may be helpful. Unchanged bilateral L5 spondylolysis with grade 1 spondylolisthesis at L5-S1. CTA PULMONARY W CONTRAST   Final Result   No evidence of an acute pulmonary embolus. No acute cardiopulmonary disease. There are 2 stable left lower lobe   pulmonary nodules consistent with a benign etiology, likely noncalcified or   partially calcified granulomas. There is mild bronchial wall thickening. Minimal airspace disease, likely   atelectasis. XR CHEST (2 VW)   Preliminary Result   Stable chest.  No acute cardiopulmonary process.                    ED COURSE & MEDICAL DECISION MAKING      Patient presents as above. Patient provided IV fluids, cough medication and Percocet. CBC within normal limits. CMP shows glucose of 332 otherwise within normal limits. Lipase 31. Troponin negative. Chest x-ray shows no acute process. See physician note for EKG reading. Rapid Covid is negative. . CTA pulmonary with no evidence of acute PE, stable left lower lobe pulmonary nodules, mild bronchial wall thickening minimal airspace disease. CT abdomen pelvis with IV contrast shows no acute abnormality, mild gallbladder wall thickening versus artifact related to motion. I discussed labs and imaging with patient day. Patient has negative Gonzalez sign and I do not believe he has acute cholecystitis. I recommend that patient stop smoking. Patient will be started on doxycycline for the minimal airspace disease. Patient provided cough medication. I recommend ibuprofen and Tylenol as needed for pain. I recommend rest and fluids. Patient states he has inhaler at home. Due to hyperglycemia we will hold off on prednisone burst currently. I recommend follow-up with primary care provider in 2 to 3 days for recheck. Patient was placed up for discharge and requested evaluation by physician, patient evaluated by Dr. Isaac Klein as well, see his note for details. Clinical  IMPRESSION    1. Pneumonia due to infectious organism, unspecified laterality, unspecified part of lung    2. Abdominal pain, unspecified abdominal location          I discussed with patient the importance of return to emergency department immediately if new or worsening symptoms develop. Comment: Please note this report has been produced using speech recognition software and may contain errors related to that system including errors in grammar, punctuation, and spelling, as well as words and phrases that may be inappropriate.  If there are any questions or concerns please feel free to contact the dictating provider for clarification.             Franki Maldonado PA-C  03/12/22 9945

## 2022-03-12 NOTE — ED NOTES
Patient ambulated to restroom independently. Urine specimen provided.      Theresa Ruiz RN  03/12/22 0000       Theresa Ruiz RN  03/12/22 0001

## 2022-06-15 NOTE — PLAN OF CARE
Problem: Pain:  Description: Pain management should include both nonpharmacologic and pharmacologic interventions.   Goal: Pain level will decrease  Description: Pain level will decrease  Outcome: Ongoing  Goal: Control of acute pain  Description: Control of acute pain  Outcome: Ongoing  Goal: Control of chronic pain  Description: Control of chronic pain  Outcome: Ongoing Carlos Ku MD

## 2023-01-31 NOTE — DISCHARGE SUMMARY
Orestes Morales 1965 [id number]  PCP:  Nabila Killian date: 6/10/2019  Admitting Physician: Aman Rowley MD    Discharge date: 6/14/2019 Discharge Physician: Aman Rowley MD      Reason for admission:   Chief Complaint   Patient presents with    Wound Check     left foot infection    Fever    Emesis    Chills     Present on Admission:   Diabetic foot infection [facility])       Discharge Diagnoses & Hospital Course[de-identified]           Ascending left foot infection in setting of DMII associated with neuropathy and prior puncture wound  - ED d/w surgery to eval for necrotizing infection  - underwent urgent I&D 6/10  - consult ID for antibiotic management - now on zosyn, zyvox. - IV pain, IV anti-emetic - attempt to transition to PO agents   - blood cx negative  - surgical wound cx with B/A-strep, MRSA, proteus-ESBL  - On 6/12, D/w ID for MRI of the left foot completed - no deeper infection / osteo noted  - d/w ID, will plan to complete 14 days of ertapenem and zyvox. Interval ID and Wound care f/u next wed in [place] wound care clinic     DMII c/b diabetic neuropathy  - resume home med  - f/u PCP for continue DMII care    Exam:   Wt Readings from Last 3 Encounters:   06/10/19 260 lb (117.9 kg)   06/03/19 260 lb (117.9 kg)   05/15/19 283 lb (128.4 kg)       Blood pressure 108/76, pulse 88, temperature 98 °F (36.7 °C), temperature source Oral, resp. rate 16, height 5' 9\" (1.753 m), weight 260 lb (117.9 kg), SpO2 98 %. General - AAO x 3  Psych - Appropriate affect/speech. No agitation  Eyes - Eye lids intact. No scleral icterus  ENT - Lips wnl. External ear clear/dry/intact. No thyromegaly on inspection  Neuro - No gross peripheral or central neuro deficits on inspection  Heart - Sinus. RRR. S1 and S2 present. No elevated JVD appreciated  Lung - Adequate air entry b/l, No crackles/wheezes appreciated  GI - Soft. No guarding/rigidity.  BS+   - No CVA/suprapubic tenderness or palpable bladder distension  Skin - Left open wound along the plantar surface of left forefoot associated with +2 edema and ascending erythema significantly improved. Left foot and Right big toe bandaged             Significant Diagnostic Studies:   CBC:   Recent Labs     06/12/19  0613 06/13/19  0726 06/14/19  0721   WBC 6.7 5.8 7.1   HGB 11.1* 12.2* 13.0*    206 281     WBC   Date/Time Value Ref Range Status   06/14/2019 07:21 AM 7.1 4.0 - 10.5 K/CU MM Final   06/13/2019 07:26 AM 5.8 4.0 - 10.5 K/CU MM Final   06/12/2019 06:13 AM 6.7 4.0 - 10.5 K/CU MM Final     Hemoglobin   Date/Time Value Ref Range Status   06/14/2019 07:21 AM 13.0 (L) 13.5 - 18.0 GM/DL Final   06/13/2019 07:26 AM 12.2 (L) 13.5 - 18.0 GM/DL Final   06/12/2019 06:13 AM 11.1 (L) 13.5 - 18.0 GM/DL Final     Platelets   Date/Time Value Ref Range Status   06/14/2019 07:21  140 - 440 K/CU MM Final   06/13/2019 07:26  140 - 440 K/CU MM Final   06/12/2019 06:13  140 - 440 K/CU MM Final    CMP:  Recent Labs     06/12/19  0613 06/13/19  0726 06/14/19  0721    136 132*   K 4.2 4.4 4.5    99 96*   CO2 24 28 25   BUN 9 9 10   CREATININE 0.6* 0.7* 0.8*   CALCIUM 8.4 8.9 9.4     Troponin: No results for input(s): TROPONINI in the last 72 hours. BNP: No results for input(s): BNP in the last 72 hours. Lipids: No results for input(s): CHOL, HDL in the last 72 hours. Invalid input(s): LDLCALCU  ABGs:No results for input(s): PH, ILT2YOP, PO2ART, BE, WQU3YCK, CO2CT, O2SAT, LABCARB in the last 72 hours. Invalid input(s): METHGBART    Glucose:   Recent Labs     06/13/19  2349 06/14/19  0903 06/14/19  1229   POCGLU 155* 233* 303*     Magnesium:   Recent Labs     06/12/19  0613 06/13/19  0726 06/14/19  0721   MG 2.0 1.8 1.9     Phosphorus:No results for input(s): PHOS in the last 72 hours. INR: No results for input(s): INR in the last 72 hours.       Patient Instructions:   Carlos Knight   Home Medication Instructions CWU:[id number] Printed on:06/14/19 1244   Medication Information                      Dulaglutide (TRULICITY) 1.59 GB/8.8EX SOPN  Inject 0.75 mg into the skin once a week             ertapenem (INVANZ) infusion  Infuse 1,000 mg intravenously daily Compound per protocol. lactobacillus (CULTURELLE) capsule  Take 1 capsule by mouth 2 times daily             linezolid (ZYVOX) 600 MG tablet  Take 1 tablet by mouth every 12 hours for 12 days             metFORMIN (GLUCOPHAGE) 1000 MG tablet  Take 1 tablet by mouth 2 times daily (with meals)             ondansetron (ZOFRAN ODT) 4 MG disintegrating tablet  Take 1 tablet by mouth every 6 hours             oxyCODONE-acetaminophen (PERCOCET)  MG per tablet  Take 1 tablet by mouth every 6 hours as needed for Pain for up to 20 doses. Intended supply: 30 days             pregabalin (LYRICA) 100 MG capsule  Take 1 capsule by mouth 2 times daily for 30 days. sertraline (ZOLOFT) 50 MG tablet  Take 50 mg by mouth daily             tiZANidine (ZANAFLEX) 4 MG tablet  Take 4 mg by mouth nightly as needed             traZODone (DESYREL) 100 MG tablet  Take 100 mg by mouth nightly as needed                   Code Status: Full Code     Consults:   IP CONSULT TO GENERAL SURGERY  IP CONSULT TO PHARMACY  IP CONSULT TO INFECTIOUS DISEASES  IP CONSULT TO HOME CARE NEEDS  IP CONSULT TO INFECTIOUS DISEASES    Diet: diabetic diet    Activity: activity as tolerated   Work:    Discharged Condition: fair    Prognosis: Fair    Disposition: home      Follow-up with     Follow-up With  Details  Why  [address] [facility] [address] [place] [address]   Linwood Ruby MD  Schedule an appointment as soon as possible for a visit  Skin and soft tissue infection - please call for appt  [address]  [address]  [phone]   [facility] on 6/19/2019  call to follow up next wednesday [place] [facility]  [facility] [place] [address]  [phone]       Discharge Physician Signed:   Asha Moran  Hospitalist, Internal medicine  6/14/2019 at 12:44 PM    The patient was seen and examined on day of discharge and this discharge summary is in conjunction with any daily progress note from day of discharge.   Time spent on discharge in the examination, evaluation, counseling and review of medications and discharge plan: <30 minutes    Please forward this discharge summary to patient's PCP 18-Dec-2022

## 2024-02-19 ENCOUNTER — HOSPITAL ENCOUNTER (EMERGENCY)
Age: 59
Discharge: HOME | End: 2024-02-19
Attending: EMERGENCY MEDICINE
Payer: MEDICARE

## 2024-02-19 ENCOUNTER — APPOINTMENT (OUTPATIENT)
Dept: GENERAL RADIOLOGY | Age: 59
End: 2024-02-19
Payer: MEDICARE

## 2024-02-19 VITALS
BODY MASS INDEX: 39.2 KG/M2 | OXYGEN SATURATION: 98 % | HEIGHT: 71 IN | TEMPERATURE: 97.8 F | RESPIRATION RATE: 19 BRPM | SYSTOLIC BLOOD PRESSURE: 159 MMHG | HEART RATE: 109 BPM | WEIGHT: 280 LBS | DIASTOLIC BLOOD PRESSURE: 86 MMHG

## 2024-02-19 DIAGNOSIS — S46.911A STRAIN OF RIGHT SHOULDER, INITIAL ENCOUNTER: ICD-10-CM

## 2024-02-19 DIAGNOSIS — S80.01XA CONTUSION OF RIGHT KNEE, INITIAL ENCOUNTER: Primary | ICD-10-CM

## 2024-02-19 DIAGNOSIS — S40.011A CONTUSION OF RIGHT SHOULDER, INITIAL ENCOUNTER: ICD-10-CM

## 2024-02-19 DIAGNOSIS — S83.91XA SPRAIN OF RIGHT KNEE, UNSPECIFIED LIGAMENT, INITIAL ENCOUNTER: ICD-10-CM

## 2024-02-19 PROCEDURE — 99283 EMERGENCY DEPT VISIT LOW MDM: CPT

## 2024-02-19 PROCEDURE — 73030 X-RAY EXAM OF SHOULDER: CPT

## 2024-02-19 PROCEDURE — 6370000000 HC RX 637 (ALT 250 FOR IP): Performed by: EMERGENCY MEDICINE

## 2024-02-19 PROCEDURE — 73562 X-RAY EXAM OF KNEE 3: CPT

## 2024-02-19 RX ORDER — CYCLOBENZAPRINE HCL 10 MG
10 TABLET ORAL 3 TIMES DAILY PRN
Qty: 21 TABLET | Refills: 0 | Status: SHIPPED | OUTPATIENT
Start: 2024-02-19 | End: 2024-02-29

## 2024-02-19 RX ORDER — CYCLOBENZAPRINE HCL 10 MG
10 TABLET ORAL ONCE
Status: COMPLETED | OUTPATIENT
Start: 2024-02-19 | End: 2024-02-19

## 2024-02-19 RX ORDER — HYDROCODONE BITARTRATE AND ACETAMINOPHEN 5; 325 MG/1; MG/1
1 TABLET ORAL EVERY 6 HOURS PRN
Qty: 6 TABLET | Refills: 0 | Status: SHIPPED | OUTPATIENT
Start: 2024-02-19 | End: 2024-02-22

## 2024-02-19 RX ORDER — OXYCODONE HYDROCHLORIDE 5 MG/1
10 TABLET ORAL ONCE
Status: COMPLETED | OUTPATIENT
Start: 2024-02-19 | End: 2024-02-19

## 2024-02-19 RX ADMIN — CYCLOBENZAPRINE 10 MG: 10 TABLET, FILM COATED ORAL at 02:46

## 2024-02-19 RX ADMIN — OXYCODONE HYDROCHLORIDE 10 MG: 5 TABLET ORAL at 02:46

## 2024-02-19 ASSESSMENT — PAIN DESCRIPTION - LOCATION: LOCATION: KNEE

## 2024-02-19 ASSESSMENT — PAIN SCALES - GENERAL: PAINLEVEL_OUTOF10: 10

## 2024-02-19 ASSESSMENT — PAIN DESCRIPTION - DESCRIPTORS: DESCRIPTORS: THROBBING

## 2024-02-19 ASSESSMENT — PAIN DESCRIPTION - ORIENTATION: ORIENTATION: RIGHT

## 2024-02-19 NOTE — ED TRIAGE NOTES
Patient presents to the ED with complaints of a fall and knee pain. Patient states he did not hit his head, denies blood thinners. Patient states he was taking his trash out when he fell and landed on his right knee. Patient rates his pain 10/10, throbbing.

## 2024-02-19 NOTE — ED PROVIDER NOTES
medial aspect of the right knee with decreased range of motion to flexion secondary to pain.  Negative anterior drawer testing.  2+ popliteal, dorsalis pedis, posterior tibial pulses to right lower extremity.  Tenderness to palpation of the lateral and posterior aspect of the right shoulder with decreased range of motion to abduction secondary to pain.  2+ radial and ulnar pulses of the right upper extremity with 5/5  strength testing.  Neurologic: Alert & oriented x 3, GCS 15, normal motor function, Normal sensory function, CN II-XII grossly intact as tested, No focal deficits noted.   Psychiatric: Affect normal, Judgment normal, Mood normal.     RADIOLOGY  Labs Reviewed - No data to display  I personally reviewed the images. The radiologist's interpretation reveals:  Last Imaging results   XR KNEE RIGHT (3 VIEWS)   Final Result   No acute osseous abnormality of the right knee.         XR SHOULDER RIGHT (MIN 2 VIEWS)   Final Result   No acute osseous abnormality of the right shoulder.             MEDS GIVEN IN ED:  Medications   oxyCODONE (ROXICODONE) immediate release tablet 10 mg (10 mg Oral Given 2/19/24 0246)   cyclobenzaprine (FLEXERIL) tablet 10 mg (10 mg Oral Given 2/19/24 0246)     COURSE & MEDICAL DECISION MAKING  58-year-old male presents emergency department with complaints of right knee pain and shoulder pain after mechanical fall on the ice.  Initial vital signs demonstrate elevated blood pressure of 159/86 and mild tachycardia heart rate of 109.  On exam he has tenderness to palpation to anterior medial aspect of the right knee with overlying abrasion of the anterior knee.  He has decreased active motion to flexion of the right knee secondary to pain.  Right lower extremity is neurovascular intact.  Patient also with pain to palpation of the lateral and posterior aspect of the right shoulder with decreased range of motion to abduction secondary to pain.  Right upper extremity is neurovascular

## 2024-05-14 ENCOUNTER — APPOINTMENT (OUTPATIENT)
Dept: GENERAL RADIOLOGY | Age: 59
End: 2024-05-14
Payer: MEDICARE

## 2024-05-14 ENCOUNTER — HOSPITAL ENCOUNTER (EMERGENCY)
Age: 59
Discharge: HOME | End: 2024-05-14
Attending: EMERGENCY MEDICINE
Payer: MEDICARE

## 2024-05-14 VITALS
TEMPERATURE: 97.8 F | SYSTOLIC BLOOD PRESSURE: 110 MMHG | HEART RATE: 73 BPM | RESPIRATION RATE: 16 BRPM | DIASTOLIC BLOOD PRESSURE: 82 MMHG | OXYGEN SATURATION: 97 %

## 2024-05-14 DIAGNOSIS — L97.529 DIABETIC ULCER OF LEFT FOOT ASSOCIATED WITH TYPE 2 DIABETES MELLITUS, UNSPECIFIED PART OF FOOT, UNSPECIFIED ULCER STAGE (HCC): Primary | ICD-10-CM

## 2024-05-14 DIAGNOSIS — E11.621 DIABETIC ULCER OF LEFT FOOT ASSOCIATED WITH TYPE 2 DIABETES MELLITUS, UNSPECIFIED PART OF FOOT, UNSPECIFIED ULCER STAGE (HCC): Primary | ICD-10-CM

## 2024-05-14 DIAGNOSIS — R30.0 DYSURIA: ICD-10-CM

## 2024-05-14 LAB
BACTERIA: NEGATIVE /HPF
BILIRUBIN, URINE: NEGATIVE MG/DL
BLOOD, URINE: ABNORMAL
CLARITY: CLEAR
COLOR: YELLOW
GLUCOSE URINE: >1000 MG/DL
KETONES, URINE: NEGATIVE MG/DL
LEUKOCYTE ESTERASE, URINE: NEGATIVE
MUCUS: ABNORMAL HPF
NITRITE URINE, QUANTITATIVE: NEGATIVE
PH, URINE: 5.5 (ref 5–8)
PROTEIN UA: NEGATIVE MG/DL
RBC URINE: 1 /HPF (ref 0–3)
SPECIFIC GRAVITY UA: 1.02 (ref 1–1.03)
SQUAMOUS EPITHELIAL: <1 /HPF
TRICHOMONAS: ABNORMAL /HPF
UROBILINOGEN, URINE: 0.2 MG/DL (ref 0.2–1)
WBC UA: 14 /HPF (ref 0–2)

## 2024-05-14 PROCEDURE — 87086 URINE CULTURE/COLONY COUNT: CPT

## 2024-05-14 PROCEDURE — 99284 EMERGENCY DEPT VISIT MOD MDM: CPT

## 2024-05-14 PROCEDURE — 73630 X-RAY EXAM OF FOOT: CPT

## 2024-05-14 PROCEDURE — 6370000000 HC RX 637 (ALT 250 FOR IP): Performed by: EMERGENCY MEDICINE

## 2024-05-14 PROCEDURE — 81001 URINALYSIS AUTO W/SCOPE: CPT

## 2024-05-14 RX ORDER — DOXYCYCLINE HYCLATE 100 MG
100 TABLET ORAL ONCE
Status: COMPLETED | OUTPATIENT
Start: 2024-05-14 | End: 2024-05-14

## 2024-05-14 RX ORDER — PREGABALIN 75 MG/1
75 CAPSULE ORAL 2 TIMES DAILY
Qty: 20 CAPSULE | Refills: 0 | Status: ON HOLD | OUTPATIENT
Start: 2024-05-14 | End: 2024-05-24

## 2024-05-14 RX ORDER — GINSENG 100 MG
CAPSULE ORAL ONCE
Status: COMPLETED | OUTPATIENT
Start: 2024-05-14 | End: 2024-05-14

## 2024-05-14 RX ORDER — HYDROCODONE BITARTRATE AND ACETAMINOPHEN 5; 325 MG/1; MG/1
1 TABLET ORAL ONCE
Status: COMPLETED | OUTPATIENT
Start: 2024-05-14 | End: 2024-05-14

## 2024-05-14 RX ADMIN — HYDROCODONE BITARTRATE AND ACETAMINOPHEN 1 TABLET: 5; 325 TABLET ORAL at 04:17

## 2024-05-14 RX ADMIN — BACITRACIN: 500 OINTMENT TOPICAL at 05:21

## 2024-05-14 RX ADMIN — DOXYCYCLINE HYCLATE 100 MG: 100 TABLET, COATED ORAL at 04:17

## 2024-05-14 ASSESSMENT — PAIN DESCRIPTION - LOCATION
LOCATION: FOOT
LOCATION: FOOT

## 2024-05-14 ASSESSMENT — PAIN DESCRIPTION - ORIENTATION
ORIENTATION: LEFT
ORIENTATION: LEFT

## 2024-05-14 ASSESSMENT — PAIN DESCRIPTION - DESCRIPTORS
DESCRIPTORS: THROBBING
DESCRIPTORS: THROBBING

## 2024-05-14 ASSESSMENT — PAIN SCALES - GENERAL
PAINLEVEL_OUTOF10: 8
PAINLEVEL_OUTOF10: 9

## 2024-05-14 NOTE — DISCHARGE INSTRUCTIONS
Follow-up with the wound clinic for further evaluation treatment and management of diabetic foot ulcer.  Call for an appointment  Take doxycycline antibiotic as prescribed and directed  Keep the wound clean and dry  Take Tylenol alternate with Motrin for any pain aches and fevers  Return to the emergency department immediately any pain fever chills nausea vomiting any worsening symptoms.

## 2024-05-14 NOTE — ED PROVIDER NOTES
Emergency Department Encounter    Patient: Toi Briones  MRN: [id number]  : 1965  Date of Evaluation: 2024  ED Provider:  Chelsea Laureano DO    Triage Chief Complaint:   Foot Pain (For couple of days L foot) and Dysuria (X1 month. Given meds by DR irene cortes )    [place]:  Toi Briones is a 58 y.o. male that presents ***    ROS - see HPI, below listed is current ROS at time of my eval:  General:  No fevers, no chills, no weakness  Eyes:  No recent vison changes, no discharge  ENT:  No sore throat, no nasal congestion, no hearing changes  Cardiovascular:  No chest pain, no palpitations  Respiratory:  No shortness of breath, no cough, no wheezing  Gastrointestinal:  No pain, no nausea, no vomiting, no diarrhea  Musculoskeletal:  No muscle pain, no joint pain  Skin:  No rash, no pruritis, no easy bruising  Neurologic:  No speech problems, no headache, no extremity numbness, no extremity tingling, no extremity weakness  Psychiatric:  No anxiety  Genitourinary:  No dysuria, no hematuria  Endocrine:  No unexpected weight gain, no unexpected weight loss  Extremities:  no edema, no pain    Past Medical History:   Diagnosis Date    Absent finger     Left hand    Anxiety     Arthritis     Hands    Asthma     \"As a kid but outgrew this\" - no medications as of 20    Chronic back pain     Depression     Edema     Fibromyalgia     Headache(784.0)     Last: 20    Hernia, abdominal     History of difficult venous access 2021    No longer a candidate for bedside PICC placement, multiple failed attempts    Hx MRSA infection     positive culture 2014 from left foot    Nausea & vomiting     Neuropathy     Obesity, Class III, BMI 40-49.9 (morbid obesity) (HCC)     Osteomyelitis (HCC)     hx finger    Poor circulation     Prolonged emergence from general anesthesia     Restless leg syndrome     Shortness of breath on exertion     2016- pt denies any sob with this interview    Type II or

## 2024-05-15 LAB
CULTURE: NORMAL
Lab: NORMAL
SPECIMEN: NORMAL

## 2024-05-18 PROCEDURE — 96374 THER/PROPH/DIAG INJ IV PUSH: CPT

## 2024-05-18 PROCEDURE — 99285 EMERGENCY DEPT VISIT HI MDM: CPT

## 2024-05-19 ENCOUNTER — APPOINTMENT (OUTPATIENT)
Dept: ULTRASOUND IMAGING | Age: 59
DRG: 617 | End: 2024-05-19
Payer: MEDICARE

## 2024-05-19 ENCOUNTER — HOSPITAL ENCOUNTER (INPATIENT)
Age: 59
LOS: 9 days | Discharge: HOME HEALTH | DRG: 617 | End: 2024-05-28
Attending: STUDENT IN AN ORGANIZED HEALTH CARE EDUCATION/TRAINING PROGRAM | Admitting: STUDENT IN AN ORGANIZED HEALTH CARE EDUCATION/TRAINING PROGRAM
Payer: MEDICARE

## 2024-05-19 ENCOUNTER — APPOINTMENT (OUTPATIENT)
Dept: GENERAL RADIOLOGY | Age: 59
DRG: 617 | End: 2024-05-19
Payer: MEDICARE

## 2024-05-19 DIAGNOSIS — L97.529 DIABETIC ULCER OF LEFT FOOT ASSOCIATED WITH TYPE 2 DIABETES MELLITUS, UNSPECIFIED PART OF FOOT, UNSPECIFIED ULCER STAGE (HCC): ICD-10-CM

## 2024-05-19 DIAGNOSIS — E11.621 TYPE 2 DIABETES MELLITUS WITH LEFT DIABETIC FOOT ULCER (HCC): ICD-10-CM

## 2024-05-19 DIAGNOSIS — L03.116 CELLULITIS OF LEFT LOWER EXTREMITY: ICD-10-CM

## 2024-05-19 DIAGNOSIS — E11.621 DIABETIC ULCER OF LEFT FOOT ASSOCIATED WITH TYPE 2 DIABETES MELLITUS, UNSPECIFIED PART OF FOOT, UNSPECIFIED ULCER STAGE (HCC): ICD-10-CM

## 2024-05-19 DIAGNOSIS — M86.9 OSTEOMYELITIS OF LEFT FOOT, UNSPECIFIED TYPE (HCC): ICD-10-CM

## 2024-05-19 DIAGNOSIS — G89.4 CHRONIC PAIN SYNDROME: ICD-10-CM

## 2024-05-19 DIAGNOSIS — L97.529 TYPE 2 DIABETES MELLITUS WITH LEFT DIABETIC FOOT ULCER (HCC): ICD-10-CM

## 2024-05-19 DIAGNOSIS — S91.309A OPEN WOUND OF FOOT EXCLUDING TOES: Primary | ICD-10-CM

## 2024-05-19 LAB
ALBUMIN SERPL-MCNC: 4 GM/DL (ref 3.4–5)
ALBUMIN SERPL-MCNC: 4.2 GM/DL (ref 3.4–5)
ALP BLD-CCNC: 116 IU/L (ref 40–128)
ALP BLD-CCNC: 97 IU/L (ref 40–128)
ALT SERPL-CCNC: 6 U/L (ref 10–40)
ALT SERPL-CCNC: 6 U/L (ref 10–40)
ANION GAP SERPL CALCULATED.3IONS-SCNC: 9 MMOL/L (ref 7–16)
ANION GAP SERPL CALCULATED.3IONS-SCNC: 9 MMOL/L (ref 7–16)
AST SERPL-CCNC: 11 IU/L (ref 15–37)
AST SERPL-CCNC: 13 IU/L (ref 15–37)
BACTERIA: NEGATIVE /HPF
BASOPHILS ABSOLUTE: 0 K/CU MM
BASOPHILS ABSOLUTE: 0.1 K/CU MM
BASOPHILS RELATIVE PERCENT: 0.6 % (ref 0–1)
BASOPHILS RELATIVE PERCENT: 0.8 % (ref 0–1)
BILIRUB SERPL-MCNC: 0.3 MG/DL (ref 0–1)
BILIRUB SERPL-MCNC: 0.4 MG/DL (ref 0–1)
BILIRUBIN, URINE: NEGATIVE MG/DL
BLOOD, URINE: ABNORMAL
BUN SERPL-MCNC: 10 MG/DL (ref 6–23)
BUN SERPL-MCNC: 9 MG/DL (ref 6–23)
CALCIUM SERPL-MCNC: 8.3 MG/DL (ref 8.3–10.6)
CALCIUM SERPL-MCNC: 8.9 MG/DL (ref 8.3–10.6)
CHLORIDE BLD-SCNC: 101 MMOL/L (ref 99–110)
CHLORIDE BLD-SCNC: 103 MMOL/L (ref 99–110)
CLARITY: CLEAR
CO2: 24 MMOL/L (ref 21–32)
CO2: 27 MMOL/L (ref 21–32)
COLOR: YELLOW
CREAT SERPL-MCNC: 0.6 MG/DL (ref 0.9–1.3)
CREAT SERPL-MCNC: 0.7 MG/DL (ref 0.9–1.3)
CRP SERPL HS-MCNC: 17.3 MG/L
DIFFERENTIAL TYPE: ABNORMAL
DIFFERENTIAL TYPE: ABNORMAL
EOSINOPHILS ABSOLUTE: 0.2 K/CU MM
EOSINOPHILS ABSOLUTE: 0.2 K/CU MM
EOSINOPHILS RELATIVE PERCENT: 2.8 % (ref 0–3)
EOSINOPHILS RELATIVE PERCENT: 3.1 % (ref 0–3)
ESTIMATED AVERAGE GLUCOSE: 212 MG/DL
GFR, ESTIMATED: >90 ML/MIN/1.73M2
GFR, ESTIMATED: >90 ML/MIN/1.73M2
GLUCOSE BLD-MCNC: 204 MG/DL (ref 70–99)
GLUCOSE BLD-MCNC: 223 MG/DL (ref 70–99)
GLUCOSE BLD-MCNC: 227 MG/DL (ref 70–99)
GLUCOSE BLD-MCNC: 229 MG/DL (ref 70–99)
GLUCOSE SERPL-MCNC: 219 MG/DL (ref 70–99)
GLUCOSE SERPL-MCNC: 226 MG/DL (ref 70–99)
GLUCOSE URINE: 500 MG/DL
HBA1C MFR BLD: 9 % (ref 4.2–6.3)
HCT VFR BLD CALC: 38.9 % (ref 42–52)
HCT VFR BLD CALC: 39.6 % (ref 42–52)
HEMOGLOBIN: 12.6 GM/DL (ref 13.5–18)
HEMOGLOBIN: 13.2 GM/DL (ref 13.5–18)
IMMATURE NEUTROPHIL %: 0.5 % (ref 0–0.43)
IMMATURE NEUTROPHIL %: 0.6 % (ref 0–0.43)
INR BLD: 1 INDEX
KETONES, URINE: NEGATIVE MG/DL
LACTIC ACID, SEPSIS: 1.6 MMOL/L (ref 0.4–2)
LEUKOCYTE ESTERASE, URINE: NEGATIVE
LYMPHOCYTES ABSOLUTE: 1.9 K/CU MM
LYMPHOCYTES ABSOLUTE: 2.3 K/CU MM
LYMPHOCYTES RELATIVE PERCENT: 28.9 % (ref 24–44)
LYMPHOCYTES RELATIVE PERCENT: 29.1 % (ref 24–44)
MCH RBC QN AUTO: 29.7 PG (ref 27–31)
MCH RBC QN AUTO: 29.8 PG (ref 27–31)
MCHC RBC AUTO-ENTMCNC: 32.4 % (ref 32–36)
MCHC RBC AUTO-ENTMCNC: 33.3 % (ref 32–36)
MCV RBC AUTO: 89.4 FL (ref 78–100)
MCV RBC AUTO: 91.7 FL (ref 78–100)
MONOCYTES ABSOLUTE: 0.4 K/CU MM
MONOCYTES ABSOLUTE: 0.6 K/CU MM
MONOCYTES RELATIVE PERCENT: 6.7 % (ref 0–4)
MONOCYTES RELATIVE PERCENT: 7.1 % (ref 0–4)
MUCUS: ABNORMAL HPF
NEUTROPHILS ABSOLUTE: 4 K/CU MM
NEUTROPHILS ABSOLUTE: 4.6 K/CU MM
NEUTROPHILS RELATIVE PERCENT: 59.3 % (ref 36–66)
NEUTROPHILS RELATIVE PERCENT: 60.5 % (ref 36–66)
NITRITE URINE, QUANTITATIVE: NEGATIVE
NUCLEATED RBC %: 0 %
NUCLEATED RBC %: 0 %
PDW BLD-RTO: 13.9 % (ref 11.7–14.9)
PDW BLD-RTO: 14.2 % (ref 11.7–14.9)
PH, URINE: 5.5 (ref 5–8)
PLATELET # BLD: 164 K/CU MM (ref 140–440)
PLATELET # BLD: 197 K/CU MM (ref 140–440)
PMV BLD AUTO: 10.2 FL (ref 7.5–11.1)
PMV BLD AUTO: 10.4 FL (ref 7.5–11.1)
POTASSIUM SERPL-SCNC: 4.5 MMOL/L (ref 3.5–5.1)
POTASSIUM SERPL-SCNC: 4.5 MMOL/L (ref 3.5–5.1)
PROCALCITONIN SERPL-MCNC: 0.12 NG/ML
PROTEIN UA: NEGATIVE MG/DL
PROTHROMBIN TIME: 13.4 SECONDS (ref 11.7–14.5)
RBC # BLD: 4.24 M/CU MM (ref 4.6–6.2)
RBC # BLD: 4.43 M/CU MM (ref 4.6–6.2)
RBC URINE: <1 /HPF (ref 0–3)
SED RATE, AUTOMATED: 17 MM/HR (ref 0–20)
SODIUM BLD-SCNC: 136 MMOL/L (ref 135–145)
SODIUM BLD-SCNC: 137 MMOL/L (ref 135–145)
SPECIFIC GRAVITY UA: >1.03 (ref 1–1.03)
TOTAL IMMATURE NEUTOROPHIL: 0.03 K/CU MM
TOTAL IMMATURE NEUTOROPHIL: 0.05 K/CU MM
TOTAL NUCLEATED RBC: 0 K/CU MM
TOTAL NUCLEATED RBC: 0 K/CU MM
TOTAL PROTEIN: 6.2 GM/DL (ref 6.4–8.2)
TOTAL PROTEIN: 7.2 GM/DL (ref 6.4–8.2)
TRICHOMONAS: ABNORMAL /HPF
UROBILINOGEN, URINE: 0.2 MG/DL (ref 0.2–1)
WBC # BLD: 6.5 K/CU MM (ref 4–10.5)
WBC # BLD: 7.8 K/CU MM (ref 4–10.5)
WBC UA: 2 /HPF (ref 0–2)

## 2024-05-19 PROCEDURE — 87075 CULTR BACTERIA EXCEPT BLOOD: CPT

## 2024-05-19 PROCEDURE — 87076 CULTURE ANAEROBE IDENT EACH: CPT

## 2024-05-19 PROCEDURE — 76937 US GUIDE VASCULAR ACCESS: CPT

## 2024-05-19 PROCEDURE — 73630 X-RAY EXAM OF FOOT: CPT

## 2024-05-19 PROCEDURE — 87070 CULTURE OTHR SPECIMN AEROBIC: CPT

## 2024-05-19 PROCEDURE — 87205 SMEAR GRAM STAIN: CPT

## 2024-05-19 PROCEDURE — 85610 PROTHROMBIN TIME: CPT

## 2024-05-19 PROCEDURE — 87040 BLOOD CULTURE FOR BACTERIA: CPT

## 2024-05-19 PROCEDURE — 2580000003 HC RX 258: Performed by: STUDENT IN AN ORGANIZED HEALTH CARE EDUCATION/TRAINING PROGRAM

## 2024-05-19 PROCEDURE — 1200000000 HC SEMI PRIVATE

## 2024-05-19 PROCEDURE — 84145 PROCALCITONIN (PCT): CPT

## 2024-05-19 PROCEDURE — 6370000000 HC RX 637 (ALT 250 FOR IP): Performed by: STUDENT IN AN ORGANIZED HEALTH CARE EDUCATION/TRAINING PROGRAM

## 2024-05-19 PROCEDURE — 86140 C-REACTIVE PROTEIN: CPT

## 2024-05-19 PROCEDURE — 93971 EXTREMITY STUDY: CPT

## 2024-05-19 PROCEDURE — 6360000002 HC RX W HCPCS: Performed by: STUDENT IN AN ORGANIZED HEALTH CARE EDUCATION/TRAINING PROGRAM

## 2024-05-19 PROCEDURE — 82962 GLUCOSE BLOOD TEST: CPT

## 2024-05-19 PROCEDURE — 80053 COMPREHEN METABOLIC PANEL: CPT

## 2024-05-19 PROCEDURE — 6360000002 HC RX W HCPCS

## 2024-05-19 PROCEDURE — 85652 RBC SED RATE AUTOMATED: CPT

## 2024-05-19 PROCEDURE — 6370000000 HC RX 637 (ALT 250 FOR IP): Performed by: INTERNAL MEDICINE

## 2024-05-19 PROCEDURE — 83605 ASSAY OF LACTIC ACID: CPT

## 2024-05-19 PROCEDURE — 83036 HEMOGLOBIN GLYCOSYLATED A1C: CPT

## 2024-05-19 PROCEDURE — 85025 COMPLETE CBC W/AUTO DIFF WBC: CPT

## 2024-05-19 PROCEDURE — 94761 N-INVAS EAR/PLS OXIMETRY MLT: CPT

## 2024-05-19 PROCEDURE — 36415 COLL VENOUS BLD VENIPUNCTURE: CPT

## 2024-05-19 PROCEDURE — 81001 URINALYSIS AUTO W/SCOPE: CPT

## 2024-05-19 RX ORDER — ACETAMINOPHEN 650 MG/1
650 SUPPOSITORY RECTAL EVERY 6 HOURS PRN
Status: DISCONTINUED | OUTPATIENT
Start: 2024-05-19 | End: 2024-05-28 | Stop reason: HOSPADM

## 2024-05-19 RX ORDER — HYDROCODONE BITARTRATE AND ACETAMINOPHEN 5; 325 MG/1; MG/1
1 TABLET ORAL ONCE
Status: COMPLETED | OUTPATIENT
Start: 2024-05-19 | End: 2024-05-19

## 2024-05-19 RX ORDER — DEXTROSE MONOHYDRATE 100 MG/ML
INJECTION, SOLUTION INTRAVENOUS CONTINUOUS PRN
Status: DISCONTINUED | OUTPATIENT
Start: 2024-05-19 | End: 2024-05-28 | Stop reason: HOSPADM

## 2024-05-19 RX ORDER — PREGABALIN 75 MG/1
75 CAPSULE ORAL 2 TIMES DAILY
Status: DISCONTINUED | OUTPATIENT
Start: 2024-05-19 | End: 2024-05-28 | Stop reason: HOSPADM

## 2024-05-19 RX ORDER — MAGNESIUM SULFATE IN WATER 40 MG/ML
2000 INJECTION, SOLUTION INTRAVENOUS PRN
Status: DISCONTINUED | OUTPATIENT
Start: 2024-05-19 | End: 2024-05-28 | Stop reason: HOSPADM

## 2024-05-19 RX ORDER — 0.9 % SODIUM CHLORIDE 0.9 %
1000 INTRAVENOUS SOLUTION INTRAVENOUS ONCE
Status: COMPLETED | OUTPATIENT
Start: 2024-05-19 | End: 2024-05-19

## 2024-05-19 RX ORDER — POTASSIUM CHLORIDE 7.45 MG/ML
10 INJECTION INTRAVENOUS PRN
Status: DISCONTINUED | OUTPATIENT
Start: 2024-05-19 | End: 2024-05-28 | Stop reason: HOSPADM

## 2024-05-19 RX ORDER — ENOXAPARIN SODIUM 100 MG/ML
30 INJECTION SUBCUTANEOUS 2 TIMES DAILY
Status: DISCONTINUED | OUTPATIENT
Start: 2024-05-19 | End: 2024-05-22

## 2024-05-19 RX ORDER — DICYCLOMINE HCL 20 MG
20 TABLET ORAL 4 TIMES DAILY PRN
Status: DISCONTINUED | OUTPATIENT
Start: 2024-05-19 | End: 2024-05-28 | Stop reason: HOSPADM

## 2024-05-19 RX ORDER — MORPHINE SULFATE 4 MG/ML
4 INJECTION, SOLUTION INTRAMUSCULAR; INTRAVENOUS EVERY 4 HOURS PRN
Status: DISCONTINUED | OUTPATIENT
Start: 2024-05-19 | End: 2024-05-19

## 2024-05-19 RX ORDER — METRONIDAZOLE 500 MG/100ML
500 INJECTION, SOLUTION INTRAVENOUS EVERY 8 HOURS
Status: DISCONTINUED | OUTPATIENT
Start: 2024-05-19 | End: 2024-05-28

## 2024-05-19 RX ORDER — INSULIN LISPRO 100 [IU]/ML
0-4 INJECTION, SOLUTION INTRAVENOUS; SUBCUTANEOUS EVERY 4 HOURS
Status: DISCONTINUED | OUTPATIENT
Start: 2024-05-19 | End: 2024-05-26

## 2024-05-19 RX ORDER — POTASSIUM CHLORIDE 20 MEQ/1
40 TABLET, EXTENDED RELEASE ORAL PRN
Status: DISCONTINUED | OUTPATIENT
Start: 2024-05-19 | End: 2024-05-28 | Stop reason: HOSPADM

## 2024-05-19 RX ORDER — ESCITALOPRAM OXALATE 10 MG/1
10 TABLET ORAL DAILY
Status: DISCONTINUED | OUTPATIENT
Start: 2024-05-19 | End: 2024-05-28 | Stop reason: HOSPADM

## 2024-05-19 RX ORDER — SODIUM CHLORIDE 0.9 % (FLUSH) 0.9 %
5-40 SYRINGE (ML) INJECTION PRN
Status: DISCONTINUED | OUTPATIENT
Start: 2024-05-19 | End: 2024-05-28 | Stop reason: HOSPADM

## 2024-05-19 RX ORDER — POLYETHYLENE GLYCOL 3350 17 G/17G
17 POWDER, FOR SOLUTION ORAL DAILY PRN
Status: DISCONTINUED | OUTPATIENT
Start: 2024-05-19 | End: 2024-05-28 | Stop reason: HOSPADM

## 2024-05-19 RX ORDER — SODIUM CHLORIDE 0.9 % (FLUSH) 0.9 %
5-40 SYRINGE (ML) INJECTION EVERY 12 HOURS SCHEDULED
Status: DISCONTINUED | OUTPATIENT
Start: 2024-05-19 | End: 2024-05-28 | Stop reason: HOSPADM

## 2024-05-19 RX ORDER — SODIUM CHLORIDE, SODIUM LACTATE, POTASSIUM CHLORIDE, CALCIUM CHLORIDE 600; 310; 30; 20 MG/100ML; MG/100ML; MG/100ML; MG/100ML
INJECTION, SOLUTION INTRAVENOUS CONTINUOUS
Status: ACTIVE | OUTPATIENT
Start: 2024-05-19 | End: 2024-05-19

## 2024-05-19 RX ORDER — INSULIN LISPRO 100 [IU]/ML
0-4 INJECTION, SOLUTION INTRAVENOUS; SUBCUTANEOUS
Status: DISCONTINUED | OUTPATIENT
Start: 2024-05-19 | End: 2024-05-27

## 2024-05-19 RX ORDER — OXYCODONE HYDROCHLORIDE AND ACETAMINOPHEN 5; 325 MG/1; MG/1
1 TABLET ORAL EVERY 6 HOURS PRN
Status: DISCONTINUED | OUTPATIENT
Start: 2024-05-19 | End: 2024-05-20

## 2024-05-19 RX ORDER — DOCUSATE SODIUM 100 MG/1
100 CAPSULE, LIQUID FILLED ORAL DAILY
Status: DISCONTINUED | OUTPATIENT
Start: 2024-05-19 | End: 2024-05-28 | Stop reason: HOSPADM

## 2024-05-19 RX ORDER — ATORVASTATIN CALCIUM 10 MG/1
10 TABLET, FILM COATED ORAL DAILY
Status: DISCONTINUED | OUTPATIENT
Start: 2024-05-19 | End: 2024-05-28 | Stop reason: HOSPADM

## 2024-05-19 RX ORDER — DIPHENHYDRAMINE HYDROCHLORIDE 50 MG/ML
INJECTION INTRAMUSCULAR; INTRAVENOUS
Status: COMPLETED
Start: 2024-05-19 | End: 2024-05-19

## 2024-05-19 RX ORDER — ONDANSETRON 2 MG/ML
4 INJECTION INTRAMUSCULAR; INTRAVENOUS EVERY 6 HOURS PRN
Status: DISCONTINUED | OUTPATIENT
Start: 2024-05-19 | End: 2024-05-28 | Stop reason: HOSPADM

## 2024-05-19 RX ORDER — PANTOPRAZOLE SODIUM 40 MG/1
40 TABLET, DELAYED RELEASE ORAL
Status: DISCONTINUED | OUTPATIENT
Start: 2024-05-19 | End: 2024-05-28 | Stop reason: HOSPADM

## 2024-05-19 RX ORDER — ONDANSETRON 4 MG/1
4 TABLET, ORALLY DISINTEGRATING ORAL EVERY 8 HOURS PRN
Status: DISCONTINUED | OUTPATIENT
Start: 2024-05-19 | End: 2024-05-28 | Stop reason: HOSPADM

## 2024-05-19 RX ORDER — ACETAMINOPHEN 325 MG/1
650 TABLET ORAL EVERY 6 HOURS PRN
Status: DISCONTINUED | OUTPATIENT
Start: 2024-05-19 | End: 2024-05-28 | Stop reason: HOSPADM

## 2024-05-19 RX ORDER — INSULIN LISPRO 100 [IU]/ML
0-4 INJECTION, SOLUTION INTRAVENOUS; SUBCUTANEOUS NIGHTLY
Status: DISCONTINUED | OUTPATIENT
Start: 2024-05-19 | End: 2024-05-27

## 2024-05-19 RX ORDER — GLUCAGON 1 MG/ML
1 KIT INJECTION PRN
Status: DISCONTINUED | OUTPATIENT
Start: 2024-05-19 | End: 2024-05-28 | Stop reason: HOSPADM

## 2024-05-19 RX ORDER — SODIUM CHLORIDE 9 MG/ML
INJECTION, SOLUTION INTRAVENOUS PRN
Status: DISCONTINUED | OUTPATIENT
Start: 2024-05-19 | End: 2024-05-22 | Stop reason: SDUPTHER

## 2024-05-19 RX ORDER — DIPHENHYDRAMINE HYDROCHLORIDE 50 MG/ML
25 INJECTION INTRAMUSCULAR; INTRAVENOUS ONCE
Status: COMPLETED | OUTPATIENT
Start: 2024-05-19 | End: 2024-05-19

## 2024-05-19 RX ORDER — TAMSULOSIN HYDROCHLORIDE 0.4 MG/1
0.4 CAPSULE ORAL DAILY
Status: DISCONTINUED | OUTPATIENT
Start: 2024-05-19 | End: 2024-05-28 | Stop reason: HOSPADM

## 2024-05-19 RX ORDER — HYDROCODONE BITARTRATE AND ACETAMINOPHEN 5; 325 MG/1; MG/1
1 TABLET ORAL EVERY 6 HOURS PRN
Status: DISCONTINUED | OUTPATIENT
Start: 2024-05-19 | End: 2024-05-19

## 2024-05-19 RX ADMIN — DOCUSATE SODIUM 100 MG: 100 CAPSULE, LIQUID FILLED ORAL at 17:36

## 2024-05-19 RX ADMIN — PREGABALIN 75 MG: 75 CAPSULE ORAL at 20:48

## 2024-05-19 RX ADMIN — ENOXAPARIN SODIUM 30 MG: 100 INJECTION SUBCUTANEOUS at 09:22

## 2024-05-19 RX ADMIN — METRONIDAZOLE 500 MG: 500 INJECTION, SOLUTION INTRAVENOUS at 09:18

## 2024-05-19 RX ADMIN — SODIUM CHLORIDE, POTASSIUM CHLORIDE, SODIUM LACTATE AND CALCIUM CHLORIDE: 600; 310; 30; 20 INJECTION, SOLUTION INTRAVENOUS at 06:30

## 2024-05-19 RX ADMIN — METRONIDAZOLE 500 MG: 500 INJECTION, SOLUTION INTRAVENOUS at 17:37

## 2024-05-19 RX ADMIN — INSULIN LISPRO 1 UNITS: 100 INJECTION, SOLUTION INTRAVENOUS; SUBCUTANEOUS at 11:56

## 2024-05-19 RX ADMIN — PREGABALIN 75 MG: 75 CAPSULE ORAL at 09:21

## 2024-05-19 RX ADMIN — OXYCODONE HYDROCHLORIDE AND ACETAMINOPHEN 1 TABLET: 5; 325 TABLET ORAL at 18:28

## 2024-05-19 RX ADMIN — VANCOMYCIN HYDROCHLORIDE 2500 MG: 1.25 INJECTION, POWDER, LYOPHILIZED, FOR SOLUTION INTRAVENOUS at 03:31

## 2024-05-19 RX ADMIN — MORPHINE SULFATE 4 MG: 4 INJECTION, SOLUTION INTRAMUSCULAR; INTRAVENOUS at 04:58

## 2024-05-19 RX ADMIN — DIPHENHYDRAMINE HYDROCHLORIDE 25 MG: 50 INJECTION INTRAMUSCULAR; INTRAVENOUS at 05:06

## 2024-05-19 RX ADMIN — PIPERACILLIN AND TAZOBACTAM 4500 MG: 4; .5 INJECTION, POWDER, FOR SOLUTION INTRAVENOUS at 02:32

## 2024-05-19 RX ADMIN — SODIUM CHLORIDE, PRESERVATIVE FREE 10 ML: 5 INJECTION INTRAVENOUS at 21:33

## 2024-05-19 RX ADMIN — INSULIN LISPRO 1 UNITS: 100 INJECTION, SOLUTION INTRAVENOUS; SUBCUTANEOUS at 09:19

## 2024-05-19 RX ADMIN — OXYCODONE HYDROCHLORIDE AND ACETAMINOPHEN 1 TABLET: 5; 325 TABLET ORAL at 11:57

## 2024-05-19 RX ADMIN — CEFEPIME 2000 MG: 2 INJECTION, POWDER, FOR SOLUTION INTRAVENOUS at 09:33

## 2024-05-19 RX ADMIN — TAMSULOSIN HYDROCHLORIDE 0.4 MG: 0.4 CAPSULE ORAL at 09:21

## 2024-05-19 RX ADMIN — DIPHENHYDRAMINE HYDROCHLORIDE 25 MG: 50 INJECTION, SOLUTION INTRAMUSCULAR; INTRAVENOUS at 05:06

## 2024-05-19 RX ADMIN — HYDROCODONE BITARTRATE AND ACETAMINOPHEN 1 TABLET: 5; 325 TABLET ORAL at 02:32

## 2024-05-19 RX ADMIN — ENOXAPARIN SODIUM 30 MG: 100 INJECTION SUBCUTANEOUS at 20:49

## 2024-05-19 RX ADMIN — PANTOPRAZOLE SODIUM 40 MG: 40 TABLET, DELAYED RELEASE ORAL at 16:07

## 2024-05-19 RX ADMIN — PANTOPRAZOLE SODIUM 40 MG: 40 TABLET, DELAYED RELEASE ORAL at 06:22

## 2024-05-19 RX ADMIN — INSULIN LISPRO 1 UNITS: 100 INJECTION, SOLUTION INTRAVENOUS; SUBCUTANEOUS at 16:07

## 2024-05-19 RX ADMIN — ESCITALOPRAM OXALATE 10 MG: 10 TABLET ORAL at 09:22

## 2024-05-19 RX ADMIN — SODIUM CHLORIDE 1000 ML: 9 INJECTION, SOLUTION INTRAVENOUS at 02:32

## 2024-05-19 RX ADMIN — ATORVASTATIN CALCIUM 10 MG: 10 TABLET, FILM COATED ORAL at 09:21

## 2024-05-19 RX ADMIN — VANCOMYCIN HYDROCHLORIDE 1750 MG: 5 INJECTION, POWDER, LYOPHILIZED, FOR SOLUTION INTRAVENOUS at 19:16

## 2024-05-19 RX ADMIN — CEFEPIME 2000 MG: 2 INJECTION, POWDER, FOR SOLUTION INTRAVENOUS at 17:39

## 2024-05-19 ASSESSMENT — PAIN DESCRIPTION - ORIENTATION
ORIENTATION: LEFT;POSTERIOR
ORIENTATION: LEFT;POSTERIOR
ORIENTATION: LEFT
ORIENTATION: LEFT
ORIENTATION: LEFT;POSTERIOR
ORIENTATION: LEFT

## 2024-05-19 ASSESSMENT — PAIN DESCRIPTION - LOCATION
LOCATION: FOOT

## 2024-05-19 ASSESSMENT — PAIN SCALES - GENERAL
PAINLEVEL_OUTOF10: 9
PAINLEVEL_OUTOF10: 7
PAINLEVEL_OUTOF10: 7
PAINLEVEL_OUTOF10: 10
PAINLEVEL_OUTOF10: 9
PAINLEVEL_OUTOF10: 5
PAINLEVEL_OUTOF10: 9
PAINLEVEL_OUTOF10: 8
PAINLEVEL_OUTOF10: 8
PAINLEVEL_OUTOF10: 7
PAINLEVEL_OUTOF10: 8
PAINLEVEL_OUTOF10: 3

## 2024-05-19 ASSESSMENT — PAIN DESCRIPTION - FREQUENCY
FREQUENCY: CONTINUOUS

## 2024-05-19 ASSESSMENT — PAIN DESCRIPTION - ONSET
ONSET: ON-GOING

## 2024-05-19 ASSESSMENT — PAIN DESCRIPTION - DESCRIPTORS
DESCRIPTORS: ACHING;DISCOMFORT
DESCRIPTORS: ACHING;DISCOMFORT
DESCRIPTORS: ACHING
DESCRIPTORS: ACHING;DISCOMFORT
DESCRIPTORS: ACHING;DISCOMFORT
DESCRIPTORS: ACHING;CRUSHING;DISCOMFORT
DESCRIPTORS: ACHING;CRAMPING;CRUSHING;DISCOMFORT
DESCRIPTORS: ACHING;DISCOMFORT

## 2024-05-19 ASSESSMENT — PAIN - FUNCTIONAL ASSESSMENT
PAIN_FUNCTIONAL_ASSESSMENT: ACTIVITIES ARE NOT PREVENTED

## 2024-05-19 ASSESSMENT — PAIN DESCRIPTION - PAIN TYPE
TYPE: ACUTE PAIN

## 2024-05-19 NOTE — ED NOTES
Patient developed hives and itching on right forearm right after the morphine was given. No trouble breathing or swelling anywhere. Notified Dr. Elder. New order for benadryl to be given. Patient stable and hives are going away.  
for bedside PICC placement, multiple failed attempts    Hx MRSA infection     positive culture 8/2014 from left foot    Nausea & vomiting     Neuropathy     Obesity, Class III, BMI 40-49.9 (morbid obesity) (HCC)     Osteomyelitis (HCC)     hx finger    Poor circulation     Prolonged emergence from general anesthesia     Restless leg syndrome     Shortness of breath on exertion     6/14/2016- pt denies any sob with this interview    Type II or unspecified type diabetes mellitus with other specified manifestations, not stated as uncontrolled 4/8/2014    Type II or unspecified type diabetes mellitus without mention of complication, not stated as uncontrolled DX 2007    Follows with PCP    Ulcer of other part of foot 4/8/2014    'ulcer on left foot healed all up\"       Assessment    Vitals:        Vitals:    05/19/24 0104 05/19/24 0230 05/19/24 0234 05/19/24 0300   BP: 112/70 117/70 117/70 122/84   Pulse:  70 78 66   Resp:  15 16 13   Temp:       TempSrc:       SpO2:       Weight:   122.5 kg (270 lb)    Height:   1.803 m (5' 11\")      PO Status: Nothing by Mouth  O2 Flow Rate:      Cardiac Rhythm: NSR  Last documented pain medication administered: See MAR  NIH Score: NIH     Active LDA's:   Peripheral IV 05/19/24 Right Wrist (Active)   Site Assessment Clean, dry & intact 05/19/24 0036   Line Status Blood return noted;Flushed;Normal saline locked;Specimen collected 05/19/24 0036   Line Care Connections checked and tightened 05/19/24 0036   Phlebitis Assessment No symptoms 05/19/24 0036   Infiltration Assessment 0 05/19/24 0036   Alcohol Cap Used No 05/19/24 0036   Dressing Status New dressing applied 05/19/24 0036   Dressing Type Transparent 05/19/24 0036   Dressing Intervention New 05/19/24 0036       Pertinent or High Risk Medications/Drips: no   If Yes, please provide details:   Blood Product Administration: no  If Yes, please provide details:     Recommendation    Incomplete orders Inpt  Additional Comments:    If

## 2024-05-19 NOTE — ED PROVIDER NOTES
Emergency Department Encounter        Pt Name: Toi Briones  MRN: [id number]  Birthdate 1965  Date of evaluation: 5/18/2024  ED Physician: Kuldeep Camacho MD    CHIEF COMPLAINT     Triage Chief Complaint:   Wound Check (Left foot) and Foot Swelling (left)      HISTORY OF PRESENT ILLNESS & REVIEW OF SYSTEMS     History obtained from the patient and staff.    Toi Briones is a 58 y.o. male who presents to the emergency department for evaluation of wound check.  Says that he was seen about 4 days ago for wound on the mom's foot.  Says that it has worsened.  Says he is having a lot of pain there.  Denies fevers but says he thinks he may have had chills.  Says he does have DM.  Says that he was not given antibiotics.  Says he tried to get in the wound clinic but was not able to consistently significantly worsened.        Patient denies any new Headache, Fever, Cough, Chest pain, Shortness of breath, Abdominal pain, Nausea, Vomiting, Diarrhea, and Constipation.    The patient has no other acute complaints at this time.  Review of systems as above.          PAST MED/SURG/SOCIAL/FAM HISTORY & ALLERGY & MEDICATIONS     Past Medical History:   Diagnosis Date    Absent finger     Left hand    Anxiety     Arthritis     Hands    Asthma     \"As a kid but outgrew this\" - no medications as of 2/21/20    Chronic back pain     Depression     Edema     Fibromyalgia     Headache(784.0)     Last: 2/19/20    Hernia, abdominal     History of difficult venous access 12/21/2021    No longer a candidate for bedside PICC placement, multiple failed attempts    Hx MRSA infection     positive culture 8/2014 from left foot    Nausea & vomiting     Neuropathy     Obesity, Class III, BMI 40-49.9 (morbid obesity) (HCC)     Osteomyelitis (HCC)     hx finger    Poor circulation     Prolonged emergence from general anesthesia     Restless leg syndrome     Shortness of breath on exertion     6/14/2016- pt denies any sob with this interview

## 2024-05-19 NOTE — ED TRIAGE NOTES
Patient presents to the ED with complaints of left foot swelling and needing a wound check. Patient states he was here a few days ago for an abscess on the bottom of his left foot. Patient states it is painful and swelling. Patient has elevated his foot and states it is still getting worse.

## 2024-05-19 NOTE — H&P
4 times daily as needed (abdominal pain) 4/7/20   Arian Lai MD   ondansetron (ZOFRAN) 4 MG tablet Take 4 mg by mouth every 8 hours as needed for Nausea or Vomiting    ProviderLaurent MD   Dulaglutide (TRULICITY SC) Inject 1.5 mLs into the skin every 7 days     Laurent Henry MD   pantoprazole (PROTONIX) 40 MG tablet Take 1 tablet by mouth 2 times daily for 14 days 1/28/20 2/2/22  Avinash Tovar MD   ondansetron (ZOFRAN ODT) 4 MG disintegrating tablet Take 1 tablet by mouth every 6 hours  Patient not taking: Reported on 2/20/2020 1/23/20   Chalo Holm, PALakshmiC   escitalopram (LEXAPRO) 10 MG tablet Take 10 mg by mouth daily    ProviderLaurent MD       Medications:     Medications:    sodium chloride  1,000 mL IntraVENous Once    piperacillin-tazobactam  4,500 mg IntraVENous Once    vancomycin  2,500 mg IntraVENous Once        Infusions:       PRN Meds:        Data:     CBC:   Recent Labs     05/19/24  0035   WBC 7.8   HGB 13.2*      MCV 89.4   RDW 13.9   LYMPHOPCT 29.1   MONOPCT 7.1*   EOSPCT 3.1*   BASOPCT 0.8   MONOSABS 0.6   EOSABS 0.2   BASOSABS 0.1     CMP:    Recent Labs     05/19/24  0035      K 4.5      CO2 27   BUN 9   CREATININE 0.7*   GLUCOSE 226*   CALCIUM 8.9   BILITOT 0.3   ALKPHOS 116   AST 13*   ALT 6*     Lipids: No results found for: \"CHOL\", \"HDL\", \"TRIG\"  Hemoglobin A1C:   Lab Results   Component Value Date/Time    LABA1C 7.6 12/14/2021 03:00 PM     TSH: No results found for: \"TSH\"  Troponin:   Lab Results   Component Value Date/Time    TROPONINT <0.010 03/11/2022 06:09 PM    TROPONINT <0.010 01/23/2020 07:37 PM    TROPONINT <0.010 04/16/2018 01:10 AM     BNP: No results for input(s): \"PROBNP\" in the last 72 hours.  Lactic Acid: No results for input(s): \"LACTA\" in the last 72 hours.  UA:  Lab Results   Component Value Date/Time    NITRU NEGATIVE 05/14/2024 05:17 AM    COLORU YELLOW 05/14/2024 05:17 AM    PHUR 5.5 05/14/2024 05:17 AM    WBCUA 14

## 2024-05-20 ENCOUNTER — ANESTHESIA EVENT (OUTPATIENT)
Dept: OPERATING ROOM | Age: 59
DRG: 617 | End: 2024-05-20
Payer: MEDICARE

## 2024-05-20 ENCOUNTER — APPOINTMENT (OUTPATIENT)
Dept: MRI IMAGING | Age: 59
DRG: 617 | End: 2024-05-20
Payer: MEDICARE

## 2024-05-20 LAB
ALBUMIN SERPL-MCNC: 3.9 GM/DL (ref 3.4–5)
ALP BLD-CCNC: 110 IU/L (ref 40–129)
ALT SERPL-CCNC: 7 U/L (ref 10–40)
ANION GAP SERPL CALCULATED.3IONS-SCNC: 14 MMOL/L (ref 7–16)
AST SERPL-CCNC: 12 IU/L (ref 15–37)
BASOPHILS ABSOLUTE: 0 K/CU MM
BASOPHILS RELATIVE PERCENT: 0.5 % (ref 0–1)
BILIRUB SERPL-MCNC: 0.3 MG/DL (ref 0–1)
BUN SERPL-MCNC: 12 MG/DL (ref 6–23)
CALCIUM SERPL-MCNC: 8.6 MG/DL (ref 8.3–10.6)
CHLORIDE BLD-SCNC: 100 MMOL/L (ref 99–110)
CO2: 20 MMOL/L (ref 21–32)
CREAT SERPL-MCNC: 0.8 MG/DL (ref 0.9–1.3)
DIFFERENTIAL TYPE: ABNORMAL
EOSINOPHILS ABSOLUTE: 0.2 K/CU MM
EOSINOPHILS RELATIVE PERCENT: 2.6 % (ref 0–3)
GFR, ESTIMATED: >90 ML/MIN/1.73M2
GLUCOSE BLD-MCNC: 202 MG/DL (ref 70–99)
GLUCOSE BLD-MCNC: 204 MG/DL (ref 70–99)
GLUCOSE BLD-MCNC: 215 MG/DL (ref 70–99)
GLUCOSE BLD-MCNC: 249 MG/DL (ref 70–99)
GLUCOSE SERPL-MCNC: 271 MG/DL (ref 70–99)
HCT VFR BLD CALC: 41.4 % (ref 42–52)
HEMOGLOBIN: 13.4 GM/DL (ref 13.5–18)
IMMATURE NEUTROPHIL %: 0.3 % (ref 0–0.43)
LYMPHOCYTES ABSOLUTE: 1.1 K/CU MM
LYMPHOCYTES RELATIVE PERCENT: 17.6 % (ref 24–44)
MCH RBC QN AUTO: 29.2 PG (ref 27–31)
MCHC RBC AUTO-ENTMCNC: 32.4 % (ref 32–36)
MCV RBC AUTO: 90.2 FL (ref 78–100)
MONOCYTES ABSOLUTE: 0.4 K/CU MM
MONOCYTES RELATIVE PERCENT: 5.6 % (ref 0–4)
NEUTROPHILS ABSOLUTE: 4.6 K/CU MM
NEUTROPHILS RELATIVE PERCENT: 73.4 % (ref 36–66)
NUCLEATED RBC %: 0 %
PDW BLD-RTO: 14 % (ref 11.7–14.9)
PLATELET # BLD: 177 K/CU MM (ref 140–440)
PMV BLD AUTO: 10.2 FL (ref 7.5–11.1)
POTASSIUM SERPL-SCNC: 4.6 MMOL/L (ref 3.5–5.1)
RBC # BLD: 4.59 M/CU MM (ref 4.6–6.2)
SODIUM BLD-SCNC: 134 MMOL/L (ref 135–145)
TOTAL IMMATURE NEUTOROPHIL: 0.02 K/CU MM
TOTAL NUCLEATED RBC: 0 K/CU MM
TOTAL PROTEIN: 6 GM/DL (ref 6.4–8.2)
WBC # BLD: 6.3 K/CU MM (ref 4–10.5)

## 2024-05-20 PROCEDURE — 99222 1ST HOSP IP/OBS MODERATE 55: CPT | Performed by: SURGERY

## 2024-05-20 PROCEDURE — 6360000002 HC RX W HCPCS: Performed by: STUDENT IN AN ORGANIZED HEALTH CARE EDUCATION/TRAINING PROGRAM

## 2024-05-20 PROCEDURE — 73720 MRI LWR EXTREMITY W/O&W/DYE: CPT

## 2024-05-20 PROCEDURE — 82962 GLUCOSE BLOOD TEST: CPT

## 2024-05-20 PROCEDURE — 85025 COMPLETE CBC W/AUTO DIFF WBC: CPT

## 2024-05-20 PROCEDURE — 6370000000 HC RX 637 (ALT 250 FOR IP): Performed by: STUDENT IN AN ORGANIZED HEALTH CARE EDUCATION/TRAINING PROGRAM

## 2024-05-20 PROCEDURE — 80053 COMPREHEN METABOLIC PANEL: CPT

## 2024-05-20 PROCEDURE — A9579 GAD-BASE MR CONTRAST NOS,1ML: HCPCS | Performed by: STUDENT IN AN ORGANIZED HEALTH CARE EDUCATION/TRAINING PROGRAM

## 2024-05-20 PROCEDURE — 1200000000 HC SEMI PRIVATE

## 2024-05-20 PROCEDURE — 6360000004 HC RX CONTRAST MEDICATION: Performed by: STUDENT IN AN ORGANIZED HEALTH CARE EDUCATION/TRAINING PROGRAM

## 2024-05-20 PROCEDURE — 6370000000 HC RX 637 (ALT 250 FOR IP): Performed by: INTERNAL MEDICINE

## 2024-05-20 PROCEDURE — 94761 N-INVAS EAR/PLS OXIMETRY MLT: CPT

## 2024-05-20 PROCEDURE — 2580000003 HC RX 258: Performed by: STUDENT IN AN ORGANIZED HEALTH CARE EDUCATION/TRAINING PROGRAM

## 2024-05-20 RX ORDER — OXYCODONE HYDROCHLORIDE AND ACETAMINOPHEN 5; 325 MG/1; MG/1
1 TABLET ORAL EVERY 4 HOURS PRN
Status: DISCONTINUED | OUTPATIENT
Start: 2024-05-20 | End: 2024-05-22

## 2024-05-20 RX ADMIN — TAMSULOSIN HYDROCHLORIDE 0.4 MG: 0.4 CAPSULE ORAL at 09:15

## 2024-05-20 RX ADMIN — PANTOPRAZOLE SODIUM 40 MG: 40 TABLET, DELAYED RELEASE ORAL at 16:46

## 2024-05-20 RX ADMIN — CEFEPIME 2000 MG: 2 INJECTION, POWDER, FOR SOLUTION INTRAVENOUS at 01:13

## 2024-05-20 RX ADMIN — VANCOMYCIN HYDROCHLORIDE 1750 MG: 5 INJECTION, POWDER, LYOPHILIZED, FOR SOLUTION INTRAVENOUS at 07:13

## 2024-05-20 RX ADMIN — SODIUM CHLORIDE 25 ML: 9 INJECTION, SOLUTION INTRAVENOUS at 01:59

## 2024-05-20 RX ADMIN — DOCUSATE SODIUM 100 MG: 100 CAPSULE, LIQUID FILLED ORAL at 09:15

## 2024-05-20 RX ADMIN — ONDANSETRON 4 MG: 4 TABLET, ORALLY DISINTEGRATING ORAL at 16:40

## 2024-05-20 RX ADMIN — INSULIN LISPRO 1 UNITS: 100 INJECTION, SOLUTION INTRAVENOUS; SUBCUTANEOUS at 09:15

## 2024-05-20 RX ADMIN — INSULIN LISPRO 1 UNITS: 100 INJECTION, SOLUTION INTRAVENOUS; SUBCUTANEOUS at 16:46

## 2024-05-20 RX ADMIN — CEFEPIME 2000 MG: 2 INJECTION, POWDER, FOR SOLUTION INTRAVENOUS at 10:47

## 2024-05-20 RX ADMIN — OXYCODONE HYDROCHLORIDE AND ACETAMINOPHEN 1 TABLET: 5; 325 TABLET ORAL at 16:45

## 2024-05-20 RX ADMIN — VANCOMYCIN HYDROCHLORIDE 1750 MG: 5 INJECTION, POWDER, LYOPHILIZED, FOR SOLUTION INTRAVENOUS at 20:03

## 2024-05-20 RX ADMIN — PREGABALIN 75 MG: 75 CAPSULE ORAL at 19:52

## 2024-05-20 RX ADMIN — SODIUM CHLORIDE 25 ML: 9 INJECTION, SOLUTION INTRAVENOUS at 01:12

## 2024-05-20 RX ADMIN — SODIUM CHLORIDE: 9 INJECTION, SOLUTION INTRAVENOUS at 10:46

## 2024-05-20 RX ADMIN — INSULIN LISPRO 1 UNITS: 100 INJECTION, SOLUTION INTRAVENOUS; SUBCUTANEOUS at 11:42

## 2024-05-20 RX ADMIN — METRONIDAZOLE 500 MG: 500 INJECTION, SOLUTION INTRAVENOUS at 13:05

## 2024-05-20 RX ADMIN — METRONIDAZOLE 500 MG: 500 INJECTION, SOLUTION INTRAVENOUS at 17:53

## 2024-05-20 RX ADMIN — OXYCODONE HYDROCHLORIDE AND ACETAMINOPHEN 1 TABLET: 5; 325 TABLET ORAL at 23:36

## 2024-05-20 RX ADMIN — PANTOPRAZOLE SODIUM 40 MG: 40 TABLET, DELAYED RELEASE ORAL at 05:28

## 2024-05-20 RX ADMIN — ATORVASTATIN CALCIUM 10 MG: 10 TABLET, FILM COATED ORAL at 09:15

## 2024-05-20 RX ADMIN — ESCITALOPRAM OXALATE 10 MG: 10 TABLET ORAL at 09:15

## 2024-05-20 RX ADMIN — OXYCODONE HYDROCHLORIDE AND ACETAMINOPHEN 1 TABLET: 5; 325 TABLET ORAL at 19:52

## 2024-05-20 RX ADMIN — OXYCODONE HYDROCHLORIDE AND ACETAMINOPHEN 1 TABLET: 5; 325 TABLET ORAL at 11:43

## 2024-05-20 RX ADMIN — ENOXAPARIN SODIUM 30 MG: 100 INJECTION SUBCUTANEOUS at 09:15

## 2024-05-20 RX ADMIN — SODIUM CHLORIDE, PRESERVATIVE FREE 5 ML: 5 INJECTION INTRAVENOUS at 09:43

## 2024-05-20 RX ADMIN — PREGABALIN 75 MG: 75 CAPSULE ORAL at 09:15

## 2024-05-20 RX ADMIN — OXYCODONE HYDROCHLORIDE AND ACETAMINOPHEN 1 TABLET: 5; 325 TABLET ORAL at 00:28

## 2024-05-20 RX ADMIN — SODIUM CHLORIDE, PRESERVATIVE FREE 10 ML: 5 INJECTION INTRAVENOUS at 20:04

## 2024-05-20 RX ADMIN — GADOTERIDOL 20 ML: 279.3 INJECTION, SOLUTION INTRAVENOUS at 08:42

## 2024-05-20 RX ADMIN — CEFEPIME 2000 MG: 2 INJECTION, POWDER, FOR SOLUTION INTRAVENOUS at 16:43

## 2024-05-20 RX ADMIN — METRONIDAZOLE 500 MG: 500 INJECTION, SOLUTION INTRAVENOUS at 01:59

## 2024-05-20 ASSESSMENT — PAIN DESCRIPTION - ONSET
ONSET: ON-GOING
ONSET: ON-GOING

## 2024-05-20 ASSESSMENT — PAIN SCALES - GENERAL
PAINLEVEL_OUTOF10: 8
PAINLEVEL_OUTOF10: 6
PAINLEVEL_OUTOF10: 3
PAINLEVEL_OUTOF10: 7
PAINLEVEL_OUTOF10: 6
PAINLEVEL_OUTOF10: 7
PAINLEVEL_OUTOF10: 7

## 2024-05-20 ASSESSMENT — PAIN DESCRIPTION - LOCATION
LOCATION: FOOT

## 2024-05-20 ASSESSMENT — PAIN - FUNCTIONAL ASSESSMENT
PAIN_FUNCTIONAL_ASSESSMENT: PREVENTS OR INTERFERES SOME ACTIVE ACTIVITIES AND ADLS
PAIN_FUNCTIONAL_ASSESSMENT: ACTIVITIES ARE NOT PREVENTED

## 2024-05-20 ASSESSMENT — PAIN DESCRIPTION - FREQUENCY
FREQUENCY: INTERMITTENT
FREQUENCY: INTERMITTENT

## 2024-05-20 ASSESSMENT — PAIN SCALES - WONG BAKER
WONGBAKER_NUMERICALRESPONSE: HURTS A LITTLE BIT
WONGBAKER_NUMERICALRESPONSE: HURTS A LITTLE BIT

## 2024-05-20 ASSESSMENT — PAIN DESCRIPTION - DESCRIPTORS
DESCRIPTORS: ACHING
DESCRIPTORS: SHARP;THROBBING
DESCRIPTORS: SHARP;THROBBING
DESCRIPTORS: SHARP

## 2024-05-20 ASSESSMENT — PAIN DESCRIPTION - ORIENTATION
ORIENTATION: LEFT

## 2024-05-20 ASSESSMENT — PAIN DESCRIPTION - PAIN TYPE
TYPE: ACUTE PAIN
TYPE: ACUTE PAIN

## 2024-05-20 NOTE — ANESTHESIA PRE PROCEDURE
bicarb-citric acid (EFFER-K) effervescent tablet 40 mEq  40 mEq Oral PRN Sandy Elder MD        Or   • potassium chloride 10 mEq/100 mL IVPB (Peripheral Line)  10 mEq IntraVENous PRN Sandy Elder MD       • potassium chloride 10 mEq/100 mL IVPB (Peripheral Line)  10 mEq IntraVENous PRN Sandy Elder MD       • magnesium sulfate 2000 mg in 50 mL IVPB premix  2,000 mg IntraVENous PRN Sandy Elder MD       • ondansetron (ZOFRAN-ODT) disintegrating tablet 4 mg  4 mg Oral Q8H PRN Sandy Elder MD   4 mg at 05/20/24 1640    Or   • ondansetron (ZOFRAN) injection 4 mg  4 mg IntraVENous Q6H PRN Sandy Elder MD       • polyethylene glycol (GLYCOLAX) packet 17 g  17 g Oral Daily PRN Sandy Elder MD       • acetaminophen (TYLENOL) tablet 650 mg  650 mg Oral Q6H PRN Sandy Elder MD        Or   • acetaminophen (TYLENOL) suppository 650 mg  650 mg Rectal Q6H PRN Sandy Elder MD       • [Held by provider] enoxaparin Sodium (LOVENOX) injection 30 mg  30 mg SubCUTAneous BID Sandy Elder MD   30 mg at 05/20/24 0915   • ceFEPIme (MAXIPIME) 2,000 mg in sodium chloride 0.9 % 50 mL IVPB (mini-bag)  2,000 mg IntraVENous q8h Sandy Elder  mL/hr at 05/20/24 1643 2,000 mg at 05/20/24 1643   • metroNIDAZOLE (FLAGYL) 500 mg in 0.9% NaCl 100 mL IVPB premix  500 mg IntraVENous Q8H Sandy Elder MD   Stopped at 05/20/24 1448   • vancomycin (VANCOCIN) 1750 mg in sodium chloride 0.9 % 500 mL IVPB  1,750 mg IntraVENous Q12H Sandy Elder MD   Stopped at 05/20/24 1029   • docusate sodium (COLACE) capsule 100 mg  100 mg Oral Daily Vicki Steel MD   100 mg at 05/20/24 0915       Allergies:    Allergies   Allergen Reactions   • Cantaloupe (Diagnostic) Hives   • Ceftriaxone Hives     Tolerated Zosyn well 1/2/2019    • Morphine Hives and Itching   • Robaxin [Methocarbamol] Swelling   • Rocephin [Ceftriaxone Sodium-Lidocaine] Hives     Noted on 10/26 - developed extremity swelling and hives. No anaphylaxis.   • Sulfa

## 2024-05-20 NOTE — CARE COORDINATION
05/20/24 1150   Service Assessment   Patient Orientation Alert and Oriented   Cognition Alert   History Provided By Medical Record;Physician;Patient   Primary Caregiver Self   Support Systems Family Members   Patient's Healthcare Decision Maker is: Legal Next of Kin   PCP Verified by CM Yes   Last Visit to PCP Within last two years   Prior Functional Level Independent in ADLs/IADLs   Current Functional Level Independent in ADLs/IADLs   Can patient return to prior living arrangement Yes   Ability to make needs known: Good   Family able to assist with home care needs: No   Would you like for me to discuss the discharge plan with any other family members/significant others, and if so, who? No   Financial Resources Medicare   Community Resources None     From home, independent prior to admission. PCP and insurance. No HHC or DME. Has had [facility] in the past. Plan is home. CM following.

## 2024-05-21 ENCOUNTER — ANESTHESIA (OUTPATIENT)
Dept: OPERATING ROOM | Age: 59
DRG: 617 | End: 2024-05-21
Payer: MEDICARE

## 2024-05-21 PROBLEM — M86.9 OSTEOMYELITIS OF LEFT FOOT (HCC): Status: ACTIVE | Noted: 2024-05-21

## 2024-05-21 PROBLEM — S91.309A OPEN WOUND OF FOOT EXCLUDING TOES: Status: ACTIVE | Noted: 2024-05-21

## 2024-05-21 LAB
ALBUMIN SERPL-MCNC: 3.8 GM/DL (ref 3.4–5)
ALP BLD-CCNC: 96 IU/L (ref 40–129)
ALT SERPL-CCNC: 7 U/L (ref 10–40)
ANION GAP SERPL CALCULATED.3IONS-SCNC: 10 MMOL/L (ref 7–16)
AST SERPL-CCNC: 12 IU/L (ref 15–37)
BASOPHILS ABSOLUTE: 0.1 K/CU MM
BASOPHILS RELATIVE PERCENT: 0.8 % (ref 0–1)
BILIRUB SERPL-MCNC: 0.4 MG/DL (ref 0–1)
BUN SERPL-MCNC: 13 MG/DL (ref 6–23)
CALCIUM SERPL-MCNC: 8.5 MG/DL (ref 8.3–10.6)
CHLORIDE BLD-SCNC: 102 MMOL/L (ref 99–110)
CO2: 25 MMOL/L (ref 21–32)
CREAT SERPL-MCNC: 0.7 MG/DL (ref 0.9–1.3)
DIFFERENTIAL TYPE: ABNORMAL
DOSE AMOUNT: NORMAL
DOSE TIME: NORMAL
EOSINOPHILS ABSOLUTE: 0.2 K/CU MM
EOSINOPHILS RELATIVE PERCENT: 2.6 % (ref 0–3)
GFR, ESTIMATED: >90 ML/MIN/1.73M2
GLUCOSE BLD-MCNC: 184 MG/DL (ref 70–99)
GLUCOSE BLD-MCNC: 189 MG/DL (ref 70–99)
GLUCOSE BLD-MCNC: 190 MG/DL (ref 70–99)
GLUCOSE BLD-MCNC: 198 MG/DL (ref 70–99)
GLUCOSE BLD-MCNC: 214 MG/DL (ref 70–99)
GLUCOSE BLD-MCNC: 224 MG/DL (ref 70–99)
GLUCOSE SERPL-MCNC: 198 MG/DL (ref 70–99)
HCT VFR BLD CALC: 39.9 % (ref 42–52)
HEMOGLOBIN: 13 GM/DL (ref 13.5–18)
IMMATURE NEUTROPHIL %: 0.5 % (ref 0–0.43)
LYMPHOCYTES ABSOLUTE: 1.6 K/CU MM
LYMPHOCYTES RELATIVE PERCENT: 26.5 % (ref 24–44)
MCH RBC QN AUTO: 29.3 PG (ref 27–31)
MCHC RBC AUTO-ENTMCNC: 32.6 % (ref 32–36)
MCV RBC AUTO: 90.1 FL (ref 78–100)
MONOCYTES ABSOLUTE: 0.4 K/CU MM
MONOCYTES RELATIVE PERCENT: 6.3 % (ref 0–4)
NEUTROPHILS ABSOLUTE: 3.9 K/CU MM
NEUTROPHILS RELATIVE PERCENT: 63.3 % (ref 36–66)
NUCLEATED RBC %: 0 %
PDW BLD-RTO: 14 % (ref 11.7–14.9)
PLATELET # BLD: 192 K/CU MM (ref 140–440)
PMV BLD AUTO: 10.2 FL (ref 7.5–11.1)
POTASSIUM SERPL-SCNC: 4.4 MMOL/L (ref 3.5–5.1)
RBC # BLD: 4.43 M/CU MM (ref 4.6–6.2)
SODIUM BLD-SCNC: 137 MMOL/L (ref 135–145)
TOTAL IMMATURE NEUTOROPHIL: 0.03 K/CU MM
TOTAL NUCLEATED RBC: 0 K/CU MM
TOTAL PROTEIN: 6 GM/DL (ref 6.4–8.2)
VANCOMYCIN TROUGH: 12.8 UG/ML (ref 10–20)
WBC # BLD: 6.1 K/CU MM (ref 4–10.5)

## 2024-05-21 PROCEDURE — 6360000002 HC RX W HCPCS: Performed by: STUDENT IN AN ORGANIZED HEALTH CARE EDUCATION/TRAINING PROGRAM

## 2024-05-21 PROCEDURE — 2580000003 HC RX 258: Performed by: STUDENT IN AN ORGANIZED HEALTH CARE EDUCATION/TRAINING PROGRAM

## 2024-05-21 PROCEDURE — 87205 SMEAR GRAM STAIN: CPT

## 2024-05-21 PROCEDURE — 6360000002 HC RX W HCPCS: Performed by: ANESTHESIOLOGY

## 2024-05-21 PROCEDURE — 1200000000 HC SEMI PRIVATE

## 2024-05-21 PROCEDURE — 88311 DECALCIFY TISSUE: CPT | Performed by: PATHOLOGY

## 2024-05-21 PROCEDURE — 87070 CULTURE OTHR SPECIMN AEROBIC: CPT

## 2024-05-21 PROCEDURE — 7100000000 HC PACU RECOVERY - FIRST 15 MIN: Performed by: SURGERY

## 2024-05-21 PROCEDURE — 28810 AMPUTATION TOE & METATARSAL: CPT | Performed by: SURGERY

## 2024-05-21 PROCEDURE — 6370000000 HC RX 637 (ALT 250 FOR IP): Performed by: ANESTHESIOLOGY

## 2024-05-21 PROCEDURE — 80053 COMPREHEN METABOLIC PANEL: CPT

## 2024-05-21 PROCEDURE — 87075 CULTR BACTERIA EXCEPT BLOOD: CPT

## 2024-05-21 PROCEDURE — 97605 NEG PRS WND THER DME<=50SQCM: CPT | Performed by: SURGERY

## 2024-05-21 PROCEDURE — 80202 ASSAY OF VANCOMYCIN: CPT

## 2024-05-21 PROCEDURE — APPSS60 APP SPLIT SHARED TIME 46-60 MINUTES: Performed by: NURSE PRACTITIONER

## 2024-05-21 PROCEDURE — 6370000000 HC RX 637 (ALT 250 FOR IP): Performed by: STUDENT IN AN ORGANIZED HEALTH CARE EDUCATION/TRAINING PROGRAM

## 2024-05-21 PROCEDURE — 0Y6Q0Z0 DETACHMENT AT LEFT 1ST TOE, COMPLETE, OPEN APPROACH: ICD-10-PCS | Performed by: SURGERY

## 2024-05-21 PROCEDURE — 2709999900 HC NON-CHARGEABLE SUPPLY: Performed by: SURGERY

## 2024-05-21 PROCEDURE — 2580000003 HC RX 258: Performed by: ANESTHESIOLOGY

## 2024-05-21 PROCEDURE — 7100000001 HC PACU RECOVERY - ADDTL 15 MIN: Performed by: SURGERY

## 2024-05-21 PROCEDURE — 82962 GLUCOSE BLOOD TEST: CPT

## 2024-05-21 PROCEDURE — 87077 CULTURE AEROBIC IDENTIFY: CPT

## 2024-05-21 PROCEDURE — 3700000001 HC ADD 15 MINUTES (ANESTHESIA): Performed by: SURGERY

## 2024-05-21 PROCEDURE — 3600000002 HC SURGERY LEVEL 2 BASE: Performed by: SURGERY

## 2024-05-21 PROCEDURE — 3700000000 HC ANESTHESIA ATTENDED CARE: Performed by: SURGERY

## 2024-05-21 PROCEDURE — 85025 COMPLETE CBC W/AUTO DIFF WBC: CPT

## 2024-05-21 PROCEDURE — 6360000002 HC RX W HCPCS: Performed by: NURSE ANESTHETIST, CERTIFIED REGISTERED

## 2024-05-21 PROCEDURE — 88305 TISSUE EXAM BY PATHOLOGIST: CPT | Performed by: PATHOLOGY

## 2024-05-21 PROCEDURE — 3600000012 HC SURGERY LEVEL 2 ADDTL 15MIN: Performed by: SURGERY

## 2024-05-21 PROCEDURE — 94761 N-INVAS EAR/PLS OXIMETRY MLT: CPT

## 2024-05-21 PROCEDURE — 2580000003 HC RX 258: Performed by: NURSE ANESTHETIST, CERTIFIED REGISTERED

## 2024-05-21 PROCEDURE — 36415 COLL VENOUS BLD VENIPUNCTURE: CPT

## 2024-05-21 PROCEDURE — 6360000002 HC RX W HCPCS: Performed by: SURGERY

## 2024-05-21 PROCEDURE — 6370000000 HC RX 637 (ALT 250 FOR IP): Performed by: INTERNAL MEDICINE

## 2024-05-21 PROCEDURE — 87186 SC STD MICRODIL/AGAR DIL: CPT

## 2024-05-21 PROCEDURE — 99223 1ST HOSP IP/OBS HIGH 75: CPT | Performed by: INTERNAL MEDICINE

## 2024-05-21 RX ORDER — SODIUM CHLORIDE 0.9 % (FLUSH) 0.9 %
5-40 SYRINGE (ML) INJECTION EVERY 12 HOURS SCHEDULED
Status: DISCONTINUED | OUTPATIENT
Start: 2024-05-21 | End: 2024-05-28 | Stop reason: HOSPADM

## 2024-05-21 RX ORDER — SODIUM CHLORIDE, SODIUM LACTATE, POTASSIUM CHLORIDE, CALCIUM CHLORIDE 600; 310; 30; 20 MG/100ML; MG/100ML; MG/100ML; MG/100ML
INJECTION, SOLUTION INTRAVENOUS ONCE
Status: COMPLETED | OUTPATIENT
Start: 2024-05-21 | End: 2024-05-21

## 2024-05-21 RX ORDER — PROPOFOL 10 MG/ML
INJECTION, EMULSION INTRAVENOUS PRN
Status: DISCONTINUED | OUTPATIENT
Start: 2024-05-21 | End: 2024-05-21 | Stop reason: SDUPTHER

## 2024-05-21 RX ORDER — SODIUM CHLORIDE 0.9 % (FLUSH) 0.9 %
5-40 SYRINGE (ML) INJECTION PRN
Status: DISCONTINUED | OUTPATIENT
Start: 2024-05-21 | End: 2024-05-28 | Stop reason: HOSPADM

## 2024-05-21 RX ORDER — LABETALOL HYDROCHLORIDE 5 MG/ML
10 INJECTION, SOLUTION INTRAVENOUS
Status: DISCONTINUED | OUTPATIENT
Start: 2024-05-21 | End: 2024-05-28 | Stop reason: HOSPADM

## 2024-05-21 RX ORDER — SODIUM CHLORIDE, SODIUM LACTATE, POTASSIUM CHLORIDE, CALCIUM CHLORIDE 600; 310; 30; 20 MG/100ML; MG/100ML; MG/100ML; MG/100ML
INJECTION, SOLUTION INTRAVENOUS CONTINUOUS PRN
Status: DISCONTINUED | OUTPATIENT
Start: 2024-05-21 | End: 2024-05-21 | Stop reason: SDUPTHER

## 2024-05-21 RX ORDER — LIDOCAINE HYDROCHLORIDE 20 MG/ML
INJECTION, SOLUTION INTRAVENOUS PRN
Status: DISCONTINUED | OUTPATIENT
Start: 2024-05-21 | End: 2024-05-21 | Stop reason: SDUPTHER

## 2024-05-21 RX ORDER — DROPERIDOL 2.5 MG/ML
0.62 INJECTION, SOLUTION INTRAMUSCULAR; INTRAVENOUS EVERY 10 MIN PRN
Status: DISCONTINUED | OUTPATIENT
Start: 2024-05-21 | End: 2024-05-28 | Stop reason: HOSPADM

## 2024-05-21 RX ORDER — HYDRALAZINE HYDROCHLORIDE 20 MG/ML
10 INJECTION INTRAMUSCULAR; INTRAVENOUS
Status: DISCONTINUED | OUTPATIENT
Start: 2024-05-21 | End: 2024-05-28 | Stop reason: HOSPADM

## 2024-05-21 RX ORDER — DIPHENHYDRAMINE HYDROCHLORIDE 50 MG/ML
12.5 INJECTION INTRAMUSCULAR; INTRAVENOUS
Status: DISCONTINUED | OUTPATIENT
Start: 2024-05-21 | End: 2024-05-21 | Stop reason: HOSPADM

## 2024-05-21 RX ORDER — VANCOMYCIN HYDROCHLORIDE 1 G/20ML
INJECTION, POWDER, LYOPHILIZED, FOR SOLUTION INTRAVENOUS PRN
Status: DISCONTINUED | OUTPATIENT
Start: 2024-05-21 | End: 2024-05-21 | Stop reason: SDUPTHER

## 2024-05-21 RX ORDER — ONDANSETRON 2 MG/ML
4 INJECTION INTRAMUSCULAR; INTRAVENOUS
Status: ACTIVE | OUTPATIENT
Start: 2024-05-21 | End: 2024-05-22

## 2024-05-21 RX ORDER — FENTANYL CITRATE 50 UG/ML
25 INJECTION, SOLUTION INTRAMUSCULAR; INTRAVENOUS EVERY 5 MIN PRN
Status: DISCONTINUED | OUTPATIENT
Start: 2024-05-21 | End: 2024-05-22 | Stop reason: SDUPTHER

## 2024-05-21 RX ORDER — ONDANSETRON 2 MG/ML
INJECTION INTRAMUSCULAR; INTRAVENOUS PRN
Status: DISCONTINUED | OUTPATIENT
Start: 2024-05-21 | End: 2024-05-21 | Stop reason: SDUPTHER

## 2024-05-21 RX ORDER — FENTANYL CITRATE 50 UG/ML
INJECTION, SOLUTION INTRAMUSCULAR; INTRAVENOUS PRN
Status: DISCONTINUED | OUTPATIENT
Start: 2024-05-21 | End: 2024-05-21 | Stop reason: SDUPTHER

## 2024-05-21 RX ORDER — BUPIVACAINE HYDROCHLORIDE 5 MG/ML
INJECTION, SOLUTION EPIDURAL; INTRACAUDAL
Status: COMPLETED | OUTPATIENT
Start: 2024-05-21 | End: 2024-05-21

## 2024-05-21 RX ORDER — OXYCODONE HYDROCHLORIDE 5 MG/1
5 TABLET ORAL
Status: COMPLETED | OUTPATIENT
Start: 2024-05-21 | End: 2024-05-21

## 2024-05-21 RX ORDER — NALOXONE HYDROCHLORIDE 0.4 MG/ML
INJECTION, SOLUTION INTRAMUSCULAR; INTRAVENOUS; SUBCUTANEOUS PRN
Status: DISCONTINUED | OUTPATIENT
Start: 2024-05-21 | End: 2024-05-28 | Stop reason: HOSPADM

## 2024-05-21 RX ADMIN — SODIUM CHLORIDE: 9 INJECTION, SOLUTION INTRAVENOUS at 12:17

## 2024-05-21 RX ADMIN — CEFEPIME 2000 MG: 2 INJECTION, POWDER, FOR SOLUTION INTRAVENOUS at 00:25

## 2024-05-21 RX ADMIN — TAMSULOSIN HYDROCHLORIDE 0.4 MG: 0.4 CAPSULE ORAL at 11:38

## 2024-05-21 RX ADMIN — HYDROMORPHONE HYDROCHLORIDE 0.5 MG: 1 INJECTION, SOLUTION INTRAMUSCULAR; INTRAVENOUS; SUBCUTANEOUS at 09:23

## 2024-05-21 RX ADMIN — LIDOCAINE HYDROCHLORIDE 40 MG: 20 INJECTION, SOLUTION INTRAVENOUS at 07:47

## 2024-05-21 RX ADMIN — PROPOFOL 200 MG: 10 INJECTION, EMULSION INTRAVENOUS at 07:47

## 2024-05-21 RX ADMIN — PANTOPRAZOLE SODIUM 40 MG: 40 TABLET, DELAYED RELEASE ORAL at 05:54

## 2024-05-21 RX ADMIN — OXYCODONE HYDROCHLORIDE 5 MG: 5 TABLET ORAL at 14:32

## 2024-05-21 RX ADMIN — CEFEPIME 2000 MG: 2 INJECTION, POWDER, FOR SOLUTION INTRAVENOUS at 12:18

## 2024-05-21 RX ADMIN — ONDANSETRON 4 MG: 4 TABLET, ORALLY DISINTEGRATING ORAL at 15:53

## 2024-05-21 RX ADMIN — METRONIDAZOLE 500 MG: 500 INJECTION, SOLUTION INTRAVENOUS at 03:19

## 2024-05-21 RX ADMIN — OXYCODONE HYDROCHLORIDE AND ACETAMINOPHEN 1 TABLET: 5; 325 TABLET ORAL at 15:52

## 2024-05-21 RX ADMIN — METRONIDAZOLE 500 MG: 500 INJECTION, SOLUTION INTRAVENOUS at 21:24

## 2024-05-21 RX ADMIN — INSULIN LISPRO 1 UNITS: 100 INJECTION, SOLUTION INTRAVENOUS; SUBCUTANEOUS at 21:36

## 2024-05-21 RX ADMIN — SODIUM CHLORIDE: 9 INJECTION, SOLUTION INTRAVENOUS at 17:01

## 2024-05-21 RX ADMIN — CEFEPIME 2000 MG: 2 INJECTION, POWDER, FOR SOLUTION INTRAVENOUS at 17:03

## 2024-05-21 RX ADMIN — PREGABALIN 75 MG: 75 CAPSULE ORAL at 11:38

## 2024-05-21 RX ADMIN — ONDANSETRON 4 MG: 2 INJECTION INTRAMUSCULAR; INTRAVENOUS at 08:16

## 2024-05-21 RX ADMIN — PANTOPRAZOLE SODIUM 40 MG: 40 TABLET, DELAYED RELEASE ORAL at 16:59

## 2024-05-21 RX ADMIN — SODIUM CHLORIDE, POTASSIUM CHLORIDE, SODIUM LACTATE AND CALCIUM CHLORIDE: 600; 310; 30; 20 INJECTION, SOLUTION INTRAVENOUS at 07:38

## 2024-05-21 RX ADMIN — ATORVASTATIN CALCIUM 10 MG: 10 TABLET, FILM COATED ORAL at 11:38

## 2024-05-21 RX ADMIN — OXYCODONE HYDROCHLORIDE AND ACETAMINOPHEN 1 TABLET: 5; 325 TABLET ORAL at 21:27

## 2024-05-21 RX ADMIN — FENTANYL CITRATE 100 MCG: 50 INJECTION, SOLUTION INTRAMUSCULAR; INTRAVENOUS at 07:47

## 2024-05-21 RX ADMIN — ESCITALOPRAM OXALATE 10 MG: 10 TABLET ORAL at 11:38

## 2024-05-21 RX ADMIN — SODIUM CHLORIDE, POTASSIUM CHLORIDE, SODIUM LACTATE AND CALCIUM CHLORIDE: 600; 310; 30; 20 INJECTION, SOLUTION INTRAVENOUS at 12:10

## 2024-05-21 RX ADMIN — DOCUSATE SODIUM 100 MG: 100 CAPSULE, LIQUID FILLED ORAL at 11:38

## 2024-05-21 RX ADMIN — VANCOMYCIN HYDROCHLORIDE 1750 MG: 1 INJECTION, POWDER, LYOPHILIZED, FOR SOLUTION INTRAVENOUS at 07:56

## 2024-05-21 RX ADMIN — HYDROMORPHONE HYDROCHLORIDE 0.5 MG: 1 INJECTION, SOLUTION INTRAMUSCULAR; INTRAVENOUS; SUBCUTANEOUS at 09:38

## 2024-05-21 RX ADMIN — PHENYLEPHRINE HYDROCHLORIDE 100 MCG: 10 INJECTION INTRAVENOUS at 08:00

## 2024-05-21 RX ADMIN — METRONIDAZOLE 500 MG: 500 INJECTION, SOLUTION INTRAVENOUS at 13:13

## 2024-05-21 RX ADMIN — OXYCODONE HYDROCHLORIDE AND ACETAMINOPHEN 1 TABLET: 5; 325 TABLET ORAL at 03:23

## 2024-05-21 RX ADMIN — PREGABALIN 75 MG: 75 CAPSULE ORAL at 21:27

## 2024-05-21 RX ADMIN — VANCOMYCIN HYDROCHLORIDE 1750 MG: 5 INJECTION, POWDER, LYOPHILIZED, FOR SOLUTION INTRAVENOUS at 21:37

## 2024-05-21 ASSESSMENT — PAIN DESCRIPTION - ORIENTATION
ORIENTATION: LEFT;INNER
ORIENTATION: LEFT;INNER
ORIENTATION: RIGHT;INNER
ORIENTATION: LEFT
ORIENTATION: LEFT;INNER
ORIENTATION: LEFT
ORIENTATION: LEFT;INNER
ORIENTATION: LEFT

## 2024-05-21 ASSESSMENT — PAIN SCALES - WONG BAKER
WONGBAKER_NUMERICALRESPONSE: HURTS A LITTLE BIT

## 2024-05-21 ASSESSMENT — PAIN DESCRIPTION - ONSET: ONSET: ON-GOING

## 2024-05-21 ASSESSMENT — PAIN SCALES - GENERAL
PAINLEVEL_OUTOF10: 7
PAINLEVEL_OUTOF10: 4
PAINLEVEL_OUTOF10: 8
PAINLEVEL_OUTOF10: 6
PAINLEVEL_OUTOF10: 9
PAINLEVEL_OUTOF10: 7
PAINLEVEL_OUTOF10: 7
PAINLEVEL_OUTOF10: 8
PAINLEVEL_OUTOF10: 7
PAINLEVEL_OUTOF10: 6
PAINLEVEL_OUTOF10: 7

## 2024-05-21 ASSESSMENT — PAIN DESCRIPTION - DESCRIPTORS
DESCRIPTORS: ACHING;DISCOMFORT
DESCRIPTORS: ACHING
DESCRIPTORS: ACHING;DISCOMFORT;THROBBING
DESCRIPTORS: ACHING;SHOOTING
DESCRIPTORS: ACHING;DISCOMFORT
DESCRIPTORS: ACHING;THROBBING
DESCRIPTORS: ACHING;DISCOMFORT
DESCRIPTORS: ACHING
DESCRIPTORS: ACHING;DISCOMFORT;THROBBING

## 2024-05-21 ASSESSMENT — PAIN DESCRIPTION - FREQUENCY
FREQUENCY: INTERMITTENT

## 2024-05-21 ASSESSMENT — PAIN DESCRIPTION - LOCATION
LOCATION: FOOT

## 2024-05-21 ASSESSMENT — PAIN - FUNCTIONAL ASSESSMENT
PAIN_FUNCTIONAL_ASSESSMENT: ACTIVITIES ARE NOT PREVENTED
PAIN_FUNCTIONAL_ASSESSMENT: PREVENTS OR INTERFERES SOME ACTIVE ACTIVITIES AND ADLS
PAIN_FUNCTIONAL_ASSESSMENT: 0-10
PAIN_FUNCTIONAL_ASSESSMENT: ACTIVITIES ARE NOT PREVENTED
PAIN_FUNCTIONAL_ASSESSMENT: PREVENTS OR INTERFERES SOME ACTIVE ACTIVITIES AND ADLS
PAIN_FUNCTIONAL_ASSESSMENT: ACTIVITIES ARE NOT PREVENTED

## 2024-05-21 ASSESSMENT — PAIN DESCRIPTION - PAIN TYPE
TYPE: ACUTE PAIN

## 2024-05-21 NOTE — BRIEF OP NOTE
GENERAL SURGERY BRIEF OPERATIVE NOTE  [facility]    PATIENT: Toi Briones : 1965     MRN: [id number]   DATE: 2024 CASE ID: [id number]     SURGEON(S): MELANY LEES MD     PROCEDURE(S) PERFORMED:   LEFT 1ST TOE AMPUTATION, WOUND VAC    PREOPERATIVE DIAGNOSIS:  Osteomyelitis of left foot, unspecified type (HCC) [M86.9]    POSTOPERATIVE DIAGNOSIS:   same    INDICATIONS: Toi Briones is a 58 y.o. male presenting with  osteomyelitis of the left 1st metatarsal head in association with an ulcer of the left foot  for which  left 1st toe amputation with wound vac placement  has been discussed. The risks, benefits, potential complications and possible alternatives of the procedure were discussed in detail, including complications of but not limited to infection, bleeding, anesthesia-related complications, death, injury to surrounding structures, pain, poor healing or cosmesis, wound complications and recurrent symptoms or pathology, or need for additional interventions. All questions were answered. The patient wished to proceed and consent was documented in the medical record.      FINDINGS:   Osteomyelitis present, left foot ulcer  Infection Present At Time Of Surgery (PATOS) (choose all levels that have infection present):  - Organ Space infection (below fascia) present as evidenced by osteomyelitis    ANESTHESIA:   General LMA anesthesia  Anesthesiologist: Td Oliver MD  CRNA: Leonel Mathias APRN - CRNA    FIRST ASSISTANT:   The use of a First Assistant was necessary for the proper positioning, prepping, and draping of the patient, as well as the safe and expeditious execution of the case and closure of skin and subcutaneous tissues.     STAFF:   Scrub Person First: Azeem Foster; Victorino Angeles    ESTIMATED BLOOD LOSS: Minimal    SPECIMEN(S):   ID Type Source Tests Collected by Time Destination   1 : Left 1st Toe Wound Culture Tissue Tissue CULTURE, SURGICAL Melany Lees MD

## 2024-05-21 NOTE — PLAN OF CARE
Problem: Pain  Goal: Verbalizes/displays adequate comfort level or baseline comfort level  5/21/2024 0350 by Chloe Welch RN  Outcome: Progressing  5/20/2024 1358 by Raiza Carter LPN  Outcome: Progressing     Problem: Safety - Adult  Goal: Free from fall injury  5/21/2024 0350 by Chloe Welch RN  Outcome: Progressing  5/20/2024 1358 by Raiza Carter LPN  Outcome: Progressing     Problem: Chronic Conditions and Co-morbidities  Goal: Patient's chronic conditions and co-morbidity symptoms are monitored and maintained or improved  5/20/2024 1358 by Raiza Carter LPN  Outcome: Progressing

## 2024-05-21 NOTE — OP NOTE
GENERAL SURGERY OPERATIVE NOTE  [facility]    PATIENT: Toi Briones : 1965     MRN: [id number]   DATE: 2024 CASE ID: [id number]     SURGEON(S): MELANY LEES MD     PROCEDURE(S) PERFORMED:   LEFT 1ST TOE AMPUTATION, WOUND VAC    PREOPERATIVE DIAGNOSIS:  Osteomyelitis of left foot, unspecified type (HCC) [M86.9]    POSTOPERATIVE DIAGNOSIS:   same    INDICATIONS: Toi Briones is a 58 y.o. male presenting with osteomyelitis of the left 1st metatarsal head in association with an ulcer of the left foot for which left 1st toe amputation with wound vac placement has been discussed. The risks, benefits, potential complications and possible alternatives of the procedure were discussed in detail, including complications of but not limited to infection, bleeding, anesthesia-related complications, death, injury to surrounding structures, pain, poor healing or cosmesis, wound complications and recurrent symptoms or pathology, or need for additional interventions. All questions were answered. The patient wished to proceed and consent was documented in the medical record.      FINDINGS:   Osteomyelitis present, left foot ulcer  Infection Present At Time Of Surgery (PATOS) (choose all levels that have infection present):  - Organ Space infection (below fascia) present as evidenced by osteomyelitis    ANESTHESIA:   General LMA anesthesia  Anesthesiologist: Td Oliver MD  CRNA: Leonel Mathias APRN - CRNA    FIRST ASSISTANT:   The use of a First Assistant was necessary for the proper positioning, prepping, and draping of the patient, as well as the safe and expeditious execution of the case and closure of skin and subcutaneous tissues.     STAFF:   Scrub Person First: Azeem Foster; Victorino Angeles    ESTIMATED BLOOD LOSS: Minimal    SPECIMEN(S):   ID Type Source Tests Collected by Time Destination   1 : Left 1st Toe Wound Culture Tissue Tissue CULTURE, SURGICAL Melany Lees MD 2024

## 2024-05-22 PROBLEM — M86.172 ACUTE OSTEOMYELITIS OF METATARSAL BONE OF LEFT FOOT (HCC): Status: ACTIVE | Noted: 2024-05-22

## 2024-05-22 LAB
CRP SERPL HS-MCNC: 18.3 MG/L
CULTURE: NORMAL
CULTURE: NORMAL
GLUCOSE BLD-MCNC: 140 MG/DL (ref 70–99)
GLUCOSE BLD-MCNC: 154 MG/DL (ref 70–99)
GLUCOSE BLD-MCNC: 200 MG/DL (ref 70–99)
GLUCOSE BLD-MCNC: 238 MG/DL (ref 70–99)
GLUCOSE BLD-MCNC: 250 MG/DL (ref 70–99)
GLUCOSE BLD-MCNC: 273 MG/DL (ref 70–99)
Lab: NORMAL
SPECIMEN: NORMAL

## 2024-05-22 PROCEDURE — 6370000000 HC RX 637 (ALT 250 FOR IP): Performed by: SURGERY

## 2024-05-22 PROCEDURE — 6370000000 HC RX 637 (ALT 250 FOR IP): Performed by: STUDENT IN AN ORGANIZED HEALTH CARE EDUCATION/TRAINING PROGRAM

## 2024-05-22 PROCEDURE — 1200000000 HC SEMI PRIVATE

## 2024-05-22 PROCEDURE — 97116 GAIT TRAINING THERAPY: CPT

## 2024-05-22 PROCEDURE — 86140 C-REACTIVE PROTEIN: CPT

## 2024-05-22 PROCEDURE — 6360000002 HC RX W HCPCS: Performed by: STUDENT IN AN ORGANIZED HEALTH CARE EDUCATION/TRAINING PROGRAM

## 2024-05-22 PROCEDURE — 6360000002 HC RX W HCPCS: Performed by: SURGERY

## 2024-05-22 PROCEDURE — 2580000003 HC RX 258: Performed by: SURGERY

## 2024-05-22 PROCEDURE — 97163 PT EVAL HIGH COMPLEX 45 MIN: CPT

## 2024-05-22 PROCEDURE — 94761 N-INVAS EAR/PLS OXIMETRY MLT: CPT

## 2024-05-22 PROCEDURE — 2580000003 HC RX 258: Performed by: STUDENT IN AN ORGANIZED HEALTH CARE EDUCATION/TRAINING PROGRAM

## 2024-05-22 PROCEDURE — 99024 POSTOP FOLLOW-UP VISIT: CPT | Performed by: SURGERY

## 2024-05-22 PROCEDURE — 36415 COLL VENOUS BLD VENIPUNCTURE: CPT

## 2024-05-22 PROCEDURE — 99233 SBSQ HOSP IP/OBS HIGH 50: CPT | Performed by: NURSE PRACTITIONER

## 2024-05-22 PROCEDURE — 97162 PT EVAL MOD COMPLEX 30 MIN: CPT

## 2024-05-22 RX ORDER — OXYCODONE HYDROCHLORIDE 5 MG/1
5 TABLET ORAL EVERY 4 HOURS PRN
Status: DISCONTINUED | OUTPATIENT
Start: 2024-05-22 | End: 2024-05-28 | Stop reason: HOSPADM

## 2024-05-22 RX ORDER — SODIUM CHLORIDE 0.9 % (FLUSH) 0.9 %
5-40 SYRINGE (ML) INJECTION PRN
Status: DISCONTINUED | OUTPATIENT
Start: 2024-05-22 | End: 2024-05-28 | Stop reason: HOSPADM

## 2024-05-22 RX ORDER — SODIUM CHLORIDE 0.9 % (FLUSH) 0.9 %
5-40 SYRINGE (ML) INJECTION EVERY 12 HOURS SCHEDULED
Status: DISCONTINUED | OUTPATIENT
Start: 2024-05-22 | End: 2024-05-28 | Stop reason: HOSPADM

## 2024-05-22 RX ORDER — SODIUM CHLORIDE 9 MG/ML
INJECTION, SOLUTION INTRAVENOUS PRN
Status: DISCONTINUED | OUTPATIENT
Start: 2024-05-22 | End: 2024-05-28 | Stop reason: HOSPADM

## 2024-05-22 RX ORDER — OXYCODONE HYDROCHLORIDE 10 MG/1
10 TABLET ORAL EVERY 4 HOURS PRN
Status: DISCONTINUED | OUTPATIENT
Start: 2024-05-22 | End: 2024-05-28 | Stop reason: HOSPADM

## 2024-05-22 RX ORDER — ENOXAPARIN SODIUM 100 MG/ML
30 INJECTION SUBCUTANEOUS 2 TIMES DAILY
Status: DISCONTINUED | OUTPATIENT
Start: 2024-05-22 | End: 2024-05-28 | Stop reason: HOSPADM

## 2024-05-22 RX ORDER — HYDROMORPHONE HYDROCHLORIDE 1 MG/ML
0.5 INJECTION, SOLUTION INTRAMUSCULAR; INTRAVENOUS; SUBCUTANEOUS
Status: DISCONTINUED | OUTPATIENT
Start: 2024-05-22 | End: 2024-05-23

## 2024-05-22 RX ORDER — HYDROMORPHONE HYDROCHLORIDE 1 MG/ML
0.25 INJECTION, SOLUTION INTRAMUSCULAR; INTRAVENOUS; SUBCUTANEOUS
Status: DISCONTINUED | OUTPATIENT
Start: 2024-05-22 | End: 2024-05-23

## 2024-05-22 RX ADMIN — ESCITALOPRAM OXALATE 10 MG: 10 TABLET ORAL at 09:51

## 2024-05-22 RX ADMIN — METRONIDAZOLE 500 MG: 500 INJECTION, SOLUTION INTRAVENOUS at 01:41

## 2024-05-22 RX ADMIN — INSULIN LISPRO 1 UNITS: 100 INJECTION, SOLUTION INTRAVENOUS; SUBCUTANEOUS at 17:06

## 2024-05-22 RX ADMIN — TAMSULOSIN HYDROCHLORIDE 0.4 MG: 0.4 CAPSULE ORAL at 09:50

## 2024-05-22 RX ADMIN — INSULIN LISPRO 2 UNITS: 100 INJECTION, SOLUTION INTRAVENOUS; SUBCUTANEOUS at 09:51

## 2024-05-22 RX ADMIN — PANTOPRAZOLE SODIUM 40 MG: 40 TABLET, DELAYED RELEASE ORAL at 05:53

## 2024-05-22 RX ADMIN — DOCUSATE SODIUM 100 MG: 100 CAPSULE, LIQUID FILLED ORAL at 09:51

## 2024-05-22 RX ADMIN — OXYCODONE HYDROCHLORIDE 10 MG: 10 TABLET ORAL at 15:05

## 2024-05-22 RX ADMIN — OXYCODONE HYDROCHLORIDE AND ACETAMINOPHEN 1 TABLET: 5; 325 TABLET ORAL at 01:04

## 2024-05-22 RX ADMIN — METRONIDAZOLE 500 MG: 500 INJECTION, SOLUTION INTRAVENOUS at 11:53

## 2024-05-22 RX ADMIN — PANTOPRAZOLE SODIUM 40 MG: 40 TABLET, DELAYED RELEASE ORAL at 17:05

## 2024-05-22 RX ADMIN — VANCOMYCIN HYDROCHLORIDE 1750 MG: 5 INJECTION, POWDER, LYOPHILIZED, FOR SOLUTION INTRAVENOUS at 20:48

## 2024-05-22 RX ADMIN — OXYCODONE HYDROCHLORIDE AND ACETAMINOPHEN 1 TABLET: 5; 325 TABLET ORAL at 05:53

## 2024-05-22 RX ADMIN — INSULIN LISPRO 1 UNITS: 100 INJECTION, SOLUTION INTRAVENOUS; SUBCUTANEOUS at 12:29

## 2024-05-22 RX ADMIN — METRONIDAZOLE 500 MG: 500 INJECTION, SOLUTION INTRAVENOUS at 18:22

## 2024-05-22 RX ADMIN — INSULIN LISPRO 1 UNITS: 100 INJECTION, SOLUTION INTRAVENOUS; SUBCUTANEOUS at 17:05

## 2024-05-22 RX ADMIN — VANCOMYCIN HYDROCHLORIDE 1750 MG: 5 INJECTION, POWDER, LYOPHILIZED, FOR SOLUTION INTRAVENOUS at 09:50

## 2024-05-22 RX ADMIN — OXYCODONE HYDROCHLORIDE 10 MG: 10 TABLET ORAL at 09:56

## 2024-05-22 RX ADMIN — INSULIN LISPRO 2 UNITS: 100 INJECTION, SOLUTION INTRAVENOUS; SUBCUTANEOUS at 09:50

## 2024-05-22 RX ADMIN — CEFEPIME 2000 MG: 2 INJECTION, POWDER, FOR SOLUTION INTRAVENOUS at 09:47

## 2024-05-22 RX ADMIN — OXYCODONE HYDROCHLORIDE 10 MG: 10 TABLET ORAL at 20:40

## 2024-05-22 RX ADMIN — CEFEPIME 2000 MG: 2 INJECTION, POWDER, FOR SOLUTION INTRAVENOUS at 17:12

## 2024-05-22 RX ADMIN — ATORVASTATIN CALCIUM 10 MG: 10 TABLET, FILM COATED ORAL at 09:50

## 2024-05-22 RX ADMIN — ENOXAPARIN SODIUM 30 MG: 100 INJECTION SUBCUTANEOUS at 20:40

## 2024-05-22 RX ADMIN — CEFEPIME 2000 MG: 2 INJECTION, POWDER, FOR SOLUTION INTRAVENOUS at 00:36

## 2024-05-22 RX ADMIN — PREGABALIN 75 MG: 75 CAPSULE ORAL at 20:40

## 2024-05-22 RX ADMIN — PREGABALIN 75 MG: 75 CAPSULE ORAL at 09:50

## 2024-05-22 RX ADMIN — INSULIN LISPRO 2 UNITS: 100 INJECTION, SOLUTION INTRAVENOUS; SUBCUTANEOUS at 20:41

## 2024-05-22 RX ADMIN — SODIUM CHLORIDE, PRESERVATIVE FREE 10 ML: 5 INJECTION INTRAVENOUS at 20:40

## 2024-05-22 ASSESSMENT — PAIN SCALES - GENERAL
PAINLEVEL_OUTOF10: 6
PAINLEVEL_OUTOF10: 8
PAINLEVEL_OUTOF10: 7
PAINLEVEL_OUTOF10: 8
PAINLEVEL_OUTOF10: 8
PAINLEVEL_OUTOF10: 7
PAINLEVEL_OUTOF10: 4
PAINLEVEL_OUTOF10: 8
PAINLEVEL_OUTOF10: 6

## 2024-05-22 ASSESSMENT — PAIN DESCRIPTION - LOCATION
LOCATION: FOOT

## 2024-05-22 ASSESSMENT — PAIN - FUNCTIONAL ASSESSMENT
PAIN_FUNCTIONAL_ASSESSMENT: ACTIVITIES ARE NOT PREVENTED
PAIN_FUNCTIONAL_ASSESSMENT: PREVENTS OR INTERFERES SOME ACTIVE ACTIVITIES AND ADLS
PAIN_FUNCTIONAL_ASSESSMENT: ACTIVITIES ARE NOT PREVENTED
PAIN_FUNCTIONAL_ASSESSMENT: PREVENTS OR INTERFERES SOME ACTIVE ACTIVITIES AND ADLS
PAIN_FUNCTIONAL_ASSESSMENT: ACTIVITIES ARE NOT PREVENTED
PAIN_FUNCTIONAL_ASSESSMENT: ACTIVITIES ARE NOT PREVENTED

## 2024-05-22 ASSESSMENT — PAIN DESCRIPTION - ORIENTATION
ORIENTATION: LEFT

## 2024-05-22 ASSESSMENT — PAIN DESCRIPTION - FREQUENCY
FREQUENCY: INTERMITTENT

## 2024-05-22 ASSESSMENT — PAIN DESCRIPTION - DESCRIPTORS
DESCRIPTORS: ACHING;DISCOMFORT
DESCRIPTORS: ACHING;STABBING
DESCRIPTORS: ACHING;DISCOMFORT

## 2024-05-22 ASSESSMENT — PAIN SCALES - WONG BAKER
WONGBAKER_NUMERICALRESPONSE: HURTS A LITTLE BIT
WONGBAKER_NUMERICALRESPONSE: HURTS WHOLE LOT

## 2024-05-22 ASSESSMENT — PAIN DESCRIPTION - ONSET
ONSET: ON-GOING
ONSET: ON-GOING

## 2024-05-22 ASSESSMENT — PAIN DESCRIPTION - PAIN TYPE
TYPE: CHRONIC PAIN;SURGICAL PAIN
TYPE: SURGICAL PAIN

## 2024-05-22 NOTE — CARE COORDINATION
Patient is agreeable to [facility]. He would like [facility]. PS to Nasima Tatum.

## 2024-05-23 LAB
ANION GAP SERPL CALCULATED.3IONS-SCNC: 9 MMOL/L (ref 7–16)
BUN SERPL-MCNC: 13 MG/DL (ref 6–23)
CALCIUM SERPL-MCNC: 8.5 MG/DL (ref 8.3–10.6)
CHLORIDE BLD-SCNC: 104 MMOL/L (ref 99–110)
CO2: 25 MMOL/L (ref 21–32)
CREAT SERPL-MCNC: 0.7 MG/DL (ref 0.9–1.3)
CRP SERPL HS-MCNC: 23.3 MG/L
DOSE AMOUNT: NORMAL
DOSE TIME: NORMAL
GFR, ESTIMATED: >90 ML/MIN/1.73M2
GLUCOSE BLD-MCNC: 163 MG/DL (ref 70–99)
GLUCOSE BLD-MCNC: 175 MG/DL (ref 70–99)
GLUCOSE BLD-MCNC: 179 MG/DL (ref 70–99)
GLUCOSE BLD-MCNC: 184 MG/DL (ref 70–99)
GLUCOSE BLD-MCNC: 188 MG/DL (ref 70–99)
GLUCOSE BLD-MCNC: 191 MG/DL (ref 70–99)
GLUCOSE BLD-MCNC: 211 MG/DL (ref 70–99)
GLUCOSE BLD-MCNC: 241 MG/DL (ref 70–99)
GLUCOSE BLD-MCNC: 255 MG/DL (ref 70–99)
GLUCOSE BLD-MCNC: 255 MG/DL (ref 70–99)
GLUCOSE SERPL-MCNC: 190 MG/DL (ref 70–99)
POTASSIUM SERPL-SCNC: 4.2 MMOL/L (ref 3.5–5.1)
SODIUM BLD-SCNC: 138 MMOL/L (ref 135–145)
VANCOMYCIN RANDOM: 14.6 UG/ML

## 2024-05-23 PROCEDURE — 94761 N-INVAS EAR/PLS OXIMETRY MLT: CPT

## 2024-05-23 PROCEDURE — 6370000000 HC RX 637 (ALT 250 FOR IP): Performed by: SURGERY

## 2024-05-23 PROCEDURE — 82962 GLUCOSE BLOOD TEST: CPT

## 2024-05-23 PROCEDURE — 86140 C-REACTIVE PROTEIN: CPT

## 2024-05-23 PROCEDURE — 99233 SBSQ HOSP IP/OBS HIGH 50: CPT | Performed by: NURSE PRACTITIONER

## 2024-05-23 PROCEDURE — 6360000002 HC RX W HCPCS: Performed by: SURGERY

## 2024-05-23 PROCEDURE — 76937 US GUIDE VASCULAR ACCESS: CPT

## 2024-05-23 PROCEDURE — 2580000003 HC RX 258: Performed by: SURGERY

## 2024-05-23 PROCEDURE — 80048 BASIC METABOLIC PNL TOTAL CA: CPT

## 2024-05-23 PROCEDURE — 36415 COLL VENOUS BLD VENIPUNCTURE: CPT

## 2024-05-23 PROCEDURE — 80202 ASSAY OF VANCOMYCIN: CPT

## 2024-05-23 PROCEDURE — 1200000000 HC SEMI PRIVATE

## 2024-05-23 RX ADMIN — METRONIDAZOLE 500 MG: 500 INJECTION, SOLUTION INTRAVENOUS at 18:33

## 2024-05-23 RX ADMIN — OXYCODONE HYDROCHLORIDE 10 MG: 10 TABLET ORAL at 17:30

## 2024-05-23 RX ADMIN — OXYCODONE HYDROCHLORIDE 10 MG: 10 TABLET ORAL at 09:37

## 2024-05-23 RX ADMIN — ESCITALOPRAM OXALATE 10 MG: 10 TABLET ORAL at 09:07

## 2024-05-23 RX ADMIN — OXYCODONE HYDROCHLORIDE 10 MG: 10 TABLET ORAL at 01:08

## 2024-05-23 RX ADMIN — INSULIN LISPRO 1 UNITS: 100 INJECTION, SOLUTION INTRAVENOUS; SUBCUTANEOUS at 12:43

## 2024-05-23 RX ADMIN — ATORVASTATIN CALCIUM 10 MG: 10 TABLET, FILM COATED ORAL at 09:06

## 2024-05-23 RX ADMIN — METRONIDAZOLE 500 MG: 500 INJECTION, SOLUTION INTRAVENOUS at 11:48

## 2024-05-23 RX ADMIN — METRONIDAZOLE 500 MG: 500 INJECTION, SOLUTION INTRAVENOUS at 02:19

## 2024-05-23 RX ADMIN — SODIUM CHLORIDE, PRESERVATIVE FREE 5 ML: 5 INJECTION INTRAVENOUS at 09:11

## 2024-05-23 RX ADMIN — SODIUM CHLORIDE, PRESERVATIVE FREE 10 ML: 5 INJECTION INTRAVENOUS at 20:13

## 2024-05-23 RX ADMIN — VANCOMYCIN HYDROCHLORIDE 1750 MG: 5 INJECTION, POWDER, LYOPHILIZED, FOR SOLUTION INTRAVENOUS at 10:42

## 2024-05-23 RX ADMIN — INSULIN LISPRO 2 UNITS: 100 INJECTION, SOLUTION INTRAVENOUS; SUBCUTANEOUS at 17:31

## 2024-05-23 RX ADMIN — ENOXAPARIN SODIUM 30 MG: 100 INJECTION SUBCUTANEOUS at 21:05

## 2024-05-23 RX ADMIN — CEFEPIME 2000 MG: 2 INJECTION, POWDER, FOR SOLUTION INTRAVENOUS at 01:12

## 2024-05-23 RX ADMIN — PANTOPRAZOLE SODIUM 40 MG: 40 TABLET, DELAYED RELEASE ORAL at 05:16

## 2024-05-23 RX ADMIN — SODIUM CHLORIDE, PRESERVATIVE FREE 5 ML: 5 INJECTION INTRAVENOUS at 09:12

## 2024-05-23 RX ADMIN — VANCOMYCIN HYDROCHLORIDE 1750 MG: 5 INJECTION, POWDER, LYOPHILIZED, FOR SOLUTION INTRAVENOUS at 22:18

## 2024-05-23 RX ADMIN — CEFEPIME 2000 MG: 2 INJECTION, POWDER, FOR SOLUTION INTRAVENOUS at 11:03

## 2024-05-23 RX ADMIN — INSULIN LISPRO 2 UNITS: 100 INJECTION, SOLUTION INTRAVENOUS; SUBCUTANEOUS at 17:32

## 2024-05-23 RX ADMIN — OXYCODONE HYDROCHLORIDE 10 MG: 10 TABLET ORAL at 05:16

## 2024-05-23 RX ADMIN — DOCUSATE SODIUM 100 MG: 100 CAPSULE, LIQUID FILLED ORAL at 09:06

## 2024-05-23 RX ADMIN — TAMSULOSIN HYDROCHLORIDE 0.4 MG: 0.4 CAPSULE ORAL at 09:06

## 2024-05-23 RX ADMIN — CEFEPIME 2000 MG: 2 INJECTION, POWDER, FOR SOLUTION INTRAVENOUS at 17:37

## 2024-05-23 RX ADMIN — PREGABALIN 75 MG: 75 CAPSULE ORAL at 09:06

## 2024-05-23 RX ADMIN — PREGABALIN 75 MG: 75 CAPSULE ORAL at 21:05

## 2024-05-23 RX ADMIN — PANTOPRAZOLE SODIUM 40 MG: 40 TABLET, DELAYED RELEASE ORAL at 17:30

## 2024-05-23 RX ADMIN — SODIUM CHLORIDE, PRESERVATIVE FREE 10 ML: 5 INJECTION INTRAVENOUS at 09:07

## 2024-05-23 RX ADMIN — OXYCODONE HYDROCHLORIDE 10 MG: 10 TABLET ORAL at 22:20

## 2024-05-23 RX ADMIN — ENOXAPARIN SODIUM 30 MG: 100 INJECTION SUBCUTANEOUS at 09:06

## 2024-05-23 ASSESSMENT — PAIN DESCRIPTION - DESCRIPTORS
DESCRIPTORS: ACHING
DESCRIPTORS: BURNING;THROBBING
DESCRIPTORS: STABBING
DESCRIPTORS: THROBBING;SHARP
DESCRIPTORS: ACHING
DESCRIPTORS: ACHING

## 2024-05-23 ASSESSMENT — PAIN SCALES - GENERAL
PAINLEVEL_OUTOF10: 6
PAINLEVEL_OUTOF10: 8
PAINLEVEL_OUTOF10: 7
PAINLEVEL_OUTOF10: 6
PAINLEVEL_OUTOF10: 7
PAINLEVEL_OUTOF10: 7
PAINLEVEL_OUTOF10: 6
PAINLEVEL_OUTOF10: 7

## 2024-05-23 ASSESSMENT — PAIN DESCRIPTION - LOCATION
LOCATION: FOOT

## 2024-05-23 ASSESSMENT — PAIN - FUNCTIONAL ASSESSMENT
PAIN_FUNCTIONAL_ASSESSMENT: PREVENTS OR INTERFERES SOME ACTIVE ACTIVITIES AND ADLS
PAIN_FUNCTIONAL_ASSESSMENT: ACTIVITIES ARE NOT PREVENTED
PAIN_FUNCTIONAL_ASSESSMENT: PREVENTS OR INTERFERES SOME ACTIVE ACTIVITIES AND ADLS
PAIN_FUNCTIONAL_ASSESSMENT: PREVENTS OR INTERFERES SOME ACTIVE ACTIVITIES AND ADLS

## 2024-05-23 ASSESSMENT — PAIN DESCRIPTION - ORIENTATION
ORIENTATION: LEFT

## 2024-05-23 ASSESSMENT — PAIN DESCRIPTION - ONSET: ONSET: ON-GOING

## 2024-05-23 ASSESSMENT — PAIN SCALES - WONG BAKER: WONGBAKER_NUMERICALRESPONSE: HURTS A LITTLE BIT

## 2024-05-23 ASSESSMENT — PAIN DESCRIPTION - FREQUENCY: FREQUENCY: CONTINUOUS

## 2024-05-23 ASSESSMENT — PAIN DESCRIPTION - PAIN TYPE: TYPE: SURGICAL PAIN

## 2024-05-24 LAB
CRP SERPL HS-MCNC: 18.4 MG/L
CULTURE: NORMAL
CULTURE: NORMAL
GLUCOSE BLD-MCNC: 182 MG/DL (ref 70–99)
GLUCOSE BLD-MCNC: 189 MG/DL (ref 70–99)
GLUCOSE BLD-MCNC: 193 MG/DL (ref 70–99)
GLUCOSE BLD-MCNC: 255 MG/DL (ref 70–99)
GLUCOSE BLD-MCNC: 297 MG/DL (ref 70–99)
Lab: NORMAL
Lab: NORMAL
SPECIMEN: NORMAL
SPECIMEN: NORMAL

## 2024-05-24 PROCEDURE — 1200000000 HC SEMI PRIVATE

## 2024-05-24 PROCEDURE — 99233 SBSQ HOSP IP/OBS HIGH 50: CPT | Performed by: NURSE PRACTITIONER

## 2024-05-24 PROCEDURE — 6370000000 HC RX 637 (ALT 250 FOR IP): Performed by: SURGERY

## 2024-05-24 PROCEDURE — 99024 POSTOP FOLLOW-UP VISIT: CPT | Performed by: SURGERY

## 2024-05-24 PROCEDURE — 97116 GAIT TRAINING THERAPY: CPT

## 2024-05-24 PROCEDURE — 36415 COLL VENOUS BLD VENIPUNCTURE: CPT

## 2024-05-24 PROCEDURE — 2580000003 HC RX 258: Performed by: SURGERY

## 2024-05-24 PROCEDURE — 6360000002 HC RX W HCPCS: Performed by: SURGERY

## 2024-05-24 PROCEDURE — 82962 GLUCOSE BLOOD TEST: CPT

## 2024-05-24 PROCEDURE — 86140 C-REACTIVE PROTEIN: CPT

## 2024-05-24 PROCEDURE — 94761 N-INVAS EAR/PLS OXIMETRY MLT: CPT

## 2024-05-24 PROCEDURE — 97605 NEG PRS WND THER DME<=50SQCM: CPT

## 2024-05-24 RX ADMIN — VANCOMYCIN HYDROCHLORIDE 1750 MG: 5 INJECTION, POWDER, LYOPHILIZED, FOR SOLUTION INTRAVENOUS at 09:39

## 2024-05-24 RX ADMIN — OXYCODONE HYDROCHLORIDE 10 MG: 10 TABLET ORAL at 17:38

## 2024-05-24 RX ADMIN — ATORVASTATIN CALCIUM 10 MG: 10 TABLET, FILM COATED ORAL at 08:37

## 2024-05-24 RX ADMIN — OXYCODONE HYDROCHLORIDE 10 MG: 10 TABLET ORAL at 21:38

## 2024-05-24 RX ADMIN — ENOXAPARIN SODIUM 30 MG: 100 INJECTION SUBCUTANEOUS at 21:21

## 2024-05-24 RX ADMIN — SODIUM CHLORIDE 25 ML: 9 INJECTION, SOLUTION INTRAVENOUS at 21:43

## 2024-05-24 RX ADMIN — CEFEPIME 2000 MG: 2 INJECTION, POWDER, FOR SOLUTION INTRAVENOUS at 08:44

## 2024-05-24 RX ADMIN — METRONIDAZOLE 500 MG: 500 INJECTION, SOLUTION INTRAVENOUS at 09:34

## 2024-05-24 RX ADMIN — PREGABALIN 75 MG: 75 CAPSULE ORAL at 08:37

## 2024-05-24 RX ADMIN — PANTOPRAZOLE SODIUM 40 MG: 40 TABLET, DELAYED RELEASE ORAL at 05:22

## 2024-05-24 RX ADMIN — PREGABALIN 75 MG: 75 CAPSULE ORAL at 21:22

## 2024-05-24 RX ADMIN — SODIUM CHLORIDE, PRESERVATIVE FREE 10 ML: 5 INJECTION INTRAVENOUS at 21:39

## 2024-05-24 RX ADMIN — VANCOMYCIN HYDROCHLORIDE 1750 MG: 5 INJECTION, POWDER, LYOPHILIZED, FOR SOLUTION INTRAVENOUS at 21:46

## 2024-05-24 RX ADMIN — CEFEPIME 2000 MG: 2 INJECTION, POWDER, FOR SOLUTION INTRAVENOUS at 17:46

## 2024-05-24 RX ADMIN — METRONIDAZOLE 500 MG: 500 INJECTION, SOLUTION INTRAVENOUS at 17:45

## 2024-05-24 RX ADMIN — ENOXAPARIN SODIUM 30 MG: 100 INJECTION SUBCUTANEOUS at 08:36

## 2024-05-24 RX ADMIN — OXYCODONE HYDROCHLORIDE 10 MG: 10 TABLET ORAL at 08:37

## 2024-05-24 RX ADMIN — OXYCODONE HYDROCHLORIDE 10 MG: 10 TABLET ORAL at 02:50

## 2024-05-24 RX ADMIN — ESCITALOPRAM OXALATE 10 MG: 10 TABLET ORAL at 08:37

## 2024-05-24 RX ADMIN — SODIUM CHLORIDE 25 ML: 9 INJECTION, SOLUTION INTRAVENOUS at 01:12

## 2024-05-24 RX ADMIN — CEFEPIME 2000 MG: 2 INJECTION, POWDER, FOR SOLUTION INTRAVENOUS at 01:13

## 2024-05-24 RX ADMIN — ACETAMINOPHEN 650 MG: 325 TABLET ORAL at 05:22

## 2024-05-24 RX ADMIN — TAMSULOSIN HYDROCHLORIDE 0.4 MG: 0.4 CAPSULE ORAL at 08:37

## 2024-05-24 RX ADMIN — SODIUM CHLORIDE 25 ML: 9 INJECTION, SOLUTION INTRAVENOUS at 02:14

## 2024-05-24 RX ADMIN — METRONIDAZOLE 500 MG: 500 INJECTION, SOLUTION INTRAVENOUS at 02:14

## 2024-05-24 RX ADMIN — OXYCODONE HYDROCHLORIDE 10 MG: 10 TABLET ORAL at 12:43

## 2024-05-24 RX ADMIN — INSULIN LISPRO 2 UNITS: 100 INJECTION, SOLUTION INTRAVENOUS; SUBCUTANEOUS at 12:44

## 2024-05-24 RX ADMIN — SODIUM CHLORIDE, PRESERVATIVE FREE 10 ML: 5 INJECTION INTRAVENOUS at 21:46

## 2024-05-24 RX ADMIN — INSULIN LISPRO 2 UNITS: 100 INJECTION, SOLUTION INTRAVENOUS; SUBCUTANEOUS at 00:04

## 2024-05-24 RX ADMIN — DOCUSATE SODIUM 100 MG: 100 CAPSULE, LIQUID FILLED ORAL at 08:37

## 2024-05-24 RX ADMIN — SODIUM CHLORIDE, PRESERVATIVE FREE 10 ML: 5 INJECTION INTRAVENOUS at 21:24

## 2024-05-24 RX ADMIN — PANTOPRAZOLE SODIUM 40 MG: 40 TABLET, DELAYED RELEASE ORAL at 17:38

## 2024-05-24 RX ADMIN — INSULIN LISPRO 2 UNITS: 100 INJECTION, SOLUTION INTRAVENOUS; SUBCUTANEOUS at 21:22

## 2024-05-24 ASSESSMENT — PAIN DESCRIPTION - LOCATION
LOCATION: FOOT
LOCATION: FOOT
LOCATION: HEAD
LOCATION: FOOT

## 2024-05-24 ASSESSMENT — PAIN DESCRIPTION - ORIENTATION
ORIENTATION: LEFT
ORIENTATION: ANTERIOR
ORIENTATION: LEFT

## 2024-05-24 ASSESSMENT — PAIN SCALES - GENERAL
PAINLEVEL_OUTOF10: 6
PAINLEVEL_OUTOF10: 5
PAINLEVEL_OUTOF10: 7
PAINLEVEL_OUTOF10: 7
PAINLEVEL_OUTOF10: 8
PAINLEVEL_OUTOF10: 6
PAINLEVEL_OUTOF10: 1
PAINLEVEL_OUTOF10: 7
PAINLEVEL_OUTOF10: 8

## 2024-05-24 ASSESSMENT — PAIN SCALES - WONG BAKER: WONGBAKER_NUMERICALRESPONSE: HURTS A LITTLE BIT

## 2024-05-24 ASSESSMENT — PAIN DESCRIPTION - DESCRIPTORS
DESCRIPTORS: THROBBING
DESCRIPTORS: THROBBING
DESCRIPTORS: THROBBING;SHOOTING
DESCRIPTORS: STABBING;THROBBING
DESCRIPTORS: ACHING;THROBBING
DESCRIPTORS: SHARP;THROBBING

## 2024-05-24 ASSESSMENT — PAIN - FUNCTIONAL ASSESSMENT
PAIN_FUNCTIONAL_ASSESSMENT: ACTIVITIES ARE NOT PREVENTED
PAIN_FUNCTIONAL_ASSESSMENT: ACTIVITIES ARE NOT PREVENTED
PAIN_FUNCTIONAL_ASSESSMENT: PREVENTS OR INTERFERES SOME ACTIVE ACTIVITIES AND ADLS
PAIN_FUNCTIONAL_ASSESSMENT: ACTIVITIES ARE NOT PREVENTED

## 2024-05-24 ASSESSMENT — PAIN DESCRIPTION - ONSET: ONSET: ON-GOING

## 2024-05-24 ASSESSMENT — PAIN DESCRIPTION - PAIN TYPE: TYPE: SURGICAL PAIN

## 2024-05-24 ASSESSMENT — PAIN DESCRIPTION - FREQUENCY: FREQUENCY: CONTINUOUS

## 2024-05-24 NOTE — CARE COORDINATION
Chart reviewed and patient discussed in IDR. Awaiting ID final recs. CM following. Plan is home with HC.

## 2024-05-25 LAB
GLUCOSE BLD-MCNC: 154 MG/DL (ref 70–99)
GLUCOSE BLD-MCNC: 165 MG/DL (ref 70–99)
GLUCOSE BLD-MCNC: 205 MG/DL (ref 70–99)
GLUCOSE BLD-MCNC: 210 MG/DL (ref 70–99)
GLUCOSE BLD-MCNC: 234 MG/DL (ref 70–99)
GLUCOSE BLD-MCNC: 266 MG/DL (ref 70–99)

## 2024-05-25 PROCEDURE — 1200000000 HC SEMI PRIVATE

## 2024-05-25 PROCEDURE — 6370000000 HC RX 637 (ALT 250 FOR IP): Performed by: SURGERY

## 2024-05-25 PROCEDURE — 6360000002 HC RX W HCPCS: Performed by: SURGERY

## 2024-05-25 PROCEDURE — 82962 GLUCOSE BLOOD TEST: CPT

## 2024-05-25 PROCEDURE — 2580000003 HC RX 258: Performed by: SURGERY

## 2024-05-25 PROCEDURE — 94761 N-INVAS EAR/PLS OXIMETRY MLT: CPT

## 2024-05-25 RX ADMIN — METRONIDAZOLE 500 MG: 500 INJECTION, SOLUTION INTRAVENOUS at 09:11

## 2024-05-25 RX ADMIN — ESCITALOPRAM OXALATE 10 MG: 10 TABLET ORAL at 08:24

## 2024-05-25 RX ADMIN — CEFEPIME 2000 MG: 2 INJECTION, POWDER, FOR SOLUTION INTRAVENOUS at 16:29

## 2024-05-25 RX ADMIN — OXYCODONE HYDROCHLORIDE 10 MG: 10 TABLET ORAL at 16:29

## 2024-05-25 RX ADMIN — SODIUM CHLORIDE, PRESERVATIVE FREE 10 ML: 5 INJECTION INTRAVENOUS at 22:23

## 2024-05-25 RX ADMIN — SODIUM CHLORIDE 25 ML: 9 INJECTION, SOLUTION INTRAVENOUS at 00:41

## 2024-05-25 RX ADMIN — INSULIN LISPRO 2 UNITS: 100 INJECTION, SOLUTION INTRAVENOUS; SUBCUTANEOUS at 00:39

## 2024-05-25 RX ADMIN — INSULIN LISPRO 1 UNITS: 100 INJECTION, SOLUTION INTRAVENOUS; SUBCUTANEOUS at 11:50

## 2024-05-25 RX ADMIN — INSULIN LISPRO 2 UNITS: 100 INJECTION, SOLUTION INTRAVENOUS; SUBCUTANEOUS at 16:30

## 2024-05-25 RX ADMIN — DOCUSATE SODIUM 100 MG: 100 CAPSULE, LIQUID FILLED ORAL at 08:24

## 2024-05-25 RX ADMIN — ATORVASTATIN CALCIUM 10 MG: 10 TABLET, FILM COATED ORAL at 08:24

## 2024-05-25 RX ADMIN — ENOXAPARIN SODIUM 30 MG: 100 INJECTION SUBCUTANEOUS at 08:23

## 2024-05-25 RX ADMIN — ENOXAPARIN SODIUM 30 MG: 100 INJECTION SUBCUTANEOUS at 22:23

## 2024-05-25 RX ADMIN — OXYCODONE HYDROCHLORIDE 10 MG: 10 TABLET ORAL at 08:24

## 2024-05-25 RX ADMIN — CEFEPIME 2000 MG: 2 INJECTION, POWDER, FOR SOLUTION INTRAVENOUS at 08:23

## 2024-05-25 RX ADMIN — METRONIDAZOLE 500 MG: 500 INJECTION, SOLUTION INTRAVENOUS at 01:32

## 2024-05-25 RX ADMIN — VANCOMYCIN HYDROCHLORIDE 1750 MG: 5 INJECTION, POWDER, LYOPHILIZED, FOR SOLUTION INTRAVENOUS at 22:29

## 2024-05-25 RX ADMIN — PREGABALIN 75 MG: 75 CAPSULE ORAL at 22:23

## 2024-05-25 RX ADMIN — METRONIDAZOLE 500 MG: 500 INJECTION, SOLUTION INTRAVENOUS at 18:01

## 2024-05-25 RX ADMIN — PREGABALIN 75 MG: 75 CAPSULE ORAL at 08:24

## 2024-05-25 RX ADMIN — OXYCODONE HYDROCHLORIDE 10 MG: 10 TABLET ORAL at 22:23

## 2024-05-25 RX ADMIN — SODIUM CHLORIDE, PRESERVATIVE FREE 10 ML: 5 INJECTION INTRAVENOUS at 09:09

## 2024-05-25 RX ADMIN — PANTOPRAZOLE SODIUM 40 MG: 40 TABLET, DELAYED RELEASE ORAL at 16:00

## 2024-05-25 RX ADMIN — PANTOPRAZOLE SODIUM 40 MG: 40 TABLET, DELAYED RELEASE ORAL at 05:08

## 2024-05-25 RX ADMIN — VANCOMYCIN HYDROCHLORIDE 1750 MG: 5 INJECTION, POWDER, LYOPHILIZED, FOR SOLUTION INTRAVENOUS at 10:55

## 2024-05-25 RX ADMIN — OXYCODONE HYDROCHLORIDE 10 MG: 10 TABLET ORAL at 12:32

## 2024-05-25 RX ADMIN — CEFEPIME 2000 MG: 2 INJECTION, POWDER, FOR SOLUTION INTRAVENOUS at 00:42

## 2024-05-25 RX ADMIN — TAMSULOSIN HYDROCHLORIDE 0.4 MG: 0.4 CAPSULE ORAL at 08:24

## 2024-05-25 ASSESSMENT — PAIN DESCRIPTION - PAIN TYPE
TYPE: SURGICAL PAIN
TYPE: SURGICAL PAIN;NEUROPATHIC PAIN
TYPE: SURGICAL PAIN
TYPE: SURGICAL PAIN

## 2024-05-25 ASSESSMENT — PAIN DESCRIPTION - ONSET
ONSET: ON-GOING

## 2024-05-25 ASSESSMENT — PAIN SCALES - GENERAL
PAINLEVEL_OUTOF10: 9
PAINLEVEL_OUTOF10: 6
PAINLEVEL_OUTOF10: 6
PAINLEVEL_OUTOF10: 7
PAINLEVEL_OUTOF10: 7
PAINLEVEL_OUTOF10: 8

## 2024-05-25 ASSESSMENT — PAIN - FUNCTIONAL ASSESSMENT
PAIN_FUNCTIONAL_ASSESSMENT: PREVENTS OR INTERFERES SOME ACTIVE ACTIVITIES AND ADLS
PAIN_FUNCTIONAL_ASSESSMENT: ACTIVITIES ARE NOT PREVENTED
PAIN_FUNCTIONAL_ASSESSMENT: ACTIVITIES ARE NOT PREVENTED
PAIN_FUNCTIONAL_ASSESSMENT: PREVENTS OR INTERFERES SOME ACTIVE ACTIVITIES AND ADLS
PAIN_FUNCTIONAL_ASSESSMENT: ACTIVITIES ARE NOT PREVENTED

## 2024-05-25 ASSESSMENT — PAIN DESCRIPTION - ORIENTATION
ORIENTATION: LEFT

## 2024-05-25 ASSESSMENT — PAIN DESCRIPTION - DESCRIPTORS
DESCRIPTORS: THROBBING
DESCRIPTORS: THROBBING;SHARP
DESCRIPTORS: THROBBING;STABBING
DESCRIPTORS: THROBBING
DESCRIPTORS: STABBING;SHOOTING

## 2024-05-25 ASSESSMENT — PAIN DESCRIPTION - FREQUENCY
FREQUENCY: CONTINUOUS

## 2024-05-25 ASSESSMENT — PAIN DESCRIPTION - LOCATION
LOCATION: FOOT

## 2024-05-26 LAB
ANION GAP SERPL CALCULATED.3IONS-SCNC: 14 MMOL/L (ref 7–16)
BUN SERPL-MCNC: 12 MG/DL (ref 6–23)
CALCIUM SERPL-MCNC: 8.8 MG/DL (ref 8.3–10.6)
CHLORIDE BLD-SCNC: 101 MMOL/L (ref 99–110)
CO2: 22 MMOL/L (ref 21–32)
CREAT SERPL-MCNC: 0.6 MG/DL (ref 0.9–1.3)
CULTURE: ABNORMAL
DOSE AMOUNT: NORMAL
DOSE TIME: NORMAL
GFR, ESTIMATED: >90 ML/MIN/1.73M2
GLUCOSE BLD-MCNC: 197 MG/DL (ref 70–99)
GLUCOSE BLD-MCNC: 214 MG/DL (ref 70–99)
GLUCOSE BLD-MCNC: 224 MG/DL (ref 70–99)
GLUCOSE BLD-MCNC: 233 MG/DL (ref 70–99)
GLUCOSE SERPL-MCNC: 200 MG/DL (ref 70–99)
Lab: ABNORMAL
POTASSIUM SERPL-SCNC: 4.3 MMOL/L (ref 3.5–5.1)
SODIUM BLD-SCNC: 137 MMOL/L (ref 135–145)
SPECIMEN: ABNORMAL
VANCOMYCIN RANDOM: 13.5 UG/ML

## 2024-05-26 PROCEDURE — 1200000000 HC SEMI PRIVATE

## 2024-05-26 PROCEDURE — 94761 N-INVAS EAR/PLS OXIMETRY MLT: CPT

## 2024-05-26 PROCEDURE — 80202 ASSAY OF VANCOMYCIN: CPT

## 2024-05-26 PROCEDURE — 2580000003 HC RX 258: Performed by: SURGERY

## 2024-05-26 PROCEDURE — 80048 BASIC METABOLIC PNL TOTAL CA: CPT

## 2024-05-26 PROCEDURE — C1751 CATH, INF, PER/CENT/MIDLINE: HCPCS

## 2024-05-26 PROCEDURE — 76937 US GUIDE VASCULAR ACCESS: CPT

## 2024-05-26 PROCEDURE — 02HV33Z INSERTION OF INFUSION DEVICE INTO SUPERIOR VENA CAVA, PERCUTANEOUS APPROACH: ICD-10-PCS | Performed by: STUDENT IN AN ORGANIZED HEALTH CARE EDUCATION/TRAINING PROGRAM

## 2024-05-26 PROCEDURE — 6360000002 HC RX W HCPCS: Performed by: SURGERY

## 2024-05-26 PROCEDURE — 82962 GLUCOSE BLOOD TEST: CPT

## 2024-05-26 PROCEDURE — C1769 GUIDE WIRE: HCPCS

## 2024-05-26 PROCEDURE — 6370000000 HC RX 637 (ALT 250 FOR IP): Performed by: SURGERY

## 2024-05-26 PROCEDURE — 2709999900 HC NON-CHARGEABLE SUPPLY

## 2024-05-26 PROCEDURE — 36569 INSJ PICC 5 YR+ W/O IMAGING: CPT

## 2024-05-26 PROCEDURE — 2580000003 HC RX 258: Performed by: NURSE PRACTITIONER

## 2024-05-26 PROCEDURE — 6370000000 HC RX 637 (ALT 250 FOR IP): Performed by: STUDENT IN AN ORGANIZED HEALTH CARE EDUCATION/TRAINING PROGRAM

## 2024-05-26 PROCEDURE — 6360000002 HC RX W HCPCS: Performed by: NURSE PRACTITIONER

## 2024-05-26 PROCEDURE — 82565 ASSAY OF CREATININE: CPT

## 2024-05-26 PROCEDURE — 36415 COLL VENOUS BLD VENIPUNCTURE: CPT

## 2024-05-26 RX ORDER — SODIUM CHLORIDE 9 MG/ML
INJECTION, SOLUTION INTRAVENOUS PRN
Status: DISCONTINUED | OUTPATIENT
Start: 2024-05-26 | End: 2024-05-28 | Stop reason: HOSPADM

## 2024-05-26 RX ORDER — INSULIN GLARGINE 100 [IU]/ML
5 INJECTION, SOLUTION SUBCUTANEOUS NIGHTLY
Status: DISCONTINUED | OUTPATIENT
Start: 2024-05-26 | End: 2024-05-27

## 2024-05-26 RX ORDER — HYDROMORPHONE HYDROCHLORIDE 1 MG/ML
0.25 INJECTION, SOLUTION INTRAMUSCULAR; INTRAVENOUS; SUBCUTANEOUS ONCE
Status: COMPLETED | OUTPATIENT
Start: 2024-05-26 | End: 2024-05-26

## 2024-05-26 RX ORDER — SODIUM CHLORIDE 0.9 % (FLUSH) 0.9 %
5-40 SYRINGE (ML) INJECTION EVERY 12 HOURS SCHEDULED
Status: DISCONTINUED | OUTPATIENT
Start: 2024-05-26 | End: 2024-05-28 | Stop reason: HOSPADM

## 2024-05-26 RX ORDER — SODIUM CHLORIDE 0.9 % (FLUSH) 0.9 %
5-40 SYRINGE (ML) INJECTION PRN
Status: DISCONTINUED | OUTPATIENT
Start: 2024-05-26 | End: 2024-05-28 | Stop reason: HOSPADM

## 2024-05-26 RX ADMIN — PANTOPRAZOLE SODIUM 40 MG: 40 TABLET, DELAYED RELEASE ORAL at 16:03

## 2024-05-26 RX ADMIN — INSULIN GLARGINE 5 UNITS: 100 INJECTION, SOLUTION SUBCUTANEOUS at 20:42

## 2024-05-26 RX ADMIN — CEFEPIME 2000 MG: 2 INJECTION, POWDER, FOR SOLUTION INTRAVENOUS at 16:02

## 2024-05-26 RX ADMIN — OXYCODONE HYDROCHLORIDE 10 MG: 10 TABLET ORAL at 06:19

## 2024-05-26 RX ADMIN — ENOXAPARIN SODIUM 30 MG: 100 INJECTION SUBCUTANEOUS at 20:42

## 2024-05-26 RX ADMIN — HYDROMORPHONE HYDROCHLORIDE 0.25 MG: 1 INJECTION, SOLUTION INTRAMUSCULAR; INTRAVENOUS; SUBCUTANEOUS at 01:06

## 2024-05-26 RX ADMIN — SODIUM CHLORIDE: 9 INJECTION, SOLUTION INTRAVENOUS at 01:45

## 2024-05-26 RX ADMIN — SODIUM CHLORIDE: 9 INJECTION, SOLUTION INTRAVENOUS at 00:52

## 2024-05-26 RX ADMIN — SODIUM CHLORIDE: 9 INJECTION, SOLUTION INTRAVENOUS at 16:01

## 2024-05-26 RX ADMIN — TAMSULOSIN HYDROCHLORIDE 0.4 MG: 0.4 CAPSULE ORAL at 08:25

## 2024-05-26 RX ADMIN — OXYCODONE HYDROCHLORIDE 10 MG: 10 TABLET ORAL at 14:31

## 2024-05-26 RX ADMIN — CEFEPIME 2000 MG: 2 INJECTION, POWDER, FOR SOLUTION INTRAVENOUS at 00:54

## 2024-05-26 RX ADMIN — ATORVASTATIN CALCIUM 10 MG: 10 TABLET, FILM COATED ORAL at 08:25

## 2024-05-26 RX ADMIN — PREGABALIN 75 MG: 75 CAPSULE ORAL at 20:41

## 2024-05-26 RX ADMIN — OXYCODONE HYDROCHLORIDE 10 MG: 10 TABLET ORAL at 20:40

## 2024-05-26 RX ADMIN — INSULIN LISPRO 1 UNITS: 100 INJECTION, SOLUTION INTRAVENOUS; SUBCUTANEOUS at 16:36

## 2024-05-26 RX ADMIN — METRONIDAZOLE 500 MG: 500 INJECTION, SOLUTION INTRAVENOUS at 17:29

## 2024-05-26 RX ADMIN — SODIUM CHLORIDE: 9 INJECTION, SOLUTION INTRAVENOUS at 20:46

## 2024-05-26 RX ADMIN — OXYCODONE HYDROCHLORIDE 10 MG: 10 TABLET ORAL at 10:20

## 2024-05-26 RX ADMIN — METRONIDAZOLE 500 MG: 500 INJECTION, SOLUTION INTRAVENOUS at 01:46

## 2024-05-26 RX ADMIN — VANCOMYCIN HYDROCHLORIDE 1750 MG: 5 INJECTION, POWDER, LYOPHILIZED, FOR SOLUTION INTRAVENOUS at 20:47

## 2024-05-26 RX ADMIN — DOCUSATE SODIUM 100 MG: 100 CAPSULE, LIQUID FILLED ORAL at 08:25

## 2024-05-26 RX ADMIN — SODIUM CHLORIDE, PRESERVATIVE FREE 10 ML: 5 INJECTION INTRAVENOUS at 01:07

## 2024-05-26 RX ADMIN — ESCITALOPRAM OXALATE 10 MG: 10 TABLET ORAL at 08:25

## 2024-05-26 RX ADMIN — PREGABALIN 75 MG: 75 CAPSULE ORAL at 08:25

## 2024-05-26 RX ADMIN — SODIUM CHLORIDE, PRESERVATIVE FREE 10 ML: 5 INJECTION INTRAVENOUS at 20:42

## 2024-05-26 RX ADMIN — PANTOPRAZOLE SODIUM 40 MG: 40 TABLET, DELAYED RELEASE ORAL at 05:08

## 2024-05-26 RX ADMIN — ENOXAPARIN SODIUM 30 MG: 100 INJECTION SUBCUTANEOUS at 08:25

## 2024-05-26 ASSESSMENT — PAIN SCALES - GENERAL
PAINLEVEL_OUTOF10: 7
PAINLEVEL_OUTOF10: 6
PAINLEVEL_OUTOF10: 10
PAINLEVEL_OUTOF10: 8
PAINLEVEL_OUTOF10: 9
PAINLEVEL_OUTOF10: 8
PAINLEVEL_OUTOF10: 9
PAINLEVEL_OUTOF10: 7

## 2024-05-26 ASSESSMENT — PAIN DESCRIPTION - ORIENTATION
ORIENTATION: LEFT

## 2024-05-26 ASSESSMENT — PAIN - FUNCTIONAL ASSESSMENT
PAIN_FUNCTIONAL_ASSESSMENT: PREVENTS OR INTERFERES SOME ACTIVE ACTIVITIES AND ADLS
PAIN_FUNCTIONAL_ASSESSMENT: ACTIVITIES ARE NOT PREVENTED
PAIN_FUNCTIONAL_ASSESSMENT: PREVENTS OR INTERFERES SOME ACTIVE ACTIVITIES AND ADLS

## 2024-05-26 ASSESSMENT — PAIN DESCRIPTION - DESCRIPTORS
DESCRIPTORS: ACHING;THROBBING
DESCRIPTORS: SHARP;SQUEEZING;STABBING
DESCRIPTORS: ACHING;THROBBING
DESCRIPTORS: ACHING
DESCRIPTORS: ACHING;DISCOMFORT;THROBBING

## 2024-05-26 ASSESSMENT — PAIN DESCRIPTION - FREQUENCY: FREQUENCY: CONTINUOUS

## 2024-05-26 ASSESSMENT — PAIN DESCRIPTION - LOCATION
LOCATION: FOOT
LOCATION: FOOT;TOE (COMMENT WHICH ONE)
LOCATION: FOOT;TOE (COMMENT WHICH ONE)
LOCATION: FOOT
LOCATION: FOOT;LEG

## 2024-05-26 ASSESSMENT — PAIN DESCRIPTION - PAIN TYPE
TYPE: SURGICAL PAIN
TYPE: SURGICAL PAIN

## 2024-05-26 ASSESSMENT — PAIN DESCRIPTION - ONSET: ONSET: ON-GOING

## 2024-05-27 LAB
CREAT SERPL-MCNC: 0.7 MG/DL (ref 0.9–1.3)
GFR, ESTIMATED: >90 ML/MIN/1.73M2
GLUCOSE BLD-MCNC: 192 MG/DL (ref 70–99)
GLUCOSE BLD-MCNC: 222 MG/DL (ref 70–99)
GLUCOSE BLD-MCNC: 256 MG/DL (ref 70–99)
GLUCOSE BLD-MCNC: 271 MG/DL (ref 70–99)

## 2024-05-27 PROCEDURE — 6370000000 HC RX 637 (ALT 250 FOR IP): Performed by: STUDENT IN AN ORGANIZED HEALTH CARE EDUCATION/TRAINING PROGRAM

## 2024-05-27 PROCEDURE — 6370000000 HC RX 637 (ALT 250 FOR IP): Performed by: SURGERY

## 2024-05-27 PROCEDURE — 6360000002 HC RX W HCPCS: Performed by: SURGERY

## 2024-05-27 PROCEDURE — 2580000003 HC RX 258: Performed by: SURGERY

## 2024-05-27 PROCEDURE — 82565 ASSAY OF CREATININE: CPT

## 2024-05-27 PROCEDURE — 82962 GLUCOSE BLOOD TEST: CPT

## 2024-05-27 PROCEDURE — 94761 N-INVAS EAR/PLS OXIMETRY MLT: CPT

## 2024-05-27 PROCEDURE — 2580000003 HC RX 258: Performed by: NURSE PRACTITIONER

## 2024-05-27 PROCEDURE — 1200000000 HC SEMI PRIVATE

## 2024-05-27 PROCEDURE — 36415 COLL VENOUS BLD VENIPUNCTURE: CPT

## 2024-05-27 PROCEDURE — 97605 NEG PRS WND THER DME<=50SQCM: CPT

## 2024-05-27 RX ORDER — INSULIN GLARGINE 100 [IU]/ML
8 INJECTION, SOLUTION SUBCUTANEOUS NIGHTLY
Status: DISCONTINUED | OUTPATIENT
Start: 2024-05-27 | End: 2024-05-28 | Stop reason: HOSPADM

## 2024-05-27 RX ORDER — INSULIN LISPRO 100 [IU]/ML
0-8 INJECTION, SOLUTION INTRAVENOUS; SUBCUTANEOUS
Status: DISCONTINUED | OUTPATIENT
Start: 2024-05-27 | End: 2024-05-28 | Stop reason: HOSPADM

## 2024-05-27 RX ORDER — INSULIN LISPRO 100 [IU]/ML
0-4 INJECTION, SOLUTION INTRAVENOUS; SUBCUTANEOUS NIGHTLY
Status: DISCONTINUED | OUTPATIENT
Start: 2024-05-27 | End: 2024-05-28 | Stop reason: HOSPADM

## 2024-05-27 RX ADMIN — PANTOPRAZOLE SODIUM 40 MG: 40 TABLET, DELAYED RELEASE ORAL at 16:23

## 2024-05-27 RX ADMIN — VANCOMYCIN HYDROCHLORIDE 1750 MG: 5 INJECTION, POWDER, LYOPHILIZED, FOR SOLUTION INTRAVENOUS at 11:34

## 2024-05-27 RX ADMIN — METRONIDAZOLE 500 MG: 500 INJECTION, SOLUTION INTRAVENOUS at 01:59

## 2024-05-27 RX ADMIN — CEFEPIME 2000 MG: 2 INJECTION, POWDER, FOR SOLUTION INTRAVENOUS at 09:33

## 2024-05-27 RX ADMIN — PANTOPRAZOLE SODIUM 40 MG: 40 TABLET, DELAYED RELEASE ORAL at 06:45

## 2024-05-27 RX ADMIN — DOCUSATE SODIUM 100 MG: 100 CAPSULE, LIQUID FILLED ORAL at 09:43

## 2024-05-27 RX ADMIN — OXYCODONE HYDROCHLORIDE 10 MG: 10 TABLET ORAL at 22:42

## 2024-05-27 RX ADMIN — OXYCODONE HYDROCHLORIDE 10 MG: 10 TABLET ORAL at 06:46

## 2024-05-27 RX ADMIN — ATORVASTATIN CALCIUM 10 MG: 10 TABLET, FILM COATED ORAL at 09:43

## 2024-05-27 RX ADMIN — CEFEPIME 2000 MG: 2 INJECTION, POWDER, FOR SOLUTION INTRAVENOUS at 17:40

## 2024-05-27 RX ADMIN — METRONIDAZOLE 500 MG: 500 INJECTION, SOLUTION INTRAVENOUS at 10:22

## 2024-05-27 RX ADMIN — CEFEPIME 2000 MG: 2 INJECTION, POWDER, FOR SOLUTION INTRAVENOUS at 00:54

## 2024-05-27 RX ADMIN — ESCITALOPRAM OXALATE 10 MG: 10 TABLET ORAL at 09:43

## 2024-05-27 RX ADMIN — INSULIN GLARGINE 8 UNITS: 100 INJECTION, SOLUTION SUBCUTANEOUS at 22:43

## 2024-05-27 RX ADMIN — VANCOMYCIN HYDROCHLORIDE 1750 MG: 5 INJECTION, POWDER, LYOPHILIZED, FOR SOLUTION INTRAVENOUS at 22:49

## 2024-05-27 RX ADMIN — ENOXAPARIN SODIUM 30 MG: 100 INJECTION SUBCUTANEOUS at 09:43

## 2024-05-27 RX ADMIN — METRONIDAZOLE 500 MG: 500 INJECTION, SOLUTION INTRAVENOUS at 18:35

## 2024-05-27 RX ADMIN — OXYCODONE HYDROCHLORIDE 10 MG: 10 TABLET ORAL at 18:46

## 2024-05-27 RX ADMIN — PREGABALIN 75 MG: 75 CAPSULE ORAL at 09:43

## 2024-05-27 RX ADMIN — INSULIN LISPRO 4 UNITS: 100 INJECTION, SOLUTION INTRAVENOUS; SUBCUTANEOUS at 11:30

## 2024-05-27 RX ADMIN — TAMSULOSIN HYDROCHLORIDE 0.4 MG: 0.4 CAPSULE ORAL at 09:43

## 2024-05-27 RX ADMIN — SODIUM CHLORIDE, PRESERVATIVE FREE 10 ML: 5 INJECTION INTRAVENOUS at 10:18

## 2024-05-27 RX ADMIN — OXYCODONE HYDROCHLORIDE 10 MG: 10 TABLET ORAL at 00:47

## 2024-05-27 RX ADMIN — OXYCODONE HYDROCHLORIDE 10 MG: 10 TABLET ORAL at 10:44

## 2024-05-27 RX ADMIN — PREGABALIN 75 MG: 75 CAPSULE ORAL at 22:43

## 2024-05-27 RX ADMIN — SODIUM CHLORIDE, PRESERVATIVE FREE 10 ML: 5 INJECTION INTRAVENOUS at 09:43

## 2024-05-27 RX ADMIN — SODIUM CHLORIDE: 9 INJECTION, SOLUTION INTRAVENOUS at 01:52

## 2024-05-27 RX ADMIN — ENOXAPARIN SODIUM 30 MG: 100 INJECTION SUBCUTANEOUS at 22:43

## 2024-05-27 RX ADMIN — SODIUM CHLORIDE: 9 INJECTION, SOLUTION INTRAVENOUS at 22:48

## 2024-05-27 RX ADMIN — SODIUM CHLORIDE, PRESERVATIVE FREE 10 ML: 5 INJECTION INTRAVENOUS at 22:43

## 2024-05-27 RX ADMIN — OXYCODONE HYDROCHLORIDE 10 MG: 10 TABLET ORAL at 14:48

## 2024-05-27 RX ADMIN — SODIUM CHLORIDE: 9 INJECTION, SOLUTION INTRAVENOUS at 00:51

## 2024-05-27 ASSESSMENT — PAIN SCALES - GENERAL
PAINLEVEL_OUTOF10: 5
PAINLEVEL_OUTOF10: 6
PAINLEVEL_OUTOF10: 7
PAINLEVEL_OUTOF10: 6
PAINLEVEL_OUTOF10: 8
PAINLEVEL_OUTOF10: 7
PAINLEVEL_OUTOF10: 8
PAINLEVEL_OUTOF10: 7
PAINLEVEL_OUTOF10: 6
PAINLEVEL_OUTOF10: 8
PAINLEVEL_OUTOF10: 7
PAINLEVEL_OUTOF10: 5

## 2024-05-27 ASSESSMENT — PAIN - FUNCTIONAL ASSESSMENT
PAIN_FUNCTIONAL_ASSESSMENT: ACTIVITIES ARE NOT PREVENTED
PAIN_FUNCTIONAL_ASSESSMENT: PREVENTS OR INTERFERES SOME ACTIVE ACTIVITIES AND ADLS
PAIN_FUNCTIONAL_ASSESSMENT: ACTIVITIES ARE NOT PREVENTED
PAIN_FUNCTIONAL_ASSESSMENT: PREVENTS OR INTERFERES SOME ACTIVE ACTIVITIES AND ADLS

## 2024-05-27 ASSESSMENT — PAIN DESCRIPTION - LOCATION
LOCATION: FOOT

## 2024-05-27 ASSESSMENT — PAIN DESCRIPTION - ONSET
ONSET: ON-GOING

## 2024-05-27 ASSESSMENT — PAIN DESCRIPTION - DESCRIPTORS
DESCRIPTORS: STABBING;ACHING
DESCRIPTORS: SHOOTING;THROBBING
DESCRIPTORS: ACHING
DESCRIPTORS: THROBBING
DESCRIPTORS: SHOOTING
DESCRIPTORS: SHOOTING;STABBING

## 2024-05-27 ASSESSMENT — PAIN DESCRIPTION - FREQUENCY
FREQUENCY: CONTINUOUS

## 2024-05-27 ASSESSMENT — PAIN DESCRIPTION - ORIENTATION
ORIENTATION: LEFT

## 2024-05-27 ASSESSMENT — PAIN DESCRIPTION - PAIN TYPE
TYPE: SURGICAL PAIN

## 2024-05-27 ASSESSMENT — PAIN SCALES - WONG BAKER
WONGBAKER_NUMERICALRESPONSE: NO HURT
WONGBAKER_NUMERICALRESPONSE: NO HURT

## 2024-05-27 NOTE — CONSULTS
[facility]  Consult Note       Toi Briones  AGE: 58 y.o.   GENDER: male  : 1965  TODAY'S DATE:  2024    Subjective:     Reason for  Evaluation and Assessment: wound vac change       Toi Briones is a 58 y.o. male referred by:   [] Physician  [] Nursing  [] Other:     Wound Identification:  Wound Type: non-healing surgical  Contributing Factors: diabetes, poor glucose control, chronic pressure, shear force, and decreased tissue oxygenation        PAST MEDICAL HISTORY        Diagnosis Date    Absent finger     Left hand    Anxiety     Arthritis     Hands    Asthma     \"As a kid but outgrew this\" - no medications as of 20    Chronic back pain     Depression     Edema     Fibromyalgia     Headache(784.0)     Last: 20    Hernia, abdominal     History of difficult venous access 2021    No longer a candidate for bedside PICC placement, multiple failed attempts    Hx MRSA infection     positive culture 2014 from left foot    Nausea & vomiting     Neuropathy     Obesity, Class III, BMI 40-49.9 (morbid obesity) (HCC)     Osteomyelitis (HCC)     hx finger    Poor circulation     Prolonged emergence from general anesthesia     Restless leg syndrome     Shortness of breath on exertion     2016- pt denies any sob with this interview    Type II or unspecified type diabetes mellitus with other specified manifestations, not stated as uncontrolled 2014    Type II or unspecified type diabetes mellitus without mention of complication, not stated as uncontrolled DX     Follows with PCP    Ulcer of other part of foot 2014    'ulcer on left foot healed all up\"       PAST SURGICAL HISTORY    Past Surgical History:   Procedure Laterality Date    COLONOSCOPY      Normal exam per pt    DEBRIDEMENT  2014    left foot    ENDOSCOPY, COLON, DIAGNOSTIC  2016    Normal exam per pt -     FINGER SURGERY  11    Right Index Finger    FINGER SURGERY  
 [facility]  Consult Note       Toi Briones  AGE: 58 y.o.   GENDER: male  : 1965  TODAY'S DATE:  2024    Subjective:     Reason for  Evaluation and Assessment: wound vac change       Toi Briones is a 58 y.o. male referred by:   [x] Physician  [] Nursing  [] Other:     Wound Identification:  Wound Type: non-healing surgical  Contributing Factors: diabetes, poor glucose control, shear force, obesity, and decreased tissue oxygenation        PAST MEDICAL HISTORY        Diagnosis Date    Absent finger     Left hand    Anxiety     Arthritis     Hands    Asthma     \"As a kid but outgrew this\" - no medications as of 20    Chronic back pain     Depression     Difficult intravenous access 2024    Unable to place PICC x2 admissions. Patient IS NOT a PICC candidate ()    Edema     Fibromyalgia     Headache(784.0)     Last: 20    Hernia, abdominal     History of difficult venous access 2021    No longer a candidate for bedside PICC placement, multiple failed attempts    Hx MRSA infection     positive culture 2014 from left foot    Nausea & vomiting     Neuropathy     Obesity, Class III, BMI 40-49.9 (morbid obesity) (HCC)     Osteomyelitis (HCC)     hx finger    Poor circulation     Prolonged emergence from general anesthesia     Restless leg syndrome     Shortness of breath on exertion     2016- pt denies any sob with this interview    Type II or unspecified type diabetes mellitus with other specified manifestations, not stated as uncontrolled 2014    Type II or unspecified type diabetes mellitus without mention of complication, not stated as uncontrolled DX     Follows with PCP    Ulcer of other part of foot 2014    'ulcer on left foot healed all up\"       PAST SURGICAL HISTORY    Past Surgical History:   Procedure Laterality Date    COLONOSCOPY      Normal exam per pt    DEBRIDEMENT  2014    left foot    ENDOSCOPY, COLON, 
Consult completed. Nexiva 20g 1.75\" peripheral IV initiated into right forearm x 1 attempt using ultrasound guided technique. Brisk blood return, flushes without resistance. Patient tolerated fair. Patient not candidate for PICC line by Vascular Access Team. Multiple attempts have been made and documented over past 3 years, unable to advance PICC line past shoulder. IR to place tunneled line 5/28/24. Charge nurse Laura notified.     Consult the Vascular Access Team for questions, concerns, or change in patient's condition.          
Consult completed. Procedure/rationale explained to pt & consent obtained. #20ga Nexiva extra-long, 1.75\" PIV initiated using UltraSound-guided technique without difficulty/complications. Pt tolerated well and no other c/o or needs noted or reported.     
Consult completed. Procedure/rationale explained to pt & consent obtained. #22ga Nexiva extra-long, 1.75\" PIV initiated using UltraSound-guided technique without difficulty/complications. Pt tolerated well and no other c/o or needs noted or reported.     
Consult unable to be completed. Attempted x2 on right-side. Utilized all problem solving techniques. Unable to get PICC to pass through shoulder; line catalina of 15-20cm when resistance is met and coiling.     Updated EPIC and database.     Consult the Vascular Access Team for questions, concerns, or change in patient's condition.    
[facility] ACUTE CARE PHYSICAL THERAPY EVALUATION  Toi Briones, 1965, 1102/1102-A, 5/22/2024    Assessment:  Patient admitted for diabetic ulcer LLE foot with cellulitis, osteomyelitis, and newly s/p first toe amputation with unstable/unpredictable medical features and evolving rehabilitative features.  Patient moves well, has minimal new functional ambulation impairments, significant medical concerns, hx other amputations, hx diabetes, and is currently blood glucose 200 before lunch.  Body Structures, Functions, Activity Limitations Requiring Skilled Therapeutic Intervention: Decreased functional mobility .  While in hospital, skilled physical therapy services are appropriate.  There are no significant educational impairments or barriers to learning which prevent participation with service. Prognosis: good.  Clinical decision making:  high complexity:  hx 3+ personal factors/comorbidities, exam tests and measures for 3+ elements of body/structures/functions/activity limitation/participation restriction, unstable/unpredictable presentation.      Discharge recommendations:  To be safe at home, would need periodic support/supervision , physical assist for home entry/exit, and home health physical therapy service (S1, S2).         Plan/Goals:  Physical Therapy Plan  General Plan:  (plan one f/u tx for gait training).  Acute care physical therapy treatment to include patient/caregiver education, therapeutic activity training, gait training, neuromuscular re-education, therapeutic exercise and self care training for home management.  First intend to develop bed mobility skills, sitting functional activity tolerance, and sitting balance, as needed.  Next, intend to develop transfer skills and standing functional activity tolerance, and standing balance, where necessary.  Then, after standing is safe, intend to develop standing functional mobility skills, including ambulation, and dynamic balance 
of complication, not stated as uncontrolled DX 2007    Follows with PCP    Ulcer of other part of foot 2014    'ulcer on left foot healed all up\"      Past Surgical History:   Procedure Laterality Date    COLONOSCOPY      Normal exam per pt    DEBRIDEMENT  2014    left foot    ENDOSCOPY, COLON, DIAGNOSTIC  2016    Normal exam per pt -     FINGER SURGERY  11    Right Index Finger    FINGER SURGERY  12    RIght Index Finger-Removal of loose body, Reconstruction of Mallet Finger    FOOT DEBRIDEMENT Left 6/10/2019    FOOT DEBRIDEMENT INCISION AND DRAINAGE performed by Jamar Saenz MD at [facility] OR    FOOT SURGERY Right 2018    I&D right foot    HEMORRHOID SURGERY      IR TUNNELED CATHETER PLACEMENT GREATER THAN 5 YEARS  2021    IR TUNNELED CATHETER PLACEMENT GREATER THAN 5 YEARS 2021 [facility] SPECIAL PROCEDURES    OTHER SURGICAL HISTORY Left 10/22/2013    I & D left foot    OTHER SURGICAL HISTORY  2017    I&D fingers X2 left hand. I&D left forearm    OTHER SURGICAL HISTORY Left 2017    left hand debridement, left forearm incision and drainage     OTHER SURGICAL HISTORY Left 07/10/2017    Fingers I&D    OTHER SURGICAL HISTORY Left 2017    middle finger amputation revision    ROTATOR CUFF REPAIR  Last Done 2011    Left, Done Four Times    UPPER GASTROINTESTINAL ENDOSCOPY N/A 2020    EGD BIOPSY performed by Arian Lai MD at [facility] OR      Family History   Problem Relation Age of Onset    Kidney Disease Mother         \"Kidney Failure, On Dialysis\"    Early Death Mother 36    Diabetes Father         \"Type II\"      Infectious disease related family history - not contibutory.   SOCIAL HISTORY  Social History     Tobacco Use    Smoking status: Former     Current packs/day: 0.00     Types: Cigarettes     Start date:      Quit date:      Years since quittin.4    Smokeless tobacco: Former     Types: Snuff   Substance Use Topics 
BILITOT 0.4 05/19/2024    AST 11 (L) 05/19/2024    ALT 6 (L) 05/19/2024    ALKPHOS 97 05/19/2024    LIPASE 31 03/11/2022    INR 1.0 05/19/2024    GLUF 206 (H) 10/19/2016    LABA1C 9.0 (H) 05/19/2024       Imaging:   Vascular duplex lower extremity venous left    Result Date: 5/19/2024  EXAM: LEFT LOWER EXTREMITY NONINVASIVE DUPLEX DOPPLER INDICATION: Left calf edema, erythema COMPARISON: None TECHNICAL FACTORS: Grayscale real-time imaging with spectral analysis and color flow. FINDINGS: Limitations: Technical limited exam due to body habitus. LOWER EXTREMITY: *  Deep femoral vein, common femoral vein and superficial femoral veins: No filling defects.  Normal compressibility.  Normal respiratory variation.  *  Popliteal vein: No filling defects.  Normal compressibility.  Normal respiratory variation. *  Infrapopliteal veins: No filling defects.  Normal compressibility.  Normal respiratory variation. However, peroneal vein is not visible.     1. No evidence of deep venous thrombosis.  2. No visible peroneal vein Electronically signed by Paresh Woodard MD    XR FOOT LEFT (MIN 3 VIEWS)    Result Date: 5/19/2024  EXAM: XR FOOT LEFT (MIN 3 VIEWS) INDICATION: wound to bottom of right foot; r/o osteo COMPARISON: 5/14/2024 FINDINGS: There is soft tissue wound over the plantar aspect of the left great toe. There is surgical and bone fusion of the left first metatarsal-phalangeal joint. No acute bone abnormality. Extensive enthesophyte formation involving the plantar fascia. No radiopaque foreign body.     No evidence for osteomyelitis Electronically signed by Paresh Woodard MD    XR FOOT LEFT (MIN 3 VIEWS)    Result Date: 5/14/2024  XR FOOT LEFT (MIN 3 VIEWS) REASON FOR EXAM: pain COMPARISON: October 20, 2021 FINDINGS: AP, lateral, and oblique views of the left foot demonstrate soft tissue ulceration in the region of the first MTP joint. Orthopedic staple remains transfixing the first MTP joint. No fracture or destructive 
tbd  Hospice Care: no  Patient appropriate for Outpatient Wound Care Center: yes    Patient/Caregiver Teaching:  Level of patient/caregiver understanding able to:   Voiced understanding.        Electronically signed by Senait Tee RN, CWOCN on 5/20/2024 at 12:07 PM

## 2024-05-27 NOTE — PLAN OF CARE
Problem: Discharge Planning  Goal: Discharge to home or other facility with appropriate resources  5/26/2024 2319 by Dakotah Gaytan RN  Outcome: Progressing  5/26/2024 0945 by Kishore Moncada LPN  Outcome: Progressing     Problem: Pain  Goal: Verbalizes/displays adequate comfort level or baseline comfort level  5/26/2024 2319 by Dakotah Gaytan RN  Outcome: Progressing  5/26/2024 0945 by Kishore Moncada LPN  Outcome: Progressing  Flowsheets (Taken 5/26/2024 0930)  Verbalizes/displays adequate comfort level or baseline comfort level:   Encourage patient to monitor pain and request assistance   Assess pain using appropriate pain scale   Administer analgesics based on type and severity of pain and evaluate response   Implement non-pharmacological measures as appropriate and evaluate response   Consider cultural and social influences on pain and pain management   Notify Licensed Independent Practitioner if interventions unsuccessful or patient reports new pain     Problem: Safety - Adult  Goal: Free from fall injury  5/26/2024 2319 by Dakotah Gaytan RN  Outcome: Progressing  5/26/2024 0945 by Kishore Moncada LPN  Outcome: Progressing     Problem: Chronic Conditions and Co-morbidities  Goal: Patient's chronic conditions and co-morbidity symptoms are monitored and maintained or improved  5/26/2024 2319 by Dakotah Gaytan RN  Outcome: Progressing  5/26/2024 0945 by Kishore Moncada LPN  Outcome: Progressing

## 2024-05-28 ENCOUNTER — APPOINTMENT (OUTPATIENT)
Dept: INTERVENTIONAL RADIOLOGY/VASCULAR | Age: 59
DRG: 617 | End: 2024-05-28
Payer: MEDICARE

## 2024-05-28 VITALS
RESPIRATION RATE: 18 BRPM | WEIGHT: 300.93 LBS | BODY MASS INDEX: 40.76 KG/M2 | HEIGHT: 72 IN | DIASTOLIC BLOOD PRESSURE: 68 MMHG | OXYGEN SATURATION: 94 % | TEMPERATURE: 98.9 F | SYSTOLIC BLOOD PRESSURE: 119 MMHG | HEART RATE: 74 BPM

## 2024-05-28 LAB
CREAT SERPL-MCNC: 0.8 MG/DL (ref 0.9–1.3)
DOSE AMOUNT: NORMAL
DOSE TIME: NORMAL
GFR, ESTIMATED: >90 ML/MIN/1.73M2
GLUCOSE BLD-MCNC: 198 MG/DL (ref 70–99)
GLUCOSE BLD-MCNC: 247 MG/DL (ref 70–99)
VANCOMYCIN RANDOM: 19 UG/ML

## 2024-05-28 PROCEDURE — 36415 COLL VENOUS BLD VENIPUNCTURE: CPT

## 2024-05-28 PROCEDURE — 80202 ASSAY OF VANCOMYCIN: CPT

## 2024-05-28 PROCEDURE — 6370000000 HC RX 637 (ALT 250 FOR IP): Performed by: SURGERY

## 2024-05-28 PROCEDURE — 82962 GLUCOSE BLOOD TEST: CPT

## 2024-05-28 PROCEDURE — 94761 N-INVAS EAR/PLS OXIMETRY MLT: CPT

## 2024-05-28 PROCEDURE — 6370000000 HC RX 637 (ALT 250 FOR IP): Performed by: STUDENT IN AN ORGANIZED HEALTH CARE EDUCATION/TRAINING PROGRAM

## 2024-05-28 PROCEDURE — 6360000002 HC RX W HCPCS

## 2024-05-28 PROCEDURE — 6370000000 HC RX 637 (ALT 250 FOR IP): Performed by: INTERNAL MEDICINE

## 2024-05-28 PROCEDURE — 99232 SBSQ HOSP IP/OBS MODERATE 35: CPT | Performed by: INTERNAL MEDICINE

## 2024-05-28 PROCEDURE — 82565 ASSAY OF CREATININE: CPT

## 2024-05-28 RX ORDER — PSEUDOEPHEDRINE HCL 30 MG
100 TABLET ORAL DAILY
Qty: 30 CAPSULE | Refills: 0 | Status: SHIPPED | OUTPATIENT
Start: 2024-05-29 | End: 2024-06-28

## 2024-05-28 RX ORDER — PANTOPRAZOLE SODIUM 40 MG/1
40 TABLET, DELAYED RELEASE ORAL 2 TIMES DAILY
Qty: 28 TABLET | Refills: 0 | Status: SHIPPED | OUTPATIENT
Start: 2024-05-28 | End: 2024-06-11

## 2024-05-28 RX ORDER — AMOXICILLIN AND CLAVULANATE POTASSIUM 875; 125 MG/1; MG/1
1 TABLET, FILM COATED ORAL EVERY 12 HOURS SCHEDULED
Qty: 27 TABLET | Refills: 0 | Status: SHIPPED | OUTPATIENT
Start: 2024-05-28 | End: 2024-06-11

## 2024-05-28 RX ORDER — INSULIN GLARGINE 100 [IU]/ML
10 INJECTION, SOLUTION SUBCUTANEOUS NIGHTLY
Qty: 5 ADJUSTABLE DOSE PRE-FILLED PEN SYRINGE | Refills: 3 | Status: SHIPPED | OUTPATIENT
Start: 2024-05-28 | End: 2024-06-27

## 2024-05-28 RX ORDER — AMOXICILLIN AND CLAVULANATE POTASSIUM 875; 125 MG/1; MG/1
1 TABLET, FILM COATED ORAL EVERY 12 HOURS SCHEDULED
Status: DISCONTINUED | OUTPATIENT
Start: 2024-05-28 | End: 2024-05-28 | Stop reason: HOSPADM

## 2024-05-28 RX ORDER — PEN NEEDLE, DIABETIC 29 GAUGE
1 NEEDLE, DISPOSABLE MISCELLANEOUS DAILY
Qty: 100 EACH | Refills: 3 | Status: SHIPPED | OUTPATIENT
Start: 2024-05-28

## 2024-05-28 RX ORDER — DULAGLUTIDE 1.5 MG/.5ML
1.5 INJECTION, SOLUTION SUBCUTANEOUS
Qty: 2 ML | Refills: 0 | Status: SHIPPED | OUTPATIENT
Start: 2024-05-28 | End: 2024-06-27

## 2024-05-28 RX ORDER — LACTOBACILLUS RHAMNOSUS GG 10B CELL
1 CAPSULE ORAL
Qty: 30 CAPSULE | Refills: 0 | Status: SHIPPED | OUTPATIENT
Start: 2024-05-28 | End: 2024-06-27

## 2024-05-28 RX ORDER — LACTOBACILLUS RHAMNOSUS GG 10B CELL
1 CAPSULE ORAL
Status: DISCONTINUED | OUTPATIENT
Start: 2024-05-28 | End: 2024-05-28 | Stop reason: HOSPADM

## 2024-05-28 RX ORDER — INSULIN LISPRO 100 [IU]/ML
INJECTION, SOLUTION INTRAVENOUS; SUBCUTANEOUS
Qty: 3 ML | Refills: 1 | Status: SHIPPED | OUTPATIENT
Start: 2024-05-28

## 2024-05-28 RX ORDER — OXYCODONE HYDROCHLORIDE 5 MG/1
5 TABLET ORAL EVERY 6 HOURS PRN
Qty: 12 TABLET | Refills: 0 | Status: SHIPPED | OUTPATIENT
Start: 2024-05-28 | End: 2024-05-31

## 2024-05-28 RX ORDER — TAMSULOSIN HYDROCHLORIDE 0.4 MG/1
0.4 CAPSULE ORAL DAILY
Qty: 7 CAPSULE | Refills: 0 | Status: SHIPPED | OUTPATIENT
Start: 2024-05-28 | End: 2024-06-04

## 2024-05-28 RX ORDER — LEVOFLOXACIN 750 MG/1
750 TABLET, FILM COATED ORAL DAILY
Qty: 13 TABLET | Refills: 0 | Status: SHIPPED | OUTPATIENT
Start: 2024-05-29 | End: 2024-06-11

## 2024-05-28 RX ORDER — LEVOFLOXACIN 500 MG/1
750 TABLET, FILM COATED ORAL DAILY
Status: DISCONTINUED | OUTPATIENT
Start: 2024-05-28 | End: 2024-05-28 | Stop reason: HOSPADM

## 2024-05-28 RX ORDER — PREGABALIN 75 MG/1
75 CAPSULE ORAL 2 TIMES DAILY
Qty: 20 CAPSULE | Refills: 0 | Status: SHIPPED | OUTPATIENT
Start: 2024-05-28 | End: 2024-06-07

## 2024-05-28 RX ADMIN — PANTOPRAZOLE SODIUM 40 MG: 40 TABLET, DELAYED RELEASE ORAL at 06:49

## 2024-05-28 RX ADMIN — Medication 1 CAPSULE: at 13:07

## 2024-05-28 RX ADMIN — DOCUSATE SODIUM 100 MG: 100 CAPSULE, LIQUID FILLED ORAL at 10:51

## 2024-05-28 RX ADMIN — AMOXICILLIN AND CLAVULANATE POTASSIUM 1 TABLET: 875; 125 TABLET, FILM COATED ORAL at 10:51

## 2024-05-28 RX ADMIN — OXYCODONE HYDROCHLORIDE 10 MG: 10 TABLET ORAL at 06:49

## 2024-05-28 RX ADMIN — OXYCODONE HYDROCHLORIDE 10 MG: 10 TABLET ORAL at 10:50

## 2024-05-28 RX ADMIN — INSULIN LISPRO 2 UNITS: 100 INJECTION, SOLUTION INTRAVENOUS; SUBCUTANEOUS at 13:07

## 2024-05-28 RX ADMIN — LEVOFLOXACIN 750 MG: 500 TABLET, FILM COATED ORAL at 10:51

## 2024-05-28 RX ADMIN — ESCITALOPRAM OXALATE 10 MG: 10 TABLET ORAL at 10:51

## 2024-05-28 RX ADMIN — OXYCODONE HYDROCHLORIDE 10 MG: 10 TABLET ORAL at 02:49

## 2024-05-28 RX ADMIN — TAMSULOSIN HYDROCHLORIDE 0.4 MG: 0.4 CAPSULE ORAL at 10:51

## 2024-05-28 RX ADMIN — ATORVASTATIN CALCIUM 10 MG: 10 TABLET, FILM COATED ORAL at 10:51

## 2024-05-28 RX ADMIN — PREGABALIN 75 MG: 75 CAPSULE ORAL at 10:51

## 2024-05-28 ASSESSMENT — PAIN DESCRIPTION - ORIENTATION
ORIENTATION: LEFT
ORIENTATION: LEFT

## 2024-05-28 ASSESSMENT — PAIN SCALES - GENERAL
PAINLEVEL_OUTOF10: 8
PAINLEVEL_OUTOF10: 4
PAINLEVEL_OUTOF10: 8
PAINLEVEL_OUTOF10: 7
PAINLEVEL_OUTOF10: 7

## 2024-05-28 ASSESSMENT — PAIN DESCRIPTION - LOCATION
LOCATION: FOOT

## 2024-05-28 ASSESSMENT — PAIN DESCRIPTION - DESCRIPTORS
DESCRIPTORS: THROBBING;SHOOTING
DESCRIPTORS: ACHING

## 2024-05-28 ASSESSMENT — PAIN DESCRIPTION - ONSET: ONSET: ON-GOING

## 2024-05-28 ASSESSMENT — PAIN DESCRIPTION - FREQUENCY: FREQUENCY: CONTINUOUS

## 2024-05-28 ASSESSMENT — PAIN SCALES - WONG BAKER
WONGBAKER_NUMERICALRESPONSE: NO HURT
WONGBAKER_NUMERICALRESPONSE: NO HURT

## 2024-05-28 ASSESSMENT — PAIN - FUNCTIONAL ASSESSMENT: PAIN_FUNCTIONAL_ASSESSMENT: ACTIVITIES ARE NOT PREVENTED

## 2024-05-28 NOTE — PLAN OF CARE
Problem: Discharge Planning  Goal: Discharge to home or other facility with appropriate resources  5/28/2024 1242 by Shravan Rosen LPN  Outcome: Adequate for Discharge  5/28/2024 1226 by Shravan Rosen LPN  Outcome: Progressing     Problem: Pain  Goal: Verbalizes/displays adequate comfort level or baseline comfort level  5/28/2024 1242 by Shravan Rosen LPN  Outcome: Adequate for Discharge  5/28/2024 1226 by Shravan Rosen LPN  Outcome: Progressing     Problem: Safety - Adult  Goal: Free from fall injury  5/28/2024 1242 by Shravan Rosen LPN  Outcome: Adequate for Discharge  5/28/2024 1226 by Shravan Rosen LPN  Outcome: Progressing     Problem: Chronic Conditions and Co-morbidities  Goal: Patient's chronic conditions and co-morbidity symptoms are monitored and maintained or improved  5/28/2024 1242 by Shravan Rosen LPN  Outcome: Adequate for Discharge  5/28/2024 1226 by Shravan Rosen LPN  Outcome: Progressing

## 2024-05-28 NOTE — PROGRESS NOTES
Nutrition Note    Pt assessed due to a length of stay. Pt has been on a 5 carb diet diet and is consuming % of most of his meals. Pt does not have a wound and his weight appears stable. Pt is at low risk at this time.      Electronically signed by Marta Gramajo RD, LD on 5/26/24 at 8:09 PM EDT    Contact: [phone]      
    V2.0  [facility] – [place], [place] Hospitalist Progress Note      Name:  Toi Briones /Age/Sex: 1965  (58 y.o. male)   MRN & CSN:  [id number] & [id number] Encounter Date/Time: 2024 01:45 PM EDT    Location:  [facility] PCP: Emmett Rivera MD       Hospital Day: 6      Subjective:     Chief Complaint: Wound Check (Left foot) and Foot Swelling (left)    Patient seen and examined at bedside. Pain is under control. No n/v  Waiting on cx sensitivitiesfor discharge.   Discussed w ID, given pt allergy hx, and the cultures growing, will need difinite abx plan before discharge. Discussed w CM, unable to get approval for IV abx over the weekend. Pt will likely need to stay until Tuesday given it is a long weekend.    Assessment and Plan:   Diabetic Foot ulcer of left foot   Cellulitis of Left leg , OM L 1st toe s/p amputation  Presents with plantar aspect ulcer x 4 days   Previous wound cx with MRSA, strep, pseudomonas and proteus  Surgical cx 2024 Staphylococcus simulans, Enterococcus casseliflavus, Citrobacter Freundii, Stenotrophomonas Maltophilia, Staphylococcus Haemolyticus.  blood cx NGTD   On admit patient hemodynamically stable, afebrile, no leukocytosis, non-septic  MRI left foot showing osteo left first metatarsal base.   - general surgery following  - has wound vanc; continue wound care   - Broad-spectrum antibiotic coverage with Vanc, cefepime and flagyl  - ID consulted  - f/u cx sensitivities       T2DM  - Basal/bolus and LCSI  - Hold PO meds   - Hypoglycemic protocol      HLD  -Continue statin      BPH  -Continue  flomax    Diet ADULT DIET; Regular; 5 carb choices (75 gm/meal); Low Fat/Low Chol/High Fiber/2 gm Na   DVT Prophylaxis [x] Lovenox, []  Heparin, [] SCDs, [] Ambulation,  [] Eliquis, [] Xarelto  [] Coumadin   Code Status Full Code   Disposition From: Home  Expected Disposition: Home  Estimated Date of Discharge: 2-3 days  Patient requires continued admission due to IV Abx, sensitivities,  ID 
    V2.0  [facility] – [place] Hospitalist Progress Note      Name:  Toi Briones /Age/Sex: 1965  (58 y.o. male)   MRN & CSN:  [id number] & [id number] Encounter Date/Time: 2024 9:20 AM EDT    Location:  [facility]/1102-A PCP: Emmett Rivera MD       Hospital Day: 8      Subjective:     Chief Complaint: Wound Check (Left foot) and Foot Swelling (left)    Pt loss IV access. PICC RN made few attempts with no luck. Recommended tunneled PICC line  by IR; consulted STAT as pt has no IV access now.    Patient seen and examined at bedside. Had some pain earlier but now improved after Dilaudid. No n/v    Discussed w pt, if PICC rn able to place peripheral line today, will see if IR is able to place tunneled line today and if not, he may need IJ central line until he gets the tunneled line placed by IR.    Assessment and Plan:   Diabetic Foot ulcer of left foot   Cellulitis of Left leg , OM L 1st toe s/p amputation  Presents with plantar aspect ulcer x 4 days   Previous wound cx with MRSA, strep, pseudomonas and proteus  Surgical cx 2024 Staphylococcus simulans, Enterococcus casseliflavus, Citrobacter Freundii, Stenotrophomonas Maltophilia, Staphylococcus Haemolyticus.  blood cx NGTD   On admit patient hemodynamically stable, afebrile, no leukocytosis, non-septic  MRI left foot showing osteo left first metatarsal base.   - general surgery following  - has wound vanc; continue wound care   - Broad-spectrum antibiotic coverage with Vanc, cefepime and flagyl  - ID consulted  - await ID plan for abx on discharge       T2DM  - Basal/bolus and LCSI  - Hold PO meds   - Hypoglycemic protocol      HLD  -Continue statin      BPH  -Continue  flomax    Diet ADULT DIET; Regular; 5 carb choices (75 gm/meal); Low Fat/Low Chol/High Fiber/2 gm Na   DVT Prophylaxis [x] Lovenox, []  Heparin, [] SCDs, [] Ambulation,  [] Eliquis, [] Xarelto  [] Coumadin   Code Status Full Code   Disposition From: Home  Expected Disposition: Home  Estimated 
    V2.0  [facility] – [place] Hospitalist Progress Note      Name:  Toi Briones /Age/Sex: 1965  (58 y.o. male)   MRN & CSN:  [id number] & [id number] Encounter Date/Time: 2024 9:20 AM EDT    [facility]       Hospital Day: 9      Subjective:     Chief Complaint: Wound Check (Left foot) and Foot Swelling (left)    Pt had periphral IV line placed.  Had wound vac changed this morning; reports some pain.  No n/v      Assessment and Plan:   Diabetic Foot ulcer of left foot   Cellulitis of Left leg , OM L 1st toe s/p amputation  Presents with plantar aspect ulcer x 4 days   Previous wound cx with MRSA, strep, pseudomonas and proteus  Surgical cx 2024 Staphylococcus simulans, Enterococcus casseliflavus, Citrobacter Freundii, Stenotrophomonas Maltophilia, Staphylococcus Haemolyticus.  blood cx NGTD   On admit patient hemodynamically stable, afebrile, no leukocytosis, non-septic  MRI left foot showing osteo left first metatarsal base.   - general surgery following  - has wound vanc; continue wound care   - Broad-spectrum antibiotic coverage with Vanc, cefepime and flagyl  - ID consulted  - await ID plan for abx on discharge  - IR consulted for tunneled PICC line (unable to place regular PICC line)       T2DM  - Basal/bolus and MCSI  - Hold PO meds   - Hypoglycemic protocol      HLD  -Continue statin      BPH  -Continue  flomax    Diet ADULT DIET; Regular; 5 carb choices (75 gm/meal); Low Fat/Low Chol/High Fiber/2 gm Na   DVT Prophylaxis [x] Lovenox, []  Heparin, [] SCDs, [] Ambulation,  [] Eliquis, [] Xarelto  [] Coumadin   Code Status Full Code   Disposition From: Home  Expected Disposition: Home  Estimated Date of Discharge: 2-3 days  Patient requires continued admission due to IV Abx, ID recs   Surrogate Decision Maker/ POA      Discussed with CM regarding patient care.     Imaging that was interpreted personally includes XR foot and MRI foot     Drugs that require 
    V2.0  [facility] – [place] Hospitalist Progress Note      Name:  Toi Briones /Age/Sex: 1965  (58 y.o. male)   MRN & CSN:  [id number] & [id number] Encounter Date/Time: 2024 9:20 AM EDT    Location:  [facility] PCP: Emmett Rivera MD       Hospital Day: 3      Subjective:     Chief Complaint: Wound Check (Left foot) and Foot Swelling (left)     Pt went for left 1st toe amputation. Doing well post op. Pt not interested in going for rehab.     Assessment and Plan:   Diabetic Foot ulcer of left foot   Cellulitis of Left leg , OM L 1st toe s/p amputation  Presents with plantar aspect ulcer x 4 days   Previous wound cx with MRSA, strep, pseudomonas and proteus  On admit patient hemodynamically stable, afebrile, no leukocytosis, non-septic  MRI left foot showing osteo left first metatarsal base.   - Broad-spectrum antibiotic coverage with Vanc, cefepime and flagyl  - ID consulted  - Wound Cx pending; await surgical cx   - blood cx NGTD         T2DM  - Basal/bolus and LCSI  - Hold PO meds   - Hypoglycemic protocol      HLD  -Continue statin      BPH  -Continue  flomax    Diet ADULT DIET; Regular; 4 carb choices (60 gm/meal)   DVT Prophylaxis [x] Lovenox, []  Heparin, [] SCDs, [] Ambulation,  [] Eliquis, [] Xarelto  [] Coumadin   Code Status Full Code   Disposition From: Home  Expected Disposition: Home  Estimated Date of Discharge: 2-3 days  Patient requires continued admission due to IV Abx, ID recs   Surrogate Decision Maker/ POA      Discussed with CM regarding patient care.     Imaging that was interpreted personally includes XR foot and MRI foot     Drugs that require monitoring for toxicity include lovenox  and monitor with CBC, BMP    Discussed management of the case in detail w/ the patient    Review of Systems:      See above    Objective:     Intake/Output Summary (Last 24 hours) at 2024 1114  Last data filed at 2024  Gross per 24 hour   Intake 490 ml   Output --   Net 490 ml    
    V2.0  [facility] [place] Hospital[place]      Name:  Toi Briones /Age/Sex: 1965  (58 y.o. male)   MRN & CSN:  [id number] & [id number] Encounter Date/Time: 2024 9:20 AM EDT    [facility]:  [facility] PCP: Emmett Rivera MD       Hospital Day: 5      Subjective:     Chief Complaint: Wound Check (Left foot) and Foot Swelling (left)    Surgical cx growing Staphylococcus Simulans Enterococcus Casseliflavus.    Patient seen and examined at bedside. Pain is under control. Had nausea and vomiting yesterday.   Pt had BM today.     Assessment and Plan:   Diabetic Foot ulcer of left foot   Cellulitis of Left leg , OM L 1st toe s/p amputation  Presents with plantar aspect ulcer x 4 days   Previous wound cx with MRSA, strep, pseudomonas and proteus  Surgical cx 2024 Staphylococcus simulans, Enterococcus casseliflavus.  blood cx NGTD   On admit patient hemodynamically stable, afebrile, no leukocytosis, non-septic  MRI left foot showing osteo left first metatarsal base.   - Broad-spectrum antibiotic coverage with Vanc, cefepime and flagyl  - ID consulted  - await surgical cx  sensitivities          T2DM  - Basal/bolus and LCSI  - Hold PO meds   - Hypoglycemic protocol      HLD  -Continue statin      BPH  -Continue  flomax    Diet ADULT DIET; Regular; 5 carb choices (75 gm/meal); Low Fat/Low Chol/High Fiber/2 gm Na   DVT Prophylaxis [x] Lovenox, []  Heparin, [] SCDs, [] Ambulation,  [] Eliquis, [] Xarelto  [] Coumadin   Code Status Full Code   Disposition From: Home  Expected Disposition: Home  Estimated Date of Discharge: 2-3 days  Patient requires continued admission due to IV Abx, ID recs   Surrogate Decision Maker/ POA      Discussed with CM regarding patient care.     Imaging that was interpreted personally includes XR foot and MRI foot     Drugs that require monitoring for toxicity include lovenox  and monitor with CBC, BMP    Discussed management of the case in detail w/ the patient    Review of 
    V2.0  [facility] – [place] Hospitalist Progress Note      Name:  Toi Briones /Age/Sex: 1965  (58 y.o. male)   MRN & CSN:  [id number] & [id number] Encounter Date/Time: 2024 9:20 AM EDT    Location:  [facility] PCP: Emmett Rivera MD       Hospital Day: 7      Subjective:     Chief Complaint: Wound Check (Left foot) and Foot Swelling (left)    Patient seen and examined at bedside. Pain is under control. No n/v  Waiting on abx plan for discharge.     Assessment and Plan:   Diabetic Foot ulcer of left foot   Cellulitis of Left leg , OM L 1st toe s/p amputation  Presents with plantar aspect ulcer x 4 days   Previous wound cx with MRSA, strep, pseudomonas and proteus  Surgical cx 2024 Staphylococcus simulans, Enterococcus casseliflavus, Citrobacter Freundii, Stenotrophomonas Maltophilia, Staphylococcus Haemolyticus.  blood cx NGTD   On admit patient hemodynamically stable, afebrile, no leukocytosis, non-septic  MRI left foot showing osteo left first metatarsal base.   - general surgery following  - has wound vanc; continue wound care   - Broad-spectrum antibiotic coverage with Vanc, cefepime and flagyl  - ID consulted  - await ID plan for abx on discharge       T2DM  - Basal/bolus and LCSI  - Hold PO meds   - Hypoglycemic protocol      HLD  -Continue statin      BPH  -Continue  flomax    Diet ADULT DIET; Regular; 5 carb choices (75 gm/meal); Low Fat/Low Chol/High Fiber/2 gm Na   DVT Prophylaxis [x] Lovenox, []  Heparin, [] SCDs, [] Ambulation,  [] Eliquis, [] Xarelto  [] Coumadin   Code Status Full Code   Disposition From: Home  Expected Disposition: Home  Estimated Date of Discharge: 2-3 days  Patient requires continued admission due to IV Abx, ID recs   Surrogate Decision Maker/ POA      Discussed with CM regarding patient care.     Imaging that was interpreted personally includes XR foot and MRI foot     Drugs that require monitoring for toxicity include lovenox  and monitor with CBC, 
    V2.0  [facility] [place] Hospitalist Progress Note      Name:  Toi Briones /Age/Sex: 1965  (58 y.o. male)   MRN & CSN:  [id number] & [id number] Encounter Date/Time: 2024 9:20 AM EDT    Location:  [facility] PCP: Emmett Rivera MD       Hospital Day: 4      Subjective:     Chief Complaint: Wound Check (Left foot) and Foot Swelling (left)     Patient seen and examined at bedside. Pain is under control. Had nausea yesterday but no vomiting.   Pt had BM today.   Waiting on culture results.     Assessment and Plan:   Diabetic Foot ulcer of left foot   Cellulitis of Left leg , OM L 1st toe s/p amputation  Presents with plantar aspect ulcer x 4 days   Previous wound cx with MRSA, strep, pseudomonas and proteus  On admit patient hemodynamically stable, afebrile, no leukocytosis, non-septic  MRI left foot showing osteo left first metatarsal base.   - Broad-spectrum antibiotic coverage with Vanc, cefepime and flagyl  - ID consulted  - Wound Cx pending; await surgical cx   - blood cx NGTD         T2DM  - Basal/bolus and LCSI  - Hold PO meds   - Hypoglycemic protocol      HLD  -Continue statin      BPH  -Continue  flomax    Diet ADULT DIET; Regular; 5 carb choices (75 gm/meal); Low Fat/Low Chol/High Fiber/2 gm Na   DVT Prophylaxis [x] Lovenox, []  Heparin, [] SCDs, [] Ambulation,  [] Eliquis, [] Xarelto  [] Coumadin   Code Status Full Code   Disposition From: Home  Expected Disposition: Home  Estimated Date of Discharge: 2-3 days  Patient requires continued admission due to IV Abx, ID recs   Surrogate Decision Maker/ POA      Discussed with CM regarding patient care.     Imaging that was interpreted personally includes XR foot and MRI foot     Drugs that require monitoring for toxicity include lovenox  and monitor with CBC, BMP    Discussed management of the case in detail w/ the patient    Review of Systems:      See above    Objective:     Intake/Output Summary (Last 24 hours) at 2024 1005  Last data 
   05/28/24 1032   Encounter Summary   Encounter Overview/Reason Initial Encounter   Service Provided For Patient   Referral/Consult From [facility]   Support System Family members   Last Encounter  05/28/24  (PT dealing with an amputation, PT unhappy about his last ER visit, PT expressed his depression and stress of family situations, PT finds meaning in a spiritual ring, also his kolby in God, and he wanted prayer.)   Complexity of Encounter Moderate   Begin Time 1000   End Time  1034   Total Time Calculated 34 min   Spiritual/Emotional needs   Type Spiritual Support;Emotional Distress   Grief, Loss, and Adjustments   Type Adjustment to illness   Assessment/Intervention/Outcome   Assessment Anxious;Concerns with suffering;Coping;Fearful;Powerlessness;Stress overload   Intervention Active listening;Discussed illness injury and it’s impact;Discussed meaning/purpose;Discussed relationship with God;Explored/Affirmed feelings, thoughts, concerns;Explored Coping Skills/Resources;Prayer (assurance of)/[place];Sustaining Presence/Ministry of presence   Outcome Coping;Less anxious, Less agitated;Venting emotion   Plan and Referrals   Plan/Referrals Continue Support (comment)       
4 Eyes Skin Assessment     NAME:  Toi Briones  YOB: 1965  MEDICAL RECORD NUMBER:  [id number]    The patient is being assess for  Admission    I agree that 2 RN's have performed a thorough Head to Toe Skin Assessment on the patient. ALL assessment sites listed below have been assessed.      Areas assessed by both nurses:    Head, Face, Ears, Shoulders, Back, Chest, Arms, Elbows, Hands, Sacrum. Buttock, Coccyx, Ischium, Legs. Feet and Heels, and Under Medical Devices      - Diabetic ulcer right nfoot plantar area  - redness BLE  - Scattered scratches BLE, BUE    Does the Patient have a Wound? Yes wound(s) were present on assessment. LDA wound assessment was Initiated and completed by MEY Briseno Prevention initiated:  NA   Wound Care Orders initiated:  Yes    Pressure Injury (Stage 3,4, Unstageable, DTI, NWPT, and Complex wounds) if present place consult order under :: Yes    New and Established Ostomies if present place consult order under : NA      Nurse 1 eSignature: Electronically signed by Juan Mckenna RN on 5/20/24 at 7:07 AM EDT    **SHARE this note so that the co-signing nurse is able to place an eSignature**    Nurse 2 eSignature: Electronically signed by Gayathri Villareal RN on 5/20/24 at 8:18 AM EDT         
4 Eyes Skin Assessment     NAME:  Toi Briones  YOB: 1965  MEDICAL RECORD NUMBER:  [id number]    The patient is being assess for  Admission    I agree that 2 RN's have performed a thorough Head to Toe Skin Assessment on the patient. ALL assessment sites listed below have been assessed.      Areas assessed by both nurses:    Head, Face, Ears, Shoulders, Back, Chest, Arms, Elbows, Hands, Sacrum. Buttock, Coccyx, Ischium, Legs. Feet and Heels, and Under Medical Devices   - diabetic ulcer left plantar area  - redness BLE  - scratches BLE        Does the Patient have a Wound? Yes wound(s) were present on assessment. LDA wound assessment was Initiated and completed by RN       Finn Prevention initiated:  NA   Wound Care Orders initiated:  Yes    Pressure Injury (Stage 3,4, Unstageable, DTI, NWPT, and Complex wounds) if present place consult order under :: Yes    New and Established Ostomies if present place consult order under : NA      Nurse 1 eSignature: Electronically signed by Juan Mckenna RN on 5/19/24 at 5:49 AM EDT    **SHARE this note so that the co-signing nurse is able to place an eSignature**    Nurse 2 eSignature: Electronically signed by Summer Thomas LPN on 5/19/24 at 7:28 AM EDT         
4 Eyes Skin Assessment     NAME:  Toi Briones  YOB: 1965  MEDICAL RECORD NUMBER:  [id number]    The patient is being assessed for  Admission    I agree that at least one RN has performed a thorough Head to Toe Skin Assessment on the patient. ALL assessment sites listed below have been assessed.      Areas assessed by both nurses:    Head, Face, Ears, Shoulders, Back, Chest, Arms, Elbows, Hands, Sacrum. Buttock, Coccyx, Ischium, Legs. Feet and Heels, and Under Medical Devices         Does the Patient have a Wound? Yes wound(s) were present on assessment. LDA wound assessment was Initiated and completed by RN     Stage 1 pressure injury on the coccyx  Finn Prevention initiated by RN: No  Wound Care Orders initiated by RN: Yes    Pressure Injury (Stage 3,4, Unstageable, DTI, NWPT, and Complex wounds) if present, place Wound referral order by RN under : No    New Ostomies, if present place, Ostomy referral order under : No     Nurse 1 eSignature: Electronically signed by David Mckenzie RN on 5/22/24 at 4:57 AM EDT    **SHARE this note so that the co-signing nurse can place an eSignature**    Nurse 2 eSignature: Electronically signed by Elvin Louis RN on 5/22/24 at 5:25 AM EDT   
GENERAL SURGERY INPATIENT POST-OP NOTE  [facility]    PATIENT: Toi Briones, 58 y.o., male, MRN: [id number]    Hospital Day:  LOS: 3 days , Post-Op Day: 1 Day Post-Op    Procedure(s): 24 LEFT 1ST TOE AMPUTATION, WOUND VAC     Assessment:  Toi Briones is a 58 y.o. male with osteomyelitis of the left 1st metatarsal head with an associated foot ulcer who is #1 Day Post-Op        Principal Problem:    Type 2 diabetes mellitus with left diabetic foot ulcer (HCC)  Active Problems:    Open wound of foot excluding toes    Osteomyelitis of left foot (HCC)  Resolved Problems:    * No resolved hospital problems. *      Plan:  Discussed findings and options with Toi Briones.   Heel touch weight bearing left foot  Wound vac in place, change on Friday. Appreciate Wound Care assistance. Will need an outpatient wound vac, CM consulted.   Await surgical culture, pathology  Will continue to follow  Thank you for the consultation and the opportunity to care for Toi Briones      _____________________________________________               Subjective:  Chief Complaint: left foot sore  Pain: controlled intermittently  BM:    Diet: ADULT DIET; Regular; 4 carb choices (60 gm/meal)  Activity: heel touch weight bearing left foot    Stable overnight. Left foot pain intermittently controlled.     Vac with minimal dark serosanguinous drainage     Objective:  Vitals: /73   Pulse 71   Temp 98.1 °F (36.7 °C) (Oral)   Resp 18   Ht 1.816 m (5' 11.5\")   Wt 124.3 kg (274 lb 0.5 oz)   SpO2 97%   BMI 37.69 kg/m²   Vital Signs (Last 24 Hours)  Temp  Av.2 °F (36.8 °C)  Min: 97.7 °F (36.5 °C)  Max: 98.6 °F (37 °C)  Pulse  Av.7  Min: 64  Max: 80  BP  Min: 112/83  Max: 159/80  Resp  Avg: 15.8  Min: 10  Max: 18  SpO2  Av %  Min: 96 %  Max: 98 %  Wt Readings from Last 3 Encounters:   24 124.3 kg (274 lb 0.5 oz)   24 127 kg (280 lb)   23 127 kg (280 lb)       I/O:  190 -  0700  In: 10 
Infectious Disease Progress Note  2024   Patient Name: Toi Briones : 1965   Impression  Left Plantar DFI with OM s/p Left Hallux Amputation 2024:  Allergy to ceftriaxone (hives):  Allergy to sulfa (hives):  CrCl 154. Afebrile, no leukocytosis. Pct 0.118, CRP 17.3, 18.3. ESR 17.   -BC 0/2 NGTD  -left foot wound culture: Mixed skin moon and enteric moon.  -MRI foot left w wo contrast: 1. New. Osteomyelitis involving the first metatarsal base. Adjacent plantar soft tissue ulcer with cellulitis. No discrete abscess.   2.  Postoperative versus post infectious abductor hallucis and flexor hallucis longus tears. Susceptibility artifact related to bunionectomy.   -S/p per Dr. Nicholson:  Left 1st toe amputation (down to the 1st MT with the MT bone head appearing spongy consistent with OM), wound VAC. DX: OM of left foot. Cultures: Pending. Pathology: Pending  Past S/p Right Index Finger Partial Amputation from MRSA with OM :  DMII:  Fibromyalgia:  Class II Obesity: BMI: 37.69 kg/m2  Multi-morbidity: per PMHx: anxiety/ depression, chronic back pain, OA of the hands, fibromyalgia, obesity, RLS, DMII and several medication allergies, left index finger MRSA OM s/p partial amputation .   Plan:  Continue empiric IV vancomycin per pharmacy dosing, past h/o MRSA   Continue empiric IV metronidazole 500 mg tid  Continue empiric IV cefepime 2 gm q8h  Trend CRP, ordered  Awaiting surgical cultures and pathology from  for final ABX choice and duration as concerns of residual OM, will probably plan on 6 weeks of therapy    Ongoing Antimicrobial Therapy  Vancomycin -  Cefepime -  Metronidazole -?  Completed Antimicrobial Therapy  Zosyn   Doxycycline ??  History:?Interval history noted. Chief complaint; left DFU with OM of the first MT.   Denies n/v/d/f or untoward effects of antibiotics  Physical Exam:  Vital Signs: /72   Pulse 79   Temp 98.2 °F (36.8 °C) (Oral) 
Infectious Disease Progress Note  2024   Patient Name: Toi Briones : 1965   Impression  Polymicrobial Left Plantar DFI with OM s/p Left Hallux Amputation 2024:  Allergy to ceftriaxone (hives):  Allergy to sulfa (hives):  CrCl 157. Afebrile, no leukocytosis. Pct 0.118, CRP 17.3, 18.3, 23.3. ESR 17.   -BC 0/2 NGTD  -left foot wound culture: Mixed skin moon and enteric moon.  -MRI foot left w wo contrast: 1. New. Osteomyelitis involving the first metatarsal base. Adjacent plantar soft tissue ulcer with cellulitis. No discrete abscess.   2.  Postoperative versus post infectious abductor hallucis and flexor hallucis longus tears. Susceptibility artifact related to bunionectomy.   -S/p per Dr. Nicholson:  Left 1st toe amputation (down to the 1st MT with the MT bone head appearing spongy consistent with OM), wound VAC. DX: OM of left foot. Cultures: Staphylococcus simulans, Enterococcus casseliflavus, Citrobacter spp (all sensi pending) Pathology: Pending  Past S/p Right Index Finger Partial Amputation from MRSA with OM :  DMII:  Fibromyalgia:  Class II Obesity: BMI: 37.69 kg/m2  Multi-morbidity: per PMHx: anxiety/ depression, chronic back pain, OA of the hands, fibromyalgia, obesity, RLS, DMII and several medication allergies, left index finger MRSA OM s/p partial amputation .   Plan:  Continue empiric IV vancomycin per pharmacy dosing, past h/o MRSA   Continue empiric IV metronidazole 500 mg tid  Continue empiric IV cefepime 2 gm q8h  Will plan on a 6 week cumulative course of ABX, choice dependent upon pending sensitivities   Trend CRP, ordered  Await pathology  I called [facility] ([facility]), requested sensi of all surgical pathogens from     Ongoing Antimicrobial Therapy  Vancomycin -  Cefepime -  Metronidazole -?  Completed Antimicrobial Therapy  Zosyn   Doxycycline ??  History:?Interval history noted. Chief complaint; left DFU with OM of the 
Interim note:  MRI results showed osteomyelitis of the left 1st metatarsal head contiguous with the plantar ulcer. Discussed with Toi Briones that given the osteo and that he has metal hardware in his MTP joint from previous fusion, medical treatment alone with antibiotics is unlikely to be successful at clearing the osteomyelitis. We discussed left 1st toe amputation including the metatarsal head, with possible wound vac placement. He wishes to proceed.   - NPO past midnight  - The risks, benefits, potential complications and possible alternatives of the procedure were discussed in detail, including complications of but not limited to infection, bleeding, anesthesia-related complications, death, pain, poor healing or cosmesis, wound complications, wound vac placement, and recurrent symptoms or pathology, or need for additional interventions. All questions were answered. The patient wished to proceed and consent was documented in the medical record.     Electronically signed: AREN LEES MD 5/20/2024 5:54 PM   
Occupational Therapy      Per therapy triage process, the OT referral has been discharged. Per discussion w/ pt and nursing, pt is ambulating independently in room. Pt was educated on HH therapy and declined services. Pt is not interested in therapy services and does not present w/ any skilled therapy needs. Please re-order in future if pt requires skilled therapy services.     Summer Bravo OTR/L [id number]  2:15 PM,5/25/2024   
PHARMACY VANCOMYCIN MONITORING SERVICE  Pharmacy consulted by Dr. Sandy Elder for monitoring and adjustment.    Indication for treatment: Diabetic foot ulcer of left foot / Cellulitis of left leg  Goal trough: Trough Goal: 10-15 mcg/mL  AUC/ADONIS: <500    Risk Factors for MRSA Identified:   Documented isolation of MRSA within 1 year , Purulent and/or complicated SSTI    Pertinent Laboratory Values:   Temp Readings from Last 3 Encounters:   05/19/24 98 °F (36.7 °C) (Oral)   05/14/24 97.8 °F (36.6 °C)   02/19/24 97.8 °F (36.6 °C) (Oral)     Recent Labs     05/19/24  0035   WBC 7.8     Recent Labs     05/19/24  0035   BUN 9   CREATININE 0.7*     Estimated Creatinine Clearance: 156 mL/min (A) (based on SCr of 0.7 mg/dL (L)).    Intake/Output Summary (Last 24 hours) at 5/19/2024 0708  Last data filed at 5/19/2024 0648  Gross per 24 hour   Intake --   Output 200 ml   Net -200 ml     Last Encounter Weight:  Wt Readings from Last 3 Encounters:   05/19/24 127.9 kg (281 lb 15.5 oz)   02/19/24 127 kg (280 lb)   06/14/23 127 kg (280 lb)       Pertinent Cultures:   Date    Source    Results  05/19   Blood    In process  05/19   Anaerobic    Collected  05/19   Wound    Collected    Assessment:  HPI: 58 y.o. male presented to ED for wound check.  Patient with plantar aspect ulcer; Previous wound cultures with MRSA, strep, pseudomonas and proteus.   SCr = 0.7, BUN = 9, and incomplete I/O data  Day(s) of therapy: 1   Vancomycin concentration:   05/21 - To be collected    Plan:  Vancomycin 2,500 mg IV given in ED; Follow with Vancomycin 1,750 mg IV Q 12 Hours  Steady state predictions: AUC: 485, Trough: 13.2  Pharmacy will continue to monitor patient and adjust therapy as indicated    VANCOMYCIN CONCENTRATION SCHEDULED FOR 05/21/2024 @ 5 AM    Thank you for the consult.  Stephen Hall RPH  5/19/2024 7:08 AM    
PHARMACY VANCOMYCIN MONITORING SERVICE  Pharmacy consulted by Dr. Sandy Elder for monitoring and adjustment.    Indication for treatment: Diabetic foot ulcer of left foot / Cellulitis of left leg  Goal trough: Trough Goal: 10-15 mcg/mL  AUC/ADONIS: <500    Risk Factors for MRSA Identified:   Documented isolation of MRSA within 1 year , Purulent and/or complicated SSTI    Pertinent Laboratory Values:   Temp Readings from Last 3 Encounters:   05/21/24 97.9 °F (36.6 °C) (Oral)   05/14/24 97.8 °F (36.6 °C)   02/19/24 97.8 °F (36.6 °C) (Oral)     Recent Labs     05/19/24  0810 05/20/24  0600 05/21/24  0531   WBC 6.5 6.3 6.1       Recent Labs     05/19/24  0810 05/20/24  0600 05/21/24  0531   BUN 10 12 13   CREATININE 0.6* 0.8* 0.7*       Estimated Creatinine Clearance: 154 mL/min (A) (based on SCr of 0.7 mg/dL (L)).    Intake/Output Summary (Last 24 hours) at 5/21/2024 1227  Last data filed at 5/20/2024 2004  Gross per 24 hour   Intake 490 ml   Output --   Net 490 ml       Last Encounter Weight:  Wt Readings from Last 3 Encounters:   05/21/24 124.3 kg (274 lb 0.5 oz)   02/19/24 127 kg (280 lb)   06/14/23 127 kg (280 lb)       Pertinent Cultures:   Date    Source    Results  05/19   Blood    No growth  05/19   Anaerobic    In process  05/19   Wound               In process  5/21                             Surgical Cx                             In process    VANCOMYCIN TROUGH:    Recent Labs     05/21/24  0531   VANCOTROUGH 12.8     VANCOMYCIN RANDOM:  No results for input(s): \"VANCORANDOM\" in the last 72 hours.    Assessment:  HPI: 58 y.o. male presented to ED for wound check.  Patient with plantar aspect ulcer; Previous wound cultures with MRSA, strep, pseudomonas and proteus.   5/20 - Surgery consulted, awaiting MRI results to see if surgical intervention is needed.  5/21 - Diabetic foot infection, Osteomyelitis.  Toe amputation and wound vac placement today in surgery.  SCr stable just above baseline @0.7 (baseline 
PHARMACY VANCOMYCIN MONITORING SERVICE  Pharmacy consulted by Dr. Sandy Elder for monitoring and adjustment.    Indication for treatment: Diabetic foot ulcer of left foot / Cellulitis of left leg  Goal trough: Trough Goal: 10-15 mcg/mL  AUC/ADONIS: <500    Risk Factors for MRSA Identified:   Documented isolation of MRSA within 1 year , Purulent and/or complicated SSTI    Pertinent Laboratory Values:   Temp Readings from Last 3 Encounters:   05/22/24 98.2 °F (36.8 °C) (Oral)   05/14/24 97.8 °F (36.6 °C)   02/19/24 97.8 °F (36.6 °C) (Oral)     Recent Labs     05/20/24  0600 05/21/24  0531   WBC 6.3 6.1       Recent Labs     05/20/24  0600 05/21/24  0531   BUN 12 13   CREATININE 0.8* 0.7*       Estimated Creatinine Clearance: 154 mL/min (A) (based on SCr of 0.7 mg/dL (L)).    Intake/Output Summary (Last 24 hours) at 5/22/2024 1217  Last data filed at 5/22/2024 1157  Gross per 24 hour   Intake 240 ml   Output 1000 ml   Net -760 ml       Last Encounter Weight:  Wt Readings from Last 3 Encounters:   05/21/24 124.3 kg (274 lb 0.5 oz)   02/19/24 127 kg (280 lb)   06/14/23 127 kg (280 lb)       Pertinent Cultures:   Date    Source    Results  05/19   Blood    No growth  05/19   Anaerobic    In process  05/19   Wound               In process  5/21                             Surgical Cx                             In process    VANCOMYCIN TROUGH:    Recent Labs     05/21/24  0531   VANCOTROUGH 12.8       VANCOMYCIN RANDOM:  No results for input(s): \"VANCORANDOM\" in the last 72 hours.    Assessment:  HPI: 58 y.o. male presented to ED for wound check.  Patient with plantar aspect ulcer; Previous wound cultures with MRSA, strep, pseudomonas and proteus.   5/20 - Surgery consulted, awaiting MRI results to see if surgical intervention is needed.  5/21 - Diabetic foot infection, Osteomyelitis.  Toe amputation and wound vac placement today in surgery.  5/22 - Noted Hx of MRSA in wound abscess in 2021.  SCr stable just above baseline 
PHARMACY VANCOMYCIN MONITORING SERVICE  Pharmacy consulted by Dr. Sandy Elder for monitoring and adjustment.    Indication for treatment: Diabetic foot ulcer of left foot / Cellulitis of left leg  Goal trough: Trough Goal: 10-15 mcg/mL  AUC/ADONIS: <500    Risk Factors for MRSA Identified:   Documented isolation of MRSA within 1 year , Purulent and/or complicated SSTI    Pertinent Laboratory Values:   Temp Readings from Last 3 Encounters:   05/23/24 98.3 °F (36.8 °C) (Oral)   05/14/24 97.8 °F (36.6 °C)   02/19/24 97.8 °F (36.6 °C) (Oral)     Recent Labs     05/21/24  0531   WBC 6.1       Recent Labs     05/21/24  0531 05/23/24  0502   BUN 13 13   CREATININE 0.7* 0.7*       Estimated Creatinine Clearance: 157 mL/min (A) (based on SCr of 0.7 mg/dL (L)).    Intake/Output Summary (Last 24 hours) at 5/23/2024 1324  Last data filed at 5/23/2024 0911  Gross per 24 hour   Intake 135 ml   Output 350 ml   Net -215 ml       Last Encounter Weight:  Wt Readings from Last 3 Encounters:   05/23/24 129 kg (284 lb 6.3 oz)   02/19/24 127 kg (280 lb)   06/14/23 127 kg (280 lb)       Pertinent Cultures:   Date    Source    Results  05/19   Blood    No growth  05/19   Anaerobic    In process  05/19   Wound               In process  5/21                             Surgical Cx       Staph, Enterococcus, Citrobacter    VANCOMYCIN TROUGH:    Recent Labs     05/21/24  0531   VANCOTROUGH 12.8       VANCOMYCIN RANDOM:    Recent Labs     05/23/24  0502   VANCORANDOM 14.6       Assessment:  HPI: 58 y.o. male presented to ED for wound check.  Patient with plantar aspect ulcer; Previous wound cultures with MRSA, strep, pseudomonas and proteus.   5/20 - Surgery consulted, awaiting MRI results to see if surgical intervention is needed.  5/21 - Diabetic foot infection, Osteomyelitis.  Toe amputation and wound vac placement today in surgery.  5/22 - Noted Hx of MRSA in wound abscess in 2021.  SCr stable just above baseline @0.7 (baseline 0.6), BUN 
PHARMACY VANCOMYCIN MONITORING SERVICE  Pharmacy consulted by Dr. Sandy Elder for monitoring and adjustment.    Indication for treatment: Diabetic foot ulcer of left foot / Cellulitis of left leg  Goal trough: Trough Goal: 10-15 mcg/mL  AUC/ADONIS: <500    Risk Factors for MRSA Identified:   Documented isolation of MRSA within 1 year , Purulent and/or complicated SSTI    Pertinent Laboratory Values:   Temp Readings from Last 3 Encounters:   05/24/24 98.4 °F (36.9 °C) (Oral)   05/14/24 97.8 °F (36.6 °C)   02/19/24 97.8 °F (36.6 °C) (Oral)     No results for input(s): \"WBC\", \"LACTATE\" in the last 72 hours.    Recent Labs     05/23/24  0502   BUN 13   CREATININE 0.7*       Estimated Creatinine Clearance: 161 mL/min (A) (based on SCr of 0.7 mg/dL (L)).  No intake or output data in the 24 hours ending 05/24/24 1223    Last Encounter Weight:  Wt Readings from Last 3 Encounters:   05/24/24 135 kg (297 lb 9.9 oz)   02/19/24 127 kg (280 lb)   06/14/23 127 kg (280 lb)       Pertinent Cultures:   Date    Source    Results  05/19   Blood    Negative  05/19   Anaerobic    In process  05/19   Wound               Mixed skin moon and enteric moon  5/21                             Surgical Cx       Staph, Enterococcus, Citrobacter    VANCOMYCIN TROUGH:    No results for input(s): \"VANCOTROUGH\" in the last 72 hours.    VANCOMYCIN RANDOM:    Recent Labs     05/23/24  0502   VANCORANDOM 14.6         Assessment:  HPI: 58 y.o. male presented to ED for wound check.  Patient with plantar aspect ulcer; Previous wound cultures with MRSA, strep, pseudomonas and proteus.   5/20 - Surgery consulted, awaiting MRI results to see if surgical intervention is needed.  5/21 - Diabetic foot infection, Osteomyelitis.  Toe amputation and wound vac placement today in surgery.  5/22 - Noted Hx of MRSA in wound abscess in 2021.  SCr stable just above baseline @0.7 (baseline 0.6), BUN normal,  UOP ok  Day(s) of therapy: 6 (ID consulted, noted to continue 
PHARMACY VANCOMYCIN MONITORING SERVICE  Pharmacy consulted by Dr. Sandy Elder for monitoring and adjustment.    Indication for treatment: Diabetic foot ulcer of left foot / Cellulitis of left leg  Goal trough: Trough Goal: 10-15 mcg/mL  AUC/ADONIS: <500    Risk Factors for MRSA Identified:   Documented isolation of MRSA within 1 year , Purulent and/or complicated SSTI    Pertinent Laboratory Values:   Temp Readings from Last 3 Encounters:   05/25/24 98.4 °F (36.9 °C) (Oral)   05/14/24 97.8 °F (36.6 °C)   02/19/24 97.8 °F (36.6 °C) (Oral)     No results for input(s): \"WBC\", \"LACTATE\" in the last 72 hours.    Recent Labs     05/23/24  0502   BUN 13   CREATININE 0.7*       Estimated Creatinine Clearance: 161 mL/min (A) (based on SCr of 0.7 mg/dL (L)).  No intake or output data in the 24 hours ending 05/25/24 0955    Last Encounter Weight:  Wt Readings from Last 3 Encounters:   05/24/24 135 kg (297 lb 9.9 oz)   02/19/24 127 kg (280 lb)   06/14/23 127 kg (280 lb)       Pertinent Cultures:   Date    Source    Results  05/19   Blood    Negative  05/19   Anaerobic    In process  05/19   Wound               Mixed skin moon and enteric moon  5/21                             Surgical Cx       Staph, Enterococcus, Citrobacter    VANCOMYCIN TROUGH:    No results for input(s): \"VANCOTROUGH\" in the last 72 hours.    VANCOMYCIN RANDOM:    Recent Labs     05/23/24  0502   VANCORANDOM 14.6         Assessment:  HPI: 58 y.o. male presented to ED for wound check.  Patient with plantar aspect ulcer; Previous wound cultures with MRSA, strep, pseudomonas and proteus.   5/20 - Surgery consulted, awaiting MRI results to see if surgical intervention is needed.  5/21 - Diabetic foot infection, Osteomyelitis.  Toe amputation and wound vac placement today in surgery.  5/22 - Noted Hx of MRSA in wound abscess in 2021.  SCr stable as of 5/23, just above baseline @0.7 (baseline 0.6), BUN normal,  UOP ok  Ordered daily creatinine to follow 
PHARMACY VANCOMYCIN MONITORING SERVICE  Pharmacy consulted by Dr. Sandy Elder for monitoring and adjustment.    Indication for treatment: Diabetic foot ulcer of left foot / Cellulitis of left leg  Goal trough: Trough Goal: 10-15 mcg/mL  AUC/ADONIS: <500    Risk Factors for MRSA Identified:   Documented isolation of MRSA within 1 year , Purulent and/or complicated SSTI    Pertinent Laboratory Values:   Temp Readings from Last 3 Encounters:   05/26/24 98.3 °F (36.8 °C) (Oral)   05/14/24 97.8 °F (36.6 °C)   02/19/24 97.8 °F (36.6 °C) (Oral)     No results for input(s): \"WBC\", \"LACTATE\" in the last 72 hours.    Recent Labs     05/26/24  0746   BUN 12   CREATININE 0.6*     Estimated Creatinine Clearance: 183 mL/min (A) (based on SCr of 0.6 mg/dL (L)).    Intake/Output Summary (Last 24 hours) at 5/26/2024 0901  Last data filed at 5/25/2024 1446  Gross per 24 hour   Intake 480 ml   Output --   Net 480 ml     Last Encounter Weight:  Wt Readings from Last 3 Encounters:   05/25/24 129.6 kg (285 lb 11.5 oz)   02/19/24 127 kg (280 lb)   06/14/23 127 kg (280 lb)       Pertinent Cultures:   Date    Source    Results  05/19   Blood    Negative  05/19   Anaerobic    In process  05/19   Wound               Mixed skin moon and enteric moon  5/21                             Surgical Cx       Staph, Enterococcus, Citrobacter pending sensitivity    VANCOMYCIN TROUGH:    No results for input(s): \"VANCOTROUGH\" in the last 72 hours.    VANCOMYCIN RANDOM:    Recent Labs     05/26/24  0746   VANCORANDOM 13.5       Assessment:  HPI: 58 y.o. male presented to ED for wound check.  Patient with plantar aspect ulcer; Previous wound cultures with MRSA, strep, pseudomonas and proteus.   5/20 - Surgery consulted, awaiting MRI results to see if surgical intervention is needed.  5/21 - Diabetic foot infection, Osteomyelitis.  Toe amputation and wound vac placement today in surgery.  5/22 - Noted Hx of MRSA in wound abscess in 2021.  SCr stable as of 
PHARMACY VANCOMYCIN MONITORING SERVICE  Pharmacy consulted by Dr. Sandy Elder for monitoring and adjustment.    Indication for treatment: Diabetic foot ulcer of left foot / Cellulitis of left leg  Goal trough: Trough Goal: 10-15 mcg/mL  AUC/ADONIS: <500    Risk Factors for MRSA Identified:   Documented isolation of MRSA within 1 year , Purulent and/or complicated SSTI    Pertinent Laboratory Values:   Temp Readings from Last 3 Encounters:   05/27/24 98.3 °F (36.8 °C) (Oral)   05/14/24 97.8 °F (36.6 °C)   02/19/24 97.8 °F (36.6 °C) (Oral)     No results for input(s): \"WBC\", \"LACTATE\" in the last 72 hours.    Recent Labs     05/26/24  0746 05/27/24  0857   BUN 12  --    CREATININE 0.6* 0.7*     Estimated Creatinine Clearance: 160 mL/min (A) (based on SCr of 0.7 mg/dL (L)).    Intake/Output Summary (Last 24 hours) at 5/27/2024 1507  Last data filed at 5/27/2024 1311  Gross per 24 hour   Intake 480 ml   Output --   Net 480 ml     Last Encounter Weight:  Wt Readings from Last 3 Encounters:   05/27/24 134.1 kg (295 lb 10.2 oz)   02/19/24 127 kg (280 lb)   06/14/23 127 kg (280 lb)       Pertinent Cultures:   Date    Source    Results  05/19   Blood    Negative  05/19   Anaerobic    In process  05/19   Wound               Mixed skin moon and enteric moon  5/21                             Surgical Cx       Staph, Enterococcus, Citrobacter pending sensitivity    VANCOMYCIN TROUGH:    No results for input(s): \"VANCOTROUGH\" in the last 72 hours.    VANCOMYCIN RANDOM:    Recent Labs     05/26/24  0746   VANCORANDOM 13.5       Assessment:  HPI: 58 y.o. male presented to ED for wound check.  Patient with plantar aspect ulcer; Previous wound cultures with MRSA, strep, pseudomonas and proteus.   5/20 - Surgery consulted, awaiting MRI results to see if surgical intervention is needed.  5/21 - Diabetic foot infection, Osteomyelitis.  Toe amputation and wound vac placement today in surgery.  5/22 - Noted Hx of MRSA in wound abscess in 
PHARMACY VANCOMYCIN MONITORING SERVICE  Pharmacy consulted by Dr. Sandy Elder for monitoring and adjustment.    Indication for treatment: Diabetic foot ulcer of left foot / Cellulitis of left leg  Goal trough: Trough Goal: 10-15 mcg/mL  AUC/DAONIS: <500    Risk Factors for MRSA Identified:   Documented isolation of MRSA within 1 year , Purulent and/or complicated SSTI    Pertinent Laboratory Values:   Temp Readings from Last 3 Encounters:   05/20/24 98.6 °F (37 °C) (Oral)   05/14/24 97.8 °F (36.6 °C)   02/19/24 97.8 °F (36.6 °C) (Oral)     Recent Labs     05/19/24  0035 05/19/24  0810 05/20/24  0600   WBC 7.8 6.5 6.3       Recent Labs     05/19/24  0035 05/19/24  0810   BUN 9 10   CREATININE 0.7* 0.6*       Estimated Creatinine Clearance: 183 mL/min (A) (based on SCr of 0.6 mg/dL (L)).    Intake/Output Summary (Last 24 hours) at 5/20/2024 1302  Last data filed at 5/20/2024 0827  Gross per 24 hour   Intake 500 ml   Output --   Net 500 ml       Last Encounter Weight:  Wt Readings from Last 3 Encounters:   05/20/24 129 kg (284 lb 6.3 oz)   02/19/24 127 kg (280 lb)   06/14/23 127 kg (280 lb)       Pertinent Cultures:   Date    Source    Results  05/19   Blood    No growth  05/19   Anaerobic    In process  05/19   Wound               In process    Assessment:  HPI: 58 y.o. male presented to ED for wound check.  Patient with plantar aspect ulcer; Previous wound cultures with MRSA, strep, pseudomonas and proteus.   5/20 - Surgery consulted, awaiting MRI results to see if surgical intervention is needed.  SCr appears close to baseline @0.6, BUN normal, no UOP data  Day(s) of therapy: 2   Vancomycin concentration:   05/21 - To be collected    Plan:  Renal trends close to baseline.  Continue vancomycin 1750mg ivpb q12h  Steady state predictions: AUC: 503, Trough: 13.8  Pharmacy will continue to monitor patient and adjust therapy as indicated    VANCOMYCIN CONCENTRATION SCHEDULED FOR 05/21/2024 @ 5 AM    Thank you for the 
Patient is a hard stick and previous IV was leaking upon assessment. IV antibiotics held for could not regain iv access at this time. Provider on call notified.   
Patient seen by wound care this am to change wound vac. Patient on call light. Tech answered call light and patient stated he wanted his medication. Call light was off when this nurse walked past.   Morning meds were pulled and given. Patient upset that this nurse didn't bring pain meds when asked. This nurse educated on light being off and pain meds not due. Patient stated he called so nurse could ask for more, but turned off own call light. Reinforced time of 1045 am for next available med and importance of not turning off own call light.  
Per Dr Santamaria, tunneled PICC in no longer needed. IR to complete consult  
Physical Therapy      Physical Therapy Treatment Note  Name: Toi Briones MRN: [id number] :   1965   Date:  2024   Admission Date: 2024 Room:  [address] [place], [place] [address]A   Restrictions/Precautions:          presumed NWB through LLE forefoot, will train WBAT through L heel -- no orders present for WB restrictions.   Communication with other providers:  Cleared for tx by nurse Leroy.   Subjective:  Patient states:  Pt. Denies any concerns with walking. Pt. Reports having multiple forms of DME at home.   Pain:   Location, Type, Intensity (0/10 to 10/10):  Pt. Does not state.   Objective:    Observation:  Pt. Sitting at EOB upon arrival. Pt. With L foot wound vac.     Treatment, including education/measures:  Bed mobility; Pt. Sitting EOB pre/post session.   Transfers from EOB: Nadege  Gait: Pt. Educated on proper walker management, posture, and technique. Pt. With 1 bout of  Post-LOB, self corrected. Min cues for walker management throughout session. Amb x ~50ft, Nadege, FWW, step-to pattern.       Assessment / Impression:    Pt. May benefit from further dynamic and unsupported ambulation training.     Patient's tolerance of treatment:  Good   Adverse Reaction: None  Significant change in status and impact:  none  Barriers to improvement:  N/A   Plan for Next Session:    Potentially see for another visit to cont working on amb and dynamic balance for increased safe functional mobility to decrease risk of falls.   Timed Code Treatment Minutes: 15 Minutes  PT Individual Minutes  Time In: 1012  Time Out: 1029  Minutes: 17            Previously filed items:                Electronically signed by:    Dougie Dacosta PTA  2024, 11:48 AM      
Unable to do pts MRI today due to volume. Plan to scan patient tomorrow.   
(H) 70 - 99 MG/DL   POCT Glucose    Collection Time: 05/23/24  8:09 PM   Result Value Ref Range    POC Glucose 188 (H) 70 - 99 MG/DL   POCT Glucose    Collection Time: 05/23/24  9:08 PM   Result Value Ref Range    POC Glucose 211 (H) 70 - 99 MG/DL   POCT Glucose    Collection Time: 05/23/24 11:59 PM   Result Value Ref Range    POC Glucose 255 (H) 70 - 99 MG/DL   C-Reactive Protein    Collection Time: 05/24/24  4:04 AM   Result Value Ref Range    CRP High Sensitivity 18.4 (H) <5.0 mg/L   POCT Glucose    Collection Time: 05/24/24  4:30 AM   Result Value Ref Range    POC Glucose 182 (H) 70 - 99 MG/DL   POCT Glucose    Collection Time: 05/24/24  6:37 AM   Result Value Ref Range    POC Glucose 189 (H) 70 - 99 MG/DL         Electronically signed: AREN LEES MD 5/24/2024 11:25 AM       
Extraocular movements intact.      Pupils: Pupils are equal, round, and reactive to light.   Cardiovascular:      Rate and Rhythm: Normal rate and regular rhythm.      Pulses: Normal pulses.      Heart sounds: Normal heart sounds.   Pulmonary:      Effort: Pulmonary effort is normal.      Breath sounds: Normal breath sounds.   Abdominal:      Palpations: Abdomen is soft.   Musculoskeletal:         General: Deformity present. No tenderness or signs of injury. Normal range of motion.      Comments: Patient has history of multiple amputations including bilateral hands.  Right fifth toe and left first toe   Skin:     General: Skin is warm.      Capillary Refill: Capillary refill takes less than 2 seconds.      Findings: Lesion present.      Comments: Left foot ulcer.  On the plantar surface on the lateral side of great toe.  Covered with a dressing.  No fluctuance or drainage appreciated.   Neurological:      General: No focal deficit present.      Mental Status: He is alert and oriented to person, place, and time.   Psychiatric:         Mood and Affect: Mood normal.         Behavior: Behavior normal.         Thought Content: Thought content normal.         Judgment: Judgment normal.            Medications:   Medications:    atorvastatin  10 mg Oral Daily    escitalopram  10 mg Oral Daily    pantoprazole  40 mg Oral BID AC    pregabalin  75 mg Oral BID    tamsulosin  0.4 mg Oral Daily    insulin lispro  0-4 Units SubCUTAneous TID WC    insulin lispro  0-4 Units SubCUTAneous Nightly    [Held by provider] insulin lispro  0-4 Units SubCUTAneous Q4H    sodium chloride flush  5-40 mL IntraVENous 2 times per day    enoxaparin  30 mg SubCUTAneous BID    cefepime  2,000 mg IntraVENous q8h    metroNIDAZOLE  500 mg IntraVENous Q8H    vancomycin  1,750 mg IntraVENous Q12H    docusate sodium  100 mg Oral Daily      Infusions:    dextrose      sodium chloride 25 mL (05/20/24 0159)     PRN Meds: dicyclomine, 20 mg, 4x Daily 
ulcer with cellulitis. No discrete abscess.  2.  Postoperative versus post infectious abductor hallucis and flexor hallucis longus tears. Susceptibility artifact related to bunionectomy.     Labs:    Recent Results (from the past 24 hour(s))   POCT Glucose    Collection Time: 05/23/24 11:25 AM   Result Value Ref Range    POC Glucose 241 (H) 70 - 99 MG/DL   POCT Glucose    Collection Time: 05/23/24  4:16 PM   Result Value Ref Range    POC Glucose 184 (H) 70 - 99 MG/DL   POCT Glucose    Collection Time: 05/23/24  5:04 PM   Result Value Ref Range    POC Glucose 255 (H) 70 - 99 MG/DL   POCT Glucose    Collection Time: 05/23/24  8:09 PM   Result Value Ref Range    POC Glucose 188 (H) 70 - 99 MG/DL   POCT Glucose    Collection Time: 05/23/24  9:08 PM   Result Value Ref Range    POC Glucose 211 (H) 70 - 99 MG/DL   POCT Glucose    Collection Time: 05/23/24 11:59 PM   Result Value Ref Range    POC Glucose 255 (H) 70 - 99 MG/DL   C-Reactive Protein    Collection Time: 05/24/24  4:04 AM   Result Value Ref Range    CRP High Sensitivity 18.4 (H) <5.0 mg/L   POCT Glucose    Collection Time: 05/24/24  4:30 AM   Result Value Ref Range    POC Glucose 182 (H) 70 - 99 MG/DL   POCT Glucose    Collection Time: 05/24/24  6:37 AM   Result Value Ref Range    POC Glucose 189 (H) 70 - 99 MG/DL     CULTURE results: Invalid input(s): \"BLOOD CULTURE\", \"URINE CULTURE\", \"SURGICAL CULTURE\"    Diagnosis:  Patient Active Problem List   Diagnosis    Finger infection right index finger    Diabetic foot infection (HCC)    Uncontrolled diabetes mellitus with complications    Diabetes with ulcer of foot (HCC)    Ulcer of other part of foot    Diabetes mellitus with ulcer of heel (HCC)    Obesity, Class III, BMI 40-49.9 (morbid obesity) (HCC)    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)    Skin ulcer of toe of left foot with fat layer exposed (HCC)    Chemical dependency (HCC)    Chronic pain syndrome    Drug abuse (HCC)    Cellulitis of 
left hand (HCC)    Wound infection    Subacute osteomyelitis of left hand (HCC)    MRSA infection    Type 2 diabetes mellitus with left diabetic foot ulcer (HCC)    Open wound of foot excluding toes    Osteomyelitis of left foot (HCC)    Acute osteomyelitis of metatarsal bone of left foot (HCC)       Active Problems  Principal Problem:    Type 2 diabetes mellitus with left diabetic foot ulcer (HCC)  Active Problems:    Type 2 diabetes mellitus with left diabetic foot infection (HCC)    Cellulitis of left foot    Open wound of foot excluding toes    Osteomyelitis of left foot (HCC)    Acute osteomyelitis of metatarsal bone of left foot (HCC)  Resolved Problems:    * No resolved hospital problems. *    Electronically signed by: Electronically signed by Natalio Carmona MD on 5/28/2024 at 7:25 AM

## 2024-05-28 NOTE — CARE COORDINATION
Wound vac delivered this am . Serial number [id number]. POD signed and faxed to [facility]. Hard copy placed in soft chart.

## 2024-05-28 NOTE — DISCHARGE SUMMARY
V2.0  Discharge Summary    Name:  Toi Briones /Age/Sex: 1965 (58 y.o. male)   Admit Date: 2024  Discharge Date: 24    MRN & CSN:  [id number] & [id number] Encounter Date and Time 24 12:22 PM EDT    Attending:  Prince Shultz,* Discharging Provider: Prince Bhatt MD       Hospital Course:     Brief HPI: Patient is a 58 y.o. male with a PMHx of T2DM who presented to the ED with foot swelling. Patient presents with 4 days of worsening erythema and edema and warmth of left foot associated with fevers and chills. He reports an ulcer to the bottom of his foot associated with purulent discharge. Denied any HA, dizziness, presyncope, syncope, cough, SOB, CP, N/V, abdominal pain, bleeding (hemoptysis / hematemesis, hematuria, BRBPR), C/D, or changes in urinary habits.       Denied any tobacco, alcohol or illicit drug use.    Brief Problem Based Course:     Diabetic Foot ulcer of left foot   Cellulitis of Left leg , OM L 1st toe s/p amputation  Presents with plantar aspect ulcer x 4 days   Previous wound cx with MRSA, strep, pseudomonas and proteus  Surgical cx 2024 Staphylococcus simulans, Enterococcus casseliflavus, Citrobacter Freundii, Stenotrophomonas Maltophilia, Staphylococcus Haemolyticus.  blood cx NGTD   On admit patient hemodynamically stable, afebrile, no leukocytosis, non-septic  MRI left foot showing osteo left first metatarsal base.   - general surgery following  - has wound vanc; continue wound care   - Broad-spectrum antibiotic coverage with Vanc, cefepime and flagyl  - ID consulted  Discussed with ID >>  discontinue empiric IV vancomycin, metronidazole, and cefepime  Start Augmentin 875 mg po bid and levofloxacin 750 mg po daily for 2 weeks     Overall patient doing better. Labs and vitals stable. DC on Abx as above. [facility] has been set up for wound care. Needs to follow up with surgery as OP.         T2DM >> A1c 9 >> Will DC on Lantus 10 Units and SSI. Endo

## 2024-05-28 NOTE — CARE COORDINATION
[facility] Liaison aware of discharge & will initiate HHC. Verified demo info and pt wants Will or Dust to come out tomorrow. Notified [facility]. No IV atbs needed at this time.

## 2024-05-29 ENCOUNTER — TELEPHONE (OUTPATIENT)
Dept: WOUND CARE | Age: 59
End: 2024-05-29

## 2024-06-07 ENCOUNTER — HOSPITAL ENCOUNTER (OUTPATIENT)
Dept: WOUND CARE | Age: 59
Discharge: HOME | End: 2024-06-07
Payer: MEDICARE

## 2024-06-07 VITALS
SYSTOLIC BLOOD PRESSURE: 117 MMHG | TEMPERATURE: 97.7 F | HEART RATE: 84 BPM | RESPIRATION RATE: 16 BRPM | DIASTOLIC BLOOD PRESSURE: 56 MMHG

## 2024-06-07 DIAGNOSIS — S91.309A OPEN WOUND OF FOOT EXCLUDING TOES: ICD-10-CM

## 2024-06-07 DIAGNOSIS — T81.89XA NON-HEALING SURGICAL WOUND, INITIAL ENCOUNTER: Primary | ICD-10-CM

## 2024-06-07 DIAGNOSIS — M86.9 OSTEOMYELITIS OF LEFT FOOT, UNSPECIFIED TYPE (HCC): ICD-10-CM

## 2024-06-07 DIAGNOSIS — E11.628 TYPE 2 DIABETES MELLITUS WITH LEFT DIABETIC FOOT INFECTION (HCC): ICD-10-CM

## 2024-06-07 DIAGNOSIS — L08.9 TYPE 2 DIABETES MELLITUS WITH LEFT DIABETIC FOOT INFECTION (HCC): ICD-10-CM

## 2024-06-07 PROCEDURE — 11042 DBRDMT SUBQ TIS 1ST 20SQCM/<: CPT

## 2024-06-07 PROCEDURE — 97605 NEG PRS WND THER DME<=50SQCM: CPT

## 2024-06-07 PROCEDURE — 99213 OFFICE O/P EST LOW 20 MIN: CPT

## 2024-06-07 RX ORDER — OXYCODONE HYDROCHLORIDE 5 MG/1
5 TABLET ORAL EVERY 12 HOURS PRN
Qty: 28 TABLET | Refills: 0 | Status: SHIPPED | OUTPATIENT
Start: 2024-06-07 | End: 2024-06-21

## 2024-06-07 RX ORDER — GENTAMICIN SULFATE 1 MG/G
OINTMENT TOPICAL ONCE
OUTPATIENT
Start: 2024-06-07 | End: 2024-06-07

## 2024-06-07 RX ORDER — SODIUM CHLOR/HYPOCHLOROUS ACID 0.033 %
SOLUTION, IRRIGATION IRRIGATION ONCE
OUTPATIENT
Start: 2024-06-07 | End: 2024-06-07

## 2024-06-07 RX ORDER — CLOBETASOL PROPIONATE 0.5 MG/G
OINTMENT TOPICAL ONCE
OUTPATIENT
Start: 2024-06-07 | End: 2024-06-07

## 2024-06-07 RX ORDER — TRIAMCINOLONE ACETONIDE 1 MG/G
OINTMENT TOPICAL ONCE
OUTPATIENT
Start: 2024-06-07 | End: 2024-06-07

## 2024-06-07 RX ORDER — BACITRACIN ZINC AND POLYMYXIN B SULFATE 500; 1000 [USP'U]/G; [USP'U]/G
OINTMENT TOPICAL ONCE
OUTPATIENT
Start: 2024-06-07 | End: 2024-06-07

## 2024-06-07 RX ORDER — BACITRACIN ZINC 500 [USP'U]/G
OINTMENT TOPICAL ONCE
OUTPATIENT
Start: 2024-06-07 | End: 2024-06-07

## 2024-06-07 RX ORDER — IBUPROFEN 200 MG
TABLET ORAL ONCE
OUTPATIENT
Start: 2024-06-07 | End: 2024-06-07

## 2024-06-07 RX ORDER — BETAMETHASONE DIPROPIONATE 0.5 MG/G
CREAM TOPICAL ONCE
OUTPATIENT
Start: 2024-06-07 | End: 2024-06-07

## 2024-06-07 ASSESSMENT — PAIN SCALES - GENERAL: PAINLEVEL_OUTOF10: 10

## 2024-06-07 ASSESSMENT — PAIN DESCRIPTION - ORIENTATION: ORIENTATION: LEFT

## 2024-06-07 ASSESSMENT — PAIN DESCRIPTION - LOCATION: LOCATION: FOOT

## 2024-06-07 ASSESSMENT — PAIN DESCRIPTION - DESCRIPTORS: DESCRIPTORS: THROBBING;SHOOTING

## 2024-06-07 NOTE — PROGRESS NOTES
Wound Care Center Initial Visit      Toi Briones  AGE: 58 y.o.   GENDER: male  : 1965  EPISODE DATE:  2024   Referred by:Post-op    Subjective:     CHIEF COMPLAINT nonhealing surgical wound to left foot     HISTORY of PRESENT ILLNESS      Toi Briones is a 58 y.o. male who presents to the Wound Clinic for an initial visit for evaluation and treatment of Acute non-healing surgical  ulcer(s) of  Lt. foot medial, hallux.  The condition is of moderate severity. The ulcer has been present for 2.5 weeks.  The underlying cause is thought to be nonhealing surgical.  The patients care to date has included normal post-op care. The patient has significant underlying medical conditions as below.     Patient on levaquin and follow up with ID on the     From ED: Patient is a 58 y.o. male with a PMHx of T2DM who presented to the ED with foot swelling. Patient presents with 4 days of worsening erythema and edema and warmth of left foot associated with fevers and chills. He reports an ulcer to the bottom of his foot associated with purulent discharge. Denied any HA, dizziness, presyncope, syncope, cough, SOB, CP, N/V, abdominal pain, bleeding (hemoptysis / hematemesis, hematuria, BRBPR), C/D, or changes in urinary habits.     2024: LEFT 1ST TOE AMPUTATION POSSIBLE WOUND VAC with Dr. Nicholson    Wound Pain Timing/Severity: waxing and waning  Quality of pain: aching, throbbing  Severity of pain:  3 / 10   Modifying Factors: edema, venous stasis, diabetes, poor glucose control, chronic pressure, and obesity  Associated Signs/Symptoms: edema, erythema, drainage, numbness, and pain        PAST MEDICAL HISTORY        Diagnosis Date    Absent finger     Left hand    Anxiety     Arthritis     Hands    Asthma     \"As a kid but outgrew this\" - no medications as of 20    Chronic back pain     Depression     Difficult intravenous access 2024    Unable to place PICC x2 admissions. Patient IS NOT a PICC

## 2024-06-07 NOTE — PATIENT INSTRUCTIONS
PHYSICIAN ORDERS AND DISCHARGE INSTRUCTIONS    NOTE: Upon discharge from the Wound Center, you will receive a patient experience survey. We would be grateful if you would take the time to fill this survey out.    Wound care order history:     ADI's   Right       Left    Date:   Cultures:     Grafts:     Antibiotics:        Continuing wound care orders and information:                Residence:                Continue home health care with:    Your wound-care supplies will be provided by:     Wound cleansing:     Do not scrub or use excessive force.   Wash hands with soap and water before and after dressing changes.   Prior to applying a clean dressing, cleanse wound with normal saline, wound cleanser, or mild soap and water.    Ask the physician or nurse before getting the wound(s) wet in a shower    Daily Wound management:   Keep weight off wounds and reposition every 2 hours.   Avoid standing for long periods of time.   Apply wraps/stockings in AM and remove at bedtime.   If swelling is present, elevate legs to the level of the heart or above for 30 minutes 4-5 times a day and/or when sitting.      When taking antibiotics take entire prescription as ordered by physician do not stop taking until medicine is all gone.              Orders for this week: 6/7/2024  Fax to [facility]       WOUND VAC THERAPY: Left Great Toe Amp     DUODERM TO PERIWOUND FOR PROTECTION. APPLY Stimulen and BLACK FOAM TO WOUND BED. SECURE VAC. DRESSING WITH DRAPE.    SET WOUND VAC  CONTINUOUS SUCTION. CANISTER CHANGE WITH EACH DRESSING CHANGE OR ACCORDING TO VOLUME OF DRAINAGE.    Wrap with Coflex Lite, be sure to keep tubing on the outside of the wrap    WOUND VAC DRESSING TO BE CHANGED MON, WED, and 6/14/24      Dispense 30 day quantity when ordering supplies.      Follow up with Raf PLA  In 2 weeks in the wound care center  Call  for any questions or concerns.  Date__________   Time____________

## 2024-06-07 NOTE — PROGRESS NOTES
Multilayer Compression Wrap   (Not Unna) Below the Knee    NAME:  Toi Briones  YOB: 1965  MEDICAL RECORD NUMBER:  [id number]  DATE:  6/7/2024    Multilayer compression wrap: Applied primary and secondary dressing as ordered.  Applied multilayered dressing below the knee to left lower leg.  Instructed patient/caregiver not to remove dressing and to keep it clean and dry.   Instructed patient/caregiver on complications to report to provider, such as pain, numbness in toes, heavy drainage, and slippage of dressing.  Instructed patient on purpose of compression dressing and on activity and exercise recommendations.Patient instructed what to do if wrap starts to roll down and when to call Home Health Care Agency.      Electronically signed by Astrid Love LPN on F2  6/7/2024 at 3:00 PM

## 2024-06-07 NOTE — PROGRESS NOTES
Negative Pressure Wound Therapy    NAME:  Toi Briones  YOB: 1965  MEDICAL RECORD NUMBER:  [id number]  DATE:  6/7/2024    Applied Negative Pressure to left foot.  [x] Applied skin barrier prep to saroj-wound.   [x] Cut strips of plastic drape to picture frame wound so that saroj-wound is     covered with the drape.   [x] If bridging dressing to less prominent site, cover any intact skin that will come in contact with the Negative Pressure Therapy sponge, gauze or channel drain with plastic drape. The sponge should never touch intact skin.   [x] Cut sponge, gauze or channel drain to size which will fit into the wound/ulcer bed without being forced.   [x] Be sure the sponge is large enough to hold the entire round plastic flange which is attached to the tubing. Never allow flange to be larger than the sponge or it will produce suction damaging intact skin.  Total number of individual pieces of foam used within the wound bed: 1    [x] If bridging the dressing away from the primary site, be sure the bridge leads to a piece of sponge large enough to hold the entire flange without allowing any of the flange to overlap onto intact skin.   [x] Covered sponge, gauze or channel drain with plastic drape.   [x] Cut a hole in this plastic drape directly over the sponge the same size as the plastic drain tubing.   [x] Removed plastic liner from flange and apply it directly over the hole you cut.   [x] Removed the plastic cover from the flange.   [x] Attached the tubing to the wound/ulcer Negative Pressure Therapy and turn it on to be sure a vacuum is created and that there are no leaks.   [x] If air leaks occur, use plastic drape to patch them.   [] Secured Negative Pressure Therapy dressing with ace wrap loosely if located on an extremity. Maintain tubing outside of ace wrap. Tubing must not exert pressure on intact skin.    Applied per  Guidelines      Electronically signed by Astrid Love LPN on

## 2024-06-21 ENCOUNTER — HOSPITAL ENCOUNTER (OUTPATIENT)
Dept: WOUND CARE | Age: 59
Discharge: HOME | End: 2024-06-21
Payer: MEDICARE

## 2024-06-21 VITALS — HEART RATE: 117 BPM | TEMPERATURE: 97.1 F | DIASTOLIC BLOOD PRESSURE: 66 MMHG | SYSTOLIC BLOOD PRESSURE: 138 MMHG

## 2024-06-21 DIAGNOSIS — T81.89XA NON-HEALING SURGICAL WOUND, INITIAL ENCOUNTER: ICD-10-CM

## 2024-06-21 DIAGNOSIS — L97.522 SKIN ULCER OF TOE OF LEFT FOOT WITH FAT LAYER EXPOSED (HCC): ICD-10-CM

## 2024-06-21 DIAGNOSIS — M86.9 OSTEOMYELITIS OF LEFT FOOT, UNSPECIFIED TYPE (HCC): ICD-10-CM

## 2024-06-21 DIAGNOSIS — E11.628 DIABETIC FOOT INFECTION (HCC): Primary | ICD-10-CM

## 2024-06-21 DIAGNOSIS — L08.9 DIABETIC FOOT INFECTION (HCC): Primary | ICD-10-CM

## 2024-06-21 PROCEDURE — 11045 DBRDMT SUBQ TISS EACH ADDL: CPT | Performed by: NURSE PRACTITIONER

## 2024-06-21 PROCEDURE — 11042 DBRDMT SUBQ TIS 1ST 20SQCM/<: CPT

## 2024-06-21 PROCEDURE — 97605 NEG PRS WND THER DME<=50SQCM: CPT

## 2024-06-21 PROCEDURE — 11042 DBRDMT SUBQ TIS 1ST 20SQCM/<: CPT | Performed by: NURSE PRACTITIONER

## 2024-06-21 PROCEDURE — 11045 DBRDMT SUBQ TISS EACH ADDL: CPT

## 2024-06-21 RX ORDER — BACITRACIN ZINC 500 [USP'U]/G
OINTMENT TOPICAL ONCE
OUTPATIENT
Start: 2024-06-21 | End: 2024-06-21

## 2024-06-21 RX ORDER — BACITRACIN ZINC AND POLYMYXIN B SULFATE 500; 1000 [USP'U]/G; [USP'U]/G
OINTMENT TOPICAL ONCE
OUTPATIENT
Start: 2024-06-21 | End: 2024-06-21

## 2024-06-21 RX ORDER — OXYCODONE HYDROCHLORIDE 5 MG/1
5 TABLET ORAL EVERY 12 HOURS PRN
Qty: 14 TABLET | Refills: 0 | Status: SHIPPED | OUTPATIENT
Start: 2024-06-21 | End: 2024-06-28

## 2024-06-21 RX ORDER — SODIUM CHLOR/HYPOCHLOROUS ACID 0.033 %
SOLUTION, IRRIGATION IRRIGATION ONCE
OUTPATIENT
Start: 2024-06-21 | End: 2024-06-21

## 2024-06-21 RX ORDER — BETAMETHASONE DIPROPIONATE 0.5 MG/G
CREAM TOPICAL ONCE
OUTPATIENT
Start: 2024-06-21 | End: 2024-06-21

## 2024-06-21 RX ORDER — CLOBETASOL PROPIONATE 0.5 MG/G
OINTMENT TOPICAL ONCE
OUTPATIENT
Start: 2024-06-21 | End: 2024-06-21

## 2024-06-21 RX ORDER — GENTAMICIN SULFATE 1 MG/G
OINTMENT TOPICAL ONCE
OUTPATIENT
Start: 2024-06-21 | End: 2024-06-21

## 2024-06-21 RX ORDER — TRIAMCINOLONE ACETONIDE 1 MG/G
OINTMENT TOPICAL ONCE
OUTPATIENT
Start: 2024-06-21 | End: 2024-06-21

## 2024-06-21 RX ORDER — IBUPROFEN 200 MG
TABLET ORAL ONCE
OUTPATIENT
Start: 2024-06-21 | End: 2024-06-21

## 2024-06-21 ASSESSMENT — PAIN DESCRIPTION - FREQUENCY: FREQUENCY: CONTINUOUS

## 2024-06-21 ASSESSMENT — PAIN DESCRIPTION - DESCRIPTORS: DESCRIPTORS: SHARP;STABBING

## 2024-06-21 ASSESSMENT — PAIN SCALES - GENERAL: PAINLEVEL_OUTOF10: 5

## 2024-06-21 ASSESSMENT — PAIN DESCRIPTION - ONSET: ONSET: ON-GOING

## 2024-06-21 ASSESSMENT — PAIN DESCRIPTION - ORIENTATION: ORIENTATION: LEFT

## 2024-06-21 ASSESSMENT — PAIN DESCRIPTION - PAIN TYPE: TYPE: SURGICAL PAIN

## 2024-06-21 ASSESSMENT — PAIN - FUNCTIONAL ASSESSMENT: PAIN_FUNCTIONAL_ASSESSMENT: PREVENTS OR INTERFERES SOME ACTIVE ACTIVITIES AND ADLS

## 2024-06-21 ASSESSMENT — PAIN DESCRIPTION - LOCATION: LOCATION: FOOT

## 2024-06-21 ASSESSMENT — PAIN SCALES - WONG BAKER: WONGBAKER_NUMERICALRESPONSE: HURTS EVEN MORE

## 2024-06-21 NOTE — PATIENT INSTRUCTIONS
PHYSICIAN ORDERS AND DISCHARGE INSTRUCTIONS    NOTE: Upon discharge from the Wound Center, you will receive a patient experience survey. We would be grateful if you would take the time to fill this survey out.    Wound care order history:     ADI's   Right       Left    Date:   Cultures:     Grafts:     Antibiotics:        Continuing wound care orders and information:                Residence:                Continue home health care with:    Your wound-care supplies will be provided by:     Wound cleansing:     Do not scrub or use excessive force.   Wash hands with soap and water before and after dressing changes.   Prior to applying a clean dressing, cleanse wound with normal saline, wound cleanser, or mild soap and water.    Ask the physician or nurse before getting the wound(s) wet in a shower    Daily Wound management:   Keep weight off wounds and reposition every 2 hours.   Avoid standing for long periods of time.   Apply wraps/stockings in AM and remove at bedtime.   If swelling is present, elevate legs to the level of the heart or above for 30 minutes 4-5 times a day and/or when sitting.      When taking antibiotics take entire prescription as ordered by physician do not stop taking until medicine is all gone.              Orders for this week: 6/21/2024    Fax to [facility]!       Right Index Finger - use neosporin daily at home    WOUND VAC THERAPY: Left Great Toe Amp    MASTISOL OR SKIN PREP AND DUODERM TO PERIWOUND FOR PROTECTION. APPLY Silvadene, Stimulen and BLACK FOAM TO WOUND BED. SECURE VAC. DRESSING WITH DRAPE.    SET WOUND VAC  CONTINUOUS SUCTION. CANISTER CHANGE WITH EACH DRESSING CHANGE OR ACCORDING TO VOLUME OF DRAINAGE.    Wrap with Coflex Lite, be sure to keep tubing on the outside of the wrap    WOUND VAC DRESSING TO BE CHANGED MON, WED, and 6/14/24    Give front offloader shoe today in clinic 6/21/24      Dispense 30 day quantity when ordering supplies.      Follow up with Raf Kuo, DEMARCO

## 2024-06-21 NOTE — PROGRESS NOTES
of pain:  3 / 10   Modifying Factors: edema, venous stasis, diabetes, poor glucose control, chronic pressure, and obesity  Associated Signs/Symptoms: edema, erythema, drainage, numbness, and pain        PAST MEDICAL HISTORY        Diagnosis Date    Absent finger     Left hand    Anxiety     Arthritis     Hands    Asthma     \"As a kid but outgrew this\" - no medications as of 2/21/20    Chronic back pain     Depression     Difficult intravenous access 05/26/2024    Unable to place PICC x2 admissions. Patient IS NOT a PICC candidate (2024)    Edema     Fibromyalgia     Headache(784.0)     Last: 2/19/20    Hernia, abdominal     History of difficult venous access 12/21/2021    No longer a candidate for bedside PICC placement, multiple failed attempts    Hx MRSA infection     positive culture 8/2014 from left foot    Nausea & vomiting     Neuropathy     Obesity, Class III, BMI 40-49.9 (morbid obesity) (HCC)     Osteomyelitis (HCC)     hx finger    Poor circulation     Prolonged emergence from general anesthesia     Restless leg syndrome     Shortness of breath on exertion     6/14/2016- pt denies any sob with this interview    Type II or unspecified type diabetes mellitus with other specified manifestations, not stated as uncontrolled 04/08/2014    Type II or unspecified type diabetes mellitus without mention of complication, not stated as uncontrolled DX 2007    Follows with PCP    Ulcer of other part of foot 04/08/2014    'ulcer on left foot healed all up\"       PAST SURGICAL HISTORY    Past Surgical History:   Procedure Laterality Date    COLONOSCOPY  1990's    Normal exam per pt    DEBRIDEMENT  8/25/2014    left foot    ENDOSCOPY, COLON, DIAGNOSTIC  06-    Normal exam per pt -     FINGER SURGERY  9-11-11    Right Index Finger    FINGER SURGERY  01-16-12    RIght Index Finger-Removal of loose body, Reconstruction of Mallet Finger    FOOT DEBRIDEMENT Left 6/10/2019    FOOT DEBRIDEMENT INCISION AND DRAINAGE

## 2024-06-27 ENCOUNTER — OFFICE VISIT (OUTPATIENT)
Dept: INFECTIOUS DISEASES | Age: 59
End: 2024-06-27

## 2024-06-27 VITALS
HEART RATE: 91 BPM | DIASTOLIC BLOOD PRESSURE: 85 MMHG | SYSTOLIC BLOOD PRESSURE: 127 MMHG | BODY MASS INDEX: 38.37 KG/M2 | WEIGHT: 279 LBS

## 2024-06-27 DIAGNOSIS — L97.509 DIABETES MELLITUS DUE TO UNDERLYING CONDITION WITH FOOT ULCER, WITHOUT LONG-TERM CURRENT USE OF INSULIN (HCC): Primary | Chronic | ICD-10-CM

## 2024-06-27 DIAGNOSIS — E08.621 DIABETES MELLITUS DUE TO UNDERLYING CONDITION WITH FOOT ULCER, WITHOUT LONG-TERM CURRENT USE OF INSULIN (HCC): Primary | Chronic | ICD-10-CM

## 2024-06-27 NOTE — ASSESSMENT & PLAN NOTE
S/p amputation, completed 2 week course of Augmentin and levofloxacin, Wound is healing. No need for additional abx at this time. Advised the patient to remain vigilant and have wound care obtain cultures if there is increased discharge.

## 2024-06-27 NOTE — PROGRESS NOTES
Death Mother 36    Diabetes Father         \"Type II\"       Vital Signs:  Vitals:    06/27/24 1034   BP: 127/85   Site: Left Lower Arm   Position: Sitting   Cuff Size: Medium Adult   Pulse: 91   Weight: 126.6 kg (279 lb)        Wt Readings from Last 3 Encounters:   07/12/24 133 kg (293 lb 4.8 oz)   06/27/24 126.6 kg (279 lb)   05/28/24 (!) 136.5 kg (300 lb 14.9 oz)        Physical Exam:   Gen: alert and NAD  HEENT: sclera clear, pupils equal and reactive, extra ocular muscles intact, oropharynx clear, mucus membranes moist, tympanic membranes clear bilaterally, no cervical lymphadenopathy noted, and neck supple  Neck: supple, no significant adenopathy  Chest: clear to auscultation, no wheezes, rales or rhonchi, symmetric air entry  Heart: regular rate and rhythm, no murmurs  ABD: abdomen is soft without significant tenderness, masses, organomegaly or guarding.  EXT:peripheral pulses normal, no pedal edema, no clubbing or cyanosis  NEURO: alert, oriented, normal speech, no focal findings or movement disorder noted  Skin: well hydrated, no lesions, surgical site examined  Wounds: Left foot wound VAC  Labs:   WBC   Date Value Ref Range Status   07/12/2024 7.5 4.0 - 10.5 K/CU MM Final   05/21/2024 6.1 4.0 - 10.5 K/CU MM Final   05/20/2024 6.3 4.0 - 10.5 K/CU MM Final     Creatinine   Date Value Ref Range Status   07/12/2024 0.7 (L) 0.9 - 1.3 MG/DL Final   05/28/2024 0.8 (L) 0.9 - 1.3 MG/DL Final   05/27/2024 0.7 (L) 0.9 - 1.3 MG/DL Final       Cultures:  Culture   Date Value Ref Range Status   07/12/2024 NO GROWTH AT 24 HOURS  Preliminary   07/12/2024 NO GROWTH AT 24 HOURS  Preliminary   05/21/2024 Final Report No anaerobes isolated  Final   05/21/2024 (A)  Final    STAPHYLOCOCCUS SIMULANS Rare growth Lana Calicoat requested sensitivity   05/21/2024 (A)  Final    ENTEROCOCCUS CASSELIFLAVUS Rare growth Lana Calicoat requested sensitivity   05/21/2024 (A)  Final    CITROBACTER FREUNDII Rare growth Lana Lao

## 2024-06-28 ENCOUNTER — HOSPITAL ENCOUNTER (OUTPATIENT)
Dept: WOUND CARE | Age: 59
Discharge: HOME | End: 2024-06-28
Payer: MEDICARE

## 2024-06-28 VITALS
SYSTOLIC BLOOD PRESSURE: 108 MMHG | TEMPERATURE: 97 F | DIASTOLIC BLOOD PRESSURE: 53 MMHG | HEART RATE: 77 BPM | RESPIRATION RATE: 16 BRPM

## 2024-06-28 DIAGNOSIS — L08.9 DIABETIC FOOT INFECTION (HCC): Primary | ICD-10-CM

## 2024-06-28 DIAGNOSIS — E11.628 DIABETIC FOOT INFECTION (HCC): Primary | ICD-10-CM

## 2024-06-28 DIAGNOSIS — M86.9 OSTEOMYELITIS OF LEFT FOOT, UNSPECIFIED TYPE (HCC): ICD-10-CM

## 2024-06-28 DIAGNOSIS — T81.89XA NON-HEALING SURGICAL WOUND, INITIAL ENCOUNTER: ICD-10-CM

## 2024-06-28 DIAGNOSIS — L97.522 SKIN ULCER OF TOE OF LEFT FOOT WITH FAT LAYER EXPOSED (HCC): ICD-10-CM

## 2024-06-28 PROCEDURE — 11045 DBRDMT SUBQ TISS EACH ADDL: CPT

## 2024-06-28 PROCEDURE — 11042 DBRDMT SUBQ TIS 1ST 20SQCM/<: CPT

## 2024-06-28 PROCEDURE — 97605 NEG PRS WND THER DME<=50SQCM: CPT

## 2024-06-28 RX ORDER — BACITRACIN ZINC AND POLYMYXIN B SULFATE 500; 1000 [USP'U]/G; [USP'U]/G
OINTMENT TOPICAL ONCE
OUTPATIENT
Start: 2024-06-28 | End: 2024-06-28

## 2024-06-28 RX ORDER — SODIUM CHLOR/HYPOCHLOROUS ACID 0.033 %
SOLUTION, IRRIGATION IRRIGATION ONCE
OUTPATIENT
Start: 2024-06-28 | End: 2024-06-28

## 2024-06-28 RX ORDER — BETAMETHASONE DIPROPIONATE 0.5 MG/G
CREAM TOPICAL ONCE
OUTPATIENT
Start: 2024-06-28 | End: 2024-06-28

## 2024-06-28 RX ORDER — GENTAMICIN SULFATE 1 MG/G
OINTMENT TOPICAL ONCE
OUTPATIENT
Start: 2024-06-28 | End: 2024-06-28

## 2024-06-28 RX ORDER — IBUPROFEN 200 MG
TABLET ORAL ONCE
OUTPATIENT
Start: 2024-06-28 | End: 2024-06-28

## 2024-06-28 RX ORDER — OXYCODONE HYDROCHLORIDE 5 MG/1
5 TABLET ORAL EVERY 12 HOURS PRN
Qty: 14 TABLET | Refills: 0 | Status: SHIPPED | OUTPATIENT
Start: 2024-06-28 | End: 2024-07-05

## 2024-06-28 RX ORDER — CLOBETASOL PROPIONATE 0.5 MG/G
OINTMENT TOPICAL ONCE
OUTPATIENT
Start: 2024-06-28 | End: 2024-06-28

## 2024-06-28 RX ORDER — TRIAMCINOLONE ACETONIDE 1 MG/G
OINTMENT TOPICAL ONCE
OUTPATIENT
Start: 2024-06-28 | End: 2024-06-28

## 2024-06-28 RX ORDER — BACITRACIN ZINC 500 [USP'U]/G
OINTMENT TOPICAL ONCE
OUTPATIENT
Start: 2024-06-28 | End: 2024-06-28

## 2024-06-28 NOTE — PROGRESS NOTES
.Multilayer Compression Wrap   (Not Unna) Below the Knee    NAME:  Toi Briones  YOB: 1965  MEDICAL RECORD NUMBER:  [id number]  DATE:  6/28/2024    Multilayer compression wrap: Removed old Multilayer wrap if indicated and wash leg with mild soap/water.  Applied moisturizing agent to dry skin as needed.   Applied primary and secondary dressing as ordered.  Applied multilayered dressing below the knee to left lower leg.  Instructed patient/caregiver not to remove dressing and to keep it clean and dry.   Instructed patient/caregiver on complications to report to provider, such as pain, numbness in toes, heavy drainage, and slippage of dressing.  Instructed patient on purpose of compression dressing and on activity and exercise recommendations.      Electronically signed by Shellie Biswas RN on 6/28/2024 at 11:23 AM

## 2024-06-28 NOTE — PROGRESS NOTES
.Negative Pressure Wound Therapy    NAME:  Toi Briones  YOB: 1965  MEDICAL RECORD NUMBER:  [id number]  DATE:  6/28/2024    Applied Negative Pressure to left foot wound(s)/ulcer(s).  [x] Applied skin barrier prep to saroj-wound.   [x] Cut strips of plastic drape to picture frame wound so that saroj-wound is     covered with the drape.   [x] If bridging dressing to less prominent site, cover any intact skin that will come in contact with the Negative Pressure Therapy sponge, gauze or channel drain with plastic drape. The sponge should never touch intact skin.   [x] Cut sponge, gauze or channel drain to size which will fit into the wound/ulcer bed without being forced.   [x] Be sure the sponge is large enough to hold the entire round plastic flange which is attached to the tubing. Never allow flange to be larger than the sponge or it will produce suction damaging intact skin.  Total number of individual pieces of foam used within the wound bed: 1    [x] If bridging the dressing away from the primary site, be sure the bridge leads to a piece of sponge large enough to hold the entire flange without allowing any of the flange to overlap onto intact skin.   [x] Covered sponge, gauze or channel drain with plastic drape.   [x] Cut a hole in this plastic drape directly over the sponge the same size as the plastic drain tubing.   [x] Removed plastic liner from flange and apply it directly over the hole you cut.   [x] Removed the plastic cover from the flange.   [x] Attached the tubing to the wound/ulcer Negative Pressure Therapy and turn it on to be sure a vacuum is created and that there are no leaks.   [x] If air leaks occur, use plastic drape to patch them.   [] Secured Negative Pressure Therapy dressing with ace wrap loosely if located on an extremity. Maintain tubing outside of ace wrap. Tubing must not exert pressure on intact skin.    Applied per  Guidelines      Electronically signed by

## 2024-06-28 NOTE — PROGRESS NOTES
Wound Care Center Progress Note With Procedure    Toi Briones  AGE: 58 y.o.   GENDER: male  : 1965  EPISODE DATE:  2024     Subjective:     Chief Complaint   Patient presents with    Wound Check     Left foot         HISTORY of PRESENT ILLNESS      Toi Briones is a 58 y.o. male who presents to the Wound Clinic for a visit for evaluation and treatment of Acute non-healing surgical  ulcer(s) of  Lt. foot medial, hallux.  The condition is of moderate severity. The ulcer has been present for 2.5 weeks.  The underlying cause is thought to be nonhealing surgical.  The patients care to date has included normal post-op care. The patient has significant underlying medical conditions as below.     2024: Per ID no antibiotics needed for now. Looking a bit better and we will get callus off this week. Continue plan of care otherwise and follow up 1 week     2024: Patient done with antibiotics. Getting home health and only some discomfort walking and needs a new off . Will refill pain meds today and add ointment and medicine to wound bed.   Overall doing well and wound is still working on granulation tissue  We will continue to prescribe patient opioid pain medication at this time. Patient understands all side effects and contraindications of opioids. No indication of abuse or seeking behavior. OARRS report checked at this time. We will continue to monitor.       Patient on levaquin and follow up with ID on the      From ED: Patient is a 58 y.o. male with a PMHx of T2DM who presented to the ED with foot swelling. Patient presents with 4 days of worsening erythema and edema and warmth of left foot associated with fevers and chills. He reports an ulcer to the bottom of his foot associated with purulent discharge. Denied any HA, dizziness, presyncope, syncope, cough, SOB, CP, N/V, abdominal pain, bleeding (hemoptysis / hematemesis, hematuria, BRBPR), C/D, or changes in urinary habits.

## 2024-06-28 NOTE — PATIENT INSTRUCTIONS
PHYSICIAN ORDERS AND DISCHARGE INSTRUCTIONS    NOTE: Upon discharge from the Wound Center, you will receive a patient experience survey. We would be grateful if you would take the time to fill this survey out.    Wound care order history:     ADI's   Right       Left    Date:   Cultures:     Grafts:     Antibiotics:        Continuing wound care orders and information:                Residence:                Continue home health care with:    Your wound-care supplies will be provided by:     Wound cleansing:     Do not scrub or use excessive force.   Wash hands with soap and water before and after dressing changes.   Prior to applying a clean dressing, cleanse wound with normal saline, wound cleanser, or mild soap and water.    Ask the physician or nurse before getting the wound(s) wet in a shower    Daily Wound management:   Keep weight off wounds and reposition every 2 hours.   Avoid standing for long periods of time.   Apply wraps/stockings in AM and remove at bedtime.   If swelling is present, elevate legs to the level of the heart or above for 30 minutes 4-5 times a day and/or when sitting.      When taking antibiotics take entire prescription as ordered by physician do not stop taking until medicine is all gone.              Orders for this week: 6/28/2024    Fax to [facility]!       Right Index Finger - use neosporin daily at home    WOUND VAC THERAPY: Left Great Toe Amp    MASTISOL OR SKIN PREP AND DUODERM TO PERIWOUND FOR PROTECTION. APPLY Silvadene, Stimulen and BLACK FOAM TO WOUND BED. SECURE VAC. DRESSING WITH DRAPE.    SET WOUND VAC  CONTINUOUS SUCTION. CANISTER CHANGE WITH EACH DRESSING CHANGE OR ACCORDING TO VOLUME OF DRAINAGE.    Wrap with Coflex Lite, be sure to keep tubing on the outside of the wrap    WOUND VAC DRESSING TO BE CHANGED MON, WED, and 6/14/24    Give front offloader shoe today in clinic 6/21/24      Dispense 30 day quantity when ordering supplies.      Follow up with Raf Kuo, DEMARCO

## 2024-07-05 ENCOUNTER — HOSPITAL ENCOUNTER (OUTPATIENT)
Dept: WOUND CARE | Age: 59
Discharge: HOME | End: 2024-07-05
Payer: MEDICARE

## 2024-07-05 VITALS
RESPIRATION RATE: 16 BRPM | TEMPERATURE: 98.2 F | HEART RATE: 98 BPM | SYSTOLIC BLOOD PRESSURE: 127 MMHG | DIASTOLIC BLOOD PRESSURE: 81 MMHG

## 2024-07-05 DIAGNOSIS — M86.9 OSTEOMYELITIS OF LEFT FOOT, UNSPECIFIED TYPE (HCC): ICD-10-CM

## 2024-07-05 DIAGNOSIS — T81.89XD NON-HEALING SURGICAL WOUND, SUBSEQUENT ENCOUNTER: Primary | ICD-10-CM

## 2024-07-05 DIAGNOSIS — L97.522 SKIN ULCER OF TOE OF LEFT FOOT WITH FAT LAYER EXPOSED (HCC): ICD-10-CM

## 2024-07-05 DIAGNOSIS — T81.89XA NON-HEALING SURGICAL WOUND, INITIAL ENCOUNTER: ICD-10-CM

## 2024-07-05 DIAGNOSIS — L08.9 DIABETIC FOOT INFECTION (HCC): ICD-10-CM

## 2024-07-05 DIAGNOSIS — E11.628 DIABETIC FOOT INFECTION (HCC): ICD-10-CM

## 2024-07-05 PROCEDURE — 11042 DBRDMT SUBQ TIS 1ST 20SQCM/<: CPT

## 2024-07-05 PROCEDURE — 97605 NEG PRS WND THER DME<=50SQCM: CPT

## 2024-07-05 PROCEDURE — 11042 DBRDMT SUBQ TIS 1ST 20SQCM/<: CPT | Performed by: NURSE PRACTITIONER

## 2024-07-05 RX ORDER — BACITRACIN ZINC AND POLYMYXIN B SULFATE 500; 1000 [USP'U]/G; [USP'U]/G
OINTMENT TOPICAL ONCE
OUTPATIENT
Start: 2024-07-05 | End: 2024-07-05

## 2024-07-05 RX ORDER — CLOBETASOL PROPIONATE 0.5 MG/G
OINTMENT TOPICAL ONCE
OUTPATIENT
Start: 2024-07-05 | End: 2024-07-05

## 2024-07-05 RX ORDER — BETAMETHASONE DIPROPIONATE 0.5 MG/G
CREAM TOPICAL ONCE
OUTPATIENT
Start: 2024-07-05 | End: 2024-07-05

## 2024-07-05 RX ORDER — OXYCODONE HYDROCHLORIDE 5 MG/1
5 TABLET ORAL EVERY 12 HOURS PRN
Qty: 14 TABLET | Refills: 0 | Status: SHIPPED | OUTPATIENT
Start: 2024-07-05 | End: 2024-07-12

## 2024-07-05 RX ORDER — BACITRACIN ZINC 500 [USP'U]/G
OINTMENT TOPICAL ONCE
OUTPATIENT
Start: 2024-07-05 | End: 2024-07-05

## 2024-07-05 RX ORDER — SODIUM CHLOR/HYPOCHLOROUS ACID 0.033 %
SOLUTION, IRRIGATION IRRIGATION ONCE
OUTPATIENT
Start: 2024-07-05 | End: 2024-07-05

## 2024-07-05 RX ORDER — IBUPROFEN 200 MG
TABLET ORAL ONCE
OUTPATIENT
Start: 2024-07-05 | End: 2024-07-05

## 2024-07-05 RX ORDER — GENTAMICIN SULFATE 1 MG/G
OINTMENT TOPICAL ONCE
OUTPATIENT
Start: 2024-07-05 | End: 2024-07-05

## 2024-07-05 RX ORDER — TRIAMCINOLONE ACETONIDE 1 MG/G
OINTMENT TOPICAL ONCE
OUTPATIENT
Start: 2024-07-05 | End: 2024-07-05

## 2024-07-05 ASSESSMENT — PAIN DESCRIPTION - ONSET: ONSET: ON-GOING

## 2024-07-05 ASSESSMENT — PAIN DESCRIPTION - LOCATION: LOCATION: FOOT

## 2024-07-05 ASSESSMENT — PAIN DESCRIPTION - FREQUENCY: FREQUENCY: CONTINUOUS

## 2024-07-05 ASSESSMENT — PAIN DESCRIPTION - ORIENTATION: ORIENTATION: LEFT

## 2024-07-05 ASSESSMENT — PAIN DESCRIPTION - PAIN TYPE: TYPE: SURGICAL PAIN

## 2024-07-05 ASSESSMENT — PAIN SCALES - GENERAL: PAINLEVEL_OUTOF10: 7

## 2024-07-05 ASSESSMENT — PAIN DESCRIPTION - DESCRIPTORS: DESCRIPTORS: SHARP;STABBING

## 2024-07-05 ASSESSMENT — PAIN - FUNCTIONAL ASSESSMENT: PAIN_FUNCTIONAL_ASSESSMENT: PREVENTS OR INTERFERES SOME ACTIVE ACTIVITIES AND ADLS

## 2024-07-05 NOTE — PROGRESS NOTES
Wound Care Center Progress Note With Procedure    Toi Briones  AGE: 58 y.o.   GENDER: male  : 1965  EPISODE DATE:  2024     Subjective:     Chief Complaint   Patient presents with    Wound Check     Left foot         HISTORY of PRESENT ILLNESS      Toi Briones is a 58 y.o. male who presents to the Wound Clinic for a visit for evaluation and treatment of Acute non-healing surgical  ulcer(s) of  Lt. foot medial, hallux.  The condition is of moderate severity. The ulcer has been present for 2.5 weeks.  The underlying cause is thought to be nonhealing surgical.  The patients care to date has included normal post-op care. The patient has significant underlying medical conditions as below.     2024: Patient had a good week. Will go week to week but will consider a week break from the vac soon  Patient wants to change pain meds which is fine if it is the same dose     2024: Per ID no antibiotics needed for now. Looking a bit better and we will get callus off this week. Continue plan of care otherwise and follow up 1 week     2024: Patient done with antibiotics. Getting home health and only some discomfort walking and needs a new off . Will refill pain meds today and add ointment and medicine to wound bed.   Overall doing well and wound is still working on granulation tissue  We will continue to prescribe patient opioid pain medication at this time. Patient understands all side effects and contraindications of opioids. No indication of abuse or seeking behavior. OARRS report checked at this time. We will continue to monitor.       Patient on levaquin and follow up with ID on the      From ED: Patient is a 58 y.o. male with a PMHx of T2DM who presented to the ED with foot swelling. Patient presents with 4 days of worsening erythema and edema and warmth of left foot associated with fevers and chills. He reports an ulcer to the bottom of his foot associated with purulent discharge.

## 2024-07-05 NOTE — PATIENT INSTRUCTIONS
PHYSICIAN ORDERS AND DISCHARGE INSTRUCTIONS    NOTE: Upon discharge from the Wound Center, you will receive a patient experience survey. We would be grateful if you would take the time to fill this survey out.    Wound care order history:     ADI's   Right       Left    Date:   Cultures:     Grafts:     Antibiotics:        Continuing wound care orders and information:                Residence:                Continue home health care with:    Your wound-care supplies will be provided by:     Wound cleansing:     Do not scrub or use excessive force.   Wash hands with soap and water before and after dressing changes.   Prior to applying a clean dressing, cleanse wound with normal saline, wound cleanser, or mild soap and water.    Ask the physician or nurse before getting the wound(s) wet in a shower    Daily Wound management:   Keep weight off wounds and reposition every 2 hours.   Avoid standing for long periods of time.   Apply wraps/stockings in AM and remove at bedtime.   If swelling is present, elevate legs to the level of the heart or above for 30 minutes 4-5 times a day and/or when sitting.      When taking antibiotics take entire prescription as ordered by physician do not stop taking until medicine is all gone.              Orders for this week: 7/5/2024    Fax to [facility]!       Right Index Finger - use neosporin daily at home    WOUND VAC THERAPY: Left Great Toe Amp    MASTISOL OR SKIN PREP AND DUODERM TO PERIWOUND FOR PROTECTION. APPLY Silvadene, Stimulen and BLACK FOAM TO WOUND BED. SECURE VAC. DRESSING WITH DRAPE.    SET WOUND VAC  CONTINUOUS SUCTION. CANISTER CHANGE WITH EACH DRESSING CHANGE OR ACCORDING TO VOLUME OF DRAINAGE.    Wrap with Coflex Lite, be sure to keep tubing on the outside of the wrap    WOUND VAC DRESSING TO BE CHANGED MON, WED    Give front offloader shoe today in clinic 6/21/24      Dispense 30 day quantity when ordering supplies.      Follow up with Raf Kuo CNP in 1 week in

## 2024-07-05 NOTE — PLAN OF CARE
Problem: Chronic Conditions and Co-morbidities  Goal: Patient's chronic conditions and co-morbidity symptoms are monitored and maintained or improved  Outcome: Progressing     Problem: Pain  Goal: Verbalizes/displays adequate comfort level or baseline comfort level  Outcome: Progressing    Problem: Wound:  Goal: Will show signs of wound healing; wound closure and no evidence of infection  Description: Will show signs of wound healing; wound closure and no evidence of infection  Outcome: Progressing

## 2024-07-05 NOTE — PROGRESS NOTES
Multilayer Compression Wrap   (Not Unna) Below the Knee    NAME:  Toi Briones  YOB: 1965  MEDICAL RECORD NUMBER:  [id number]  DATE:  7/5/2024    Multilayer compression wrap: Removed old Multilayer wrap if indicated and wash leg with mild soap/water.  Applied moisturizing agent to dry skin as needed.   Applied primary and secondary dressing as ordered.  Applied multilayered dressing below the knee to left lower leg.  Instructed patient/caregiver not to remove dressing and to keep it clean and dry.   Instructed patient/caregiver on complications to report to provider, such as pain, numbness in toes, heavy drainage, and slippage of dressing.  Instructed patient on purpose of compression dressing and on activity and exercise recommendations.      Electronically signed by Gilberto Puente RN on 7/5/2024 at 11:42 AM

## 2024-07-05 NOTE — PLAN OF CARE
Problem: Chronic Conditions and Co-morbidities  Goal: Patient's chronic conditions and co-morbidity symptoms are monitored and maintained or improved  7/5/2024 1140 by Gilberto Puente RN  Outcome: Progressing     Problem: Pain  Goal: Verbalizes/displays adequate comfort level or baseline comfort level  7/5/2024 1153 by Geno Velásquez LPN  Outcome: Progressing  7/5/2024 1140 by Gilberto Puente RN  Outcome: Progressing     Problem: Wound:  Goal: Will show signs of wound healing; wound closure and no evidence of infection  Description: Will show signs of wound healing; wound closure and no evidence of infection  7/5/2024 1153 by Geno Velásquez LPN  Outcome: Progressing  7/5/2024 1140 by Gilberto Puente RN  Outcome: Progressing

## 2024-07-05 NOTE — PROGRESS NOTES
Multilayer Compression Wrap   (Not Unna) Below the Knee    NAME:  Toi Briones  YOB: 1965  MEDICAL RECORD NUMBER:  [id number]  DATE:  7/5/2024    Multilayer compression wrap: Removed old Multilayer wrap if indicated and wash leg with mild soap/water.  Applied moisturizing agent to dry skin as needed.   Applied primary and secondary dressing as ordered.  Applied multilayered dressing below the knee to left lower leg.  Instructed patient/caregiver not to remove dressing and to keep it clean and dry.   Instructed patient/caregiver on complications to report to provider, such as pain, numbness in toes, heavy drainage, and slippage of dressing.  Instructed patient on purpose of compression dressing and on activity and exercise recommendations.      Electronically signed by Geno Velásquez LPN on 7/5/2024 at 11:53 AM

## 2024-07-05 NOTE — PROGRESS NOTES
Negative Pressure Wound Therapy    NAME:  Toi Briones  YOB: 1965  MEDICAL RECORD NUMBER:  [id number]  DATE:  7/5/2024    Applied Negative Pressure to Left Great Toe amp site wound(s)/ulcer(s).  [x] Applied skin barrier prep to saroj-wound.   [x] Cut strips of plastic drape to picture frame wound so that saroj-wound is     covered with the drape.   [x] If bridging dressing to less prominent site, cover any intact skin that will come in contact with the Negative Pressure Therapy sponge, gauze or channel drain with plastic drape. The sponge should never touch intact skin.   [x] Cut sponge, gauze or channel drain to size which will fit into the wound/ulcer bed without being forced.   [x] Be sure the sponge is large enough to hold the entire round plastic flange which is attached to the tubing. Never allow flange to be larger than the sponge or it will produce suction damaging intact skin.  Total number of individual pieces of foam used within the wound bed: 1    [x] If bridging the dressing away from the primary site, be sure the bridge leads to a piece of sponge large enough to hold the entire flange without allowing any of the flange to overlap onto intact skin.   [x] Covered sponge, gauze or channel drain with plastic drape.   [x] Cut a hole in this plastic drape directly over the sponge the same size as the plastic drain tubing.   [x] Removed plastic liner from flange and apply it directly over the hole you cut.   [x] Removed the plastic cover from the flange.   [x] Attached the tubing to the wound/ulcer Negative Pressure Therapy and turn it on to be sure a vacuum is created and that there are no leaks.   [x] If air leaks occur, use plastic drape to patch them.   [x] Secured Negative Pressure Therapy dressing with ace wrap loosely if located on an extremity. Maintain tubing outside of ace wrap. Tubing must not exert pressure on intact skin.    Applied per  Guidelines      Electronically

## 2024-07-11 PROCEDURE — 96374 THER/PROPH/DIAG INJ IV PUSH: CPT

## 2024-07-11 PROCEDURE — 99285 EMERGENCY DEPT VISIT HI MDM: CPT

## 2024-07-11 ASSESSMENT — PAIN DESCRIPTION - ORIENTATION: ORIENTATION: RIGHT;LEFT

## 2024-07-11 ASSESSMENT — LIFESTYLE VARIABLES
HOW OFTEN DO YOU HAVE A DRINK CONTAINING ALCOHOL: NEVER
HOW MANY STANDARD DRINKS CONTAINING ALCOHOL DO YOU HAVE ON A TYPICAL DAY: PATIENT DOES NOT DRINK

## 2024-07-11 ASSESSMENT — PAIN DESCRIPTION - LOCATION: LOCATION: TOE (COMMENT WHICH ONE);LEG

## 2024-07-11 ASSESSMENT — PAIN SCALES - GENERAL: PAINLEVEL_OUTOF10: 10

## 2024-07-12 ENCOUNTER — HOSPITAL ENCOUNTER (INPATIENT)
Age: 59
LOS: 5 days | Discharge: HOME HEALTH | End: 2024-07-17
Attending: EMERGENCY MEDICINE | Admitting: INTERNAL MEDICINE
Payer: MEDICARE

## 2024-07-12 ENCOUNTER — HOSPITAL ENCOUNTER (OUTPATIENT)
Dept: WOUND CARE | Age: 59
Discharge: HOME | End: 2024-07-12

## 2024-07-12 ENCOUNTER — APPOINTMENT (OUTPATIENT)
Dept: GENERAL RADIOLOGY | Age: 59
End: 2024-07-12
Attending: EMERGENCY MEDICINE
Payer: MEDICARE

## 2024-07-12 ENCOUNTER — APPOINTMENT (OUTPATIENT)
Dept: ULTRASOUND IMAGING | Age: 59
End: 2024-07-12
Payer: MEDICARE

## 2024-07-12 DIAGNOSIS — T81.89XA NON-HEALING SURGICAL WOUND, INITIAL ENCOUNTER: ICD-10-CM

## 2024-07-12 DIAGNOSIS — L97.522 SKIN ULCER OF TOE OF LEFT FOOT WITH FAT LAYER EXPOSED (HCC): ICD-10-CM

## 2024-07-12 DIAGNOSIS — L03.115 CELLULITIS OF RIGHT LOWER EXTREMITY: Primary | ICD-10-CM

## 2024-07-12 DIAGNOSIS — M86.9 OSTEOMYELITIS OF LEFT FOOT, UNSPECIFIED TYPE (HCC): ICD-10-CM

## 2024-07-12 LAB
ALBUMIN SERPL-MCNC: 3.9 GM/DL (ref 3.4–5)
ALP BLD-CCNC: 158 IU/L (ref 40–128)
ALT SERPL-CCNC: 7 U/L (ref 10–40)
ANION GAP SERPL CALCULATED.3IONS-SCNC: 15 MMOL/L (ref 7–16)
AST SERPL-CCNC: 15 IU/L (ref 15–37)
BASOPHILS ABSOLUTE: 0.1 K/CU MM
BASOPHILS RELATIVE PERCENT: 0.8 % (ref 0–1)
BILIRUB SERPL-MCNC: 0.3 MG/DL (ref 0–1)
BUN SERPL-MCNC: 20 MG/DL (ref 6–23)
CALCIUM SERPL-MCNC: 9.4 MG/DL (ref 8.3–10.6)
CHLORIDE BLD-SCNC: 99 MMOL/L (ref 99–110)
CO2: 22 MMOL/L (ref 21–32)
CREAT SERPL-MCNC: 0.7 MG/DL (ref 0.9–1.3)
DIFFERENTIAL TYPE: ABNORMAL
EOSINOPHILS ABSOLUTE: 0.2 K/CU MM
EOSINOPHILS RELATIVE PERCENT: 3.1 % (ref 0–3)
GFR, ESTIMATED: >90 ML/MIN/1.73M2
GLUCOSE BLD-MCNC: 201 MG/DL (ref 70–99)
GLUCOSE BLD-MCNC: 244 MG/DL (ref 70–99)
GLUCOSE BLD-MCNC: 291 MG/DL (ref 70–99)
GLUCOSE BLD-MCNC: 347 MG/DL (ref 70–99)
GLUCOSE SERPL-MCNC: 356 MG/DL (ref 70–99)
HCT VFR BLD CALC: 37.1 % (ref 42–52)
HEMOGLOBIN: 11.7 GM/DL (ref 13.5–18)
IMMATURE NEUTROPHIL %: 0.4 % (ref 0–0.43)
LACTIC ACID, SEPSIS: 1.9 MMOL/L (ref 0.4–2)
LYMPHOCYTES ABSOLUTE: 1.8 K/CU MM
LYMPHOCYTES RELATIVE PERCENT: 24.1 % (ref 24–44)
MCH RBC QN AUTO: 28.1 PG (ref 27–31)
MCHC RBC AUTO-ENTMCNC: 31.5 % (ref 32–36)
MCV RBC AUTO: 89.2 FL (ref 78–100)
MONOCYTES ABSOLUTE: 0.6 K/CU MM
MONOCYTES RELATIVE PERCENT: 7.6 % (ref 0–4)
NEUTROPHILS ABSOLUTE: 4.8 K/CU MM
NEUTROPHILS RELATIVE PERCENT: 64 % (ref 36–66)
NUCLEATED RBC %: 0 %
PDW BLD-RTO: 13.8 % (ref 11.7–14.9)
PLATELET # BLD: 221 K/CU MM (ref 140–440)
PMV BLD AUTO: 10.1 FL (ref 7.5–11.1)
POTASSIUM SERPL-SCNC: 4.8 MMOL/L (ref 3.5–5.1)
PROCALCITONIN SERPL-MCNC: 0.11 NG/ML
RBC # BLD: 4.16 M/CU MM (ref 4.6–6.2)
SODIUM BLD-SCNC: 136 MMOL/L (ref 135–145)
TOTAL IMMATURE NEUTOROPHIL: 0.03 K/CU MM
TOTAL NUCLEATED RBC: 0 K/CU MM
TOTAL PROTEIN: 7.7 GM/DL (ref 6.4–8.2)
WBC # BLD: 7.5 K/CU MM (ref 4–10.5)

## 2024-07-12 PROCEDURE — 6370000000 HC RX 637 (ALT 250 FOR IP): Performed by: STUDENT IN AN ORGANIZED HEALTH CARE EDUCATION/TRAINING PROGRAM

## 2024-07-12 PROCEDURE — 99223 1ST HOSP IP/OBS HIGH 75: CPT | Performed by: INTERNAL MEDICINE

## 2024-07-12 PROCEDURE — 6370000000 HC RX 637 (ALT 250 FOR IP): Performed by: EMERGENCY MEDICINE

## 2024-07-12 PROCEDURE — 1200000000 HC SEMI PRIVATE

## 2024-07-12 PROCEDURE — 82962 GLUCOSE BLOOD TEST: CPT

## 2024-07-12 PROCEDURE — 6360000002 HC RX W HCPCS: Performed by: STUDENT IN AN ORGANIZED HEALTH CARE EDUCATION/TRAINING PROGRAM

## 2024-07-12 PROCEDURE — 83605 ASSAY OF LACTIC ACID: CPT

## 2024-07-12 PROCEDURE — 2580000003 HC RX 258: Performed by: STUDENT IN AN ORGANIZED HEALTH CARE EDUCATION/TRAINING PROGRAM

## 2024-07-12 PROCEDURE — 2580000003 HC RX 258: Performed by: EMERGENCY MEDICINE

## 2024-07-12 PROCEDURE — 6360000002 HC RX W HCPCS: Performed by: EMERGENCY MEDICINE

## 2024-07-12 PROCEDURE — 76937 US GUIDE VASCULAR ACCESS: CPT

## 2024-07-12 PROCEDURE — 73590 X-RAY EXAM OF LOWER LEG: CPT

## 2024-07-12 PROCEDURE — 93005 ELECTROCARDIOGRAM TRACING: CPT | Performed by: INTERNAL MEDICINE

## 2024-07-12 PROCEDURE — 85025 COMPLETE CBC W/AUTO DIFF WBC: CPT

## 2024-07-12 PROCEDURE — 6370000000 HC RX 637 (ALT 250 FOR IP): Performed by: FAMILY MEDICINE

## 2024-07-12 PROCEDURE — 94761 N-INVAS EAR/PLS OXIMETRY MLT: CPT

## 2024-07-12 PROCEDURE — 93971 EXTREMITY STUDY: CPT

## 2024-07-12 PROCEDURE — 6360000002 HC RX W HCPCS: Performed by: INTERNAL MEDICINE

## 2024-07-12 PROCEDURE — 80053 COMPREHEN METABOLIC PANEL: CPT

## 2024-07-12 PROCEDURE — 84145 PROCALCITONIN (PCT): CPT

## 2024-07-12 PROCEDURE — 87040 BLOOD CULTURE FOR BACTERIA: CPT

## 2024-07-12 PROCEDURE — 6370000000 HC RX 637 (ALT 250 FOR IP): Performed by: NURSE PRACTITIONER

## 2024-07-12 RX ORDER — ATORVASTATIN CALCIUM 10 MG/1
10 TABLET, FILM COATED ORAL DAILY
Status: DISCONTINUED | OUTPATIENT
Start: 2024-07-12 | End: 2024-07-17 | Stop reason: HOSPADM

## 2024-07-12 RX ORDER — INSULIN GLARGINE 100 [IU]/ML
10 INJECTION, SOLUTION SUBCUTANEOUS EVERY EVENING
Status: DISCONTINUED | OUTPATIENT
Start: 2024-07-12 | End: 2024-07-15

## 2024-07-12 RX ORDER — PANTOPRAZOLE SODIUM 40 MG/1
40 TABLET, DELAYED RELEASE ORAL 2 TIMES DAILY
Status: DISCONTINUED | OUTPATIENT
Start: 2024-07-12 | End: 2024-07-17 | Stop reason: HOSPADM

## 2024-07-12 RX ORDER — PREGABALIN 75 MG/1
75 CAPSULE ORAL 2 TIMES DAILY
Status: DISCONTINUED | OUTPATIENT
Start: 2024-07-12 | End: 2024-07-17 | Stop reason: HOSPADM

## 2024-07-12 RX ORDER — POTASSIUM CHLORIDE 7.45 MG/ML
10 INJECTION INTRAVENOUS PRN
Status: DISCONTINUED | OUTPATIENT
Start: 2024-07-12 | End: 2024-07-17 | Stop reason: HOSPADM

## 2024-07-12 RX ORDER — DICYCLOMINE HYDROCHLORIDE 10 MG/1
20 CAPSULE ORAL 4 TIMES DAILY PRN
Status: DISCONTINUED | OUTPATIENT
Start: 2024-07-12 | End: 2024-07-17 | Stop reason: HOSPADM

## 2024-07-12 RX ORDER — SODIUM CHLORIDE 0.9 % (FLUSH) 0.9 %
5-40 SYRINGE (ML) INJECTION PRN
Status: DISCONTINUED | OUTPATIENT
Start: 2024-07-12 | End: 2024-07-17 | Stop reason: HOSPADM

## 2024-07-12 RX ORDER — MAGNESIUM SULFATE IN WATER 40 MG/ML
2000 INJECTION, SOLUTION INTRAVENOUS PRN
Status: DISCONTINUED | OUTPATIENT
Start: 2024-07-12 | End: 2024-07-17 | Stop reason: HOSPADM

## 2024-07-12 RX ORDER — ONDANSETRON 4 MG/1
4 TABLET, ORALLY DISINTEGRATING ORAL EVERY 8 HOURS PRN
Status: DISCONTINUED | OUTPATIENT
Start: 2024-07-12 | End: 2024-07-17 | Stop reason: HOSPADM

## 2024-07-12 RX ORDER — SODIUM CHLORIDE 0.9 % (FLUSH) 0.9 %
5-40 SYRINGE (ML) INJECTION EVERY 12 HOURS SCHEDULED
Status: DISCONTINUED | OUTPATIENT
Start: 2024-07-12 | End: 2024-07-17 | Stop reason: HOSPADM

## 2024-07-12 RX ORDER — INSULIN LISPRO 100 [IU]/ML
0-4 INJECTION, SOLUTION INTRAVENOUS; SUBCUTANEOUS
Status: DISCONTINUED | OUTPATIENT
Start: 2024-07-12 | End: 2024-07-15

## 2024-07-12 RX ORDER — SODIUM CHLORIDE, SODIUM LACTATE, POTASSIUM CHLORIDE, CALCIUM CHLORIDE 600; 310; 30; 20 MG/100ML; MG/100ML; MG/100ML; MG/100ML
INJECTION, SOLUTION INTRAVENOUS CONTINUOUS
Status: DISPENSED | OUTPATIENT
Start: 2024-07-12 | End: 2024-07-12

## 2024-07-12 RX ORDER — OXYCODONE HYDROCHLORIDE AND ACETAMINOPHEN 5; 325 MG/1; MG/1
1 TABLET ORAL EVERY 4 HOURS PRN
Status: DISCONTINUED | OUTPATIENT
Start: 2024-07-12 | End: 2024-07-12

## 2024-07-12 RX ORDER — POTASSIUM CHLORIDE 20 MEQ/1
40 TABLET, EXTENDED RELEASE ORAL PRN
Status: DISCONTINUED | OUTPATIENT
Start: 2024-07-12 | End: 2024-07-17 | Stop reason: HOSPADM

## 2024-07-12 RX ORDER — OXYCODONE AND ACETAMINOPHEN 7.5; 325 MG/1; MG/1
1 TABLET ORAL EVERY 4 HOURS PRN
Status: DISCONTINUED | OUTPATIENT
Start: 2024-07-12 | End: 2024-07-17 | Stop reason: HOSPADM

## 2024-07-12 RX ORDER — INSULIN LISPRO 100 [IU]/ML
0-4 INJECTION, SOLUTION INTRAVENOUS; SUBCUTANEOUS EVERY 4 HOURS
Status: DISCONTINUED | OUTPATIENT
Start: 2024-07-12 | End: 2024-07-12 | Stop reason: SDUPTHER

## 2024-07-12 RX ORDER — LEVOFLOXACIN 5 MG/ML
750 INJECTION, SOLUTION INTRAVENOUS EVERY 24 HOURS
Status: DISCONTINUED | OUTPATIENT
Start: 2024-07-12 | End: 2024-07-17 | Stop reason: HOSPADM

## 2024-07-12 RX ORDER — TAMSULOSIN HYDROCHLORIDE 0.4 MG/1
0.4 CAPSULE ORAL DAILY
Status: DISCONTINUED | OUTPATIENT
Start: 2024-07-12 | End: 2024-07-17 | Stop reason: HOSPADM

## 2024-07-12 RX ORDER — INSULIN LISPRO 100 [IU]/ML
0-4 INJECTION, SOLUTION INTRAVENOUS; SUBCUTANEOUS NIGHTLY
Status: DISCONTINUED | OUTPATIENT
Start: 2024-07-12 | End: 2024-07-15

## 2024-07-12 RX ORDER — GLUCAGON 1 MG/ML
1 KIT INJECTION PRN
Status: DISCONTINUED | OUTPATIENT
Start: 2024-07-12 | End: 2024-07-17 | Stop reason: HOSPADM

## 2024-07-12 RX ORDER — ACETAMINOPHEN 325 MG/1
650 TABLET ORAL EVERY 6 HOURS PRN
Status: DISCONTINUED | OUTPATIENT
Start: 2024-07-12 | End: 2024-07-17 | Stop reason: HOSPADM

## 2024-07-12 RX ORDER — DOCUSATE SODIUM 100 MG/1
100 CAPSULE, LIQUID FILLED ORAL 2 TIMES DAILY
Status: DISCONTINUED | OUTPATIENT
Start: 2024-07-12 | End: 2024-07-17 | Stop reason: HOSPADM

## 2024-07-12 RX ORDER — OXYCODONE HYDROCHLORIDE AND ACETAMINOPHEN 5; 325 MG/1; MG/1
1 TABLET ORAL ONCE
Status: COMPLETED | OUTPATIENT
Start: 2024-07-12 | End: 2024-07-12

## 2024-07-12 RX ORDER — SODIUM CHLORIDE 9 MG/ML
INJECTION, SOLUTION INTRAVENOUS PRN
Status: DISCONTINUED | OUTPATIENT
Start: 2024-07-12 | End: 2024-07-17 | Stop reason: HOSPADM

## 2024-07-12 RX ORDER — ONDANSETRON 2 MG/ML
4 INJECTION INTRAMUSCULAR; INTRAVENOUS EVERY 6 HOURS PRN
Status: DISCONTINUED | OUTPATIENT
Start: 2024-07-12 | End: 2024-07-17 | Stop reason: HOSPADM

## 2024-07-12 RX ORDER — 0.9 % SODIUM CHLORIDE 0.9 %
1000 INTRAVENOUS SOLUTION INTRAVENOUS ONCE
Status: COMPLETED | OUTPATIENT
Start: 2024-07-12 | End: 2024-07-12

## 2024-07-12 RX ORDER — ACETAMINOPHEN 650 MG/1
650 SUPPOSITORY RECTAL EVERY 6 HOURS PRN
Status: DISCONTINUED | OUTPATIENT
Start: 2024-07-12 | End: 2024-07-17 | Stop reason: HOSPADM

## 2024-07-12 RX ORDER — DEXTROSE MONOHYDRATE 100 MG/ML
INJECTION, SOLUTION INTRAVENOUS CONTINUOUS PRN
Status: DISCONTINUED | OUTPATIENT
Start: 2024-07-12 | End: 2024-07-17 | Stop reason: HOSPADM

## 2024-07-12 RX ORDER — ENOXAPARIN SODIUM 100 MG/ML
30 INJECTION SUBCUTANEOUS 2 TIMES DAILY
Status: DISCONTINUED | OUTPATIENT
Start: 2024-07-12 | End: 2024-07-17 | Stop reason: HOSPADM

## 2024-07-12 RX ORDER — LACTOBACILLUS RHAMNOSUS GG 10B CELL
1 CAPSULE ORAL
Status: DISCONTINUED | OUTPATIENT
Start: 2024-07-13 | End: 2024-07-17 | Stop reason: HOSPADM

## 2024-07-12 RX ORDER — POLYETHYLENE GLYCOL 3350 17 G/17G
17 POWDER, FOR SOLUTION ORAL DAILY PRN
Status: DISCONTINUED | OUTPATIENT
Start: 2024-07-12 | End: 2024-07-17 | Stop reason: HOSPADM

## 2024-07-12 RX ADMIN — SODIUM CHLORIDE, POTASSIUM CHLORIDE, SODIUM LACTATE AND CALCIUM CHLORIDE: 600; 310; 30; 20 INJECTION, SOLUTION INTRAVENOUS at 08:09

## 2024-07-12 RX ADMIN — DOCUSATE SODIUM 100 MG: 100 CAPSULE, LIQUID FILLED ORAL at 21:50

## 2024-07-12 RX ADMIN — LEVOFLOXACIN 750 MG: 5 INJECTION, SOLUTION INTRAVENOUS at 13:02

## 2024-07-12 RX ADMIN — SODIUM CHLORIDE, PRESERVATIVE FREE 10 ML: 5 INJECTION INTRAVENOUS at 20:42

## 2024-07-12 RX ADMIN — OXYCODONE HYDROCHLORIDE AND ACETAMINOPHEN 1 TABLET: 5; 325 TABLET ORAL at 12:54

## 2024-07-12 RX ADMIN — TAMSULOSIN HYDROCHLORIDE 0.4 MG: 0.4 CAPSULE ORAL at 08:36

## 2024-07-12 RX ADMIN — ENOXAPARIN SODIUM 30 MG: 100 INJECTION SUBCUTANEOUS at 20:37

## 2024-07-12 RX ADMIN — PREGABALIN 75 MG: 75 CAPSULE ORAL at 08:36

## 2024-07-12 RX ADMIN — PANTOPRAZOLE SODIUM 40 MG: 40 TABLET, DELAYED RELEASE ORAL at 08:36

## 2024-07-12 RX ADMIN — INSULIN GLARGINE 10 UNITS: 100 INJECTION, SOLUTION SUBCUTANEOUS at 20:41

## 2024-07-12 RX ADMIN — PANTOPRAZOLE SODIUM 40 MG: 40 TABLET, DELAYED RELEASE ORAL at 20:37

## 2024-07-12 RX ADMIN — SODIUM CHLORIDE 2500 MG: 0.9 INJECTION, SOLUTION INTRAVENOUS at 02:44

## 2024-07-12 RX ADMIN — OXYCODONE AND ACETAMINOPHEN 1 TABLET: 7.5; 325 TABLET ORAL at 17:47

## 2024-07-12 RX ADMIN — SODIUM CHLORIDE 1000 ML: 9 INJECTION, SOLUTION INTRAVENOUS at 05:25

## 2024-07-12 RX ADMIN — SODIUM CHLORIDE 1250 MG: 9 INJECTION, SOLUTION INTRAVENOUS at 22:20

## 2024-07-12 RX ADMIN — ATORVASTATIN CALCIUM 10 MG: 10 TABLET, FILM COATED ORAL at 08:35

## 2024-07-12 RX ADMIN — INSULIN LISPRO 3 UNITS: 100 INJECTION, SOLUTION INTRAVENOUS; SUBCUTANEOUS at 12:58

## 2024-07-12 RX ADMIN — PREGABALIN 75 MG: 75 CAPSULE ORAL at 20:36

## 2024-07-12 RX ADMIN — OXYCODONE HYDROCHLORIDE AND ACETAMINOPHEN 1 TABLET: 5; 325 TABLET ORAL at 03:52

## 2024-07-12 RX ADMIN — OXYCODONE AND ACETAMINOPHEN 1 TABLET: 7.5; 325 TABLET ORAL at 21:50

## 2024-07-12 RX ADMIN — OXYCODONE HYDROCHLORIDE AND ACETAMINOPHEN 1 TABLET: 5; 325 TABLET ORAL at 08:36

## 2024-07-12 ASSESSMENT — PAIN DESCRIPTION - DESCRIPTORS
DESCRIPTORS: THROBBING
DESCRIPTORS: ACHING
DESCRIPTORS: STABBING
DESCRIPTORS: THROBBING;STABBING
DESCRIPTORS: SHARP;STABBING
DESCRIPTORS: ACHING;TEARING

## 2024-07-12 ASSESSMENT — PAIN DESCRIPTION - LOCATION
LOCATION: FOOT;LEG
LOCATION: LEG
LOCATION: FOOT;LEG
LOCATION: FOOT
LOCATION: LEG;FOOT
LOCATION: LEG

## 2024-07-12 ASSESSMENT — PAIN SCALES - GENERAL
PAINLEVEL_OUTOF10: 9
PAINLEVEL_OUTOF10: 3
PAINLEVEL_OUTOF10: 5
PAINLEVEL_OUTOF10: 9
PAINLEVEL_OUTOF10: 10
PAINLEVEL_OUTOF10: 7
PAINLEVEL_OUTOF10: 3
PAINLEVEL_OUTOF10: 7
PAINLEVEL_OUTOF10: 8
PAINLEVEL_OUTOF10: 9

## 2024-07-12 ASSESSMENT — PAIN DESCRIPTION - ORIENTATION
ORIENTATION: RIGHT

## 2024-07-12 ASSESSMENT — PAIN - FUNCTIONAL ASSESSMENT
PAIN_FUNCTIONAL_ASSESSMENT: PREVENTS OR INTERFERES SOME ACTIVE ACTIVITIES AND ADLS
PAIN_FUNCTIONAL_ASSESSMENT: 0-10
PAIN_FUNCTIONAL_ASSESSMENT: PREVENTS OR INTERFERES SOME ACTIVE ACTIVITIES AND ADLS

## 2024-07-12 ASSESSMENT — PAIN DESCRIPTION - PAIN TYPE
TYPE: ACUTE PAIN
TYPE: ACUTE PAIN

## 2024-07-12 ASSESSMENT — PAIN DESCRIPTION - FREQUENCY: FREQUENCY: CONTINUOUS

## 2024-07-12 ASSESSMENT — PAIN DESCRIPTION - ONSET: ONSET: ON-GOING

## 2024-07-12 NOTE — CONSULTS
Infectious Disease Consult Note  2024   Patient Name: Toi Briones : 1965     Assessment  Right leg cellulitis  Non-purulent, and does not spread to the right foot. Suspect the scabs on his anterior leg to be the portals of entry  Possibilities: MRSA, Streptococcus or gram-negative microbes.   Left foot post first ray amputation wound  Pathology report did not show osteomyelitis.  He completed a 2-week course of Augmentin and levofloxacin.  Further treatment is not planned.  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  ------------------------  REASON FOR CONSULT: \"RLE cellulitis. LLE Hallux /DFI amputation culture polymicrobial: Staphylococcus simulans, Enterococcus casseliflavus, Citrobacter, Staphylococcus hemolyticus and Stenotrophomonas maltophilia\"  Requested by: Monroe Galvez APRN - CNP  HPI:Patient is a 58 y.o. male living with type 2 diabetes mellitus, known to me from recent admission in May 2024.  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  First ray amputation was performed on 2024.  Culture was positive for Staphylococcus simulans, Enterococcus capsular flavus, Citrobacter, Staphylococcus hemolyticus and Stenotrophomonas maltophilia.  He received a 2-week course of Augmentin and levofloxacin.  He was seen in the office on 2024.  The histopathology report did not show osteomyelitis in the margin of the resected bone, hence, additional antibiotics were not ordered.  He was counseled that since he has a wound he remains at risk for

## 2024-07-12 NOTE — ED NOTES
Voided 400ml out.      Pending admission at this time.     Resting in bed, watching TV   NAD noted.   Skin w/d oral mucosa moist pink lips nailbeds pink brisk crf, resp easy unlabored with symmetrical rise and fall of chest wall.     Call light within reach, bed in low position,   Declines needs currently.

## 2024-07-12 NOTE — CONSULTS
Consult completed. Procedure/rationale explained to pt & consent obtained. #22ga Nexiva extra-long, 1.75\" PIV initiated using UltraSound-guided technique without difficulty/complications. Pt tolerated well and requesting his dose of pain medication - Melanie Mir RN notified.

## 2024-07-12 NOTE — ED PROVIDER NOTES
Triage Chief Complaint:   Leg Swelling, Leg Pain, and Fever    Tangirnaq:  Toi Briones is a 58 y.o. male that presents with right calf pain and swelling and fever.  Patient was in baseline state of health until the past 24 hours or so when the above started.  Patient noticed fairly brisk swelling and redness to his right calf region.  Some associated pain that actually makes it difficult to walk.  Patient reports that symptoms are similar to when he had infection to his left foot secondary to diabetic ulcer.  Patient actually had to have his great toe of his left foot amputated in May of this year.  Patient has been seeing wound care for this and actually has a wound VAC to his left foot.  Patient denies any fevers or chills.    Patient has had numerous problems with his diabetes in the past and has had amputations of several of his fingers of his left hand as well as bilateral feet toe amputations.    Patient does have recent hospitalization for his amputation in May of this year.    ROS:  General:  No fevers, no chills, no weakness  Eyes:  No recent vison changes, no discharge  ENT:  No sore throat, no nasal congestion, no hearing changes  Cardiovascular:  No chest pain, no palpitations  Respiratory:  No shortness of breath, no cough, no wheezing  Gastrointestinal:  No pain, no nausea, no vomiting, no diarrhea  Musculoskeletal:  No muscle pain, no joint pain  Skin:  + rash, no pruritis, no easy bruising  Neurologic:  No speech problems, no headache, no extremity numbness, no extremity tingling, no extremity weakness  Psychiatric:  No anxiety  Genitourinary:  No dysuria, no hematuria  Endocrine:  No unexpected weight gain, no unexpected weight loss  Extremities:  no edema, + pain    Past Medical History:   Diagnosis Date    Absent finger     Left hand    Anxiety     Arthritis     Hands    Asthma     \"As a kid but outgrew this\" - no medications as of 2/21/20    Chronic back pain     Depression     Difficult intravenous

## 2024-07-12 NOTE — ED NOTES
Resting in bed with eyes closed, awakens immediately upon speaking name.   NAD noted.   Skin w/d oral mucosa moist pink lips nailbeds pink brisk crf, resp easy unlabored with symmetrical rise and fall of chest wall.     CM SR without ectopy.   Call light within reach, bed in low position,   Declines needs currently.     Denies pain at present.

## 2024-07-12 NOTE — PROGRESS NOTES
PHARMACY VANCOMYCIN MONITORING SERVICE  Pharmacy consulted by Dr. Sandy Elder MD for monitoring and adjustment.    Indication for treatment: Vancomycin indication: SSTI with risk factors   Goal trough: Trough Goal: 10-15 mcg/mL  AUC/ADONIS: <500    Risk Factors for MRSA Identified:   Hospitalization within the past 90 days, Received IV antibiotics within the past 90 days, Purulent and/or complicated SSTI    Pertinent Laboratory Values:   Temp Readings from Last 3 Encounters:   07/11/24 98.2 °F (36.8 °C) (Oral)   07/05/24 98.2 °F (36.8 °C) (Temporal)   06/28/24 97 °F (36.1 °C) (Temporal)     Recent Labs     07/12/24  0155   WBC 7.5     Recent Labs     07/12/24  0155   BUN 20   CREATININE 0.7*     Estimated Creatinine Clearance: 157 mL/min (A) (based on SCr of 0.7 mg/dL (L)).  No intake or output data in the 24 hours ending 07/12/24 0537  Last Encounter Weight:  Wt Readings from Last 3 Encounters:   07/11/24 131.5 kg (290 lb)   06/27/24 126.6 kg (279 lb)   05/28/24 (!) 136.5 kg (300 lb 14.9 oz)       Pertinent Cultures:   Date    Source    Results  07/12   Blood    In process    Assessment:  HPI: 58 y.o. male with history of DM type II and diabetic foot ulcer with OM (s/p left hallux amputation).  Patient presented to ED with right leg swelling.  SCr = 0.7, BUN = 20, and no I/O data  Day(s) of therapy: 1 of 5  Vancomycin concentration:   07/14 - To be collected    Plan:  Vancomycin 2,500 mg IV initial dose; Follow with Vancomycin 1,250 mg IV Q 12 Hours  Steady state predictions: AUC: 431; Trough: 10.7  Pharmacy will continue to monitor patient and adjust therapy as indicated    VANCOMYCIN CONCENTRATION SCHEDULED FOR 07/14/2024 @ 2 AM    Thank you for the consult.  Stephen Hall RPH  7/12/2024 5:37 AM

## 2024-07-12 NOTE — ED NOTES
ED TO INPATIENT SBAR HANDOFF    Patient Name: Toi Briones   :  1965  58 y.o.   Preferred Name  toi   Family/Caregiver Present no   Restraints no   C-SSRS: Risk of Suicide: No Risk  Sitter no   Sepsis Risk Score        Situation  Chief Complaint   Patient presents with    Leg Swelling    Leg Pain    Fever     Brief Description of Patient's Condition: 58 y.o. male that presents with right calf pain and swelling and fever.  Patient was in baseline state of health until the past 24 hours or so when the above started.  Patient noticed fairly brisk swelling and redness to his right calf region.  Some associated pain that actually makes it difficult to walk.  Patient reports that symptoms are similar to when he had infection to his left foot secondary to diabetic ulcer.  Patient actually had to have his great toe of his left foot amputated in May of this year.  Patient has been seeing wound care for this and actually has a wound VAC to his left foot.  Patient denies any fevers or chills.   Mental Status: oriented, alert, coherent, logical, thought processes intact, and able to concentrate and follow conversation  Arrived from: home    Imaging:   Vascular duplex lower extremity venous right   Final Result   No evidence of deep venous thrombosis in the right lower extremity         Electronically signed by Sidney Vieyra      XR TIBIA FIBULA RIGHT (2 VIEWS)   Final Result   No acute osseous abnormality. No soft tissue abnormality.      Electronically signed by Sidney Vieyra        Abnormal labs:   Abnormal Labs Reviewed   CBC WITH AUTO DIFFERENTIAL - Abnormal; Notable for the following components:       Result Value    RBC 4.16 (*)     Hemoglobin 11.7 (*)     Hematocrit 37.1 (*)     MCHC 31.5 (*)     Monocytes % 7.6 (*)     Eosinophils % 3.1 (*)     All other components within normal limits   COMPREHENSIVE METABOLIC PANEL - Abnormal; Notable for the following components:    Glucose 356 (*)     Creatinine 0.7 (*)      Alkaline Phosphatase 158 (*)     ALT 7 (*)     All other components within normal limits        Background  History:   Past Medical History:   Diagnosis Date    Absent finger     Left hand    Anxiety     Arthritis     Hands    Asthma     \"As a kid but outgrew this\" - no medications as of 2/21/20    Chronic back pain     Depression     Difficult intravenous access 05/26/2024    Unable to place PICC x2 admissions. Patient IS NOT a PICC candidate (2024)    Edema     Fibromyalgia     Headache(784.0)     Last: 2/19/20    Hernia, abdominal     History of difficult venous access 12/21/2021    No longer a candidate for bedside PICC placement, multiple failed attempts    Hx MRSA infection     positive culture 8/2014 from left foot    Nausea & vomiting     Neuropathy     Obesity, Class III, BMI 40-49.9 (morbid obesity) (HCC)     Osteomyelitis (HCC)     hx finger    Poor circulation     Prolonged emergence from general anesthesia     Restless leg syndrome     Shortness of breath on exertion     6/14/2016- pt denies any sob with this interview    Type II or unspecified type diabetes mellitus with other specified manifestations, not stated as uncontrolled 04/08/2014    Type II or unspecified type diabetes mellitus without mention of complication, not stated as uncontrolled DX 2007    Follows with PCP    Ulcer of other part of foot 04/08/2014    'ulcer on left foot healed all up\"       Assessment    Vitals:    Level of Consciousness: Alert (0)   Vitals:    07/11/24 2317 07/12/24 0202 07/12/24 0301 07/12/24 0526   BP: 115/63 116/80 111/64 103/68   Pulse: 91  89 69   Resp: 18  18 18   Temp: 98.2 °F (36.8 °C)      TempSrc: Oral      SpO2: 95% 98% 97% 97%   Weight:       Height:         PO Status: Regular  O2 Flow Rate: O2 Device: None (Room air)    Cardiac Rhythm: NA  Last documented pain medication administered: 0352   NIH Score: NIH     Active LDA's:   Peripheral IV 07/12/24 Distal;Posterior;Right Wrist (Active)   Site

## 2024-07-12 NOTE — PROGRESS NOTES
PHARMACY VANCOMYCIN MONITORING & DOSING SERVICE    Toi Briones is a 58 y.o. male started on vancomycin for SSTI. Pharmacy consulted by ED provider Dr Monroe Vergara MD to order a dose of vancomycin in the emergency department.    Other antimicrobials: --    Ht Readings from Last 1 Encounters:   07/11/24 1.803 m (5' 11\")     Wt Readings from Last 3 Encounters:   07/11/24 131.5 kg (290 lb)   06/27/24 126.6 kg (279 lb)   05/28/24 (!) 136.5 kg (300 lb 14.9 oz)        Pertinent Laboratory Values:   Temp Readings from Last 3 Encounters:   07/11/24 98.2 °F (36.8 °C) (Oral)   07/05/24 98.2 °F (36.8 °C) (Temporal)   06/28/24 97 °F (36.1 °C) (Temporal)     No results for input(s): \"WBC\", \"LACTATE\" in the last 72 hours.  No results for input(s): \"BUN\", \"CREATININE\" in the last 72 hours.  CrCl cannot be calculated (Patient's most recent lab result is older than the maximum 30 days allowed.).  No intake or output data in the 24 hours ending 07/12/24 0114    Assessment/Plan:  Pharmacy will order vancomycin 2,500 mg IV Once.  Please note, pharmacy will order a one-time dose of vancomycin for the Emergency Department. The consult will need to be re-ordered if vancomycin is to continue upon admission.    Thank you for the consult.  Stephen Hall RPH  7/12/2024 1:14 AM

## 2024-07-12 NOTE — ED NOTES
Patient arrived ambulatory from home with c/o left toe pain and swelling, bilateral leg swelling and redness. Patient had big toe amputated 3 weeks ago and is supposed to follow back up with wound clinic tomorrow but the pain is bad. Patient also states he started to have fever. Patient states his fever got up to 102. Patient states when he goes to walk he can only make it about 5 steps before he has to sit down. Patient is Aox4, respirations equal and unlabored, skin Pwd.

## 2024-07-12 NOTE — PROGRESS NOTES
4 Eyes Skin Assessment     NAME:  Toi Briones  YOB: 1965  MEDICAL RECORD NUMBER:  [id number]    The patient is being assessed for  Admission    I agree that at least one RN has performed a thorough Head to Toe Skin Assessment on the patient. ALL assessment sites listed below have been assessed.      Areas assessed by both nurses:    Head, Face, Ears, Shoulders, Back, Chest, Arms, Elbows, Hands, Sacrum. Buttock, Coccyx, Ischium, Legs. Feet and Heels, and Under Medical Devices         Does the Patient have a Wound? Yes wound(s) were present on assessment. LDA wound assessment was Initiated and completed by RN  Redness on right leg  Wound vac on left foot.       Finn Prevention initiated by RN: No  Wound Care Orders initiated by RN: Yes    Pressure Injury (Stage 3,4, Unstageable, DTI, NWPT, and Complex wounds) if present, place Wound referral order by RN under : No    New Ostomies, if present place, Ostomy referral order under : No     Nurse 1 eSignature: Electronically signed by Adeola Wen RN on 7/12/24 at 10:27 AM EDT    **SHARE this note so that the co-signing nurse can place an eSignature**    Nurse 2 eSignature: {Esignature:[id number]}

## 2024-07-12 NOTE — CARE COORDINATION
07/12/24 1633   Service Assessment   Patient Orientation Alert and Oriented   Cognition Alert   History Provided By Patient;Medical Record   Primary Caregiver Self   Support Systems Children;Spouse/Significant Other   PCP Verified by CM Yes   Last Visit to PCP Within last 6 months   Prior Functional Level Independent in ADLs/IADLs   Current Functional Level Independent in ADLs/IADLs   Can patient return to prior living arrangement Yes   Ability to make needs known: Good   Family able to assist with home care needs: Yes   Financial Resources Medicare;Medicaid   Social/Functional History   Active  Yes   Mode of Transportation Car   Condition of Participation: Discharge Planning   The Patient and/Or Patient Representative agree with the Discharge Plan? Yes     Reviewed chart, discussed in IDR and spoke with pt about discharge needs/plans.   Pt plans on returning home, denied need for HC or any DME.  CM available as needed.

## 2024-07-12 NOTE — H&P
History and Physical      Name:  Toi Briones /Age/Sex: 1965  (58 y.o. male)   MRN & CSN:  [id number] & [id number] Encounter Date/Time: 2024 2:58 AM EDT   Location:  ED14/ED-14 PCP: Emmett Rivera MD       Hospital Day: 1    Assessment and Plan:     Patient is a 58 y.o. male who presented with right leg swelling     Cellulitis of right leg   Left Plantar DFI with OM s/p Left Hallux Amputation 2024   -Presents with acute onset RLE pain and redness, unable to ambulate due to pain   -Previous wound cx with citrobacter, enterococcus, staph simulans and stenotrophomonas   -On admit patient hemodynamically stable, afebrile, no leukocytosis, non-septic  -X-ray of foot without osteo, venous US without DVT    - Continue with Vanc  - Pain control   -  blood cx ordered   - Trend inflammatory markers      T2DM  - Basal and LCSI  - Hold PO meds   - Hypoglycemic protocol     HLD  -Continue statin     BPH  -Continue  flomax    Checklist:  Advanced directive: full  Diet: cardiac  DVT ppx: Lovenox  Sugar: BG goal of 140-180 while inpatient    Disposition: admit to inpatient.  Estimated discharge: 5 day(s).  Current living situation: home.  Expected disposition: home.    Spoke with ED provider who recommended admission for the patient and I agree with that plan.  Personally reviewed lab studies and imaging.  EKG interpreted personally and results as stated above.  Imaging that was interpreted personally and results as stated above.    History of Present Illness:     Chief Complaint:    Chief Complaint   Patient presents with    Leg Swelling    Leg Pain    Fever       Patient is a 58 y.o. male with a PMHx of T2DM with diabetic foot ulcer and OM s/p left hallux amputation who presented to the ED with right leg swelling. Patient report he has been seeing wound care for left hallux amputation as currently has wound vac and reports it has been healing well. Recently seen ID and follows with wound care

## 2024-07-12 NOTE — PROGRESS NOTES
V2.0    [facility] Progress Note      Name:  Toi Briones /Age/Sex: 1965  (58 y.o. male)   MRN & CSN:  [id number] & [id number] Encounter Date/Time: 2024 2:47 PM EDT   Location:  [address] [place] [address]- PCP: Emmett Rivera MD     Attending:Stephen Romo MD       Hospital Day: 1    Assessment and Recommendations   Toi Briones is a 58 y.o. male  who presents with Cellulitis of right leg      Plan:     Cellulitis of right leg   Left Plantar DFI with OM s/p Left Hallux Amputation 2024   -Presents with acute onset RLE pain and redness, unable to ambulate due to pain   -Previous wound cx with citrobacter, enterococcus, staph simulans and stenotrophomonas   - Does not meet sepsis criteria on admit   - Foot XR without osteo  - venous US without DVT  - PRN pain control      - Continue with Vanc  -Consult infectious disease for further antibiotic recommendations  -Recommended Levaquin  - Pain control   -  blood cx ordered   - Trend inflammatory markers/screening labs    Uncontrolled DMII with chronic complications and with long term use of insulin.               -Continue glargine.   -Monitor FSBS and cover with  SSI  -Hold PO medications while inpatient  -Hypoglycemic protocol  -Last A1c 9.0 (2024)    HLD  -Continue statin      BPH  -Continue  flomax    Diet ADULT DIET; Regular   DVT Prophylaxis [] Lovenox, []  Heparin, [] SCDs, [] Ambulation,  [] Eliquis, [] Xarelto  [] Coumadin   Code Status Full Code   Disposition From: Home  Expected Disposition: Home  Estimated Date of Discharge: 2-3 days  Patient requires continued admission due to management of above and culture results   Surrogate Decision Maker/ POA Dtr: Loulou     Personally reviewed Lab Studies and Imaging     Discussed management of the case with ID who recommended above    Imaging that was interpreted personally includes CXR and results as noted above    Drugs that require monitoring for toxicity include Lovenox and the method of monitoring

## 2024-07-13 LAB
ALBUMIN SERPL-MCNC: 3.9 GM/DL (ref 3.4–5)
ALP BLD-CCNC: 114 IU/L (ref 40–128)
ALT SERPL-CCNC: 7 U/L (ref 10–40)
ANION GAP SERPL CALCULATED.3IONS-SCNC: 11 MMOL/L (ref 7–16)
AST SERPL-CCNC: 14 IU/L (ref 15–37)
BASOPHILS ABSOLUTE: 0 K/CU MM
BASOPHILS RELATIVE PERCENT: 0.5 % (ref 0–1)
BILIRUB SERPL-MCNC: 0.3 MG/DL (ref 0–1)
BUN SERPL-MCNC: 16 MG/DL (ref 6–23)
CALCIUM SERPL-MCNC: 9 MG/DL (ref 8.3–10.6)
CHLORIDE BLD-SCNC: 99 MMOL/L (ref 99–110)
CO2: 25 MMOL/L (ref 21–32)
CREAT SERPL-MCNC: 0.7 MG/DL (ref 0.9–1.3)
CRP SERPL HS-MCNC: 12.1 MG/L
DIFFERENTIAL TYPE: ABNORMAL
EKG ATRIAL RATE: 73 BPM
EKG DIAGNOSIS: NORMAL
EKG P AXIS: 107 DEGREES
EKG P-R INTERVAL: 192 MS
EKG Q-T INTERVAL: 382 MS
EKG QRS DURATION: 82 MS
EKG QTC CALCULATION (BAZETT): 420 MS
EKG R AXIS: 5 DEGREES
EKG T AXIS: 21 DEGREES
EKG VENTRICULAR RATE: 73 BPM
EOSINOPHILS ABSOLUTE: 0.2 K/CU MM
EOSINOPHILS RELATIVE PERCENT: 3 % (ref 0–3)
GFR, ESTIMATED: >90 ML/MIN/1.73M2
GLUCOSE BLD-MCNC: 220 MG/DL (ref 70–99)
GLUCOSE BLD-MCNC: 226 MG/DL (ref 70–99)
GLUCOSE BLD-MCNC: 230 MG/DL (ref 70–99)
GLUCOSE BLD-MCNC: 232 MG/DL (ref 70–99)
GLUCOSE SERPL-MCNC: 223 MG/DL (ref 70–99)
HCT VFR BLD CALC: 37.7 % (ref 42–52)
HEMOGLOBIN: 12.1 GM/DL (ref 13.5–18)
IMMATURE NEUTROPHIL %: 0.7 % (ref 0–0.43)
INR BLD: 1 INDEX
LYMPHOCYTES ABSOLUTE: 1.5 K/CU MM
LYMPHOCYTES RELATIVE PERCENT: 25 % (ref 24–44)
MCH RBC QN AUTO: 28 PG (ref 27–31)
MCHC RBC AUTO-ENTMCNC: 32.1 % (ref 32–36)
MCV RBC AUTO: 87.3 FL (ref 78–100)
MONOCYTES ABSOLUTE: 0.4 K/CU MM
MONOCYTES RELATIVE PERCENT: 6.9 % (ref 0–4)
MRSA, DNA, NASAL: NEGATIVE
NEUTROPHILS ABSOLUTE: 3.8 K/CU MM
NEUTROPHILS RELATIVE PERCENT: 63.9 % (ref 36–66)
NUCLEATED RBC %: 0 %
PDW BLD-RTO: 13.7 % (ref 11.7–14.9)
PLATELET # BLD: 211 K/CU MM (ref 140–440)
PMV BLD AUTO: 9.8 FL (ref 7.5–11.1)
POTASSIUM SERPL-SCNC: 4.4 MMOL/L (ref 3.5–5.1)
PROTHROMBIN TIME: 13.4 SECONDS (ref 11.7–14.5)
RBC # BLD: 4.32 M/CU MM (ref 4.6–6.2)
SODIUM BLD-SCNC: 135 MMOL/L (ref 135–145)
SPECIMEN DESCRIPTION: NORMAL
TOTAL IMMATURE NEUTOROPHIL: 0.04 K/CU MM
TOTAL NUCLEATED RBC: 0 K/CU MM
TOTAL PROTEIN: 7 GM/DL (ref 6.4–8.2)
TOTAL RETICULOCYTE COUNT: 0.1 K/CU MM
WBC # BLD: 5.9 K/CU MM (ref 4–10.5)

## 2024-07-13 PROCEDURE — 93010 ELECTROCARDIOGRAM REPORT: CPT | Performed by: INTERNAL MEDICINE

## 2024-07-13 PROCEDURE — 2580000003 HC RX 258: Performed by: STUDENT IN AN ORGANIZED HEALTH CARE EDUCATION/TRAINING PROGRAM

## 2024-07-13 PROCEDURE — 80053 COMPREHEN METABOLIC PANEL: CPT

## 2024-07-13 PROCEDURE — 6360000002 HC RX W HCPCS: Performed by: INTERNAL MEDICINE

## 2024-07-13 PROCEDURE — 85025 COMPLETE CBC W/AUTO DIFF WBC: CPT

## 2024-07-13 PROCEDURE — 87641 MR-STAPH DNA AMP PROBE: CPT

## 2024-07-13 PROCEDURE — 6370000000 HC RX 637 (ALT 250 FOR IP): Performed by: STUDENT IN AN ORGANIZED HEALTH CARE EDUCATION/TRAINING PROGRAM

## 2024-07-13 PROCEDURE — 36415 COLL VENOUS BLD VENIPUNCTURE: CPT

## 2024-07-13 PROCEDURE — 82962 GLUCOSE BLOOD TEST: CPT

## 2024-07-13 PROCEDURE — 6360000002 HC RX W HCPCS: Performed by: STUDENT IN AN ORGANIZED HEALTH CARE EDUCATION/TRAINING PROGRAM

## 2024-07-13 PROCEDURE — 85610 PROTHROMBIN TIME: CPT

## 2024-07-13 PROCEDURE — 6370000000 HC RX 637 (ALT 250 FOR IP): Performed by: NURSE PRACTITIONER

## 2024-07-13 PROCEDURE — 86140 C-REACTIVE PROTEIN: CPT

## 2024-07-13 PROCEDURE — 6370000000 HC RX 637 (ALT 250 FOR IP): Performed by: FAMILY MEDICINE

## 2024-07-13 PROCEDURE — 1200000000 HC SEMI PRIVATE

## 2024-07-13 PROCEDURE — 6370000000 HC RX 637 (ALT 250 FOR IP): Performed by: INTERNAL MEDICINE

## 2024-07-13 RX ADMIN — PREGABALIN 75 MG: 75 CAPSULE ORAL at 10:00

## 2024-07-13 RX ADMIN — SODIUM CHLORIDE, PRESERVATIVE FREE 10 ML: 5 INJECTION INTRAVENOUS at 10:02

## 2024-07-13 RX ADMIN — OXYCODONE AND ACETAMINOPHEN 1 TABLET: 7.5; 325 TABLET ORAL at 18:57

## 2024-07-13 RX ADMIN — INSULIN LISPRO 1 UNITS: 100 INJECTION, SOLUTION INTRAVENOUS; SUBCUTANEOUS at 12:47

## 2024-07-13 RX ADMIN — INSULIN LISPRO 1 UNITS: 100 INJECTION, SOLUTION INTRAVENOUS; SUBCUTANEOUS at 17:47

## 2024-07-13 RX ADMIN — Medication 1 CAPSULE: at 10:00

## 2024-07-13 RX ADMIN — LEVOFLOXACIN 750 MG: 5 INJECTION, SOLUTION INTRAVENOUS at 15:54

## 2024-07-13 RX ADMIN — PANTOPRAZOLE SODIUM 40 MG: 40 TABLET, DELAYED RELEASE ORAL at 10:00

## 2024-07-13 RX ADMIN — SODIUM CHLORIDE, PRESERVATIVE FREE 5 ML: 5 INJECTION INTRAVENOUS at 23:08

## 2024-07-13 RX ADMIN — ATORVASTATIN CALCIUM 10 MG: 10 TABLET, FILM COATED ORAL at 10:00

## 2024-07-13 RX ADMIN — PREGABALIN 75 MG: 75 CAPSULE ORAL at 23:04

## 2024-07-13 RX ADMIN — INSULIN GLARGINE 10 UNITS: 100 INJECTION, SOLUTION SUBCUTANEOUS at 23:05

## 2024-07-13 RX ADMIN — OXYCODONE AND ACETAMINOPHEN 1 TABLET: 7.5; 325 TABLET ORAL at 14:34

## 2024-07-13 RX ADMIN — SODIUM CHLORIDE 1250 MG: 9 INJECTION, SOLUTION INTRAVENOUS at 12:59

## 2024-07-13 RX ADMIN — OXYCODONE AND ACETAMINOPHEN 1 TABLET: 7.5; 325 TABLET ORAL at 01:51

## 2024-07-13 RX ADMIN — INSULIN LISPRO 1 UNITS: 100 INJECTION, SOLUTION INTRAVENOUS; SUBCUTANEOUS at 10:01

## 2024-07-13 RX ADMIN — OXYCODONE AND ACETAMINOPHEN 1 TABLET: 7.5; 325 TABLET ORAL at 05:47

## 2024-07-13 RX ADMIN — OXYCODONE AND ACETAMINOPHEN 1 TABLET: 7.5; 325 TABLET ORAL at 10:00

## 2024-07-13 RX ADMIN — TAMSULOSIN HYDROCHLORIDE 0.4 MG: 0.4 CAPSULE ORAL at 10:00

## 2024-07-13 RX ADMIN — DOCUSATE SODIUM 100 MG: 100 CAPSULE, LIQUID FILLED ORAL at 10:00

## 2024-07-13 RX ADMIN — OXYCODONE AND ACETAMINOPHEN 1 TABLET: 7.5; 325 TABLET ORAL at 23:04

## 2024-07-13 RX ADMIN — ENOXAPARIN SODIUM 30 MG: 100 INJECTION SUBCUTANEOUS at 10:00

## 2024-07-13 ASSESSMENT — PAIN DESCRIPTION - ORIENTATION
ORIENTATION: RIGHT
ORIENTATION: RIGHT
ORIENTATION: RIGHT;LEFT
ORIENTATION: RIGHT
ORIENTATION: RIGHT;LOWER

## 2024-07-13 ASSESSMENT — PAIN SCALES - GENERAL
PAINLEVEL_OUTOF10: 7
PAINLEVEL_OUTOF10: 9
PAINLEVEL_OUTOF10: 3
PAINLEVEL_OUTOF10: 8
PAINLEVEL_OUTOF10: 8
PAINLEVEL_OUTOF10: 3
PAINLEVEL_OUTOF10: 7
PAINLEVEL_OUTOF10: 8
PAINLEVEL_OUTOF10: 8

## 2024-07-13 ASSESSMENT — PAIN DESCRIPTION - FREQUENCY
FREQUENCY: CONTINUOUS

## 2024-07-13 ASSESSMENT — PAIN DESCRIPTION - DESCRIPTORS
DESCRIPTORS: ACHING;STABBING
DESCRIPTORS: ACHING
DESCRIPTORS: ACHING;SHARP
DESCRIPTORS: ACHING;SHARP
DESCRIPTORS: STABBING;SHOOTING;THROBBING

## 2024-07-13 ASSESSMENT — PAIN DESCRIPTION - ONSET
ONSET: ON-GOING

## 2024-07-13 ASSESSMENT — PAIN DESCRIPTION - LOCATION
LOCATION: LEG
LOCATION: LEG
LOCATION: LEG;FOOT
LOCATION: LEG

## 2024-07-13 ASSESSMENT — PAIN - FUNCTIONAL ASSESSMENT
PAIN_FUNCTIONAL_ASSESSMENT: PREVENTS OR INTERFERES SOME ACTIVE ACTIVITIES AND ADLS
PAIN_FUNCTIONAL_ASSESSMENT: ACTIVITIES ARE NOT PREVENTED
PAIN_FUNCTIONAL_ASSESSMENT: PREVENTS OR INTERFERES SOME ACTIVE ACTIVITIES AND ADLS
PAIN_FUNCTIONAL_ASSESSMENT: ACTIVITIES ARE NOT PREVENTED
PAIN_FUNCTIONAL_ASSESSMENT: PREVENTS OR INTERFERES SOME ACTIVE ACTIVITIES AND ADLS

## 2024-07-13 ASSESSMENT — PAIN DESCRIPTION - PAIN TYPE
TYPE: ACUTE PAIN

## 2024-07-13 NOTE — PROGRESS NOTES
V2.0    [facility] Progress Note      Name:  Toi Briones /Age/Sex: 1965  (58 y.o. male)   MRN & CSN:  [id number] & [id number] Encounter Date/Time: 2024 2:47 PM EDT   Location:  [address] [place] [address] PCP: Emmett Rivera MD     Attending:Cate Lim MD       Hospital Day: 2    Assessment and Recommendations   Toi Briones is a 58 y.o. male  who presents with Cellulitis of right leg      Plan:     Cellulitis of right leg   Left Plantar DFI with OM s/p Left Hallux Amputation 2024   -Presents with acute onset RLE pain and redness, unable to ambulate due to pain   -Previous wound cx with citrobacter, enterococcus, staph simulans and stenotrophomonas   - Does not meet sepsis criteria on admit   - Foot XR without osteo  - venous US without DVT  - PRN pain control   - Continue with Vanc  -Consult infectious disease, levofloxacin added to regimen  - Pain control   - f/u blood cx, NGTD    Uncontrolled DMII with chronic complications and with long term use of insulin - improving             -Continue glargine.   -Monitor FSBS and cover with  SSI  -Hold PO medications while inpatient  -Hypoglycemic protocol  -Diabetic diet  -Last A1c 9.0 (2024)    HLD  -Continue statin      BPH  -Continue  flomax    Diet ADULT DIET; Regular   DVT Prophylaxis [x] Lovenox, []  Heparin, [] SCDs, [] Ambulation,  [] Eliquis, [] Xarelto  [] Coumadin   Code Status Full Code   Disposition From: Home  Expected Disposition: Home  Estimated Date of Discharge: 2-3 days  Patient requires continued admission due to management of above and culture results   Surrogate Decision Maker/ POA Dtr: Loulou     Personally reviewed Lab Studies and Imaging     Imaging that was interpreted personally includes CXR and results as noted above    Drugs that require monitoring for toxicity include Lovenox and the method of monitoring was CBC for developing anemia        Subjective:     Chief Complaint: Left leg pain    Toi Briones is a 58 y.o. male who  chloride, 10 mEq, PRN  potassium chloride, 10 mEq, PRN  magnesium sulfate, 2,000 mg, PRN  ondansetron, 4 mg, Q8H PRN   Or  ondansetron, 4 mg, Q6H PRN  polyethylene glycol, 17 g, Daily PRN  acetaminophen, 650 mg, Q6H PRN   Or  acetaminophen, 650 mg, Q6H PRN  oxyCODONE-acetaminophen, 1 tablet, Q4H PRN        Labs and Imaging   Vascular duplex lower extremity venous right    Result Date: 7/12/2024  Right lower extremity venous ultrasound INDICATION: Edema right calf COMPARISON:  None Doppler ultrasound of the right lower extremity was performed. FINDINGS:  There is normal flow in the great saphenous, common femoral, femoral, and popliteal veins. Normal compression and augmentation is demonstrated. The proximal calf veins are also patent.     No evidence of deep venous thrombosis in the right lower extremity Electronically signed by Sidney Vieyra    XR TIBIA FIBULA RIGHT (2 VIEWS)    Result Date: 7/12/2024  Right Tib/fib INDICATION: Evaluate for any gas pain swelling and cellulitis COMPARISON:  None TECHNIQUE: AP and lateral views of the right tibia and fibula were obtained. FINDINGS: There is no evidence of fracture or other acute bony abnormality in the right lower leg.  The tibia and fibula are intact. No soft tissue abnormality     No acute osseous abnormality. No soft tissue abnormality. Electronically signed by Sidney Vieyra      CBC:   Recent Labs     07/12/24  0155   WBC 7.5   HGB 11.7*          BMP:    Recent Labs     07/12/24  0155      K 4.8   CL 99   CO2 22   BUN 20   CREATININE 0.7*   GLUCOSE 356*       Hepatic:   Recent Labs     07/12/24  0155   AST 15   ALT 7*   BILITOT 0.3   ALKPHOS 158*       Lipids: No results found for: \"CHOL\", \"HDL\", \"TRIG\"  Hemoglobin A1C:   Lab Results   Component Value Date/Time    LABA1C 9.0 05/19/2024 08:10 AM     TSH: No results found for: \"TSH\"  Troponin:   Lab Results   Component Value Date/Time    TROPONINT <0.010 03/11/2022 06:09 PM    TROPONINT <0.010

## 2024-07-13 NOTE — PROGRESS NOTES
4 Eyes Skin Assessment     NAME:  Toi Briones  YOB: 1965  MEDICAL RECORD NUMBER:  [id number]    The patient is being assessed for  Admission    I agree that at least one RN has performed a thorough Head to Toe Skin Assessment on the patient. ALL assessment sites listed below have been assessed.      Areas assessed by both nurses:    Head, Face, Ears, Shoulders, Back, Chest, Arms, Elbows, Hands, Sacrum. Buttock, Coccyx, Ischium, Legs. Feet and Heels, and Other did not view the coccyx, patient stated, \"No wounds on my butt.\"         Does the Patient have a Wound? Yes wound(s) were present on assessment. LDA wound assessment was Initiated and completed by RN       Finn Prevention initiated by RN: Yes  Wound Care Orders initiated by RN: Yes    Pressure Injury (Stage 3,4, Unstageable, DTI, NWPT, and Complex wounds) if present, place Wound referral order by RN under : No    New Ostomies, if present place, Ostomy referral order under : No     Nurse 1 eSignature: Electronically signed by Aurea Salinas RN on 7/13/24 at 12:22 PM EDT    **SHARE this note so that the co-signing nurse can place an eSignature**    Nurse 2 eSignature: Electronically signed by Varsha Ponce RN on 7/13/24 at 2:24 PM EDT

## 2024-07-13 NOTE — PROGRESS NOTES
PHARMACY VANCOMYCIN MONITORING SERVICE  Pharmacy consulted by Dr. Sandy Elder MD for monitoring and adjustment.    Indication for treatment: Vancomycin indication: SSTI with risk factors   Goal trough: Trough Goal: 10-15 mcg/mL  AUC/ADONIS: <500    Risk Factors for MRSA Identified:   Hospitalization within the past 90 days, Received IV antibiotics within the past 90 days, Purulent and/or complicated SSTI    Pertinent Laboratory Values:   Temp Readings from Last 3 Encounters:   07/13/24 97.9 °F (36.6 °C) (Oral)   07/05/24 98.2 °F (36.8 °C) (Temporal)   06/28/24 97 °F (36.1 °C) (Temporal)     Recent Labs     07/12/24  0155   WBC 7.5     Recent Labs     07/12/24  0155   BUN 20   CREATININE 0.7*     Estimated Creatinine Clearance: 158 mL/min (A) (based on SCr of 0.7 mg/dL (L)).    Intake/Output Summary (Last 24 hours) at 7/13/2024 1347  Last data filed at 7/13/2024 0930  Gross per 24 hour   Intake 240 ml   Output --   Net 240 ml     Last Encounter Weight:  Wt Readings from Last 3 Encounters:   07/12/24 133 kg (293 lb 4.8 oz)   06/27/24 126.6 kg (279 lb)   05/28/24 (!) 136.5 kg (300 lb 14.9 oz)       Pertinent Cultures:   Date    Source    Results  07/12   Blood    NG at 24 hours  07/12   MRSA Nares   Negative    Assessment:  HPI: 58 y.o. male with history of DM type II and diabetic foot ulcer with OM (s/p left hallux amputation).  Patient presented to ED with right leg swelling.  SCr = 0.7, BUN = 20, and no I/O data -- yesterday  Day(s) of therapy: 2 of 5  Vancomycin concentration:   07/14 - To be collected    Plan:  Attempted to have labs drawn today but not drawn, called lab multiple times. Continue current dose for now  Labs in the AM  Steady state predictions: AUC: 431; Trough: 10.7  Pharmacy will continue to monitor patient and adjust therapy as indicated    VANCOMYCIN CONCENTRATION SCHEDULED FOR 07/14/2024 @ 2 AM    Thank you for the consult.  Mary Lagos RPH  7/13/2024 1:47 PM

## 2024-07-14 LAB
ALBUMIN SERPL-MCNC: 3.9 GM/DL (ref 3.4–5)
ALP BLD-CCNC: 106 IU/L (ref 40–128)
ALT SERPL-CCNC: 7 U/L (ref 10–40)
ANION GAP SERPL CALCULATED.3IONS-SCNC: 12 MMOL/L (ref 7–16)
AST SERPL-CCNC: 16 IU/L (ref 15–37)
BASOPHILS ABSOLUTE: 0.1 K/CU MM
BASOPHILS RELATIVE PERCENT: 0.8 % (ref 0–1)
BILIRUB SERPL-MCNC: 0.4 MG/DL (ref 0–1)
BUN SERPL-MCNC: 15 MG/DL (ref 6–23)
CALCIUM SERPL-MCNC: 9.1 MG/DL (ref 8.3–10.6)
CHLORIDE BLD-SCNC: 99 MMOL/L (ref 99–110)
CO2: 23 MMOL/L (ref 21–32)
CREAT SERPL-MCNC: 0.7 MG/DL (ref 0.9–1.3)
CRP SERPL HS-MCNC: 11.6 MG/L
DIFFERENTIAL TYPE: ABNORMAL
DOSE AMOUNT: ABNORMAL
DOSE AMOUNT: NORMAL
DOSE TIME: ABNORMAL
DOSE TIME: NORMAL
EOSINOPHILS ABSOLUTE: 0.2 K/CU MM
EOSINOPHILS RELATIVE PERCENT: 2.8 % (ref 0–3)
GFR, ESTIMATED: >90 ML/MIN/1.73M2
GLUCOSE BLD-MCNC: 172 MG/DL (ref 70–99)
GLUCOSE BLD-MCNC: 182 MG/DL (ref 70–99)
GLUCOSE BLD-MCNC: 208 MG/DL (ref 70–99)
GLUCOSE BLD-MCNC: 258 MG/DL (ref 70–99)
GLUCOSE SERPL-MCNC: 213 MG/DL (ref 70–99)
HCT VFR BLD CALC: 40.2 % (ref 42–52)
HEMOGLOBIN: 12.5 GM/DL (ref 13.5–18)
IMMATURE NEUTROPHIL %: 0.6 % (ref 0–0.43)
LYMPHOCYTES ABSOLUTE: 1.9 K/CU MM
LYMPHOCYTES RELATIVE PERCENT: 28.6 % (ref 24–44)
MCH RBC QN AUTO: 27.7 PG (ref 27–31)
MCHC RBC AUTO-ENTMCNC: 31.1 % (ref 32–36)
MCV RBC AUTO: 89.1 FL (ref 78–100)
MONOCYTES ABSOLUTE: 0.6 K/CU MM
MONOCYTES RELATIVE PERCENT: 8.4 % (ref 0–4)
NEUTROPHILS ABSOLUTE: 3.8 K/CU MM
NEUTROPHILS RELATIVE PERCENT: 58.8 % (ref 36–66)
NUCLEATED RBC %: 0 %
PDW BLD-RTO: 13.7 % (ref 11.7–14.9)
PLATELET # BLD: 195 K/CU MM (ref 140–440)
PMV BLD AUTO: 9.8 FL (ref 7.5–11.1)
POTASSIUM SERPL-SCNC: 4.5 MMOL/L (ref 3.5–5.1)
RBC # BLD: 4.51 M/CU MM (ref 4.6–6.2)
SODIUM BLD-SCNC: 134 MMOL/L (ref 135–145)
TOTAL IMMATURE NEUTOROPHIL: 0.04 K/CU MM
TOTAL NUCLEATED RBC: 0 K/CU MM
TOTAL PROTEIN: 7 GM/DL (ref 6.4–8.2)
VANCOMYCIN TROUGH: 10.4 UG/ML (ref 10–20)
VANCOMYCIN TROUGH: 30.8 UG/ML (ref 10–20)
WBC # BLD: 6.5 K/CU MM (ref 4–10.5)

## 2024-07-14 PROCEDURE — 85025 COMPLETE CBC W/AUTO DIFF WBC: CPT

## 2024-07-14 PROCEDURE — 80202 ASSAY OF VANCOMYCIN: CPT

## 2024-07-14 PROCEDURE — 80053 COMPREHEN METABOLIC PANEL: CPT

## 2024-07-14 PROCEDURE — 2580000003 HC RX 258: Performed by: STUDENT IN AN ORGANIZED HEALTH CARE EDUCATION/TRAINING PROGRAM

## 2024-07-14 PROCEDURE — 6360000002 HC RX W HCPCS: Performed by: STUDENT IN AN ORGANIZED HEALTH CARE EDUCATION/TRAINING PROGRAM

## 2024-07-14 PROCEDURE — 82962 GLUCOSE BLOOD TEST: CPT

## 2024-07-14 PROCEDURE — 6370000000 HC RX 637 (ALT 250 FOR IP): Performed by: FAMILY MEDICINE

## 2024-07-14 PROCEDURE — 36415 COLL VENOUS BLD VENIPUNCTURE: CPT

## 2024-07-14 PROCEDURE — 6370000000 HC RX 637 (ALT 250 FOR IP): Performed by: STUDENT IN AN ORGANIZED HEALTH CARE EDUCATION/TRAINING PROGRAM

## 2024-07-14 PROCEDURE — 6370000000 HC RX 637 (ALT 250 FOR IP): Performed by: NURSE PRACTITIONER

## 2024-07-14 PROCEDURE — 86140 C-REACTIVE PROTEIN: CPT

## 2024-07-14 PROCEDURE — 6370000000 HC RX 637 (ALT 250 FOR IP): Performed by: INTERNAL MEDICINE

## 2024-07-14 PROCEDURE — 6360000002 HC RX W HCPCS: Performed by: INTERNAL MEDICINE

## 2024-07-14 PROCEDURE — 94761 N-INVAS EAR/PLS OXIMETRY MLT: CPT

## 2024-07-14 PROCEDURE — 1200000000 HC SEMI PRIVATE

## 2024-07-14 RX ADMIN — SODIUM CHLORIDE, PRESERVATIVE FREE 10 ML: 5 INJECTION INTRAVENOUS at 11:05

## 2024-07-14 RX ADMIN — INSULIN GLARGINE 10 UNITS: 100 INJECTION, SOLUTION SUBCUTANEOUS at 21:02

## 2024-07-14 RX ADMIN — SODIUM CHLORIDE 25 ML: 9 INJECTION, SOLUTION INTRAVENOUS at 01:10

## 2024-07-14 RX ADMIN — Medication 1 CAPSULE: at 09:56

## 2024-07-14 RX ADMIN — DOCUSATE SODIUM 100 MG: 100 CAPSULE, LIQUID FILLED ORAL at 20:51

## 2024-07-14 RX ADMIN — LEVOFLOXACIN 750 MG: 5 INJECTION, SOLUTION INTRAVENOUS at 11:46

## 2024-07-14 RX ADMIN — TAMSULOSIN HYDROCHLORIDE 0.4 MG: 0.4 CAPSULE ORAL at 09:56

## 2024-07-14 RX ADMIN — ENOXAPARIN SODIUM 30 MG: 100 INJECTION SUBCUTANEOUS at 20:51

## 2024-07-14 RX ADMIN — PREGABALIN 75 MG: 75 CAPSULE ORAL at 20:51

## 2024-07-14 RX ADMIN — INSULIN LISPRO 1 UNITS: 100 INJECTION, SOLUTION INTRAVENOUS; SUBCUTANEOUS at 12:46

## 2024-07-14 RX ADMIN — ENOXAPARIN SODIUM 30 MG: 100 INJECTION SUBCUTANEOUS at 00:00

## 2024-07-14 RX ADMIN — OXYCODONE AND ACETAMINOPHEN 1 TABLET: 7.5; 325 TABLET ORAL at 11:21

## 2024-07-14 RX ADMIN — PANTOPRAZOLE SODIUM 40 MG: 40 TABLET, DELAYED RELEASE ORAL at 20:51

## 2024-07-14 RX ADMIN — PANTOPRAZOLE SODIUM 40 MG: 40 TABLET, DELAYED RELEASE ORAL at 00:00

## 2024-07-14 RX ADMIN — OXYCODONE AND ACETAMINOPHEN 1 TABLET: 7.5; 325 TABLET ORAL at 03:06

## 2024-07-14 RX ADMIN — ENOXAPARIN SODIUM 30 MG: 100 INJECTION SUBCUTANEOUS at 09:56

## 2024-07-14 RX ADMIN — PANTOPRAZOLE SODIUM 40 MG: 40 TABLET, DELAYED RELEASE ORAL at 09:56

## 2024-07-14 RX ADMIN — DOCUSATE SODIUM 100 MG: 100 CAPSULE, LIQUID FILLED ORAL at 00:00

## 2024-07-14 RX ADMIN — ATORVASTATIN CALCIUM 10 MG: 10 TABLET, FILM COATED ORAL at 09:56

## 2024-07-14 RX ADMIN — OXYCODONE AND ACETAMINOPHEN 1 TABLET: 7.5; 325 TABLET ORAL at 07:00

## 2024-07-14 RX ADMIN — DOCUSATE SODIUM 100 MG: 100 CAPSULE, LIQUID FILLED ORAL at 09:56

## 2024-07-14 RX ADMIN — SODIUM CHLORIDE 1500 MG: 9 INJECTION, SOLUTION INTRAVENOUS at 14:39

## 2024-07-14 RX ADMIN — OXYCODONE AND ACETAMINOPHEN 1 TABLET: 7.5; 325 TABLET ORAL at 21:00

## 2024-07-14 RX ADMIN — SODIUM CHLORIDE 1250 MG: 9 INJECTION, SOLUTION INTRAVENOUS at 01:11

## 2024-07-14 RX ADMIN — PREGABALIN 75 MG: 75 CAPSULE ORAL at 09:56

## 2024-07-14 RX ADMIN — OXYCODONE AND ACETAMINOPHEN 1 TABLET: 7.5; 325 TABLET ORAL at 14:39

## 2024-07-14 ASSESSMENT — PAIN DESCRIPTION - ORIENTATION
ORIENTATION: RIGHT;LEFT
ORIENTATION: RIGHT
ORIENTATION: RIGHT
ORIENTATION: RIGHT;LEFT
ORIENTATION: RIGHT;LEFT

## 2024-07-14 ASSESSMENT — PAIN - FUNCTIONAL ASSESSMENT
PAIN_FUNCTIONAL_ASSESSMENT: ACTIVITIES ARE NOT PREVENTED

## 2024-07-14 ASSESSMENT — PAIN SCALES - GENERAL
PAINLEVEL_OUTOF10: 7
PAINLEVEL_OUTOF10: 7
PAINLEVEL_OUTOF10: 5
PAINLEVEL_OUTOF10: 8
PAINLEVEL_OUTOF10: 2
PAINLEVEL_OUTOF10: 8
PAINLEVEL_OUTOF10: 8
PAINLEVEL_OUTOF10: 3

## 2024-07-14 ASSESSMENT — PAIN DESCRIPTION - LOCATION
LOCATION: LEG
LOCATION: LEG;FOOT
LOCATION: FOOT;LEG
LOCATION: FOOT;LEG
LOCATION: LEG;FOOT
LOCATION: LEG
LOCATION: FOOT;LEG

## 2024-07-14 ASSESSMENT — PAIN DESCRIPTION - DESCRIPTORS
DESCRIPTORS: ACHING;THROBBING
DESCRIPTORS: ACHING
DESCRIPTORS: ACHING;THROBBING

## 2024-07-14 NOTE — PROGRESS NOTES
PHARMACY VANCOMYCIN MONITORING SERVICE  Pharmacy consulted by Dr. Sandy Elder MD for monitoring and adjustment.    Indication for treatment: Vancomycin indication: SSTI with risk factors   Goal trough: Trough Goal: 10-15 mcg/mL  AUC/ADONIS: <500    Risk Factors for MRSA Identified:   Hospitalization within the past 90 days, Received IV antibiotics within the past 90 days, Purulent and/or complicated SSTI    Pertinent Laboratory Values:   Temp Readings from Last 3 Encounters:   07/14/24 97.5 °F (36.4 °C) (Oral)   07/05/24 98.2 °F (36.8 °C) (Temporal)   06/28/24 97 °F (36.1 °C) (Temporal)     Recent Labs     07/12/24  0155 07/13/24  1706 07/14/24  0236   WBC 7.5 5.9 6.5       Recent Labs     07/12/24  0155 07/13/24  1706 07/14/24  0236   BUN 20 16 15   CREATININE 0.7* 0.7* 0.7*       Estimated Creatinine Clearance: 158 mL/min (A) (based on SCr of 0.7 mg/dL (L)).    Intake/Output Summary (Last 24 hours) at 7/14/2024 1245  Last data filed at 7/14/2024 0245  Gross per 24 hour   Intake 240 ml   Output --   Net 240 ml       Last Encounter Weight:  Wt Readings from Last 3 Encounters:   07/12/24 133 kg (293 lb 4.8 oz)   06/27/24 126.6 kg (279 lb)   05/28/24 (!) 136.5 kg (300 lb 14.9 oz)       Pertinent Cultures:   Date    Source    Results  07/12   Blood    NG at 48 hours  07/12   MRSA Nares   Negative    Assessment:  HPI: 58 y.o. male with history of DM type II and diabetic foot ulcer with OM (s/p left hallux amputation).  Patient presented to ED with right leg swelling.  SCr = 0.7, BUN = 15, and no I/O data -- yesterday  Day(s) of therapy: 3 of 5  Vancomycin concentration:   07/14 - 10.4 mg/L, ~11 hour level on 1250 mg IV q12hr. , Trough 9.6    Plan:  Given above kinetics are slightly below goal will increases dose to 1500 mg IV q12hr. AUC predicted to be 493 mg/L, and ssTrough of 11.8 mg/L.  Pharmacy will continue to monitor patient and adjust therapy as indicated    VANCOMYCIN CONCENTRATION SCHEDULED FOR 07/16/2024

## 2024-07-14 NOTE — PROGRESS NOTES
V2.0    [facility] [place] Progress Note      Name:  Toi Briones /Age/Sex: 1965  (58 y.o. male)   MRN & CSN:  [id number] & [id number] Encounter Date/Time: 2024 2:47 PM EDT   Location:  [address] [place] [address]- PCP: Emmett Rivera MD     Attending:Cate Lim MD       Hospital Day: 3    Assessment and Recommendations   Toi Broines is a 58 y.o. male  who presents with Cellulitis of right leg      Plan:     Cellulitis of right leg   Left Plantar DFI with OM s/p Left Hallux Amputation 2024   - Presents with acute onset RLE pain and redness, unable to ambulate due to pain   - Previous wound cx with citrobacter, enterococcus, staph simulans and stenotrophomonas   - Does not meet sepsis criteria on admit   - Foot XR without osteo  - duplex venous US without DVT  - PRN pain control   - Continue with Vanc  - Consult infectious disease, levofloxacin added to regimen  - Pain control   - f/u blood cx, NGTD  - Follow-up ID recs regarding antibiotic regimen and duration    Uncontrolled DMII with chronic complications and with long term use of insulin - improving             -Continue glargine.   -Monitor FSBS and cover with  SSI  -Hold PO medications while inpatient  -Hypoglycemic protocol  -Diabetic diet  -Last A1c 9.0 (2024)    HLD  -Continue statin      BPH  -Continue  flomax    Diet ADULT DIET; Regular; 5 carb choices (75 gm/meal); Low Fat/Low Chol/High Fiber/2 gm Na   DVT Prophylaxis [x] Lovenox, []  Heparin, [] SCDs, [] Ambulation,  [] Eliquis, [] Xarelto  [] Coumadin   Code Status Full Code   Disposition From: Home  Expected Disposition: Home  Estimated Date of Discharge: 2-3 days  Patient requires continued admission due to management of above and culture results   Surrogate Decision Maker/ POA Dtr: Loulou     Personally reviewed Lab Studies and Imaging     Imaging that was interpreted personally includes CXR and results as noted above    Drugs that require monitoring for toxicity include Lovenox and the  method of monitoring was CBC for developing anemia        Subjective:     Chief Complaint: Left leg pain    Patient seen and evaluated at bedside.  No acute events overnight.  Pain is improved significantly.  Plan of care discussed at length.  All questions answered.    Review of Systems:      Pertinent positives and negatives discussed in HPI    Objective:     Intake/Output Summary (Last 24 hours) at 7/14/2024 0907  Last data filed at 7/14/2024 0245  Gross per 24 hour   Intake 480 ml   Output --   Net 480 ml        Vitals:   Vitals:    07/13/24 2300 07/14/24 0245 07/14/24 0410 07/14/24 0700   BP: 138/89 113/72     Pulse: 70 71     Resp: 18 16 18 16   Temp: 98 °F (36.7 °C) 98.2 °F (36.8 °C)     TempSrc: Oral Oral     SpO2: 97% 98%     Weight:       Height:             Physical Exam:    07/14/24    General: NAD  Eyes: EOMI  ENT: neck supple  Cardiovascular: Regular rate.  Respiratory: Clear to auscultation  Gastrointestinal: Soft, non tender  Genitourinary: no suprapubic tenderness  Musculoskeletal: +2  R>L edema, patient has multiple bilateral upper extremity digital amputations. Recent Left Hallux Amputation  Skin: warm, dry, left lower extremity wound VAC in place with serous drainage and system.  Diffuse right lower extremity redness  Neuro: Alert.  Psych: Mood appropriate.         Medications:   Medications:    atorvastatin  10 mg Oral Daily    pantoprazole  40 mg Oral BID    pregabalin  75 mg Oral BID    tamsulosin  0.4 mg Oral Daily    insulin lispro  0-4 Units SubCUTAneous TID WC    insulin lispro  0-4 Units SubCUTAneous Nightly    sodium chloride flush  5-40 mL IntraVENous 2 times per day    enoxaparin  30 mg SubCUTAneous BID    insulin glargine  10 Units SubCUTAneous QPM    vancomycin  1,250 mg IntraVENous Q12H    levofloxacin  750 mg IntraVENous Q24H    lactobacillus  1 capsule Oral Daily with breakfast    docusate sodium  100 mg Oral BID      Infusions:    dextrose      sodium chloride 25 mL (07/14/24

## 2024-07-14 NOTE — PLAN OF CARE
Problem: Discharge Planning  Goal: Discharge to home or other facility with appropriate resources  Outcome: Progressing     Problem: Pain  Goal: Verbalizes/displays adequate comfort level or baseline comfort level  Outcome: Progressing     Problem: Safety - Adult  Goal: Free from fall injury  Outcome: Progressing     Problem: Infection - Adult  Goal: Absence of infection at discharge  Outcome: Progressing

## 2024-07-15 LAB
ALBUMIN SERPL-MCNC: 4 GM/DL (ref 3.4–5)
ALP BLD-CCNC: 108 IU/L (ref 40–128)
ALT SERPL-CCNC: 7 U/L (ref 10–40)
ANION GAP SERPL CALCULATED.3IONS-SCNC: 11 MMOL/L (ref 7–16)
AST SERPL-CCNC: 12 IU/L (ref 15–37)
BASOPHILS ABSOLUTE: 0 K/CU MM
BASOPHILS RELATIVE PERCENT: 0.6 % (ref 0–1)
BILIRUB SERPL-MCNC: 0.6 MG/DL (ref 0–1)
BUN SERPL-MCNC: 15 MG/DL (ref 6–23)
CALCIUM SERPL-MCNC: 9.3 MG/DL (ref 8.3–10.6)
CHLORIDE BLD-SCNC: 99 MMOL/L (ref 99–110)
CO2: 24 MMOL/L (ref 21–32)
CREAT SERPL-MCNC: 0.7 MG/DL (ref 0.9–1.3)
CRP SERPL HS-MCNC: 13.5 MG/L
DIFFERENTIAL TYPE: ABNORMAL
EOSINOPHILS ABSOLUTE: 0.2 K/CU MM
EOSINOPHILS RELATIVE PERCENT: 3.6 % (ref 0–3)
GFR, ESTIMATED: >90 ML/MIN/1.73M2
GLUCOSE BLD-MCNC: 216 MG/DL (ref 70–99)
GLUCOSE BLD-MCNC: 223 MG/DL (ref 70–99)
GLUCOSE BLD-MCNC: 288 MG/DL (ref 70–99)
GLUCOSE BLD-MCNC: 366 MG/DL (ref 70–99)
GLUCOSE SERPL-MCNC: 221 MG/DL (ref 70–99)
HCT VFR BLD CALC: 40.5 % (ref 42–52)
HEMOGLOBIN: 13 GM/DL (ref 13.5–18)
IMMATURE NEUTROPHIL %: 0.6 % (ref 0–0.43)
LYMPHOCYTES ABSOLUTE: 1.7 K/CU MM
LYMPHOCYTES RELATIVE PERCENT: 33.7 % (ref 24–44)
MCH RBC QN AUTO: 27.8 PG (ref 27–31)
MCHC RBC AUTO-ENTMCNC: 32.1 % (ref 32–36)
MCV RBC AUTO: 86.7 FL (ref 78–100)
MONOCYTES ABSOLUTE: 0.5 K/CU MM
MONOCYTES RELATIVE PERCENT: 9 % (ref 0–4)
NEUTROPHILS ABSOLUTE: 2.6 K/CU MM
NEUTROPHILS RELATIVE PERCENT: 52.5 % (ref 36–66)
NUCLEATED RBC %: 0 %
PDW BLD-RTO: 14.1 % (ref 11.7–14.9)
PLATELET # BLD: 204 K/CU MM (ref 140–440)
PMV BLD AUTO: 9.7 FL (ref 7.5–11.1)
POTASSIUM SERPL-SCNC: 4.5 MMOL/L (ref 3.5–5.1)
RBC # BLD: 4.67 M/CU MM (ref 4.6–6.2)
SODIUM BLD-SCNC: 134 MMOL/L (ref 135–145)
TOTAL IMMATURE NEUTOROPHIL: 0.03 K/CU MM
TOTAL NUCLEATED RBC: 0 K/CU MM
TOTAL PROTEIN: 7.2 GM/DL (ref 6.4–8.2)
WBC # BLD: 5 K/CU MM (ref 4–10.5)

## 2024-07-15 PROCEDURE — 99232 SBSQ HOSP IP/OBS MODERATE 35: CPT | Performed by: INTERNAL MEDICINE

## 2024-07-15 PROCEDURE — 85025 COMPLETE CBC W/AUTO DIFF WBC: CPT

## 2024-07-15 PROCEDURE — 2580000003 HC RX 258: Performed by: STUDENT IN AN ORGANIZED HEALTH CARE EDUCATION/TRAINING PROGRAM

## 2024-07-15 PROCEDURE — 6370000000 HC RX 637 (ALT 250 FOR IP): Performed by: FAMILY MEDICINE

## 2024-07-15 PROCEDURE — 6370000000 HC RX 637 (ALT 250 FOR IP): Performed by: NURSE PRACTITIONER

## 2024-07-15 PROCEDURE — 97605 NEG PRS WND THER DME<=50SQCM: CPT

## 2024-07-15 PROCEDURE — 6360000002 HC RX W HCPCS: Performed by: STUDENT IN AN ORGANIZED HEALTH CARE EDUCATION/TRAINING PROGRAM

## 2024-07-15 PROCEDURE — 6360000002 HC RX W HCPCS: Performed by: INTERNAL MEDICINE

## 2024-07-15 PROCEDURE — 94761 N-INVAS EAR/PLS OXIMETRY MLT: CPT

## 2024-07-15 PROCEDURE — 76937 US GUIDE VASCULAR ACCESS: CPT

## 2024-07-15 PROCEDURE — 80053 COMPREHEN METABOLIC PANEL: CPT

## 2024-07-15 PROCEDURE — 6370000000 HC RX 637 (ALT 250 FOR IP): Performed by: STUDENT IN AN ORGANIZED HEALTH CARE EDUCATION/TRAINING PROGRAM

## 2024-07-15 PROCEDURE — 36415 COLL VENOUS BLD VENIPUNCTURE: CPT

## 2024-07-15 PROCEDURE — 86140 C-REACTIVE PROTEIN: CPT

## 2024-07-15 PROCEDURE — 6370000000 HC RX 637 (ALT 250 FOR IP): Performed by: INTERNAL MEDICINE

## 2024-07-15 PROCEDURE — 1200000000 HC SEMI PRIVATE

## 2024-07-15 PROCEDURE — 82962 GLUCOSE BLOOD TEST: CPT

## 2024-07-15 RX ORDER — PREDNISONE 20 MG/1
40 TABLET ORAL DAILY
Status: DISCONTINUED | OUTPATIENT
Start: 2024-07-15 | End: 2024-07-17 | Stop reason: HOSPADM

## 2024-07-15 RX ORDER — INSULIN LISPRO 100 [IU]/ML
0-4 INJECTION, SOLUTION INTRAVENOUS; SUBCUTANEOUS NIGHTLY
Status: DISCONTINUED | OUTPATIENT
Start: 2024-07-15 | End: 2024-07-16

## 2024-07-15 RX ORDER — INSULIN GLARGINE 100 [IU]/ML
13 INJECTION, SOLUTION SUBCUTANEOUS EVERY EVENING
Status: DISCONTINUED | OUTPATIENT
Start: 2024-07-15 | End: 2024-07-15

## 2024-07-15 RX ORDER — INSULIN LISPRO 100 [IU]/ML
0-8 INJECTION, SOLUTION INTRAVENOUS; SUBCUTANEOUS
Status: DISCONTINUED | OUTPATIENT
Start: 2024-07-15 | End: 2024-07-16

## 2024-07-15 RX ORDER — INSULIN GLARGINE 100 [IU]/ML
15 INJECTION, SOLUTION SUBCUTANEOUS EVERY EVENING
Status: DISCONTINUED | OUTPATIENT
Start: 2024-07-15 | End: 2024-07-16

## 2024-07-15 RX ADMIN — OXYCODONE AND ACETAMINOPHEN 1 TABLET: 7.5; 325 TABLET ORAL at 18:59

## 2024-07-15 RX ADMIN — SODIUM CHLORIDE 1500 MG: 9 INJECTION, SOLUTION INTRAVENOUS at 15:10

## 2024-07-15 RX ADMIN — INSULIN LISPRO 4 UNITS: 100 INJECTION, SOLUTION INTRAVENOUS; SUBCUTANEOUS at 21:00

## 2024-07-15 RX ADMIN — LEVOFLOXACIN 750 MG: 5 INJECTION, SOLUTION INTRAVENOUS at 12:17

## 2024-07-15 RX ADMIN — PREDNISONE 40 MG: 20 TABLET ORAL at 12:21

## 2024-07-15 RX ADMIN — SODIUM CHLORIDE 1500 MG: 9 INJECTION, SOLUTION INTRAVENOUS at 02:28

## 2024-07-15 RX ADMIN — SODIUM CHLORIDE, PRESERVATIVE FREE 10 ML: 5 INJECTION INTRAVENOUS at 09:33

## 2024-07-15 RX ADMIN — DOCUSATE SODIUM 100 MG: 100 CAPSULE, LIQUID FILLED ORAL at 09:32

## 2024-07-15 RX ADMIN — OXYCODONE AND ACETAMINOPHEN 1 TABLET: 7.5; 325 TABLET ORAL at 10:30

## 2024-07-15 RX ADMIN — PANTOPRAZOLE SODIUM 40 MG: 40 TABLET, DELAYED RELEASE ORAL at 21:50

## 2024-07-15 RX ADMIN — ENOXAPARIN SODIUM 30 MG: 100 INJECTION SUBCUTANEOUS at 09:32

## 2024-07-15 RX ADMIN — INSULIN LISPRO 2 UNITS: 100 INJECTION, SOLUTION INTRAVENOUS; SUBCUTANEOUS at 17:41

## 2024-07-15 RX ADMIN — PREGABALIN 75 MG: 75 CAPSULE ORAL at 09:32

## 2024-07-15 RX ADMIN — INSULIN LISPRO 1 UNITS: 100 INJECTION, SOLUTION INTRAVENOUS; SUBCUTANEOUS at 09:33

## 2024-07-15 RX ADMIN — ENOXAPARIN SODIUM 30 MG: 100 INJECTION SUBCUTANEOUS at 21:50

## 2024-07-15 RX ADMIN — OXYCODONE AND ACETAMINOPHEN 1 TABLET: 7.5; 325 TABLET ORAL at 05:18

## 2024-07-15 RX ADMIN — DOCUSATE SODIUM 100 MG: 100 CAPSULE, LIQUID FILLED ORAL at 21:50

## 2024-07-15 RX ADMIN — TAMSULOSIN HYDROCHLORIDE 0.4 MG: 0.4 CAPSULE ORAL at 09:32

## 2024-07-15 RX ADMIN — OXYCODONE AND ACETAMINOPHEN 1 TABLET: 7.5; 325 TABLET ORAL at 01:12

## 2024-07-15 RX ADMIN — INSULIN LISPRO 2 UNITS: 100 INJECTION, SOLUTION INTRAVENOUS; SUBCUTANEOUS at 12:21

## 2024-07-15 RX ADMIN — ATORVASTATIN CALCIUM 10 MG: 10 TABLET, FILM COATED ORAL at 09:32

## 2024-07-15 RX ADMIN — SODIUM CHLORIDE 25 ML: 9 INJECTION, SOLUTION INTRAVENOUS at 02:27

## 2024-07-15 RX ADMIN — INSULIN GLARGINE 15 UNITS: 100 INJECTION, SOLUTION SUBCUTANEOUS at 21:00

## 2024-07-15 RX ADMIN — OXYCODONE AND ACETAMINOPHEN 1 TABLET: 7.5; 325 TABLET ORAL at 15:05

## 2024-07-15 RX ADMIN — PREGABALIN 75 MG: 75 CAPSULE ORAL at 21:50

## 2024-07-15 RX ADMIN — PANTOPRAZOLE SODIUM 40 MG: 40 TABLET, DELAYED RELEASE ORAL at 09:32

## 2024-07-15 RX ADMIN — OXYCODONE AND ACETAMINOPHEN 1 TABLET: 7.5; 325 TABLET ORAL at 22:47

## 2024-07-15 RX ADMIN — Medication 1 CAPSULE: at 09:32

## 2024-07-15 ASSESSMENT — PAIN SCALES - GENERAL
PAINLEVEL_OUTOF10: 3
PAINLEVEL_OUTOF10: 9
PAINLEVEL_OUTOF10: 7
PAINLEVEL_OUTOF10: 9
PAINLEVEL_OUTOF10: 4
PAINLEVEL_OUTOF10: 8
PAINLEVEL_OUTOF10: 6
PAINLEVEL_OUTOF10: 7
PAINLEVEL_OUTOF10: 2
PAINLEVEL_OUTOF10: 7

## 2024-07-15 ASSESSMENT — PAIN DESCRIPTION - ORIENTATION
ORIENTATION: RIGHT;LEFT
ORIENTATION: RIGHT;LEFT
ORIENTATION: LEFT
ORIENTATION: RIGHT;LEFT
ORIENTATION: MID
ORIENTATION: INNER

## 2024-07-15 ASSESSMENT — PAIN DESCRIPTION - LOCATION
LOCATION: ARM
LOCATION: FOOT
LOCATION: FOOT
LOCATION: FOOT;LEG
LOCATION: FOOT;LEG
LOCATION: FOOT
LOCATION: LEG;FOOT
LOCATION: FOOT;LEG

## 2024-07-15 ASSESSMENT — PAIN SCALES - WONG BAKER
WONGBAKER_NUMERICALRESPONSE: NO HURT

## 2024-07-15 ASSESSMENT — PAIN DESCRIPTION - DESCRIPTORS
DESCRIPTORS: ACHING
DESCRIPTORS: ACHING;THROBBING
DESCRIPTORS: THROBBING;SHARP
DESCRIPTORS: ACHING
DESCRIPTORS: THROBBING

## 2024-07-15 NOTE — PROGRESS NOTES
Infectious Disease Progress Note  7/15/2024   Patient Name: Toi Briones : 1965     Assessment  Right leg cellulitis  Non-purulent, and does not spread to the right foot. Suspect the scabs on his anterior leg to be the portals of entry  Possibilities: MRSA, Streptococcus or gram-negative microbes.   24, Blood cx 0/2 NGTD  EKG Qtc: 420 ms  Swelling has improved but he rates his pain at 6/10 in intensity. MRSA nares is negative but may still be reasonable to cover for MRSA. Prednisone will be given to help inflammation.   Left foot post first ray amputation wound  Pathology report did not show osteomyelitis.  He completed a 2-week course of Augmentin and levofloxacin.  Further treatment is not planned.  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  History:?Interval history noted  Denies n/v/d/f or untoward effects of antimicrobials  Physical Exam:  Vital Signs: /79   Pulse 80   Temp 98.1 °F (36.7 °C) (Oral)   Resp 16   Ht 1.803 m (5' 11\")   Wt 133 kg (293 lb 4.8 oz)   SpO2 98%   BMI 40.91 kg/m²     Gen: alert and oriented X3, no distress  Skin: no stigmata of endocarditis  Wounds: left foot  dressing C/D/I  HEMT: AT/NC Oropharynx pink, moist, and without lesions or exudates;  Eyes: PERRLA, EOMI, conjunctiva pink, sclera anicteric.   Neck: Supple. Trachea midline. No LAD.  Chest: no distress and CTA. Good air movement.  Heart: RRR and no MRG.   Abd: soft, non-distended, no tenderness, no hepatomegaly. Normoactive bowel sounds.  Ext: right leg swelling, erythema has improved. Left leg dressing

## 2024-07-15 NOTE — PROGRESS NOTES
PHARMACY VANCOMYCIN MONITORING SERVICE  Pharmacy consulted by Dr. Sandy Elder MD for monitoring and adjustment.    Indication for treatment: Vancomycin indication: SSTI with risk factors   Goal trough: Trough Goal: 10-15 mcg/mL  AUC/ADONIS: <500    Risk Factors for MRSA Identified:   Hospitalization within the past 90 days, Received IV antibiotics within the past 90 days, Purulent and/or complicated SSTI    Pertinent Laboratory Values:   Temp Readings from Last 3 Encounters:   07/15/24 98 °F (36.7 °C) (Oral)   07/05/24 98.2 °F (36.8 °C) (Temporal)   06/28/24 97 °F (36.1 °C) (Temporal)     Recent Labs     07/13/24  1706 07/14/24  0236 07/15/24  0813   WBC 5.9 6.5 5.0       Recent Labs     07/13/24  1706 07/14/24  0236 07/15/24  0813   BUN 16 15 15   CREATININE 0.7* 0.7* 0.7*       Estimated Creatinine Clearance: 158 mL/min (A) (based on SCr of 0.7 mg/dL (L)).    Intake/Output Summary (Last 24 hours) at 7/15/2024 1339  Last data filed at 7/15/2024 1003  Gross per 24 hour   Intake 240 ml   Output --   Net 240 ml       Last Encounter Weight:  Wt Readings from Last 3 Encounters:   07/12/24 133 kg (293 lb 4.8 oz)   06/27/24 126.6 kg (279 lb)   05/28/24 (!) 136.5 kg (300 lb 14.9 oz)       Pertinent Cultures:   Date    Source    Results  07/12   Blood    NG at 48 hours  07/12   MRSA Nares   Negative    Assessment:  HPI: 58 y.o. male with history of DM type II and diabetic foot ulcer with OM (s/p left hallux amputation).  Patient presented to ED with right leg swelling.  SCr stable at 0.7, BUN normal, no UOP data  Day(s) of therapy: 4 of 5 (ID notes to continue vanco)  Vancomycin concentration:   07/14 - 10.4 mg/L, ~11 hour level on 1250 mg IV q12hr. , Trough 9.6    Plan:  Renal trends at [facility].  Continue vancomycin 1500mg ivpb q12h for a predicted trough of 11.8 at steady state.  Check the vanco level tomorrow am.  Pharmacy will continue to monitor patient and adjust therapy as indicated    VANCOMYCIN CONCENTRATION

## 2024-07-15 NOTE — CONSULTS
[facility] Nurse  Consult Note       Toi Briones  AGE: 58 y.o.   GENDER: male  : 1965  TODAY'S DATE:  7/15/2024    Subjective:     Reason for Evaluation and Assessment: wound care eval/npwt dressing change to left foot.       Toi Briones is a 58 y.o. male referred by:   [x] Physician  [] Nursing  [] Other:     Wound Identification:  Wound Type: diabetic and surgical  Contributing Factors: edema, diabetes, and chronic pressure        PAST MEDICAL HISTORY        Diagnosis Date    Absent finger     Left hand    Anxiety     Arthritis     Hands    Asthma     \"As a kid but outgrew this\" - no medications as of 20    Chronic back pain     Depression     Difficult intravenous access 2024    Unable to place PICC x2 admissions. Patient IS NOT a PICC candidate ()    Edema     Fibromyalgia     Headache(784.0)     Last: 20    Hernia, abdominal     History of difficult venous access 2021    No longer a candidate for bedside PICC placement, multiple failed attempts    Hx MRSA infection     positive culture 2014 from left foot    Nausea & vomiting     Neuropathy     Obesity, Class III, BMI 40-49.9 (morbid obesity) (HCC)     Osteomyelitis (HCC)     hx finger    Poor circulation     Prolonged emergence from general anesthesia     Restless leg syndrome     Shortness of breath on exertion     2016- pt denies any sob with this interview    Type II or unspecified type diabetes mellitus with other specified manifestations, not stated as uncontrolled 2014    Type II or unspecified type diabetes mellitus without mention of complication, not stated as uncontrolled DX 2007    Follows with PCP    Ulcer of other part of foot 2014    'ulcer on left foot healed all up\"       PAST SURGICAL HISTORY    Past Surgical History:   Procedure Laterality Date    COLONOSCOPY      Normal exam per pt    DEBRIDEMENT  2014    left foot    ENDOSCOPY, COLON, DIAGNOSTIC   swabs/povidone iodine  Wound 07/13/24 Finger (Comment which one) Posterior;Right index-Dressing/Treatment: Betadine swabs/povidone iodine    Patient in bed agreeable to wound care eval/npwt dressing change to left foot amp site. Cleansed with NS measured and pictured. Red/pink granulation tissue. Applied stimulen powder/ black foam x 1 piece to wound and 2 pieces for bridge. Bridged to dorsal foot. Suction @ 125mmHg continuous. Wound care to change again on Wed. Rt index finger and lt 5th with dry eschar measured and pictured. Recommend paint with betadine. Pt is generally not at risk for skin breakdown AEB reji.         Specialty Bed Required : no  [] Low Air Loss   [] Pressure Redistribution  [] Fluid Immersion  [] Bariatric  [] Total Pressure Relief  [] Other:     Discharge Plan:  Placement for patient upon discharge: tbd  Hospice Care: no  Patient appropriate for Outpatient Wound Care Center: active     Patient/Caregiver Teaching:  Level of patient/caregiver understanding able to: pt voiced understanding.         Electronically signed by Yoselin Cano RN,  on 7/15/2024 at 2:10 PM

## 2024-07-15 NOTE — PROGRESS NOTES
V2.0    [facility] [place]      Name:  Toi Briones /Age/Sex: 1965  (58 y.o. male)   MRN & CSN:  [id number] & [id number] Encounter Date/Time: 7/15/2024 2:47 PM EDT   Location:  [address] [place] [address] PCP: Emmett Rivera MD     Attending:Jonathon Mckee MD       Hospital Day: 4        Subjective:     Chief Complaint: Left leg pain    Patient seen and evaluated at bedside.  No acute events overnight.  Pain is under control. No new complaints or concerns.        Assessment and Recommendations   Cellulitis of right leg   Left Plantar DFI with OM s/p Left Hallux Amputation 2024    Presents with acute onset RLE pain and redness, unable to ambulate due to pain    Previous wound cx with citrobacter, enterococcus, staph simulans and stenotrophomonas    Does not meet sepsis criteria on admit    Foot XR without osteo   duplex venous US without DVT  - PRN pain control   - Continue with Vanc  - Consult infectious disease, levofloxacin added to regimen  - prednisone added to reduce inflammation per ID  - f/u blood cx, NGTD    Uncontrolled DMII with chronic complications and with long term use of insulin - improving             -increase Lantus 15U  -Monitor FSBS and cover with  SSI  -Hold PO medications while inpatient  -Hypoglycemic protocol  -Diabetic diet  -Last A1c 9.0 (2024)    HLD  -Continue statin      BPH  -Continue  flomax    Diet ADULT DIET; Regular; 5 carb choices (75 gm/meal); Low Fat/Low Chol/High Fiber/2 gm Na   DVT Prophylaxis [x] Lovenox, []  Heparin, [] SCDs, [] Ambulation,  [] Eliquis, [] Xarelto  [] Coumadin   Code Status Full Code   Disposition From: Home  Expected Disposition: Home  Estimated Date of Discharge: 2-3 days  Patient requires continued admission due to management of above and culture results   Surrogate Decision Maker/ POA Dtr: Loulou     Personally reviewed Lab Studies and Imaging     Imaging that was interpreted personally includes CXR and results as noted above    Drugs that  PRN  dextrose, , Continuous PRN  sodium chloride flush, 5-40 mL, PRN  sodium chloride, , PRN  potassium chloride, 40 mEq, PRN   Or  potassium alternative oral replacement, 40 mEq, PRN   Or  potassium chloride, 10 mEq, PRN  potassium chloride, 10 mEq, PRN  magnesium sulfate, 2,000 mg, PRN  ondansetron, 4 mg, Q8H PRN   Or  ondansetron, 4 mg, Q6H PRN  polyethylene glycol, 17 g, Daily PRN  acetaminophen, 650 mg, Q6H PRN   Or  acetaminophen, 650 mg, Q6H PRN  oxyCODONE-acetaminophen, 1 tablet, Q4H PRN        Labs and Imaging   Vascular duplex lower extremity venous right    Result Date: 7/12/2024  Right lower extremity venous ultrasound INDICATION: Edema right calf COMPARISON:  None Doppler ultrasound of the right lower extremity was performed. FINDINGS:  There is normal flow in the great saphenous, common femoral, femoral, and popliteal veins. Normal compression and augmentation is demonstrated. The proximal calf veins are also patent.     No evidence of deep venous thrombosis in the right lower extremity Electronically signed by Sidney Vieyra    XR TIBIA FIBULA RIGHT (2 VIEWS)    Result Date: 7/12/2024  Right Tib/fib INDICATION: Evaluate for any gas pain swelling and cellulitis COMPARISON:  None TECHNIQUE: AP and lateral views of the right tibia and fibula were obtained. FINDINGS: There is no evidence of fracture or other acute bony abnormality in the right lower leg.  The tibia and fibula are intact. No soft tissue abnormality     No acute osseous abnormality. No soft tissue abnormality. Electronically signed by Sidney Vieyra      CBC:   Recent Labs     07/13/24  1706 07/14/24  0236 07/15/24  0813   WBC 5.9 6.5 5.0   HGB 12.1* 12.5* 13.0*    195 204     BMP:    Recent Labs     07/13/24 1706 07/14/24  0236 07/15/24  0813    134* 134*   K 4.4 4.5 4.5   CL 99 99 99   CO2 25 23 24   BUN 16 15 15   CREATININE 0.7* 0.7* 0.7*   GLUCOSE 223* 213* 221*     Hepatic:   Recent Labs     07/13/24 1706 07/14/24 0236

## 2024-07-15 NOTE — CONSULTS
Consult completed. Procedure/rationale explained to pt & consent obtained. #22ga Nexiva extra-long, 1.75\" PIV initiated using UltraSound-guided technique without difficulty/complications. Pt tolerated well and no other c/o or needs noted or reported.

## 2024-07-16 LAB
DOSE AMOUNT: NORMAL
DOSE TIME: NORMAL
GLUCOSE BLD-MCNC: 317 MG/DL (ref 70–99)
GLUCOSE BLD-MCNC: 319 MG/DL (ref 70–99)
GLUCOSE BLD-MCNC: 358 MG/DL (ref 70–99)
GLUCOSE BLD-MCNC: 369 MG/DL (ref 70–99)
VANCOMYCIN RANDOM: 19.1 UG/ML

## 2024-07-16 PROCEDURE — 80202 ASSAY OF VANCOMYCIN: CPT

## 2024-07-16 PROCEDURE — 6370000000 HC RX 637 (ALT 250 FOR IP): Performed by: STUDENT IN AN ORGANIZED HEALTH CARE EDUCATION/TRAINING PROGRAM

## 2024-07-16 PROCEDURE — 6360000002 HC RX W HCPCS: Performed by: STUDENT IN AN ORGANIZED HEALTH CARE EDUCATION/TRAINING PROGRAM

## 2024-07-16 PROCEDURE — 2580000003 HC RX 258: Performed by: STUDENT IN AN ORGANIZED HEALTH CARE EDUCATION/TRAINING PROGRAM

## 2024-07-16 PROCEDURE — 05HF33Z INSERTION OF INFUSION DEVICE INTO LEFT CEPHALIC VEIN, PERCUTANEOUS APPROACH: ICD-10-PCS | Performed by: STUDENT IN AN ORGANIZED HEALTH CARE EDUCATION/TRAINING PROGRAM

## 2024-07-16 PROCEDURE — 82962 GLUCOSE BLOOD TEST: CPT

## 2024-07-16 PROCEDURE — 6360000002 HC RX W HCPCS: Performed by: INTERNAL MEDICINE

## 2024-07-16 PROCEDURE — 6370000000 HC RX 637 (ALT 250 FOR IP): Performed by: NURSE PRACTITIONER

## 2024-07-16 PROCEDURE — 36410 VNPNXR 3YR/> PHY/QHP DX/THER: CPT

## 2024-07-16 PROCEDURE — 76937 US GUIDE VASCULAR ACCESS: CPT

## 2024-07-16 PROCEDURE — 94761 N-INVAS EAR/PLS OXIMETRY MLT: CPT

## 2024-07-16 PROCEDURE — 99232 SBSQ HOSP IP/OBS MODERATE 35: CPT | Performed by: INTERNAL MEDICINE

## 2024-07-16 PROCEDURE — NBSRV NON-BILLABLE SERVICE: Performed by: INTERNAL MEDICINE

## 2024-07-16 PROCEDURE — 6370000000 HC RX 637 (ALT 250 FOR IP): Performed by: INTERNAL MEDICINE

## 2024-07-16 PROCEDURE — 6370000000 HC RX 637 (ALT 250 FOR IP): Performed by: FAMILY MEDICINE

## 2024-07-16 PROCEDURE — 1200000000 HC SEMI PRIVATE

## 2024-07-16 PROCEDURE — 36415 COLL VENOUS BLD VENIPUNCTURE: CPT

## 2024-07-16 RX ORDER — INSULIN GLARGINE 100 [IU]/ML
20 INJECTION, SOLUTION SUBCUTANEOUS EVERY EVENING
Status: DISCONTINUED | OUTPATIENT
Start: 2024-07-16 | End: 2024-07-17 | Stop reason: HOSPADM

## 2024-07-16 RX ORDER — INSULIN LISPRO 100 [IU]/ML
5 INJECTION, SOLUTION INTRAVENOUS; SUBCUTANEOUS
Status: DISCONTINUED | OUTPATIENT
Start: 2024-07-16 | End: 2024-07-17 | Stop reason: HOSPADM

## 2024-07-16 RX ORDER — INSULIN LISPRO 100 [IU]/ML
0-4 INJECTION, SOLUTION INTRAVENOUS; SUBCUTANEOUS NIGHTLY
Status: DISCONTINUED | OUTPATIENT
Start: 2024-07-16 | End: 2024-07-17 | Stop reason: HOSPADM

## 2024-07-16 RX ORDER — INSULIN LISPRO 100 [IU]/ML
0-16 INJECTION, SOLUTION INTRAVENOUS; SUBCUTANEOUS
Status: DISCONTINUED | OUTPATIENT
Start: 2024-07-16 | End: 2024-07-17 | Stop reason: HOSPADM

## 2024-07-16 RX ADMIN — DOCUSATE SODIUM 100 MG: 100 CAPSULE, LIQUID FILLED ORAL at 08:39

## 2024-07-16 RX ADMIN — SODIUM CHLORIDE 1500 MG: 9 INJECTION, SOLUTION INTRAVENOUS at 02:49

## 2024-07-16 RX ADMIN — ENOXAPARIN SODIUM 30 MG: 100 INJECTION SUBCUTANEOUS at 22:15

## 2024-07-16 RX ADMIN — INSULIN LISPRO 12 UNITS: 100 INJECTION, SOLUTION INTRAVENOUS; SUBCUTANEOUS at 12:39

## 2024-07-16 RX ADMIN — Medication 1 CAPSULE: at 08:39

## 2024-07-16 RX ADMIN — TAMSULOSIN HYDROCHLORIDE 0.4 MG: 0.4 CAPSULE ORAL at 08:38

## 2024-07-16 RX ADMIN — PREDNISONE 40 MG: 20 TABLET ORAL at 08:39

## 2024-07-16 RX ADMIN — PANTOPRAZOLE SODIUM 40 MG: 40 TABLET, DELAYED RELEASE ORAL at 22:07

## 2024-07-16 RX ADMIN — OXYCODONE AND ACETAMINOPHEN 1 TABLET: 7.5; 325 TABLET ORAL at 23:40

## 2024-07-16 RX ADMIN — ENOXAPARIN SODIUM 30 MG: 100 INJECTION SUBCUTANEOUS at 08:38

## 2024-07-16 RX ADMIN — LEVOFLOXACIN 750 MG: 5 INJECTION, SOLUTION INTRAVENOUS at 11:04

## 2024-07-16 RX ADMIN — OXYCODONE AND ACETAMINOPHEN 1 TABLET: 7.5; 325 TABLET ORAL at 10:59

## 2024-07-16 RX ADMIN — OXYCODONE AND ACETAMINOPHEN 1 TABLET: 7.5; 325 TABLET ORAL at 07:00

## 2024-07-16 RX ADMIN — DOCUSATE SODIUM 100 MG: 100 CAPSULE, LIQUID FILLED ORAL at 22:07

## 2024-07-16 RX ADMIN — INSULIN LISPRO 16 UNITS: 100 INJECTION, SOLUTION INTRAVENOUS; SUBCUTANEOUS at 18:11

## 2024-07-16 RX ADMIN — INSULIN LISPRO 4 UNITS: 100 INJECTION, SOLUTION INTRAVENOUS; SUBCUTANEOUS at 22:12

## 2024-07-16 RX ADMIN — PANTOPRAZOLE SODIUM 40 MG: 40 TABLET, DELAYED RELEASE ORAL at 08:39

## 2024-07-16 RX ADMIN — SODIUM CHLORIDE 1500 MG: 9 INJECTION, SOLUTION INTRAVENOUS at 15:15

## 2024-07-16 RX ADMIN — OXYCODONE AND ACETAMINOPHEN 1 TABLET: 7.5; 325 TABLET ORAL at 02:42

## 2024-07-16 RX ADMIN — INSULIN LISPRO 8 UNITS: 100 INJECTION, SOLUTION INTRAVENOUS; SUBCUTANEOUS at 08:48

## 2024-07-16 RX ADMIN — INSULIN LISPRO 5 UNITS: 100 INJECTION, SOLUTION INTRAVENOUS; SUBCUTANEOUS at 18:11

## 2024-07-16 RX ADMIN — INSULIN GLARGINE 20 UNITS: 100 INJECTION, SOLUTION SUBCUTANEOUS at 22:12

## 2024-07-16 RX ADMIN — SODIUM CHLORIDE, PRESERVATIVE FREE 10 ML: 5 INJECTION INTRAVENOUS at 08:39

## 2024-07-16 RX ADMIN — OXYCODONE AND ACETAMINOPHEN 1 TABLET: 7.5; 325 TABLET ORAL at 19:27

## 2024-07-16 RX ADMIN — OXYCODONE AND ACETAMINOPHEN 1 TABLET: 7.5; 325 TABLET ORAL at 15:11

## 2024-07-16 RX ADMIN — PREGABALIN 75 MG: 75 CAPSULE ORAL at 08:39

## 2024-07-16 RX ADMIN — ATORVASTATIN CALCIUM 10 MG: 10 TABLET, FILM COATED ORAL at 08:39

## 2024-07-16 RX ADMIN — PREGABALIN 75 MG: 75 CAPSULE ORAL at 22:07

## 2024-07-16 RX ADMIN — SODIUM CHLORIDE, PRESERVATIVE FREE 10 ML: 5 INJECTION INTRAVENOUS at 22:08

## 2024-07-16 ASSESSMENT — PAIN DESCRIPTION - FREQUENCY
FREQUENCY: INTERMITTENT

## 2024-07-16 ASSESSMENT — PAIN DESCRIPTION - DESCRIPTORS
DESCRIPTORS: ACHING;DISCOMFORT
DESCRIPTORS: STABBING
DESCRIPTORS: ACHING;DISCOMFORT
DESCRIPTORS: ACHING;DISCOMFORT;THROBBING

## 2024-07-16 ASSESSMENT — PAIN DESCRIPTION - PAIN TYPE
TYPE: ACUTE PAIN

## 2024-07-16 ASSESSMENT — PAIN SCALES - GENERAL
PAINLEVEL_OUTOF10: 9
PAINLEVEL_OUTOF10: 7
PAINLEVEL_OUTOF10: 9
PAINLEVEL_OUTOF10: 8
PAINLEVEL_OUTOF10: 6
PAINLEVEL_OUTOF10: 8

## 2024-07-16 ASSESSMENT — PAIN DESCRIPTION - ORIENTATION
ORIENTATION: ANTERIOR;UPPER
ORIENTATION: LEFT
ORIENTATION: ANTERIOR;LEFT
ORIENTATION: MID
ORIENTATION: LEFT

## 2024-07-16 ASSESSMENT — PAIN DESCRIPTION - ONSET
ONSET: ON-GOING

## 2024-07-16 ASSESSMENT — PAIN - FUNCTIONAL ASSESSMENT
PAIN_FUNCTIONAL_ASSESSMENT: ACTIVITIES ARE NOT PREVENTED

## 2024-07-16 ASSESSMENT — PAIN DESCRIPTION - LOCATION
LOCATION: FOOT
LOCATION: OTHER (COMMENT);FOOT
LOCATION: FOOT;TOE (COMMENT WHICH ONE)
LOCATION: FOOT

## 2024-07-16 ASSESSMENT — PAIN SCALES - WONG BAKER: WONGBAKER_NUMERICALRESPONSE: NO HURT

## 2024-07-16 NOTE — PROGRESS NOTES
V2.0    [facility] [place] Note      Name:  Toi Briones /Age/Sex: 1965  (58 y.o. male)   MRN & CSN:  [id number] & [id number] Encounter Date/Time: 2024 2:47 PM EDT   Location:  [address] [place] [address] PCP: Emmett Rivera MD     Attending:Jonathon Mckee MD       Hospital Day: 5        Subjective:     Chief Complaint: Left leg pain    Patient seen and evaluated at bedside.  No acute events overnight.  Pain is under control. Discussed the need to decrease opoid intake.   No new complaints or concerns.        Assessment and Recommendations   Cellulitis of right leg   Left Plantar DFI with OM s/p Left Hallux Amputation 2024    Presents with acute onset RLE pain and redness, unable to ambulate due to pain    Previous wound cx with citrobacter, enterococcus, staph simulans and stenotrophomonas    Does not meet sepsis criteria on admit    Foot XR without osteo   duplex venous US without DVT  - PRN pain control   - Continue with Vanc  - Consult infectious disease, levofloxacin added to regimen  - prednisone added to reduce inflammation per ID  - f/u blood cx, NGTD    Uncontrolled DMII with chronic complications and with long term use of insulin - improving             -increase Lantus 20U  -increase to HDSSI  -Hold PO medications while inpatient  -Hypoglycemic protocol  -Diabetic diet  -Last A1c 9.0 (2024)    HLD  -Continue statin      BPH  -Continue  flomax    Diet ADULT DIET; Regular; 5 carb choices (75 gm/meal); Low Fat/Low Chol/High Fiber/2 gm Na   DVT Prophylaxis [x] Lovenox, []  Heparin, [] SCDs, [] Ambulation,  [] Eliquis, [] Xarelto  [] Coumadin   Code Status Full Code   Disposition From: Home  Expected Disposition: Home  Estimated Date of Discharge: 2-3 days  Patient requires continued admission due to ID plan   Surrogate Decision Maker/ POA Dtr: Loulou     Personally reviewed Lab Studies and Imaging     Imaging that was interpreted personally includes CXR and results as noted above    Drugs that  07/13/24  1706 07/14/24  0236 07/15/24  0813   AST 14* 16 12*   ALT 7* 7* 7*   BILITOT 0.3 0.4 0.6   ALKPHOS 114 106 108     Lipids: No results found for: \"CHOL\", \"HDL\", \"TRIG\"  Hemoglobin A1C:   Lab Results   Component Value Date/Time    LABA1C 9.0 05/19/2024 08:10 AM     TSH: No results found for: \"TSH\"  Troponin:   Lab Results   Component Value Date/Time    TROPONINT <0.010 03/11/2022 06:09 PM    TROPONINT <0.010 01/23/2020 07:37 PM    TROPONINT <0.010 04/16/2018 01:10 AM     Lactic Acid: No results for input(s): \"LACTA\" in the last 72 hours.  BNP: No results for input(s): \"PROBNP\" in the last 72 hours.  UA:  Lab Results   Component Value Date/Time    NITRU NEGATIVE 05/19/2024 03:29 AM    COLORU YELLOW 05/19/2024 03:29 AM    PHUR 5.5 05/19/2024 03:29 AM    WBCUA 2 05/19/2024 03:29 AM    RBCUA <1 05/19/2024 03:29 AM    MUCUS RARE 05/19/2024 03:29 AM    TRICHOMONAS NONE SEEN 05/19/2024 03:29 AM    BACTERIA NEGATIVE 05/19/2024 03:29 AM    CLARITYU CLEAR 05/19/2024 03:29 AM    LEUKOCYTESUR NEGATIVE 05/19/2024 03:29 AM    UROBILINOGEN 0.2 05/19/2024 03:29 AM    BILIRUBINUR NEGATIVE 05/19/2024 03:29 AM    BLOODU SMALL NUMBER OR AMOUNT OBSERVED 05/19/2024 03:29 AM    GLUCOSEU 500 05/19/2024 03:29 AM    KETUA NEGATIVE 05/19/2024 03:29 AM     Urine Cultures: No results found for: \"LABURIN\"  Blood Cultures: No results found for: \"BC\"  No results found for: \"BLOODCULT2\"  Organism:   Lab Results   Component Value Date/Time    ORG PSAR 02/05/2019 11:17 PM    ORG MRSA 02/05/2019 11:17 PM         Electronically signed by Jonathon Mckee MD on 7/16/2024 at 1:02 PM

## 2024-07-16 NOTE — CONSULTS
Consult completed.  Patient states many many PIV attempts to both foreams, prefers to have a midline at this point.   MIDLINE Introcan Safety® Deep Access 20g 2 ½\" IV catheter initiated into LUE cephalic vein x 1 attempt using ultrasound guided technique. Brisk blood return, flushes without resistance. Patient tolerated well.      Consult the Vascular Access Team for questions, concerns, or change in patient's condition.

## 2024-07-16 NOTE — PROGRESS NOTES
PHARMACY VANCOMYCIN MONITORING SERVICE  Pharmacy consulted by Dr. Sandy Elder MD for monitoring and adjustment.    Indication for treatment: Vancomycin indication: SSTI with risk factors   Goal trough: Trough Goal: 10-15 mcg/mL  AUC/ADONIS: <500    Risk Factors for MRSA Identified:   Hospitalization within the past 90 days, Received IV antibiotics within the past 90 days, Purulent and/or complicated SSTI    Pertinent Laboratory Values:   Temp Readings from Last 3 Encounters:   07/16/24 97.5 °F (36.4 °C) (Oral)   07/05/24 98.2 °F (36.8 °C) (Temporal)   06/28/24 97 °F (36.1 °C) (Temporal)     Recent Labs     07/13/24  1706 07/14/24  0236 07/15/24  0813   WBC 5.9 6.5 5.0       Recent Labs     07/13/24  1706 07/14/24  0236 07/15/24  0813   BUN 16 15 15   CREATININE 0.7* 0.7* 0.7*       Estimated Creatinine Clearance: 154 mL/min (A) (based on SCr of 0.7 mg/dL (L)).    Intake/Output Summary (Last 24 hours) at 7/16/2024 1449  Last data filed at 7/16/2024 0839  Gross per 24 hour   Intake 10 ml   Output --   Net 10 ml       Last Encounter Weight:  Wt Readings from Last 3 Encounters:   07/16/24 127.2 kg (280 lb 6.4 oz)   06/27/24 126.6 kg (279 lb)   05/28/24 (!) 136.5 kg (300 lb 14.9 oz)       Pertinent Cultures:   Date    Source    Results  07/12   Blood    NG at 48 hours  07/12   MRSA Nares   Negative    VANCOMYCIN TROUGH:    Recent Labs     07/14/24  0236 07/14/24  1201   VANCOTROUGH 30.8* 10.4     VANCOMYCIN RANDOM:    Recent Labs     07/16/24  0753   VANCORANDOM 19.1       Assessment:  HPI: 58 y.o. male with history of DM type II and diabetic foot ulcer with OM (s/p left hallux amputation).  Patient presented to ED with right leg swelling.  SCr previously stable at 0.7, BUN normal, no UOP data  Day(s) of therapy: 5 (ID notes to continue vanco)  Vancomycin concentration:   07/14 - 10.4 mg/L, ~11 hour level on 1250 mg IV q12hr. , Trough 9.6  7/16 - 19.1, 5 hour level on 1500mg q12h, predicted trough

## 2024-07-16 NOTE — PLAN OF CARE
Problem: Discharge Planning  Goal: Discharge to home or other facility with appropriate resources  Outcome: Progressing     Problem: Pain  Goal: Verbalizes/displays adequate comfort level or baseline comfort level  Outcome: Progressing     Problem: Safety - Adult  Goal: Free from fall injury  Outcome: Progressing     Problem: Infection - Adult  Goal: Absence of infection at discharge  Outcome: Progressing     Problem: Chronic Conditions and Co-morbidities  Goal: Patient's chronic conditions and co-morbidity symptoms are monitored and maintained or improved  Outcome: Progressing

## 2024-07-16 NOTE — PROGRESS NOTES
Infectious Disease Progress Note  2024   Patient Name: Toi Briones : 1965     Assessment  Right leg cellulitis  Non-purulent, and does not spread to the right foot. Suspect the scabs on his anterior leg to be the portals of entry  Possibilities: MRSA, Streptococcus or gram-negative microbes.   24, Blood cx 0/2 NGTD  EKG Qtc: 420 ms  Right leg swelling has improved, but not resolved.   Left foot post first ray amputation wound  Pathology report did not show osteomyelitis.  He completed a 2-week course of Augmentin and levofloxacin.  Further treatment is not planned.  Ceftriaxone allergy  Hives  Comorbid conditions: Anxiety/depression, chronic back pain, osteoarthritis of the hands, fibromyalgia, obesity, RLS, type 2 diabetes mellitus, left index finger MRSA osteomyelitis status post partial amputation     Plan  Therapeutic:  Continue vancomycin ()  Continue  levofloxacin on   Prednisone 40 mg po daily (7/15-)  May discharge on linezolid 600 mg po bid and levofloxacin 750 mg po daily for 7 days ( EoT: 2024)  prednisone  40 mg po daily for 7 days (EoT: 2024)  Diagnostic:    F/u:    Other:     Reason for visit: F/u Right leg cellulitis?  History:?Interval history noted  Denies n/v/d/f or untoward effects of antimicrobials  Physical Exam:  Vital Signs: /82   Pulse 80   Temp 97.7 °F (36.5 °C) (Oral)   Resp 16   Ht 1.803 m (5' 11\")   Wt 133 kg (293 lb 4.8 oz)   SpO2 97%   BMI 40.91 kg/m²     Gen: alert and oriented X3, no distress  Skin: no stigmata of endocarditis  Wounds: left foot  dressing C/D/I  HEMT: AT/NC Oropharynx pink, moist, and without lesions or exudates;  Eyes: PERRLA, EOMI, conjunctiva pink, sclera anicteric.   Neck: Supple. Trachea midline. No LAD.  Chest: no distress and CTA. Good air movement.  Heart: RRR and no MRG.   Abd: soft, non-distended, no tenderness, no hepatomegaly. Normoactive bowel sounds.  Ext: right leg swelling, erythema has improved. Left  leg dressing   Catheter Site: without erythema or tenderness  LDA:   Neuro: Mental status intact. CN 2-12 intact and no focal sensory or motor deficits     Radiologic / Imaging / TESTING  No results found.     Labs:    Recent Results (from the past 24 hour(s))   Comprehensive Metabolic Panel w/ Reflex to MG    Collection Time: 07/15/24  8:13 AM   Result Value Ref Range    Sodium 134 (L) 135 - 145 MMOL/L    Potassium 4.5 3.5 - 5.1 MMOL/L    Chloride 99 99 - 110 mMol/L    CO2 24 21 - 32 MMOL/L    Anion Gap 11 7 - 16    Glucose 221 (H) 70 - 99 MG/DL    BUN 15 6 - 23 MG/DL    Creatinine 0.7 (L) 0.9 - 1.3 MG/DL    Est, Glom Filt Rate >90 >60 mL/min/1.73m2    Calcium 9.3 8.3 - 10.6 MG/DL    Total Protein 7.2 6.4 - 8.2 GM/DL    Albumin 4.0 3.4 - 5.0 GM/DL    Total Bilirubin 0.6 0.0 - 1.0 MG/DL    Alkaline Phosphatase 108 40 - 128 IU/L    ALT 7 (L) 10 - 40 U/L    AST 12 (L) 15 - 37 IU/L   CBC with Auto Differential    Collection Time: 07/15/24  8:13 AM   Result Value Ref Range    WBC 5.0 4.0 - 10.5 K/CU MM    RBC 4.67 4.6 - 6.2 M/CU MM    Hemoglobin 13.0 (L) 13.5 - 18.0 GM/DL    Hematocrit 40.5 (L) 42 - 52 %    MCV 86.7 78 - 100 FL    MCH 27.8 27 - 31 PG    MCHC 32.1 32.0 - 36.0 %    RDW 14.1 11.7 - 14.9 %    Platelets 204 140 - 440 K/CU MM    MPV 9.7 7.5 - 11.1 FL    Differential Type AUTOMATED DIFFERENTIAL     Neutrophils % 52.5 36 - 66 %    Lymphocytes % 33.7 24 - 44 %    Monocytes % 9.0 (H) 0 - 4 %    Eosinophils % 3.6 (H) 0 - 3 %    Basophils % 0.6 0 - 1 %    Neutrophils Absolute 2.6 K/CU MM    Lymphocytes Absolute 1.7 K/CU MM    Monocytes Absolute 0.5 K/CU MM    Eosinophils Absolute 0.2 K/CU MM    Basophils Absolute 0.0 K/CU MM    Nucleated RBC % 0.0 %    Total Nucleated RBC 0.0 K/CU MM    Total Immature Neutrophil 0.03 K/CU MM    Immature Neutrophil % 0.6 (H) 0 - 0.43 %   C-Reactive Protein    Collection Time: 07/15/24  8:13 AM   Result Value Ref Range    CRP High Sensitivity 13.5 (H) <5.0 mg/L   POCT Glucose

## 2024-07-17 VITALS
HEART RATE: 65 BPM | DIASTOLIC BLOOD PRESSURE: 92 MMHG | HEIGHT: 71 IN | OXYGEN SATURATION: 98 % | RESPIRATION RATE: 16 BRPM | SYSTOLIC BLOOD PRESSURE: 109 MMHG | WEIGHT: 280.4 LBS | TEMPERATURE: 97.5 F | BODY MASS INDEX: 39.26 KG/M2

## 2024-07-17 LAB
ANION GAP SERPL CALCULATED.3IONS-SCNC: 12 MMOL/L (ref 7–16)
BASOPHILS ABSOLUTE: 0 K/CU MM
BASOPHILS RELATIVE PERCENT: 0.4 % (ref 0–1)
BUN SERPL-MCNC: 26 MG/DL (ref 6–23)
CALCIUM SERPL-MCNC: 9.7 MG/DL (ref 8.3–10.6)
CHLORIDE BLD-SCNC: 100 MMOL/L (ref 99–110)
CO2: 23 MMOL/L (ref 21–32)
CREAT SERPL-MCNC: 0.8 MG/DL (ref 0.9–1.3)
CULTURE: NORMAL
CULTURE: NORMAL
DIFFERENTIAL TYPE: ABNORMAL
EOSINOPHILS ABSOLUTE: 0.2 K/CU MM
EOSINOPHILS RELATIVE PERCENT: 2.3 % (ref 0–3)
GFR, ESTIMATED: >90 ML/MIN/1.73M2
GLUCOSE BLD-MCNC: 289 MG/DL (ref 70–99)
GLUCOSE BLD-MCNC: 326 MG/DL (ref 70–99)
GLUCOSE SERPL-MCNC: 271 MG/DL (ref 70–99)
HCT VFR BLD CALC: 42.2 % (ref 42–52)
HEMOGLOBIN: 13.3 GM/DL (ref 13.5–18)
IMMATURE NEUTROPHIL %: 0.6 % (ref 0–0.43)
LYMPHOCYTES ABSOLUTE: 2.6 K/CU MM
LYMPHOCYTES RELATIVE PERCENT: 28.7 % (ref 24–44)
Lab: NORMAL
Lab: NORMAL
MCH RBC QN AUTO: 27.5 PG (ref 27–31)
MCHC RBC AUTO-ENTMCNC: 31.5 % (ref 32–36)
MCV RBC AUTO: 87.2 FL (ref 78–100)
MONOCYTES ABSOLUTE: 0.6 K/CU MM
MONOCYTES RELATIVE PERCENT: 6.7 % (ref 0–4)
NEUTROPHILS ABSOLUTE: 5.5 K/CU MM
NEUTROPHILS RELATIVE PERCENT: 61.3 % (ref 36–66)
NUCLEATED RBC %: 0 %
PDW BLD-RTO: 14 % (ref 11.7–14.9)
PLATELET # BLD: 249 K/CU MM (ref 140–440)
PMV BLD AUTO: 9.9 FL (ref 7.5–11.1)
POTASSIUM SERPL-SCNC: 4.6 MMOL/L (ref 3.5–5.1)
RBC # BLD: 4.84 M/CU MM (ref 4.6–6.2)
SODIUM BLD-SCNC: 135 MMOL/L (ref 135–145)
SPECIMEN: NORMAL
SPECIMEN: NORMAL
TOTAL IMMATURE NEUTOROPHIL: 0.05 K/CU MM
TOTAL NUCLEATED RBC: 0 K/CU MM
WBC # BLD: 9 K/CU MM (ref 4–10.5)

## 2024-07-17 PROCEDURE — 85025 COMPLETE CBC W/AUTO DIFF WBC: CPT

## 2024-07-17 PROCEDURE — 6370000000 HC RX 637 (ALT 250 FOR IP): Performed by: INTERNAL MEDICINE

## 2024-07-17 PROCEDURE — 6370000000 HC RX 637 (ALT 250 FOR IP): Performed by: NURSE PRACTITIONER

## 2024-07-17 PROCEDURE — 6360000002 HC RX W HCPCS: Performed by: STUDENT IN AN ORGANIZED HEALTH CARE EDUCATION/TRAINING PROGRAM

## 2024-07-17 PROCEDURE — 80048 BASIC METABOLIC PNL TOTAL CA: CPT

## 2024-07-17 PROCEDURE — 6370000000 HC RX 637 (ALT 250 FOR IP): Performed by: STUDENT IN AN ORGANIZED HEALTH CARE EDUCATION/TRAINING PROGRAM

## 2024-07-17 PROCEDURE — 6370000000 HC RX 637 (ALT 250 FOR IP): Performed by: FAMILY MEDICINE

## 2024-07-17 PROCEDURE — 36415 COLL VENOUS BLD VENIPUNCTURE: CPT

## 2024-07-17 PROCEDURE — 82962 GLUCOSE BLOOD TEST: CPT

## 2024-07-17 PROCEDURE — 2580000003 HC RX 258: Performed by: STUDENT IN AN ORGANIZED HEALTH CARE EDUCATION/TRAINING PROGRAM

## 2024-07-17 RX ORDER — LEVOFLOXACIN 750 MG/1
750 TABLET, FILM COATED ORAL DAILY
Qty: 5 TABLET | Refills: 0 | Status: SHIPPED | OUTPATIENT
Start: 2024-07-17 | End: 2024-07-22

## 2024-07-17 RX ORDER — OXYCODONE HYDROCHLORIDE 5 MG/1
5 TABLET ORAL EVERY 6 HOURS PRN
Qty: 16 TABLET | Refills: 0 | Status: SHIPPED | OUTPATIENT
Start: 2024-07-17 | End: 2024-07-21

## 2024-07-17 RX ORDER — INSULIN LISPRO 100 [IU]/ML
INJECTION, SOLUTION INTRAVENOUS; SUBCUTANEOUS
Qty: 10 ML | Refills: 1 | Status: SHIPPED | OUTPATIENT
Start: 2024-07-17

## 2024-07-17 RX ORDER — LINEZOLID 600 MG/1
600 TABLET, FILM COATED ORAL 2 TIMES DAILY
Qty: 10 TABLET | Refills: 0 | Status: SHIPPED | OUTPATIENT
Start: 2024-07-17 | End: 2024-07-22

## 2024-07-17 RX ORDER — INSULIN GLARGINE 100 [IU]/ML
15 INJECTION, SOLUTION SUBCUTANEOUS NIGHTLY
Qty: 5 ADJUSTABLE DOSE PRE-FILLED PEN SYRINGE | Refills: 3 | Status: SHIPPED | OUTPATIENT
Start: 2024-07-17 | End: 2024-08-16

## 2024-07-17 RX ORDER — LACTOBACILLUS RHAMNOSUS GG 10B CELL
1 CAPSULE ORAL
Qty: 10 CAPSULE | Refills: 0 | Status: SHIPPED | OUTPATIENT
Start: 2024-07-18 | End: 2024-07-28

## 2024-07-17 RX ORDER — PREDNISONE 20 MG/1
40 TABLET ORAL DAILY
Qty: 8 TABLET | Refills: 0 | Status: SHIPPED | OUTPATIENT
Start: 2024-07-18 | End: 2024-07-22

## 2024-07-17 RX ADMIN — SODIUM CHLORIDE 1500 MG: 9 INJECTION, SOLUTION INTRAVENOUS at 03:44

## 2024-07-17 RX ADMIN — INSULIN LISPRO 8 UNITS: 100 INJECTION, SOLUTION INTRAVENOUS; SUBCUTANEOUS at 08:16

## 2024-07-17 RX ADMIN — PREGABALIN 75 MG: 75 CAPSULE ORAL at 08:20

## 2024-07-17 RX ADMIN — Medication 1 CAPSULE: at 08:18

## 2024-07-17 RX ADMIN — PREDNISONE 40 MG: 20 TABLET ORAL at 08:20

## 2024-07-17 RX ADMIN — ATORVASTATIN CALCIUM 10 MG: 10 TABLET, FILM COATED ORAL at 08:20

## 2024-07-17 RX ADMIN — ENOXAPARIN SODIUM 30 MG: 100 INJECTION SUBCUTANEOUS at 08:14

## 2024-07-17 RX ADMIN — SODIUM CHLORIDE, PRESERVATIVE FREE 10 ML: 5 INJECTION INTRAVENOUS at 03:38

## 2024-07-17 RX ADMIN — PANTOPRAZOLE SODIUM 40 MG: 40 TABLET, DELAYED RELEASE ORAL at 08:18

## 2024-07-17 RX ADMIN — INSULIN LISPRO 5 UNITS: 100 INJECTION, SOLUTION INTRAVENOUS; SUBCUTANEOUS at 08:17

## 2024-07-17 RX ADMIN — OXYCODONE AND ACETAMINOPHEN 1 TABLET: 7.5; 325 TABLET ORAL at 12:32

## 2024-07-17 RX ADMIN — OXYCODONE AND ACETAMINOPHEN 1 TABLET: 7.5; 325 TABLET ORAL at 03:38

## 2024-07-17 RX ADMIN — SODIUM CHLORIDE, PRESERVATIVE FREE 10 ML: 5 INJECTION INTRAVENOUS at 08:15

## 2024-07-17 RX ADMIN — DOCUSATE SODIUM 100 MG: 100 CAPSULE, LIQUID FILLED ORAL at 08:20

## 2024-07-17 RX ADMIN — OXYCODONE AND ACETAMINOPHEN 1 TABLET: 7.5; 325 TABLET ORAL at 08:18

## 2024-07-17 RX ADMIN — TAMSULOSIN HYDROCHLORIDE 0.4 MG: 0.4 CAPSULE ORAL at 08:20

## 2024-07-17 ASSESSMENT — PAIN SCALES - GENERAL
PAINLEVEL_OUTOF10: 8
PAINLEVEL_OUTOF10: 2
PAINLEVEL_OUTOF10: 8
PAINLEVEL_OUTOF10: 0
PAINLEVEL_OUTOF10: 8
PAINLEVEL_OUTOF10: 2
PAINLEVEL_OUTOF10: 0

## 2024-07-17 ASSESSMENT — PAIN DESCRIPTION - DESCRIPTORS
DESCRIPTORS: STABBING
DESCRIPTORS: ACHING;DULL;CRAMPING
DESCRIPTORS: ACHING;STABBING

## 2024-07-17 ASSESSMENT — PAIN DESCRIPTION - ORIENTATION
ORIENTATION: LEFT;RIGHT
ORIENTATION: RIGHT
ORIENTATION: RIGHT;LEFT

## 2024-07-17 ASSESSMENT — PAIN DESCRIPTION - FREQUENCY: FREQUENCY: INTERMITTENT

## 2024-07-17 ASSESSMENT — PAIN DESCRIPTION - LOCATION
LOCATION: TOE (COMMENT WHICH ONE)
LOCATION: LEG;FOOT
LOCATION: LEG;FOOT

## 2024-07-17 ASSESSMENT — PAIN DESCRIPTION - PAIN TYPE: TYPE: ACUTE PAIN

## 2024-07-17 ASSESSMENT — PAIN SCALES - WONG BAKER
WONGBAKER_NUMERICALRESPONSE: HURTS A LITTLE BIT
WONGBAKER_NUMERICALRESPONSE: HURTS A LITTLE BIT
WONGBAKER_NUMERICALRESPONSE: NO HURT
WONGBAKER_NUMERICALRESPONSE: NO HURT
WONGBAKER_NUMERICALRESPONSE: HURTS A LITTLE BIT

## 2024-07-17 ASSESSMENT — PAIN DESCRIPTION - ONSET: ONSET: ON-GOING

## 2024-07-17 NOTE — DISCHARGE INSTRUCTIONS
- please schedule an appointment to see your PCP  - please schedule an appointment to see ID and endocrinology  - please monitor your glucose closely. Decrease Lantus intake if your glucose is running below 120.   - please take medications as prescribed

## 2024-07-17 NOTE — CONSULTS
07/17/24 07/17/24 0831   Wound Length (cm) 5.4 cm 07/15/24 1155   Wound Width (cm) 3.5 cm 07/15/24 1155   Wound Depth (cm) 0.5 cm 07/15/24 1155   Wound Surface Area (cm^2) 18.9 cm^2 07/15/24 1155   Change in Wound Size % (l*w) 17.11 07/15/24 1155   Wound Volume (cm^3) 9.45 cm^3 07/15/24 1155   Wound Healing % -38 07/15/24 1155   Post-Procedure Length (cm) 5.5 cm 07/05/24 1125   Post-Procedure Width (cm) 3.6 cm 07/05/24 1125   Post-Procedure Depth (cm) 0.7 cm 07/05/24 1125   Post-Procedure Surface Area (cm^2) 19.8 cm^2 07/05/24 1125   Post-Procedure Volume (cm^3) 13.86 cm^3 07/05/24 1125   Distance Tunneling (cm) 0 cm 07/17/24 0831   Tunneling Position ___ O'Clock 0 07/17/24 0831   Undermining Starts ___ O'Clock 0 07/17/24 0831   Undermining Ends___ O'Clock 0 07/17/24 0831   Undermining Maxium Distance (cm) 0 07/17/24 0831   Wound Assessment Pink/red 07/17/24 0831   Drainage Amount Moderate (25-50%) 07/17/24 0831   Drainage Description Serosanguinous 07/17/24 0831   Odor None 07/17/24 0831   Thu-wound Assessment Intact 07/17/24 0831   Margins Defined edges 07/17/24 0831   Wound Thickness Description not for Pressure Injury Full thickness 07/17/24 0831   Number of days: 39       Wound 07/13/24 Finger (Comment which one) Left;Posterior 5th finger (Active)   Wound Image   07/15/24 1155   Wound Etiology Diabetic 07/17/24 0831   Wound Cleansed Other (Comment) 07/17/24 0831   Dressing/Treatment Betadine swabs/povidone iodine 07/15/24 1155   Wound Length (cm) 0.3 cm 07/15/24 1155   Wound Width (cm) 0.3 cm 07/15/24 1155   Wound Depth (cm) 0.1 cm 07/15/24 1155   Wound Surface Area (cm^2) 0.09 cm^2 07/15/24 1155   Wound Volume (cm^3) 0.009 cm^3 07/15/24 1155   Distance Tunneling (cm) 0 cm 07/17/24 0831   Tunneling Position ___ O'Clock 0 07/17/24 0831   Undermining Starts ___ O'Clock 0 07/17/24 0831   Undermining Ends___ O'Clock 0 07/17/24 0831   Undermining Maxium Distance (cm) 0 07/17/24 0831   Wound Assessment Eschar dry  To suction at 125 MMHG continuously. No other areas of concern at this time.    Specialty Bed Required : none   [] Low Air Loss   [x] Pressure Redistribution  [] Fluid Immersion  [] Bariatric  [] Total Pressure Relief  [] Other:     Discharge Plan:  Placement for patient upon discharge: to be determined   Hospice Care: not at this time   Patient appropriate for Outpatient Wound Care Center: is a current pt     Patient/Caregiver Teaching:  Level of patient/caregiver understanding able to:   Voiced understanding        Electronically signed by Kellee Fuchs RN,  on 7/17/2024 at 12:52 PM

## 2024-07-17 NOTE — DISCHARGE SUMMARY
Discharge Summary    Name:  Toi Briones /Age/Sex: 1965  (58 y.o. male)   MRN & CSN:  [id number] & [id number] Admission Date/Time: 2024 12:45 AM   Attending:  Jonathon Mckee MD Discharging Physician: Jonathon Mckee MD     Hospital Course:   Toi Briones is a 58 y.o.  male  who presents with Cellulitis of right leg    HPI  \"Patient is a 58 y.o. male with a PMHx of T2DM with diabetic foot ulcer and OM s/p left hallux amputation who presented to the ED with right leg swelling. Patient report he has been seeing wound care for left hallux amputation as currently has wound vac and reports it has been healing well. Recently seen ID and follows with wound care daily. He reports acute onset right leg swelling and pain associated with tmax of 102. He reports the pain and swelling is so severe he has not been able to ambulate.Denied any HA, dizziness, presyncope, syncope, cough, SOB, CP, N/V, abdominal pain, bleeding (hemoptysis / hematemesis, hematuria, BRBPR), C/D, or changes in urinary habits.  \"       The following problems have been addressed during this hospitalization.  Cellulitis of right leg   Left Plantar DFI with OM s/p Left Hallux Amputation 2024   Presents with acute onset RLE pain and redness, unable to ambulate due to pain   Previous wound cx with citrobacter, enterococcus, staph simulans and stenotrophomonas   Does not meet sepsis criteria on admit   Foot XR without osteo  duplex venous US without DVT  - was on  Vanc + levofloxacin; discharged on linezolid and levofloxacin end date 2024  - was placed on prednisone 40 mg until  per ID  - blood cxX2 no growth to date.     Uncontrolled DMII with chronic complications and with long term use of insulin   Last A1c 9.0   Home Lantus increased, in addition to sliding scale and scheduled  - discharged on Lantus 15U QHS, sliding scale and Lispro w meals but instructed to monitor glucose closely. Pt was advised to cut down insulin after few days of

## 2024-07-17 NOTE — PROGRESS NOTES
Physician Progress Note      PATIENT:               MORRO NO  CSN #:                  [id number]  :                       1965  ADMIT DATE:       2024 12:45 AM  DISCH DATE:  RESPONDING  PROVIDER #:        Jonathon Hewitt MD          QUERY TEXT:    Pt admitted with cellulitis of right leg. Pt noted to have T2DM. If possible,   please document in progress notes and discharge summary the relationship, if   any, between cellulitis and DM.    The medical record reflects the following:  Risk Factors: T2DM  Clinical Indicators: H&P: acute onset RLE pain and redness, unable to ambulate   due to pain.  Treatment: IV Levaquin, IV Vancomycin, ID consult    JAYLEN Mireles, RN, CRCR  Clinical   [phone]  Options provided:  -- Right leg cellulitis associated with Diabetes  -- Right leg cellulitis unrelated to Diabetes  -- Other - I will add my own diagnosis  -- Disagree - Not applicable / Not valid  -- Disagree - Clinically unable to determine / Unknown  -- Refer to Clinical Documentation Reviewer    PROVIDER RESPONSE TEXT:    Right leg cellulitis associated with Diabetes.    Query created by: Kate Liu on 7/15/2024 8:14 AM      Electronically signed by:  Jonathon Hewitt MD 2024 10:45 AM

## 2024-07-19 ENCOUNTER — HOSPITAL ENCOUNTER (OUTPATIENT)
Dept: WOUND CARE | Age: 59
Discharge: HOME | End: 2024-07-19
Payer: MEDICARE

## 2024-07-19 VITALS
RESPIRATION RATE: 16 BRPM | HEART RATE: 86 BPM | DIASTOLIC BLOOD PRESSURE: 68 MMHG | TEMPERATURE: 97.6 F | SYSTOLIC BLOOD PRESSURE: 126 MMHG

## 2024-07-19 DIAGNOSIS — M86.9 OSTEOMYELITIS OF LEFT FOOT, UNSPECIFIED TYPE (HCC): ICD-10-CM

## 2024-07-19 DIAGNOSIS — L08.9 TYPE 2 DIABETES MELLITUS WITH LEFT DIABETIC FOOT INFECTION (HCC): ICD-10-CM

## 2024-07-19 DIAGNOSIS — T81.89XD NON-HEALING SURGICAL WOUND, SUBSEQUENT ENCOUNTER: Primary | ICD-10-CM

## 2024-07-19 DIAGNOSIS — L97.529 TYPE 2 DIABETES MELLITUS WITH LEFT DIABETIC FOOT ULCER (HCC): ICD-10-CM

## 2024-07-19 DIAGNOSIS — E11.628 DIABETIC FOOT INFECTION (HCC): ICD-10-CM

## 2024-07-19 DIAGNOSIS — E11.628 TYPE 2 DIABETES MELLITUS WITH LEFT DIABETIC FOOT INFECTION (HCC): ICD-10-CM

## 2024-07-19 DIAGNOSIS — L08.9 DIABETIC FOOT INFECTION (HCC): ICD-10-CM

## 2024-07-19 DIAGNOSIS — L97.522 SKIN ULCER OF TOE OF LEFT FOOT WITH FAT LAYER EXPOSED (HCC): ICD-10-CM

## 2024-07-19 DIAGNOSIS — T81.89XA NON-HEALING SURGICAL WOUND, INITIAL ENCOUNTER: ICD-10-CM

## 2024-07-19 DIAGNOSIS — E11.621 TYPE 2 DIABETES MELLITUS WITH LEFT DIABETIC FOOT ULCER (HCC): ICD-10-CM

## 2024-07-19 PROCEDURE — 11042 DBRDMT SUBQ TIS 1ST 20SQCM/<: CPT

## 2024-07-19 PROCEDURE — 11042 DBRDMT SUBQ TIS 1ST 20SQCM/<: CPT | Performed by: NURSE PRACTITIONER

## 2024-07-19 RX ORDER — BETAMETHASONE DIPROPIONATE 0.5 MG/G
CREAM TOPICAL ONCE
OUTPATIENT
Start: 2024-07-19 | End: 2024-07-19

## 2024-07-19 RX ORDER — TRIAMCINOLONE ACETONIDE 1 MG/G
OINTMENT TOPICAL ONCE
OUTPATIENT
Start: 2024-07-19 | End: 2024-07-19

## 2024-07-19 RX ORDER — BACITRACIN ZINC AND POLYMYXIN B SULFATE 500; 1000 [USP'U]/G; [USP'U]/G
OINTMENT TOPICAL ONCE
OUTPATIENT
Start: 2024-07-19 | End: 2024-07-19

## 2024-07-19 RX ORDER — CLOBETASOL PROPIONATE 0.5 MG/G
OINTMENT TOPICAL ONCE
OUTPATIENT
Start: 2024-07-19 | End: 2024-07-19

## 2024-07-19 RX ORDER — GENTAMICIN SULFATE 1 MG/G
OINTMENT TOPICAL ONCE
OUTPATIENT
Start: 2024-07-19 | End: 2024-07-19

## 2024-07-19 RX ORDER — IBUPROFEN 200 MG
TABLET ORAL ONCE
OUTPATIENT
Start: 2024-07-19 | End: 2024-07-19

## 2024-07-19 RX ORDER — BACITRACIN ZINC 500 [USP'U]/G
OINTMENT TOPICAL ONCE
OUTPATIENT
Start: 2024-07-19 | End: 2024-07-19

## 2024-07-19 RX ORDER — SODIUM CHLOR/HYPOCHLOROUS ACID 0.033 %
SOLUTION, IRRIGATION IRRIGATION ONCE
OUTPATIENT
Start: 2024-07-19 | End: 2024-07-19

## 2024-07-19 ASSESSMENT — PAIN SCALES - GENERAL: PAINLEVEL_OUTOF10: 0

## 2024-07-19 ASSESSMENT — PAIN SCALES - WONG BAKER: WONGBAKER_NUMERICALRESPONSE: NO HURT

## 2024-07-19 NOTE — PROGRESS NOTES
'ulcer on left foot healed all up\"       PAST SURGICAL HISTORY    Past Surgical History:   Procedure Laterality Date    COLONOSCOPY  's    Normal exam per pt    DEBRIDEMENT  2014    left foot    ENDOSCOPY, COLON, DIAGNOSTIC  2016    Normal exam per pt -     FINGER SURGERY  10    Right Index Finger    FINGER SURGERY  12    RIght Index Finger-Removal of loose body, Reconstruction of Mallet Finger    FOOT DEBRIDEMENT Left 6/10/2019    FOOT DEBRIDEMENT INCISION AND DRAINAGE performed by Jamar Saenz MD at [facility] OR    FOOT SURGERY Right 2018    I&D right foot    HEMORRHOID SURGERY      IR TUNNELED CATHETER PLACEMENT GREATER THAN 5 YEARS  2021    IR TUNNELED CATHETER PLACEMENT GREATER THAN 5 YEARS 2021 [facility] SPECIAL PROCEDURES    OTHER SURGICAL HISTORY Left 10/22/2013    I & D left foot    OTHER SURGICAL HISTORY  2017    I&D fingers X2 left hand. I&D left forearm    OTHER SURGICAL HISTORY Left 2017    left hand debridement, left forearm incision and drainage     OTHER SURGICAL HISTORY Left 07/10/2017    Fingers I&D    OTHER SURGICAL HISTORY Left 2017    middle finger amputation revision    ROTATOR CUFF REPAIR  Last Done 2011    Left, Done Four Times    TOE AMPUTATION Left 2024    LEFT 1ST TOE AMPUTATION POSSIBLE WOUND VAC performed by Melany Nicholson MD at [facility] OR    UPPER GASTROINTESTINAL ENDOSCOPY N/A 2020    EGD BIOPSY performed by Arian Lai MD at [facility] OR       FAMILY HISTORY    Family History   Problem Relation Age of Onset    Kidney Disease Mother         \"Kidney Failure, On Dialysis\"    Early Death Mother 36    Diabetes Father         \"Type II\"       SOCIAL HISTORY    Social History     Tobacco Use    Smoking status: Former     Current packs/day: 0.00     Types: Cigarettes     Start date:      Quit date:      Years since quittin.5    Smokeless tobacco: Former     Types: Snuff   Vaping Use    Vaping

## 2024-07-19 NOTE — PATIENT INSTRUCTIONS
PHYSICIAN ORDERS AND DISCHARGE INSTRUCTIONS    NOTE: Upon discharge from the Wound Center, you will receive a patient experience survey. We would be grateful if you would take the time to fill this survey out.    Wound cleansing:     Do not scrub or use excessive force.   Wash hands with soap and water before and after dressing changes.   Prior to applying a clean dressing, cleanse wound with normal saline, wound cleanser, or mild soap and water.    Ask the physician or nurse before getting the wound(s) wet in a shower         Orders for this week: 7/19/2024    Fax to [facility]!       Right Index Finger - use neosporin daily at home    Left Great Toe Amp:   In clinic this week: Apply puracol ag  to wound bed   Wrap with conform and coban   Change Monday and Wednesday       WOUND VAC THERAPY: Hold 7/19/24 for 1 week   MASTISOL OR SKIN PREP AND DUODERM TO PERIWOUND FOR PROTECTION.   APPLY Silvadene, Stimulen and BLACK FOAM TO WOUND BED.   SECURE VAC. DRESSING WITH DRAPE.  SET WOUND VAC  CONTINUOUS SUCTION.   CANISTER CHANGE WITH EACH DRESSING CHANGE OR ACCORDING TO VOLUME OF DRAINAGE.  Wrap with Coflex Lite, be sure to keep tubing on the outside of the wrap  WOUND VAC DRESSING TO BE CHANGED MON, WED      Dispense 30 day quantity when ordering supplies.      Follow up with Raf Kuo CNP in 1 week in the wound care center.  Call  for any questions or concerns.  Date__________   Time____________

## 2024-07-26 ENCOUNTER — HOSPITAL ENCOUNTER (OUTPATIENT)
Dept: WOUND CARE | Age: 59
Discharge: HOME | End: 2024-07-26
Payer: MEDICARE

## 2024-07-26 VITALS
TEMPERATURE: 97.7 F | HEART RATE: 108 BPM | SYSTOLIC BLOOD PRESSURE: 101 MMHG | DIASTOLIC BLOOD PRESSURE: 61 MMHG | RESPIRATION RATE: 18 BRPM

## 2024-07-26 DIAGNOSIS — T14.8XXA WOUND INFECTION: ICD-10-CM

## 2024-07-26 DIAGNOSIS — L08.9 WOUND INFECTION: ICD-10-CM

## 2024-07-26 DIAGNOSIS — Z89.421 S/P AMPUTATION OF LESSER TOE, RIGHT (HCC): ICD-10-CM

## 2024-07-26 DIAGNOSIS — T81.89XD NON-HEALING SURGICAL WOUND, SUBSEQUENT ENCOUNTER: Primary | ICD-10-CM

## 2024-07-26 PROCEDURE — 11045 DBRDMT SUBQ TISS EACH ADDL: CPT

## 2024-07-26 PROCEDURE — 11042 DBRDMT SUBQ TIS 1ST 20SQCM/<: CPT

## 2024-07-26 RX ORDER — OXYCODONE HYDROCHLORIDE 5 MG/1
5 TABLET ORAL EVERY 12 HOURS PRN
Qty: 14 TABLET | Refills: 0 | Status: SHIPPED | OUTPATIENT
Start: 2024-07-26 | End: 2024-08-02

## 2024-07-26 NOTE — PROGRESS NOTES
discharge from the Wound Center, you will receive a patient experience survey. We would be grateful if you would take the time to fill this survey out.    Wound cleansing:     Do not scrub or use excessive force.   Wash hands with soap and water before and after dressing changes.   Prior to applying a clean dressing, cleanse wound with normal saline, wound cleanser, or mild soap and water.    Ask the physician or nurse before getting the wound(s) wet in a shower         Orders for this week: 7/26/2024    Fax to [facility]!       Right and left Index Finger - Clean with soap and water, pat dry  Apply Gentamicin to wound bed  Cover with Gentac Border  use neosporin and bandaid daily at home    Left Great Toe Amp:   In clinic this week: Apply puracol ag to wound bed   Wrap with conform and coban   Change Monday and Wednesday       WOUND VAC THERAPY: Hold 7/19/24 and 7/26/24 for 1 week   MASTISOL OR SKIN PREP AND DUODERM TO PERIWOUND FOR PROTECTION.   APPLY Silvadene, Stimulen and BLACK FOAM TO WOUND BED.   SECURE VAC. DRESSING WITH DRAPE.  SET WOUND VAC  CONTINUOUS SUCTION.   CANISTER CHANGE WITH EACH DRESSING CHANGE OR ACCORDING TO VOLUME OF DRAINAGE.  Wrap with Coflex Lite, be sure to keep tubing on the outside of the wrap  WOUND VAC DRESSING TO BE CHANGED MON, WED      Dispense 30 day quantity when ordering supplies.      Follow up with Raf Kuo CNP in 1 week in the wound care center.  Call  for any questions or concerns.  Date__________   Time____________    Treatment Note Wound 06/07/24 Foot Left #1 left great toe amp site-Dressing/Treatment:  (puracol ag conform coban)  Wound 07/13/24 Finger (Comment which one) Posterior;Right index-Dressing/Treatment:  (gent border)  Wound 07/26/24 Left;Posterior left index finger-Dressing/Treatment:  (gent border)    Written Patient Dismissal Instructions Given            Electronically signed by JULITA Rider CNP on 7/26/2024 at 12:55 PM

## 2024-07-26 NOTE — PATIENT INSTRUCTIONS
PHYSICIAN ORDERS AND DISCHARGE INSTRUCTIONS    NOTE: Upon discharge from the Wound Center, you will receive a patient experience survey. We would be grateful if you would take the time to fill this survey out.    Wound cleansing:     Do not scrub or use excessive force.   Wash hands with soap and water before and after dressing changes.   Prior to applying a clean dressing, cleanse wound with normal saline, wound cleanser, or mild soap and water.    Ask the physician or nurse before getting the wound(s) wet in a shower         Orders for this week: 7/26/2024    Fax to [facility]!       Right and left Index Finger - Clean with soap and water, pat dry  Apply Gentamicin to wound bed  Cover with Gentac Border  use neosporin and bandaid daily at home    Left Great Toe Amp:   In clinic this week: Apply puracol ag to wound bed   Wrap with conform and coban   Change Monday and Wednesday       WOUND VAC THERAPY: Hold 7/19/24 and 7/26/24 for 1 week   MASTISOL OR SKIN PREP AND DUODERM TO PERIWOUND FOR PROTECTION.   APPLY Silvadene, Stimulen and BLACK FOAM TO WOUND BED.   SECURE VAC. DRESSING WITH DRAPE.  SET WOUND VAC  CONTINUOUS SUCTION.   CANISTER CHANGE WITH EACH DRESSING CHANGE OR ACCORDING TO VOLUME OF DRAINAGE.  Wrap with Coflex Lite, be sure to keep tubing on the outside of the wrap  WOUND VAC DRESSING TO BE CHANGED MON, WED      Dispense 30 day quantity when ordering supplies.      Follow up with Raf Kuo CNP in 1 week in the wound care center.  Call  for any questions or concerns.  Date__________   Time____________

## 2024-07-30 ENCOUNTER — TELEPHONE (OUTPATIENT)
Dept: CARDIOLOGY CLINIC | Age: 59
End: 2024-07-30

## 2024-07-30 NOTE — TELEPHONE ENCOUNTER
Attempted to schedule a consult visit with provider, a woman answered the phone and hung up after I introduced myself.  A letter will be sent.

## 2024-08-02 ENCOUNTER — HOSPITAL ENCOUNTER (OUTPATIENT)
Dept: WOUND CARE | Age: 59
Discharge: HOME | End: 2024-08-02
Payer: MEDICARE

## 2024-08-02 VITALS
RESPIRATION RATE: 20 BRPM | DIASTOLIC BLOOD PRESSURE: 59 MMHG | TEMPERATURE: 97.8 F | HEART RATE: 80 BPM | SYSTOLIC BLOOD PRESSURE: 111 MMHG

## 2024-08-02 DIAGNOSIS — E11.628 DIABETIC FOOT INFECTION (HCC): Primary | ICD-10-CM

## 2024-08-02 DIAGNOSIS — T81.89XA NON-HEALING SURGICAL WOUND, INITIAL ENCOUNTER: ICD-10-CM

## 2024-08-02 DIAGNOSIS — M86.9 OSTEOMYELITIS OF LEFT FOOT, UNSPECIFIED TYPE (HCC): ICD-10-CM

## 2024-08-02 DIAGNOSIS — L97.522 SKIN ULCER OF TOE OF LEFT FOOT WITH FAT LAYER EXPOSED (HCC): ICD-10-CM

## 2024-08-02 DIAGNOSIS — L08.9 DIABETIC FOOT INFECTION (HCC): Primary | ICD-10-CM

## 2024-08-02 PROCEDURE — 11042 DBRDMT SUBQ TIS 1ST 20SQCM/<: CPT

## 2024-08-02 RX ORDER — BACITRACIN ZINC AND POLYMYXIN B SULFATE 500; 1000 [USP'U]/G; [USP'U]/G
OINTMENT TOPICAL ONCE
OUTPATIENT
Start: 2024-08-02 | End: 2024-08-02

## 2024-08-02 RX ORDER — SODIUM CHLOR/HYPOCHLOROUS ACID 0.033 %
SOLUTION, IRRIGATION IRRIGATION ONCE
OUTPATIENT
Start: 2024-08-02 | End: 2024-08-02

## 2024-08-02 RX ORDER — TRIAMCINOLONE ACETONIDE 1 MG/G
OINTMENT TOPICAL ONCE
OUTPATIENT
Start: 2024-08-02 | End: 2024-08-02

## 2024-08-02 RX ORDER — CLOBETASOL PROPIONATE 0.5 MG/G
OINTMENT TOPICAL ONCE
OUTPATIENT
Start: 2024-08-02 | End: 2024-08-02

## 2024-08-02 RX ORDER — GENTAMICIN SULFATE 1 MG/G
OINTMENT TOPICAL ONCE
OUTPATIENT
Start: 2024-08-02 | End: 2024-08-02

## 2024-08-02 RX ORDER — BACITRACIN ZINC 500 [USP'U]/G
OINTMENT TOPICAL ONCE
OUTPATIENT
Start: 2024-08-02 | End: 2024-08-02

## 2024-08-02 RX ORDER — OXYCODONE HYDROCHLORIDE AND ACETAMINOPHEN 5; 325 MG/1; MG/1
1 TABLET ORAL EVERY 12 HOURS PRN
Qty: 14 TABLET | Refills: 0 | Status: SHIPPED | OUTPATIENT
Start: 2024-08-02 | End: 2024-08-09

## 2024-08-02 RX ORDER — LIDOCAINE 50 MG/G
OINTMENT TOPICAL
Qty: 50 G | Refills: 5 | Status: SHIPPED | OUTPATIENT
Start: 2024-08-02

## 2024-08-02 RX ORDER — IBUPROFEN 200 MG
TABLET ORAL ONCE
OUTPATIENT
Start: 2024-08-02 | End: 2024-08-02

## 2024-08-02 RX ORDER — BETAMETHASONE DIPROPIONATE 0.5 MG/G
CREAM TOPICAL ONCE
OUTPATIENT
Start: 2024-08-02 | End: 2024-08-02

## 2024-08-02 ASSESSMENT — PAIN DESCRIPTION - DESCRIPTORS: DESCRIPTORS: THROBBING

## 2024-08-02 ASSESSMENT — PAIN DESCRIPTION - ORIENTATION: ORIENTATION: LEFT

## 2024-08-02 ASSESSMENT — PAIN DESCRIPTION - LOCATION: LOCATION: FOOT

## 2024-08-02 ASSESSMENT — PAIN SCALES - GENERAL: PAINLEVEL_OUTOF10: 8

## 2024-08-02 ASSESSMENT — PAIN DESCRIPTION - PAIN TYPE: TYPE: CHRONIC PAIN

## 2024-08-02 NOTE — PROGRESS NOTES
all side effects and contraindications of opioids. No indication of abuse or seeking behavior. OARRS report checked at this time. We will continue to monitor.       Patient on levaquin and follow up with ID on the 27th     From ED: Patient is a 58 y.o. male with a PMHx of T2DM who presented to the ED with foot swelling. Patient presents with 4 days of worsening erythema and edema and warmth of left foot associated with fevers and chills. He reports an ulcer to the bottom of his foot associated with purulent discharge. Denied any HA, dizziness, presyncope, syncope, cough, SOB, CP, N/V, abdominal pain, bleeding (hemoptysis / hematemesis, hematuria, BRBPR), C/D, or changes in urinary habits.      5-: LEFT 1ST TOE AMPUTATION POSSIBLE WOUND VAC with Dr. Nicholson     Wound Pain Timing/Severity: waxing and waning  Quality of pain: aching, throbbing  Severity of pain:  3 / 10   Modifying Factors: edema, venous stasis, diabetes, poor glucose control, chronic pressure, and obesity  Associated Signs/Symptoms: edema, erythema, drainage, numbness, and pain        PAST MEDICAL HISTORY        Diagnosis Date    Absent finger     Left hand    Anxiety     Arthritis     Hands    Asthma     \"As a kid but outgrew this\" - no medications as of 2/21/20    Chronic back pain     Depression     Difficult intravenous access 05/26/2024    Unable to place PICC x2 admissions. Patient IS NOT a PICC candidate (2024)    Edema     Fibromyalgia     Headache(784.0)     Last: 2/19/20    Hernia, abdominal     History of difficult venous access 12/21/2021    No longer a candidate for bedside PICC placement, multiple failed attempts    Hx MRSA infection     positive culture 8/2014 from left foot    Nausea & vomiting     Neuropathy     Obesity, Class III, BMI 40-49.9 (morbid obesity) (HCC)     Osteomyelitis (HCC)     hx finger    Poor circulation     Prolonged emergence from general anesthesia     Restless leg syndrome     Shortness of breath on

## 2024-08-02 NOTE — PATIENT INSTRUCTIONS
PHYSICIAN ORDERS AND DISCHARGE INSTRUCTIONS    NOTE: Upon discharge from the Wound Center, you will receive a patient experience survey. We would be grateful if you would take the time to fill this survey out.    Wound cleansing:     Do not scrub or use excessive force.   Wash hands with soap and water before and after dressing changes.   Prior to applying a clean dressing, cleanse wound with normal saline, wound cleanser, or mild soap and water.    Ask the physician or nurse before getting the wound(s) wet in a shower         Orders for this week: 8/2/2024    Fax to [facility]!       Finger Wounds - Clean with soap and water, pat dry  Apply silvadene and stimulen powder to wound bed  Cover with Gentac Border  Change Daily     Left Great Toe Amp: Clean with soap and water, pat dry  Apply puracol ag to wound bed   Wrap with conform and coban   Change Monday and Wednesday     Dispense 30 day quantity when ordering supplies.      Follow up with Raf Kuo CNP in 1 week in the wound care center.  Call  for any questions or concerns.

## 2024-08-06 NOTE — PROGRESS NOTES
Negative Pressure Wound Therapy    NAME:  Toi Briones  YOB: 1965  MEDICAL RECORD NUMBER:  [id number]  DATE:  7/5/2024    Applied Negative Pressure to left great toe amp site wound(s)/ulcer(s).  [x] Applied skin barrier prep to saroj-wound.   [x] Cut strips of plastic drape to picture frame wound so that saroj-wound is     covered with the drape.   [x] If bridging dressing to less prominent site, cover any intact skin that will come in contact with the Negative Pressure Therapy sponge, gauze or channel drain with plastic drape. The sponge should never touch intact skin.   [x] Cut sponge, gauze or channel drain to size which will fit into the wound/ulcer bed without being forced.   [x] Be sure the sponge is large enough to hold the entire round plastic flange which is attached to the tubing. Never allow flange to be larger than the sponge or it will produce suction damaging intact skin.  Total number of individual pieces of foam used within the wound bed: 1    [x] If bridging the dressing away from the primary site, be sure the bridge leads to a piece of sponge large enough to hold the entire flange without allowing any of the flange to overlap onto intact skin.   [x] Covered sponge, gauze or channel drain with plastic drape.   [x] Cut a hole in this plastic drape directly over the sponge the same size as the plastic drain tubing.   [x] Removed plastic liner from flange and apply it directly over the hole you cut.   [x] Removed the plastic cover from the flange.   [x] Attached the tubing to the wound/ulcer Negative Pressure Therapy and turn it on to be sure a vacuum is created and that there are no leaks.   [x] If air leaks occur, use plastic drape to patch them.   [x] Secured Negative Pressure Therapy dressing with ace wrap loosely if located on an extremity. Maintain tubing outside of ace wrap. Tubing must not exert pressure on intact skin.    Applied per  Guidelines      Electronically  Show Spray Paint Technique Variable?: Yes Post-Care Instructions: I reviewed with the patient in detail post-care instructions. Patient is to wear sunprotection, and avoid picking at any of the treated lesions. Pt may apply Vaseline to crusted or scabbing areas. Render Post-Care Instructions In Note?: no Number Of Freeze-Thaw Cycles: 2 freeze-thaw cycles Duration Of Freeze Thaw-Cycle (Seconds): 10-15 Medical Necessity Information: It is in your best interest to select a reason for this procedure from the list below. All of these items fulfill various CMS LCD requirements except the new and changing color options. Detail Level: Detailed Medical Necessity Clause: This procedure was medically necessary because the lesions that were treated were: Consent: The patient's consent was obtained including but not limited to risks of crusting, scabbing, blistering, scarring, darker or lighter pigmentary change, recurrence, incomplete removal and infection. Spray Paint Text: The liquid nitrogen was applied to the skin utilizing a spray paint frosting technique.

## 2024-08-09 ENCOUNTER — HOSPITAL ENCOUNTER (OUTPATIENT)
Dept: WOUND CARE | Age: 59
Discharge: HOME | End: 2024-08-09
Attending: NURSE PRACTITIONER
Payer: MEDICARE

## 2024-08-09 VITALS
TEMPERATURE: 98.2 F | HEART RATE: 83 BPM | RESPIRATION RATE: 20 BRPM | SYSTOLIC BLOOD PRESSURE: 152 MMHG | DIASTOLIC BLOOD PRESSURE: 82 MMHG

## 2024-08-09 DIAGNOSIS — T81.89XA NON-HEALING SURGICAL WOUND, INITIAL ENCOUNTER: ICD-10-CM

## 2024-08-09 DIAGNOSIS — L97.522 SKIN ULCER OF TOE OF LEFT FOOT WITH FAT LAYER EXPOSED (HCC): ICD-10-CM

## 2024-08-09 DIAGNOSIS — E11.628 DIABETIC FOOT INFECTION (HCC): Primary | ICD-10-CM

## 2024-08-09 DIAGNOSIS — L08.9 DIABETIC FOOT INFECTION (HCC): Primary | ICD-10-CM

## 2024-08-09 DIAGNOSIS — M86.9 OSTEOMYELITIS OF LEFT FOOT, UNSPECIFIED TYPE (HCC): ICD-10-CM

## 2024-08-09 DIAGNOSIS — L08.9 TYPE 2 DIABETES MELLITUS WITH LEFT DIABETIC FOOT INFECTION (HCC): ICD-10-CM

## 2024-08-09 DIAGNOSIS — E11.628 TYPE 2 DIABETES MELLITUS WITH LEFT DIABETIC FOOT INFECTION (HCC): ICD-10-CM

## 2024-08-09 PROCEDURE — 11042 DBRDMT SUBQ TIS 1ST 20SQCM/<: CPT

## 2024-08-09 RX ORDER — SODIUM CHLOR/HYPOCHLOROUS ACID 0.033 %
SOLUTION, IRRIGATION IRRIGATION ONCE
OUTPATIENT
Start: 2024-08-09 | End: 2024-08-09

## 2024-08-09 RX ORDER — BACITRACIN ZINC 500 [USP'U]/G
OINTMENT TOPICAL ONCE
OUTPATIENT
Start: 2024-08-09 | End: 2024-08-09

## 2024-08-09 RX ORDER — CLOBETASOL PROPIONATE 0.5 MG/G
OINTMENT TOPICAL ONCE
OUTPATIENT
Start: 2024-08-09 | End: 2024-08-09

## 2024-08-09 RX ORDER — IBUPROFEN 200 MG
TABLET ORAL ONCE
OUTPATIENT
Start: 2024-08-09 | End: 2024-08-09

## 2024-08-09 RX ORDER — BETAMETHASONE DIPROPIONATE 0.5 MG/G
CREAM TOPICAL ONCE
OUTPATIENT
Start: 2024-08-09 | End: 2024-08-09

## 2024-08-09 RX ORDER — GENTAMICIN SULFATE 1 MG/G
OINTMENT TOPICAL ONCE
OUTPATIENT
Start: 2024-08-09 | End: 2024-08-09

## 2024-08-09 RX ORDER — TRIAMCINOLONE ACETONIDE 1 MG/G
OINTMENT TOPICAL ONCE
OUTPATIENT
Start: 2024-08-09 | End: 2024-08-09

## 2024-08-09 RX ORDER — BACITRACIN ZINC AND POLYMYXIN B SULFATE 500; 1000 [USP'U]/G; [USP'U]/G
OINTMENT TOPICAL ONCE
OUTPATIENT
Start: 2024-08-09 | End: 2024-08-09

## 2024-08-09 RX ORDER — OXYCODONE HYDROCHLORIDE AND ACETAMINOPHEN 5; 325 MG/1; MG/1
1 TABLET ORAL EVERY 12 HOURS PRN
Qty: 14 TABLET | Refills: 0 | Status: SHIPPED | OUTPATIENT
Start: 2024-08-09 | End: 2024-08-16

## 2024-08-09 RX ORDER — LEVOFLOXACIN 750 MG/1
750 TABLET, FILM COATED ORAL DAILY
Qty: 10 TABLET | Refills: 0 | Status: SHIPPED | OUTPATIENT
Start: 2024-08-09 | End: 2024-08-19

## 2024-08-09 NOTE — PATIENT INSTRUCTIONS
PHYSICIAN ORDERS AND DISCHARGE INSTRUCTIONS    NOTE: Upon discharge from the Wound Center, you will receive a patient experience survey. We would be grateful if you would take the time to fill this survey out.    Wound cleansing:     Do not scrub or use excessive force.   Wash hands with soap and water before and after dressing changes.   Prior to applying a clean dressing, cleanse wound with normal saline, wound cleanser, or mild soap and water.    Ask the physician or nurse before getting the wound(s) wet in a shower         Orders for this week: 8/9/2024    Fax to [facility]!       Finger Wounds - Clean with soap and water, pat dry  Apply silvadene and stimulen powder to wound bed  Cover with Gentac Border  Change Daily     Left Great Toe Amp: Clean with soap and water, pat dry  Apply puracol ag to wound bed   Wrap with conform and coban   Change Monday and Wednesday     Dispense 30 day quantity when ordering supplies.      Follow up with Raf Kuo CNP in 1 week in the wound care center.  Call  for any questions or concerns.

## 2024-08-09 NOTE — PROGRESS NOTES
pharmacy at this time   .      Plan:       Patient Instructions   PHYSICIAN ORDERS AND DISCHARGE INSTRUCTIONS    NOTE: Upon discharge from the Wound Center, you will receive a patient experience survey. We would be grateful if you would take the time to fill this survey out.    Wound cleansing:     Do not scrub or use excessive force.   Wash hands with soap and water before and after dressing changes.   Prior to applying a clean dressing, cleanse wound with normal saline, wound cleanser, or mild soap and water.    Ask the physician or nurse before getting the wound(s) wet in a shower         Orders for this week: 8/9/2024    Fax to [facility]!       Finger Wounds - Clean with soap and water, pat dry  Apply silvadene and stimulen powder to wound bed  Cover with Gentac Border  Change Daily     Left Great Toe Amp: Clean with soap and water, pat dry  Apply puracol ag to wound bed   Wrap with conform and coban   Change Monday and Wednesday     Dispense 30 day quantity when ordering supplies.      Follow up with Raf Kuo CNP in 1 week in the wound care center.  Call  for any questions or concerns.    Treatment Note      Written Patient Dismissal Instructions Given            Electronically signed by JULITA Rider CNP on 8/9/2024 at 1:14 PM

## 2024-08-10 ENCOUNTER — APPOINTMENT (OUTPATIENT)
Dept: CT IMAGING | Age: 59
End: 2024-08-10
Payer: MEDICARE

## 2024-08-10 ENCOUNTER — APPOINTMENT (OUTPATIENT)
Dept: GENERAL RADIOLOGY | Age: 59
End: 2024-08-10
Payer: MEDICARE

## 2024-08-10 ENCOUNTER — APPOINTMENT (OUTPATIENT)
Dept: ULTRASOUND IMAGING | Age: 59
End: 2024-08-10
Payer: MEDICARE

## 2024-08-10 ENCOUNTER — HOSPITAL ENCOUNTER (EMERGENCY)
Age: 59
Discharge: HOME | End: 2024-08-10
Attending: EMERGENCY MEDICINE
Payer: MEDICARE

## 2024-08-10 VITALS
HEIGHT: 71 IN | DIASTOLIC BLOOD PRESSURE: 71 MMHG | RESPIRATION RATE: 16 BRPM | SYSTOLIC BLOOD PRESSURE: 105 MMHG | HEART RATE: 79 BPM | WEIGHT: 280 LBS | BODY MASS INDEX: 39.2 KG/M2 | OXYGEN SATURATION: 98 % | TEMPERATURE: 98.1 F

## 2024-08-10 DIAGNOSIS — L03.115 CELLULITIS OF RIGHT LOWER EXTREMITY: ICD-10-CM

## 2024-08-10 DIAGNOSIS — L03.116 CELLULITIS OF LEFT LEG: Primary | ICD-10-CM

## 2024-08-10 LAB
ALBUMIN SERPL-MCNC: 4.1 GM/DL (ref 3.4–5)
ALP BLD-CCNC: 96 IU/L (ref 40–128)
ALT SERPL-CCNC: 6 U/L (ref 10–40)
ANION GAP SERPL CALCULATED.3IONS-SCNC: 13 MMOL/L (ref 7–16)
AST SERPL-CCNC: 13 IU/L (ref 15–37)
BASOPHILS ABSOLUTE: 0 K/CU MM
BASOPHILS RELATIVE PERCENT: 0.7 % (ref 0–1)
BILIRUB SERPL-MCNC: 0.5 MG/DL (ref 0–1)
BUN SERPL-MCNC: 12 MG/DL (ref 6–23)
CALCIUM SERPL-MCNC: 9.2 MG/DL (ref 8.3–10.6)
CHLORIDE BLD-SCNC: 98 MMOL/L (ref 99–110)
CO2: 24 MMOL/L (ref 21–32)
CREAT SERPL-MCNC: 0.7 MG/DL (ref 0.9–1.3)
DIFFERENTIAL TYPE: ABNORMAL
EOSINOPHILS ABSOLUTE: 0.2 K/CU MM
EOSINOPHILS RELATIVE PERCENT: 3.4 % (ref 0–3)
GFR, ESTIMATED: >90 ML/MIN/1.73M2
GLUCOSE SERPL-MCNC: 186 MG/DL (ref 70–99)
HCT VFR BLD CALC: 37.9 % (ref 42–52)
HEMOGLOBIN: 12.1 GM/DL (ref 13.5–18)
IMMATURE NEUTROPHIL %: 0.3 % (ref 0–0.43)
LYMPHOCYTES ABSOLUTE: 1.5 K/CU MM
LYMPHOCYTES RELATIVE PERCENT: 24.7 % (ref 24–44)
MCH RBC QN AUTO: 27.9 PG (ref 27–31)
MCHC RBC AUTO-ENTMCNC: 31.9 % (ref 32–36)
MCV RBC AUTO: 87.3 FL (ref 78–100)
MONOCYTES ABSOLUTE: 0.5 K/CU MM
MONOCYTES RELATIVE PERCENT: 7.6 % (ref 0–4)
NEUTROPHILS ABSOLUTE: 3.8 K/CU MM
NEUTROPHILS RELATIVE PERCENT: 63.3 % (ref 36–66)
NUCLEATED RBC %: 0 %
PDW BLD-RTO: 14.5 % (ref 11.7–14.9)
PLATELET # BLD: 192 K/CU MM (ref 140–440)
PMV BLD AUTO: 9.6 FL (ref 7.5–11.1)
POTASSIUM SERPL-SCNC: 3.9 MMOL/L (ref 3.5–5.1)
RBC # BLD: 4.34 M/CU MM (ref 4.6–6.2)
SODIUM BLD-SCNC: 135 MMOL/L (ref 135–145)
TOTAL IMMATURE NEUTOROPHIL: 0.02 K/CU MM
TOTAL NUCLEATED RBC: 0 K/CU MM
TOTAL PROTEIN: 6.9 GM/DL (ref 6.4–8.2)
WBC # BLD: 5.9 K/CU MM (ref 4–10.5)

## 2024-08-10 PROCEDURE — 80053 COMPREHEN METABOLIC PANEL: CPT

## 2024-08-10 PROCEDURE — 93971 EXTREMITY STUDY: CPT

## 2024-08-10 PROCEDURE — 96365 THER/PROPH/DIAG IV INF INIT: CPT

## 2024-08-10 PROCEDURE — 73700 CT LOWER EXTREMITY W/O DYE: CPT

## 2024-08-10 PROCEDURE — 6360000002 HC RX W HCPCS: Performed by: EMERGENCY MEDICINE

## 2024-08-10 PROCEDURE — 6370000000 HC RX 637 (ALT 250 FOR IP): Performed by: EMERGENCY MEDICINE

## 2024-08-10 PROCEDURE — 99284 EMERGENCY DEPT VISIT MOD MDM: CPT

## 2024-08-10 PROCEDURE — 85025 COMPLETE CBC W/AUTO DIFF WBC: CPT

## 2024-08-10 PROCEDURE — 96366 THER/PROPH/DIAG IV INF ADDON: CPT

## 2024-08-10 RX ORDER — HYDROCODONE BITARTRATE AND ACETAMINOPHEN 5; 325 MG/1; MG/1
2 TABLET ORAL ONCE
Status: COMPLETED | OUTPATIENT
Start: 2024-08-10 | End: 2024-08-10

## 2024-08-10 RX ORDER — HYDROCODONE BITARTRATE AND ACETAMINOPHEN 5; 325 MG/1; MG/1
1 TABLET ORAL EVERY 6 HOURS PRN
Qty: 10 TABLET | Refills: 0 | Status: SHIPPED | OUTPATIENT
Start: 2024-08-10 | End: 2024-08-13

## 2024-08-10 RX ORDER — CLINDAMYCIN PHOSPHATE 900 MG/50ML
900 INJECTION, SOLUTION INTRAVENOUS ONCE
Status: COMPLETED | OUTPATIENT
Start: 2024-08-10 | End: 2024-08-10

## 2024-08-10 RX ORDER — ONDANSETRON 4 MG/1
8 TABLET, ORALLY DISINTEGRATING ORAL ONCE
Status: COMPLETED | OUTPATIENT
Start: 2024-08-10 | End: 2024-08-10

## 2024-08-10 RX ADMIN — CLINDAMYCIN PHOSPHATE 900 MG: 900 INJECTION, SOLUTION INTRAVENOUS at 19:46

## 2024-08-10 RX ADMIN — HYDROCODONE BITARTRATE AND ACETAMINOPHEN 2 TABLET: 5; 325 TABLET ORAL at 19:06

## 2024-08-10 RX ADMIN — ONDANSETRON 8 MG: 4 TABLET, ORALLY DISINTEGRATING ORAL at 19:06

## 2024-08-10 ASSESSMENT — PAIN SCALES - GENERAL
PAINLEVEL_OUTOF10: 9
PAINLEVEL_OUTOF10: 9

## 2024-08-10 ASSESSMENT — PAIN DESCRIPTION - ORIENTATION
ORIENTATION: LEFT
ORIENTATION: LEFT

## 2024-08-10 ASSESSMENT — PAIN DESCRIPTION - DESCRIPTORS: DESCRIPTORS: ACHING;DISCOMFORT

## 2024-08-10 ASSESSMENT — PAIN DESCRIPTION - PAIN TYPE: TYPE: ACUTE PAIN

## 2024-08-10 ASSESSMENT — PAIN DESCRIPTION - LOCATION
LOCATION: LEG
LOCATION: LEG

## 2024-08-10 ASSESSMENT — PAIN DESCRIPTION - FREQUENCY: FREQUENCY: CONTINUOUS

## 2024-08-10 ASSESSMENT — PAIN - FUNCTIONAL ASSESSMENT: PAIN_FUNCTIONAL_ASSESSMENT: 0-10

## 2024-08-10 NOTE — ED PROVIDER NOTES
Rocephin [Ceftriaxone Sodium-Lidocaine] Hives     Noted on 10/26 - developed extremity swelling and hives. No anaphylaxis.    Sulfa Antibiotics Hives     Noted on 10/26 - developed extremity swelling and hives. No anaphylaxis.    Aspirin Nausea Only    Nsaids Nausea Only    Other Nausea Only     Darvocet- Gi distress  napsalate/acetaminophen    Propoxyphene Nausea And Vomiting    Toradol [Ketorolac Tromethamine] Other (See Comments)     Sweats        Nursing Notes Reviewed    Physical Exam:  Triage VS:    ED Triage Vitals [08/10/24 1801]   Encounter Vitals Group      /67      Systolic BP Percentile       Diastolic BP Percentile       Pulse 91      Respirations 18      Temp 98.1 °F (36.7 °C)      Temp Source Oral      SpO2 96 %      Weight - Scale 127 kg (280 lb)      Height 1.803 m (5' 11\")      Head Circumference       Peak Flow       Pain Score       Pain Loc       Pain Education       Exclude from Growth Chart        My pulse ox interpretation is - normal    General appearance:  No acute distress.   Skin:  Warm. Dry.   Eye:  Extraocular movements intact.     Ears, nose, mouth and throat:  Oral mucosa moist   Neck:  Trachea midline.   Extremity: Left leg swelling about midway all the way down to the ankle.  Swelling indurated and tender to palpation.  There is no calf tenderness.  Negative Homans' sign.  Distal neurovascular examination is normal            Heart:  Regular rate and rhythm, normal S1 & S2, no extra heart sounds.    Perfusion:  intact  Respiratory:  Lungs clear to auscultation bilaterally.  Respirations nonlabored.     Abdominal:  Normal bowel sounds.  Soft.  Nontender.  Non distended.  Back:  No CVA tenderness to palpation     Neurological:  Alert and oriented times 3.  No focal neuro deficits.             Psychiatric:  Appropriate    I have reviewed and interpreted all of the currently available lab results from this visit (if applicable):  Results for orders placed or performed during the  hospital encounter of 08/10/24   CBC with Auto Differential   Result Value Ref Range    WBC 5.9 4.0 - 10.5 K/CU MM    RBC 4.34 (L) 4.6 - 6.2 M/CU MM    Hemoglobin 12.1 (L) 13.5 - 18.0 GM/DL    Hematocrit 37.9 (L) 42 - 52 %    MCV 87.3 78 - 100 FL    MCH 27.9 27 - 31 PG    MCHC 31.9 (L) 32.0 - 36.0 %    RDW 14.5 11.7 - 14.9 %    Platelets 192 140 - 440 K/CU MM    MPV 9.6 7.5 - 11.1 FL    Differential Type AUTOMATED DIFFERENTIAL     Neutrophils % 63.3 36 - 66 %    Lymphocytes % 24.7 24 - 44 %    Monocytes % 7.6 (H) 0 - 4 %    Eosinophils % 3.4 (H) 0 - 3 %    Basophils % 0.7 0 - 1 %    Neutrophils Absolute 3.8 K/CU MM    Lymphocytes Absolute 1.5 K/CU MM    Monocytes Absolute 0.5 K/CU MM    Eosinophils Absolute 0.2 K/CU MM    Basophils Absolute 0.0 K/CU MM    Nucleated RBC % 0.0 %    Total Nucleated RBC 0.0 K/CU MM    Total Immature Neutrophil 0.02 K/CU MM    Immature Neutrophil % 0.3 0 - 0.43 %   CMP   Result Value Ref Range    Sodium 135 135 - 145 MMOL/L    Potassium 3.9 3.5 - 5.1 MMOL/L    Chloride 98 (L) 99 - 110 mMol/L    CO2 24 21 - 32 MMOL/L    Anion Gap 13 7 - 16    Glucose 186 (H) 70 - 99 MG/DL    BUN 12 6 - 23 MG/DL    Creatinine 0.7 (L) 0.9 - 1.3 MG/DL    Est, Glom Filt Rate >90 >60 mL/min/1.73m2    Calcium 9.2 8.3 - 10.6 MG/DL    Total Protein 6.9 6.4 - 8.2 GM/DL    Albumin 4.1 3.4 - 5.0 GM/DL    Total Bilirubin 0.5 0.0 - 1.0 MG/DL    Alkaline Phosphatase 96 40 - 128 IU/L    ALT 6 (L) 10 - 40 U/L    AST 13 (L) 15 - 37 IU/L      Radiographs (if obtained):  Radiologist's Report Reviewed:  No results found.        MDM:  CC/HPI Summary, DDx, ED Course, and Reassessment: 8-year-old male presenting with redness pain and swelling of the pretibial area of the left leg.  Ultrasound of the leg was negative for DVT.  CBC is unremarkable.  Patient already is on Levaquin started yesterday for cellulitis.  I gave the patient Cleocin here in the ED IV.  However, the patient should continue with the levofloxacin that she is

## 2024-08-14 ENCOUNTER — INITIAL CONSULT (OUTPATIENT)
Dept: CARDIOLOGY CLINIC | Age: 59
End: 2024-08-14
Payer: MEDICARE

## 2024-08-14 VITALS
HEIGHT: 71 IN | DIASTOLIC BLOOD PRESSURE: 60 MMHG | OXYGEN SATURATION: 95 % | WEIGHT: 275 LBS | BODY MASS INDEX: 38.5 KG/M2 | HEART RATE: 87 BPM | SYSTOLIC BLOOD PRESSURE: 118 MMHG

## 2024-08-14 DIAGNOSIS — I83.893 VARICOSE VEINS OF BOTH LEGS WITH EDEMA: Primary | ICD-10-CM

## 2024-08-14 DIAGNOSIS — L97.509 DIABETES MELLITUS DUE TO UNDERLYING CONDITION WITH FOOT ULCER, WITHOUT LONG-TERM CURRENT USE OF INSULIN (HCC): Chronic | ICD-10-CM

## 2024-08-14 DIAGNOSIS — E66.01 CLASS 3 SEVERE OBESITY DUE TO EXCESS CALORIES WITH SERIOUS COMORBIDITY AND BODY MASS INDEX (BMI) OF 40.0 TO 44.9 IN ADULT (HCC): ICD-10-CM

## 2024-08-14 DIAGNOSIS — E08.621 DIABETES MELLITUS DUE TO UNDERLYING CONDITION WITH FOOT ULCER, WITHOUT LONG-TERM CURRENT USE OF INSULIN (HCC): Chronic | ICD-10-CM

## 2024-08-14 PROCEDURE — 99204 OFFICE O/P NEW MOD 45 MIN: CPT | Performed by: INTERNAL MEDICINE

## 2024-08-14 PROCEDURE — G8427 DOCREV CUR MEDS BY ELIG CLIN: HCPCS | Performed by: INTERNAL MEDICINE

## 2024-08-14 PROCEDURE — 1036F TOBACCO NON-USER: CPT | Performed by: INTERNAL MEDICINE

## 2024-08-14 PROCEDURE — 1111F DSCHRG MED/CURRENT MED MERGE: CPT | Performed by: INTERNAL MEDICINE

## 2024-08-14 PROCEDURE — 3017F COLORECTAL CA SCREEN DOC REV: CPT | Performed by: INTERNAL MEDICINE

## 2024-08-14 PROCEDURE — G8417 CALC BMI ABV UP PARAM F/U: HCPCS | Performed by: INTERNAL MEDICINE

## 2024-08-14 RX ORDER — TIZANIDINE 4 MG/1
TABLET ORAL
COMMUNITY
Start: 2024-07-24

## 2024-08-14 RX ORDER — SERTRALINE HYDROCHLORIDE 25 MG/1
TABLET, FILM COATED ORAL
COMMUNITY
Start: 2024-08-13

## 2024-08-14 RX ORDER — EMPAGLIFLOZIN 10 MG/1
TABLET, FILM COATED ORAL
COMMUNITY
Start: 2024-07-24

## 2024-08-14 NOTE — PROGRESS NOTES
Nourished  1. Skin: It is warm & dry. No rashes noted.  2. Eyes: No conjunctival Pallor seen. No jaundice noted.  3. Neck: is supple there is no elevation of JVD. No thyromegaly  4. Respiratory:  Resp Assessment: No abnormal findings.  Resp Auscultation: Vesicular breath sounds without rales or wheezing.  5. Cardiovascular:  Auscultation: Normal S1 & S2, No prominent murmurs  Carotid Arteries: No bruits present  Abdominal Aorta: Non-palpable  Pedal Pulses: 2+ and equal   6. Abdomen:  No masses or tenderness  Liver/Spleen: No Abnormalities Noted, no organomegaly.  7. Musculoskeletal: No joint deformities. No muscle wasting  8. Extremities:   No Cyanosis or Clubbing. There is significant edema with C6 changes.diminished Pedal pulses.  9. Rectal / genital:   Deferred  10. Neurological/Psychiatric:  Oriented to time, place, and person  Non-anxious    Lab Results   Component Value Date/Time    CKTOTAL 41 07/18/2019 07:22 AM    CKTOTAL 69 01/07/2019 01:44 AM    TROPONINT <0.010 03/11/2022 06:09 PM    TROPONINT <0.010 01/23/2020 07:37 PM     BNP:    Lab Results   Component Value Date/Time    BNP 58 10/20/2013 05:00 AM    PROBNP 365.7 03/11/2022 06:09 PM    PROBNP 782.3 11/06/2021 04:24 PM     PT/INR:  No results found for: \"PTINR\"  A1C:  Lab Results   Component Value Date    LABA1C 9.0 (H) 05/19/2024    LABA1C 7.6 (H) 12/14/2021     Lipids: No results found for: \"CHOL\", \"CHOLFAST\"  No results found for: \"TRIG\", \"TRIGLYCFAST\"  No results found for: \"HDL\"  No results found for: \"LDL\"  No results found for: \"VLDL\"  No results found for: \"CHOLHDLRATIO\"   BMP:    Lab Results   Component Value Date/Time     08/10/2024 06:59 PM     07/17/2024 04:03 AM    K 3.9 08/10/2024 06:59 PM    K 4.6 07/17/2024 04:03 AM    CL 98 08/10/2024 06:59 PM     07/17/2024 04:03 AM    CO2 24 08/10/2024 06:59 PM    CO2 23 07/17/2024 04:03 AM    BUN 12 08/10/2024 06:59 PM    BUN 26 07/17/2024 04:03 AM    CREATININE 0.7 08/10/2024 06:59

## 2024-08-14 NOTE — PATIENT INSTRUCTIONS
CHRONIC VENOUS INSUFFICIENCY:yes,    Patient has symptomatic C6 disease.    Will order venous ultrasound studies for reflex documentation.    Advised patient to continue to wear compression stockings.  Need to Diet Exercise & Loose weight.  Increase walking while wearing compression stockings.  Keep feet propped up while seated.    PAD: Patient is a long-term diabetic, used to smoke in the past, has obesity and has lost several fingers and toes to diabetic and ulcer complications.  I suggest checking for peripheral arterial disease for assessment of limb circulation and in addition if he needed any intervention of the venous problems also this piece of information is important.    I spent about 30 min. of time in review of the available data, chart Prep., interviewing patient, obtaining history, performing physical exam, going through decision making analysis for assessment & plans of management on this patient.    Office Visit for test results.

## 2024-08-16 ENCOUNTER — HOSPITAL ENCOUNTER (OUTPATIENT)
Dept: WOUND CARE | Age: 59
Discharge: HOME | End: 2024-08-16
Attending: NURSE PRACTITIONER
Payer: MEDICARE

## 2024-08-16 VITALS
RESPIRATION RATE: 16 BRPM | DIASTOLIC BLOOD PRESSURE: 68 MMHG | HEART RATE: 84 BPM | TEMPERATURE: 97.1 F | SYSTOLIC BLOOD PRESSURE: 92 MMHG

## 2024-08-16 DIAGNOSIS — E11.628 DIABETIC FOOT INFECTION (HCC): Primary | ICD-10-CM

## 2024-08-16 DIAGNOSIS — L97.529 TYPE 2 DIABETES MELLITUS WITH LEFT DIABETIC FOOT ULCER (HCC): ICD-10-CM

## 2024-08-16 DIAGNOSIS — E11.628 TYPE 2 DIABETES MELLITUS WITH LEFT DIABETIC FOOT INFECTION (HCC): ICD-10-CM

## 2024-08-16 DIAGNOSIS — L08.9 DIABETIC FOOT INFECTION (HCC): Primary | ICD-10-CM

## 2024-08-16 DIAGNOSIS — Z89.421 S/P AMPUTATION OF LESSER TOE, RIGHT (HCC): ICD-10-CM

## 2024-08-16 DIAGNOSIS — M86.9 OSTEOMYELITIS OF LEFT FOOT, UNSPECIFIED TYPE (HCC): ICD-10-CM

## 2024-08-16 DIAGNOSIS — L08.9 TYPE 2 DIABETES MELLITUS WITH LEFT DIABETIC FOOT INFECTION (HCC): ICD-10-CM

## 2024-08-16 DIAGNOSIS — E11.621 TYPE 2 DIABETES MELLITUS WITH LEFT DIABETIC FOOT ULCER (HCC): ICD-10-CM

## 2024-08-16 DIAGNOSIS — L97.522 SKIN ULCER OF TOE OF LEFT FOOT WITH FAT LAYER EXPOSED (HCC): ICD-10-CM

## 2024-08-16 DIAGNOSIS — T81.89XA NON-HEALING SURGICAL WOUND, INITIAL ENCOUNTER: ICD-10-CM

## 2024-08-16 PROCEDURE — 11042 DBRDMT SUBQ TIS 1ST 20SQCM/<: CPT

## 2024-08-16 RX ORDER — CLOBETASOL PROPIONATE 0.5 MG/G
OINTMENT TOPICAL ONCE
OUTPATIENT
Start: 2024-08-16 | End: 2024-08-16

## 2024-08-16 RX ORDER — OXYCODONE HYDROCHLORIDE AND ACETAMINOPHEN 5; 325 MG/1; MG/1
1 TABLET ORAL EVERY 12 HOURS PRN
Qty: 14 TABLET | Refills: 0 | Status: SHIPPED | OUTPATIENT
Start: 2024-08-16 | End: 2024-08-23

## 2024-08-16 RX ORDER — SODIUM CHLOR/HYPOCHLOROUS ACID 0.033 %
SOLUTION, IRRIGATION IRRIGATION ONCE
OUTPATIENT
Start: 2024-08-16 | End: 2024-08-16

## 2024-08-16 RX ORDER — BACITRACIN ZINC 500 [USP'U]/G
OINTMENT TOPICAL ONCE
OUTPATIENT
Start: 2024-08-16 | End: 2024-08-16

## 2024-08-16 RX ORDER — IBUPROFEN 200 MG
TABLET ORAL ONCE
OUTPATIENT
Start: 2024-08-16 | End: 2024-08-16

## 2024-08-16 RX ORDER — GENTAMICIN SULFATE 1 MG/G
OINTMENT TOPICAL ONCE
OUTPATIENT
Start: 2024-08-16 | End: 2024-08-16

## 2024-08-16 RX ORDER — BETAMETHASONE DIPROPIONATE 0.5 MG/G
CREAM TOPICAL ONCE
OUTPATIENT
Start: 2024-08-16 | End: 2024-08-16

## 2024-08-16 RX ORDER — TRIAMCINOLONE ACETONIDE 1 MG/G
OINTMENT TOPICAL ONCE
OUTPATIENT
Start: 2024-08-16 | End: 2024-08-16

## 2024-08-16 RX ORDER — BACITRACIN ZINC AND POLYMYXIN B SULFATE 500; 1000 [USP'U]/G; [USP'U]/G
OINTMENT TOPICAL ONCE
OUTPATIENT
Start: 2024-08-16 | End: 2024-08-16

## 2024-08-16 ASSESSMENT — PAIN DESCRIPTION - LOCATION: LOCATION: FOOT

## 2024-08-16 ASSESSMENT — PAIN DESCRIPTION - ONSET: ONSET: ON-GOING

## 2024-08-16 ASSESSMENT — PAIN DESCRIPTION - DESCRIPTORS: DESCRIPTORS: THROBBING

## 2024-08-16 ASSESSMENT — PAIN SCALES - GENERAL: PAINLEVEL_OUTOF10: 8

## 2024-08-16 ASSESSMENT — PAIN - FUNCTIONAL ASSESSMENT: PAIN_FUNCTIONAL_ASSESSMENT: PREVENTS OR INTERFERES SOME ACTIVE ACTIVITIES AND ADLS

## 2024-08-16 ASSESSMENT — PAIN DESCRIPTION - ORIENTATION: ORIENTATION: LEFT

## 2024-08-16 ASSESSMENT — PAIN DESCRIPTION - FREQUENCY: FREQUENCY: CONTINUOUS

## 2024-08-16 NOTE — PATIENT INSTRUCTIONS
PHYSICIAN ORDERS AND DISCHARGE INSTRUCTIONS    NOTE: Upon discharge from the Wound Center, you will receive a patient experience survey. We would be grateful if you would take the time to fill this survey out.    Wound cleansing:     Do not scrub or use excessive force.   Wash hands with soap and water before and after dressing changes.   Prior to applying a clean dressing, cleanse wound with normal saline, wound cleanser, or mild soap and water.    Ask the physician or nurse before getting the wound(s) wet in a shower         Orders for this week: 8/16/2024    Fax to [facility]!       Finger Wounds - Clean with soap and water, pat dry  Apply silvadene and stimulen powder to wound bed  Cover with Gentac Border  Change Daily     Left Great Toe Amp: Clean with soap and water, pat dry  Apply puracol ag to wound bed   Wrap with conform and coban   Change Monday and Wednesday     Dispense 30 day quantity when ordering supplies.      Follow up with Raf Kuo CNP in 1 week in the wound care center.  Call  for any questions or concerns.

## 2024-08-16 NOTE — PROGRESS NOTES
Wound Care Center Progress Note With Procedure    Toi Briones  AGE: 58 y.o.   GENDER: male  : 1965  EPISODE DATE:  2024     Subjective:     Chief Complaint   Patient presents with    Wound Check     Left foot         HISTORY of PRESENT ILLNESS      Toi Briones is a 58 y.o. male who presents to the Wound Clinic for a visit for evaluation and treatment of Acute non-healing surgical  ulcer(s) of  Lt. foot medial, hallux.  The condition is of moderate severity. The ulcer has been present for 2.5 weeks.  The underlying cause is thought to be nonhealing surgical.  The patients care to date has included normal post-op care. The patient has significant underlying medical conditions as below.     2024: Patient finger is looking better and foot wound has improved. He only started PO a few days ago but helping so far. Went to ER with left lower extremity cellulitis.      2024: Patient finger wound is worse and there is an odor now. Patient has various missing digits on both hands and seems to loose more parts of his fingers regularly. Needs antibiotics and will largely keep orders the same. Predict that patient will need an amputation at some point but hard to tell     2024: Patient needs better orders for finger wounds  Leg improving  Cardiology looking for him for appointment and will give him the number  Requesting pain med refill     2024: Patient did great with vac and we will discontinue at this time. Will wrap again this week.   Patient complains of claudication symptoms. Referral to cardiology for arterial work up      2024: Patient was inpatient and missed last week. Continued improvement. Will give a week off from Npt this week and likely reapply next 2024: Patient had a good week. Will go week to week but will consider a week break from the vac soon  Patient wants to change pain meds which is fine if it is the same dose     2024: Per ID no antibiotics

## 2024-08-23 ENCOUNTER — HOSPITAL ENCOUNTER (OUTPATIENT)
Dept: WOUND CARE | Age: 59
Discharge: HOME | End: 2024-08-23
Attending: NURSE PRACTITIONER
Payer: MEDICARE

## 2024-08-23 VITALS
RESPIRATION RATE: 18 BRPM | HEART RATE: 84 BPM | TEMPERATURE: 97.5 F | SYSTOLIC BLOOD PRESSURE: 129 MMHG | DIASTOLIC BLOOD PRESSURE: 77 MMHG

## 2024-08-23 DIAGNOSIS — E11.621 TYPE 2 DIABETES MELLITUS WITH LEFT DIABETIC FOOT ULCER (HCC): ICD-10-CM

## 2024-08-23 DIAGNOSIS — L97.522 SKIN ULCER OF TOE OF LEFT FOOT WITH FAT LAYER EXPOSED (HCC): ICD-10-CM

## 2024-08-23 DIAGNOSIS — E11.628 TYPE 2 DIABETES MELLITUS WITH LEFT DIABETIC FOOT INFECTION (HCC): ICD-10-CM

## 2024-08-23 DIAGNOSIS — T81.89XA NON-HEALING SURGICAL WOUND, INITIAL ENCOUNTER: ICD-10-CM

## 2024-08-23 DIAGNOSIS — L08.9 DIABETIC FOOT INFECTION (HCC): Primary | ICD-10-CM

## 2024-08-23 DIAGNOSIS — M86.9 OSTEOMYELITIS OF LEFT FOOT, UNSPECIFIED TYPE (HCC): ICD-10-CM

## 2024-08-23 DIAGNOSIS — E11.628 DIABETIC FOOT INFECTION (HCC): Primary | ICD-10-CM

## 2024-08-23 DIAGNOSIS — L08.9 TYPE 2 DIABETES MELLITUS WITH LEFT DIABETIC FOOT INFECTION (HCC): ICD-10-CM

## 2024-08-23 DIAGNOSIS — L97.529 TYPE 2 DIABETES MELLITUS WITH LEFT DIABETIC FOOT ULCER (HCC): ICD-10-CM

## 2024-08-23 PROCEDURE — 11042 DBRDMT SUBQ TIS 1ST 20SQCM/<: CPT

## 2024-08-23 RX ORDER — BETAMETHASONE DIPROPIONATE 0.5 MG/G
CREAM TOPICAL ONCE
OUTPATIENT
Start: 2024-08-23 | End: 2024-08-23

## 2024-08-23 RX ORDER — OXYCODONE HYDROCHLORIDE AND ACETAMINOPHEN 5; 325 MG/1; MG/1
1 TABLET ORAL EVERY 12 HOURS PRN
Qty: 14 TABLET | Refills: 0 | Status: SHIPPED | OUTPATIENT
Start: 2024-08-23 | End: 2024-08-30

## 2024-08-23 RX ORDER — BACITRACIN ZINC 500 [USP'U]/G
OINTMENT TOPICAL ONCE
OUTPATIENT
Start: 2024-08-23 | End: 2024-08-23

## 2024-08-23 RX ORDER — CLOBETASOL PROPIONATE 0.5 MG/G
OINTMENT TOPICAL ONCE
OUTPATIENT
Start: 2024-08-23 | End: 2024-08-23

## 2024-08-23 RX ORDER — IBUPROFEN 200 MG
TABLET ORAL ONCE
OUTPATIENT
Start: 2024-08-23 | End: 2024-08-23

## 2024-08-23 RX ORDER — SODIUM CHLOR/HYPOCHLOROUS ACID 0.033 %
SOLUTION, IRRIGATION IRRIGATION ONCE
OUTPATIENT
Start: 2024-08-23 | End: 2024-08-23

## 2024-08-23 RX ORDER — BACITRACIN ZINC AND POLYMYXIN B SULFATE 500; 1000 [USP'U]/G; [USP'U]/G
OINTMENT TOPICAL ONCE
OUTPATIENT
Start: 2024-08-23 | End: 2024-08-23

## 2024-08-23 RX ORDER — TRIAMCINOLONE ACETONIDE 1 MG/G
OINTMENT TOPICAL ONCE
OUTPATIENT
Start: 2024-08-23 | End: 2024-08-23

## 2024-08-23 RX ORDER — GENTAMICIN SULFATE 1 MG/G
OINTMENT TOPICAL ONCE
OUTPATIENT
Start: 2024-08-23 | End: 2024-08-23

## 2024-08-23 ASSESSMENT — PAIN SCALES - GENERAL: PAINLEVEL_OUTOF10: 9

## 2024-08-23 ASSESSMENT — PAIN DESCRIPTION - ORIENTATION: ORIENTATION: LEFT

## 2024-08-23 ASSESSMENT — PAIN DESCRIPTION - DESCRIPTORS: DESCRIPTORS: THROBBING

## 2024-08-23 ASSESSMENT — PAIN DESCRIPTION - PAIN TYPE: TYPE: CHRONIC PAIN

## 2024-08-23 ASSESSMENT — PAIN DESCRIPTION - ONSET: ONSET: ON-GOING

## 2024-08-23 ASSESSMENT — PAIN DESCRIPTION - FREQUENCY: FREQUENCY: CONTINUOUS

## 2024-08-23 ASSESSMENT — PAIN DESCRIPTION - LOCATION: LOCATION: FOOT

## 2024-08-23 NOTE — PATIENT INSTRUCTIONS
PHYSICIAN ORDERS AND DISCHARGE INSTRUCTIONS    NOTE: Upon discharge from the Wound Center, you will receive a patient experience survey. We would be grateful if you would take the time to fill this survey out.    Wound cleansing:     Do not scrub or use excessive force.   Wash hands with soap and water before and after dressing changes.   Prior to applying a clean dressing, cleanse wound with normal saline, wound cleanser, or mild soap and water.    Ask the physician or nurse before getting the wound(s) wet in a shower         Orders for this week: 8/23/2024    Fax to [facility]!       Finger Wounds - Clean with soap and water, pat dry  Apply silvadene and stimulen powder to wound bed  Cover with Gentac Border  Change Daily     Left Great Toe Amp: Clean with soap and water, pat dry  Apply puracol ag to wound bed   Wrap with conform and coban   Change Monday and Wednesday     Dispense 30 day quantity when ordering supplies.      Follow up with Raf Kuo CNP in 1 week in the wound care center.  Call  for any questions or concerns.

## 2024-08-23 NOTE — PROGRESS NOTES
Wound Size % (l*w) 55.88 08/23/24 0948   Wound Volume (cm^3) 0.075 cm^3 08/23/24 0948   Wound Healing % 56 08/23/24 0948   Post-Procedure Length (cm) 0.5 cm 08/23/24 1015   Post-Procedure Width (cm) 1.5 cm 08/23/24 1015   Post-Procedure Depth (cm) 0.1 cm 08/23/24 1015   Post-Procedure Surface Area (cm^2) 0.75 cm^2 08/23/24 1015   Post-Procedure Volume (cm^3) 0.075 cm^3 08/23/24 1015   Distance Tunneling (cm) 0 cm 08/16/24 1010   Tunneling Position ___ O'Clock 0 08/16/24 1010   Undermining Starts ___ O'Clock 0 08/16/24 1010   Undermining Ends___ O'Clock 0 08/16/24 1010   Undermining Maxium Distance (cm) 0 08/16/24 1010   Wound Assessment Pink/red 08/23/24 0948   Drainage Amount None (dry) 08/23/24 0948   Drainage Description Serosanguinous 08/09/24 1251   Odor None 08/23/24 0948   Thu-wound Assessment Intact 08/23/24 0948   Margins Defined edges 08/23/24 0948   Wound Thickness Description not for Pressure Injury Partial thickness 08/23/24 0948   Number of days: 27       Wound 08/16/24 Finger (Comment which one) Posterior;Right 4th finger (Active)   Wound Image   08/16/24 1014   Wound Etiology Diabetic 08/23/24 0948   Dressing Status New dressing applied 08/16/24 1045   Wound Cleansed Wound cleanser 08/23/24 0948   Offloading for Diabetic Foot Ulcers Offloading not required 08/16/24 1045   Wound Length (cm) 0.1 cm 08/23/24 0948   Wound Width (cm) 0.1 cm 08/23/24 0948   Wound Depth (cm) 0.1 cm 08/23/24 0948   Wound Surface Area (cm^2) 0.01 cm^2 08/23/24 0948   Change in Wound Size % (l*w) 96.67 08/23/24 0948   Wound Volume (cm^3) 0.001 cm^3 08/23/24 0948   Wound Healing % 97 08/23/24 0948   Post-Procedure Length (cm) 0.1 cm 08/23/24 1015   Post-Procedure Width (cm) 0.1 cm 08/23/24 1015   Post-Procedure Depth (cm) 0.1 cm 08/23/24 1015   Post-Procedure Surface Area (cm^2) 0.01 cm^2 08/23/24 1015   Post-Procedure Volume (cm^3) 0.001 cm^3 08/23/24 1015   Distance Tunneling (cm) 0 cm 08/16/24 1014   Tunneling Position ___

## 2024-08-29 ENCOUNTER — HOSPITAL ENCOUNTER (EMERGENCY)
Age: 59
Discharge: HOME | End: 2024-08-29
Payer: MEDICARE

## 2024-08-29 VITALS
HEIGHT: 71 IN | OXYGEN SATURATION: 97 % | WEIGHT: 260 LBS | SYSTOLIC BLOOD PRESSURE: 119 MMHG | RESPIRATION RATE: 18 BRPM | TEMPERATURE: 98.4 F | BODY MASS INDEX: 36.4 KG/M2 | HEART RATE: 94 BPM | DIASTOLIC BLOOD PRESSURE: 72 MMHG

## 2024-08-29 DIAGNOSIS — Z79.4 TYPE 2 DIABETES MELLITUS WITH DIABETIC DERMATITIS, WITH LONG-TERM CURRENT USE OF INSULIN (HCC): Primary | ICD-10-CM

## 2024-08-29 DIAGNOSIS — E11.620 TYPE 2 DIABETES MELLITUS WITH DIABETIC DERMATITIS, WITH LONG-TERM CURRENT USE OF INSULIN (HCC): Primary | ICD-10-CM

## 2024-08-29 DIAGNOSIS — T23.199A: ICD-10-CM

## 2024-08-29 DIAGNOSIS — S68.119D AMPUTATION OF FINGER WITHOUT COMPLICATION, SUBSEQUENT ENCOUNTER: ICD-10-CM
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
## 2024-08-29 PROCEDURE — 6370000000 HC RX 637 (ALT 250 FOR IP)

## 2024-08-29 PROCEDURE — 99283 EMERGENCY DEPT VISIT LOW MDM: CPT

## 2024-08-29 RX ORDER — OXYCODONE AND ACETAMINOPHEN 7.5; 325 MG/1; MG/1
1 TABLET ORAL
Status: COMPLETED | OUTPATIENT
Start: 2024-08-29 | End: 2024-08-29

## 2024-08-29 RX ORDER — DOXYCYCLINE HYCLATE 100 MG
100 TABLET ORAL 2 TIMES DAILY
Qty: 14 TABLET | Refills: 0 | Status: SHIPPED | OUTPATIENT
Start: 2024-08-29 | End: 2024-09-05

## 2024-08-29 RX ORDER — DOXYCYCLINE HYCLATE 100 MG
100 TABLET ORAL
Status: COMPLETED | OUTPATIENT
Start: 2024-08-29 | End: 2024-08-29

## 2024-08-29 RX ORDER — MUPIROCIN 20 MG/G
OINTMENT TOPICAL
Qty: 30 G | Refills: 0 | Status: SHIPPED | OUTPATIENT
Start: 2024-08-29 | End: 2024-09-05

## 2024-08-29 RX ADMIN — DOXYCYCLINE HYCLATE 100 MG: 100 TABLET, COATED ORAL at 14:21

## 2024-08-29 RX ADMIN — OXYCODONE HYDROCHLORIDE AND ACETAMINOPHEN 1 TABLET: 7.5; 325 TABLET ORAL at 14:21

## 2024-08-29 ASSESSMENT — PAIN DESCRIPTION - LOCATION: LOCATION: FINGER (COMMENT WHICH ONE);HAND

## 2024-08-29 ASSESSMENT — PAIN DESCRIPTION - DESCRIPTORS: DESCRIPTORS: THROBBING

## 2024-08-29 ASSESSMENT — PAIN SCALES - GENERAL
PAINLEVEL_OUTOF10: 9

## 2024-08-29 ASSESSMENT — PAIN DESCRIPTION - PAIN TYPE: TYPE: ACUTE PAIN

## 2024-08-29 ASSESSMENT — PAIN - FUNCTIONAL ASSESSMENT: PAIN_FUNCTIONAL_ASSESSMENT: 0-10

## 2024-08-29 ASSESSMENT — PAIN DESCRIPTION - ORIENTATION: ORIENTATION: RIGHT;LEFT

## 2024-08-29 ASSESSMENT — PAIN DESCRIPTION - FREQUENCY: FREQUENCY: CONTINUOUS
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
## 2024-08-29 NOTE — PATIENT INSTRUCTIONS
PHYSICIAN ORDERS AND DISCHARGE INSTRUCTIONS    NOTE: Upon discharge from the Wound Center, you will receive a patient experience survey. We would be grateful if you would take the time to fill this survey out.    Wound cleansing:     Do not scrub or use excessive force.   Wash hands with soap and water before and after dressing changes.   Prior to applying a clean dressing, cleanse wound with normal saline, wound cleanser, or mild soap and water.    Ask the physician or nurse before getting the wound(s) wet in a shower         Orders for this week: 8/30/2024    Fax to [facility]!       Finger Wounds - Clean with soap and water, pat dry  Apply silvadene and stimulen powder to wound bed  Cover with Gentac Border  Change Daily     Left Great Toe Amp: Clean with soap and water, pat dry  Apply puracol ag to wound bed   Wrap with conform and coban   Change Monday and Wednesday and Friday 9/6/24    Dispense 30 day quantity when ordering supplies.      Follow up with Raf Kuo CNP in 2 week in the wound care center.  Call  for any questions or concerns.

## 2024-08-30 ENCOUNTER — HOSPITAL ENCOUNTER (OUTPATIENT)
Dept: WOUND CARE | Age: 59
Discharge: HOME | End: 2024-08-30
Attending: NURSE PRACTITIONER
Payer: MEDICARE

## 2024-08-30 VITALS
RESPIRATION RATE: 18 BRPM | TEMPERATURE: 98 F | DIASTOLIC BLOOD PRESSURE: 64 MMHG | HEART RATE: 94 BPM | SYSTOLIC BLOOD PRESSURE: 97 MMHG

## 2024-08-30 DIAGNOSIS — L08.9 TYPE 2 DIABETES MELLITUS WITH LEFT DIABETIC FOOT INFECTION (HCC): ICD-10-CM

## 2024-08-30 DIAGNOSIS — E11.628 DIABETIC FOOT INFECTION (HCC): ICD-10-CM

## 2024-08-30 DIAGNOSIS — I83.893 VARICOSE VEINS OF BOTH LEGS WITH EDEMA: Primary | ICD-10-CM

## 2024-08-30 DIAGNOSIS — T14.8XXA WOUND INFECTION: ICD-10-CM

## 2024-08-30 DIAGNOSIS — E11.628 TYPE 2 DIABETES MELLITUS WITH LEFT DIABETIC FOOT INFECTION (HCC): ICD-10-CM

## 2024-08-30 DIAGNOSIS — T81.89XA NON-HEALING SURGICAL WOUND, INITIAL ENCOUNTER: ICD-10-CM

## 2024-08-30 DIAGNOSIS — M86.9 OSTEOMYELITIS OF LEFT FOOT, UNSPECIFIED TYPE (HCC): ICD-10-CM

## 2024-08-30 DIAGNOSIS — L08.9 WOUND INFECTION: ICD-10-CM

## 2024-08-30 DIAGNOSIS — T81.89XD NON-HEALING SURGICAL WOUND, SUBSEQUENT ENCOUNTER: ICD-10-CM

## 2024-08-30 DIAGNOSIS — Z89.421 S/P AMPUTATION OF LESSER TOE, RIGHT (HCC): ICD-10-CM

## 2024-08-30 DIAGNOSIS — E11.621 TYPE 2 DIABETES MELLITUS WITH LEFT DIABETIC FOOT ULCER (HCC): ICD-10-CM

## 2024-08-30 DIAGNOSIS — L97.529 TYPE 2 DIABETES MELLITUS WITH LEFT DIABETIC FOOT ULCER (HCC): ICD-10-CM

## 2024-08-30 DIAGNOSIS — L08.9 DIABETIC FOOT INFECTION (HCC): ICD-10-CM

## 2024-08-30 DIAGNOSIS — L97.522 SKIN ULCER OF TOE OF LEFT FOOT WITH FAT LAYER EXPOSED (HCC): ICD-10-CM

## 2024-08-30 PROCEDURE — 11042 DBRDMT SUBQ TIS 1ST 20SQCM/<: CPT | Performed by: NURSE PRACTITIONER

## 2024-08-30 PROCEDURE — 11042 DBRDMT SUBQ TIS 1ST 20SQCM/<: CPT

## 2024-08-30 RX ORDER — GENTAMICIN SULFATE 1 MG/G
OINTMENT TOPICAL ONCE
OUTPATIENT
Start: 2024-08-30 | End: 2024-08-30

## 2024-08-30 RX ORDER — TRIAMCINOLONE ACETONIDE 1 MG/G
OINTMENT TOPICAL ONCE
OUTPATIENT
Start: 2024-08-30 | End: 2024-08-30

## 2024-08-30 RX ORDER — SODIUM CHLOR/HYPOCHLOROUS ACID 0.033 %
SOLUTION, IRRIGATION IRRIGATION ONCE
OUTPATIENT
Start: 2024-08-30 | End: 2024-08-30

## 2024-08-30 RX ORDER — BACITRACIN ZINC 500 [USP'U]/G
OINTMENT TOPICAL ONCE
OUTPATIENT
Start: 2024-08-30 | End: 2024-08-30

## 2024-08-30 RX ORDER — OXYCODONE AND ACETAMINOPHEN 5; 325 MG/1; MG/1
1 TABLET ORAL EVERY 12 HOURS PRN
Qty: 14 TABLET | Refills: 0 | Status: SHIPPED | OUTPATIENT
Start: 2024-08-30 | End: 2024-09-06

## 2024-08-30 RX ORDER — MUPIROCIN 20 MG/G
OINTMENT TOPICAL ONCE
OUTPATIENT
Start: 2024-08-30 | End: 2024-08-30

## 2024-08-30 RX ORDER — NEOMYCIN/BACITRACIN/POLYMYXINB 3.5-400-5K
OINTMENT (GRAM) TOPICAL ONCE
OUTPATIENT
Start: 2024-08-30 | End: 2024-08-30

## 2024-08-30 RX ORDER — BETAMETHASONE DIPROPIONATE 0.5 MG/G
CREAM TOPICAL ONCE
OUTPATIENT
Start: 2024-08-30 | End: 2024-08-30

## 2024-08-30 RX ORDER — BACITRACIN ZINC AND POLYMYXIN B SULFATE 500; 1000 [USP'U]/G; [USP'U]/G
OINTMENT TOPICAL ONCE
OUTPATIENT
Start: 2024-08-30 | End: 2024-08-30

## 2024-08-30 RX ORDER — CLOBETASOL PROPIONATE 0.5 MG/G
OINTMENT TOPICAL ONCE
OUTPATIENT
Start: 2024-08-30 | End: 2024-08-30

## 2024-08-30 ASSESSMENT — PAIN DESCRIPTION - ORIENTATION: ORIENTATION: RIGHT;LEFT

## 2024-08-30 ASSESSMENT — PAIN SCALES - GENERAL: PAINLEVEL_OUTOF10: 10

## 2024-08-30 ASSESSMENT — PAIN DESCRIPTION - DESCRIPTORS: DESCRIPTORS: SHARP;STABBING;THROBBING

## 2024-08-30 NOTE — PROGRESS NOTES
08/30/24 1022   Wound Depth (cm) 0.1 cm 08/30/24 1022   Wound Surface Area (cm^2) 0.01 cm^2 08/30/24 1022   Change in Wound Size % (l*w) 96.67 08/30/24 1022   Wound Volume (cm^3) 0.001 cm^3 08/30/24 1022   Wound Healing % 97 08/30/24 1022   Post-Procedure Length (cm) 0.1 cm 08/23/24 1015   Post-Procedure Width (cm) 0.1 cm 08/23/24 1015   Post-Procedure Depth (cm) 0.1 cm 08/23/24 1015   Post-Procedure Surface Area (cm^2) 0.01 cm^2 08/23/24 1015   Post-Procedure Volume (cm^3) 0.001 cm^3 08/23/24 1015   Distance Tunneling (cm) 0 cm 08/30/24 1022   Tunneling Position ___ O'Clock 0 08/30/24 1022   Undermining Starts ___ O'Clock 0 08/30/24 1022   Undermining Ends___ O'Clock 0 08/30/24 1022   Undermining Maxium Distance (cm) 0 08/30/24 1022   Wound Assessment Eschar dry 08/30/24 1022   Drainage Amount None (dry) 08/30/24 1022   Odor None 08/30/24 1022   Thu-wound Assessment Intact 08/30/24 1022   Margins Defined edges 08/30/24 1022   Wound Thickness Description not for Pressure Injury Full thickness 08/30/24 1022   Number of days: 14       Wound 08/23/24 Finger (Comment which one) Right (Active)   Wound Image   08/23/24 0959   Wound Etiology Traumatic 08/23/24 1015   Dressing Status New dressing applied 08/23/24 1038   Wound Cleansed Wound cleanser 08/30/24 1022   Offloading for Diabetic Foot Ulcers Offloading not required 08/23/24 1038   Wound Length (cm) 6 cm 08/30/24 1022   Wound Width (cm) 0.4 cm 08/30/24 1022   Wound Depth (cm) 0.1 cm 08/30/24 1022   Wound Surface Area (cm^2) 2.4 cm^2 08/30/24 1022   Change in Wound Size % (l*w) -100 08/30/24 1022   Wound Volume (cm^3) 0.24 cm^3 08/30/24 1022   Wound Healing % -100 08/30/24 1022   Post-Procedure Length (cm) 1.2 cm 08/23/24 1015   Post-Procedure Width (cm) 1 cm 08/23/24 1015   Post-Procedure Depth (cm) 0.1 cm 08/23/24 1015   Post-Procedure Surface Area (cm^2) 1.2 cm^2 08/23/24 1015   Post-Procedure Volume (cm^3) 0.12 cm^3 08/23/24 1015   Distance Tunneling (cm) 0 cm  (callous);Maceration 08/30/24 1022   Margins Defined edges 08/30/24 1022   Wound Thickness Description not for Pressure Injury Full thickness 08/30/24 1022   Number of days: 0       Percent of Wound(s) Debrided: approximately 100%    Total  Area  Debrided:  11 sq cm     Bleeding:  Minimal    Hemostasis Achieved:  by pressure    Procedural Pain:  1  / 10     Post Procedural Pain:  0 / 10     Response to treatment:  Well tolerated by patient.     Status of wound progress and description from last visit:   healing over and getting new skin to wound.  Fingers stable for now    We will continue to prescribe patient opioid pain medication at this time. Patient understands all side effects and contraindications of opioids. No indication of abuse or seeking behavior. OARRS report checked at this time. We will continue to monitor.        Plan:       Patient Instructions   PHYSICIAN ORDERS AND DISCHARGE INSTRUCTIONS    NOTE: Upon discharge from the Wound Center, you will receive a patient experience survey. We would be grateful if you would take the time to fill this survey out.    Wound cleansing:     Do not scrub or use excessive force.   Wash hands with soap and water before and after dressing changes.   Prior to applying a clean dressing, cleanse wound with normal saline, wound cleanser, or mild soap and water.    Ask the physician or nurse before getting the wound(s) wet in a shower         Orders for this week: 8/30/2024    Fax to [facility]!       Finger Wounds - Clean with soap and water, pat dry  Apply silvadene and stimulen powder to wound bed  Cover with Gentac Border  Change Daily     Left Great Toe Amp: Clean with soap and water, pat dry  Apply puracol ag to wound bed   Wrap with conform and coban   Change Monday and Wednesday and Friday 9/6/24    Dispense 30 day quantity when ordering supplies.      Follow up with Raf Kuo CNP in 2 week in the wound care center.  Call  for any questions or

## 2024-09-08 ENCOUNTER — HOSPITAL ENCOUNTER (INPATIENT)
Age: 59
LOS: 1 days | Discharge: ANOTHER HOSPITAL | DRG: 637 | End: 2024-09-09
Attending: EMERGENCY MEDICINE | Admitting: STUDENT IN AN ORGANIZED HEALTH CARE EDUCATION/TRAINING PROGRAM
Payer: MEDICARE

## 2024-09-08 DIAGNOSIS — U07.1 COVID-19: ICD-10-CM

## 2024-09-08 DIAGNOSIS — L03.011 CELLULITIS OF FINGER OF RIGHT HAND: ICD-10-CM

## 2024-09-08 DIAGNOSIS — L98.499 INFECTED ULCER OF SKIN, UNSPECIFIED ULCER STAGE (HCC): Primary | ICD-10-CM

## 2024-09-08 DIAGNOSIS — M86.9 OSTEOMYELITIS OF RIGHT HAND, UNSPECIFIED TYPE (HCC): ICD-10-CM

## 2024-09-08 DIAGNOSIS — L08.9 INFECTED ULCER OF SKIN, UNSPECIFIED ULCER STAGE (HCC): Primary | ICD-10-CM

## 2024-09-08 LAB
ALBUMIN SERPL-MCNC: 3.8 G/DL (ref 3.4–5)
ALBUMIN/GLOB SERPL: 1.3 {RATIO} (ref 1.1–2.2)
ALP SERPL-CCNC: 86 U/L (ref 40–129)
ALT SERPL-CCNC: 6 U/L (ref 10–40)
ANION GAP SERPL CALCULATED.3IONS-SCNC: 12 MMOL/L (ref 9–17)
AST SERPL-CCNC: 20 U/L (ref 15–37)
BASOPHILS # BLD: 0.02 K/UL
BASOPHILS NFR BLD: 0 % (ref 0–1)
BILIRUB DIRECT SERPL-MCNC: 0.2 MG/DL (ref 0–0.3)
BILIRUB INDIRECT SERPL-MCNC: 0.2 MG/DL (ref 0–0.7)
BILIRUB SERPL-MCNC: 0.3 MG/DL (ref 0–1)
BUN SERPL-MCNC: 16 MG/DL (ref 7–20)
CALCIUM SERPL-MCNC: 9 MG/DL (ref 8.3–10.6)
CHLORIDE SERPL-SCNC: 101 MMOL/L (ref 99–110)
CO2 SERPL-SCNC: 23 MMOL/L (ref 21–32)
CREAT SERPL-MCNC: 0.7 MG/DL (ref 0.9–1.3)
EOSINOPHIL # BLD: 0.17 K/UL
EOSINOPHILS RELATIVE PERCENT: 2 % (ref 0–3)
ERYTHROCYTE [DISTWIDTH] IN BLOOD BY AUTOMATED COUNT: 14.6 % (ref 11.7–14.9)
GFR, ESTIMATED: >90 ML/MIN/1.73M2
GLOBULIN SER CALC-MCNC: 2.9 G/DL
GLUCOSE SERPL-MCNC: 152 MG/DL (ref 74–99)
HCT VFR BLD AUTO: 36.1 % (ref 42–52)
HGB BLD-MCNC: 11.6 G/DL (ref 13.5–18)
IMM GRANULOCYTES # BLD AUTO: 0.01 K/UL
IMM GRANULOCYTES NFR BLD: 0 %
INR PPP: 1.1
LACTATE BLDV-SCNC: 0.8 MMOL/L (ref 0.4–2)
LYMPHOCYTES NFR BLD: 1.17 K/UL
LYMPHOCYTES RELATIVE PERCENT: 16 % (ref 24–44)
MCH RBC QN AUTO: 27 PG (ref 27–31)
MCHC RBC AUTO-ENTMCNC: 32.1 G/DL (ref 32–36)
MCV RBC AUTO: 84 FL (ref 78–100)
MONOCYTES NFR BLD: 0.67 K/UL
MONOCYTES NFR BLD: 9 % (ref 0–4)
NEUTROPHILS NFR BLD: 72 % (ref 36–66)
NEUTS SEG NFR BLD: 5.34 K/UL
PARTIAL THROMBOPLASTIN TIME: 35.1 SEC (ref 25.1–37.1)
PLATELET # BLD AUTO: 183 K/UL (ref 140–440)
PMV BLD AUTO: 9.9 FL (ref 7.5–11.1)
POTASSIUM SERPL-SCNC: 3.8 MMOL/L (ref 3.5–5.1)
PROT SERPL-MCNC: 6.7 G/DL (ref 6.4–8.2)
PROTHROMBIN TIME: 14 SEC (ref 11.7–14.5)
RBC # BLD AUTO: 4.3 M/UL (ref 4.6–6.2)
SARS-COV-2 RDRP RESP QL NAA+PROBE: DETECTED
SODIUM SERPL-SCNC: 136 MMOL/L (ref 136–145)
SPECIMEN DESCRIPTION: ABNORMAL
WBC OTHER # BLD: 7.4 K/UL (ref 4–10.5)

## 2024-09-08 PROCEDURE — 83605 ASSAY OF LACTIC ACID: CPT

## 2024-09-08 PROCEDURE — 87070 CULTURE OTHR SPECIMN AEROBIC: CPT

## 2024-09-08 PROCEDURE — 99285 EMERGENCY DEPT VISIT HI MDM: CPT

## 2024-09-08 PROCEDURE — 6360000002 HC RX W HCPCS: Performed by: EMERGENCY MEDICINE

## 2024-09-08 PROCEDURE — 96365 THER/PROPH/DIAG IV INF INIT: CPT

## 2024-09-08 PROCEDURE — 87040 BLOOD CULTURE FOR BACTERIA: CPT

## 2024-09-08 PROCEDURE — 2580000003 HC RX 258: Performed by: EMERGENCY MEDICINE

## 2024-09-08 PROCEDURE — 82248 BILIRUBIN DIRECT: CPT

## 2024-09-08 PROCEDURE — 85730 THROMBOPLASTIN TIME PARTIAL: CPT

## 2024-09-08 PROCEDURE — 87186 SC STD MICRODIL/AGAR DIL: CPT

## 2024-09-08 PROCEDURE — 85610 PROTHROMBIN TIME: CPT

## 2024-09-08 PROCEDURE — 85025 COMPLETE CBC W/AUTO DIFF WBC: CPT

## 2024-09-08 PROCEDURE — 87075 CULTR BACTERIA EXCEPT BLOOD: CPT

## 2024-09-08 PROCEDURE — 87205 SMEAR GRAM STAIN: CPT

## 2024-09-08 PROCEDURE — 96368 THER/DIAG CONCURRENT INF: CPT

## 2024-09-08 PROCEDURE — 87635 SARS-COV-2 COVID-19 AMP PRB: CPT

## 2024-09-08 PROCEDURE — 80053 COMPREHEN METABOLIC PANEL: CPT

## 2024-09-08 RX ADMIN — SODIUM CHLORIDE 2500 MG: 9 INJECTION, SOLUTION INTRAVENOUS at 23:21

## 2024-09-08 RX ADMIN — PIPERACILLIN AND TAZOBACTAM 3375 MG: 3; .375 INJECTION, POWDER, LYOPHILIZED, FOR SOLUTION INTRAVENOUS at 23:27

## 2024-09-08 ASSESSMENT — PAIN SCALES - GENERAL: PAINLEVEL_OUTOF10: 3

## 2024-09-08 ASSESSMENT — LIFESTYLE VARIABLES
HOW MANY STANDARD DRINKS CONTAINING ALCOHOL DO YOU HAVE ON A TYPICAL DAY: PATIENT DOES NOT DRINK
HOW OFTEN DO YOU HAVE A DRINK CONTAINING ALCOHOL: NEVER

## 2024-09-09 ENCOUNTER — APPOINTMENT (OUTPATIENT)
Dept: CT IMAGING | Age: 59
DRG: 637 | End: 2024-09-09
Payer: MEDICARE

## 2024-09-09 ENCOUNTER — APPOINTMENT (OUTPATIENT)
Dept: GENERAL RADIOLOGY | Age: 59
DRG: 637 | End: 2024-09-09
Payer: MEDICARE

## 2024-09-09 VITALS
DIASTOLIC BLOOD PRESSURE: 73 MMHG | SYSTOLIC BLOOD PRESSURE: 130 MMHG | TEMPERATURE: 98.4 F | HEIGHT: 71 IN | BODY MASS INDEX: 36.4 KG/M2 | RESPIRATION RATE: 16 BRPM | HEART RATE: 85 BPM | WEIGHT: 260 LBS | OXYGEN SATURATION: 94 %

## 2024-09-09 PROBLEM — M86.9 OSTEOMYELITIS OF FINGER OF RIGHT HAND (HCC): Status: ACTIVE | Noted: 2024-09-09

## 2024-09-09 LAB
CRP SERPL HS-MCNC: 126 MG/L (ref 0–5)
ERYTHROCYTE [SEDIMENTATION RATE] IN BLOOD BY WESTERGREN METHOD: 51 MM/HR (ref 0–20)
GLUCOSE BLD-MCNC: 227 MG/DL (ref 74–99)

## 2024-09-09 PROCEDURE — 6360000002 HC RX W HCPCS: Performed by: STUDENT IN AN ORGANIZED HEALTH CARE EDUCATION/TRAINING PROGRAM

## 2024-09-09 PROCEDURE — 71045 X-RAY EXAM CHEST 1 VIEW: CPT

## 2024-09-09 PROCEDURE — 73201 CT UPPER EXTREMITY W/DYE: CPT

## 2024-09-09 PROCEDURE — 86140 C-REACTIVE PROTEIN: CPT

## 2024-09-09 PROCEDURE — 2580000003 HC RX 258: Performed by: STUDENT IN AN ORGANIZED HEALTH CARE EDUCATION/TRAINING PROGRAM

## 2024-09-09 PROCEDURE — 85652 RBC SED RATE AUTOMATED: CPT

## 2024-09-09 PROCEDURE — 6360000004 HC RX CONTRAST MEDICATION: Performed by: EMERGENCY MEDICINE

## 2024-09-09 PROCEDURE — 36415 COLL VENOUS BLD VENIPUNCTURE: CPT

## 2024-09-09 PROCEDURE — 82962 GLUCOSE BLOOD TEST: CPT

## 2024-09-09 PROCEDURE — 6370000000 HC RX 637 (ALT 250 FOR IP): Performed by: STUDENT IN AN ORGANIZED HEALTH CARE EDUCATION/TRAINING PROGRAM

## 2024-09-09 PROCEDURE — 1200000000 HC SEMI PRIVATE

## 2024-09-09 RX ORDER — MAGNESIUM SULFATE IN WATER 40 MG/ML
2000 INJECTION, SOLUTION INTRAVENOUS PRN
Status: DISCONTINUED | OUTPATIENT
Start: 2024-09-09 | End: 2024-09-09 | Stop reason: HOSPADM

## 2024-09-09 RX ORDER — HYDROCODONE BITARTRATE AND ACETAMINOPHEN 5; 325 MG/1; MG/1
1 TABLET ORAL EVERY 6 HOURS PRN
Status: DISCONTINUED | OUTPATIENT
Start: 2024-09-09 | End: 2024-09-09

## 2024-09-09 RX ORDER — ACETAMINOPHEN 325 MG/1
650 TABLET ORAL EVERY 6 HOURS PRN
Status: DISCONTINUED | OUTPATIENT
Start: 2024-09-09 | End: 2024-09-09 | Stop reason: HOSPADM

## 2024-09-09 RX ORDER — BUDESONIDE AND FORMOTEROL FUMARATE DIHYDRATE 160; 4.5 UG/1; UG/1
2 AEROSOL RESPIRATORY (INHALATION)
Status: DISCONTINUED | OUTPATIENT
Start: 2024-09-09 | End: 2024-09-09 | Stop reason: HOSPADM

## 2024-09-09 RX ORDER — SODIUM CHLORIDE 0.9 % (FLUSH) 0.9 %
5-40 SYRINGE (ML) INJECTION EVERY 12 HOURS SCHEDULED
Status: DISCONTINUED | OUTPATIENT
Start: 2024-09-09 | End: 2024-09-09 | Stop reason: HOSPADM

## 2024-09-09 RX ORDER — ALBUTEROL SULFATE 90 UG/1
2 AEROSOL, METERED RESPIRATORY (INHALATION)
Status: DISCONTINUED | OUTPATIENT
Start: 2024-09-09 | End: 2024-09-09 | Stop reason: HOSPADM

## 2024-09-09 RX ORDER — IOPAMIDOL 755 MG/ML
75 INJECTION, SOLUTION INTRAVASCULAR
Status: COMPLETED | OUTPATIENT
Start: 2024-09-09 | End: 2024-09-09

## 2024-09-09 RX ORDER — INSULIN LISPRO 100 [IU]/ML
0-4 INJECTION, SOLUTION INTRAVENOUS; SUBCUTANEOUS NIGHTLY
Status: DISCONTINUED | OUTPATIENT
Start: 2024-09-09 | End: 2024-09-09 | Stop reason: HOSPADM

## 2024-09-09 RX ORDER — ATORVASTATIN CALCIUM 10 MG/1
10 TABLET, FILM COATED ORAL DAILY
Status: DISCONTINUED | OUTPATIENT
Start: 2024-09-09 | End: 2024-09-09 | Stop reason: HOSPADM

## 2024-09-09 RX ORDER — POTASSIUM CHLORIDE 7.45 MG/ML
10 INJECTION INTRAVENOUS PRN
Status: DISCONTINUED | OUTPATIENT
Start: 2024-09-09 | End: 2024-09-09 | Stop reason: HOSPADM

## 2024-09-09 RX ORDER — INSULIN LISPRO 100 [IU]/ML
0-4 INJECTION, SOLUTION INTRAVENOUS; SUBCUTANEOUS
Status: DISCONTINUED | OUTPATIENT
Start: 2024-09-09 | End: 2024-09-09 | Stop reason: HOSPADM

## 2024-09-09 RX ORDER — POTASSIUM CHLORIDE 1500 MG/1
40 TABLET, EXTENDED RELEASE ORAL PRN
Status: DISCONTINUED | OUTPATIENT
Start: 2024-09-09 | End: 2024-09-09 | Stop reason: HOSPADM

## 2024-09-09 RX ORDER — SODIUM CHLORIDE 0.9 % (FLUSH) 0.9 %
5-40 SYRINGE (ML) INJECTION PRN
Status: DISCONTINUED | OUTPATIENT
Start: 2024-09-09 | End: 2024-09-09 | Stop reason: HOSPADM

## 2024-09-09 RX ORDER — SODIUM CHLORIDE, SODIUM LACTATE, POTASSIUM CHLORIDE, CALCIUM CHLORIDE 600; 310; 30; 20 MG/100ML; MG/100ML; MG/100ML; MG/100ML
INJECTION, SOLUTION INTRAVENOUS CONTINUOUS
Status: DISCONTINUED | OUTPATIENT
Start: 2024-09-09 | End: 2024-09-09 | Stop reason: HOSPADM

## 2024-09-09 RX ORDER — HYDROCODONE BITARTRATE AND ACETAMINOPHEN 5; 325 MG/1; MG/1
1 TABLET ORAL EVERY 6 HOURS PRN
Status: DISCONTINUED | OUTPATIENT
Start: 2024-09-09 | End: 2024-09-09 | Stop reason: HOSPADM

## 2024-09-09 RX ORDER — DEXTROSE MONOHYDRATE 100 MG/ML
INJECTION, SOLUTION INTRAVENOUS CONTINUOUS PRN
Status: DISCONTINUED | OUTPATIENT
Start: 2024-09-09 | End: 2024-09-09 | Stop reason: HOSPADM

## 2024-09-09 RX ORDER — SODIUM CHLORIDE 9 MG/ML
INJECTION, SOLUTION INTRAVENOUS PRN
Status: DISCONTINUED | OUTPATIENT
Start: 2024-09-09 | End: 2024-09-09 | Stop reason: HOSPADM

## 2024-09-09 RX ORDER — ENOXAPARIN SODIUM 100 MG/ML
30 INJECTION SUBCUTANEOUS 2 TIMES DAILY
Status: DISCONTINUED | OUTPATIENT
Start: 2024-09-09 | End: 2024-09-09 | Stop reason: HOSPADM

## 2024-09-09 RX ORDER — PANTOPRAZOLE SODIUM 40 MG/1
40 TABLET, DELAYED RELEASE ORAL 2 TIMES DAILY
Status: DISCONTINUED | OUTPATIENT
Start: 2024-09-09 | End: 2024-09-09 | Stop reason: HOSPADM

## 2024-09-09 RX ORDER — POLYETHYLENE GLYCOL 3350 17 G/17G
17 POWDER, FOR SOLUTION ORAL DAILY PRN
Status: DISCONTINUED | OUTPATIENT
Start: 2024-09-09 | End: 2024-09-09 | Stop reason: HOSPADM

## 2024-09-09 RX ORDER — ACETAMINOPHEN 650 MG/1
650 SUPPOSITORY RECTAL EVERY 6 HOURS PRN
Status: DISCONTINUED | OUTPATIENT
Start: 2024-09-09 | End: 2024-09-09 | Stop reason: HOSPADM

## 2024-09-09 RX ORDER — LEVOFLOXACIN 5 MG/ML
750 INJECTION, SOLUTION INTRAVENOUS EVERY 24 HOURS
Status: DISCONTINUED | OUTPATIENT
Start: 2024-09-09 | End: 2024-09-09 | Stop reason: HOSPADM

## 2024-09-09 RX ORDER — GLUCAGON 1 MG/ML
1 KIT INJECTION PRN
Status: DISCONTINUED | OUTPATIENT
Start: 2024-09-09 | End: 2024-09-09 | Stop reason: HOSPADM

## 2024-09-09 RX ORDER — INSULIN LISPRO 100 [IU]/ML
0-4 INJECTION, SOLUTION INTRAVENOUS; SUBCUTANEOUS EVERY 4 HOURS
Status: DISCONTINUED | OUTPATIENT
Start: 2024-09-09 | End: 2024-09-09 | Stop reason: SDUPTHER

## 2024-09-09 RX ORDER — INSULIN GLARGINE 100 [IU]/ML
10 INJECTION, SOLUTION SUBCUTANEOUS NIGHTLY
Status: DISCONTINUED | OUTPATIENT
Start: 2024-09-09 | End: 2024-09-09 | Stop reason: HOSPADM

## 2024-09-09 RX ORDER — ONDANSETRON 4 MG/1
4 TABLET, ORALLY DISINTEGRATING ORAL EVERY 8 HOURS PRN
Status: DISCONTINUED | OUTPATIENT
Start: 2024-09-09 | End: 2024-09-09 | Stop reason: HOSPADM

## 2024-09-09 RX ORDER — ONDANSETRON 2 MG/ML
4 INJECTION INTRAMUSCULAR; INTRAVENOUS EVERY 6 HOURS PRN
Status: DISCONTINUED | OUTPATIENT
Start: 2024-09-09 | End: 2024-09-09 | Stop reason: HOSPADM

## 2024-09-09 RX ADMIN — ENOXAPARIN SODIUM 30 MG: 100 INJECTION SUBCUTANEOUS at 08:50

## 2024-09-09 RX ADMIN — LEVOFLOXACIN 750 MG: 5 INJECTION, SOLUTION INTRAVENOUS at 08:49

## 2024-09-09 RX ADMIN — SODIUM CHLORIDE, POTASSIUM CHLORIDE, SODIUM LACTATE AND CALCIUM CHLORIDE: 600; 310; 30; 20 INJECTION, SOLUTION INTRAVENOUS at 08:46

## 2024-09-09 RX ADMIN — SODIUM CHLORIDE, PRESERVATIVE FREE 10 ML: 5 INJECTION INTRAVENOUS at 08:51

## 2024-09-09 RX ADMIN — PANTOPRAZOLE SODIUM 40 MG: 40 TABLET, DELAYED RELEASE ORAL at 08:50

## 2024-09-09 RX ADMIN — HYDROCODONE BITARTRATE AND ACETAMINOPHEN 1 TABLET: 5; 325 TABLET ORAL at 09:19

## 2024-09-09 RX ADMIN — IOPAMIDOL 75 ML: 755 INJECTION, SOLUTION INTRAVENOUS at 02:47

## 2024-09-09 RX ADMIN — SERTRALINE HYDROCHLORIDE 25 MG: 50 TABLET ORAL at 08:50

## 2024-09-09 RX ADMIN — INSULIN LISPRO 1 UNITS: 100 INJECTION, SOLUTION INTRAVENOUS; SUBCUTANEOUS at 08:55

## 2024-09-09 RX ADMIN — ATORVASTATIN CALCIUM 10 MG: 10 TABLET, FILM COATED ORAL at 08:50

## 2024-09-09 ASSESSMENT — PAIN DESCRIPTION - DESCRIPTORS: DESCRIPTORS: SHARP

## 2024-09-09 ASSESSMENT — PAIN DESCRIPTION - ORIENTATION
ORIENTATION: RIGHT
ORIENTATION: RIGHT

## 2024-09-09 ASSESSMENT — PAIN SCALES - GENERAL
PAINLEVEL_OUTOF10: 8
PAINLEVEL_OUTOF10: 9
PAINLEVEL_OUTOF10: 8

## 2024-09-09 ASSESSMENT — PAIN - FUNCTIONAL ASSESSMENT: PAIN_FUNCTIONAL_ASSESSMENT: 0-10

## 2024-09-09 ASSESSMENT — PAIN DESCRIPTION - LOCATION
LOCATION: FINGER (COMMENT WHICH ONE)

## 2024-09-11 LAB
MICROORGANISM SPEC CULT: ABNORMAL
MICROORGANISM/AGENT SPEC: ABNORMAL
SPECIMEN DESCRIPTION: ABNORMAL

## 2024-09-14 LAB
MICROORGANISM SPEC CULT: NORMAL
SPECIMEN DESCRIPTION: NORMAL

## 2024-09-15 LAB
MICROORGANISM SPEC CULT: NORMAL
MICROORGANISM SPEC CULT: NORMAL
SERVICE CMNT-IMP: NORMAL
SERVICE CMNT-IMP: NORMAL
SPECIMEN DESCRIPTION: NORMAL
SPECIMEN DESCRIPTION: NORMAL

## 2024-09-18 ENCOUNTER — HOSPITAL ENCOUNTER (OUTPATIENT)
Dept: WOUND CARE | Age: 59
Discharge: HOME | End: 2024-09-18
Attending: NURSE PRACTITIONER
Payer: MEDICARE

## 2024-09-18 DIAGNOSIS — Z89.421 S/P AMPUTATION OF LESSER TOE, RIGHT (HCC): ICD-10-CM

## 2024-09-18 DIAGNOSIS — L08.9 FINGER INFECTION: ICD-10-CM

## 2024-09-18 DIAGNOSIS — T81.89XD NON-HEALING SURGICAL WOUND, SUBSEQUENT ENCOUNTER: ICD-10-CM

## 2024-09-18 DIAGNOSIS — M86.9 OSTEOMYELITIS OF FINGER OF RIGHT HAND (HCC): ICD-10-CM

## 2024-09-18 DIAGNOSIS — M86.9 OSTEOMYELITIS OF FINGER OF LEFT HAND (HCC): Primary | ICD-10-CM

## 2024-09-18 PROCEDURE — 11042 DBRDMT SUBQ TIS 1ST 20SQCM/<: CPT

## 2024-09-18 RX ORDER — SULFAMETHOXAZOLE AND TRIMETHOPRIM 200; 40 MG/5ML; MG/5ML
160 SUSPENSION ORAL 2 TIMES DAILY
COMMUNITY

## 2024-09-18 RX ORDER — OXYCODONE AND ACETAMINOPHEN 5; 325 MG/1; MG/1
1 TABLET ORAL EVERY 12 HOURS PRN
Qty: 10 TABLET | Refills: 0 | Status: SHIPPED | OUTPATIENT
Start: 2024-09-19 | End: 2024-09-25

## 2024-09-25 ENCOUNTER — HOSPITAL ENCOUNTER (OUTPATIENT)
Dept: WOUND CARE | Age: 59
Discharge: HOME | End: 2024-09-25
Attending: NURSE PRACTITIONER
Payer: MEDICARE

## 2024-09-25 VITALS
HEART RATE: 84 BPM | DIASTOLIC BLOOD PRESSURE: 74 MMHG | RESPIRATION RATE: 16 BRPM | SYSTOLIC BLOOD PRESSURE: 108 MMHG | TEMPERATURE: 97.7 F

## 2024-09-25 DIAGNOSIS — E11.628 DIABETIC FOOT INFECTION (HCC): ICD-10-CM

## 2024-09-25 DIAGNOSIS — L08.9 TYPE 2 DIABETES MELLITUS WITH LEFT DIABETIC FOOT INFECTION (HCC): ICD-10-CM

## 2024-09-25 DIAGNOSIS — S61.200A OPEN WOUND OF RIGHT INDEX FINGER: ICD-10-CM

## 2024-09-25 DIAGNOSIS — T81.89XD NON-HEALING SURGICAL WOUND, SUBSEQUENT ENCOUNTER: ICD-10-CM

## 2024-09-25 DIAGNOSIS — M86.9 OSTEOMYELITIS OF LEFT FOOT, UNSPECIFIED TYPE: ICD-10-CM

## 2024-09-25 DIAGNOSIS — L97.522 SKIN ULCER OF TOE OF LEFT FOOT WITH FAT LAYER EXPOSED (HCC): ICD-10-CM

## 2024-09-25 DIAGNOSIS — T14.8XXA WOUND INFECTION: Primary | ICD-10-CM

## 2024-09-25 DIAGNOSIS — L08.9 WOUND INFECTION: Primary | ICD-10-CM

## 2024-09-25 DIAGNOSIS — E11.621 TYPE 2 DIABETES MELLITUS WITH LEFT DIABETIC FOOT ULCER (HCC): ICD-10-CM

## 2024-09-25 DIAGNOSIS — L97.529 TYPE 2 DIABETES MELLITUS WITH LEFT DIABETIC FOOT ULCER (HCC): ICD-10-CM

## 2024-09-25 DIAGNOSIS — E11.628 TYPE 2 DIABETES MELLITUS WITH LEFT DIABETIC FOOT INFECTION (HCC): ICD-10-CM

## 2024-09-25 DIAGNOSIS — L08.9 DIABETIC FOOT INFECTION (HCC): ICD-10-CM

## 2024-09-25 DIAGNOSIS — T81.89XA NON-HEALING SURGICAL WOUND, INITIAL ENCOUNTER: ICD-10-CM

## 2024-09-25 DIAGNOSIS — M86.242 SUBACUTE OSTEOMYELITIS OF LEFT HAND: ICD-10-CM

## 2024-09-25 PROCEDURE — 11042 DBRDMT SUBQ TIS 1ST 20SQCM/<: CPT | Performed by: NURSE PRACTITIONER

## 2024-09-25 PROCEDURE — 11042 DBRDMT SUBQ TIS 1ST 20SQCM/<: CPT

## 2024-09-25 RX ORDER — NEOMYCIN/BACITRACIN/POLYMYXINB 3.5-400-5K
OINTMENT (GRAM) TOPICAL ONCE
OUTPATIENT
Start: 2024-09-25 | End: 2024-09-25

## 2024-09-25 RX ORDER — CLOBETASOL PROPIONATE 0.5 MG/G
OINTMENT TOPICAL ONCE
OUTPATIENT
Start: 2024-09-25 | End: 2024-09-25

## 2024-09-25 RX ORDER — BACITRACIN ZINC AND POLYMYXIN B SULFATE 500; 1000 [USP'U]/G; [USP'U]/G
OINTMENT TOPICAL ONCE
OUTPATIENT
Start: 2024-09-25 | End: 2024-09-25

## 2024-09-25 RX ORDER — TRIAMCINOLONE ACETONIDE 1 MG/G
OINTMENT TOPICAL ONCE
OUTPATIENT
Start: 2024-09-25 | End: 2024-09-25

## 2024-09-25 RX ORDER — MUPIROCIN 20 MG/G
OINTMENT TOPICAL ONCE
OUTPATIENT
Start: 2024-09-25 | End: 2024-09-25

## 2024-09-25 RX ORDER — GENTAMICIN SULFATE 1 MG/G
OINTMENT TOPICAL ONCE
OUTPATIENT
Start: 2024-09-25 | End: 2024-09-25

## 2024-09-25 RX ORDER — BETAMETHASONE DIPROPIONATE 0.5 MG/G
CREAM TOPICAL ONCE
OUTPATIENT
Start: 2024-09-25 | End: 2024-09-25

## 2024-09-25 RX ORDER — SODIUM CHLOR/HYPOCHLOROUS ACID 0.033 %
SOLUTION, IRRIGATION IRRIGATION ONCE
OUTPATIENT
Start: 2024-09-25 | End: 2024-09-25

## 2024-09-25 RX ORDER — OXYCODONE AND ACETAMINOPHEN 5; 325 MG/1; MG/1
1 TABLET ORAL DAILY PRN
Qty: 7 TABLET | Refills: 0 | Status: SHIPPED | OUTPATIENT
Start: 2024-09-25 | End: 2024-10-02

## 2024-09-25 RX ORDER — BACITRACIN ZINC 500 [USP'U]/G
OINTMENT TOPICAL ONCE
OUTPATIENT
Start: 2024-09-25 | End: 2024-09-25

## 2024-10-03 ENCOUNTER — HOSPITAL ENCOUNTER (EMERGENCY)
Age: 59
Discharge: HOME | End: 2024-10-03
Payer: MEDICARE

## 2024-10-03 VITALS
WEIGHT: 260 LBS | HEART RATE: 87 BPM | DIASTOLIC BLOOD PRESSURE: 67 MMHG | RESPIRATION RATE: 17 BRPM | HEIGHT: 71 IN | OXYGEN SATURATION: 98 % | SYSTOLIC BLOOD PRESSURE: 110 MMHG | BODY MASS INDEX: 36.4 KG/M2 | TEMPERATURE: 98.4 F

## 2024-10-03 DIAGNOSIS — Z51.89 VISIT FOR WOUND CHECK: Primary | ICD-10-CM

## 2024-10-03 PROCEDURE — 99283 EMERGENCY DEPT VISIT LOW MDM: CPT

## 2024-10-03 PROCEDURE — 6370000000 HC RX 637 (ALT 250 FOR IP): Performed by: PHYSICIAN ASSISTANT

## 2024-10-03 RX ORDER — HYDROCODONE BITARTRATE AND ACETAMINOPHEN 5; 325 MG/1; MG/1
1 TABLET ORAL ONCE
Status: COMPLETED | OUTPATIENT
Start: 2024-10-03 | End: 2024-10-03

## 2024-10-03 RX ORDER — DICLOFENAC SODIUM 75 MG/1
75 TABLET, DELAYED RELEASE ORAL 2 TIMES DAILY PRN
Qty: 20 TABLET | Refills: 0 | Status: SHIPPED | OUTPATIENT
Start: 2024-10-03

## 2024-10-03 RX ORDER — ONDANSETRON 4 MG/1
4 TABLET, ORALLY DISINTEGRATING ORAL 3 TIMES DAILY PRN
Qty: 21 TABLET | Refills: 0 | Status: SHIPPED | OUTPATIENT
Start: 2024-10-03

## 2024-10-03 RX ADMIN — AMOXICILLIN AND CLAVULANATE POTASSIUM 1 TABLET: 875; 125 TABLET, FILM COATED ORAL at 21:46

## 2024-10-03 RX ADMIN — HYDROCODONE BITARTRATE AND ACETAMINOPHEN 1 TABLET: 5; 325 TABLET ORAL at 21:46

## 2024-10-03 ASSESSMENT — PAIN - FUNCTIONAL ASSESSMENT
PAIN_FUNCTIONAL_ASSESSMENT: 0-10
PAIN_FUNCTIONAL_ASSESSMENT: 0-10

## 2024-10-03 ASSESSMENT — PAIN DESCRIPTION - ORIENTATION
ORIENTATION: RIGHT
ORIENTATION: RIGHT
ORIENTATION: LEFT;RIGHT

## 2024-10-03 ASSESSMENT — PAIN DESCRIPTION - LOCATION
LOCATION: FINGER (COMMENT WHICH ONE)

## 2024-10-03 ASSESSMENT — PAIN DESCRIPTION - FREQUENCY
FREQUENCY: CONTINUOUS
FREQUENCY: CONTINUOUS

## 2024-10-03 ASSESSMENT — PAIN SCALES - GENERAL
PAINLEVEL_OUTOF10: 10
PAINLEVEL_OUTOF10: 8
PAINLEVEL_OUTOF10: 10

## 2024-10-03 ASSESSMENT — PAIN DESCRIPTION - PAIN TYPE: TYPE: ACUTE PAIN

## 2024-10-03 ASSESSMENT — PAIN DESCRIPTION - DESCRIPTORS
DESCRIPTORS: ACHING
DESCRIPTORS: THROBBING
DESCRIPTORS: ACHING;DULL

## 2024-10-03 ASSESSMENT — PAIN DESCRIPTION - ONSET: ONSET: ON-GOING

## 2024-10-04 ENCOUNTER — HOSPITAL ENCOUNTER (OUTPATIENT)
Dept: WOUND CARE | Age: 59
Discharge: HOME | End: 2024-10-04
Attending: NURSE PRACTITIONER
Payer: MEDICARE

## 2024-10-04 VITALS
TEMPERATURE: 97.7 F | RESPIRATION RATE: 18 BRPM | DIASTOLIC BLOOD PRESSURE: 66 MMHG | HEART RATE: 86 BPM | SYSTOLIC BLOOD PRESSURE: 130 MMHG

## 2024-10-04 DIAGNOSIS — L97.522 SKIN ULCER OF TOE OF LEFT FOOT WITH FAT LAYER EXPOSED (HCC): ICD-10-CM

## 2024-10-04 DIAGNOSIS — S61.200A OPEN WOUND OF RIGHT INDEX FINGER: Primary | ICD-10-CM

## 2024-10-04 DIAGNOSIS — M86.9 OSTEOMYELITIS OF FINGER OF RIGHT HAND: ICD-10-CM

## 2024-10-04 DIAGNOSIS — L08.9 DIABETIC FOOT INFECTION (HCC): ICD-10-CM

## 2024-10-04 DIAGNOSIS — E11.628 DIABETIC FOOT INFECTION (HCC): ICD-10-CM

## 2024-10-04 DIAGNOSIS — M86.9 OSTEOMYELITIS OF LEFT FOOT, UNSPECIFIED TYPE: ICD-10-CM

## 2024-10-04 DIAGNOSIS — E11.621 TYPE 2 DIABETES MELLITUS WITH LEFT DIABETIC FOOT ULCER (HCC): ICD-10-CM

## 2024-10-04 DIAGNOSIS — Z89.421 S/P AMPUTATION OF LESSER TOE, RIGHT (HCC): ICD-10-CM

## 2024-10-04 DIAGNOSIS — M86.9 OSTEOMYELITIS OF FINGER OF LEFT HAND: ICD-10-CM

## 2024-10-04 DIAGNOSIS — T81.89XD NON-HEALING SURGICAL WOUND, SUBSEQUENT ENCOUNTER: ICD-10-CM

## 2024-10-04 DIAGNOSIS — L97.529 TYPE 2 DIABETES MELLITUS WITH LEFT DIABETIC FOOT ULCER (HCC): ICD-10-CM

## 2024-10-04 DIAGNOSIS — T81.89XA NON-HEALING SURGICAL WOUND, INITIAL ENCOUNTER: ICD-10-CM

## 2024-10-04 PROCEDURE — 11042 DBRDMT SUBQ TIS 1ST 20SQCM/<: CPT

## 2024-10-04 RX ORDER — BETAMETHASONE DIPROPIONATE 0.5 MG/G
CREAM TOPICAL ONCE
OUTPATIENT
Start: 2024-10-04 | End: 2024-10-04

## 2024-10-04 RX ORDER — MUPIROCIN 20 MG/G
OINTMENT TOPICAL ONCE
OUTPATIENT
Start: 2024-10-04 | End: 2024-10-04

## 2024-10-04 RX ORDER — CLOBETASOL PROPIONATE 0.5 MG/G
OINTMENT TOPICAL ONCE
OUTPATIENT
Start: 2024-10-04 | End: 2024-10-04

## 2024-10-04 RX ORDER — SODIUM CHLOR/HYPOCHLOROUS ACID 0.033 %
SOLUTION, IRRIGATION IRRIGATION ONCE
OUTPATIENT
Start: 2024-10-04 | End: 2024-10-04

## 2024-10-04 RX ORDER — GENTAMICIN SULFATE 1 MG/G
OINTMENT TOPICAL ONCE
OUTPATIENT
Start: 2024-10-04 | End: 2024-10-04

## 2024-10-04 RX ORDER — BACITRACIN ZINC 500 [USP'U]/G
OINTMENT TOPICAL ONCE
OUTPATIENT
Start: 2024-10-04 | End: 2024-10-04

## 2024-10-04 RX ORDER — BACITRACIN ZINC AND POLYMYXIN B SULFATE 500; 1000 [USP'U]/G; [USP'U]/G
OINTMENT TOPICAL ONCE
OUTPATIENT
Start: 2024-10-04 | End: 2024-10-04

## 2024-10-04 RX ORDER — OXYCODONE AND ACETAMINOPHEN 5; 325 MG/1; MG/1
1 TABLET ORAL EVERY 6 HOURS PRN
Qty: 10 TABLET | Refills: 0 | Status: SHIPPED | OUTPATIENT
Start: 2024-10-04 | End: 2024-10-18

## 2024-10-04 RX ORDER — NEOMYCIN/BACITRACIN/POLYMYXINB 3.5-400-5K
OINTMENT (GRAM) TOPICAL ONCE
OUTPATIENT
Start: 2024-10-04 | End: 2024-10-04

## 2024-10-04 RX ORDER — TRIAMCINOLONE ACETONIDE 1 MG/G
OINTMENT TOPICAL ONCE
OUTPATIENT
Start: 2024-10-04 | End: 2024-10-04

## 2024-10-04 ASSESSMENT — PAIN DESCRIPTION - ONSET: ONSET: ON-GOING

## 2024-10-04 ASSESSMENT — PAIN DESCRIPTION - LOCATION: LOCATION: FINGER (COMMENT WHICH ONE)

## 2024-10-04 ASSESSMENT — PAIN - FUNCTIONAL ASSESSMENT: PAIN_FUNCTIONAL_ASSESSMENT: PREVENTS OR INTERFERES SOME ACTIVE ACTIVITIES AND ADLS

## 2024-10-04 ASSESSMENT — PAIN DESCRIPTION - PAIN TYPE: TYPE: ACUTE PAIN

## 2024-10-04 ASSESSMENT — PAIN DESCRIPTION - FREQUENCY: FREQUENCY: CONTINUOUS

## 2024-10-04 ASSESSMENT — PAIN DESCRIPTION - DESCRIPTORS: DESCRIPTORS: THROBBING

## 2024-10-04 ASSESSMENT — PAIN SCALES - GENERAL: PAINLEVEL_OUTOF10: 8

## 2024-10-04 ASSESSMENT — PAIN DESCRIPTION - ORIENTATION: ORIENTATION: RIGHT;LEFT

## 2024-10-04 NOTE — PATIENT INSTRUCTIONS
PHYSICIAN ORDERS AND DISCHARGE INSTRUCTIONS    NOTE: Upon discharge from the Wound Center, you will receive a patient experience survey. We would be grateful if you would take the time to fill this survey out.    Wound cleansing:     Do not scrub or use excessive force.   Wash hands with soap and water before and after dressing changes.   Prior to applying a clean dressing, cleanse wound with normal saline, wound cleanser, or mild soap and water.    Ask the physician or nurse before getting the wound(s) wet in a shower         Orders for this week: 10/4/2024    Fax to [facility]!       Finger Wounds - Clean with soap and water, pat dry  Apply silvadene and stimulen powder to wound bed  Cover with Gentac Border  Change Daily     Left Great Toe Amp: Clean with soap and water, pat dry  Apply liquid silver nitrate damp gauze  to wound bed   Wrap with conform and coban   Change Monday and Wednesday    Dispense 30 day quantity when ordering supplies.  Plan: Referral to [facility] when appropriate       Follow up with Raf Kuo CNP in 2 week. Nurse visit in 1 week   Call  for any questions or concerns.

## 2024-10-04 NOTE — ED TRIAGE NOTES
Patient presents with concern of possible wound infection. Patient has multiple wounds to fingers on both hands. Wounds are wrapped and secured with kerlix. Patient repeatedly referenced percocet during triaging.

## 2024-10-04 NOTE — PROGRESS NOTES
O'Clock 0 10/04/24 1310   Undermining Maxium Distance (cm) 0 10/04/24 1310   Wound Assessment Purple/maroon 10/04/24 1310   Drainage Amount Small (< 25%) 10/04/24 1310   Drainage Description Clear 10/04/24 1310   Odor None 10/04/24 1310   Thu-wound Assessment Hyperkeratosis (callous) 10/04/24 1310   Margins Defined edges 10/04/24 1310   Wound Thickness Description not for Pressure Injury Full thickness 10/04/24 1310   Number of days: 35       Percent of Wound(s) Debrided: approximately 100%    Total  Area  Debrided:  2.43 sq cm     Bleeding:  Minimal    Hemostasis Achieved:  by pressure    Procedural Pain:  2  / 10     Post Procedural Pain:  0 / 10     Response to treatment:  Well tolerated by patient.     Status of wound progress and description from last visit:   hope to heal foot soon.    Maintenance care to fingers       Plan:       Patient Instructions   PHYSICIAN ORDERS AND DISCHARGE INSTRUCTIONS    NOTE: Upon discharge from the Wound Center, you will receive a patient experience survey. We would be grateful if you would take the time to fill this survey out.    Wound cleansing:     Do not scrub or use excessive force.   Wash hands with soap and water before and after dressing changes.   Prior to applying a clean dressing, cleanse wound with normal saline, wound cleanser, or mild soap and water.    Ask the physician or nurse before getting the wound(s) wet in a shower         Orders for this week: 10/4/2024    Fax to [facility]!       Finger Wounds - Clean with soap and water, pat dry  Apply silvadene and stimulen powder to wound bed  Cover with Gentac Border  Change Daily     Left Great Toe Amp: Clean with soap and water, pat dry  Apply liquid silver nitrate damp gauze  to wound bed   Wrap with conform and coban   Change Monday and Wednesday    Dispense 30 day quantity when ordering supplies.  Plan: Referral to [facility] when appropriate       Follow up with Raf Kuo CNP in 2 week. Nurse visit in 1 week

## 2024-10-07 ENCOUNTER — HOSPITAL ENCOUNTER (EMERGENCY)
Age: 59
Discharge: HOME | End: 2024-10-08
Payer: MEDICARE

## 2024-10-07 ENCOUNTER — APPOINTMENT (OUTPATIENT)
Dept: CT IMAGING | Age: 59
End: 2024-10-07
Payer: MEDICARE

## 2024-10-07 VITALS
TEMPERATURE: 98.4 F | SYSTOLIC BLOOD PRESSURE: 132 MMHG | DIASTOLIC BLOOD PRESSURE: 82 MMHG | RESPIRATION RATE: 18 BRPM | HEIGHT: 71 IN | OXYGEN SATURATION: 96 % | HEART RATE: 72 BPM | BODY MASS INDEX: 36.4 KG/M2 | WEIGHT: 260 LBS

## 2024-10-07 DIAGNOSIS — M86.9 OSTEOMYELITIS OF RIGHT HAND, UNSPECIFIED TYPE: ICD-10-CM

## 2024-10-07 DIAGNOSIS — L98.499: Primary | ICD-10-CM

## 2024-10-07 LAB
ALBUMIN SERPL-MCNC: 3.8 G/DL (ref 3.4–5)
ALBUMIN/GLOB SERPL: 1.4 {RATIO} (ref 1.1–2.2)
ALP SERPL-CCNC: 91 U/L (ref 40–129)
ALT SERPL-CCNC: <5 U/L (ref 10–40)
ANION GAP SERPL CALCULATED.3IONS-SCNC: 11 MMOL/L (ref 9–17)
AST SERPL-CCNC: 17 U/L (ref 15–37)
BASOPHILS # BLD: 0.03 K/UL
BASOPHILS NFR BLD: 0 % (ref 0–1)
BILIRUB SERPL-MCNC: 0.3 MG/DL (ref 0–1)
BUN SERPL-MCNC: 18 MG/DL (ref 7–20)
CALCIUM SERPL-MCNC: 9.1 MG/DL (ref 8.3–10.6)
CHLORIDE SERPL-SCNC: 104 MMOL/L (ref 99–110)
CO2 SERPL-SCNC: 23 MMOL/L (ref 21–32)
CREAT SERPL-MCNC: 0.8 MG/DL (ref 0.9–1.3)
CRP SERPL HS-MCNC: 61.7 MG/L (ref 0–5)
EOSINOPHIL # BLD: 0.18 K/UL
EOSINOPHILS RELATIVE PERCENT: 2 % (ref 0–3)
ERYTHROCYTE [DISTWIDTH] IN BLOOD BY AUTOMATED COUNT: 15.2 % (ref 11.7–14.9)
ERYTHROCYTE [SEDIMENTATION RATE] IN BLOOD BY WESTERGREN METHOD: 55 MM/HR (ref 0–20)
GFR, ESTIMATED: >90 ML/MIN/1.73M2
GLUCOSE SERPL-MCNC: 185 MG/DL (ref 74–99)
HCT VFR BLD AUTO: 36.9 % (ref 42–52)
HGB BLD-MCNC: 11.5 G/DL (ref 13.5–18)
IMM GRANULOCYTES # BLD AUTO: 0.03 K/UL
IMM GRANULOCYTES NFR BLD: 0 %
LYMPHOCYTES NFR BLD: 1.34 K/UL
LYMPHOCYTES RELATIVE PERCENT: 17 % (ref 24–44)
MCH RBC QN AUTO: 26.7 PG (ref 27–31)
MCHC RBC AUTO-ENTMCNC: 31.2 G/DL (ref 32–36)
MCV RBC AUTO: 85.8 FL (ref 78–100)
MONOCYTES NFR BLD: 0.57 K/UL
MONOCYTES NFR BLD: 7 % (ref 0–4)
NEUTROPHILS NFR BLD: 73 % (ref 36–66)
NEUTS SEG NFR BLD: 5.7 K/UL
PLATELET # BLD AUTO: 209 K/UL (ref 140–440)
PMV BLD AUTO: 9.6 FL (ref 7.5–11.1)
POTASSIUM SERPL-SCNC: 4.2 MMOL/L (ref 3.5–5.1)
PROT SERPL-MCNC: 6.6 G/DL (ref 6.4–8.2)
RBC # BLD AUTO: 4.3 M/UL (ref 4.6–6.2)
SODIUM SERPL-SCNC: 138 MMOL/L (ref 136–145)
WBC OTHER # BLD: 7.9 K/UL (ref 4–10.5)

## 2024-10-07 PROCEDURE — 6360000004 HC RX CONTRAST MEDICATION: Performed by: PHYSICIAN ASSISTANT

## 2024-10-07 PROCEDURE — 6370000000 HC RX 637 (ALT 250 FOR IP): Performed by: PHYSICIAN ASSISTANT

## 2024-10-07 PROCEDURE — 85025 COMPLETE CBC W/AUTO DIFF WBC: CPT

## 2024-10-07 PROCEDURE — 86140 C-REACTIVE PROTEIN: CPT

## 2024-10-07 PROCEDURE — 99285 EMERGENCY DEPT VISIT HI MDM: CPT

## 2024-10-07 PROCEDURE — 73201 CT UPPER EXTREMITY W/DYE: CPT

## 2024-10-07 PROCEDURE — 80053 COMPREHEN METABOLIC PANEL: CPT

## 2024-10-07 PROCEDURE — 85652 RBC SED RATE AUTOMATED: CPT

## 2024-10-07 RX ORDER — OXYCODONE AND ACETAMINOPHEN 5; 325 MG/1; MG/1
2 TABLET ORAL ONCE
Status: DISCONTINUED | OUTPATIENT
Start: 2024-10-07 | End: 2024-10-08 | Stop reason: HOSPADM

## 2024-10-07 RX ORDER — DOXYCYCLINE HYCLATE 100 MG
100 TABLET ORAL 2 TIMES DAILY
Qty: 14 TABLET | Refills: 0 | Status: SHIPPED | OUTPATIENT
Start: 2024-10-07 | End: 2024-10-14

## 2024-10-07 RX ORDER — IOPAMIDOL 755 MG/ML
80 INJECTION, SOLUTION INTRAVASCULAR
Status: COMPLETED | OUTPATIENT
Start: 2024-10-07 | End: 2024-10-07

## 2024-10-07 RX ORDER — OXYCODONE AND ACETAMINOPHEN 5; 325 MG/1; MG/1
2 TABLET ORAL ONCE
Status: COMPLETED | OUTPATIENT
Start: 2024-10-07 | End: 2024-10-07

## 2024-10-07 RX ORDER — DOXYCYCLINE HYCLATE 100 MG
100 TABLET ORAL ONCE
Status: COMPLETED | OUTPATIENT
Start: 2024-10-07 | End: 2024-10-08

## 2024-10-07 RX ADMIN — OXYCODONE HYDROCHLORIDE AND ACETAMINOPHEN 2 TABLET: 5; 325 TABLET ORAL at 18:22

## 2024-10-07 RX ADMIN — IOPAMIDOL 80 ML: 755 INJECTION, SOLUTION INTRAVENOUS at 20:17

## 2024-10-07 ASSESSMENT — PAIN SCALES - GENERAL
PAINLEVEL_OUTOF10: 10
PAINLEVEL_OUTOF10: 9
PAINLEVEL_OUTOF10: 8

## 2024-10-07 ASSESSMENT — PAIN - FUNCTIONAL ASSESSMENT
PAIN_FUNCTIONAL_ASSESSMENT: 0-10
PAIN_FUNCTIONAL_ASSESSMENT: 0-10

## 2024-10-07 ASSESSMENT — PAIN DESCRIPTION - DESCRIPTORS: DESCRIPTORS: ACHING

## 2024-10-07 ASSESSMENT — PAIN DESCRIPTION - LOCATION
LOCATION: FINGER (COMMENT WHICH ONE)
LOCATION: FINGER (COMMENT WHICH ONE)

## 2024-10-07 ASSESSMENT — PAIN DESCRIPTION - ORIENTATION: ORIENTATION: RIGHT

## 2024-10-07 NOTE — ED PROVIDER NOTES
normal limits.  He is afebrile at home as well.    On my examination, there is no circumferential swelling, pain over tendon sheath, decreased range of motion of his fifth finger, passive flexion or pain range of motion I have very low concern for infectious tenosynovitis.  Additionally no crepitus, pain out of proportion, and symptoms been ongoing for weeks of lower suspicion for necrotizing soft tissue infection.  CT shows no abscess.  There is some interpretation on the CT read today for osteomyelitis, with his finger of concern is his fifth finger that shows potential early osteomyelitis versus fracture.  I do not suspect necrotizing soft tissue infection, ischemia, no lower suspicion for septic arthritis.  Patient does not appear to be septic.  Tolerating p.o.    Do not have access to orthopedic hand surgery at this facility, so I did reach out to [facility] where patient was previously seen and discussed with orthopedic hand they have advised for continued outpatient management and no need for transfer here.  Patient did have a significant delay in his ED course here given the delay in interpretation of patient's CT scan.  In discussion with ED charge nurse Hayley she had reached out to our radiology team, reportedly there was not a radiologist available to interpret the extremity CT scan which resulted in the delay.  Patient was observed in the emergency department for this time.  Did receive multiple doses of pain medications and did respond to those.  He has no worsening.  His vitals are stable.  I feel this is most likely chronic ulceration, potential cellulitis, developing osteomyelitis.  Admission transfer considered orthopedics consulted advise for outpatient management.    My Treatment Plan: I did discuss his workup with him including differential, as well as orthopedic consult and with recommendations for outpatient management.  He had mentioned that he had lost the paperwork from [employer] [place]  possible for a visit      DISCHARGE MEDICATIONS:  Discharge Medication List as of 10/8/2024 12:09 AM        START taking these medications    Details   doxycycline hyclate (VIBRA-TABS) 100 MG tablet Take 1 tablet by mouth 2 times daily for 7 days, Disp-14 tablet, R-0Normal             DISCONTINUED MEDICATIONS:  Discharge Medication List as of 10/8/2024 12:09 AM        STOP taking these medications       sulfamethoxazole-trimethoprim (BACTRIM;SEPTRA) 200-40 MG/5ML suspension Comments:   Reason for Stopping:                      (Please note that portions of this note were completed with a voice recognition program.  Efforts were made to edit the dictations but occasionally words are mis-transcribed.)    Kevin Merchant PA-C (electronically signed)       Kevin Merchant PA-C  10/08/24 [id number]

## 2024-10-08 ENCOUNTER — HOSPITAL ENCOUNTER (EMERGENCY)
Age: 59
Discharge: HOME | End: 2024-10-08
Attending: STUDENT IN AN ORGANIZED HEALTH CARE EDUCATION/TRAINING PROGRAM
Payer: MEDICARE

## 2024-10-08 VITALS
SYSTOLIC BLOOD PRESSURE: 111 MMHG | DIASTOLIC BLOOD PRESSURE: 68 MMHG | RESPIRATION RATE: 16 BRPM | TEMPERATURE: 98.5 F | HEIGHT: 71 IN | BODY MASS INDEX: 36.4 KG/M2 | HEART RATE: 90 BPM | WEIGHT: 260 LBS | OXYGEN SATURATION: 98 %

## 2024-10-08 DIAGNOSIS — L98.499 DIABETES MELLITUS WITH SKIN ULCER (HCC): Primary | ICD-10-CM

## 2024-10-08 DIAGNOSIS — M86.69 OTHER CHRONIC OSTEOMYELITIS, MULTIPLE SITES: ICD-10-CM

## 2024-10-08 DIAGNOSIS — E11.622 DIABETES MELLITUS WITH SKIN ULCER (HCC): Primary | ICD-10-CM

## 2024-10-08 LAB
BILIRUB UR QL STRIP: ABNORMAL
CLARITY UR: ABNORMAL
COLOR UR: YELLOW
GLUCOSE UR STRIP-MCNC: NEGATIVE MG/DL
HGB UR QL STRIP.AUTO: ABNORMAL
KETONES UR STRIP-MCNC: ABNORMAL MG/DL
LEUKOCYTE ESTERASE UR QL STRIP: ABNORMAL
MUCOUS THREADS URNS QL MICRO: ABNORMAL
NITRITE UR QL STRIP: NEGATIVE
PH UR STRIP: 6 [PH] (ref 5–8)
PROT UR STRIP-MCNC: 100 MG/DL
RBC #/AREA URNS HPF: 11 /HPF (ref 0–2)
SP GR UR STRIP: 1.02 (ref 1–1.03)
TRICHOMONAS #/AREA URNS HPF: ABNORMAL /[HPF]
UROBILINOGEN UR STRIP-ACNC: 1 EU/DL (ref 0–1)
WBC #/AREA URNS HPF: 42 /HPF (ref 0–5)

## 2024-10-08 PROCEDURE — 6370000000 HC RX 637 (ALT 250 FOR IP): Performed by: PHYSICIAN ASSISTANT

## 2024-10-08 PROCEDURE — 6370000000 HC RX 637 (ALT 250 FOR IP): Performed by: NURSE PRACTITIONER

## 2024-10-08 PROCEDURE — 81001 URINALYSIS AUTO W/SCOPE: CPT

## 2024-10-08 PROCEDURE — 99283 EMERGENCY DEPT VISIT LOW MDM: CPT

## 2024-10-08 RX ORDER — OXYCODONE AND ACETAMINOPHEN 7.5; 325 MG/1; MG/1
1 TABLET ORAL ONCE
Status: COMPLETED | OUTPATIENT
Start: 2024-10-08 | End: 2024-10-08

## 2024-10-08 RX ORDER — ACETAMINOPHEN 500 MG
500 TABLET ORAL EVERY 6 HOURS PRN
Qty: 120 TABLET | Refills: 0 | Status: SHIPPED | OUTPATIENT
Start: 2024-10-08 | End: 2024-11-07

## 2024-10-08 RX ADMIN — AMOXICILLIN AND CLAVULANATE POTASSIUM 1 TABLET: 875; 125 TABLET, FILM COATED ORAL at 01:08

## 2024-10-08 RX ADMIN — DOXYCYCLINE HYCLATE 100 MG: 100 TABLET, COATED ORAL at 01:07

## 2024-10-08 RX ADMIN — OXYCODONE HYDROCHLORIDE AND ACETAMINOPHEN 1 TABLET: 7.5; 325 TABLET ORAL at 16:14

## 2024-10-08 ASSESSMENT — PAIN SCALES - GENERAL
PAINLEVEL_OUTOF10: 9
PAINLEVEL_OUTOF10: 10
PAINLEVEL_OUTOF10: 7
PAINLEVEL_OUTOF10: 0

## 2024-10-08 ASSESSMENT — PAIN DESCRIPTION - LOCATION
LOCATION: FINGER (COMMENT WHICH ONE)

## 2024-10-08 ASSESSMENT — PAIN - FUNCTIONAL ASSESSMENT
PAIN_FUNCTIONAL_ASSESSMENT: 0-10
PAIN_FUNCTIONAL_ASSESSMENT: ACTIVITIES ARE NOT PREVENTED

## 2024-10-08 ASSESSMENT — PAIN DESCRIPTION - PAIN TYPE: TYPE: ACUTE PAIN

## 2024-10-08 ASSESSMENT — PAIN DESCRIPTION - ORIENTATION
ORIENTATION: RIGHT
ORIENTATION: RIGHT

## 2024-10-08 ASSESSMENT — PAIN DESCRIPTION - FREQUENCY: FREQUENCY: CONTINUOUS

## 2024-10-08 ASSESSMENT — PAIN DESCRIPTION - DESCRIPTORS: DESCRIPTORS: ACHING
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
## 2024-10-08 NOTE — DISCHARGE INSTRUCTIONS
Call Hand Clinic at [facility] first thing tomorrow morning at [phone] to get appointment for evaluation.  Advise them you are established patient of Dr. Garcia, have exposed bone of the left little finger.  Continue the antibiotics prescribed last night as directed.

## 2024-10-08 NOTE — ED PROVIDER NOTES
THIS IS MY CHAR SUPERVISORY AND SHARED VISIT NOTE    I independently examined and evaluated Toi Briones.    CC/HPI Summary, DDx, ED Course, Brief Exam and Reassessment:   In brief, known history of scattered ulcerations and previous amputations.  Patient seen yesterday at this emergency department.  Labs reviewed from yesterday shows downtrending CRP, stable elevation in sed rate, reassuring chemistry panel, CBC with no leukocytosis    The provider yesterday spoke to hand surgery and at that point it was recommended for outpatient follow-up.  He reports trying to call the hand surgeon's office but no one answered.    He states since yesterday a piece of tissue came off of the palmar aspect of the left distal fifth digit and he noticed some bone exposure which prompted his visit today.  Patient was started on antibiotics and has been taking them as prescribed.    The nurse practitioner reached out to the patient's hand surgeon and had a lengthy discussion, given recent reassuring laboratory evaluation felt as if outpatient follow-up was appropriate.  It seems as if he has been noncompliant.  They are recommending he call their office first thing in the morning to establish follow-up appointment.  At this time, patient does not appear to be septic, he is afebrile, he is not tachycardic.  He appears well.  I do not believe repeat imaging or labs are warranted at this time.    Urine sample sent for culture.  Patient vocalized understanding and was recommended to follow-up with his hand surgeon tomorrow morning as stated.  At this time I have lower concern for necrotizing infection, septic joint, or ischemic injury.  He does have antibiotics that were prescribed yesterday and we recommended he continue these           Patient Vitals for the past 24 hrs:   BP Temp Temp src Pulse Resp SpO2 Height Weight   10/08/24 1524 -- -- -- -- -- -- 1.803 m (5' 11\") 117.9 kg (260 lb)   10/08/24 1522 111/68 98.5 °F (36.9 °C) Oral 90 16

## 2024-10-08 NOTE — DISCHARGE INSTRUCTIONS
Call the  [facility] hand clinic tomorrow to schedule appointment this week  for reevaluation of your hand and discussed the results of your CAT scan.  Please let them know that we spoke with their hand surgeon Dr. Garcia , who advised that you contact them to schedule appointment for reevaluation.    Meanwhile please continue to take the prescription medication Augmentin and doxycycline pack as directed .    Continue to follow-up with [facility] wound care clinic for your toe as well as your hand.    You can continue to use your home pain medications as needed for pain.    Follow-up with your primary care provider for ongoing evaluation and management of your diabetes and discuss your visit to the emergency department.

## 2024-10-18 ENCOUNTER — HOSPITAL ENCOUNTER (OUTPATIENT)
Dept: WOUND CARE | Age: 59
Discharge: HOME | End: 2024-10-18
Attending: NURSE PRACTITIONER
Payer: MEDICARE

## 2024-10-18 VITALS
RESPIRATION RATE: 16 BRPM | HEART RATE: 103 BPM | DIASTOLIC BLOOD PRESSURE: 69 MMHG | SYSTOLIC BLOOD PRESSURE: 118 MMHG | TEMPERATURE: 98 F

## 2024-10-18 DIAGNOSIS — S91.309A OPEN WOUND OF FOOT EXCLUDING TOES: ICD-10-CM

## 2024-10-18 DIAGNOSIS — S61.200A OPEN WOUND OF RIGHT INDEX FINGER: ICD-10-CM

## 2024-10-18 DIAGNOSIS — M86.9 OSTEOMYELITIS OF LEFT FOOT, UNSPECIFIED TYPE: ICD-10-CM

## 2024-10-18 DIAGNOSIS — M86.9 OSTEOMYELITIS OF FINGER OF LEFT HAND: ICD-10-CM

## 2024-10-18 DIAGNOSIS — M86.9 OSTEOMYELITIS OF FINGER OF RIGHT HAND: ICD-10-CM

## 2024-10-18 DIAGNOSIS — T81.89XD NON-HEALING SURGICAL WOUND, SUBSEQUENT ENCOUNTER: Primary | ICD-10-CM

## 2024-10-18 DIAGNOSIS — I83.893 VARICOSE VEINS OF BOTH LEGS WITH EDEMA: ICD-10-CM

## 2024-10-18 DIAGNOSIS — M86.242 SUBACUTE OSTEOMYELITIS OF LEFT HAND: ICD-10-CM

## 2024-10-18 PROCEDURE — 11042 DBRDMT SUBQ TIS 1ST 20SQCM/<: CPT

## 2024-10-18 RX ORDER — GENTAMICIN SULFATE 1 MG/G
OINTMENT TOPICAL ONCE
OUTPATIENT
Start: 2024-10-18 | End: 2024-10-18

## 2024-10-18 RX ORDER — MUPIROCIN 20 MG/G
OINTMENT TOPICAL ONCE
OUTPATIENT
Start: 2024-10-18 | End: 2024-10-18

## 2024-10-18 RX ORDER — BACITRACIN ZINC 500 [USP'U]/G
OINTMENT TOPICAL ONCE
OUTPATIENT
Start: 2024-10-18 | End: 2024-10-18

## 2024-10-18 RX ORDER — OXYCODONE AND ACETAMINOPHEN 5; 325 MG/1; MG/1
1 TABLET ORAL DAILY PRN
Qty: 7 TABLET | Refills: 0 | Status: SHIPPED | OUTPATIENT
Start: 2024-10-18 | End: 2024-10-25

## 2024-10-18 RX ORDER — CLOBETASOL PROPIONATE 0.5 MG/G
OINTMENT TOPICAL ONCE
OUTPATIENT
Start: 2024-10-18 | End: 2024-10-18

## 2024-10-18 RX ORDER — TRIAMCINOLONE ACETONIDE 1 MG/G
OINTMENT TOPICAL ONCE
OUTPATIENT
Start: 2024-10-18 | End: 2024-10-18

## 2024-10-18 RX ORDER — NEOMYCIN/BACITRACIN/POLYMYXINB 3.5-400-5K
OINTMENT (GRAM) TOPICAL ONCE
OUTPATIENT
Start: 2024-10-18 | End: 2024-10-18

## 2024-10-18 RX ORDER — SODIUM CHLOR/HYPOCHLOROUS ACID 0.033 %
SOLUTION, IRRIGATION IRRIGATION ONCE
OUTPATIENT
Start: 2024-10-18 | End: 2024-10-18

## 2024-10-18 RX ORDER — BACITRACIN ZINC AND POLYMYXIN B SULFATE 500; 1000 [USP'U]/G; [USP'U]/G
OINTMENT TOPICAL ONCE
OUTPATIENT
Start: 2024-10-18 | End: 2024-10-18

## 2024-10-18 RX ORDER — BETAMETHASONE DIPROPIONATE 0.5 MG/G
CREAM TOPICAL ONCE
OUTPATIENT
Start: 2024-10-18 | End: 2024-10-18

## 2024-10-18 NOTE — PATIENT INSTRUCTIONS
PHYSICIAN ORDERS AND DISCHARGE INSTRUCTIONS    NOTE: Upon discharge from the Wound Center, you will receive a patient experience survey. We would be grateful if you would take the time to fill this survey out.    Wound cleansing:     Do not scrub or use excessive force.   Wash hands with soap and water before and after dressing changes.   Prior to applying a clean dressing, cleanse wound with normal saline, wound cleanser, or mild soap and water.    Ask the physician or nurse before getting the wound(s) wet in a shower         Orders for this week: 10/18/2024    Fax to [facility]!       Finger Wounds - Clean with soap and water, pat dry  Apply silvadene and stimulen powder to wound bed  Cover with Gentac Border  Change Daily     Left Great Toe Amp: Clean with soap and water, pat dry  Apply liquid silver nitrate damp gauze  to wound bed   Wrap with conform and coban   Change Monday and Wednesday    Dispense 30 day quantity when ordering supplies.  Plan: Referral to [facility] when appropriate       Follow up with Raf Kuo CNP in 1 week.  Call  for any questions or concerns.
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
## 2024-10-27 ENCOUNTER — APPOINTMENT (OUTPATIENT)
Dept: GENERAL RADIOLOGY | Age: 59
End: 2024-10-27
Attending: STUDENT IN AN ORGANIZED HEALTH CARE EDUCATION/TRAINING PROGRAM
Payer: MEDICARE

## 2024-10-27 ENCOUNTER — HOSPITAL ENCOUNTER (EMERGENCY)
Age: 59
Discharge: HOME | End: 2024-10-28
Attending: STUDENT IN AN ORGANIZED HEALTH CARE EDUCATION/TRAINING PROGRAM
Payer: MEDICARE

## 2024-10-27 DIAGNOSIS — S62.667A CLOSED NONDISPLACED FRACTURE OF DISTAL PHALANX OF LEFT LITTLE FINGER, INITIAL ENCOUNTER: Primary | ICD-10-CM

## 2024-10-27 DIAGNOSIS — N39.0 URINARY TRACT INFECTION WITHOUT HEMATURIA, SITE UNSPECIFIED: ICD-10-CM

## 2024-10-27 LAB
BILIRUB UR QL STRIP: NEGATIVE
CLARITY UR: CLEAR
COLOR UR: YELLOW
GLUCOSE UR STRIP-MCNC: NEGATIVE MG/DL
HGB UR QL STRIP.AUTO: ABNORMAL
KETONES UR STRIP-MCNC: ABNORMAL MG/DL
LEUKOCYTE ESTERASE UR QL STRIP: ABNORMAL
NITRITE UR QL STRIP: NEGATIVE
PH UR STRIP: 6 [PH] (ref 5–8)
PROT UR STRIP-MCNC: 30 MG/DL
SP GR UR STRIP: >1.03 (ref 1–1.03)
UROBILINOGEN UR STRIP-ACNC: 0.2 EU/DL (ref 0–1)

## 2024-10-27 PROCEDURE — 73130 X-RAY EXAM OF HAND: CPT

## 2024-10-27 PROCEDURE — 6370000000 HC RX 637 (ALT 250 FOR IP): Performed by: STUDENT IN AN ORGANIZED HEALTH CARE EDUCATION/TRAINING PROGRAM

## 2024-10-27 PROCEDURE — 87086 URINE CULTURE/COLONY COUNT: CPT

## 2024-10-27 PROCEDURE — 99284 EMERGENCY DEPT VISIT MOD MDM: CPT

## 2024-10-27 RX ORDER — IBUPROFEN 600 MG/1
600 TABLET, FILM COATED ORAL ONCE
Status: COMPLETED | OUTPATIENT
Start: 2024-10-27 | End: 2024-10-27

## 2024-10-27 RX ORDER — OXYCODONE HYDROCHLORIDE 5 MG/1
2.5 TABLET ORAL ONCE
Status: COMPLETED | OUTPATIENT
Start: 2024-10-27 | End: 2024-10-28

## 2024-10-27 RX ORDER — ACETAMINOPHEN 500 MG
1000 TABLET ORAL ONCE
Status: COMPLETED | OUTPATIENT
Start: 2024-10-27 | End: 2024-10-27

## 2024-10-27 RX ORDER — OXYCODONE HYDROCHLORIDE 5 MG/1
5 TABLET ORAL ONCE
Status: COMPLETED | OUTPATIENT
Start: 2024-10-27 | End: 2024-10-27

## 2024-10-27 RX ADMIN — IBUPROFEN 600 MG: 600 TABLET, FILM COATED ORAL at 22:19

## 2024-10-27 RX ADMIN — OXYCODONE HYDROCHLORIDE 5 MG: 5 TABLET ORAL at 22:21

## 2024-10-27 RX ADMIN — ACETAMINOPHEN 1000 MG: 500 TABLET ORAL at 22:20

## 2024-10-27 ASSESSMENT — PAIN - FUNCTIONAL ASSESSMENT
PAIN_FUNCTIONAL_ASSESSMENT: PREVENTS OR INTERFERES SOME ACTIVE ACTIVITIES AND ADLS
PAIN_FUNCTIONAL_ASSESSMENT: 0-10

## 2024-10-27 ASSESSMENT — PAIN DESCRIPTION - ORIENTATION: ORIENTATION: LEFT

## 2024-10-27 ASSESSMENT — PAIN SCALES - GENERAL
PAINLEVEL_OUTOF10: 10
PAINLEVEL_OUTOF10: 8

## 2024-10-27 ASSESSMENT — PAIN DESCRIPTION - LOCATION: LOCATION: HAND

## 2024-10-27 ASSESSMENT — PAIN DESCRIPTION - DESCRIPTORS: DESCRIPTORS: SHARP;BURNING

## 2024-10-28 VITALS
SYSTOLIC BLOOD PRESSURE: 132 MMHG | TEMPERATURE: 98.6 F | HEART RATE: 88 BPM | OXYGEN SATURATION: 97 % | RESPIRATION RATE: 17 BRPM | DIASTOLIC BLOOD PRESSURE: 87 MMHG

## 2024-10-28 LAB
MICROORGANISM SPEC CULT: NORMAL
SERVICE CMNT-IMP: NORMAL
SPECIMEN DESCRIPTION: NORMAL

## 2024-10-28 PROCEDURE — 6360000002 HC RX W HCPCS: Performed by: STUDENT IN AN ORGANIZED HEALTH CARE EDUCATION/TRAINING PROGRAM

## 2024-10-28 PROCEDURE — 90715 TDAP VACCINE 7 YRS/> IM: CPT | Performed by: STUDENT IN AN ORGANIZED HEALTH CARE EDUCATION/TRAINING PROGRAM

## 2024-10-28 PROCEDURE — 90471 IMMUNIZATION ADMIN: CPT | Performed by: STUDENT IN AN ORGANIZED HEALTH CARE EDUCATION/TRAINING PROGRAM

## 2024-10-28 PROCEDURE — 6370000000 HC RX 637 (ALT 250 FOR IP): Performed by: STUDENT IN AN ORGANIZED HEALTH CARE EDUCATION/TRAINING PROGRAM

## 2024-10-28 RX ADMIN — AMOXICILLIN AND CLAVULANATE POTASSIUM 1 TABLET: 875; 125 TABLET, FILM COATED ORAL at 00:00

## 2024-10-28 RX ADMIN — OXYCODONE HYDROCHLORIDE 2.5 MG: 5 TABLET ORAL at 00:01

## 2024-10-28 RX ADMIN — TETANUS TOXOID, REDUCED DIPHTHERIA TOXOID AND ACELLULAR PERTUSSIS VACCINE, ADSORBED 0.5 ML: 5; 2.5; 8; 8; 2.5 SUSPENSION INTRAMUSCULAR at 00:02

## 2024-10-28 ASSESSMENT — PAIN SCALES - GENERAL: PAINLEVEL_OUTOF10: 8

## 2024-10-28 ASSESSMENT — PAIN DESCRIPTION - LOCATION: LOCATION: HAND

## 2024-10-28 NOTE — DISCHARGE INSTRUCTIONS
Take the full course of antibiotics  You can use Tylenol as needed for pain  You can use ibuprofen as needed for pain  Keep your finger clean and dry, you can use soap and water.  You can call and follow up with orthopedics in the next 1-3 days regarding your symptoms  Call and follow-up with your family doctor in the next 1-3 days  Return to the ED if your symptoms worsen or you feel you need to be reevaluated

## 2024-10-28 NOTE — ED TRIAGE NOTES
Patient presents to the ED from home with complaints of a left pinky injury/pain as well as prostate pain. Patient states his prostate has been \"off the chain\", states he has been taking pills forever for it. Patient states yesterday he was at his friends moving branches and he fell and smashed his pinky. States it took some skin off of it and it has not got any better. Took ibuprofen at 1400. Rates pain 8/10 at this time.

## 2024-10-28 NOTE — ED PROVIDER NOTES
Emergency Department Encounter    Patient: Toi Briones  MRN: [id number]  : 1965  Date of Evaluation: 10/28/2024  ED Provider:  Tenzin Downing MD    Triage Chief Complaint:   Hand Pain (Left pinky pain/injury ) and Groin Pain (Prostate )    [place]:  Toi Briones is a 58 y.o. male with history significant for depression, anxiety, fibromyalgia, type 2 diabetes, that presents for an injury to his left pinky, and 3 weeks of dysuria.  The patient mentions that 2 days before presentation he slammed the fifth finger of his left hand against a machine at work.  This caused significant pain, that was constant, nonradiating, and limited to the distal phalange of the finger and perform all during the intensity, but his symptoms have progressed to be severe, associated with worsening edema and erythema of the finger.  He has a chronic ulcer in the distal fingertip with subcutaneous tissue exposure, with scant serous drainage.  He denies trauma anywhere else, including the trauma to his head.  He has a normal distal strength and sensation all of his extremities.  Endorses also a month of dysuria, described as urethral pain that occurs only with urination, as well as urinary frequency and occasional urgency.  He has been afebrile, without chills.  Has had multiple partial amputations of phalanges bilaterally, some locations due to osteomyelitis.  Has not been currently on antibiotics.    ROS - see HPI, below listed is current ROS at time of my eval:  Systems reviewed and negative except as above.     Past Medical History:   Diagnosis Date    Absent finger     Left hand    Anxiety     Arthritis     Hands    Asthma     \"As a kid but outgrew this\" - no medications as of 20    Chronic back pain     Depression     Difficult intravenous access 2024    Unable to place PICC x2 admissions. Patient IS NOT a PICC candidate ()    Edema     Fibromyalgia     Headache(784.0)     Last: 20    Hernia, 
disposition I signed out to the oncoming provider at the end of my shift [SC]   7879 XR:IMPRESSION:  Comminuted fractures of the fifth distal phalanx with soft tissue  laceration.    [CR]   5532 Discussed results with patient, would like dose of antibiotics here, will update tetanus.  Otherwise agreeable for discharge with orthopedic follow-up [CR]      ED Course User Index  [CR] Kuldeep Camacho MD  [SC] Tenzin Trevino MD       Further MDM and disposition:   Assessment:   UTI, phalanx fracture.  Plan:   UA shows trace leukocyte Estrace and he mentions dysuria so we will treat as UTI.  Has an allergy to Rocephin but reports that he has tolerated Zosyn so should be fine on Augmentin, will give him a dose here.  X-ray shows fracture of the distal fifth phalanx.  On exam he has 4 fingers with partial amputations.  He does have some soft tissue exposure on the distal parts of his fingers that is chronic.  Is not secondary to the fall otherwise well-appearing.  Neurovascularly intact on my exam, radial pulse intact.  Will update his tetanus and given that it is not significantly displaced and his abnormal hand anatomy at baseline do not think he needs shahram taping.  He is agreeable for discharge with outpatient orthopedic follow-up.  Do not think this is an open fracture as he has this chronic tissue exposure at the distal parts of his fingers.        PROCEDURES: (None if blank)  Procedures:     CRITICAL CARE: (None if blank)        Independent Imaging Interpretation by me: please seen ED course/above/below  EKG (if obtained): (All EKG's are interpreted by myself in the absence of a cardiologist) Please see ED course/above/below    ED COURSE:  ED Course as of 10/27/24 2328   Sun Oct 27, 2024   2215 Pending imaging and urinalysis for final disposition I signed out to the oncoming provider at the end of my shift [SC]   6624 XR:IMPRESSION:  Comminuted fractures of the fifth distal phalanx with soft

## 2024-11-04 ENCOUNTER — APPOINTMENT (OUTPATIENT)
Dept: GENERAL RADIOLOGY | Age: 59
End: 2024-11-04
Payer: MEDICARE

## 2024-11-04 ENCOUNTER — HOSPITAL ENCOUNTER (EMERGENCY)
Age: 59
Discharge: HOME | End: 2024-11-04
Payer: MEDICARE

## 2024-11-04 ENCOUNTER — APPOINTMENT (OUTPATIENT)
Dept: CT IMAGING | Age: 59
End: 2024-11-04
Payer: MEDICARE

## 2024-11-04 VITALS
RESPIRATION RATE: 13 BRPM | HEIGHT: 71 IN | WEIGHT: 260 LBS | SYSTOLIC BLOOD PRESSURE: 127 MMHG | TEMPERATURE: 98.2 F | OXYGEN SATURATION: 93 % | DIASTOLIC BLOOD PRESSURE: 74 MMHG | HEART RATE: 77 BPM | BODY MASS INDEX: 36.4 KG/M2

## 2024-11-04 DIAGNOSIS — N30.01 ACUTE CYSTITIS WITH HEMATURIA: ICD-10-CM
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
## 2024-11-04 DIAGNOSIS — R31.9 HEMATURIA, UNSPECIFIED TYPE: ICD-10-CM

## 2024-11-04 DIAGNOSIS — R30.0 DYSURIA: Primary | ICD-10-CM

## 2024-11-04 DIAGNOSIS — M25.511 RIGHT SHOULDER PAIN, UNSPECIFIED CHRONICITY: ICD-10-CM

## 2024-11-04 LAB
ALBUMIN SERPL-MCNC: 4 G/DL (ref 3.4–5)
ALBUMIN/GLOB SERPL: 1.8 {RATIO} (ref 1.1–2.2)
ALP SERPL-CCNC: 92 U/L (ref 40–129)
ALT SERPL-CCNC: 7 U/L (ref 10–40)
ANION GAP SERPL CALCULATED.3IONS-SCNC: 10 MMOL/L (ref 9–17)
AST SERPL-CCNC: 13 U/L (ref 15–37)
BASOPHILS # BLD: 0.03 K/UL
BASOPHILS NFR BLD: 1 % (ref 0–1)
BILIRUB SERPL-MCNC: 0.4 MG/DL (ref 0–1)
BILIRUB UR QL STRIP: ABNORMAL
BUN SERPL-MCNC: 14 MG/DL (ref 7–20)
CALCIUM SERPL-MCNC: 9.2 MG/DL (ref 8.3–10.6)
CHLORIDE SERPL-SCNC: 101 MMOL/L (ref 99–110)
CLARITY UR: ABNORMAL
CO2 SERPL-SCNC: 26 MMOL/L (ref 21–32)
COLOR UR: ABNORMAL
CREAT SERPL-MCNC: 0.8 MG/DL (ref 0.9–1.3)
EOSINOPHIL # BLD: 0.16 K/UL
EOSINOPHILS RELATIVE PERCENT: 3 % (ref 0–3)
ERYTHROCYTE [DISTWIDTH] IN BLOOD BY AUTOMATED COUNT: 15.5 % (ref 11.7–14.9)
GFR, ESTIMATED: >90 ML/MIN/1.73M2
GLUCOSE SERPL-MCNC: 147 MG/DL (ref 74–99)
GLUCOSE UR STRIP-MCNC: ABNORMAL MG/DL
HCT VFR BLD AUTO: 36.7 % (ref 42–52)
HGB BLD-MCNC: 11.4 G/DL (ref 13.5–18)
HGB UR QL STRIP.AUTO: ABNORMAL
IMM GRANULOCYTES # BLD AUTO: 0.01 K/UL
IMM GRANULOCYTES NFR BLD: 0 %
KETONES UR STRIP-MCNC: ABNORMAL MG/DL
LEUKOCYTE ESTERASE UR QL STRIP: ABNORMAL
LYMPHOCYTES NFR BLD: 1.66 K/UL
LYMPHOCYTES RELATIVE PERCENT: 26 % (ref 24–44)
MCH RBC QN AUTO: 26.8 PG (ref 27–31)
MCHC RBC AUTO-ENTMCNC: 31.1 G/DL (ref 32–36)
MCV RBC AUTO: 86.4 FL (ref 78–100)
MONOCYTES NFR BLD: 0.4 K/UL
MONOCYTES NFR BLD: 6 % (ref 0–4)
MUCOUS THREADS URNS QL MICRO: ABNORMAL
NEUTROPHILS NFR BLD: 65 % (ref 36–66)
NEUTS SEG NFR BLD: 4.21 K/UL
NITRITE UR QL STRIP: ABNORMAL
PH UR STRIP: ABNORMAL [PH] (ref 5–8)
PLATELET # BLD AUTO: 182 K/UL (ref 140–440)
PMV BLD AUTO: 9 FL (ref 7.5–11.1)
POTASSIUM SERPL-SCNC: 4.7 MMOL/L (ref 3.5–5.1)
PROT SERPL-MCNC: 6.2 G/DL (ref 6.4–8.2)
PROT UR STRIP-MCNC: ABNORMAL MG/DL
RBC # BLD AUTO: 4.25 M/UL (ref 4.6–6.2)
RBC #/AREA URNS HPF: 2967 /HPF (ref 0–2)
SODIUM SERPL-SCNC: 137 MMOL/L (ref 136–145)
SP GR UR STRIP: ABNORMAL (ref 1–1.03)
TRICHOMONAS #/AREA URNS HPF: ABNORMAL /[HPF]
UROBILINOGEN UR STRIP-ACNC: ABNORMAL EU/DL (ref 0–1)
WBC #/AREA URNS HPF: 0 /HPF (ref 0–5)
WBC OTHER # BLD: 6.5 K/UL (ref 4–10.5)
YEAST URNS QL MICRO: ABNORMAL

## 2024-11-04 PROCEDURE — 73030 X-RAY EXAM OF SHOULDER: CPT

## 2024-11-04 PROCEDURE — 96374 THER/PROPH/DIAG INJ IV PUSH: CPT

## 2024-11-04 PROCEDURE — 85025 COMPLETE CBC W/AUTO DIFF WBC: CPT

## 2024-11-04 PROCEDURE — 87086 URINE CULTURE/COLONY COUNT: CPT

## 2024-11-04 PROCEDURE — 96376 TX/PRO/DX INJ SAME DRUG ADON: CPT

## 2024-11-04 PROCEDURE — 74177 CT ABD & PELVIS W/CONTRAST: CPT

## 2024-11-04 PROCEDURE — 99285 EMERGENCY DEPT VISIT HI MDM: CPT

## 2024-11-04 PROCEDURE — 2580000003 HC RX 258: Performed by: PHYSICIAN ASSISTANT

## 2024-11-04 PROCEDURE — 6360000004 HC RX CONTRAST MEDICATION: Performed by: PHYSICIAN ASSISTANT

## 2024-11-04 PROCEDURE — 81001 URINALYSIS AUTO W/SCOPE: CPT

## 2024-11-04 PROCEDURE — 51798 US URINE CAPACITY MEASURE: CPT

## 2024-11-04 PROCEDURE — 6360000002 HC RX W HCPCS: Performed by: PHYSICIAN ASSISTANT

## 2024-11-04 PROCEDURE — 73060 X-RAY EXAM OF HUMERUS: CPT

## 2024-11-04 PROCEDURE — 80053 COMPREHEN METABOLIC PANEL: CPT

## 2024-11-04 RX ORDER — FENTANYL CITRATE 50 UG/ML
25 INJECTION, SOLUTION INTRAMUSCULAR; INTRAVENOUS ONCE
Status: COMPLETED | OUTPATIENT
Start: 2024-11-04 | End: 2024-11-04

## 2024-11-04 RX ORDER — IOPAMIDOL 755 MG/ML
75 INJECTION, SOLUTION INTRAVASCULAR
Status: COMPLETED | OUTPATIENT
Start: 2024-11-04 | End: 2024-11-04

## 2024-11-04 RX ORDER — CIPROFLOXACIN 500 MG/1
500 TABLET, FILM COATED ORAL ONCE
Status: DISCONTINUED | OUTPATIENT
Start: 2024-11-04 | End: 2024-11-04 | Stop reason: HOSPADM

## 2024-11-04 RX ORDER — CIPROFLOXACIN 500 MG/1
500 TABLET, FILM COATED ORAL 2 TIMES DAILY
Qty: 14 TABLET | Refills: 0 | Status: SHIPPED | OUTPATIENT
Start: 2024-11-04 | End: 2024-11-11

## 2024-11-04 RX ORDER — 0.9 % SODIUM CHLORIDE 0.9 %
1000 INTRAVENOUS SOLUTION INTRAVENOUS ONCE
Status: COMPLETED | OUTPATIENT
Start: 2024-11-04 | End: 2024-11-04

## 2024-11-04 RX ORDER — HYDROCODONE BITARTRATE AND ACETAMINOPHEN 5; 325 MG/1; MG/1
1 TABLET ORAL EVERY 6 HOURS PRN
Qty: 10 TABLET | Refills: 0 | Status: SHIPPED | OUTPATIENT
Start: 2024-11-04 | End: 2024-11-09

## 2024-11-04 RX ADMIN — FENTANYL CITRATE 25 MCG: 50 INJECTION, SOLUTION INTRAMUSCULAR; INTRAVENOUS at 10:53

## 2024-11-04 RX ADMIN — SODIUM CHLORIDE 1000 ML: 9 INJECTION, SOLUTION INTRAVENOUS at 11:29

## 2024-11-04 RX ADMIN — FENTANYL CITRATE 25 MCG: 50 INJECTION, SOLUTION INTRAMUSCULAR; INTRAVENOUS at 13:30

## 2024-11-04 RX ADMIN — IOPAMIDOL 75 ML: 755 INJECTION, SOLUTION INTRAVENOUS at 12:35

## 2024-11-04 ASSESSMENT — PAIN DESCRIPTION - LOCATION
LOCATION: GROIN

## 2024-11-04 ASSESSMENT — PAIN SCALES - GENERAL
PAINLEVEL_OUTOF10: 10
PAINLEVEL_OUTOF10: 10
PAINLEVEL_OUTOF10: 7
PAINLEVEL_OUTOF10: 9

## 2024-11-04 NOTE — DISCHARGE INSTR - COC
L03.116    Abscess of left index finger L02.512    Osteomyelitis of finger of left hand M86.9    Wound infection T14.8XXA, L08.9    Subacute osteomyelitis of left hand M86.242    MRSA infection A49.02    Type 2 diabetes mellitus with left diabetic foot ulcer (HCC) E11.621, L97.529    Open wound of foot excluding toes S91.309A    Osteomyelitis of left foot M86.9    Acute osteomyelitis of metatarsal bone of left foot M86.172    WD-Nonhealing surgical wound T81.89XA    Cellulitis of right leg L03.115    Cellulitis of right lower extremity L03.115    Varicose veins of both legs with edema I83.893    Osteomyelitis of finger of right hand M86.9    WD-Open wound of right index finger S61.200A       Isolation/Infection:   Isolation            No Isolation          Patient Infection Status       Infection Onset Added Last Indicated Last Indicated By Review Planned Expiration Resolved Resolved By    None active    Resolved    COVID-19 24 COVID-19, Rapid  history 24 Infection     MRSA 21 Culture, Wound   10/09/24 Lise Ruvalcaba RN    ESBL (Extended Spectrum Beta Lactamase) 06/10/19 06/14/19 06/10/19 Surgical Culture   10/21/21 Summer Pena, MEY    6/10/19 Proteus, wound    MRSA  07/10/17 07/16/19 Wound culture   10/21/21 Summer Pena, MEY    7/15/19 wound; 19 blood; 6/10/19 wound; 19 wound; 18 wound; 18 wound; 17 wound    ESBL (Extended Spectrum Beta Lactamase)  06/05/15 06/05/15 Elin Champion   10/19/16 Fatmata Jimenez RN    9/8/15 Proteus mirabilis ESBL foot wound; 5/26/15 Proteus mirabilis ESBL foot wound    MDRO (multi-drug resistant organism)  14 Lili Mcpherson RN   14 Lili Mcpherson RN    wound 14 [place] - review 14    MRSA  14 Lili Mcpherson RN   16 [place]    Surgical 14                       Nurse Assessment:  Last Vital Signs: BP

## 2024-11-04 NOTE — ED PROVIDER NOTES
EMERGENCY DEPARTMENT ENCOUNTER        Pt Name: Toi Briones  MRN: [id number]  Birthdate 1965  Date of evaluation: 11/4/2024  Provider: CAMACHO OLGUIN  PCP: Emmett Rivera MD    CHAR. I have evaluated this patient.        Triage CHIEF COMPLAINT       Chief Complaint   Patient presents with    Dysuria     Blood in urine.     Fall     Frequent falls         HISTORY OF PRESENT ILLNESS      Chief Complaint: Lower abdominal pain, dysuria, hematuria, right shoulder pain secondary to a fall yesterday    Toi Briones is a 58 y.o. male presents to the Emergency Department today with a chief complaint of lower abdominal pain, dysuria, hematuria.  Patient states that he noticed blood in his urine this morning which stimulated his ER evaluation today.  Patient states that over the last several weeks he has had some difficulty with urination, he describes that when he goes to urinate he has a pain and a pressure sensation into the head of his penis and then has a pain that then shoots in between his legs and down each leg.  Patient also states he had a fever last night, he had noticed some blood clots in his urine.  Patient is not anticoagulated, takes no blood thinning medication.  No history of kidney stones, no history of significant kidney infection.  Is a's smoker.  Patient also has history of diabetes, has history of recurrent osteomyelitis to the hands, he is on regular schedule antibiotics for this.    Patient also complaining of right shoulder pain, right proximal humeral pain.  He states that yesterday he was doing yard work when he did trip and fall, he states that he landed on his right shoulder.  Denies hitting his head, no loss of conscious.  He states he had immediate pain and decreased range of motion of the right upper extremity.  Having worsening pain today, has history of rotator cuff tear to the left and feels like it similar into the right.  No other apparent injuries.

## 2024-11-05 LAB
MICROORGANISM SPEC CULT: NORMAL
SERVICE CMNT-IMP: NORMAL
SPECIMEN DESCRIPTION: NORMAL

## 2024-11-07 ENCOUNTER — TELEPHONE (OUTPATIENT)
Dept: WOUND CARE | Age: 59
End: 2024-11-07

## 2024-11-13 ENCOUNTER — OFFICE VISIT (OUTPATIENT)
Dept: ORTHOPEDIC SURGERY | Age: 59
End: 2024-11-13
Payer: MEDICARE

## 2024-11-13 VITALS
RESPIRATION RATE: 14 BRPM | HEART RATE: 81 BPM | WEIGHT: 258 LBS | HEIGHT: 71 IN | BODY MASS INDEX: 36.12 KG/M2 | OXYGEN SATURATION: 98 %

## 2024-11-13 DIAGNOSIS — S46.011A TRAUMATIC TEAR OF RIGHT ROTATOR CUFF, UNSPECIFIED TEAR EXTENT, INITIAL ENCOUNTER: Primary | ICD-10-CM

## 2024-11-13 PROCEDURE — 99203 OFFICE O/P NEW LOW 30 MIN: CPT | Performed by: PHYSICIAN ASSISTANT

## 2024-11-13 PROCEDURE — 3017F COLORECTAL CA SCREEN DOC REV: CPT | Performed by: PHYSICIAN ASSISTANT

## 2024-11-13 PROCEDURE — 1036F TOBACCO NON-USER: CPT | Performed by: PHYSICIAN ASSISTANT

## 2024-11-13 PROCEDURE — G8427 DOCREV CUR MEDS BY ELIG CLIN: HCPCS | Performed by: PHYSICIAN ASSISTANT

## 2024-11-13 PROCEDURE — G8484 FLU IMMUNIZE NO ADMIN: HCPCS | Performed by: PHYSICIAN ASSISTANT

## 2024-11-13 PROCEDURE — G8417 CALC BMI ABV UP PARAM F/U: HCPCS | Performed by: PHYSICIAN ASSISTANT

## 2024-11-13 RX ORDER — HYDROCODONE BITARTRATE AND ACETAMINOPHEN 7.5; 325 MG/1; MG/1
1 TABLET ORAL 2 TIMES DAILY PRN
Qty: 14 TABLET | Refills: 0 | Status: SHIPPED | OUTPATIENT
Start: 2024-11-13 | End: 2024-11-20

## 2024-11-13 NOTE — PATIENT INSTRUCTIONS
Central Scheduling # [phone]    MRI of right shoulder.  Follow-up in 2-3 weeks to go over MRI.  Weightbearing as tolerated  Over-the-counter medication as needed for pain    We are committed to providing you the best care possible.  If you receive a survey after visiting one of our offices, please take time to share your experience concerning your physician office visit.  These surveys are confidential and no health information about you is shared.  We are eager to improve for you and we are counting on your feedback to help make that happen.

## 2024-12-02 ENCOUNTER — TELEPHONE (OUTPATIENT)
Dept: ORTHOPEDIC SURGERY | Age: 59
End: 2024-12-02

## 2024-12-02 DIAGNOSIS — S46.011A TRAUMATIC TEAR OF RIGHT ROTATOR CUFF, UNSPECIFIED TEAR EXTENT, INITIAL ENCOUNTER: Primary | ICD-10-CM

## 2024-12-02 NOTE — TELEPHONE ENCOUNTER
Patient is requesting a refill of [place] sent to [facility] [address]. Pt stated he finished his prescription and that he was having difficulty getting his MRI scheduled due to the incorrect order being put in and I resubmitted the correct order and gave him the number to call right away.    Please advise on refill.

## 2024-12-03 ENCOUNTER — TELEPHONE (OUTPATIENT)
Dept: ORTHOPEDIC SURGERY | Age: 59
End: 2024-12-03

## 2024-12-04 ENCOUNTER — APPOINTMENT (OUTPATIENT)
Dept: GENERAL RADIOLOGY | Age: 59
End: 2024-12-04
Payer: MEDICARE

## 2024-12-04 ENCOUNTER — HOSPITAL ENCOUNTER (EMERGENCY)
Age: 59
Discharge: ANOTHER HOSPITAL | End: 2024-12-04
Attending: STUDENT IN AN ORGANIZED HEALTH CARE EDUCATION/TRAINING PROGRAM
Payer: MEDICARE

## 2024-12-04 VITALS
BODY MASS INDEX: 35.98 KG/M2 | SYSTOLIC BLOOD PRESSURE: 132 MMHG | HEART RATE: 82 BPM | TEMPERATURE: 99.2 F | RESPIRATION RATE: 18 BRPM | OXYGEN SATURATION: 97 % | WEIGHT: 258 LBS | DIASTOLIC BLOOD PRESSURE: 74 MMHG

## 2024-12-04 DIAGNOSIS — M86.9 OSTEOMYELITIS OF FINGER OF RIGHT HAND: Primary | ICD-10-CM

## 2024-12-04 DIAGNOSIS — Z86.39 HISTORY OF DIABETES MELLITUS: ICD-10-CM

## 2024-12-04 DIAGNOSIS — N30.00 ACUTE CYSTITIS WITHOUT HEMATURIA: ICD-10-CM

## 2024-12-04 LAB
ANION GAP SERPL CALCULATED.3IONS-SCNC: 12 MMOL/L (ref 9–17)
BASOPHILS # BLD: 0.04 K/UL
BASOPHILS NFR BLD: 1 % (ref 0–1)
BILIRUB UR QL STRIP: NEGATIVE
BUN SERPL-MCNC: 16 MG/DL (ref 7–20)
CALCIUM SERPL-MCNC: 9.2 MG/DL (ref 8.3–10.6)
CHARACTER UR: ABNORMAL
CHLORIDE SERPL-SCNC: 101 MMOL/L (ref 99–110)
CLARITY UR: CLEAR
CO2 SERPL-SCNC: 23 MMOL/L (ref 21–32)
COLOR UR: YELLOW
CREAT SERPL-MCNC: 0.7 MG/DL (ref 0.9–1.3)
CRP SERPL HS-MCNC: 83.3 MG/L (ref 0–5)
EOSINOPHIL # BLD: 0.25 K/UL
EOSINOPHILS RELATIVE PERCENT: 4 % (ref 0–3)
EPI CELLS #/AREA URNS HPF: <1 /HPF
ERYTHROCYTE [DISTWIDTH] IN BLOOD BY AUTOMATED COUNT: 16.5 % (ref 11.7–14.9)
ERYTHROCYTE [SEDIMENTATION RATE] IN BLOOD BY WESTERGREN METHOD: 62 MM/HR (ref 0–20)
GFR, ESTIMATED: >90 ML/MIN/1.73M2
GLUCOSE SERPL-MCNC: 121 MG/DL (ref 74–99)
GLUCOSE UR STRIP-MCNC: NEGATIVE MG/DL
HCT VFR BLD AUTO: 35 % (ref 42–52)
HGB BLD-MCNC: 10.7 G/DL (ref 13.5–18)
HGB UR QL STRIP.AUTO: ABNORMAL
IMM GRANULOCYTES # BLD AUTO: 0.02 K/UL
IMM GRANULOCYTES NFR BLD: 0 %
KETONES UR STRIP-MCNC: NEGATIVE MG/DL
LEUKOCYTE ESTERASE UR QL STRIP: NEGATIVE
LYMPHOCYTES NFR BLD: 1.8 K/UL
LYMPHOCYTES RELATIVE PERCENT: 25 % (ref 24–44)
MCH RBC QN AUTO: 26.4 PG (ref 27–31)
MCHC RBC AUTO-ENTMCNC: 30.6 G/DL (ref 32–36)
MCV RBC AUTO: 86.4 FL (ref 78–100)
MONOCYTES NFR BLD: 0.49 K/UL
MONOCYTES NFR BLD: 7 % (ref 0–4)
NEUTROPHILS NFR BLD: 64 % (ref 36–66)
NEUTS SEG NFR BLD: 4.55 K/UL
NITRITE UR QL STRIP: NEGATIVE
PH UR STRIP: 6 [PH] (ref 5–8)
PLATELET # BLD AUTO: 208 K/UL (ref 140–440)
PMV BLD AUTO: 10 FL (ref 7.5–11.1)
POTASSIUM SERPL-SCNC: 4 MMOL/L (ref 3.5–5.1)
PROT UR STRIP-MCNC: ABNORMAL MG/DL
RBC # BLD AUTO: 4.05 M/UL (ref 4.6–6.2)
RBC #/AREA URNS HPF: 7 /HPF (ref 0–2)
SODIUM SERPL-SCNC: 136 MMOL/L (ref 136–145)
SP GR UR STRIP: 1.02 (ref 1–1.03)
TRICHOMONAS #/AREA URNS HPF: ABNORMAL /[HPF]
UROBILINOGEN UR STRIP-ACNC: 1 EU/DL (ref 0–1)
WBC #/AREA URNS HPF: 16 /HPF (ref 0–5)
WBC OTHER # BLD: 7.2 K/UL (ref 4–10.5)

## 2024-12-04 PROCEDURE — 81001 URINALYSIS AUTO W/SCOPE: CPT

## 2024-12-04 PROCEDURE — 96366 THER/PROPH/DIAG IV INF ADDON: CPT

## 2024-12-04 PROCEDURE — 87086 URINE CULTURE/COLONY COUNT: CPT

## 2024-12-04 PROCEDURE — 2580000003 HC RX 258: Performed by: STUDENT IN AN ORGANIZED HEALTH CARE EDUCATION/TRAINING PROGRAM

## 2024-12-04 PROCEDURE — 6370000000 HC RX 637 (ALT 250 FOR IP): Performed by: STUDENT IN AN ORGANIZED HEALTH CARE EDUCATION/TRAINING PROGRAM

## 2024-12-04 PROCEDURE — 85652 RBC SED RATE AUTOMATED: CPT

## 2024-12-04 PROCEDURE — 80048 BASIC METABOLIC PNL TOTAL CA: CPT

## 2024-12-04 PROCEDURE — 6360000002 HC RX W HCPCS: Performed by: STUDENT IN AN ORGANIZED HEALTH CARE EDUCATION/TRAINING PROGRAM

## 2024-12-04 PROCEDURE — 96365 THER/PROPH/DIAG IV INF INIT: CPT

## 2024-12-04 PROCEDURE — 73140 X-RAY EXAM OF FINGER(S): CPT

## 2024-12-04 PROCEDURE — 85025 COMPLETE CBC W/AUTO DIFF WBC: CPT

## 2024-12-04 PROCEDURE — 86140 C-REACTIVE PROTEIN: CPT

## 2024-12-04 PROCEDURE — 99285 EMERGENCY DEPT VISIT HI MDM: CPT

## 2024-12-04 RX ORDER — ACETAMINOPHEN 500 MG
1000 TABLET ORAL
Status: COMPLETED | OUTPATIENT
Start: 2024-12-04 | End: 2024-12-04

## 2024-12-04 RX ORDER — CLINDAMYCIN HYDROCHLORIDE 150 MG/1
450 CAPSULE ORAL ONCE
Status: COMPLETED | OUTPATIENT
Start: 2024-12-04 | End: 2024-12-04

## 2024-12-04 RX ADMIN — VANCOMYCIN HYDROCHLORIDE 2500 MG: 5 INJECTION, POWDER, LYOPHILIZED, FOR SOLUTION INTRAVENOUS at 20:17

## 2024-12-04 RX ADMIN — CLINDAMYCIN HYDROCHLORIDE 450 MG: 150 CAPSULE ORAL at 18:56

## 2024-12-04 RX ADMIN — ACETAMINOPHEN 1000 MG: 500 TABLET ORAL at 18:56

## 2024-12-04 ASSESSMENT — PAIN SCALES - GENERAL: PAINLEVEL_OUTOF10: 9

## 2024-12-05 NOTE — ED PROVIDER NOTES
Emergency Department Encounter    Patient: Toi Briones  MRN: [id number]  : 1965  Date of Evaluation: 2024  ED Provider:  Emerson Jameson DO    Triage Chief Complaint:   Wound Check (R finger infection. ) and Dysuria    [place]:  Toi Briones is a 58 y.o. male that presents with concerns for infection of his right ring finger.  States that it has been hurting him more for last several days and a few days ago things got worse when he got his finger caught in between 2 bricks.  He states he is a diabetic and has had previous infections of his pinky finger and index finger of that hand requiring amputation.  Last time he had his finger amputated was in October of this year.  He reports he has osteomyelitis.  He is also mentioned dysuria no abdominal pain nausea vomiting or fevers.    MDM:    History from : Patient    On arrival patient quite well-appearing with normal vital signs.  Initially he looked to have traumatic injury to his ring finger.  He has intact flexion extension at the DIP and PIP.  There is an ulcerated appearance distally that was concerning for possible osteomyelitis.  On x-ray he has nearly complete obliteration of his distal phalanx.  Given concerns for possible cellulitis and UTI initially I did give clindamycin, based on his x-ray I will order vancomycin to cover for osteomyelitis.  Will order basic labs with CRP ESR.  I reviewed in his medical records in October he had amputation done through [facility] with Dr. Joseph, will contact [facility] for transfer for definitive care as we do not have a hand specialist at our facility either.      ED Course as of 12/04/24 2034   Wed Dec 04, 2024   2033 Discussed with hospitalist at [facility].   has accepted patient for transfer for further evaluation by their hand team [LA]      ED Course User Index  [LA] Emerson Jameson DO       Patient was given the following medications:  Medications   vancomycin (VANCOCIN) 2,500

## 2024-12-05 NOTE — ED NOTES
PT accepted to [facility]    Accepting provider is Dr. Torrie Longoria    N2N is [phone]    Room # SE 9 room 908    Transport time is 2200 with [facility]

## 2024-12-06 DIAGNOSIS — S46.011A TRAUMATIC TEAR OF RIGHT ROTATOR CUFF, UNSPECIFIED TEAR EXTENT, INITIAL ENCOUNTER: Primary | ICD-10-CM

## 2024-12-06 LAB
MICROORGANISM SPEC CULT: NORMAL
SPECIMEN DESCRIPTION: NORMAL

## 2024-12-06 RX ORDER — HYDROCODONE BITARTRATE AND ACETAMINOPHEN 7.5; 325 MG/1; MG/1
1 TABLET ORAL 2 TIMES DAILY PRN
Qty: 14 TABLET | Refills: 0 | Status: SHIPPED | OUTPATIENT
Start: 2024-12-06 | End: 2024-12-13

## 2024-12-17 ASSESSMENT — ENCOUNTER SYMPTOMS
RESPIRATORY NEGATIVE: 1
EYES NEGATIVE: 1
GASTROINTESTINAL NEGATIVE: 1

## 2024-12-17 NOTE — PROGRESS NOTES
'ulcer on left foot healed all up\"       Past Surgical History:   Procedure Laterality Date    COLONOSCOPY  1990's    Normal exam per pt    DEBRIDEMENT  8/25/2014    left foot    ENDOSCOPY, COLON, DIAGNOSTIC  06-    Normal exam per pt -     FINGER SURGERY  9-11-11    Right Index Finger    FINGER SURGERY  01-16-12    RIght Index Finger-Removal of loose body, Reconstruction of Mallet Finger    FOOT DEBRIDEMENT Left 6/10/2019    FOOT DEBRIDEMENT INCISION AND DRAINAGE performed by Jamar Saenz MD at [facility] OR    FOOT SURGERY Right 06/01/2018    I&D right foot    HEMORRHOID SURGERY  2008    IR TUNNELED CVC PLACE WO SQ PORT/PUMP > 5 YEARS  12/21/2021    IR TUNNELED CATHETER PLACEMENT GREATER THAN 5 YEARS 12/21/2021 [facility] SPECIAL PROCEDURES    OTHER SURGICAL HISTORY Left 10/22/2013    I & D left foot    OTHER SURGICAL HISTORY  07/07/2017    I&D fingers X2 left hand. I&D left forearm    OTHER SURGICAL HISTORY Left 07/08/2017    left hand debridement, left forearm incision and drainage     OTHER SURGICAL HISTORY Left 07/10/2017    Fingers I&D    OTHER SURGICAL HISTORY Left 07/14/2017    middle finger amputation revision    ROTATOR CUFF REPAIR  Last Done 7/18/2011    Left, Done Four Times    TOE AMPUTATION Left 5/21/2024    LEFT 1ST TOE AMPUTATION POSSIBLE WOUND VAC performed by Melany Nicholson MD at [facility] OR    UPPER GASTROINTESTINAL ENDOSCOPY N/A 2/21/2020    EGD BIOPSY performed by Arian Lai MD at [facility] OR       Family History   Problem Relation Age of Onset    Kidney Disease Mother         \"Kidney Failure, On Dialysis\"    Early Death Mother 36    Diabetes Father         \"Type II\"       Social History     Socioeconomic History    Marital status:      Spouse name: None    Number of children: 4    Years of education: None    Highest education level: None   Tobacco Use    Smoking status: Former     Current packs/day: 0.00     Types: Cigarettes     Start date: 1972     Quit date: 1978

## 2024-12-30 ENCOUNTER — HOSPITAL ENCOUNTER (EMERGENCY)
Age: 59
Discharge: HOME | End: 2024-12-30
Attending: EMERGENCY MEDICINE
Payer: MEDICARE

## 2024-12-30 ENCOUNTER — HOSPITAL ENCOUNTER (OUTPATIENT)
Dept: MRI IMAGING | Age: 59
Discharge: HOME | End: 2024-12-30
Payer: MEDICARE

## 2024-12-30 ENCOUNTER — APPOINTMENT (OUTPATIENT)
Dept: GENERAL RADIOLOGY | Age: 59
End: 2024-12-30
Payer: MEDICARE

## 2024-12-30 VITALS
DIASTOLIC BLOOD PRESSURE: 70 MMHG | HEIGHT: 71 IN | HEART RATE: 79 BPM | WEIGHT: 240 LBS | SYSTOLIC BLOOD PRESSURE: 127 MMHG | TEMPERATURE: 98.1 F | BODY MASS INDEX: 33.6 KG/M2 | OXYGEN SATURATION: 98 % | RESPIRATION RATE: 20 BRPM
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
## 2024-12-30 DIAGNOSIS — S40.011A CONTUSION OF RIGHT SHOULDER, INITIAL ENCOUNTER: ICD-10-CM

## 2024-12-30 DIAGNOSIS — S46.011A TRAUMATIC TEAR OF RIGHT ROTATOR CUFF, UNSPECIFIED TEAR EXTENT, INITIAL ENCOUNTER: ICD-10-CM

## 2024-12-30 DIAGNOSIS — S80.211A ABRASION OF RIGHT KNEE, INITIAL ENCOUNTER: ICD-10-CM

## 2024-12-30 PROCEDURE — 73030 X-RAY EXAM OF SHOULDER: CPT

## 2024-12-30 PROCEDURE — 73221 MRI JOINT UPR EXTREM W/O DYE: CPT

## 2024-12-30 PROCEDURE — 6370000000 HC RX 637 (ALT 250 FOR IP): Performed by: EMERGENCY MEDICINE

## 2024-12-30 PROCEDURE — 99283 EMERGENCY DEPT VISIT LOW MDM: CPT

## 2024-12-30 PROCEDURE — 73562 X-RAY EXAM OF KNEE 3: CPT

## 2024-12-30 RX ORDER — GINSENG 100 MG
CAPSULE ORAL ONCE
Status: COMPLETED | OUTPATIENT
Start: 2024-12-30 | End: 2024-12-30

## 2024-12-30 RX ORDER — OXYCODONE AND ACETAMINOPHEN 5; 325 MG/1; MG/1
1 TABLET ORAL ONCE
Status: COMPLETED | OUTPATIENT
Start: 2024-12-30 | End: 2024-12-30

## 2024-12-30 RX ORDER — BACITRACIN ZINC AND POLYMYXIN B SULFATE 500; 1000 [USP'U]/G; [USP'U]/G
OINTMENT TOPICAL
Qty: 14 G | Refills: 1 | Status: SHIPPED | OUTPATIENT
Start: 2024-12-30 | End: 2025-01-06

## 2024-12-30 RX ORDER — OXYCODONE AND ACETAMINOPHEN 5; 325 MG/1; MG/1
1 TABLET ORAL EVERY 6 HOURS PRN
Qty: 12 TABLET | Refills: 0 | Status: SHIPPED | OUTPATIENT
Start: 2024-12-30 | End: 2025-01-02

## 2024-12-30 RX ADMIN — BACITRACIN 1 EACH: 500 OINTMENT TOPICAL at 20:43

## 2024-12-30 RX ADMIN — OXYCODONE HYDROCHLORIDE AND ACETAMINOPHEN 1 TABLET: 5; 325 TABLET ORAL at 20:42

## 2024-12-30 ASSESSMENT — PAIN DESCRIPTION - LOCATION: LOCATION: SHOULDER

## 2024-12-30 ASSESSMENT — PAIN SCALES - GENERAL: PAINLEVEL_OUTOF10: 9

## 2024-12-31 NOTE — ED PROVIDER NOTES
specified manifestations, not stated as uncontrolled 04/08/2014    Type II or unspecified type diabetes mellitus without mention of complication, not stated as uncontrolled DX 2007    Follows with PCP    Ulcer of other part of foot 04/08/2014    'ulcer on left foot healed all up\"       Social Determinants : None    Records Reviewed : None    On exam, the patient is afebrile and nontoxic appearing.  He is hemodynamically stable and neurologically intact. Physical exam is significant for generalized right shoulder tenderness to palpation with decreased range of motion due to pain and an abrasion to the right anterior knee.  The extremities are neurovascularly intact.  Right shoulder x-rays show no acute osseous abnormality.  There are degenerative changes.  X-rays of the right knee show no acute osseous abnormality.  Results were discussed with the patient.  The patient was treated with medications as below and felt significantly better.  Bacitracin and a bandage were applied to the right knee abrasion.  He has a sling with him.    Patient was given the following medications:  Medications   oxyCODONE-acetaminophen (PERCOCET) 5-325 MG per tablet 1 tablet (1 tablet Oral Given 12/30/24 2042)   bacitracin ointment (1 each Topical Given 12/30/24 2043)     Diagnosis and Differential Diagnosis:  I suspect that the patient had a mechanical fall and sustained a right shoulder sprain as well as a right knee abrasion/contusion.  I have a low suspicion for acute fracture, dislocation, neurovascular injury or open fracture.     Disposition Considerations:  I feel that the patient is stable for outpatient management with follow up in 2-3 days.  Prescriptions for bacitracin and Percocet will be prescribed as below.  The patient is given return precautions.  The patient verbalized understanding, was agreeable with plan, and the patient was discharged home in stable condition.    I am the Primary Clinician of Record.    Clinical

## 2025-01-07 ENCOUNTER — TELEPHONE (OUTPATIENT)
Dept: ORTHOPEDIC SURGERY | Age: 60
End: 2025-01-07

## 2025-01-07 NOTE — TELEPHONE ENCOUNTER
Patient is wanting to discuss MRI left shoulder. Patient states that he is also wanting pain medication due to the cold weather and him having more discomfort within the shoulder.

## 2025-01-14 ENCOUNTER — TELEPHONE (OUTPATIENT)
Dept: ORTHOPEDIC SURGERY | Age: 60
End: 2025-01-14

## 2025-01-14 NOTE — TELEPHONE ENCOUNTER
Pt called in stating that he is in extreme pain.   I stated that he could call his PCP to see if they could help him with the pain until we see him. Pt stated that his PCP \" isn't worth shit they wont even give tylenol.\"   I advised of Voltaren cream that is over the counter the pt asked if the doc would prescribe it. I stated that possibly once he is seen  but unsure. Pt stated that he is broke and that's why he was asking for the doc to prescribe it.   Pt stated that he is having extreme pain and may go to the ER. I did advise the pt that there may not be much they could do but it was an option.

## 2025-01-22 ENCOUNTER — OFFICE VISIT (OUTPATIENT)
Dept: ORTHOPEDIC SURGERY | Age: 60
End: 2025-01-22
Payer: COMMERCIAL

## 2025-01-22 DIAGNOSIS — M75.81 RIGHT ROTATOR CUFF TENDINITIS: Primary | ICD-10-CM

## 2025-01-22 PROCEDURE — 99211 OFF/OP EST MAY X REQ PHY/QHP: CPT | Performed by: PHYSICIAN ASSISTANT

## 2025-01-31 ASSESSMENT — ENCOUNTER SYMPTOMS
EYES NEGATIVE: 1
GASTROINTESTINAL NEGATIVE: 1
RESPIRATORY NEGATIVE: 1

## 2025-01-31 NOTE — PROGRESS NOTES
Patient returns with right shoulder pain following MRI results. Patient states pain is constant and rates it a 10/10 today. Has been wearing a sling and taking ibuprofen.       Completed on: 01/06/2025    IMPRESSION:     Rotator cuff tendinopathy without tear.     Tendinopathy of the long head biceps without tear.  
   Aspirin Nausea Only    Nsaids Nausea Only    Other Nausea Only     Darvocet- Gi distress  napsalate/acetaminophen    Propoxyphene Nausea And Vomiting    Toradol [Ketorolac Tromethamine] Other (See Comments)     Sweats        Review of Systems:  See above      Physical Exam:   There were no vitals taken for this visit.       Gait is Antalgic.   Gen/Psych:Examination reveals a pleasant individual in no acute distress. The patient is oriented to time, place and person.  The patient's mood and affect are appropriate.     Lymph: The lymphatic examination bilaterally reveals all areas to be without enlargement or induration.      Skin intact without lymphadenopathy, discoloration, or abnormal temperature.      Vascular: There is intact, symmetric circulation in both upper extremities.    Right shoulder exam:  Skin:  Clear with no erythema, there is no significant joint effusion  Deformity:  none  Atrophy:  none  Tenderness:  globally, mild globally  Active ROM:   FE:60    IR side: sacrum           ER side: 30     Passive ROM is not the same as active  Strength: FE:4/5     ER:5-/5   Neer:  mildly positive  Champagne:  moderately positive      Outsiderecord review: X-rays reviewed    Imaging studies: Right shoulder x-rays reviewed.  No acute fractures or dislocations of the shoulder mild to moderate degenerative change of the AC joint, mild glenohumeral joint degenerative changes noted.      MRI right shoulder 12/30/2024  FINDINGS:     AC Joint:  Mild degenerative changes.     Bones: No acute fracture or humeral head AVN.     Rotator Cuff-  Supraspinatus: Tendinopathy.  Infraspinatus: Tendinopathy.  Subscapularis: Tendinopathy.  Teres minor: Intact.     Labral-ligamentous Complex: Intact.     Long head biceps tendon: Tendinopathy of the proximal portion.     Additional Findings: Trace subacromial/subdeltoid bursitis.     IMPRESSION:     Rotator cuff tendinopathy without tear.     Tendinopathy of the long head biceps

## 2025-02-05 ENCOUNTER — OFFICE VISIT (OUTPATIENT)
Dept: ORTHOPEDIC SURGERY | Age: 60
End: 2025-02-05
Payer: COMMERCIAL

## 2025-02-05 VITALS — BODY MASS INDEX: 37.38 KG/M2 | OXYGEN SATURATION: 98 % | HEART RATE: 76 BPM | WEIGHT: 268 LBS

## 2025-02-05 DIAGNOSIS — S46.011A TRAUMATIC TEAR OF RIGHT ROTATOR CUFF, UNSPECIFIED TEAR EXTENT, INITIAL ENCOUNTER: Primary | ICD-10-CM

## 2025-02-05 PROCEDURE — 99204 OFFICE O/P NEW MOD 45 MIN: CPT | Performed by: STUDENT IN AN ORGANIZED HEALTH CARE EDUCATION/TRAINING PROGRAM

## 2025-02-05 ASSESSMENT — ENCOUNTER SYMPTOMS
VOMITING: 0
WHEEZING: 0
BACK PAIN: 0
NAUSEA: 0
SORE THROAT: 0
SHORTNESS OF BREATH: 0
DIARRHEA: 0
COUGH: 0
COLOR CHANGE: 0

## 2025-02-05 NOTE — PROGRESS NOTES
New patient is here for consult on chronic right shoulder pain. Most recently seen by Azeem who referred him to us. Also seen by Duy in 2019 where he received injection with no relief.    Today he is here with 10/10 pain that has gradually worsened over the last few years. Most notably his pain increased drastically after he \"overextended\" his shoulder while trying to help his wife move ~2 months ago. Structurally unremarkable MRI. Abductive ROM severely limited by pain bilaterally. Coincident minor episodes of numbness and tingling. Consider cervical spine radiculopathy versus adhesive capsulitis.

## 2025-02-05 NOTE — PATIENT INSTRUCTIONS
If you have any questions regarding your surgery scheduling, please call our office and ask to speak with Gisselle [phone].

## 2025-02-05 NOTE — PROGRESS NOTES
2/5/2025   Chief Complaint   Patient presents with    Shoulder Pain     Right shoulder        History of Present Illness:                             Toi Briones is a 59 y.o. male      New patient is here for consult on chronic right shoulder pain. Most recently seen by Azeem who referred him to us. Also seen by Duy in 2019 where he received injection with no relief.     Today he is here with 10/10 pain that has gradually worsened over the last few years. Most notably his pain increased drastically after he \"overextended\" his shoulder while trying to help his wife move ~2 months ago.         This my first time seeing the patient.  Has attempted cortisone injections and physical therapy in the past with no relief.  Has significant issues with shoulder range of motion with pain and crepitance.  Most the pain is at the rotator cuff insertion laterally.  No surgery previously to the right shoulder.  This my first time seeing the patient.  MRI was reviewed    Medical History  Patient's medications, allergies, past medical, surgical, social and family histories were reviewed and updated as appropriate.    Past Medical History:   Diagnosis Date    Absent finger     Left hand    Anxiety     Arthritis     Hands    Asthma     \"As a kid but outgrew this\" - no medications as of 2/21/20    Chronic back pain     Depression     Difficult intravenous access 05/26/2024    Unable to place PICC x2 admissions. Patient IS NOT a PICC candidate (2024)    Edema     Fibromyalgia     Headache(784.0)     Last: 2/19/20    Hernia, abdominal     History of difficult venous access 12/21/2021    No longer a candidate for bedside PICC placement, multiple failed attempts    Hx MRSA infection     positive culture 8/2014 from left foot    Nausea & vomiting     Neuropathy     Obesity, Class III, BMI 40-49.9 (morbid obesity)     Osteomyelitis     hx finger    Poor circulation     Prolonged emergence from general anesthesia     Restless leg

## 2025-02-06 ENCOUNTER — TELEPHONE (OUTPATIENT)
Dept: ORTHOPEDIC SURGERY | Age: 60
End: 2025-02-06

## 2025-02-06 ENCOUNTER — PREP FOR PROCEDURE (OUTPATIENT)
Dept: ORTHOPEDIC SURGERY | Age: 60
End: 2025-02-06

## 2025-02-06 DIAGNOSIS — M19.011 ARTHRITIS OF RIGHT SHOULDER REGION: ICD-10-CM

## 2025-02-06 DIAGNOSIS — Z01.818 PREOP EXAMINATION: Primary | ICD-10-CM

## 2025-02-06 PROBLEM — S46.011A TRAUMATIC TEAR OF RIGHT ROTATOR CUFF: Status: ACTIVE | Noted: 2025-02-06

## 2025-02-06 PROBLEM — M25.511 RIGHT SHOULDER PAIN: Status: ACTIVE | Noted: 2025-02-06

## 2025-02-06 NOTE — TELEPHONE ENCOUNTER
Patient scheduled for    Right Shoulder Arthroscopic Rotator Cuff Debridement, Subacromial Decompression, Distal Clavicle Excision, Biceps Tenodesis, Debridement  Date: 2/18/25  Facility: [facility]  Surgeon: Abdi Mchugh DO  Product: Arthrex    Prep for Proc 2/6/25  PCP clearance routed via Epic 2/6/25  Pre op appt 2/5/25    [employer]  Contacted 2/6/25 via Availity  Status: NPR CPT 49159 ICD M19.011/M25.511 for outpatient    [facility] PAT

## 2025-02-11 RX ORDER — TIRZEPATIDE 2.5 MG/.5ML
INJECTION, SOLUTION SUBCUTANEOUS WEEKLY
COMMUNITY

## 2025-02-11 RX ORDER — LINEZOLID 600 MG/1
600 TABLET, FILM COATED ORAL EVERY 12 HOURS
COMMUNITY
Start: 2024-12-07

## 2025-02-11 NOTE — PROGRESS NOTES
Patient coming in for PAT later this week. Orders placed and faxed to registation and outpatient lab.   Surgery @ [facility] on 2/18/25 at 1015, arrival at 0815. Patient is using transportation service. Asked scheduling not to change his time.     NOTHING TO EAT OR DRINK AFTER MIDNIGHT DAY OF SURGERY    1. Enter thru the hospital main entrance on day of surgery, check in at the Information Desk. If you arrive prior to 6:00am, enter thru the ER entrance.    2. Follow the directions as prescribed by the doctor for your procedure and medications.         Morning of surgery take:Protonix with sip of water   Last dose of Mounjaro 2/10/25- HOLD on 2/17. Clear liquid diet day before surgery. Lantus the night before surgery 12 units.        Stop vitamins, supplements and NSAIDS:  NOW     3. Check with your Doctor regarding stopping blood thinners and follow their instructions.    4. Do not smoke, vape or use chewing tobacco morning of surgery. Do not drink any alcoholic beverages 24 hours prior to surgery.       This includes NA Beer. No street drugs 7 days prior to surgery.    5. If you have dentures, contacts of glasses they will be removed before going to the OR; please bring a case.    6. Please bring picture ID, insurance card, paperwork from the doctor’s office (H & P, Consent, & card for implantable devices).    7. Take a shower with an antibacterial soap the night before surgery and the morning of surgery. Do not put anything on your skin      After your morning shower.    8. You will need a responsible adult to drive you home and check on you after surgery.

## 2025-02-12 RX ORDER — SODIUM CHLORIDE, SODIUM LACTATE, POTASSIUM CHLORIDE, CALCIUM CHLORIDE 600; 310; 30; 20 MG/100ML; MG/100ML; MG/100ML; MG/100ML
INJECTION, SOLUTION INTRAVENOUS CONTINUOUS
Status: CANCELLED | OUTPATIENT
Start: 2025-02-12

## 2025-02-12 RX ORDER — SODIUM CHLORIDE 0.9 % (FLUSH) 0.9 %
5-40 SYRINGE (ML) INJECTION EVERY 12 HOURS SCHEDULED
Status: CANCELLED | OUTPATIENT
Start: 2025-02-12

## 2025-02-12 RX ORDER — ACETAMINOPHEN 325 MG/1
1000 TABLET ORAL ONCE
Status: CANCELLED | OUTPATIENT
Start: 2025-02-12 | End: 2025-02-12

## 2025-02-12 RX ORDER — SODIUM CHLORIDE 0.9 % (FLUSH) 0.9 %
5-40 SYRINGE (ML) INJECTION PRN
Status: CANCELLED | OUTPATIENT
Start: 2025-02-12

## 2025-02-12 RX ORDER — SODIUM CHLORIDE 9 MG/ML
INJECTION, SOLUTION INTRAVENOUS PRN
Status: CANCELLED | OUTPATIENT
Start: 2025-02-12

## 2025-02-17 ENCOUNTER — HOSPITAL ENCOUNTER (OUTPATIENT)
Dept: GENERAL RADIOLOGY | Age: 60
Discharge: HOME | End: 2025-02-17
Payer: COMMERCIAL

## 2025-02-17 ENCOUNTER — HOSPITAL ENCOUNTER (OUTPATIENT)
Age: 60
Discharge: HOME | End: 2025-02-17
Payer: COMMERCIAL

## 2025-02-17 ENCOUNTER — ANESTHESIA EVENT (OUTPATIENT)
Dept: OPERATING ROOM | Age: 60
End: 2025-02-17
Payer: COMMERCIAL

## 2025-02-17 DIAGNOSIS — Z01.818 PREOP TESTING: ICD-10-CM

## 2025-02-17 DIAGNOSIS — Z01.818 PREOP EXAMINATION: ICD-10-CM

## 2025-02-17 DIAGNOSIS — Z01.818 PREOP EXAMINATION: Primary | ICD-10-CM

## 2025-02-17 LAB
ALBUMIN SERPL-MCNC: 4 G/DL (ref 3.4–5)
ALBUMIN/GLOB SERPL: 1.3 {RATIO} (ref 1.1–2.2)
ALP SERPL-CCNC: 89 U/L (ref 40–129)
ALT SERPL-CCNC: <5 U/L (ref 10–40)
ANION GAP SERPL CALCULATED.3IONS-SCNC: 12 MMOL/L (ref 9–17)
AST SERPL-CCNC: 16 U/L (ref 15–37)
BASOPHILS # BLD: 0.04 K/UL
BASOPHILS NFR BLD: 1 % (ref 0–1)
BILIRUB SERPL-MCNC: 0.4 MG/DL (ref 0–1)
BILIRUB UR QL STRIP: NEGATIVE
BUN SERPL-MCNC: 17 MG/DL (ref 7–20)
CALCIUM SERPL-MCNC: 9.6 MG/DL (ref 8.3–10.6)
CHLORIDE SERPL-SCNC: 104 MMOL/L (ref 99–110)
CLARITY UR: CLEAR
CO2 SERPL-SCNC: 22 MMOL/L (ref 21–32)
COLOR UR: YELLOW
CREAT SERPL-MCNC: 0.8 MG/DL (ref 0.8–1.3)
CRYSTALS URNS MICRO: ABNORMAL /HPF
EOSINOPHIL # BLD: 0.12 K/UL
EOSINOPHILS RELATIVE PERCENT: 2 % (ref 0–3)
EPI CELLS #/AREA URNS HPF: <1 /HPF
ERYTHROCYTE [DISTWIDTH] IN BLOOD BY AUTOMATED COUNT: 15.7 % (ref 11.7–14.9)
GFR, ESTIMATED: >90 ML/MIN/1.73M2
GLUCOSE SERPL-MCNC: 112 MG/DL (ref 74–99)
GLUCOSE UR STRIP-MCNC: NEGATIVE MG/DL
HCT VFR BLD AUTO: 43 % (ref 42–52)
HGB BLD-MCNC: 13.1 G/DL (ref 13.5–18)
HGB UR QL STRIP.AUTO: ABNORMAL
IMM GRANULOCYTES # BLD AUTO: 0.02 K/UL
IMM GRANULOCYTES NFR BLD: 0 %
KETONES UR STRIP-MCNC: NEGATIVE MG/DL
LEUKOCYTE ESTERASE UR QL STRIP: NEGATIVE
LYMPHOCYTES NFR BLD: 1.81 K/UL
LYMPHOCYTES RELATIVE PERCENT: 31 % (ref 24–44)
MCH RBC QN AUTO: 26.7 PG (ref 27–31)
MCHC RBC AUTO-ENTMCNC: 30.5 G/DL (ref 32–36)
MCV RBC AUTO: 87.8 FL (ref 78–100)
MONOCYTES NFR BLD: 0.35 K/UL
MONOCYTES NFR BLD: 6 % (ref 0–4)
MUCOUS THREADS URNS QL MICRO: ABNORMAL
NEUTROPHILS NFR BLD: 60 % (ref 36–66)
NEUTS SEG NFR BLD: 3.56 K/UL
NITRITE UR QL STRIP: NEGATIVE
PH UR STRIP: 6 [PH] (ref 5–8)
PLATELET # BLD AUTO: 177 K/UL (ref 140–440)
PMV BLD AUTO: 9.9 FL (ref 7.5–11.1)
POTASSIUM SERPL-SCNC: 4.2 MMOL/L (ref 3.5–5.1)
PROT SERPL-MCNC: 7 G/DL (ref 6.4–8.2)
PROT UR STRIP-MCNC: NEGATIVE MG/DL
RBC # BLD AUTO: 4.9 M/UL (ref 4.6–6.2)
RBC #/AREA URNS HPF: 41 /HPF (ref 0–2)
SODIUM SERPL-SCNC: 138 MMOL/L (ref 136–145)
SP GR UR STRIP: 1.02 (ref 1–1.03)
UROBILINOGEN UR STRIP-ACNC: 0.2 EU/DL (ref 0–1)
WBC #/AREA URNS HPF: 1 /HPF (ref 0–5)
WBC OTHER # BLD: 5.9 K/UL (ref 4–10.5)

## 2025-02-17 PROCEDURE — 80053 COMPREHEN METABOLIC PANEL: CPT

## 2025-02-17 PROCEDURE — 87086 URINE CULTURE/COLONY COUNT: CPT

## 2025-02-17 PROCEDURE — 85025 COMPLETE CBC W/AUTO DIFF WBC: CPT

## 2025-02-17 PROCEDURE — 71046 X-RAY EXAM CHEST 2 VIEWS: CPT

## 2025-02-17 PROCEDURE — 81001 URINALYSIS AUTO W/SCOPE: CPT

## 2025-02-17 NOTE — PROGRESS NOTES
Notified patient surgery @ [facility] on  2/18/25 @ 1015, arrival 0815. NPO status and morning medications reviewed. Understanding verbalized.

## 2025-02-17 NOTE — ANESTHESIA PRE PROCEDURE
Department of Anesthesiology  Preprocedure Note       Name:  Toi Briones   Age:  59 y.o.  :  1965                                          MRN:  [id number]         Date:  2025      Surgeon: Surgeon(s):  Abdi Mchugh DO    Procedure: Procedure(s):  SHOULDER ARTHROSCOPY    Medications prior to admission:   Prior to Admission medications    Medication Sig Start Date End Date Taking? Authorizing Provider   linezolid (ZYVOX) 600 MG tablet Take 1 tablet by mouth in the morning and 1 tablet in the evening. 24  Yes Laurent Henry MD   Tirzepatide (MOUNJARO) 2.5 MG/0.5ML SOAJ Inject into the skin once a week Takes on    Yes Laurent Henry MD   Probiotic Product (CULTURELLE PROBIOTICS PO) Take by mouth   Yes Laurent Henry MD   acetaminophen (TYLENOL) 500 MG tablet Take 1 tablet by mouth every 6 hours as needed for Pain 10/8/24 2/11/25 Yes Edin Kaye DO   ondansetron (ZOFRAN-ODT) 4 MG disintegrating tablet Take 1 tablet by mouth 3 times daily as needed for Nausea or Vomiting 10/3/24  Yes Td Zabala PA-C   sertraline (ZOLOFT) 25 MG tablet  24  Yes Laurent Henry MD   tiZANidine (ZANAFLEX) 4 MG tablet  24  Yes Laurent Henry MD   insulin glargine (LANTUS SOLOSTAR) 100 UNIT/ML injection pen Inject 15 Units into the skin nightly 24 Yes Jonathon Mckee MD   insulin lispro, 1 Unit Dial, (HUMALOG KWIKPEN) 100 UNIT/ML SOPN While taking prednisone, please take 5U three times a day with meals but hold it if your glucose is below 150.   Take insulin per sliding scale as below  Blood glucose up to 199: no insulin; 200-249: 2 units; 250-299: 4 units; 300-349: 6 units; over 349: 8 units & call PCP 24  Yes Jonathon Mckee MD   tamsulosin (FLOMAX) 0.4 MG capsule Take 1 capsule by mouth daily for 7 doses 24 Yes Prince Shultz MD   pregabalin (LYRICA) 75 MG capsule Take 1 capsule by mouth 2 times daily for 10 days.

## 2025-02-18 ENCOUNTER — HOSPITAL ENCOUNTER (OUTPATIENT)
Age: 60
Setting detail: OUTPATIENT SURGERY
Discharge: HOME | End: 2025-02-18
Attending: STUDENT IN AN ORGANIZED HEALTH CARE EDUCATION/TRAINING PROGRAM | Admitting: STUDENT IN AN ORGANIZED HEALTH CARE EDUCATION/TRAINING PROGRAM
Payer: COMMERCIAL

## 2025-02-18 ENCOUNTER — ANESTHESIA (OUTPATIENT)
Dept: OPERATING ROOM | Age: 60
End: 2025-02-18
Payer: COMMERCIAL

## 2025-02-18 VITALS
RESPIRATION RATE: 16 BRPM | SYSTOLIC BLOOD PRESSURE: 129 MMHG | WEIGHT: 240 LBS | BODY MASS INDEX: 33.6 KG/M2 | HEIGHT: 71 IN | OXYGEN SATURATION: 97 % | DIASTOLIC BLOOD PRESSURE: 81 MMHG | HEART RATE: 68 BPM | TEMPERATURE: 97.5 F

## 2025-02-18 DIAGNOSIS — Z01.818 PRE-OP TESTING: ICD-10-CM

## 2025-02-18 DIAGNOSIS — S46.011A TRAUMATIC COMPLETE TEAR OF RIGHT ROTATOR CUFF, INITIAL ENCOUNTER: ICD-10-CM

## 2025-02-18 DIAGNOSIS — Z01.818 PREOP TESTING: Primary | ICD-10-CM

## 2025-02-18 LAB
GLUCOSE BLD-MCNC: 140 MG/DL (ref 74–99)
MICROORGANISM SPEC CULT: NORMAL
SPECIMEN DESCRIPTION: NORMAL

## 2025-02-18 PROCEDURE — C1713 ANCHOR/SCREW BN/BN,TIS/BN: HCPCS | Performed by: STUDENT IN AN ORGANIZED HEALTH CARE EDUCATION/TRAINING PROGRAM

## 2025-02-18 PROCEDURE — 7100000000 HC PACU RECOVERY - FIRST 15 MIN: Performed by: STUDENT IN AN ORGANIZED HEALTH CARE EDUCATION/TRAINING PROGRAM

## 2025-02-18 PROCEDURE — 3600000005 HC SURGERY LEVEL 5 BASE: Performed by: STUDENT IN AN ORGANIZED HEALTH CARE EDUCATION/TRAINING PROGRAM

## 2025-02-18 PROCEDURE — 3600000015 HC SURGERY LEVEL 5 ADDTL 15MIN: Performed by: STUDENT IN AN ORGANIZED HEALTH CARE EDUCATION/TRAINING PROGRAM

## 2025-02-18 PROCEDURE — 2500000003 HC RX 250 WO HCPCS

## 2025-02-18 PROCEDURE — 6360000002 HC RX W HCPCS: Performed by: STUDENT IN AN ORGANIZED HEALTH CARE EDUCATION/TRAINING PROGRAM

## 2025-02-18 PROCEDURE — 82962 GLUCOSE BLOOD TEST: CPT

## 2025-02-18 PROCEDURE — 6360000002 HC RX W HCPCS

## 2025-02-18 PROCEDURE — 6370000000 HC RX 637 (ALT 250 FOR IP): Performed by: ANESTHESIOLOGY

## 2025-02-18 PROCEDURE — 7100000010 HC PHASE II RECOVERY - FIRST 15 MIN: Performed by: STUDENT IN AN ORGANIZED HEALTH CARE EDUCATION/TRAINING PROGRAM

## 2025-02-18 PROCEDURE — 3700000000 HC ANESTHESIA ATTENDED CARE: Performed by: STUDENT IN AN ORGANIZED HEALTH CARE EDUCATION/TRAINING PROGRAM

## 2025-02-18 PROCEDURE — 3700000001 HC ADD 15 MINUTES (ANESTHESIA): Performed by: STUDENT IN AN ORGANIZED HEALTH CARE EDUCATION/TRAINING PROGRAM

## 2025-02-18 PROCEDURE — 6360000002 HC RX W HCPCS: Performed by: ANESTHESIOLOGY

## 2025-02-18 PROCEDURE — 2500000003 HC RX 250 WO HCPCS: Performed by: STUDENT IN AN ORGANIZED HEALTH CARE EDUCATION/TRAINING PROGRAM

## 2025-02-18 PROCEDURE — 2720000010 HC SURG SUPPLY STERILE: Performed by: STUDENT IN AN ORGANIZED HEALTH CARE EDUCATION/TRAINING PROGRAM

## 2025-02-18 PROCEDURE — 64415 NJX AA&/STRD BRCH PLXS IMG: CPT | Performed by: ANESTHESIOLOGY

## 2025-02-18 PROCEDURE — 2580000003 HC RX 258

## 2025-02-18 PROCEDURE — 2709999900 HC NON-CHARGEABLE SUPPLY: Performed by: STUDENT IN AN ORGANIZED HEALTH CARE EDUCATION/TRAINING PROGRAM

## 2025-02-18 PROCEDURE — 7100000011 HC PHASE II RECOVERY - ADDTL 15 MIN: Performed by: STUDENT IN AN ORGANIZED HEALTH CARE EDUCATION/TRAINING PROGRAM

## 2025-02-18 PROCEDURE — 7100000001 HC PACU RECOVERY - ADDTL 15 MIN: Performed by: STUDENT IN AN ORGANIZED HEALTH CARE EDUCATION/TRAINING PROGRAM

## 2025-02-18 PROCEDURE — 6370000000 HC RX 637 (ALT 250 FOR IP): Performed by: STUDENT IN AN ORGANIZED HEALTH CARE EDUCATION/TRAINING PROGRAM

## 2025-02-18 PROCEDURE — L3650 SO 8 ABD RESTRAINT PRE OTS: HCPCS | Performed by: STUDENT IN AN ORGANIZED HEALTH CARE EDUCATION/TRAINING PROGRAM

## 2025-02-18 DEVICE — ANCHOR SUTURE BIOCOMP 4.75X19.1 MM SWIVELOCK C: Type: IMPLANTABLE DEVICE | Site: SHOULDER | Status: FUNCTIONAL

## 2025-02-18 RX ORDER — OXYCODONE HYDROCHLORIDE 5 MG/1
5 TABLET ORAL EVERY 4 HOURS PRN
Status: CANCELLED | OUTPATIENT
Start: 2025-02-18

## 2025-02-18 RX ORDER — SODIUM CHLORIDE 9 MG/ML
INJECTION, SOLUTION INTRAVENOUS PRN
Status: DISCONTINUED | OUTPATIENT
Start: 2025-02-18 | End: 2025-02-18 | Stop reason: SDUPTHER

## 2025-02-18 RX ORDER — ONDANSETRON 4 MG/1
4 TABLET, ORALLY DISINTEGRATING ORAL 3 TIMES DAILY PRN
Qty: 21 TABLET | Refills: 1 | Status: SHIPPED | OUTPATIENT
Start: 2025-02-18

## 2025-02-18 RX ORDER — ACETAMINOPHEN 325 MG/1
650 TABLET ORAL
Status: DISCONTINUED | OUTPATIENT
Start: 2025-02-18 | End: 2025-02-18 | Stop reason: HOSPADM

## 2025-02-18 RX ORDER — HALOPERIDOL 5 MG/ML
1 INJECTION INTRAMUSCULAR
Status: DISCONTINUED | OUTPATIENT
Start: 2025-02-18 | End: 2025-02-18 | Stop reason: HOSPADM

## 2025-02-18 RX ORDER — KETOROLAC TROMETHAMINE 30 MG/ML
30 INJECTION, SOLUTION INTRAMUSCULAR; INTRAVENOUS EVERY 6 HOURS
Status: CANCELLED | OUTPATIENT
Start: 2025-02-18 | End: 2025-02-21

## 2025-02-18 RX ORDER — OXYCODONE HYDROCHLORIDE 5 MG/1
5 TABLET ORAL PRN
Status: COMPLETED | OUTPATIENT
Start: 2025-02-18 | End: 2025-02-18

## 2025-02-18 RX ORDER — SODIUM CHLORIDE, SODIUM LACTATE, POTASSIUM CHLORIDE, CALCIUM CHLORIDE 600; 310; 30; 20 MG/100ML; MG/100ML; MG/100ML; MG/100ML
INJECTION, SOLUTION INTRAVENOUS CONTINUOUS
Status: DISCONTINUED | OUTPATIENT
Start: 2025-02-18 | End: 2025-02-18 | Stop reason: HOSPADM

## 2025-02-18 RX ORDER — ACETAMINOPHEN 500 MG
1000 TABLET ORAL ONCE
Status: DISCONTINUED | OUTPATIENT
Start: 2025-02-18 | End: 2025-02-18 | Stop reason: SDUPTHER

## 2025-02-18 RX ORDER — OXYCODONE AND ACETAMINOPHEN 5; 325 MG/1; MG/1
1 TABLET ORAL EVERY 6 HOURS PRN
Qty: 28 TABLET | Refills: 0 | Status: SHIPPED | OUTPATIENT
Start: 2025-02-18 | End: 2025-02-25

## 2025-02-18 RX ORDER — PROCHLORPERAZINE EDISYLATE 5 MG/ML
5 INJECTION INTRAMUSCULAR; INTRAVENOUS
Status: DISCONTINUED | OUTPATIENT
Start: 2025-02-18 | End: 2025-02-18 | Stop reason: HOSPADM

## 2025-02-18 RX ORDER — ONDANSETRON 2 MG/ML
INJECTION INTRAMUSCULAR; INTRAVENOUS
Status: DISCONTINUED | OUTPATIENT
Start: 2025-02-18 | End: 2025-02-18 | Stop reason: SDUPTHER

## 2025-02-18 RX ORDER — FENTANYL CITRATE 50 UG/ML
INJECTION, SOLUTION INTRAMUSCULAR; INTRAVENOUS
Status: DISCONTINUED | OUTPATIENT
Start: 2025-02-18 | End: 2025-02-18 | Stop reason: SDUPTHER

## 2025-02-18 RX ORDER — ONDANSETRON 2 MG/ML
4 INJECTION INTRAMUSCULAR; INTRAVENOUS EVERY 6 HOURS PRN
Status: CANCELLED | OUTPATIENT
Start: 2025-02-18

## 2025-02-18 RX ORDER — SODIUM CHLORIDE 0.9 % (FLUSH) 0.9 %
5-40 SYRINGE (ML) INJECTION EVERY 12 HOURS SCHEDULED
Status: DISCONTINUED | OUTPATIENT
Start: 2025-02-18 | End: 2025-02-18 | Stop reason: SDUPTHER

## 2025-02-18 RX ORDER — SUCCINYLCHOLINE/SOD CL,ISO/PF 100 MG/5ML
SYRINGE (ML) INTRAVENOUS
Status: DISCONTINUED | OUTPATIENT
Start: 2025-02-18 | End: 2025-02-18 | Stop reason: SDUPTHER

## 2025-02-18 RX ORDER — SODIUM CHLORIDE 9 MG/ML
INJECTION, SOLUTION INTRAVENOUS PRN
Status: DISCONTINUED | OUTPATIENT
Start: 2025-02-18 | End: 2025-02-18 | Stop reason: HOSPADM

## 2025-02-18 RX ORDER — MEPERIDINE HYDROCHLORIDE 25 MG/ML
12.5 INJECTION INTRAMUSCULAR; INTRAVENOUS; SUBCUTANEOUS EVERY 5 MIN PRN
Status: DISCONTINUED | OUTPATIENT
Start: 2025-02-18 | End: 2025-02-18 | Stop reason: HOSPADM

## 2025-02-18 RX ORDER — FENTANYL CITRATE 50 UG/ML
25 INJECTION, SOLUTION INTRAMUSCULAR; INTRAVENOUS EVERY 5 MIN PRN
Status: DISCONTINUED | OUTPATIENT
Start: 2025-02-18 | End: 2025-02-18 | Stop reason: HOSPADM

## 2025-02-18 RX ORDER — SODIUM CHLORIDE 0.9 % (FLUSH) 0.9 %
5-40 SYRINGE (ML) INJECTION PRN
Status: DISCONTINUED | OUTPATIENT
Start: 2025-02-18 | End: 2025-02-18 | Stop reason: HOSPADM

## 2025-02-18 RX ORDER — NALOXONE HYDROCHLORIDE 0.4 MG/ML
INJECTION, SOLUTION INTRAMUSCULAR; INTRAVENOUS; SUBCUTANEOUS PRN
Status: DISCONTINUED | OUTPATIENT
Start: 2025-02-18 | End: 2025-02-18 | Stop reason: HOSPADM

## 2025-02-18 RX ORDER — ACETAMINOPHEN 500 MG
1000 TABLET ORAL ONCE
Status: COMPLETED | OUTPATIENT
Start: 2025-02-18 | End: 2025-02-18

## 2025-02-18 RX ORDER — SODIUM CHLORIDE, SODIUM LACTATE, POTASSIUM CHLORIDE, CALCIUM CHLORIDE 600; 310; 30; 20 MG/100ML; MG/100ML; MG/100ML; MG/100ML
INJECTION, SOLUTION INTRAVENOUS CONTINUOUS
Status: DISCONTINUED | OUTPATIENT
Start: 2025-02-18 | End: 2025-02-18 | Stop reason: SDUPTHER

## 2025-02-18 RX ORDER — SODIUM CHLORIDE 0.9 % (FLUSH) 0.9 %
5-40 SYRINGE (ML) INJECTION EVERY 12 HOURS SCHEDULED
Status: DISCONTINUED | OUTPATIENT
Start: 2025-02-18 | End: 2025-02-18 | Stop reason: HOSPADM

## 2025-02-18 RX ORDER — ONDANSETRON 2 MG/ML
4 INJECTION INTRAMUSCULAR; INTRAVENOUS
Status: DISCONTINUED | OUTPATIENT
Start: 2025-02-18 | End: 2025-02-18 | Stop reason: HOSPADM

## 2025-02-18 RX ORDER — SODIUM CHLORIDE 9 MG/ML
INJECTION, SOLUTION INTRAVENOUS PRN
Status: CANCELLED | OUTPATIENT
Start: 2025-02-18

## 2025-02-18 RX ORDER — OXYCODONE HYDROCHLORIDE 5 MG/1
10 TABLET ORAL PRN
Status: COMPLETED | OUTPATIENT
Start: 2025-02-18 | End: 2025-02-18

## 2025-02-18 RX ORDER — LIDOCAINE HYDROCHLORIDE 20 MG/ML
INJECTION, SOLUTION EPIDURAL; INFILTRATION; INTRACAUDAL; PERINEURAL
Status: DISCONTINUED | OUTPATIENT
Start: 2025-02-18 | End: 2025-02-18 | Stop reason: SDUPTHER

## 2025-02-18 RX ORDER — SODIUM CHLORIDE 0.9 % (FLUSH) 0.9 %
5-40 SYRINGE (ML) INJECTION EVERY 12 HOURS SCHEDULED
Status: CANCELLED | OUTPATIENT
Start: 2025-02-18

## 2025-02-18 RX ORDER — HYDRALAZINE HYDROCHLORIDE 20 MG/ML
10 INJECTION INTRAMUSCULAR; INTRAVENOUS
Status: DISCONTINUED | OUTPATIENT
Start: 2025-02-18 | End: 2025-02-18 | Stop reason: HOSPADM

## 2025-02-18 RX ORDER — ROCURONIUM BROMIDE 10 MG/ML
INJECTION, SOLUTION INTRAVENOUS
Status: DISCONTINUED | OUTPATIENT
Start: 2025-02-18 | End: 2025-02-18 | Stop reason: SDUPTHER

## 2025-02-18 RX ORDER — ONDANSETRON 4 MG/1
4 TABLET, ORALLY DISINTEGRATING ORAL EVERY 8 HOURS PRN
Status: CANCELLED | OUTPATIENT
Start: 2025-02-18

## 2025-02-18 RX ORDER — PHENYLEPHRINE HCL IN 0.9% NACL 1 MG/10 ML
SYRINGE (ML) INTRAVENOUS
Status: DISCONTINUED | OUTPATIENT
Start: 2025-02-18 | End: 2025-02-18 | Stop reason: SDUPTHER

## 2025-02-18 RX ORDER — LIDOCAINE 4 G/G
1 PATCH TOPICAL
Status: DISCONTINUED | OUTPATIENT
Start: 2025-02-18 | End: 2025-02-18 | Stop reason: HOSPADM

## 2025-02-18 RX ORDER — FENTANYL CITRATE 50 UG/ML
50 INJECTION, SOLUTION INTRAMUSCULAR; INTRAVENOUS EVERY 5 MIN PRN
Status: DISCONTINUED | OUTPATIENT
Start: 2025-02-18 | End: 2025-02-18 | Stop reason: HOSPADM

## 2025-02-18 RX ORDER — LABETALOL HYDROCHLORIDE 5 MG/ML
10 INJECTION, SOLUTION INTRAVENOUS
Status: DISCONTINUED | OUTPATIENT
Start: 2025-02-18 | End: 2025-02-18 | Stop reason: HOSPADM

## 2025-02-18 RX ORDER — OXYCODONE HYDROCHLORIDE 5 MG/1
10 TABLET ORAL EVERY 4 HOURS PRN
Status: CANCELLED | OUTPATIENT
Start: 2025-02-18

## 2025-02-18 RX ORDER — PROPOFOL 10 MG/ML
INJECTION, EMULSION INTRAVENOUS
Status: DISCONTINUED | OUTPATIENT
Start: 2025-02-18 | End: 2025-02-18 | Stop reason: SDUPTHER

## 2025-02-18 RX ORDER — ONDANSETRON 2 MG/ML
4 INJECTION INTRAMUSCULAR; INTRAVENOUS
Status: COMPLETED | OUTPATIENT
Start: 2025-02-18 | End: 2025-02-18

## 2025-02-18 RX ORDER — SODIUM CHLORIDE, SODIUM LACTATE, POTASSIUM CHLORIDE, CALCIUM CHLORIDE 600; 310; 30; 20 MG/100ML; MG/100ML; MG/100ML; MG/100ML
INJECTION, SOLUTION INTRAVENOUS CONTINUOUS
Status: CANCELLED | OUTPATIENT
Start: 2025-02-18

## 2025-02-18 RX ORDER — MIDAZOLAM HYDROCHLORIDE 1 MG/ML
INJECTION, SOLUTION INTRAMUSCULAR; INTRAVENOUS
Status: DISCONTINUED | OUTPATIENT
Start: 2025-02-18 | End: 2025-02-18 | Stop reason: SDUPTHER

## 2025-02-18 RX ORDER — ROPIVACAINE HYDROCHLORIDE 5 MG/ML
INJECTION, SOLUTION EPIDURAL; INFILTRATION; PERINEURAL
Status: COMPLETED | OUTPATIENT
Start: 2025-02-18 | End: 2025-02-18

## 2025-02-18 RX ORDER — SODIUM CHLORIDE 0.9 % (FLUSH) 0.9 %
5-40 SYRINGE (ML) INJECTION PRN
Status: CANCELLED | OUTPATIENT
Start: 2025-02-18

## 2025-02-18 RX ORDER — SODIUM CHLORIDE, SODIUM LACTATE, POTASSIUM CHLORIDE, CALCIUM CHLORIDE 600; 310; 30; 20 MG/100ML; MG/100ML; MG/100ML; MG/100ML
INJECTION, SOLUTION INTRAVENOUS
Status: DISCONTINUED | OUTPATIENT
Start: 2025-02-18 | End: 2025-02-18 | Stop reason: SDUPTHER

## 2025-02-18 RX ADMIN — CEFAZOLIN 2000 MG: 2 INJECTION, POWDER, FOR SOLUTION INTRAMUSCULAR; INTRAVENOUS at 11:14

## 2025-02-18 RX ADMIN — ACETAMINOPHEN 1000 MG: 500 TABLET ORAL at 10:23

## 2025-02-18 RX ADMIN — ONDANSETRON 4 MG: 2 INJECTION INTRAMUSCULAR; INTRAVENOUS at 11:00

## 2025-02-18 RX ADMIN — SUGAMMADEX 218 MG: 100 INJECTION, SOLUTION INTRAVENOUS at 12:56

## 2025-02-18 RX ADMIN — ROCURONIUM BROMIDE 30 MG: 50 INJECTION INTRAVENOUS at 12:11

## 2025-02-18 RX ADMIN — OXYCODONE HYDROCHLORIDE 10 MG: 5 TABLET ORAL at 14:34

## 2025-02-18 RX ADMIN — SODIUM CHLORIDE, POTASSIUM CHLORIDE, SODIUM LACTATE AND CALCIUM CHLORIDE: 600; 310; 30; 20 INJECTION, SOLUTION INTRAVENOUS at 10:44

## 2025-02-18 RX ADMIN — ROPIVACAINE HYDROCHLORIDE 20 ML: 5 INJECTION, SOLUTION EPIDURAL; INFILTRATION; PERINEURAL at 10:35

## 2025-02-18 RX ADMIN — LIDOCAINE HYDROCHLORIDE 100 MG: 20 INJECTION, SOLUTION EPIDURAL; INFILTRATION; INTRACAUDAL; PERINEURAL at 10:50

## 2025-02-18 RX ADMIN — FENTANYL CITRATE 50 MCG: 50 INJECTION, SOLUTION INTRAMUSCULAR; INTRAVENOUS at 11:17

## 2025-02-18 RX ADMIN — FENTANYL CITRATE 50 MCG: 50 INJECTION, SOLUTION INTRAMUSCULAR; INTRAVENOUS at 13:27

## 2025-02-18 RX ADMIN — ONDANSETRON 4 MG: 2 INJECTION INTRAMUSCULAR; INTRAVENOUS at 13:18

## 2025-02-18 RX ADMIN — FENTANYL CITRATE 50 MCG: 50 INJECTION, SOLUTION INTRAMUSCULAR; INTRAVENOUS at 10:47

## 2025-02-18 RX ADMIN — ROCURONIUM BROMIDE 40 MG: 50 INJECTION INTRAVENOUS at 11:00

## 2025-02-18 RX ADMIN — Medication 0.1 MG: at 12:03

## 2025-02-18 RX ADMIN — MIDAZOLAM 2 MG: 1 INJECTION INTRAMUSCULAR; INTRAVENOUS at 10:33

## 2025-02-18 RX ADMIN — HYDROMORPHONE HYDROCHLORIDE 0.5 MG: 1 INJECTION, SOLUTION INTRAMUSCULAR; INTRAVENOUS; SUBCUTANEOUS at 13:23

## 2025-02-18 RX ADMIN — FENTANYL CITRATE 50 MCG: 50 INJECTION, SOLUTION INTRAMUSCULAR; INTRAVENOUS at 13:16

## 2025-02-18 RX ADMIN — FENTANYL CITRATE 50 MCG: 50 INJECTION, SOLUTION INTRAMUSCULAR; INTRAVENOUS at 13:56

## 2025-02-18 RX ADMIN — Medication 100 MG: at 10:50

## 2025-02-18 RX ADMIN — Medication 0.1 MG: at 11:58

## 2025-02-18 RX ADMIN — PROPOFOL 150 MG: 10 INJECTION, EMULSION INTRAVENOUS at 10:50

## 2025-02-18 RX ADMIN — HYDROMORPHONE HYDROCHLORIDE 0.5 MG: 1 INJECTION, SOLUTION INTRAMUSCULAR; INTRAVENOUS; SUBCUTANEOUS at 13:40

## 2025-02-18 RX ADMIN — ROCURONIUM BROMIDE 10 MG: 50 INJECTION INTRAVENOUS at 10:50

## 2025-02-18 ASSESSMENT — ENCOUNTER SYMPTOMS
NAUSEA: 0
COLOR CHANGE: 0
COUGH: 0
VOMITING: 0
SORE THROAT: 0
DIARRHEA: 0
WHEEZING: 0
SHORTNESS OF BREATH: 0
BACK PAIN: 0

## 2025-02-18 ASSESSMENT — PAIN DESCRIPTION - LOCATION
LOCATION: SHOULDER

## 2025-02-18 ASSESSMENT — PAIN DESCRIPTION - PAIN TYPE
TYPE: SURGICAL PAIN
TYPE: SURGICAL PAIN

## 2025-02-18 ASSESSMENT — PAIN DESCRIPTION - ORIENTATION
ORIENTATION: RIGHT

## 2025-02-18 ASSESSMENT — PAIN - FUNCTIONAL ASSESSMENT
PAIN_FUNCTIONAL_ASSESSMENT: PREVENTS OR INTERFERES SOME ACTIVE ACTIVITIES AND ADLS
PAIN_FUNCTIONAL_ASSESSMENT: 0-10
PAIN_FUNCTIONAL_ASSESSMENT: PREVENTS OR INTERFERES SOME ACTIVE ACTIVITIES AND ADLS
PAIN_FUNCTIONAL_ASSESSMENT: ACTIVITIES ARE NOT PREVENTED

## 2025-02-18 ASSESSMENT — PAIN DESCRIPTION - DESCRIPTORS
DESCRIPTORS: ACHING;DISCOMFORT

## 2025-02-18 ASSESSMENT — PAIN SCALES - GENERAL
PAINLEVEL_OUTOF10: 10
PAINLEVEL_OUTOF10: 6
PAINLEVEL_OUTOF10: 7
PAINLEVEL_OUTOF10: 9
PAINLEVEL_OUTOF10: 7

## 2025-02-18 ASSESSMENT — PAIN DESCRIPTION - FREQUENCY
FREQUENCY: CONTINUOUS
FREQUENCY: CONTINUOUS

## 2025-02-18 ASSESSMENT — PAIN DESCRIPTION - ONSET
ONSET: ON-GOING
ONSET: ON-GOING

## 2025-02-18 NOTE — PROGRESS NOTES
1300 Arrived to PACU.  Monitors on and report received. Pt drowsy but responds to voice.  1325 Pt voices pain at a \"12\" on 0-10 scale. Utilizing prn meds as needed. (See MAR)  1345 Continuing to utilize prn meds as needed. Pt falls asleep after receiving pain meds.  VSS (See flowsheet) Wakes to voice pain \"still a 10\".  1400 Dr Brock notified of pts verbalized pain levels. Per Dr Brock, pt can return to [facility]. Lisa MATHIAS updated.  Dr. Brock stated to call him if needed.  1420 Pt to [facility]. Pt resting though still c/o pain.

## 2025-02-18 NOTE — OP NOTE
the components.  Closure wound and application of the dressing.        IMPLANTS UTILIZED:      [place] arthroscopy equipment, Beach Chair, arthroscopic wand, sling with Abduction Pillow, Iceman Machine with pad, Trimano arm mullins, Arthrex fiberlink suture tape x 2 (for subscapularis repair as well as biceps tenodesis), Arthrex 4.75 swivel lock anchor x 2       DESCRIPTION OF PROCEDURE:       Complexity of case was increased based on the 2 separate rotator cuff tears requiring 2 separate techniques and interventions.       The patient was seen in the preoperative area where the consent was formally obtained and the correct extremity was marked.  The patient was brought into the Operating Room and placed in supine position. Following application of the interscalene block in the preoperative hold area, the patient underwent general anesthesia and airway stabilization in the standard fashion. The patient was given the appropriate dose of antibiotics (Ancef) based on body weight. Both lower extremities were placed in comfortable position. Nonoperative arm was  carefully placed into a well padded arm mullins with appropriate position and no pressure on the nerves. The operative arm was then examined under anesthesia revealing the findings as noted in the findings section of this dictation. The operative shoulder was then prepped and draped in a sterile fashion with chloraprep traction was applied. Time-out was utilized to verify right side as the operative site.      An #11 blade was used to make the posterior portal. Blunt trocar tip was inserted into the glenohumeral joint and area of concern was directly visualized demonstrating significant labral degeneration including a SLAP tear.  There was minimal glenohumeral osteoarthritis as documented.  The undersurface of the rotator cuff was noted to be somewhat frayed in the area of concern for supraspinatus tearing, demonstrating partial thickness tearing.  There was

## 2025-02-18 NOTE — PROGRESS NOTES
1415 - received patient from pacu, report received from Jose RN, patient A&O, reports pain 7/10, denies nausea, given cranberry juice and crackers, call light within reach  1434 - pain med given - see mar  1450 - patient voided 150cc  1520 - discharge instructions given to patient, understanding voiced without any further questions at this time  1527 - VSS, reports pain 6/10, denies nausea, dressing dry  1529 - iv removed  1535 - patient dressing  1550 - called for transportation  1558 - patient left sds via wheelchair accompanied by nurse, patient safely in car

## 2025-02-18 NOTE — FLOWSHEET NOTE
Patient hasn't arrived yet, called phone number listed, talked to family, patient was picked up by transportation, family member stated he was going to the doctor on [facility], that he was supposed to arrive there at 815 for a 1015 procedure, states that he doesn't carry a phone. She stated she will let him know if he contacts her

## 2025-02-18 NOTE — ANESTHESIA PROCEDURE NOTES
Peripheral Block    Patient location during procedure: PACU  Reason for block: post-op pain management  Start time: 2/18/2025 10:33 AM  End time: 2/18/2025 10:38 AM  Staffing  Performed: anesthesiologist and resident/CRNA   Resident/CRNA: Barry Black APRN - CRNA  Performed by: Barry Black APRN - CRNA  Authorized by: Yury uQispe MD    Preanesthetic Checklist  Completed: patient identified, IV checked, site marked, risks and benefits discussed, surgical/procedural consents, equipment checked, pre-op evaluation, timeout performed, anesthesia consent given, oxygen available, monitors applied/VS acknowledged, fire risk safety assessment completed and verbalized and blood product R/B/A discussed and consented  Peripheral Block   Patient position: sitting  Prep: ChloraPrep  Provider prep: mask and sterile gloves  Patient monitoring: cardiac monitor, continuous pulse ox, frequent blood pressure checks, IV access and responsive to questions  Block type: Brachial plexus  Interscalene  Laterality: right  Injection technique: single-shot  Guidance: ultrasound guided    Needle   Needle type: insulated echogenic nerve stimulator needle   Needle gauge: 22 G  Needle localization: anatomical landmarks and ultrasound guidance  Needle length: 8 cm  Assessment   Injection assessment: negative aspiration for heme, no paresthesia on injection, local visualized surrounding nerve on ultrasound and no intravascular symptoms  Paresthesia pain: none  Slow fractionated injection: yes  Hemodynamics: stable  Outcomes: uncomplicated and patient tolerated procedure well    Medications Administered  ropivacaine (NAROPIN) injection 0.5% - Perineural   20 mL - 2/18/2025 10:35:00 AM

## 2025-02-18 NOTE — ANESTHESIA POSTPROCEDURE EVALUATION
Department of Anesthesiology  Postprocedure Note    Patient: Toi Briones  MRN: [id number]  YOB: 1965  Date of evaluation: 2/18/2025    Procedure Summary       Date: 02/18/25 Room / Location: [address]    Anesthesia Start: 1033 Anesthesia Stop: 1305    Procedure: SHOULDER ARTHROSCOPY, ROTATOR CUFF REPAIR, ACROMIOPLASTY, DISTAL CLAVICLE EXCISION, DEBRIDEMENT (Right: Shoulder) Diagnosis:       Arthritis of right shoulder region      Right shoulder pain      (Arthritis of right shoulder region [M19.011])      (Right shoulder pain [M25.511])    Surgeons: Abdi Mchugh DO Responsible Provider: Yury Quispe MD    Anesthesia Type: general, regional ASA Status: 3            Anesthesia Type: No value filed.    Ban Phase I: Ban Score: 10    Ban Phase II:      Anesthesia Post Evaluation    Patient location during evaluation: PACU  Patient participation: complete - patient participated  Level of consciousness: awake and alert  Airway patency: patent  Nausea & Vomiting: no nausea  Cardiovascular status: blood pressure returned to baseline and hemodynamically stable  Respiratory status: spontaneous ventilation and face mask  Hydration status: euvolemic  Multimodal analgesia pain management approach  Pain management: adequate    There were no known notable events for this encounter.

## 2025-02-18 NOTE — PROGRESS NOTES
Outpatient Pharmacy Progress Note for Meds-to-Beds    Total number of Prescriptions Filled: 2    Additional Documentation:  Medication(s) were delivered to the patient's room prior to discharge      Thank you for letting us serve your patients.  [address] [address]    Phone: [phone]    Fax: [phone]

## 2025-02-18 NOTE — H&P
2-  No chief complaint on file.     Updated HPI: Patient is here to undergo right shoulder arthroscopy as previously discussed in office.  He has no questions about today's planned procedure.  No new injuries or complaints.  Once again discussed utilizing a allograft patch with the patient and he is okay with allograft patch being used as needed    Previous HPI (2/5/2025):                             Toi Briones is a 59 y.o. male      New patient is here for consult on chronic right shoulder pain. Most recently seen by Azeem who referred him to us. Also seen by Duy in 2019 where he received injection with no relief.     Today he is here with 10/10 pain that has gradually worsened over the last few years. Most notably his pain increased drastically after he \"overextended\" his shoulder while trying to help his wife move ~2 months ago.         This my first time seeing the patient.  Has attempted cortisone injections and physical therapy in the past with no relief.  Has significant issues with shoulder range of motion with pain and crepitance.  Most the pain is at the rotator cuff insertion laterally.  No surgery previously to the right shoulder.  This my first time seeing the patient.  MRI was reviewed    Medical History  Patient's medications, allergies, past medical, surgical, social and family histories were reviewed and updated as appropriate.    Past Medical History:   Diagnosis Date    Absent finger     Left hand    Anxiety     Arthritis     Hands    Asthma     \"As a kid but outgrew this\" - no medications as of 2/21/20    Chronic back pain     Depression     Difficult intravenous access 05/26/2024    Unable to place PICC x2 admissions. Patient IS NOT a PICC candidate (2024)    Edema     Fibromyalgia     Headache(784.0)     Last: 2/19/20    Hernia, abdominal     History of difficult venous access 12/21/2021    No longer a candidate for bedside PICC placement, multiple failed attempts    Hx MRSA

## 2025-02-18 NOTE — ANESTHESIA PROCEDURE NOTES
Airway  Date/Time: 2/18/2025 10:51 AM  Urgency: elective    Airway not difficult    General Information and Staff    Patient location during procedure: OR  Resident/CRNA: Barry Black APRN - CRNA  Performed: resident/CRNA/CAA   Performed by: Barry Black APRN - CRNA  Authorized by: Yury Quispe MD      Indications and Patient Condition  Indications for airway management: anesthesia and airway protection  Spontaneous Ventilation: absent  Sedation level: deep  Preoxygenated: yes  Patient position: reverse Trendelenburg  MILS not maintained throughout  Mask difficulty assessment: not attempted    Final Airway Details  Final airway type: endotracheal airway      Successful airway: ETT  Cuffed: yes   Successful intubation technique: direct laryngoscopy  Facilitating devices/methods: intubating stylet  Endotracheal tube insertion site: oral  Blade size: #4  ETT size (mm): 7.5  Cormack-Lehane Classification: grade I - full view of glottis  Placement verified by: chest auscultation, bronchoscopy and capnometry   Measured from: lips  ETT to lips (cm): 23  Number of attempts at approach: 1  Ventilation between attempts: bag mask  Number of other approaches attempted: 0    no

## 2025-02-18 NOTE — DISCHARGE INSTRUCTIONS
[facility]  [phone]    Do not drive, work around machines or use equipment.  Do not drink any alcoholic beverages.  Do not smoke while alone.  Avoid making important decisions.  Plan to spend a quiet, relaxed evening @ home.  Resume normal activities as you begin to feel better.  Eat lightly for your first meal, then gradually increase your diet to what is normal for you.  In case of nausea, avoid food and drink only clear liquids.  Resume food as nausea ceases.  Notify your surgeon if you experience fever, chills, large amount of bleeding, difficulty breathing, persistent nausea and vomiting or any other disturbing problem.  Call for a follow-up appointment with your surgeon.

## 2025-03-04 ENCOUNTER — HOSPITAL ENCOUNTER (OUTPATIENT)
Age: 60
Discharge: HOME | End: 2025-03-04
Payer: MEDICARE

## 2025-03-04 PROCEDURE — 84154 ASSAY OF PSA FREE: CPT

## 2025-03-04 PROCEDURE — 84153 ASSAY OF PSA TOTAL: CPT

## 2025-03-05 ENCOUNTER — OFFICE VISIT (OUTPATIENT)
Dept: ORTHOPEDIC SURGERY | Age: 60
End: 2025-03-05

## 2025-03-05 VITALS — RESPIRATION RATE: 13 BRPM | BODY MASS INDEX: 33.47 KG/M2 | HEIGHT: 71 IN

## 2025-03-05 DIAGNOSIS — Z98.890 S/P ROTATOR CUFF REPAIR: Primary | ICD-10-CM

## 2025-03-05 PROCEDURE — 99024 POSTOP FOLLOW-UP VISIT: CPT | Performed by: PHYSICIAN ASSISTANT

## 2025-03-05 RX ORDER — OXYCODONE AND ACETAMINOPHEN 5; 325 MG/1; MG/1
1 TABLET ORAL EVERY 6 HOURS PRN
Qty: 28 TABLET | Refills: 0 | Status: SHIPPED | OUTPATIENT
Start: 2025-03-05 | End: 2025-03-12

## 2025-03-05 NOTE — PROGRESS NOTES
Patient returns to the office for a 2 week post op of his shoulder surgery. Pt stated that he is continuing to deal with quite a bit of pain. Pt stated that he has been taking the pain mediation regularly. Pt stated he has been wearing the sling and has any pain with any movement of the arm. Pt did mention he fell a couple days ago where his knee took most of the pain but his shoulder was in more pain following the fall.

## 2025-03-05 NOTE — PATIENT INSTRUCTIONS
Continue to use the sling but may come out of the sling for gentle range of motion of the elbow and the wrist.  No therapy until after follow-up visit with Jeison in 4 weeks  Topical joint rub prescribed for patient today  Refill of pain medication given today, take as needed.  Follow-up in 4 weeks.

## 2025-03-06 NOTE — PROGRESS NOTES
Date of surgery:   2/18/2025  Surgeon Dr. Abdi Mchugh    History:  Mr. Toi Briones is here in follow up regarding his right shoulder arthroscopic rotator cuff repair including the supraspinatus and subscapularis.  He has had a biceps tenodesis.  He is doing fairly well although moderate pain still present and he did have a fall several days ago.  He states that when he fell he landed mostly on his buttock but he did have increased pain in the shoulder at that time as well.  He continues to use the sling.    Physical:  Vitals:    03/05/25 1409   Resp: 13      He demonstrates appropriate mood and affect.   Right shoulder arthroscopic portal sites are well-healed  No significant edema in the right upper extremity.    Impression: Status post above, doing well       Plan:   Patient Instructions   Continue to use the sling but may come out of the sling for gentle range of motion of the elbow and the wrist.  No therapy until after follow-up visit with Jeison in 4 weeks  Topical joint rub prescribed for patient today  Refill of pain medication given today, take as needed.  Follow-up in 4 weeks.

## 2025-03-18 ENCOUNTER — TELEPHONE (OUTPATIENT)
Dept: ORTHOPEDIC SURGERY | Age: 60
End: 2025-03-18

## 2025-03-18 DIAGNOSIS — Z98.890 S/P ROTATOR CUFF REPAIR: Primary | ICD-10-CM

## 2025-03-18 NOTE — TELEPHONE ENCOUNTER
Pt is requesting a refill on pain medication.    [facility] on [facility]

## 2025-03-19 RX ORDER — OXYCODONE AND ACETAMINOPHEN 5; 325 MG/1; MG/1
1 TABLET ORAL EVERY 6 HOURS PRN
Qty: 28 TABLET | Refills: 0 | Status: SHIPPED | OUTPATIENT
Start: 2025-03-19 | End: 2025-03-26

## 2025-03-20 PROBLEM — Z01.818 PREOP TESTING: Status: RESOLVED | Noted: 2025-02-18 | Resolved: 2025-03-20

## 2025-03-26 ENCOUNTER — TELEPHONE (OUTPATIENT)
Dept: ORTHOPEDIC SURGERY | Age: 60
End: 2025-03-26

## 2025-03-26 DIAGNOSIS — Z98.890 S/P ROTATOR CUFF REPAIR: Primary | ICD-10-CM

## 2025-03-26 NOTE — TELEPHONE ENCOUNTER
Please send in another refill of Percocet to [facility].  Last refill sent by Dr. Mchugh 03/16/2025

## 2025-03-27 RX ORDER — OXYCODONE AND ACETAMINOPHEN 5; 325 MG/1; MG/1
1 TABLET ORAL EVERY 8 HOURS PRN
Qty: 21 TABLET | Refills: 0 | Status: SHIPPED | OUTPATIENT
Start: 2025-03-27 | End: 2025-04-03

## 2025-04-02 ENCOUNTER — OFFICE VISIT (OUTPATIENT)
Dept: ORTHOPEDIC SURGERY | Age: 60
End: 2025-04-02

## 2025-04-02 VITALS — BODY MASS INDEX: 34.87 KG/M2 | OXYGEN SATURATION: 98 % | HEART RATE: 75 BPM | WEIGHT: 250 LBS

## 2025-04-02 DIAGNOSIS — Z98.890 S/P ROTATOR CUFF REPAIR: Primary | ICD-10-CM

## 2025-04-02 PROCEDURE — 99024 POSTOP FOLLOW-UP VISIT: CPT | Performed by: STUDENT IN AN ORGANIZED HEALTH CARE EDUCATION/TRAINING PROGRAM

## 2025-04-02 RX ORDER — CYCLOBENZAPRINE HCL 5 MG
5 TABLET ORAL 2 TIMES DAILY PRN
Qty: 30 TABLET | Refills: 0 | Status: SHIPPED | OUTPATIENT
Start: 2025-04-02 | End: 2025-04-12

## 2025-04-02 RX ORDER — OXYCODONE AND ACETAMINOPHEN 5; 325 MG/1; MG/1
1 TABLET ORAL EVERY 6 HOURS PRN
Qty: 28 TABLET | Refills: 0 | Status: SHIPPED | OUTPATIENT
Start: 2025-04-02 | End: 2025-04-09

## 2025-04-02 NOTE — PROGRESS NOTES
Patient returns for 6 week post op check on right shoulder. SS,SAD,DCE,BT,RCT,SHARIFA DOS 2/18/25.    He reports significant 10/10 pain that is persistent. He is taking Percocet as prescribed. Has been wearing his sling, but it appears loose. I adjusted the sling and advised it should be holding the weight of his arm.     Otherwise, he denies new injury. Notes tingling in right fingers 2,3,4.

## 2025-04-02 NOTE — PROGRESS NOTES
Date of surgery: 2-     Procedure:  1.  Right shoulder arthroscopic rotator cuff repair -small size single row repair of the supraspinatus with additional repair of the subscapularis (complex, -22 modifier)  2. Shoulder biceps tenodesis - arthroscopic  3. Shoulder arthroscopic subacromial decompression, bursectomy   4. Shoulder arthroscopic extensive debridement (anterior, posterior glenohumeral joint, subacromial space) (CPT 36373)  5. Shoulder arthroscopic labral debridement (CPT 42381)  6. Shoulder arthroscopic lysis of adhesions (CPT 50848)  7.  Shoulder arthroscopic distal clavicle excision      History:  Toi is here in follow up regarding their 6-week follow-up appointment for above procedure    Patient is doing well. They have continued 10/10 pain. They deny chest pain, SOB, calf pain,fever,wound drainage.  No other issues.  Patient denies any constitutional symptoms.    Patient states they have been compliant with restrictions.    Patient states they have been mostly using their sling as ordered.    No new injuries or complaints     Physical:   Patient demonstrates appropriate mood and affect.     Right upper extremity exam:   The incisions are well-healed and are clean, dry, intact, and nontender with no erythema. They have no edema, the Arm compartments are soft . There are No cords or calf tenderness.  No significant calf/ankle edema. They are neurovascularly intact distally.     Sling removed    Range of motion of the right shoulder demonstrates inability to touch the top of the    No areas of tenderness to palpation    Negative Shola's    Imaging:   No new orthopedic imaging     Impression: Status post above, doing well        Plan:   -Intraoperative arthroscopic imaging reviewed with patient  -Patient discontinued from sling today  -Physical therapy order  -continue the PT protocol  -rest/elevation as needed  -Refills for Flexeril, Percocet sent to pharmacy.  I did provide him with the Voltaren

## 2025-04-02 NOTE — PATIENT INSTRUCTIONS
Follow up in 6 weeks.  Start physical therapy.  Discontinue sling.  Medications sent to pharmacy.

## 2025-04-14 ENCOUNTER — TELEPHONE (OUTPATIENT)
Dept: ORTHOPEDIC SURGERY | Age: 60
End: 2025-04-14

## 2025-04-14 DIAGNOSIS — Z98.890 S/P ROTATOR CUFF REPAIR: Primary | ICD-10-CM

## 2025-04-14 RX ORDER — OXYCODONE AND ACETAMINOPHEN 5; 325 MG/1; MG/1
1 TABLET ORAL EVERY 6 HOURS PRN
Qty: 28 TABLET | Refills: 0 | Status: SHIPPED | OUTPATIENT
Start: 2025-04-14 | End: 2025-04-21

## 2025-04-14 NOTE — TELEPHONE ENCOUNTER
New refill sent to pharmacy.  This will have to be his last Percocet prescription due to being 2 months out from surgery.

## 2025-04-18 NOTE — PLAN OF CARE
function.    Plan of Care/Treatment to date:  [x] Therapeutic Exercise  [x] Modalities:  [x] Therapeutic Activity     [] Ultrasound  [x] Electrical Stimulation  [] Gait Training      [] Cervical Traction [] Lumbar Traction  [x] Neuromuscular Re-education    [x] Cold/hotpack [] Iontophoresis   [x] Instruction in HEP      [x] Vasopneumatic    [] Dry Needling  [x] Manual Therapy               [] Aquatic Therapy       Other:          Frequency/Duration:  # Days per week: [] 1 day # Weeks: [] 1 week [] 5 weeks     [x] 2 days   [] 2 weeks [x] 6 weeks     [] 3 days   [] 3 weeks [] 7 weeks     [] 4 days   [] 4 weeks [] 8 weeks         [] 9 weeks [] 10 weeks         [] 11 weeks [] 12 weeks    Rehab Potential/Progress: [] Excellent [x] Good [] Fair  [] Poor     Goals:    Patient goals: to manage the pain better and move arm better    Long Term Goals  Time Frame for Long Term Goals: 6 weeks  Patient will demonstrate independence with HEP  Patient will improve QuickDash from 93% to <50% to demonstrate improved tolerance to functional activities.  Patient will improve shoulder flexion AROM to >120deg to allow him to reach into kitchen cabinets  Patient will improve global LUE strength to >4/5 to allow him to  participate in his hobbies of fishing.  Patient will improve shoulder ER to >60deg to allow him the motion required for proper mechanics during overhead movement.        Electronically signed by:  Do Coronado PT, DPT, 4/21/2025, 10:50 AM        If you have any questions or concerns, please don't hesitate to call.  Thank you for your referral.      Physician Signature:________________________________Date:_________ TIME: _____  By signing above, therapist’s plan is approved by physician

## 2025-04-18 NOTE — FLOWSHEET NOTE
Outpatient Physical Therapy  [place]           [x] Phone: [phone]   Fax: [phone]  [place]           [] Phone: [phone]   Fax: [phone]        Physical Therapy Daily Treatment Note  Date:  2025    Patient Name:  Toi Briones    :  1965  MRN: [id number]  Restrictions/Precautions: No data recorded   Position Activity Restriction  Other Position/Activity Restrictions: no shoulder ER ROM until week 10  Diagnosis:   S/P rotator cuff repair [Z98.890] Diagnosis: s/p RCT and biceps tenodesis repair  Date of Injury/Surgery:   Treatment Diagnosis:  left shoulder weakness and ROM deficits  Insurance/Certification information: [facility] Medicare  Referring Physician:  Abdi Mchugh DO Malone, Chrisitopher, DO   PCP: Emmett Alexandra MD  Next Doctor Visit:    Plan of care signed (Y/N):  sent day of eval   Outcome Measure: quickdash   Visit# / total visits:   bomn   Pain level: 10/10   Goals:     Patient goals: to manage the pain better and move arm better    Long Term Goals  Time Frame for Long Term Goals: 6 weeks  Patient will demonstrate independence with HEP  Patient will improve QuickDash from 93% to <50% to demonstrate improved tolerance to functional activities.  Patient will improve shoulder flexion AROM to >120deg to allow him to reach into kitchen cabinets  Patient will improve global LUE strength to >4/5 to allow him to  participate in his hobbies of fishing.  Patient will improve shoulder ER to >60deg to allow him the motion required for proper mechanics during overhead movement.      Summary of Evaluation:  Assessment: Patient is a 59 year old male who presents to physical therapy status post right shoulder arthroscopic rotator cuff repair including the supraspinatus and ; with biceps tenodesis on 25. He is now 9 weeks out of surgery. He does relate one fall a couple weeks ago, but notes he did not land on his surgical UE. Upon assessment, he demonstrates

## 2025-04-21 ENCOUNTER — HOSPITAL ENCOUNTER (OUTPATIENT)
Dept: PHYSICAL THERAPY | Age: 60
Setting detail: THERAPIES SERIES
Discharge: HOME | End: 2025-04-21
Payer: MEDICARE

## 2025-04-21 PROCEDURE — 97110 THERAPEUTIC EXERCISES: CPT

## 2025-04-21 PROCEDURE — 97161 PT EVAL LOW COMPLEX 20 MIN: CPT

## 2025-04-21 PROCEDURE — 97016 VASOPNEUMATIC DEVICE THERAPY: CPT

## 2025-04-21 ASSESSMENT — PAIN DESCRIPTION - DESCRIPTORS: DESCRIPTORS: ACHING

## 2025-04-21 ASSESSMENT — PAIN DESCRIPTION - ORIENTATION: ORIENTATION: RIGHT

## 2025-04-21 ASSESSMENT — PAIN DESCRIPTION - PAIN TYPE: TYPE: CHRONIC PAIN

## 2025-04-21 ASSESSMENT — PAIN SCALES - GENERAL: PAINLEVEL_OUTOF10: 9

## 2025-04-21 ASSESSMENT — PAIN DESCRIPTION - LOCATION: LOCATION: SHOULDER

## 2025-04-21 NOTE — PROGRESS NOTES
Physical Therapy: Initial Evaluation    Patient: Toi Briones (59 y.o. male)   Examination Date: 2025  Plan of Care Certification Period: 2025 to        :  1965 ;    Confirmed: Yes MRN: [id number]  CSN: [id number]   Insurance: Payor: [facility] MEDICARE / Plan: AET MEDICARE ADVANTAGE HMO / Product Type: Medicare /   Insurance ID: [id number] - (Medicare Managed) Secondary Insurance (if applicable): [place]ICAID [place]   Referring Physician: Abdi Mchugh DO Malone, Chrisitopher, DO   PCP: Emmett Alexandra MD Visits to Date/Visits Approved:   (bomn)    No Show/Cancelled Appts:   /       Medical Diagnosis: S/P rotator cuff repair [Z98.890] s/p RCT and biceps tenodesis repair  Treatment Diagnosis: left shoulder weakness and ROM deficits     PERTINENT MEDICAL HISTORY   Patient Assessed for Rehabilitation Services: Yes  Self reported health status:: Good    Medical History: Chart Reviewed: Yes   Past Medical History:   Diagnosis Date    Absent finger     Left hand    Anxiety     Arthritis     Hands    Asthma     \"As a kid but outgrew this\" - no medications as of 20    Chronic back pain     Depression     Difficult intravenous access 2024    Unable to place PICC x2 admissions. Patient IS NOT a PICC candidate ()    Edema     Fibromyalgia     Headache(784.0)     Last: 20    Hernia, abdominal     History of difficult venous access 2021    No longer a candidate for bedside PICC placement, multiple failed attempts    Hx MRSA infection     positive culture 2014 from left foot    Hyperlipidemia     Nausea & vomiting     Neuropathy     Obesity, Class III, BMI 40-49.9 (morbid obesity)     Osteomyelitis (HCC)     hx finger    Poor circulation     Restless leg syndrome     Shortness of breath on exertion     2016- pt denies any sob with this interview    Type II or unspecified type diabetes mellitus with other specified manifestations, not stated as uncontrolled

## 2025-05-05 ENCOUNTER — HOSPITAL ENCOUNTER (EMERGENCY)
Age: 60
Discharge: HOME | End: 2025-05-05
Attending: STUDENT IN AN ORGANIZED HEALTH CARE EDUCATION/TRAINING PROGRAM
Payer: MEDICARE

## 2025-05-05 ENCOUNTER — APPOINTMENT (OUTPATIENT)
Dept: CT IMAGING | Age: 60
End: 2025-05-05
Payer: MEDICARE

## 2025-05-05 VITALS
RESPIRATION RATE: 15 BRPM | TEMPERATURE: 97.8 F | HEART RATE: 55 BPM | SYSTOLIC BLOOD PRESSURE: 135 MMHG | BODY MASS INDEX: 32.2 KG/M2 | HEIGHT: 71 IN | OXYGEN SATURATION: 97 % | DIASTOLIC BLOOD PRESSURE: 77 MMHG | WEIGHT: 230 LBS

## 2025-05-05 DIAGNOSIS — N39.0 URINARY TRACT INFECTION WITH HEMATURIA, SITE UNSPECIFIED: Primary | ICD-10-CM

## 2025-05-05 DIAGNOSIS — S61.409A OPEN WOUND OF HAND WITHOUT FOREIGN BODY, UNSPECIFIED LATERALITY, UNSPECIFIED WOUND TYPE, INITIAL ENCOUNTER: ICD-10-CM

## 2025-05-05 DIAGNOSIS — R91.1 PULMONARY NODULE: ICD-10-CM

## 2025-05-05 DIAGNOSIS — R31.9 URINARY TRACT INFECTION WITH HEMATURIA, SITE UNSPECIFIED: Primary | ICD-10-CM

## 2025-05-05 LAB
ALBUMIN SERPL-MCNC: 4.3 G/DL (ref 3.4–5)
ALBUMIN/GLOB SERPL: 1.8 {RATIO} (ref 1.1–2.2)
ALP SERPL-CCNC: 103 U/L (ref 40–129)
ALT SERPL-CCNC: 8 U/L (ref 10–40)
ANION GAP SERPL CALCULATED.3IONS-SCNC: 15 MMOL/L (ref 9–17)
AST SERPL-CCNC: 22 U/L (ref 15–37)
BASOPHILS # BLD: 0.04 K/UL
BASOPHILS NFR BLD: 1 % (ref 0–1)
BILIRUB SERPL-MCNC: 0.5 MG/DL (ref 0–1)
BILIRUB UR QL STRIP: NEGATIVE
BUN SERPL-MCNC: 15 MG/DL (ref 7–20)
CALCIUM SERPL-MCNC: 9.9 MG/DL (ref 8.3–10.6)
CHARACTER UR: ABNORMAL
CHLORIDE SERPL-SCNC: 104 MMOL/L (ref 99–110)
CK SERPL-CCNC: 106 U/L (ref 26–192)
CLARITY UR: CLEAR
CO2 SERPL-SCNC: 20 MMOL/L (ref 21–32)
COLOR UR: YELLOW
CREAT SERPL-MCNC: 0.9 MG/DL (ref 0.8–1.3)
EOSINOPHIL # BLD: 0.11 K/UL
EOSINOPHILS RELATIVE PERCENT: 2 % (ref 0–3)
ERYTHROCYTE [DISTWIDTH] IN BLOOD BY AUTOMATED COUNT: 15.4 % (ref 11.7–14.9)
GFR, ESTIMATED: 86 ML/MIN/1.73M2
GLUCOSE SERPL-MCNC: 131 MG/DL (ref 74–99)
GLUCOSE UR STRIP-MCNC: NEGATIVE MG/DL
HCT VFR BLD AUTO: 41.6 % (ref 42–52)
HGB BLD-MCNC: 13.6 G/DL (ref 13.5–18)
HGB UR QL STRIP.AUTO: ABNORMAL
IMM GRANULOCYTES # BLD AUTO: 0.01 K/UL
IMM GRANULOCYTES NFR BLD: 0 %
INR PPP: 1
KETONES UR STRIP-MCNC: ABNORMAL MG/DL
LACTATE BLDV-SCNC: 1.2 MMOL/L (ref 0.4–2)
LEUKOCYTE ESTERASE UR QL STRIP: ABNORMAL
LIPASE SERPL-CCNC: 34 U/L (ref 13–60)
LYMPHOCYTES NFR BLD: 1.9 K/UL
LYMPHOCYTES RELATIVE PERCENT: 27 % (ref 24–44)
MCH RBC QN AUTO: 27.7 PG (ref 27–31)
MCHC RBC AUTO-ENTMCNC: 32.7 G/DL (ref 32–36)
MCV RBC AUTO: 84.7 FL (ref 78–100)
MONOCYTES NFR BLD: 0.4 K/UL
MONOCYTES NFR BLD: 6 % (ref 0–5)
MUCOUS THREADS URNS QL MICRO: ABNORMAL
NEUTROPHILS NFR BLD: 66 % (ref 36–66)
NEUTS SEG NFR BLD: 4.68 K/UL
NITRITE UR QL STRIP: NEGATIVE
PH UR STRIP: 6 [PH] (ref 5–8)
PLATELET # BLD AUTO: 238 K/UL (ref 140–440)
PMV BLD AUTO: 9.9 FL (ref 7.5–11.1)
POTASSIUM SERPL-SCNC: 4.5 MMOL/L (ref 3.5–5.1)
PROT SERPL-MCNC: 6.7 G/DL (ref 6.4–8.2)
PROT UR STRIP-MCNC: ABNORMAL MG/DL
PROTHROMBIN TIME: 13.6 SEC (ref 11.7–14.5)
RBC # BLD AUTO: 4.91 M/UL (ref 4.6–6.2)
RBC #/AREA URNS HPF: 15 /HPF (ref 0–2)
SODIUM SERPL-SCNC: 139 MMOL/L (ref 136–145)
SP GR UR STRIP: 1.02 (ref 1–1.03)
UROBILINOGEN UR STRIP-ACNC: 0.2 EU/DL (ref 0–1)
WBC #/AREA URNS HPF: 24 /HPF (ref 0–5)
WBC OTHER # BLD: 7.1 K/UL (ref 4–10.5)

## 2025-05-05 PROCEDURE — 85025 COMPLETE CBC W/AUTO DIFF WBC: CPT

## 2025-05-05 PROCEDURE — 81001 URINALYSIS AUTO W/SCOPE: CPT

## 2025-05-05 PROCEDURE — 96375 TX/PRO/DX INJ NEW DRUG ADDON: CPT

## 2025-05-05 PROCEDURE — 96365 THER/PROPH/DIAG IV INF INIT: CPT

## 2025-05-05 PROCEDURE — 85610 PROTHROMBIN TIME: CPT

## 2025-05-05 PROCEDURE — 6370000000 HC RX 637 (ALT 250 FOR IP): Performed by: STUDENT IN AN ORGANIZED HEALTH CARE EDUCATION/TRAINING PROGRAM

## 2025-05-05 PROCEDURE — 82550 ASSAY OF CK (CPK): CPT

## 2025-05-05 PROCEDURE — 99285 EMERGENCY DEPT VISIT HI MDM: CPT

## 2025-05-05 PROCEDURE — 74177 CT ABD & PELVIS W/CONTRAST: CPT

## 2025-05-05 PROCEDURE — 2580000003 HC RX 258: Performed by: STUDENT IN AN ORGANIZED HEALTH CARE EDUCATION/TRAINING PROGRAM

## 2025-05-05 PROCEDURE — 96361 HYDRATE IV INFUSION ADD-ON: CPT

## 2025-05-05 PROCEDURE — 6360000004 HC RX CONTRAST MEDICATION: Performed by: STUDENT IN AN ORGANIZED HEALTH CARE EDUCATION/TRAINING PROGRAM

## 2025-05-05 PROCEDURE — 83690 ASSAY OF LIPASE: CPT

## 2025-05-05 PROCEDURE — 83605 ASSAY OF LACTIC ACID: CPT

## 2025-05-05 PROCEDURE — 6360000002 HC RX W HCPCS: Performed by: STUDENT IN AN ORGANIZED HEALTH CARE EDUCATION/TRAINING PROGRAM

## 2025-05-05 PROCEDURE — 87040 BLOOD CULTURE FOR BACTERIA: CPT

## 2025-05-05 PROCEDURE — 87086 URINE CULTURE/COLONY COUNT: CPT

## 2025-05-05 PROCEDURE — 80053 COMPREHEN METABOLIC PANEL: CPT

## 2025-05-05 RX ORDER — OXYCODONE HYDROCHLORIDE 5 MG/1
5 TABLET ORAL ONCE
Refills: 0 | Status: COMPLETED | OUTPATIENT
Start: 2025-05-05 | End: 2025-05-05

## 2025-05-05 RX ORDER — OXYCODONE AND ACETAMINOPHEN 5; 325 MG/1; MG/1
1 TABLET ORAL ONCE
Refills: 0 | Status: COMPLETED | OUTPATIENT
Start: 2025-05-05 | End: 2025-05-05

## 2025-05-05 RX ORDER — CIPROFLOXACIN 2 MG/ML
400 INJECTION, SOLUTION INTRAVENOUS ONCE
Status: COMPLETED | OUTPATIENT
Start: 2025-05-05 | End: 2025-05-05

## 2025-05-05 RX ORDER — 0.9 % SODIUM CHLORIDE 0.9 %
1000 INTRAVENOUS SOLUTION INTRAVENOUS ONCE
Status: COMPLETED | OUTPATIENT
Start: 2025-05-05 | End: 2025-05-05

## 2025-05-05 RX ORDER — PHENAZOPYRIDINE HYDROCHLORIDE 200 MG/1
200 TABLET, FILM COATED ORAL 3 TIMES DAILY PRN
Qty: 21 TABLET | Refills: 0 | Status: SHIPPED | OUTPATIENT
Start: 2025-05-05 | End: 2025-05-12

## 2025-05-05 RX ORDER — IOPAMIDOL 755 MG/ML
75 INJECTION, SOLUTION INTRAVASCULAR
Status: COMPLETED | OUTPATIENT
Start: 2025-05-05 | End: 2025-05-05

## 2025-05-05 RX ORDER — CIPROFLOXACIN 500 MG/1
500 TABLET, FILM COATED ORAL 2 TIMES DAILY
Qty: 14 TABLET | Refills: 0 | Status: SHIPPED | OUTPATIENT
Start: 2025-05-05 | End: 2025-05-12

## 2025-05-05 RX ORDER — ONDANSETRON 2 MG/ML
4 INJECTION INTRAMUSCULAR; INTRAVENOUS ONCE
Status: COMPLETED | OUTPATIENT
Start: 2025-05-05 | End: 2025-05-05

## 2025-05-05 RX ADMIN — CIPROFLOXACIN 400 MG: 2 INJECTION, SOLUTION INTRAVENOUS at 17:31

## 2025-05-05 RX ADMIN — OXYCODONE HYDROCHLORIDE 5 MG: 5 TABLET ORAL at 14:38

## 2025-05-05 RX ADMIN — OXYCODONE AND ACETAMINOPHEN 1 TABLET: 325; 5 TABLET ORAL at 17:56

## 2025-05-05 RX ADMIN — ONDANSETRON 4 MG: 2 INJECTION INTRAMUSCULAR; INTRAVENOUS at 14:39

## 2025-05-05 RX ADMIN — SODIUM CHLORIDE 1000 ML: 0.9 INJECTION, SOLUTION INTRAVENOUS at 14:39

## 2025-05-05 RX ADMIN — IOPAMIDOL 75 ML: 755 INJECTION, SOLUTION INTRAVENOUS at 16:26

## 2025-05-05 ASSESSMENT — PAIN DESCRIPTION - DESCRIPTORS
DESCRIPTORS: ACHING
DESCRIPTORS: ACHING

## 2025-05-05 ASSESSMENT — PAIN SCALES - GENERAL
PAINLEVEL_OUTOF10: 8
PAINLEVEL_OUTOF10: 9
PAINLEVEL_OUTOF10: 0
PAINLEVEL_OUTOF10: 5
PAINLEVEL_OUTOF10: 9

## 2025-05-05 ASSESSMENT — PAIN - FUNCTIONAL ASSESSMENT
PAIN_FUNCTIONAL_ASSESSMENT: 0-10
PAIN_FUNCTIONAL_ASSESSMENT: 0-10

## 2025-05-05 ASSESSMENT — PAIN DESCRIPTION - LOCATION: LOCATION: OTHER (COMMENT)
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
## 2025-05-05 NOTE — ED PROVIDER NOTES
as described above See above for complete description  Dictated and Electronically Signed By: Omero Viera MD 5/5/2025 16:55            Discussion with Other Professionals : None    Records Reviewed : Other as noted above    Chronic conditions affecting care: None    Disposition Considerations (tests considered but not done, Shared Decision Making, Patient Expectation of Test or Treatment): as noted above    Patient was given the following medications:  Medications   sodium chloride 0.9 % bolus 1,000 mL (0 mLs IntraVENous Stopped 5/5/25 1708)   oxyCODONE (ROXICODONE) immediate release tablet 5 mg (5 mg Oral Given 5/5/25 1438)   ondansetron (ZOFRAN) injection 4 mg (4 mg IntraVENous Given 5/5/25 1439)   iopamidol (ISOVUE-370) 76 % injection 75 mL (75 mLs IntraVENous Given 5/5/25 1626)   ciprofloxacin (CIPRO) IVPB 400 mg (0 mg IntraVENous Stopped 5/5/25 1855)   oxyCODONE-acetaminophen (PERCOCET) 5-325 MG per tablet 1 tablet (1 tablet Oral Given 5/5/25 1756)         Is this patient to be included in the SEP-1 Core Measure due to severe sepsis or septic shock?   No   Exclusion criteria - the patient is NOT to be included for SEP-1 Core Measure due to:  2+ SIRS criteria are not met    Disposition: Discharged     I am the primary physician of record    Clinical Impression:  1. Urinary tract infection with hematuria, site unspecified    2. Pulmonary nodule    3. Open wound of hand without foreign body, unspecified laterality, unspecified wound type, initial encounter      Disposition referral (if applicable):  [facility] Emergency Department  [address] [place] [address]  [phone]  Go to   If symptoms worsen    Emmett Alexandra MD  [address]  [id number] [address]  [phone]    Schedule an appointment as soon as possible for a visit in 3 days      Chris Green PA  [address] [address]  [phone]    Schedule an

## 2025-05-05 NOTE — PROGRESS NOTES
labs:   Abnormal Labs Reviewed   CBC WITH AUTO DIFFERENTIAL - Abnormal; Notable for the following components:       Result Value    Hematocrit 41.6 (*)     RDW 15.4 (*)     Monocytes % 6 (*)     All other components within normal limits        Background  History:   Past Medical History:   Diagnosis Date    Absent finger     Left hand    Anxiety     Arthritis     Hands    Asthma     \"As a kid but outgrew this\" - no medications as of 2/21/20    Chronic back pain     Depression     Difficult intravenous access 05/26/2024    Unable to place PICC x2 admissions. Patient IS NOT a PICC candidate (2024)    Edema     Fibromyalgia     Headache(784.0)     Last: 2/19/20    Hernia, abdominal     History of difficult venous access 12/21/2021    No longer a candidate for bedside PICC placement, multiple failed attempts    Hx MRSA infection     positive culture 8/2014 from left foot    Hyperlipidemia     Nausea & vomiting     Neuropathy     Obesity, Class III, BMI 40-49.9 (morbid obesity) (HCC)     Osteomyelitis (HCC)     hx finger    Poor circulation     Restless leg syndrome     Shortness of breath on exertion     6/14/2016- pt denies any sob with this interview    Type II or unspecified type diabetes mellitus with other specified manifestations, not stated as uncontrolled 04/08/2014    Type II or unspecified type diabetes mellitus without mention of complication, not stated as uncontrolled DX 2007    Follows with PCP    Ulcer of other part of foot 04/08/2014    'ulcer on left foot healed all up\"       Assessment    Vitals:        Vitals:    05/05/25 1409 05/05/25 1412   BP:  135/77   Pulse: (!) 108    Resp: 15    Temp: 97.8 °F (36.6 °C)    SpO2: 97%    Weight: 104.3 kg (230 lb)    Height: 1.803 m (5' 11\")        PO Status: Regular    O2 Flow Rate:        Cardiac Rhythm:       Last documented pain medication administered: Oxycodone 1438      NIH Score: NIH       Active LDA's:   Peripheral IV 05/05/25 Distal;Left Forearm (Active)

## 2025-05-07 ENCOUNTER — RESULTS FOLLOW-UP (OUTPATIENT)
Dept: EMERGENCY DEPT | Age: 60
End: 2025-05-07

## 2025-05-07 LAB
MICROORGANISM SPEC CULT: NORMAL
SPECIMEN DESCRIPTION: NORMAL

## 2025-05-09 ENCOUNTER — HOSPITAL ENCOUNTER (EMERGENCY)
Age: 60
Discharge: HOME | End: 2025-05-09
Attending: EMERGENCY MEDICINE
Payer: MEDICARE

## 2025-05-09 VITALS
DIASTOLIC BLOOD PRESSURE: 74 MMHG | SYSTOLIC BLOOD PRESSURE: 112 MMHG | OXYGEN SATURATION: 94 % | BODY MASS INDEX: 32.2 KG/M2 | HEIGHT: 71 IN | HEART RATE: 90 BPM | TEMPERATURE: 98.4 F | RESPIRATION RATE: 16 BRPM | WEIGHT: 230 LBS

## 2025-05-09 DIAGNOSIS — T23.329A: Primary | ICD-10-CM

## 2025-05-09 PROCEDURE — 6370000000 HC RX 637 (ALT 250 FOR IP)

## 2025-05-09 PROCEDURE — 6370000000 HC RX 637 (ALT 250 FOR IP): Performed by: EMERGENCY MEDICINE

## 2025-05-09 PROCEDURE — 99283 EMERGENCY DEPT VISIT LOW MDM: CPT

## 2025-05-09 RX ORDER — GINSENG 100 MG
CAPSULE ORAL ONCE
Status: COMPLETED | OUTPATIENT
Start: 2025-05-09 | End: 2025-05-09

## 2025-05-09 RX ORDER — GINSENG 100 MG
CAPSULE ORAL
Status: COMPLETED
Start: 2025-05-09 | End: 2025-05-09

## 2025-05-09 RX ORDER — BACITRACIN ZINC AND POLYMYXIN B SULFATE 500; 1000 [USP'U]/G; [USP'U]/G
OINTMENT TOPICAL 2 TIMES DAILY
Qty: 15 G | Refills: 0 | Status: SHIPPED | OUTPATIENT
Start: 2025-05-09 | End: 2025-05-16

## 2025-05-09 RX ADMIN — BACITRACIN: 500 OINTMENT TOPICAL at 01:00

## 2025-05-09 ASSESSMENT — PAIN DESCRIPTION - DESCRIPTORS: DESCRIPTORS: ACHING

## 2025-05-09 ASSESSMENT — PAIN SCALES - GENERAL
PAINLEVEL_OUTOF10: 0
PAINLEVEL_OUTOF10: 9

## 2025-05-09 ASSESSMENT — PAIN DESCRIPTION - ORIENTATION: ORIENTATION: RIGHT

## 2025-05-09 ASSESSMENT — PAIN - FUNCTIONAL ASSESSMENT: PAIN_FUNCTIONAL_ASSESSMENT: 0-10

## 2025-05-09 ASSESSMENT — PAIN DESCRIPTION - LOCATION: LOCATION: HAND
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
## 2025-05-14 ENCOUNTER — OFFICE VISIT (OUTPATIENT)
Dept: ORTHOPEDIC SURGERY | Age: 60
End: 2025-05-14

## 2025-05-14 VITALS — WEIGHT: 238 LBS | HEART RATE: 64 BPM | BODY MASS INDEX: 33.19 KG/M2 | OXYGEN SATURATION: 98 %

## 2025-05-14 DIAGNOSIS — M54.12 CERVICAL RADICULOPATHY: Primary | ICD-10-CM

## 2025-05-14 PROCEDURE — 99024 POSTOP FOLLOW-UP VISIT: CPT | Performed by: STUDENT IN AN ORGANIZED HEALTH CARE EDUCATION/TRAINING PROGRAM

## 2025-05-14 RX ORDER — OXYCODONE AND ACETAMINOPHEN 5; 325 MG/1; MG/1
1 TABLET ORAL EVERY 6 HOURS PRN
Qty: 28 TABLET | Refills: 0 | Status: SHIPPED | OUTPATIENT
Start: 2025-05-14 | End: 2025-05-21

## 2025-05-14 NOTE — PATIENT INSTRUCTIONS
EMG ordered. To be completed by Neurology. Referral placed for Neurology.    Xray orders placed for cervical spine. You can complete these without an appointment at the [facility]

## 2025-05-14 NOTE — PROGRESS NOTES
3 months post op - R Shoulder Arthroscopy    Started PT 3 weeks ago. After first appointment, self d/c'd PT due to increased pain. He has not gone back since as his pain has not improved. Pain 8/10 today without pain medication.

## 2025-05-14 NOTE — PROGRESS NOTES
Date of surgery: 2-     Procedure:  1.  Right shoulder arthroscopic rotator cuff repair -small size single row repair of the supraspinatus with additional repair of the subscapularis (complex, -22 modifier)  2. Shoulder biceps tenodesis - arthroscopic  3. Shoulder arthroscopic subacromial decompression, bursectomy   4. Shoulder arthroscopic extensive debridement (anterior, posterior glenohumeral joint, subacromial space) (CPT 93012)  5. Shoulder arthroscopic labral debridement (CPT 35179)  6. Shoulder arthroscopic lysis of adhesions (CPT 83726)  7.  Shoulder arthroscopic distal clavicle excision      History:  Toi is here in follow up regarding their 12-week follow-up appointment for above procedure    Patient is doing okay. They have continued 9/10 pain and because of this he pulled himself out of therapy and occasionally does exercises on his own at home.  He has been having numbness and tingling down the inside of the arm and periscapular as well as paracervical in nature.  They deny chest pain, SOB, calf pain,fever,wound drainage.  No other issues.  Patient denies any constitutional symptoms.    Patient states they have been compliant with restrictions.    No new injuries or complaints     Physical:   Patient demonstrates appropriate mood and affect.     Right upper extremity exam:   The incisions are well-healed and are clean, dry, intact, and nontender with no erythema. They have no edema, the Arm compartments are soft . There are No cords or calf tenderness.  No significant calf/ankle edema. They are neurovascularly intact distally.     Sling removed    Range of motion of the right shoulder demonstrates inability to touch the top of the    No areas of tenderness to palpation    Negative Shola's    Imaging:   No new orthopedic imaging     Impression: Status post above, doing well        Plan:   -Intraoperative arthroscopic imaging again reviewed with patient  -Discussed proceeding with cervical spine

## 2025-05-22 ENCOUNTER — HOSPITAL ENCOUNTER (OUTPATIENT)
Dept: PHYSICAL THERAPY | Age: 60
Setting detail: THERAPIES SERIES
Discharge: HOME | End: 2025-05-22
Payer: MEDICARE

## 2025-05-22 PROCEDURE — 97110 THERAPEUTIC EXERCISES: CPT

## 2025-05-22 PROCEDURE — 97016 VASOPNEUMATIC DEVICE THERAPY: CPT

## 2025-05-22 NOTE — FLOWSHEET NOTE
Outpatient Physical Therapy  [place]           [x] Phone: [phone]   Fax: [phone]  [place]           [] Phone: [phone]   Fax: [phone]        Physical Therapy Daily Treatment Note  Date:  2025    Patient Name:  Toi Briones    :  1965  MRN: [id number]  Restrictions/Precautions: No data recorded      Diagnosis:   S/P rotator cuff repair [Z98.890]    Date of Injury/Surgery:   Treatment Diagnosis:   left shoulder weakness and ROM deficits   Insurance/Certification information:    Referring Physician:  Abdi Mchugh DO     PCP: Emmett Alexandra MD  Next Doctor Visit:    Plan of care signed (Y/N):  sent day of eval   Outcome Measure: quickdash   Visit# / total visits:   bomn   Pain level: 7/10   Goals:     Patient goals: to manage the pain better and move arm better     Long Term Goals  Time Frame for Long Term Goals: 6 weeks  Patient will demonstrate independence with HEP --Ongoing  Patient will improve QuickDash from 93% to <50% to demonstrate improved tolerance to functional activities. --Not Met  Patient will improve shoulder flexion AROM to >120deg to allow him to reach into kitchen cabinets -- Not Met  Patient will improve global LUE strength to >4/5 to allow him to  participate in his hobbies of fishing.-- Not Met  Patient will improve shoulder ER to >60deg to allow him the motion required for proper mechanics during overhead movement. --Partially Met      Summary of Evaluation:  Assessment: Patient is a 59 year old male who presents to physical therapy status post right shoulder arthroscopic rotator cuff repair including the supraspinatus and ; with biceps tenodesis on 25. He is now 9 weeks out of surgery. He does relate one fall a couple weeks ago, but notes he did not land on his surgical UE. Upon assessment, he demonstrates limited right upper extremity strength and ROM, as expected secondary to the nature of his procedures. Surgical incisions

## 2025-05-22 NOTE — PROGRESS NOTES
Outpatient Physical Therapy           [place]           [x] Phone: [phone]   Fax: [phone]  [place]           [] Phone: [phone]   Fax: [phone]      To: Abdi Mchugh DO     From: Kirsten Yee, PT, DPT     Patient: Toi Briones                    : 1965  Diagnosis:  S/P rotator cuff repair [Z98.890]        Treatment Diagnosis:       Date: 2025  [x]  Progress Note                []  Discharge Note    Evaluation Date:  25 Total Visits to date:  2  Cancels/No-shows to date:  0    Subjective:  Pt reports to therapy doing okay this date. States that he has some trouble lifting his shoulder and reaching behind his back. States that during his last appointment he had residual pain that lasted over a week and decided not to continue with therapy at that time.       Plan of Care/Treatment to date:  [x] Therapeutic Exercise    [x] Modalities:  [x] Therapeutic Activity     [] Ultrasound  [x] Electrical Stimulation  [] Gait Training      [] Cervical Traction   [] Lumbar Traction  [x] Neuromuscular Re-education  [x] Cold/hotpack [] Iontophoresis  [x] Instruction in HEP      Other:  [x] Manual Therapy       [x]  Vasopneumatic  [] Aquatic Therapy       []   Dry Needle Therapy                      Objective/Significant Findings At Last Visit/Comments:    Shoulder Flex:  AROM: 65 deg     Shoulder ER:  AROM: 60 deg     Shoulder IR:  AROM: 65 deg    Assessment:   Pt tolerated treatment well this date. PT conducted progress note due to pt restarting therapy this date. Pt demonstrates moderate ROM impairments of R shoulder noted above. PT introduced cane stretching while standing and completed vaso with TENS and pt responded well with decreased feeling of pain. Pt would continue to benefit from skilled therapy interventions in order to improve impairments noted and reduced risk of future decline or re-injury.       Goal Status:  [] Achieved [x] Partially Achieved  [] Not

## 2025-05-25 ENCOUNTER — HOSPITAL ENCOUNTER (EMERGENCY)
Age: 60
Discharge: HOME | End: 2025-05-25
Payer: MEDICARE

## 2025-05-25 VITALS
RESPIRATION RATE: 18 BRPM | DIASTOLIC BLOOD PRESSURE: 64 MMHG | SYSTOLIC BLOOD PRESSURE: 111 MMHG | HEIGHT: 71 IN | BODY MASS INDEX: 33.32 KG/M2 | HEART RATE: 86 BPM | WEIGHT: 238 LBS | TEMPERATURE: 98.1 F | OXYGEN SATURATION: 97 %

## 2025-05-25 DIAGNOSIS — Z51.89 VISIT FOR WOUND CHECK: ICD-10-CM

## 2025-05-25 DIAGNOSIS — S61.209A OPEN WOUND OF FINGER OF RIGHT HAND, INITIAL ENCOUNTER: Primary | ICD-10-CM

## 2025-05-25 PROCEDURE — 99283 EMERGENCY DEPT VISIT LOW MDM: CPT

## 2025-05-25 PROCEDURE — 6370000000 HC RX 637 (ALT 250 FOR IP): Performed by: PHYSICIAN ASSISTANT

## 2025-05-25 RX ORDER — TAMSULOSIN HYDROCHLORIDE 0.4 MG/1
0.4 CAPSULE ORAL DAILY
Qty: 30 CAPSULE | Refills: 0 | Status: SHIPPED | OUTPATIENT
Start: 2025-05-25

## 2025-05-25 RX ORDER — CIPROFLOXACIN 500 MG/1
500 TABLET, FILM COATED ORAL 2 TIMES DAILY
Qty: 14 TABLET | Refills: 0 | Status: SHIPPED | OUTPATIENT
Start: 2025-05-25 | End: 2025-06-01

## 2025-05-25 RX ORDER — CIPROFLOXACIN 500 MG/1
500 TABLET, FILM COATED ORAL ONCE
Status: COMPLETED | OUTPATIENT
Start: 2025-05-25 | End: 2025-05-25

## 2025-05-25 RX ORDER — HYDROCODONE BITARTRATE AND ACETAMINOPHEN 5; 325 MG/1; MG/1
1 TABLET ORAL ONCE
Status: COMPLETED | OUTPATIENT
Start: 2025-05-25 | End: 2025-05-25

## 2025-05-25 RX ADMIN — HYDROCODONE BITARTRATE AND ACETAMINOPHEN 1 TABLET: 5; 325 TABLET ORAL at 17:27

## 2025-05-25 RX ADMIN — CIPROFLOXACIN HYDROCHLORIDE 500 MG: 500 TABLET, FILM COATED ORAL at 17:23

## 2025-05-25 ASSESSMENT — PAIN SCALES - GENERAL: PAINLEVEL_OUTOF10: 9

## 2025-05-25 ASSESSMENT — PAIN DESCRIPTION - ORIENTATION: ORIENTATION: LEFT

## 2025-05-25 ASSESSMENT — PAIN DESCRIPTION - LOCATION: LOCATION: FINGER (COMMENT WHICH ONE)

## 2025-05-25 ASSESSMENT — PAIN DESCRIPTION - DESCRIPTORS: DESCRIPTORS: STABBING;ACHING

## 2025-05-25 NOTE — ED PROVIDER NOTES
[address]  [id number]  [facility] [address]  [phone]    In 2 days  Follow-up primary care and your wound doctor.  Return for any new or worsening symptoms.      DISCHARGE MEDICATIONS:  Current Discharge Medication List        START taking these medications    Details   ciprofloxacin (CIPRO) 500 MG tablet Take 1 tablet by mouth 2 times daily for 7 days  Qty: 14 tablet, Refills: 0             DISCONTINUED MEDICATIONS:  Current Discharge Medication List                 (Please note that portions of this note were completed with a voice recognition program.  Efforts were made to edit the dictations but occasionally words are mis-transcribed.)    Td Zabala PA-C (electronically signed)        Td Zabala PA-C  05/25/25 5265
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
## 2025-05-27 ENCOUNTER — TELEPHONE (OUTPATIENT)
Dept: ORTHOPEDIC SURGERY | Age: 60
End: 2025-05-27

## 2025-05-27 ENCOUNTER — HOSPITAL ENCOUNTER (OUTPATIENT)
Dept: PHYSICAL THERAPY | Age: 60
Discharge: HOME | End: 2025-05-27

## 2025-05-27 ENCOUNTER — HOSPITAL ENCOUNTER (EMERGENCY)
Age: 60
Discharge: ANOTHER HOSPITAL | End: 2025-05-28
Payer: MEDICARE

## 2025-05-27 ENCOUNTER — APPOINTMENT (OUTPATIENT)
Dept: GENERAL RADIOLOGY | Age: 60
End: 2025-05-27
Payer: MEDICARE

## 2025-05-27 VITALS
HEART RATE: 79 BPM | OXYGEN SATURATION: 98 % | DIASTOLIC BLOOD PRESSURE: 62 MMHG | RESPIRATION RATE: 21 BRPM | SYSTOLIC BLOOD PRESSURE: 101 MMHG | TEMPERATURE: 99.1 F

## 2025-05-27 DIAGNOSIS — M86.9 OSTEOMYELITIS OF RIGHT HAND, UNSPECIFIED TYPE (HCC): Primary | ICD-10-CM

## 2025-05-27 LAB
ALBUMIN SERPL-MCNC: 3.8 G/DL (ref 3.4–5)
ALBUMIN/GLOB SERPL: 1.5 {RATIO} (ref 1.1–2.2)
ALP SERPL-CCNC: 85 U/L (ref 40–129)
ALT SERPL-CCNC: 6 U/L (ref 10–40)
ANION GAP SERPL CALCULATED.3IONS-SCNC: 11 MMOL/L (ref 9–17)
AST SERPL-CCNC: 19 U/L (ref 15–37)
BASOPHILS # BLD: 0.05 K/UL
BASOPHILS NFR BLD: 1 % (ref 0–1)
BILIRUB SERPL-MCNC: 0.4 MG/DL (ref 0–1)
BUN SERPL-MCNC: 14 MG/DL (ref 7–20)
CALCIUM SERPL-MCNC: 9.3 MG/DL (ref 8.3–10.6)
CHLORIDE SERPL-SCNC: 104 MMOL/L (ref 99–110)
CO2 SERPL-SCNC: 24 MMOL/L (ref 21–32)
CREAT SERPL-MCNC: 0.8 MG/DL (ref 0.8–1.3)
CRP SERPL HS-MCNC: 19 MG/L (ref 0–5)
EOSINOPHIL # BLD: 0.17 K/UL
EOSINOPHILS RELATIVE PERCENT: 3 % (ref 0–3)
ERYTHROCYTE [DISTWIDTH] IN BLOOD BY AUTOMATED COUNT: 15 % (ref 11.7–14.9)
ERYTHROCYTE [SEDIMENTATION RATE] IN BLOOD BY WESTERGREN METHOD: 30 MM/HR (ref 0–20)
GFR, ESTIMATED: >90 ML/MIN/1.73M2
GLUCOSE SERPL-MCNC: 173 MG/DL (ref 74–99)
HCT VFR BLD AUTO: 38.7 % (ref 42–52)
HGB BLD-MCNC: 12.4 G/DL (ref 13.5–18)
IMM GRANULOCYTES # BLD AUTO: 0.02 K/UL
IMM GRANULOCYTES NFR BLD: 0 %
LYMPHOCYTES NFR BLD: 2.1 K/UL
LYMPHOCYTES RELATIVE PERCENT: 31 % (ref 24–44)
MCH RBC QN AUTO: 27.4 PG (ref 27–31)
MCHC RBC AUTO-ENTMCNC: 32 G/DL (ref 32–36)
MCV RBC AUTO: 85.6 FL (ref 78–100)
MONOCYTES NFR BLD: 0.53 K/UL
MONOCYTES NFR BLD: 8 % (ref 0–5)
NEUTROPHILS NFR BLD: 58 % (ref 36–66)
NEUTS SEG NFR BLD: 3.93 K/UL
PLATELET # BLD AUTO: 214 K/UL (ref 140–440)
PMV BLD AUTO: 10.1 FL (ref 7.5–11.1)
POTASSIUM SERPL-SCNC: 3.9 MMOL/L (ref 3.5–5.1)
PROT SERPL-MCNC: 6.3 G/DL (ref 6.4–8.2)
RBC # BLD AUTO: 4.52 M/UL (ref 4.6–6.2)
SODIUM SERPL-SCNC: 138 MMOL/L (ref 136–145)
WBC OTHER # BLD: 6.8 K/UL (ref 4–10.5)

## 2025-05-27 PROCEDURE — 96365 THER/PROPH/DIAG IV INF INIT: CPT

## 2025-05-27 PROCEDURE — 96366 THER/PROPH/DIAG IV INF ADDON: CPT

## 2025-05-27 PROCEDURE — 87077 CULTURE AEROBIC IDENTIFY: CPT

## 2025-05-27 PROCEDURE — 73130 X-RAY EXAM OF HAND: CPT

## 2025-05-27 PROCEDURE — 6370000000 HC RX 637 (ALT 250 FOR IP): Performed by: NURSE PRACTITIONER

## 2025-05-27 PROCEDURE — 2580000003 HC RX 258: Performed by: NURSE PRACTITIONER

## 2025-05-27 PROCEDURE — 80053 COMPREHEN METABOLIC PANEL: CPT

## 2025-05-27 PROCEDURE — 87186 SC STD MICRODIL/AGAR DIL: CPT

## 2025-05-27 PROCEDURE — 87070 CULTURE OTHR SPECIMN AEROBIC: CPT

## 2025-05-27 PROCEDURE — 6360000002 HC RX W HCPCS: Performed by: NURSE PRACTITIONER

## 2025-05-27 PROCEDURE — 99285 EMERGENCY DEPT VISIT HI MDM: CPT

## 2025-05-27 PROCEDURE — 86140 C-REACTIVE PROTEIN: CPT

## 2025-05-27 PROCEDURE — 87205 SMEAR GRAM STAIN: CPT

## 2025-05-27 PROCEDURE — 85025 COMPLETE CBC W/AUTO DIFF WBC: CPT

## 2025-05-27 PROCEDURE — 85652 RBC SED RATE AUTOMATED: CPT

## 2025-05-27 RX ORDER — HYDROCODONE BITARTRATE AND ACETAMINOPHEN 5; 325 MG/1; MG/1
1 TABLET ORAL ONCE
Refills: 0 | Status: COMPLETED | OUTPATIENT
Start: 2025-05-27 | End: 2025-05-27

## 2025-05-27 RX ADMIN — HYDROCODONE BITARTRATE AND ACETAMINOPHEN 1 TABLET: 5; 325 TABLET ORAL at 22:45

## 2025-05-27 RX ADMIN — HYDROCODONE BITARTRATE AND ACETAMINOPHEN 1 TABLET: 5; 325 TABLET ORAL at 19:56

## 2025-05-27 RX ADMIN — VANCOMYCIN HYDROCHLORIDE 2500 MG: 5 INJECTION, POWDER, LYOPHILIZED, FOR SOLUTION INTRAVENOUS at 22:24

## 2025-05-27 ASSESSMENT — PAIN DESCRIPTION - FREQUENCY: FREQUENCY: CONTINUOUS

## 2025-05-27 ASSESSMENT — PAIN - FUNCTIONAL ASSESSMENT
PAIN_FUNCTIONAL_ASSESSMENT: 0-10
PAIN_FUNCTIONAL_ASSESSMENT: 0-10

## 2025-05-27 ASSESSMENT — ENCOUNTER SYMPTOMS
VOMITING: 0
SHORTNESS OF BREATH: 0
NAUSEA: 0

## 2025-05-27 ASSESSMENT — PAIN DESCRIPTION - LOCATION
LOCATION: FINGER (COMMENT WHICH ONE)
LOCATION: FINGER (COMMENT WHICH ONE)
LOCATION: HAND
LOCATION: FINGER (COMMENT WHICH ONE)

## 2025-05-27 ASSESSMENT — PAIN SCALES - GENERAL
PAINLEVEL_OUTOF10: 10
PAINLEVEL_OUTOF10: 8
PAINLEVEL_OUTOF10: 7

## 2025-05-27 NOTE — TELEPHONE ENCOUNTER
Patient is requesting a medication refill. Per Dr. Mchugh last office note the prescription sent in on 05/14/2025 and the office note stated that will be the patient last medication sent.

## 2025-05-27 NOTE — ED PROVIDER NOTES
directly visualized the radiologic plain film image(s) with the below findings read by radiologist and agree with interpretation:    Interpretation per the Radiologist below, if available at the time of this note:    XR HAND RIGHT (MIN 3 VIEWS)   Final Result          PROCEDURES   Unless otherwise noted below, none     Procedures    CRITICAL CARE TIME       PAST MEDICAL HISTORY   Chronic Conditions:affecting care  has a past medical history of Absent finger, Anxiety, Arthritis, Asthma, Chronic back pain, Depression, Difficult intravenous access (05/26/2024), Edema, Fibromyalgia, Headache(784.0), Hernia, abdominal, History of difficult venous access (12/21/2021), MRSA infection, Hyperlipidemia, Nausea & vomiting, Neuropathy, Obesity, Class III, BMI 40-49.9 (morbid obesity) (HCC), Osteomyelitis (HCC), Poor circulation, Restless leg syndrome, Shortness of breath on exertion, Type II or unspecified type diabetes mellitus with other specified manifestations, not stated as uncontrolled (04/08/2014), Type II or unspecified type diabetes mellitus without mention of complication, not stated as uncontrolled (DX 2007), and Ulcer of other part of foot (04/08/2014).     CC/HPI Summary, DDx, ED Course, and Reassessment:       Review of notes from [facility] dated 12/4/2024 show patient was treated for infected right fourth ring finger diagnosed with right Hand Osteomyelitis (2nd and 4th fingers): Orthopedic hand consult--no surgical indication. Infectious disease (ID) consult--treated with Unasyn and vancomycin, to be discharged on linezolid for 4 weeks and Augmentin for 3 months     Chart review shows recent radiograph(s):  XR HAND RIGHT (MIN 3 VIEWS)  Result Date: 5/27/2025  PROCEDURE: XR HAND RIGHT (MIN 3 VIEWS) DATE OF EXAM:  5/27/2025 19:31 DEMOGRAPHICS: 59 years old Male INDICATION: finger infection COMPARISON: Right hand radiographs 10/20/2021, right hand CT 10/7/2024, and right finger radiographs 12/4/2024 
no

## 2025-05-27 NOTE — FLOWSHEET NOTE
Physical Therapy  Cancellation/No-show Note  Patient Name:  Toi Briones  :  1965   Date:  2025  Cancelled visits to date: 1  No-shows to date: 0    For today's appointment patient:  [x]  Cancelled  []  Rescheduled appointment  []  No-show     Reason given by patient:  []  Patient ill  []  Conflicting appointment  [x]  No transportation    []  Conflict with work  []  No reason given  []  Other:     Comments:      Electronically signed by:  Kirsten Yee, PT, DPT

## 2025-05-28 NOTE — CONSULTS
PHARMACY VANCOMYCIN MONITORING & DOSING SERVICE    Toi Briones is a 59 y.o. male started on vancomycin for SSTI. Pharmacy consulted by ED provider Meghan Biswas NP to order a dose of vancomycin in the emergency department.    Other antimicrobials: Unasyn 3 g IV once    Ht Readings from Last 1 Encounters:   05/25/25 1.803 m (5' 11\")     Wt Readings from Last 3 Encounters:   05/25/25 108 kg (238 lb)   05/14/25 108 kg (238 lb)   05/09/25 104.3 kg (230 lb)      Ideal body weight: 75.3 kg (166 lb 0.1 oz)  Adjusted ideal body weight: 88.4 kg (194 lb 12.9 oz)    Pertinent Laboratory Values:   Temp Readings from Last 3 Encounters:   05/27/25 99.1 °F (37.3 °C)   05/25/25 98.1 °F (36.7 °C) (Oral)   05/09/25 98.4 °F (36.9 °C)     No results for input(s): \"WBC\", \"LACTATE\" in the last 72 hours.  No results for input(s): \"BUN\", \"CREATININE\" in the last 72 hours.  Estimated Creatinine Clearance: 109 mL/min (based on SCr of 0.9 mg/dL).  No intake or output data in the 24 hours ending 05/27/25 4603    Assessment/Plan:  Pharmacy will order vancomycin 2500 mg (23.1 mg/kg) IV x once in the ED.  Please note, pharmacy will order a one-time dose of vancomycin for the Emergency Department. The consult will need to be re-ordered if vancomycin is to continue upon admission.    Thank you for the consult.  Krysta Odell [facility], [facility]  5/27/2025 9:43 PM

## 2025-05-29 ENCOUNTER — RESULTS FOLLOW-UP (OUTPATIENT)
Dept: EMERGENCY DEPT | Age: 60
End: 2025-05-29

## 2025-05-30 ENCOUNTER — HOSPITAL ENCOUNTER (OUTPATIENT)
Dept: PHYSICAL THERAPY | Age: 60
Discharge: HOME | End: 2025-05-30

## 2025-05-30 NOTE — FLOWSHEET NOTE
Physical Therapy  Cancellation/No-show Note  Patient Name:  Toi Briones  :  1965   Date:  2025  Cancelled visits to date: 2  No-shows to date: 0    For today's appointment patient:  [x]  Cancelled  []  Rescheduled appointment  []  No-show     Reason given by patient:  []  Patient ill  []  Conflicting appointment  []  No transportation    []  Conflict with work  []  No reason given  [x]  Other:     Comments:  PT was completing chart review and saw that pt was potentially admitted to [facility]. PT called and spoke with family member who confirmed that pt is currently admitted. Pt's family cancelled appt for pt this date.     Electronically signed by:  Kirsten Yee, PT, DPT

## 2025-06-03 ENCOUNTER — HOSPITAL ENCOUNTER (OUTPATIENT)
Dept: PHYSICAL THERAPY | Age: 60
Setting detail: THERAPIES SERIES
Discharge: HOME | End: 2025-06-03
Payer: MEDICARE

## 2025-06-03 NOTE — FLOWSHEET NOTE
Physical Therapy  Cancellation/No-show Note  Patient Name:  Toi Briones  :  1965   Date:  6/3/2025  Cancelled visits to date: 2  No-shows to date: 0    For today's appointment patient:  [x]  Cancelled  []  Rescheduled appointment  []  No-show     Reason given by patient:  []  Patient ill  []  Conflicting appointment  []  No transportation    []  Conflict with work  []  No reason given  [x]  Other:     Comments:  Cancelled this session per PT discretion secondary to pt having in-patient hospital stay and needing a Doctor's note to return to therapy.     Electronically signed by:  Kirsten Yee, PT, DPT

## 2025-06-04 ENCOUNTER — TELEPHONE (OUTPATIENT)
Dept: ORTHOPEDIC SURGERY | Age: 60
End: 2025-06-04

## 2025-06-04 NOTE — TELEPHONE ENCOUNTER
Patient called in asking about a refill on his pain medication.     Please advise.     Thank you.

## 2025-06-05 ENCOUNTER — HOSPITAL ENCOUNTER (EMERGENCY)
Age: 60
Discharge: HOME | End: 2025-06-05
Payer: MEDICARE

## 2025-06-05 ENCOUNTER — HOSPITAL ENCOUNTER (OUTPATIENT)
Dept: PHYSICAL THERAPY | Age: 60
Setting detail: THERAPIES SERIES
Discharge: HOME | End: 2025-06-05
Payer: MEDICARE

## 2025-06-05 VITALS
TEMPERATURE: 98.1 F | BODY MASS INDEX: 33.19 KG/M2 | SYSTOLIC BLOOD PRESSURE: 111 MMHG | WEIGHT: 238 LBS | DIASTOLIC BLOOD PRESSURE: 73 MMHG | HEART RATE: 61 BPM | RESPIRATION RATE: 16 BRPM | OXYGEN SATURATION: 97 %

## 2025-06-05 DIAGNOSIS — N30.01 ACUTE CYSTITIS WITH HEMATURIA: Primary | ICD-10-CM

## 2025-06-05 LAB
BACTERIA URNS QL MICRO: ABNORMAL
BILIRUB UR QL STRIP: NEGATIVE
CHARACTER UR: ABNORMAL
CLARITY UR: CLEAR
COLOR UR: YELLOW
EPI CELLS #/AREA URNS HPF: <1 /HPF
GLUCOSE UR STRIP-MCNC: NEGATIVE MG/DL
HGB UR QL STRIP.AUTO: ABNORMAL
KETONES UR STRIP-MCNC: NEGATIVE MG/DL
LEUKOCYTE ESTERASE UR QL STRIP: NEGATIVE
MUCOUS THREADS URNS QL MICRO: ABNORMAL
NITRITE UR QL STRIP: NEGATIVE
PH UR STRIP: 5.5 [PH] (ref 5–8)
PROT UR STRIP-MCNC: 100 MG/DL
RBC #/AREA URNS HPF: 160 /HPF (ref 0–2)
SP GR UR STRIP: >1.03 (ref 1–1.03)
UROBILINOGEN UR STRIP-ACNC: 0.2 EU/DL (ref 0–1)
WBC #/AREA URNS HPF: 33 /HPF (ref 0–5)

## 2025-06-05 PROCEDURE — 99283 EMERGENCY DEPT VISIT LOW MDM: CPT

## 2025-06-05 PROCEDURE — 51798 US URINE CAPACITY MEASURE: CPT

## 2025-06-05 PROCEDURE — 87086 URINE CULTURE/COLONY COUNT: CPT

## 2025-06-05 PROCEDURE — 97140 MANUAL THERAPY 1/> REGIONS: CPT

## 2025-06-05 PROCEDURE — 81001 URINALYSIS AUTO W/SCOPE: CPT

## 2025-06-05 PROCEDURE — 97016 VASOPNEUMATIC DEVICE THERAPY: CPT

## 2025-06-05 PROCEDURE — 6370000000 HC RX 637 (ALT 250 FOR IP): Performed by: PHYSICIAN ASSISTANT

## 2025-06-05 PROCEDURE — 97110 THERAPEUTIC EXERCISES: CPT

## 2025-06-05 RX ORDER — OXYBUTYNIN CHLORIDE 10 MG/1
10 TABLET, EXTENDED RELEASE ORAL DAILY
Qty: 3 TABLET | Refills: 0 | Status: SHIPPED | OUTPATIENT
Start: 2025-06-05

## 2025-06-05 RX ORDER — TRAMADOL HYDROCHLORIDE 50 MG/1
50 TABLET ORAL ONCE
Status: COMPLETED | OUTPATIENT
Start: 2025-06-05 | End: 2025-06-05

## 2025-06-05 RX ORDER — OXYBUTYNIN CHLORIDE 5 MG/1
5 TABLET ORAL ONCE
Status: COMPLETED | OUTPATIENT
Start: 2025-06-05 | End: 2025-06-05

## 2025-06-05 RX ADMIN — TRAMADOL HYDROCHLORIDE 50 MG: 50 TABLET, COATED ORAL at 20:52

## 2025-06-05 RX ADMIN — OXYBUTYNIN CHLORIDE 5 MG: 5 TABLET ORAL at 20:52

## 2025-06-05 ASSESSMENT — PAIN - FUNCTIONAL ASSESSMENT: PAIN_FUNCTIONAL_ASSESSMENT: 0-10

## 2025-06-05 ASSESSMENT — PAIN SCALES - GENERAL
PAINLEVEL_OUTOF10: 8
PAINLEVEL_OUTOF10: 8

## 2025-06-05 NOTE — ED PROVIDER NOTES
septicemia presents today to the ED.  With hematuria with known cystitis.  Already on antibiotics.  Interventions as below provided patient will be discharged his care and outpatient follow-up with his urologist.    Differential diagnosis cystitis, urethritis, pyelonephritis           Problems Addressed:  1. Acute cystitis with hematuria          Interventions given this visit:   Medications   traMADol (ULTRAM) tablet 50 mg (50 mg Oral Given 6/5/25 2052)   oxyBUTYnin (DITROPAN) tablet 5 mg (5 mg Oral Given 6/5/25 2052)       Interventions listed are used to treat Problem list above                 I have discussed the findings of today's workup with the patient and present family members and have addressed their questions and concerns. Important warning signs as well as new or worsening symptoms which would necessitate immediate return to the ED were discussed. The plan is to discharge from the ED at this time, and the patient is in stable condition. The patient acknowledged understanding is agreeable with this plan.The patient and/or family and I have discussed the diagnosis and risks, and we agree with discharging home to follow-up with their primary care, specialist or referral doctor. Questions addressed. Disposition and follow-up agreed upon. Specific discharge instructions explained.  We have discussed the symptoms which are most concerning that necessitate immediate return. We also discussed returning to the Emergency Department immediately if new or worsening symptoms occur.        I independently managed patient today in the ED.       /85   Pulse 89   Temp 98.1 °F (36.7 °C) (Oral)   Resp 18   Wt 108 kg (238 lb)   SpO2 98%   BMI 33.19 kg/m²       Clinical Impression:  1. Acute cystitis with hematuria        Disposition referral (if applicable):  Emmett Alexandra MD  [address] [place] [address]  [id number] [id number]  [phone]    In 2 days      Disposition medications (if  applicable):  New Prescriptions    OXYBUTYNIN (DITROPAN XL) 10 MG EXTENDED RELEASE TABLET    Take 1 tablet by mouth daily         Comment: Please note this report has been produced using speech recognition software and may contain errors related to that system including errors in grammar, punctuation, and spelling, as well as words and phrases that may be inappropriate. If there are any questions or concerns please feel free to contact the dictating provider for clarification.    Please note Images are personally interpreted by this Provider (CAMACHO Aguirre. )However final disposition is made with deference to Radiologist interpretation of said images.       \        Campos Aguirre PA-C  06/05/25 [id number]

## 2025-06-05 NOTE — FLOWSHEET NOTE
Outpatient Physical Therapy  [place]           [x] Phone: [phone]   Fax: [phone]  [place]           [] Phone: [phone]   Fax: [phone]        Physical Therapy Daily Treatment Note  Date:  2025    Patient Name:  Toi Briones    :  1965  MRN: [id number]  Restrictions/Precautions: No data recorded      Diagnosis:   S/P rotator cuff repair [Z98.890]    Date of Injury/Surgery:   Treatment Diagnosis:   left shoulder weakness and ROM deficits   Insurance/Certification information:    Referring Physician:  Abdi Mchugh DO     PCP: Emmett Alexandra MD  Next Doctor Visit:    Plan of care signed (Y/N):  sent day of eval   Outcome Measure: quickdash   Visit# / total visits: 3 /10  bomn   Pain level: 9/10   Goals:     Patient goals: to manage the pain better and move arm better     Long Term Goals  Time Frame for Long Term Goals: 6 weeks  Patient will demonstrate independence with HEP --Ongoing  Patient will improve QuickDash from 93% to <50% to demonstrate improved tolerance to functional activities. --Not Met  Patient will improve shoulder flexion AROM to >120deg to allow him to reach into kitchen cabinets -- Not Met  Patient will improve global LUE strength to >4/5 to allow him to  participate in his hobbies of fishing.-- Not Met  Patient will improve shoulder ER to >60deg to allow him the motion required for proper mechanics during overhead movement. --Partially Met      Summary of Evaluation:  Assessment: Patient is a 59 year old male who presents to physical therapy status post right shoulder arthroscopic rotator cuff repair including the supraspinatus and ; with biceps tenodesis on 25. He is now 9 weeks out of surgery. He does relate one fall a couple weeks ago, but notes he did not land on his surgical UE. Upon assessment, he demonstrates limited right upper extremity strength and ROM, as expected secondary to the nature of his procedures. Surgical incisions

## 2025-06-06 NOTE — TELEPHONE ENCOUNTER
We are no longer doing pain medication for this patient as he is 4 months out from surgery. He will need to see pain management or PCP. Also, he just had surgery by another physician.

## 2025-06-07 ENCOUNTER — RESULTS FOLLOW-UP (OUTPATIENT)
Dept: EMERGENCY DEPT | Age: 60
End: 2025-06-07

## 2025-06-07 LAB
MICROORGANISM SPEC CULT: NORMAL
SPECIMEN DESCRIPTION: NORMAL

## 2025-06-09 ENCOUNTER — TELEPHONE (OUTPATIENT)
Dept: PHARMACY | Facility: CLINIC | Age: 60
End: 2025-06-09

## 2025-06-09 NOTE — TELEPHONE ENCOUNTER
[facility] [place] [facility]: STATIN THERAPY REVIEW  Identified statin use in persons with diabetes care gap per [employer]. Records dated: 5/26/25.    Patient also appears to be prescribed: Diabetes (93% PDC YTD, Mounjaro 28ds last filled 5/3/25)    ASSESSMENT  STATIN GAP IDENTIFIED    Per chart review: patient is prescribed atorvastatin 10mg daily  Historical listing  Per Reconcile Dispense History: last filled on 3/1/23 for 30 day supply.     No results found for: \"CHOL\", \"TRIG\", \"HDL\", \"LDLDIRECT\"  No results found for: \"LDL\", \"LDLDIRECT\"   *no labs available in [employer] portal    ALT   Date Value Ref Range Status   05/27/2025 6 (L) 10 - 40 U/L Final     AST   Date Value Ref Range Status   05/27/2025 19 15 - 37 U/L Final     The ASCVD Risk score (Rita DK, et al., 2019) failed to calculate for the following reasons:    Cannot find a previous HDL lab    Cannot find a previous total cholesterol lab     PLAN  Per insurer report, LIS-2 - may be able to receive up to a 100-day supply for the same cost as a 30-day supply.    The following are interventions that have been identified:   Adherence: Patient eligible for 84 day supply of Mounjaro (LIS2), pending if dose will change?  Statin Gap (Diabetes): confirm atorvastatin rx? (On medication list in this EMR as historical. Appears last filled in 2023 and follows with affiliate provider/s at [facility]?)    Attempting to reach patient to review.  Left message asking for return call.    Last Visit: per [employer] portal 3/12/25 with Dain Guadalupe ([facility])    Sherri Mckenzie, PharmD, BCACP  Population Health Pharmacist  [facility] Clinical [facility]  Department, toll free: [phone], option 1

## 2025-06-10 ENCOUNTER — APPOINTMENT (OUTPATIENT)
Dept: CT IMAGING | Age: 60
End: 2025-06-10
Payer: MEDICARE

## 2025-06-10 ENCOUNTER — HOSPITAL ENCOUNTER (EMERGENCY)
Age: 60
Discharge: HOME | End: 2025-06-10
Attending: EMERGENCY MEDICINE
Payer: MEDICARE

## 2025-06-10 VITALS
DIASTOLIC BLOOD PRESSURE: 62 MMHG | BODY MASS INDEX: 31.15 KG/M2 | OXYGEN SATURATION: 98 % | WEIGHT: 230 LBS | TEMPERATURE: 98.4 F | HEART RATE: 76 BPM | SYSTOLIC BLOOD PRESSURE: 103 MMHG | RESPIRATION RATE: 16 BRPM | HEIGHT: 72 IN

## 2025-06-10 DIAGNOSIS — R10.30 LOWER ABDOMINAL PAIN: ICD-10-CM

## 2025-06-10 DIAGNOSIS — R31.0 GROSS HEMATURIA: Primary | ICD-10-CM

## 2025-06-10 LAB
ALBUMIN SERPL-MCNC: 3.7 G/DL (ref 3.4–5)
ALBUMIN/GLOB SERPL: 1.6 {RATIO} (ref 1.1–2.2)
ALP SERPL-CCNC: 93 U/L (ref 40–129)
ALT SERPL-CCNC: 11 U/L (ref 10–40)
ANION GAP SERPL CALCULATED.3IONS-SCNC: 11 MMOL/L (ref 9–17)
AST SERPL-CCNC: 41 U/L (ref 15–37)
BASOPHILS # BLD: 0.05 K/UL
BASOPHILS NFR BLD: 1 % (ref 0–1)
BILIRUB SERPL-MCNC: 0.3 MG/DL (ref 0–1)
BILIRUB UR QL STRIP: NEGATIVE
BUN SERPL-MCNC: 16 MG/DL (ref 7–20)
CALCIUM SERPL-MCNC: 9.2 MG/DL (ref 8.3–10.6)
CHARACTER UR: ABNORMAL
CHLORIDE SERPL-SCNC: 106 MMOL/L (ref 99–110)
CLARITY UR: CLEAR
CO2 SERPL-SCNC: 23 MMOL/L (ref 21–32)
COLOR UR: YELLOW
CREAT SERPL-MCNC: 0.8 MG/DL (ref 0.8–1.3)
CRYSTALS URNS MICRO: ABNORMAL /HPF
EOSINOPHIL # BLD: 0.16 K/UL
EOSINOPHILS RELATIVE PERCENT: 2 % (ref 0–3)
EPI CELLS #/AREA URNS HPF: <1 /HPF
ERYTHROCYTE [DISTWIDTH] IN BLOOD BY AUTOMATED COUNT: 14.9 % (ref 11.7–14.9)
GFR, ESTIMATED: >90 ML/MIN/1.73M2
GLUCOSE SERPL-MCNC: 153 MG/DL (ref 74–99)
GLUCOSE UR STRIP-MCNC: NEGATIVE MG/DL
HCT VFR BLD AUTO: 36.8 % (ref 42–52)
HGB BLD-MCNC: 12 G/DL (ref 13.5–18)
HGB UR QL STRIP.AUTO: ABNORMAL
IMM GRANULOCYTES # BLD AUTO: 0.02 K/UL
IMM GRANULOCYTES NFR BLD: 0 %
KETONES UR STRIP-MCNC: NEGATIVE MG/DL
LEUKOCYTE ESTERASE UR QL STRIP: NEGATIVE
LYMPHOCYTES NFR BLD: 1.91 K/UL
LYMPHOCYTES RELATIVE PERCENT: 26 % (ref 24–44)
MCH RBC QN AUTO: 27.6 PG (ref 27–31)
MCHC RBC AUTO-ENTMCNC: 32.6 G/DL (ref 32–36)
MCV RBC AUTO: 84.8 FL (ref 78–100)
MONOCYTES NFR BLD: 0.45 K/UL
MONOCYTES NFR BLD: 6 % (ref 0–5)
MUCOUS THREADS URNS QL MICRO: ABNORMAL
NEUTROPHILS NFR BLD: 64 % (ref 36–66)
NEUTS SEG NFR BLD: 4.67 K/UL
NITRITE UR QL STRIP: NEGATIVE
PH UR STRIP: 6 [PH] (ref 5–8)
PLATELET # BLD AUTO: 225 K/UL (ref 140–440)
PMV BLD AUTO: 11 FL (ref 7.5–11.1)
POTASSIUM SERPL-SCNC: 4.4 MMOL/L (ref 3.5–5.1)
PROT SERPL-MCNC: 5.9 G/DL (ref 6.4–8.2)
PROT UR STRIP-MCNC: 30 MG/DL
RBC # BLD AUTO: 4.34 M/UL (ref 4.6–6.2)
RBC #/AREA URNS HPF: 136 /HPF (ref 0–2)
SODIUM SERPL-SCNC: 139 MMOL/L (ref 136–145)
SP GR UR STRIP: >1.03 (ref 1–1.03)
UROBILINOGEN UR STRIP-ACNC: 0.2 EU/DL (ref 0–1)
WBC #/AREA URNS HPF: 16 /HPF (ref 0–5)
WBC OTHER # BLD: 7.3 K/UL (ref 4–10.5)

## 2025-06-10 PROCEDURE — 80053 COMPREHEN METABOLIC PANEL: CPT

## 2025-06-10 PROCEDURE — 6360000002 HC RX W HCPCS: Performed by: EMERGENCY MEDICINE

## 2025-06-10 PROCEDURE — 81001 URINALYSIS AUTO W/SCOPE: CPT

## 2025-06-10 PROCEDURE — 2580000003 HC RX 258: Performed by: EMERGENCY MEDICINE

## 2025-06-10 PROCEDURE — 85025 COMPLETE CBC W/AUTO DIFF WBC: CPT

## 2025-06-10 PROCEDURE — 96375 TX/PRO/DX INJ NEW DRUG ADDON: CPT

## 2025-06-10 PROCEDURE — 74176 CT ABD & PELVIS W/O CONTRAST: CPT

## 2025-06-10 PROCEDURE — 87086 URINE CULTURE/COLONY COUNT: CPT

## 2025-06-10 PROCEDURE — 96374 THER/PROPH/DIAG INJ IV PUSH: CPT

## 2025-06-10 PROCEDURE — 99284 EMERGENCY DEPT VISIT MOD MDM: CPT

## 2025-06-10 RX ORDER — 0.9 % SODIUM CHLORIDE 0.9 %
1000 INTRAVENOUS SOLUTION INTRAVENOUS ONCE
Status: COMPLETED | OUTPATIENT
Start: 2025-06-10 | End: 2025-06-10

## 2025-06-10 RX ORDER — HYDROMORPHONE HYDROCHLORIDE 1 MG/ML
1 INJECTION, SOLUTION INTRAMUSCULAR; INTRAVENOUS; SUBCUTANEOUS ONCE
Status: COMPLETED | OUTPATIENT
Start: 2025-06-10 | End: 2025-06-10

## 2025-06-10 RX ORDER — ONDANSETRON 2 MG/ML
4 INJECTION INTRAMUSCULAR; INTRAVENOUS ONCE
Status: COMPLETED | OUTPATIENT
Start: 2025-06-10 | End: 2025-06-10

## 2025-06-10 RX ORDER — OXYCODONE AND ACETAMINOPHEN 5; 325 MG/1; MG/1
1 TABLET ORAL EVERY 6 HOURS PRN
Qty: 12 TABLET | Refills: 0 | Status: SHIPPED | OUTPATIENT
Start: 2025-06-10 | End: 2025-06-13

## 2025-06-10 RX ADMIN — HYDROMORPHONE HYDROCHLORIDE 1 MG: 1 INJECTION, SOLUTION INTRAMUSCULAR; INTRAVENOUS; SUBCUTANEOUS at 20:57

## 2025-06-10 RX ADMIN — SODIUM CHLORIDE 1000 ML: 0.9 INJECTION, SOLUTION INTRAVENOUS at 20:57

## 2025-06-10 RX ADMIN — ONDANSETRON 4 MG: 2 INJECTION INTRAMUSCULAR; INTRAVENOUS at 20:57

## 2025-06-10 ASSESSMENT — PAIN SCALES - GENERAL: PAINLEVEL_OUTOF10: 9

## 2025-06-10 ASSESSMENT — PAIN DESCRIPTION - ORIENTATION: ORIENTATION: LOWER

## 2025-06-10 ASSESSMENT — PAIN - FUNCTIONAL ASSESSMENT
PAIN_FUNCTIONAL_ASSESSMENT: 0-10
PAIN_FUNCTIONAL_ASSESSMENT: NONE - DENIES PAIN

## 2025-06-10 ASSESSMENT — PAIN DESCRIPTION - LOCATION: LOCATION: ABDOMEN

## 2025-06-10 NOTE — ED PROVIDER NOTES
EMERGENCY DEPARTMENT ENCOUNTER      CHIEF COMPLAINT:   Lower abdominal pain  Hematuria    HPI: Toi Briones is a 59 y.o. male who presents to the Emergency Department for evaluation of lower abdominal pain and hematuria.  The patient has a history of intermittent episodes of hematuria.  He states he is following with urology and that they did a scope last week but he does not know the results.  He states that when he woke up this morning he was peeing obvious blood with clots.  This has persisted throughout the day.  He has been having suprapubic abdominal pressure and pain.  He does not take any blood thinners. There are no exacerbating or relieving factors. The patient denies fevers, chills, hematemesis, bloody stools, flank pain, or any other complaints.      REVIEW OF SYSTEMS:  CONSTITUTIONAL:  Denies fever, chills, weight loss or weakness  EYES:  Denies photophobia or discharge  ENT:  Denies sore throat or ear pain  CARDIOVASCULAR:  Denies chest pain, palpitations or swelling  RESPIRATORY:  Denies cough or shortness of breath  GI/: See HPI  MUSCULOSKELETAL:  Denies back pain  SKIN:  No rash  NEUROLOGIC:  Denies headache, focal weakness or sensory changes  All systems negative except as marked.  \"Remaining review of systems reviewed and negative. I have reviewed the nursing triage documentation and agree unless otherwise noted below.\"    PAST MEDICAL HISTORY:   Past Medical History:   Diagnosis Date    Absent finger     Left hand    Anxiety     Arthritis     Hands    Asthma     \"As a kid but outgrew this\" - no medications as of 2/21/20    Chronic back pain     Depression     Difficult intravenous access 05/26/2024    Unable to place PICC x2 admissions. Patient IS NOT a PICC candidate (2024)    Edema     Fibromyalgia     Headache(784.0)     Last: 2/19/20    Hernia, abdominal     History of difficult venous access 12/21/2021    No longer a candidate for bedside PICC placement, multiple failed attempts    Hx MRSA

## 2025-06-10 NOTE — ED TRIAGE NOTES
Patient [resents from home with c/o abdominal pain, hematuria with urinary urgency. States he passed a clot today.

## 2025-06-12 ENCOUNTER — HOSPITAL ENCOUNTER (OUTPATIENT)
Dept: PHYSICAL THERAPY | Age: 60
Setting detail: THERAPIES SERIES
Discharge: HOME | End: 2025-06-12
Payer: MEDICARE

## 2025-06-12 ENCOUNTER — RESULTS FOLLOW-UP (OUTPATIENT)
Dept: EMERGENCY DEPT | Age: 60
End: 2025-06-12

## 2025-06-12 LAB
MICROORGANISM SPEC CULT: NORMAL
SPECIMEN DESCRIPTION: NORMAL

## 2025-06-12 NOTE — FLOWSHEET NOTE
Physical Therapy  Cancellation/No-show Note  Patient Name:  Toi Briones  :  1965   Date:  2025  Cancelled visits to date: 2  No-shows to date: 2    For today's appointment patient:  []  Cancelled  []  Rescheduled appointment  [x]  No-show     Reason given by patient:  []  Patient ill  [x]  Conflicting appointment  []  No transportation    []  Conflict with work  []  No reason given  []  Other:     Comments:       Electronically signed by:  Kirsten Yee, PT, DPT

## 2025-06-12 NOTE — TELEPHONE ENCOUNTER
Attempting again to reach patient. Left another message and will send letter.     Also attempted to reach PCP office, reached voicemail. Left message advising patient is eligible for 84-ds Mounjaro (if dose not changing/titrating) and if he is to be taking atorvastatin (or any statin), needs an rx to his [facility] #:[phone].    =======================================================   For Pharmacy Admin Tracking Only    Program: [facility]  CPA in place:  No  Gap Closed?: No   Time Spent (min): 20

## 2025-06-19 ENCOUNTER — HOSPITAL ENCOUNTER (OUTPATIENT)
Dept: PHYSICAL THERAPY | Age: 60
Setting detail: THERAPIES SERIES
Discharge: HOME | End: 2025-06-19
Payer: MEDICARE

## 2025-06-19 PROCEDURE — 97016 VASOPNEUMATIC DEVICE THERAPY: CPT

## 2025-06-19 PROCEDURE — 97110 THERAPEUTIC EXERCISES: CPT

## 2025-06-19 NOTE — PROGRESS NOTES
Outpatient Physical Therapy           [place]           [x] Phone: [phone]   Fax: [phone]  [place]           [] Phone: [phone]   Fax: [phone]      To: Abdi Mchugh DO     From: Kirsten Yee, PT, DPT     Patient: Toi Briones                    : 1965  Diagnosis:  S/P rotator cuff repair [Z98.890]        Treatment Diagnosis:      left shoulder weakness and ROM deficits   Date: 2025  [x]  Progress Note                []  Discharge Note    Evaluation Date:  25 Total Visits to date:  4  Cancels/No-shows to date:  5    Subjective:  Pt reports to therapy doing well this date. Pt states that he has been completing his exercises about every other day. Pt states that he feels like he is about 90% recovered since starting therapy. Pt states that he is having an easier time putting his shirt on and taking it off. States that he feels he is having an easier time fishing as well.      Quick Dash: 47%      Plan of Care/Treatment to date:  [x] Therapeutic Exercise    [x] Modalities:  [x] Therapeutic Activity     [] Ultrasound  [x] Electrical Stimulation  [] Gait Training      [] Cervical Traction   [] Lumbar Traction  [x] Neuromuscular Re-education  [x] Cold/hotpack [] Iontophoresis  [x] Instruction in HEP      Other:  [x] Manual Therapy       [x]  Vasopneumatic  [] Aquatic Therapy       []   Dry Needle Therapy                      Objective/Significant Findings At Last Visit/Comments:    R Shoulder AROM:  Flex: 97deg  Abd: 85deg  ER: 55deg    Assessment:   Pt tolerated treatment well this date. PT conducted progress note due to pt restarting therapy this date. Pt demonstrates moderate ROM impairments of R shoulder noted above. PT introduced cane stretching while standing and completed vaso with TENS and pt responded well with decreased feeling of pain. Pt would continue to benefit from skilled therapy interventions in order to improve impairments noted and reduced risk of

## 2025-06-19 NOTE — FLOWSHEET NOTE
Outpatient Physical Therapy  [place]           [x] Phone: [phone]   Fax: [phone]  [place]           [] Phone: [phone]   Fax: [phone]        Physical Therapy Daily Treatment Note  Date:  2025    Patient Name:  Toi Briones    :  1965  MRN: [id number]  Restrictions/Precautions: No data recorded      Diagnosis:   S/P rotator cuff repair [Z98.890]    Date of Injury/Surgery:   Treatment Diagnosis:   left shoulder weakness and ROM deficits   Insurance/Certification information:    Referring Physician:  Abdi Mchugh DO     PCP: Emmett Alexandra MD  Next Doctor Visit:    Plan of care signed (Y/N):  sent day of eval   Outcome Measure: quickdash 47%  Visit# / total visits: 4 /10  bomn   Pain level: 7/10   Goals:     Patient goals: to manage the pain better and move arm better     Long Term Goals  Time Frame for Long Term Goals: 6 weeks  Patient will demonstrate independence with HEP --MET   Patient will improve QuickDash from 93% to <50% to demonstrate improved tolerance to functional activities. --MET   Patient will improve shoulder flexion AROM to >120deg to allow him to reach into kitchen cabinets -- NOT MET   Patient will improve global LUE strength to >4/5 to allow him to  participate in his hobbies of fishing.-- NOT MET   Patient will improve shoulder ER to >60deg to allow him the motion required for proper mechanics during overhead movement. --NOT MET       Summary of Evaluation:  Assessment: Patient is a 59 year old male who presents to physical therapy status post right shoulder arthroscopic rotator cuff repair including the supraspinatus and ; with biceps tenodesis on 25. He is now 9 weeks out of surgery. He does relate one fall a couple weeks ago, but notes he did not land on his surgical UE. Upon assessment, he demonstrates limited right upper extremity strength and ROM, as expected secondary to the nature of his procedures.

## 2025-06-20 ENCOUNTER — TELEPHONE (OUTPATIENT)
Dept: ORTHOPEDIC SURGERY | Age: 60
End: 2025-06-20

## 2025-06-20 NOTE — TELEPHONE ENCOUNTER
----- Message from Dr. Abdi Mchugh DO sent at 6/20/2025  7:22 AM EDT -----  We can do Rx strength Ibuprofen or something like Mobic.  ----- Message -----  From: Linda Hassan ATC  Sent: 6/19/2025   3:44 PM EDT  To: Abdi Mchugh, DO    I saw this in physical therapy today. I scheduled him for a follow up appointment. He is requesting more pain medication. I advised him that is he is more than 3 months post so you can not give him opioids. He is getting back into therapy and states that he is having elevated pain. Please advise if there is anything you can send him.

## 2025-06-20 NOTE — TELEPHONE ENCOUNTER
LMOVM letting pt know that we can not provide opioids but Dr. Mchugh can prescribe prescription strength ibuprofen or Mobic. If her would like either of those medications, he can call the office to let us know.

## 2025-06-25 DIAGNOSIS — Z98.890 S/P ROTATOR CUFF REPAIR: Primary | ICD-10-CM

## 2025-06-25 RX ORDER — MELOXICAM 15 MG/1
15 TABLET ORAL DAILY PRN
Qty: 30 TABLET | Refills: 2 | Status: SHIPPED | OUTPATIENT
Start: 2025-06-25

## 2025-07-09 ENCOUNTER — OFFICE VISIT (OUTPATIENT)
Dept: ORTHOPEDIC SURGERY | Age: 60
End: 2025-07-09
Payer: MEDICARE

## 2025-07-09 VITALS — HEART RATE: 92 BPM | WEIGHT: 232 LBS | OXYGEN SATURATION: 98 % | BODY MASS INDEX: 31.91 KG/M2

## 2025-07-09 DIAGNOSIS — M54.12 CERVICAL RADICULOPATHY: Primary | ICD-10-CM

## 2025-07-09 DIAGNOSIS — Z98.890 S/P ROTATOR CUFF REPAIR: ICD-10-CM

## 2025-07-09 DIAGNOSIS — M75.81 RIGHT ROTATOR CUFF TENDINITIS: ICD-10-CM

## 2025-07-09 PROCEDURE — 99213 OFFICE O/P EST LOW 20 MIN: CPT | Performed by: STUDENT IN AN ORGANIZED HEALTH CARE EDUCATION/TRAINING PROGRAM

## 2025-07-09 RX ORDER — CYCLOBENZAPRINE HCL 5 MG
5 TABLET ORAL 2 TIMES DAILY PRN
Qty: 30 TABLET | Refills: 0 | Status: SHIPPED | OUTPATIENT
Start: 2025-07-09 | End: 2025-07-19

## 2025-07-09 NOTE — PROGRESS NOTES
Date of surgery: 2-     Procedure:  1.  Right shoulder arthroscopic rotator cuff repair -small size single row repair of the supraspinatus with additional repair of the subscapularis (complex, -22 modifier)  2. Shoulder biceps tenodesis - arthroscopic  3. Shoulder arthroscopic subacromial decompression, bursectomy   4. Shoulder arthroscopic extensive debridement (anterior, posterior glenohumeral joint, subacromial space) (CPT 54725)  5. Shoulder arthroscopic labral debridement (CPT 07233)  6. Shoulder arthroscopic lysis of adhesions (CPT 89635)  7.  Shoulder arthroscopic distal clavicle excision      History:  Toi is here in follow up regarding their 5-month follow-up appointment for above procedure    Patient is doing okay. They have continued 6/10 pain.  Although we discussed restarting therapy and a new order was placed at his last appointment, he no-showed once again.  He states he does do some of the exercise on his own at home.  He is currently seeing Dr. Beasley and they are discussing additional finger amputations.  He continues with numbness and tingling into the arms.  They deny chest pain, SOB, calf pain,fever,wound drainage.  No other issues.  Patient denies any constitutional symptoms.    Patient states they have been compliant with restrictions.    No new injuries or complaints     Physical:   Patient demonstrates appropriate mood and affect.     Right upper extremity exam:   The incisions are well-healed and are clean, dry, intact, and nontender with no erythema. They have no edema, the Arm compartments are soft . There are No cords or calf tenderness.  No significant calf/ankle edema. They are neurovascularly intact distally.     Range of motion of the right shoulder demonstrates inability to touch the top of the head.  Overall minimally improved from last appointment    No areas of tenderness to palpation    Negative Shola's    Imaging:   No new orthopedic imaging     Impression: Status post

## 2025-07-09 NOTE — PROGRESS NOTES
5 month follow up on right rotator cuff repair DOS 2/18/25.     Still struggling with neuropathic pain. A1C continues to improve. Would like muscle relaxer. Has been seeing [facility] for hand. EMG incomplete since they are booked out for a few months. Would like referral to pain management.

## 2025-07-14 ENCOUNTER — OFFICE VISIT (OUTPATIENT)
Dept: ORTHOPEDIC SURGERY | Age: 60
End: 2025-07-14
Payer: MEDICARE

## 2025-07-14 VITALS — BODY MASS INDEX: 31.42 KG/M2 | HEART RATE: 76 BPM | RESPIRATION RATE: 14 BRPM | WEIGHT: 232 LBS | HEIGHT: 72 IN

## 2025-07-14 DIAGNOSIS — M75.82 ROTATOR CUFF TENDINITIS, LEFT: Primary | ICD-10-CM

## 2025-07-14 PROCEDURE — 99213 OFFICE O/P EST LOW 20 MIN: CPT | Performed by: PHYSICIAN ASSISTANT

## 2025-07-14 PROCEDURE — 20610 DRAIN/INJ JOINT/BURSA W/O US: CPT | Performed by: PHYSICIAN ASSISTANT

## 2025-07-14 RX ORDER — TRIAMCINOLONE ACETONIDE 40 MG/ML
40 INJECTION, SUSPENSION INTRA-ARTICULAR; INTRAMUSCULAR ONCE
Status: COMPLETED | OUTPATIENT
Start: 2025-07-14 | End: 2025-07-14

## 2025-07-14 RX ADMIN — TRIAMCINOLONE ACETONIDE 40 MG: 40 INJECTION, SUSPENSION INTRA-ARTICULAR; INTRAMUSCULAR at 10:43

## 2025-07-14 ASSESSMENT — ENCOUNTER SYMPTOMS
EYES NEGATIVE: 1
RESPIRATORY NEGATIVE: 1
GASTROINTESTINAL NEGATIVE: 1

## 2025-07-14 NOTE — PROGRESS NOTES
bilateral upper extremities     Skin: Intact in bilateral upper extremities with no ulcerations, lesions, rash, erythema.     Vascular: There are no varicosities in bilateral upper  extremities, sensation intact to light touch over bilateral upper extremities.    Musculoskeletal:  Left shoulder exam:  Skin:  Clear with no erythema, there is no significant joint effusion  Deformity:  none  Atrophy:  none  Tenderness:  lateral acromial  Active ROM:   FE:160    IR side: L4           ER side: 50   Ad/Abduction: 160      Strength: FE:5/5     ER:5/5   Neer:  mildly positive  Champagne:  moderately positive      Imaging: X-rays of the left shoulder taken and reviewed in the office today show evidence of prior rotator cuff surgery with metallic anchor within the greater tuberosity.  Reactive changes over the greater tuberosity.  Moderate degenerative change glenohumeral joint and AC joint.    Assessment:    Diagnosis Orders   1. Rotator cuff tendinitis, left  triamcinolone acetonide (KENALOG-40) injection 40 mg    DRAIN/INJECT LARGE JOINT/BURSA            Plan:   I explained to him that at this point even if he had of rotator cuff issue with the left shoulder he likely needs a reverse total shoulder replacement and I do not need to get an MRI of his shoulder at this point.  I would try an injection in the left shoulder as he is still recovering from right shoulder surgery.  Patient Instructions   Continue weight-bearing as tolerated.  Continue range of motion exercises as instructed.  Ice and elevate as needed.  Tylenol or Motrin for pain.  Injection given into the left shoulder.  Call office if no better.    We are committed to providing you the best care possible.  If you receive a survey after visiting one of our offices, please take time to share your experience concerning your physician office visit.  These surveys are confidential and no health information about you is shared.  We are eager to improve for you and we are

## 2025-07-14 NOTE — PATIENT INSTRUCTIONS
Continue weight-bearing as tolerated.  Continue range of motion exercises as instructed.  Ice and elevate as needed.  Tylenol or Motrin for pain.  Injection given into the left shoulder.  Call office if no better.    We are committed to providing you the best care possible.  If you receive a survey after visiting one of our offices, please take time to share your experience concerning your physician office visit.  These surveys are confidential and no health information about you is shared.  We are eager to improve for you and we are counting on your feedback to help make that happen.

## 2025-07-15 ENCOUNTER — HOSPITAL ENCOUNTER (EMERGENCY)
Age: 60
Discharge: HOME | End: 2025-07-15
Payer: MEDICARE

## 2025-07-15 VITALS
SYSTOLIC BLOOD PRESSURE: 114 MMHG | DIASTOLIC BLOOD PRESSURE: 78 MMHG | WEIGHT: 232 LBS | OXYGEN SATURATION: 98 % | HEART RATE: 80 BPM | RESPIRATION RATE: 18 BRPM | BODY MASS INDEX: 31.42 KG/M2 | TEMPERATURE: 97.7 F | HEIGHT: 72 IN

## 2025-07-15 DIAGNOSIS — R31.0 GROSS HEMATURIA: Primary | ICD-10-CM

## 2025-07-15 LAB
ALBUMIN SERPL-MCNC: 4.1 G/DL (ref 3.4–5)
ALBUMIN/GLOB SERPL: 1.5 {RATIO} (ref 1.1–2.2)
ALP SERPL-CCNC: 93 U/L (ref 40–129)
ALT SERPL-CCNC: 7 U/L (ref 10–40)
ANION GAP SERPL CALCULATED.3IONS-SCNC: 10 MMOL/L (ref 9–17)
AST SERPL-CCNC: 13 U/L (ref 15–37)
BASOPHILS # BLD: 0.05 K/UL
BASOPHILS NFR BLD: 1 % (ref 0–1)
BILIRUB SERPL-MCNC: 0.4 MG/DL (ref 0–1)
BILIRUB UR QL STRIP: ABNORMAL
BUN SERPL-MCNC: 11 MG/DL (ref 7–20)
CALCIUM SERPL-MCNC: 9.8 MG/DL (ref 8.3–10.6)
CHARACTER UR: ABNORMAL
CHLORIDE SERPL-SCNC: 104 MMOL/L (ref 99–110)
CLARITY UR: ABNORMAL
CO2 SERPL-SCNC: 26 MMOL/L (ref 21–32)
COLOR UR: ABNORMAL
CREAT SERPL-MCNC: 0.8 MG/DL (ref 0.8–1.3)
EOSINOPHIL # BLD: 0.09 K/UL
EOSINOPHILS RELATIVE PERCENT: 1 % (ref 0–3)
ERYTHROCYTE [DISTWIDTH] IN BLOOD BY AUTOMATED COUNT: 14.6 % (ref 11.7–14.9)
GFR, ESTIMATED: >90 ML/MIN/1.73M2
GLUCOSE SERPL-MCNC: 126 MG/DL (ref 74–99)
GLUCOSE UR STRIP-MCNC: ABNORMAL MG/DL
HCT VFR BLD AUTO: 45.1 % (ref 42–52)
HGB BLD-MCNC: 14.2 G/DL (ref 13.5–18)
HGB UR QL STRIP.AUTO: ABNORMAL
IMM GRANULOCYTES # BLD AUTO: 0.02 K/UL
IMM GRANULOCYTES NFR BLD: 0 %
KETONES UR STRIP-MCNC: ABNORMAL MG/DL
LEUKOCYTE ESTERASE UR QL STRIP: ABNORMAL
LYMPHOCYTES NFR BLD: 1.54 K/UL
LYMPHOCYTES RELATIVE PERCENT: 19 % (ref 24–44)
MCH RBC QN AUTO: 27.6 PG (ref 27–31)
MCHC RBC AUTO-ENTMCNC: 31.5 G/DL (ref 32–36)
MCV RBC AUTO: 87.7 FL (ref 78–100)
MONOCYTES NFR BLD: 0.37 K/UL
MONOCYTES NFR BLD: 5 % (ref 0–5)
MUCOUS THREADS URNS QL MICRO: ABNORMAL
NEUTROPHILS NFR BLD: 74 % (ref 36–66)
NEUTS SEG NFR BLD: 5.88 K/UL
NITRITE UR QL STRIP: ABNORMAL
PH UR STRIP: ABNORMAL [PH] (ref 5–8)
PLATELET # BLD AUTO: 220 K/UL (ref 140–440)
PMV BLD AUTO: 10.4 FL (ref 7.5–11.1)
POTASSIUM SERPL-SCNC: 4.5 MMOL/L (ref 3.5–5.1)
PROT SERPL-MCNC: 6.9 G/DL (ref 6.4–8.2)
PROT UR STRIP-MCNC: ABNORMAL MG/DL
PSA SERPL-MCNC: 1.17 NG/ML (ref 0–4)
RBC # BLD AUTO: 5.14 M/UL (ref 4.6–6.2)
RBC #/AREA URNS HPF: 3960 /HPF (ref 0–2)
SODIUM SERPL-SCNC: 139 MMOL/L (ref 136–145)
SP GR UR STRIP: ABNORMAL (ref 1–1.03)
UROBILINOGEN UR STRIP-ACNC: ABNORMAL EU/DL (ref 0–1)
WBC #/AREA URNS HPF: 29 /HPF (ref 0–5)
WBC OTHER # BLD: 8 K/UL (ref 4–10.5)

## 2025-07-15 PROCEDURE — 80053 COMPREHEN METABOLIC PANEL: CPT

## 2025-07-15 PROCEDURE — 87186 SC STD MICRODIL/AGAR DIL: CPT

## 2025-07-15 PROCEDURE — 87086 URINE CULTURE/COLONY COUNT: CPT

## 2025-07-15 PROCEDURE — 85025 COMPLETE CBC W/AUTO DIFF WBC: CPT

## 2025-07-15 PROCEDURE — 99283 EMERGENCY DEPT VISIT LOW MDM: CPT

## 2025-07-15 PROCEDURE — 6370000000 HC RX 637 (ALT 250 FOR IP): Performed by: PHYSICIAN ASSISTANT

## 2025-07-15 PROCEDURE — 51798 US URINE CAPACITY MEASURE: CPT

## 2025-07-15 PROCEDURE — 81001 URINALYSIS AUTO W/SCOPE: CPT

## 2025-07-15 PROCEDURE — G0103 PSA SCREENING: HCPCS

## 2025-07-15 PROCEDURE — 87077 CULTURE AEROBIC IDENTIFY: CPT

## 2025-07-15 RX ORDER — DOXYCYCLINE HYCLATE 100 MG
100 TABLET ORAL 2 TIMES DAILY
Qty: 28 TABLET | Refills: 0 | Status: ON HOLD | OUTPATIENT
Start: 2025-07-15 | End: 2025-07-22 | Stop reason: HOSPADM

## 2025-07-15 RX ORDER — OXYCODONE HYDROCHLORIDE 5 MG/1
5 TABLET ORAL ONCE
Refills: 0 | Status: COMPLETED | OUTPATIENT
Start: 2025-07-15 | End: 2025-07-15

## 2025-07-15 RX ORDER — DOXYCYCLINE HYCLATE 100 MG
100 TABLET ORAL ONCE
Status: COMPLETED | OUTPATIENT
Start: 2025-07-15 | End: 2025-07-15

## 2025-07-15 RX ORDER — HYDROCODONE BITARTRATE AND ACETAMINOPHEN 5; 325 MG/1; MG/1
1 TABLET ORAL ONCE
Status: COMPLETED | OUTPATIENT
Start: 2025-07-15 | End: 2025-07-15

## 2025-07-15 RX ADMIN — OXYCODONE HYDROCHLORIDE 5 MG: 5 TABLET ORAL at 17:21

## 2025-07-15 RX ADMIN — HYDROCODONE BITARTRATE AND ACETAMINOPHEN 1 TABLET: 5; 325 TABLET ORAL at 12:44

## 2025-07-15 RX ADMIN — DOXYCYCLINE HYCLATE 100 MG: 100 TABLET, COATED ORAL at 18:44

## 2025-07-15 ASSESSMENT — PAIN SCALES - GENERAL
PAINLEVEL_OUTOF10: 8

## 2025-07-15 ASSESSMENT — PAIN DESCRIPTION - LOCATION
LOCATION: ABDOMEN

## 2025-07-15 ASSESSMENT — PAIN DESCRIPTION - ORIENTATION: ORIENTATION: LOWER

## 2025-07-15 ASSESSMENT — PAIN - FUNCTIONAL ASSESSMENT
PAIN_FUNCTIONAL_ASSESSMENT: 0-10
PAIN_FUNCTIONAL_ASSESSMENT: 0-10

## 2025-07-15 ASSESSMENT — PAIN DESCRIPTION - DESCRIPTORS: DESCRIPTORS: ACHING

## 2025-07-15 NOTE — ED PROVIDER NOTES
[facility] EMERGENCY DEPARTMENT  EMERGENCY DEPARTMENT ENCOUNTER        Pt Name: Toi Briones  MRN: [id number]  Birthdate 1965  Date of evaluation: 7/15/2025  Provider: Kevin Merchant PA-C  PCP: Emmett Alexandra MD      CHAR. I have evaluated this patient.        CHIEF COMPLAINT      Chief Complaint   Patient presents with    Hematuria       HISTORY OF PRESENT ILLNESS:     History from : Patient    Limitations to history : None    Toi Briones is a 59 y.o. male who presents with  complains of gross hematuria.  Mentions h/o prostate issues - at times has difficulty urinating, slow stream, or increased frequency.  Today had urinated had noticed gross hematuria, and passing clot.  Has had gross hematuria, but resolved but returned this week.  Is on tamulosin.  Is also taking ibuprofen taking 3 times day that he takes for stomach.  Mentions h/o urology visit few months ago, with cystoscopy.  Denies: abdominal pain, flank pain, nausea, vomitting, fevers, dysuria, lightheadedness,     Nursing Notes were all reviewed and agreed with or any disagreements were addressed in the HPI.    REVIEW OF SYSTEMS :     Review of Systems   All other systems reviewed and are negative.      Pertinent positives and negatives are stated within HPI    PAST HISTORY   has a past medical history of Absent finger, Anxiety, Arthritis, Asthma, Chronic back pain, Depression, Difficult intravenous access, Edema, Fibromyalgia, Headache(784.0), Hernia, abdominal, History of difficult venous access, Hx MRSA infection, Hyperlipidemia, Nausea & vomiting, Neuropathy, Obesity, Class III, BMI 40-49.9 (morbid obesity) (HCC), Osteomyelitis (HCC), Poor circulation, Restless leg syndrome, Shortness of breath on exertion, Type II or unspecified type diabetes mellitus with other specified manifestations, not stated as uncontrolled, Type II or unspecified type diabetes mellitus without mention of complication, not stated as      Escalation of care, including admission/OBS considered : I considered  admission for observation, however they do not shared medical decision making and clinical judgment, appropriate for outpatient management     Records Reviewed : Outpatient Notes    urology visit, April 2025    Social Determinants of Health : None     Chronic conditions affecting care:    has a past medical history of Absent finger, Anxiety, Arthritis, Asthma, Chronic back pain, Depression, Difficult intravenous access (05/26/2024), Edema, Fibromyalgia, Headache(784.0), Hernia, abdominal, History of difficult venous access (12/21/2021), MRSA infection, Hyperlipidemia, Nausea & vomiting, Neuropathy, Obesity, Class III, BMI 40-49.9 (morbid obesity) ([facility]), Osteomyelitis (HCC), Poor circulation, Restless leg syndrome, Shortness of breath on exertion, Type II or unspecified type diabetes mellitus with other specified manifestations, not stated as uncontrolled (04/08/2014), Type II or unspecified type diabetes mellitus without mention of complication, not stated as uncontrolled (DX 2007), and Ulcer of other part of foot (04/08/2014).    I am the Primary Clinician of Record.    Patient was given the following medications:  Medications   HYDROcodone-acetaminophen (NORCO) 5-325 MG per tablet 1 tablet (1 tablet Oral Given 7/15/25 1244)   oxyCODONE (ROXICODONE) immediate release tablet 5 mg (5 mg Oral Given 7/15/25 1721)   doxycycline hyclate (VIBRA-TABS) tablet 100 mg (100 mg Oral Given 7/15/25 1844)         Is this patient to be included in the SEP-1 Core Measure due to severe sepsis or septic shock?   No   Exclusion criteria - the patient is NOT to be included for SEP-1 Core Measure due to:  2+ SIRS criteria are not met    CRITICAL CARE TIME         PROCEDURES   Unless otherwise noted  none     Procedures      FINAL IMPRESSION      1. Gross hematuria          DISPOSITION/PLAN     DISPOSITION Decision To Discharge 07/15/2025 06:49:32 PM

## 2025-07-15 NOTE — DISCHARGE INSTRUCTIONS
Please call your urologist office to discuss your visit to emergency department and schedule appointment for reevaluation and to discuss the results of your blood work (PSA)    Return with any abdominal pain, uncontrollable vomiting, passing out, shortness of breath, flank pain, worsening symptoms or any new concerns.

## 2025-07-17 LAB
MICROORGANISM SPEC CULT: ABNORMAL
SPECIMEN DESCRIPTION: ABNORMAL

## 2025-07-20 ENCOUNTER — HOSPITAL ENCOUNTER (INPATIENT)
Age: 60
LOS: 2 days | Discharge: HOME | DRG: 696 | End: 2025-07-22
Attending: EMERGENCY MEDICINE | Admitting: STUDENT IN AN ORGANIZED HEALTH CARE EDUCATION/TRAINING PROGRAM
Payer: MEDICARE

## 2025-07-20 ENCOUNTER — APPOINTMENT (OUTPATIENT)
Dept: GENERAL RADIOLOGY | Age: 60
DRG: 696 | End: 2025-07-20
Payer: MEDICARE

## 2025-07-20 DIAGNOSIS — N48.89 PENILE PAIN: ICD-10-CM

## 2025-07-20 DIAGNOSIS — R31.9 HEMATURIA, UNSPECIFIED TYPE: ICD-10-CM

## 2025-07-20 DIAGNOSIS — N39.0 ACUTE UTI: Primary | ICD-10-CM

## 2025-07-20 LAB
ALBUMIN SERPL-MCNC: 4.3 G/DL (ref 3.4–5)
ALBUMIN/GLOB SERPL: 1.6 {RATIO} (ref 1.1–2.2)
ALP SERPL-CCNC: 95 U/L (ref 40–129)
ALT SERPL-CCNC: <5 U/L (ref 10–40)
ANION GAP SERPL CALCULATED.3IONS-SCNC: 11 MMOL/L (ref 9–17)
AST SERPL-CCNC: 14 U/L (ref 15–37)
BASOPHILS # BLD: 0.03 K/UL
BASOPHILS NFR BLD: 1 % (ref 0–1)
BILIRUB SERPL-MCNC: 0.4 MG/DL (ref 0–1)
BUN SERPL-MCNC: 15 MG/DL (ref 7–20)
CALCIUM SERPL-MCNC: 9.9 MG/DL (ref 8.3–10.6)
CHLORIDE SERPL-SCNC: 103 MMOL/L (ref 99–110)
CO2 SERPL-SCNC: 23 MMOL/L (ref 21–32)
CREAT SERPL-MCNC: 0.8 MG/DL (ref 0.8–1.3)
EOSINOPHIL # BLD: 0.14 K/UL
EOSINOPHILS RELATIVE PERCENT: 2 % (ref 0–3)
ERYTHROCYTE [DISTWIDTH] IN BLOOD BY AUTOMATED COUNT: 14.6 % (ref 11.7–14.9)
GFR, ESTIMATED: >90 ML/MIN/1.73M2
GLUCOSE BLD-MCNC: 137 MG/DL (ref 74–99)
GLUCOSE SERPL-MCNC: 117 MG/DL (ref 74–99)
HCT VFR BLD AUTO: 41.3 % (ref 42–52)
HGB BLD-MCNC: 13.1 G/DL (ref 13.5–18)
IMM GRANULOCYTES # BLD AUTO: 0.01 K/UL
IMM GRANULOCYTES NFR BLD: 0 %
LACTATE BLDV-SCNC: 1.1 MMOL/L (ref 0.4–2)
LYMPHOCYTES NFR BLD: 1.68 K/UL
LYMPHOCYTES RELATIVE PERCENT: 29 % (ref 24–44)
MCH RBC QN AUTO: 27.6 PG (ref 27–31)
MCHC RBC AUTO-ENTMCNC: 31.7 G/DL (ref 32–36)
MCV RBC AUTO: 86.9 FL (ref 78–100)
MONOCYTES NFR BLD: 0.39 K/UL
MONOCYTES NFR BLD: 7 % (ref 0–5)
NEUTROPHILS NFR BLD: 62 % (ref 36–66)
NEUTS SEG NFR BLD: 3.61 K/UL
PLATELET # BLD AUTO: 216 K/UL (ref 140–440)
PMV BLD AUTO: 10 FL (ref 7.5–11.1)
POTASSIUM SERPL-SCNC: 4.2 MMOL/L (ref 3.5–5.1)
PROCALCITONIN SERPL-MCNC: 0.17 NG/ML
PROT SERPL-MCNC: 7 G/DL (ref 6.4–8.2)
RBC # BLD AUTO: 4.75 M/UL (ref 4.6–6.2)
SODIUM SERPL-SCNC: 137 MMOL/L (ref 136–145)
WBC OTHER # BLD: 5.9 K/UL (ref 4–10.5)

## 2025-07-20 PROCEDURE — 36415 COLL VENOUS BLD VENIPUNCTURE: CPT

## 2025-07-20 PROCEDURE — 82962 GLUCOSE BLOOD TEST: CPT

## 2025-07-20 PROCEDURE — 87086 URINE CULTURE/COLONY COUNT: CPT

## 2025-07-20 PROCEDURE — 6370000000 HC RX 637 (ALT 250 FOR IP)

## 2025-07-20 PROCEDURE — 85025 COMPLETE CBC W/AUTO DIFF WBC: CPT

## 2025-07-20 PROCEDURE — 84145 PROCALCITONIN (PCT): CPT

## 2025-07-20 PROCEDURE — 80053 COMPREHEN METABOLIC PANEL: CPT

## 2025-07-20 PROCEDURE — 83605 ASSAY OF LACTIC ACID: CPT

## 2025-07-20 PROCEDURE — 99285 EMERGENCY DEPT VISIT HI MDM: CPT

## 2025-07-20 PROCEDURE — 2500000003 HC RX 250 WO HCPCS: Performed by: STUDENT IN AN ORGANIZED HEALTH CARE EDUCATION/TRAINING PROGRAM

## 2025-07-20 PROCEDURE — 94761 N-INVAS EAR/PLS OXIMETRY MLT: CPT

## 2025-07-20 PROCEDURE — 71045 X-RAY EXAM CHEST 1 VIEW: CPT

## 2025-07-20 PROCEDURE — 1200000000 HC SEMI PRIVATE

## 2025-07-20 PROCEDURE — 87040 BLOOD CULTURE FOR BACTERIA: CPT

## 2025-07-20 PROCEDURE — 81001 URINALYSIS AUTO W/SCOPE: CPT

## 2025-07-20 PROCEDURE — 76937 US GUIDE VASCULAR ACCESS: CPT

## 2025-07-20 PROCEDURE — 6370000000 HC RX 637 (ALT 250 FOR IP): Performed by: STUDENT IN AN ORGANIZED HEALTH CARE EDUCATION/TRAINING PROGRAM

## 2025-07-20 PROCEDURE — 6360000002 HC RX W HCPCS: Performed by: EMERGENCY MEDICINE

## 2025-07-20 PROCEDURE — 2580000003 HC RX 258: Performed by: EMERGENCY MEDICINE

## 2025-07-20 RX ORDER — INSULIN GLARGINE 100 [IU]/ML
7 INJECTION, SOLUTION SUBCUTANEOUS NIGHTLY
Status: DISCONTINUED | OUTPATIENT
Start: 2025-07-20 | End: 2025-07-22 | Stop reason: HOSPADM

## 2025-07-20 RX ORDER — PANTOPRAZOLE SODIUM 40 MG/1
40 TABLET, DELAYED RELEASE ORAL 2 TIMES DAILY
Status: DISCONTINUED | OUTPATIENT
Start: 2025-07-20 | End: 2025-07-22 | Stop reason: HOSPADM

## 2025-07-20 RX ORDER — TAMSULOSIN HYDROCHLORIDE 0.4 MG/1
0.4 CAPSULE ORAL DAILY
Status: DISCONTINUED | OUTPATIENT
Start: 2025-07-20 | End: 2025-07-22 | Stop reason: HOSPADM

## 2025-07-20 RX ORDER — 0.9 % SODIUM CHLORIDE 0.9 %
30 INTRAVENOUS SOLUTION INTRAVENOUS ONCE
Status: COMPLETED | OUTPATIENT
Start: 2025-07-20 | End: 2025-07-20

## 2025-07-20 RX ORDER — GLUCAGON 1 MG/ML
1 KIT INJECTION PRN
Status: DISCONTINUED | OUTPATIENT
Start: 2025-07-20 | End: 2025-07-22 | Stop reason: HOSPADM

## 2025-07-20 RX ORDER — ATORVASTATIN CALCIUM 10 MG/1
10 TABLET, FILM COATED ORAL DAILY
Status: DISCONTINUED | OUTPATIENT
Start: 2025-07-21 | End: 2025-07-22 | Stop reason: HOSPADM

## 2025-07-20 RX ORDER — POLYETHYLENE GLYCOL 3350 17 G/17G
17 POWDER, FOR SOLUTION ORAL DAILY PRN
Status: DISCONTINUED | OUTPATIENT
Start: 2025-07-20 | End: 2025-07-22 | Stop reason: HOSPADM

## 2025-07-20 RX ORDER — SODIUM CHLORIDE 9 MG/ML
INJECTION, SOLUTION INTRAVENOUS PRN
Status: DISCONTINUED | OUTPATIENT
Start: 2025-07-20 | End: 2025-07-22 | Stop reason: HOSPADM

## 2025-07-20 RX ORDER — SODIUM CHLORIDE, SODIUM LACTATE, POTASSIUM CHLORIDE, CALCIUM CHLORIDE 600; 310; 30; 20 MG/100ML; MG/100ML; MG/100ML; MG/100ML
INJECTION, SOLUTION INTRAVENOUS CONTINUOUS
Status: DISCONTINUED | OUTPATIENT
Start: 2025-07-20 | End: 2025-07-22 | Stop reason: HOSPADM

## 2025-07-20 RX ORDER — ONDANSETRON 2 MG/ML
4 INJECTION INTRAMUSCULAR; INTRAVENOUS EVERY 6 HOURS PRN
Status: DISCONTINUED | OUTPATIENT
Start: 2025-07-20 | End: 2025-07-22 | Stop reason: HOSPADM

## 2025-07-20 RX ORDER — POTASSIUM CHLORIDE 1500 MG/1
40 TABLET, EXTENDED RELEASE ORAL PRN
Status: DISCONTINUED | OUTPATIENT
Start: 2025-07-20 | End: 2025-07-22 | Stop reason: HOSPADM

## 2025-07-20 RX ORDER — OXYCODONE AND ACETAMINOPHEN 5; 325 MG/1; MG/1
1 TABLET ORAL ONCE
Refills: 0 | Status: COMPLETED | OUTPATIENT
Start: 2025-07-20 | End: 2025-07-20

## 2025-07-20 RX ORDER — INSULIN LISPRO 100 [IU]/ML
0-4 INJECTION, SOLUTION INTRAVENOUS; SUBCUTANEOUS
Status: DISCONTINUED | OUTPATIENT
Start: 2025-07-20 | End: 2025-07-22 | Stop reason: HOSPADM

## 2025-07-20 RX ORDER — SODIUM CHLORIDE 0.9 % (FLUSH) 0.9 %
5-40 SYRINGE (ML) INJECTION PRN
Status: DISCONTINUED | OUTPATIENT
Start: 2025-07-20 | End: 2025-07-22 | Stop reason: HOSPADM

## 2025-07-20 RX ORDER — ACETAMINOPHEN 325 MG/1
650 TABLET ORAL EVERY 6 HOURS PRN
Status: DISCONTINUED | OUTPATIENT
Start: 2025-07-20 | End: 2025-07-22 | Stop reason: HOSPADM

## 2025-07-20 RX ORDER — POTASSIUM CHLORIDE 7.45 MG/ML
10 INJECTION INTRAVENOUS PRN
Status: DISCONTINUED | OUTPATIENT
Start: 2025-07-20 | End: 2025-07-22 | Stop reason: HOSPADM

## 2025-07-20 RX ORDER — SODIUM CHLORIDE 0.9 % (FLUSH) 0.9 %
5-40 SYRINGE (ML) INJECTION EVERY 12 HOURS SCHEDULED
Status: DISCONTINUED | OUTPATIENT
Start: 2025-07-20 | End: 2025-07-22 | Stop reason: HOSPADM

## 2025-07-20 RX ORDER — ACETAMINOPHEN 650 MG/1
650 SUPPOSITORY RECTAL EVERY 6 HOURS PRN
Status: DISCONTINUED | OUTPATIENT
Start: 2025-07-20 | End: 2025-07-22 | Stop reason: HOSPADM

## 2025-07-20 RX ORDER — DEXTROSE MONOHYDRATE 100 MG/ML
INJECTION, SOLUTION INTRAVENOUS CONTINUOUS PRN
Status: DISCONTINUED | OUTPATIENT
Start: 2025-07-20 | End: 2025-07-22 | Stop reason: HOSPADM

## 2025-07-20 RX ORDER — ONDANSETRON 4 MG/1
4 TABLET, ORALLY DISINTEGRATING ORAL EVERY 8 HOURS PRN
Status: DISCONTINUED | OUTPATIENT
Start: 2025-07-20 | End: 2025-07-22 | Stop reason: HOSPADM

## 2025-07-20 RX ORDER — MAGNESIUM SULFATE IN WATER 40 MG/ML
2000 INJECTION, SOLUTION INTRAVENOUS PRN
Status: DISCONTINUED | OUTPATIENT
Start: 2025-07-20 | End: 2025-07-22 | Stop reason: HOSPADM

## 2025-07-20 RX ORDER — PREGABALIN 75 MG/1
75 CAPSULE ORAL 2 TIMES DAILY
Status: DISCONTINUED | OUTPATIENT
Start: 2025-07-20 | End: 2025-07-22 | Stop reason: HOSPADM

## 2025-07-20 RX ADMIN — INSULIN GLARGINE 7 UNITS: 100 INJECTION, SOLUTION SUBCUTANEOUS at 21:36

## 2025-07-20 RX ADMIN — PREGABALIN 75 MG: 75 CAPSULE ORAL at 20:26

## 2025-07-20 RX ADMIN — SODIUM CHLORIDE, PRESERVATIVE FREE 10 ML: 5 INJECTION INTRAVENOUS at 21:36

## 2025-07-20 RX ADMIN — OXYCODONE AND ACETAMINOPHEN 1 TABLET: 5; 325 TABLET ORAL at 20:25

## 2025-07-20 RX ADMIN — PANTOPRAZOLE SODIUM 40 MG: 40 TABLET, DELAYED RELEASE ORAL at 20:26

## 2025-07-20 RX ADMIN — VANCOMYCIN HYDROCHLORIDE 2500 MG: 5 INJECTION, POWDER, LYOPHILIZED, FOR SOLUTION INTRAVENOUS at 18:13

## 2025-07-20 RX ADMIN — SODIUM CHLORIDE 2259 ML: 9 INJECTION, SOLUTION INTRAVENOUS at 18:04

## 2025-07-20 ASSESSMENT — PAIN DESCRIPTION - ORIENTATION
ORIENTATION: MID
ORIENTATION: LOWER

## 2025-07-20 ASSESSMENT — PAIN SCALES - GENERAL
PAINLEVEL_OUTOF10: 10
PAINLEVEL_OUTOF10: 8
PAINLEVEL_OUTOF10: 7

## 2025-07-20 ASSESSMENT — PAIN - FUNCTIONAL ASSESSMENT: PAIN_FUNCTIONAL_ASSESSMENT: ACTIVITIES ARE NOT PREVENTED

## 2025-07-20 ASSESSMENT — PAIN DESCRIPTION - LOCATION
LOCATION: GROIN
LOCATION: ABDOMEN

## 2025-07-20 ASSESSMENT — PAIN DESCRIPTION - DESCRIPTORS
DESCRIPTORS: STABBING;BURNING
DESCRIPTORS: SHARP

## 2025-07-20 NOTE — ED NOTES
Peripheral IV placed in the left ac using an 18 gauge catheter.   Site flushed with normal saline, and no complications noted. Saline locked.   IV Dressing clean, dry, and intact.

## 2025-07-20 NOTE — ED NOTES
ED TO INPATIENT SBAR HANDOFF    Patient Name: Toi Briones   :  1965  59 y.o.   Preferred Name  Toi  Family/Caregiver Present no   Restraints no   C-SSRS: Risk of Suicide: No Risk  Sitter no   Sepsis Risk Score Sepsis V2 Risk Score: 28.1    PLEASE NOTE--Encounter / Re-Admission Within 30 Days  This patient has had another encounter or admission within the last 30 days.      Readmission Risk Score: 22.2      Situation  Chief Complaint   Patient presents with    Urinary Tract Infection     Brief Description of Patient's Condition:     Patient presents to the ED via walk in from home   Chief complaint: UTI   Initial vital signs:       Vitals:     25 1716   BP: 109/70   Pulse: 88   Resp: 18   Temp: 99.1 °F (37.3 °C)   SpO2: 98%      Patient reports receiving a phone call from lab due to urinary tract infection and to come to ED to be admitted to hospital for IV antibiotics.      Mental Status: oriented, alert, coherent, logical, thought processes intact, and able to concentrate and follow conversation  Arrived from: home    Imaging:   XR CHEST PORTABLE    (Results Pending)     Abnormal labs:   Abnormal Labs Reviewed   CBC WITH AUTO DIFFERENTIAL - Abnormal; Notable for the following components:       Result Value    Hemoglobin 13.1 (*)     Hematocrit 41.3 (*)     MCHC 31.7 (*)     Monocytes % 7 (*)     All other components within normal limits        Background  History:   Past Medical History:   Diagnosis Date    Absent finger     Left hand    Anxiety     Arthritis     Hands    Asthma     \"As a kid but outgrew this\" - no medications as of 20    Chronic back pain     Depression     Difficult intravenous access 2024    Unable to place PICC x2 admissions. Patient IS NOT a PICC candidate ()    Edema     Fibromyalgia     Headache(784.0)     Last: 20    Hernia, abdominal     History of difficult venous access 2021    No longer a candidate for bedside PICC placement, multiple failed

## 2025-07-20 NOTE — ED NOTES
Patient presents to the ED via walk in from home   Chief complaint: UTI   Initial vital signs:   Vitals:    07/20/25 1716   BP: 109/70   Pulse: 88   Resp: 18   Temp: 99.1 °F (37.3 °C)   SpO2: 98%      Patient reports receiving a phone call from lab due to urinary tract infection and to come to ED to be admitted to hospital for IV antibiotics.

## 2025-07-20 NOTE — ED PROVIDER NOTES
[facility]    Emergency Department Encounter      Patient: Toi Briones  MRN: [id number]  : 1965  Date of Evaluation: 2025  ED Provider:  Jovanna Pan DO    Triage Chief Complaint:   Urinary Tract Infection    Nikolai:  Toi Briones is a 59 y.o. male who  has a past medical history of Absent finger, Anxiety, Arthritis, Asthma, Chronic back pain, Depression, Difficult intravenous access, Edema, Fibromyalgia, Headache(784.0), Hernia, abdominal, History of difficult venous access, Hx MRSA infection, Hyperlipidemia, Nausea & vomiting, Neuropathy, Obesity, Class III, BMI 40-49.9 (morbid obesity) (HCC), Osteomyelitis (HCC), Poor circulation, Restless leg syndrome, Shortness of breath on exertion, Type II or unspecified type diabetes mellitus with other specified manifestations, not stated as uncontrolled, Type II or unspecified type diabetes mellitus without mention of complication, not stated as uncontrolled, and Ulcer of other part of foot. and presents with   59 y.o. M with hematuria who had a urine culture + for     STAPHYLOCOCCUS HAEMOLYTICUS 50,000 CFU/ml This isolate is presumed to be Clindamycin resistant based on detection of inducible Clindamycin resistance. Clindamycin may still be effective in some patients.     Sensitive to vancomycin and linezolid.  Patient has significant dysuria and had to pull two blood clots from his penis today.      ROS - see HPI, below listed is current ROS at time of my eval:   systems reviewed and negative except as above.     Past Medical History:   Diagnosis Date    Absent finger     Left hand    Anxiety     Arthritis     Hands    Asthma     \"As a kid but outgrew this\" - no medications as of 20    Chronic back pain     Depression     Difficult intravenous access 2024    Unable to place PICC x2 admissions. Patient IS NOT a PICC candidate ()    Edema     Fibromyalgia     Headache(784.0)     Last: 20    Hernia, abdominal     History

## 2025-07-21 LAB
BILIRUB UR QL STRIP: NEGATIVE
CLARITY UR: CLEAR
COLOR UR: YELLOW
EPI CELLS #/AREA URNS HPF: <1 /HPF
EST. AVERAGE GLUCOSE BLD GHB EST-MCNC: 149 MG/DL
GLUCOSE BLD-MCNC: 103 MG/DL (ref 74–99)
GLUCOSE BLD-MCNC: 108 MG/DL (ref 74–99)
GLUCOSE BLD-MCNC: 114 MG/DL (ref 74–99)
GLUCOSE BLD-MCNC: 115 MG/DL (ref 74–99)
GLUCOSE BLD-MCNC: 131 MG/DL (ref 74–99)
GLUCOSE BLD-MCNC: 153 MG/DL (ref 74–99)
GLUCOSE UR STRIP-MCNC: NEGATIVE MG/DL
HBA1C MFR BLD: 6.8 % (ref 4.2–6.3)
HGB UR QL STRIP.AUTO: ABNORMAL
KETONES UR STRIP-MCNC: NEGATIVE MG/DL
LACTATE BLDV-SCNC: 1.6 MMOL/L (ref 0.4–2)
LEUKOCYTE ESTERASE UR QL STRIP: ABNORMAL
MUCOUS THREADS URNS QL MICRO: ABNORMAL
NITRITE UR QL STRIP: NEGATIVE
PH UR STRIP: 6 [PH] (ref 5–8)
PROT UR STRIP-MCNC: NEGATIVE MG/DL
RBC #/AREA URNS HPF: 2 /HPF (ref 0–2)
SP GR UR STRIP: 1.01 (ref 1–1.03)
UROBILINOGEN UR STRIP-ACNC: 0.2 EU/DL (ref 0–1)
WBC #/AREA URNS HPF: 4 /HPF (ref 0–5)

## 2025-07-21 PROCEDURE — 93005 ELECTROCARDIOGRAM TRACING: CPT

## 2025-07-21 PROCEDURE — 6370000000 HC RX 637 (ALT 250 FOR IP)

## 2025-07-21 PROCEDURE — 94761 N-INVAS EAR/PLS OXIMETRY MLT: CPT

## 2025-07-21 PROCEDURE — 1200000000 HC SEMI PRIVATE

## 2025-07-21 PROCEDURE — 83605 ASSAY OF LACTIC ACID: CPT

## 2025-07-21 PROCEDURE — 2500000003 HC RX 250 WO HCPCS: Performed by: STUDENT IN AN ORGANIZED HEALTH CARE EDUCATION/TRAINING PROGRAM

## 2025-07-21 PROCEDURE — 6370000000 HC RX 637 (ALT 250 FOR IP): Performed by: STUDENT IN AN ORGANIZED HEALTH CARE EDUCATION/TRAINING PROGRAM

## 2025-07-21 PROCEDURE — 83036 HEMOGLOBIN GLYCOSYLATED A1C: CPT

## 2025-07-21 PROCEDURE — 36415 COLL VENOUS BLD VENIPUNCTURE: CPT

## 2025-07-21 PROCEDURE — 76937 US GUIDE VASCULAR ACCESS: CPT

## 2025-07-21 PROCEDURE — 6370000000 HC RX 637 (ALT 250 FOR IP): Performed by: PHYSICIAN ASSISTANT

## 2025-07-21 PROCEDURE — 6360000002 HC RX W HCPCS: Performed by: STUDENT IN AN ORGANIZED HEALTH CARE EDUCATION/TRAINING PROGRAM

## 2025-07-21 PROCEDURE — 2580000003 HC RX 258: Performed by: STUDENT IN AN ORGANIZED HEALTH CARE EDUCATION/TRAINING PROGRAM

## 2025-07-21 PROCEDURE — 82962 GLUCOSE BLOOD TEST: CPT

## 2025-07-21 RX ORDER — OXYCODONE AND ACETAMINOPHEN 5; 325 MG/1; MG/1
2 TABLET ORAL EVERY 6 HOURS PRN
Refills: 0 | Status: DISCONTINUED | OUTPATIENT
Start: 2025-07-21 | End: 2025-07-22 | Stop reason: HOSPADM

## 2025-07-21 RX ORDER — FINASTERIDE 5 MG/1
5 TABLET, FILM COATED ORAL DAILY
Status: DISCONTINUED | OUTPATIENT
Start: 2025-07-21 | End: 2025-07-22 | Stop reason: HOSPADM

## 2025-07-21 RX ORDER — OXYCODONE AND ACETAMINOPHEN 5; 325 MG/1; MG/1
1 TABLET ORAL ONCE
Refills: 0 | Status: COMPLETED | OUTPATIENT
Start: 2025-07-21 | End: 2025-07-21

## 2025-07-21 RX ORDER — OXYCODONE HYDROCHLORIDE 5 MG/1
5 TABLET ORAL ONCE
Status: COMPLETED | OUTPATIENT
Start: 2025-07-21 | End: 2025-07-21

## 2025-07-21 RX ADMIN — OXYCODONE AND ACETAMINOPHEN 2 TABLET: 5; 325 TABLET ORAL at 12:22

## 2025-07-21 RX ADMIN — INSULIN GLARGINE 7 UNITS: 100 INJECTION, SOLUTION SUBCUTANEOUS at 22:39

## 2025-07-21 RX ADMIN — MEROPENEM 1000 MG: 1 INJECTION, POWDER, FOR SOLUTION INTRAVENOUS at 00:48

## 2025-07-21 RX ADMIN — OXYCODONE AND ACETAMINOPHEN 2 TABLET: 5; 325 TABLET ORAL at 18:28

## 2025-07-21 RX ADMIN — SODIUM CHLORIDE, PRESERVATIVE FREE 10 ML: 5 INJECTION INTRAVENOUS at 22:47

## 2025-07-21 RX ADMIN — PREGABALIN 75 MG: 75 CAPSULE ORAL at 22:41

## 2025-07-21 RX ADMIN — SODIUM CHLORIDE, SODIUM LACTATE, POTASSIUM CHLORIDE, AND CALCIUM CHLORIDE: .6; .31; .03; .02 INJECTION, SOLUTION INTRAVENOUS at 16:46

## 2025-07-21 RX ADMIN — TAMSULOSIN HYDROCHLORIDE 0.4 MG: 0.4 CAPSULE ORAL at 09:21

## 2025-07-21 RX ADMIN — SODIUM CHLORIDE, SODIUM LACTATE, POTASSIUM CHLORIDE, AND CALCIUM CHLORIDE: .6; .31; .03; .02 INJECTION, SOLUTION INTRAVENOUS at 00:43

## 2025-07-21 RX ADMIN — PANTOPRAZOLE SODIUM 40 MG: 40 TABLET, DELAYED RELEASE ORAL at 22:41

## 2025-07-21 RX ADMIN — FINASTERIDE 5 MG: 5 TABLET, FILM COATED ORAL at 11:13

## 2025-07-21 RX ADMIN — SODIUM CHLORIDE, PRESERVATIVE FREE 10 ML: 5 INJECTION INTRAVENOUS at 08:49

## 2025-07-21 RX ADMIN — PANTOPRAZOLE SODIUM 40 MG: 40 TABLET, DELAYED RELEASE ORAL at 09:21

## 2025-07-21 RX ADMIN — MEROPENEM 1000 MG: 1 INJECTION, POWDER, FOR SOLUTION INTRAVENOUS at 22:45

## 2025-07-21 RX ADMIN — MEROPENEM 1000 MG: 1 INJECTION, POWDER, FOR SOLUTION INTRAVENOUS at 08:47

## 2025-07-21 RX ADMIN — ATORVASTATIN CALCIUM 10 MG: 10 TABLET, FILM COATED ORAL at 09:21

## 2025-07-21 RX ADMIN — PREGABALIN 75 MG: 75 CAPSULE ORAL at 09:21

## 2025-07-21 RX ADMIN — OXYCODONE AND ACETAMINOPHEN 1 TABLET: 5; 325 TABLET ORAL at 06:04

## 2025-07-21 RX ADMIN — OXYCODONE HYDROCHLORIDE 5 MG: 5 TABLET ORAL at 00:39

## 2025-07-21 RX ADMIN — SERTRALINE HYDROCHLORIDE 25 MG: 50 TABLET ORAL at 09:21

## 2025-07-21 ASSESSMENT — PAIN SCALES - GENERAL
PAINLEVEL_OUTOF10: 8
PAINLEVEL_OUTOF10: 8
PAINLEVEL_OUTOF10: 5
PAINLEVEL_OUTOF10: 9
PAINLEVEL_OUTOF10: 7

## 2025-07-21 ASSESSMENT — PAIN DESCRIPTION - ORIENTATION
ORIENTATION: MID

## 2025-07-21 ASSESSMENT — PAIN DESCRIPTION - DESCRIPTORS
DESCRIPTORS: STABBING;SHARP
DESCRIPTORS: BURNING;ACHING;THROBBING

## 2025-07-21 ASSESSMENT — PAIN DESCRIPTION - LOCATION
LOCATION: GROIN
LOCATION: ABDOMEN;GROIN
LOCATION: UMBILICUS

## 2025-07-21 ASSESSMENT — PAIN SCALES - WONG BAKER: WONGBAKER_NUMERICALRESPONSE: HURTS A LITTLE BIT

## 2025-07-21 NOTE — PLAN OF CARE
Problem: Chronic Conditions and Co-morbidities  Goal: Patient's chronic conditions and co-morbidity symptoms are monitored and maintained or improved  Outcome: Progressing     Problem: Discharge Planning  Goal: Discharge to home or other facility with appropriate resources  Outcome: Progressing     Problem: Pain  Goal: Verbalizes/displays adequate comfort level or baseline comfort level  Outcome: Progressing     Problem: Seizure Precautions  Goal: Remains free of injury related to seizures activity  Outcome: Progressing  Flowsheets (Taken 7/20/2025 2101)  Remains free of injury related to seizure activity:   Maintain airway, patient safety  and administer oxygen as ordered   Monitor patient for seizure activity, document and report duration and description of seizure to Licensed Independent Practitioner   If seizure occurs, turn patient to side and suction secretions as needed   Reorient patient post seizure   Seizure pads on all 4 side rails   Instruct patient/family to notify RN of any seizure activity   Instruct patient/family to call for assistance with activity based on assessment

## 2025-07-21 NOTE — PROGRESS NOTES
V2.0  [facility] Hospitalist Progress Note      Name:  Toi Briones /Age/Sex: 1965  (59 y.o. male)   MRN & CSN:  [id number] & [id number] Encounter Date/Time: 2025 9:09 AM EDT    Location:  [address] [place] [address] PCP: Emmett Alexandra MD       Hospital Day: 2    Assessment and Plan:   Toi Briones is a 59 y.o. male  who presents with Hematuria     Hospital Problems             Last Modified POA     * (Principal) Hematuria 2025 Yes            Assessment and Plan:  Concerns for hematuria with recurrent resistant infection.  Start the patient on meropenem urology consulted SCDs for VT prophylaxis telemetry in place n.p.o. at midnight with consideration of urological intervention in the morning.  Insulin-dependent imetelstat to start the patient on Lantus and sliding scale insulin monitor for hypoglycemia  Mood disorder on Zoloft at home  Hyperlipidemia on statin at home  Chronic GERD on PPI  Peripheral neuropathy on Lyrica  Right hand osteomyelitis s/p amputations of the distal ends of 3rd and 4th metacarpophalangeal joint    Medical Decision Making:  The following items were considered in medical decision making:  Discussion of patient care with other providers  Reviewed clinical lab tests if any  Reviewed radiology tests if any  Reviewed other diagnostic tests/interventions  Independent review of radiologic images if any  Microbiology cultures and other micro tests if any    Estimated time spent for medical decision-making encompassing complexity of the case, history taking, medication review, physical examination, communication with family, RN, , discussion with specialists, and ancillary staff members to provide accurate care for the patient was around 35 minutes.    MDM (any 2 required for High level billing)     A. Problems (any 1)  [x] Acute/Chronic Illness/injury posing ongoing threat to life and/or bodily function without ongoing treatment    [] Severe exacerbation of chronic illness

## 2025-07-21 NOTE — PLAN OF CARE
Problem: Chronic Conditions and Co-morbidities  Goal: Patient's chronic conditions and co-morbidity symptoms are monitored and maintained or improved  7/21/2025 1512 by Anita Corral LPN  Outcome: Progressing     Problem: Discharge Planning  Goal: Discharge to home or other facility with appropriate resources  7/21/2025 1512 by Anita Corral LPN  Outcome: Progressing     Problem: Pain  Goal: Verbalizes/displays adequate comfort level or baseline comfort level  7/21/2025 1512 by Anita Corral LPN  Outcome: Progressing     Problem: Seizure Precautions  Goal: Remains free of injury related to seizures activity  7/21/2025 1512 by Anita Corral LPN  Outcome: Progressing     Problem: Safety - Adult  Goal: Free from fall injury  Outcome: Progressing

## 2025-07-21 NOTE — PROGRESS NOTES
4 Eyes Skin Assessment     NAME:  Toi Briones  YOB: 1965  MEDICAL RECORD NUMBER:  [id number]    The patient is being assessed for  Admission    I agree that at least one RN has performed a thorough Head to Toe Skin Assessment on the patient. ALL assessment sites listed below have been assessed.      Areas assessed by both nurses:    Head, Face, Ears, Shoulders, Back, Chest, Arms, Elbows, Hands, Sacrum. Buttock, Coccyx, Ischium, Legs. Feet and Heels, and Under Medical Devices         Does the Patient have a Wound? Yes wound(s) were present on assessment. LDA wound assessment was Initiated and completed by RN       Finn Prevention initiated by RN: Yes  Wound Care Orders initiated by RN: Yes    For hospital-acquired stage 1 & 2 and ALL Stage 3,4, Unstageable, DTI, NWPT, and Complex wounds: place order “IP Wound Care/Ostomy Nurse Eval and Treat” by RN under : Yes    New Ostomies, if present place, Ostomy referral order under : No     Nurse 1 eSignature: Electronically signed by Nova Hassan RN on 7/20/25 at 10:01 PM EDT    **SHARE this note so that the co-signing nurse can place an eSignature**    Nurse 2 eSignature: Electronically signed by Juan Mckenna RN on 7/21/25 at 12:56 AM EDT

## 2025-07-21 NOTE — PROGRESS NOTES
Patient has been NPO since MN except for sips with meds and a few ice chips. He is irritable about his pain medication not being ordered correctly. Refuses to have the bed alarm on, patient is alert and oriented x4, educated on fall risk and patient insists he is to be independent. Seizure precautions in place as ordered. Will continue with plan of care.

## 2025-07-21 NOTE — CARE COORDINATION
07/21/25 0828   Service Assessment   Patient Orientation Alert and Oriented   Cognition Alert   History Provided By Medical Record   Primary Caregiver Self   Support Systems Spouse/Significant Other;Children;Family Members   Patient's Healthcare Decision Maker is: Legal Next of Kin   PCP Verified by CM Yes   Last Visit to PCP Within last 6 months   Prior Functional Level Independent in ADLs/IADLs   Current Functional Level Independent in ADLs/IADLs   Can patient return to prior living arrangement Yes   Ability to make needs known: Good   Family able to assist with home care needs: Yes   Would you like for me to discuss the discharge plan with any other family members/significant others, and if so, who? No   Financial Resources Medicare   Community Resources None     Chart reviewed, and patient screened for DC needs. Patient is from home with family, independent prior to admission. Patient has PCP and insurance. All needed DME at home. No HHC at this time. No discharge needs identified at this time. CM following for any new needs that may arise.

## 2025-07-22 VITALS
HEART RATE: 77 BPM | RESPIRATION RATE: 17 BRPM | DIASTOLIC BLOOD PRESSURE: 89 MMHG | SYSTOLIC BLOOD PRESSURE: 122 MMHG | OXYGEN SATURATION: 98 % | TEMPERATURE: 97.8 F | HEIGHT: 71 IN | BODY MASS INDEX: 33.67 KG/M2 | WEIGHT: 240.52 LBS

## 2025-07-22 LAB
GLUCOSE BLD-MCNC: 135 MG/DL (ref 74–99)
GLUCOSE BLD-MCNC: 99 MG/DL (ref 74–99)
MICROORGANISM SPEC CULT: NORMAL
SPECIMEN DESCRIPTION: NORMAL

## 2025-07-22 PROCEDURE — 6370000000 HC RX 637 (ALT 250 FOR IP): Performed by: STUDENT IN AN ORGANIZED HEALTH CARE EDUCATION/TRAINING PROGRAM

## 2025-07-22 PROCEDURE — 82962 GLUCOSE BLOOD TEST: CPT

## 2025-07-22 PROCEDURE — 2580000003 HC RX 258: Performed by: STUDENT IN AN ORGANIZED HEALTH CARE EDUCATION/TRAINING PROGRAM

## 2025-07-22 PROCEDURE — 6370000000 HC RX 637 (ALT 250 FOR IP): Performed by: PHYSICIAN ASSISTANT

## 2025-07-22 PROCEDURE — 2500000003 HC RX 250 WO HCPCS: Performed by: STUDENT IN AN ORGANIZED HEALTH CARE EDUCATION/TRAINING PROGRAM

## 2025-07-22 PROCEDURE — 94761 N-INVAS EAR/PLS OXIMETRY MLT: CPT

## 2025-07-22 PROCEDURE — 6360000002 HC RX W HCPCS: Performed by: STUDENT IN AN ORGANIZED HEALTH CARE EDUCATION/TRAINING PROGRAM

## 2025-07-22 RX ORDER — INSULIN GLARGINE 100 [IU]/ML
7 INJECTION, SOLUTION SUBCUTANEOUS NIGHTLY
Qty: 5 ADJUSTABLE DOSE PRE-FILLED PEN SYRINGE | Refills: 0 | Status: SHIPPED | OUTPATIENT
Start: 2025-07-22 | End: 2025-08-21

## 2025-07-22 RX ORDER — OXYCODONE AND ACETAMINOPHEN 5; 325 MG/1; MG/1
1 TABLET ORAL EVERY 8 HOURS PRN
Qty: 9 TABLET | Refills: 0 | Status: SHIPPED | OUTPATIENT
Start: 2025-07-22 | End: 2025-07-25

## 2025-07-22 RX ORDER — FINASTERIDE 5 MG/1
5 TABLET, FILM COATED ORAL DAILY
Qty: 30 TABLET | Refills: 0 | Status: SHIPPED | OUTPATIENT
Start: 2025-07-22

## 2025-07-22 RX ADMIN — ATORVASTATIN CALCIUM 10 MG: 10 TABLET, FILM COATED ORAL at 09:17

## 2025-07-22 RX ADMIN — SODIUM CHLORIDE, PRESERVATIVE FREE 10 ML: 5 INJECTION INTRAVENOUS at 09:17

## 2025-07-22 RX ADMIN — FINASTERIDE 5 MG: 5 TABLET, FILM COATED ORAL at 09:17

## 2025-07-22 RX ADMIN — PREGABALIN 75 MG: 75 CAPSULE ORAL at 09:16

## 2025-07-22 RX ADMIN — OXYCODONE AND ACETAMINOPHEN 2 TABLET: 5; 325 TABLET ORAL at 12:26

## 2025-07-22 RX ADMIN — OXYCODONE AND ACETAMINOPHEN 2 TABLET: 5; 325 TABLET ORAL at 05:52

## 2025-07-22 RX ADMIN — SERTRALINE HYDROCHLORIDE 25 MG: 50 TABLET ORAL at 09:17

## 2025-07-22 RX ADMIN — PANTOPRAZOLE SODIUM 40 MG: 40 TABLET, DELAYED RELEASE ORAL at 09:17

## 2025-07-22 RX ADMIN — OXYCODONE AND ACETAMINOPHEN 2 TABLET: 5; 325 TABLET ORAL at 00:09

## 2025-07-22 RX ADMIN — TAMSULOSIN HYDROCHLORIDE 0.4 MG: 0.4 CAPSULE ORAL at 09:17

## 2025-07-22 RX ADMIN — MEROPENEM 1000 MG: 1 INJECTION, POWDER, FOR SOLUTION INTRAVENOUS at 05:01

## 2025-07-22 ASSESSMENT — PAIN SCALES - WONG BAKER: WONGBAKER_NUMERICALRESPONSE: HURTS WHOLE LOT

## 2025-07-22 ASSESSMENT — PAIN DESCRIPTION - DESCRIPTORS
DESCRIPTORS: ACHING
DESCRIPTORS: ACHING;THROBBING
DESCRIPTORS: SHARP;STABBING

## 2025-07-22 ASSESSMENT — PAIN DESCRIPTION - LOCATION
LOCATION: ABDOMEN

## 2025-07-22 ASSESSMENT — PAIN - FUNCTIONAL ASSESSMENT: PAIN_FUNCTIONAL_ASSESSMENT: PREVENTS OR INTERFERES SOME ACTIVE ACTIVITIES AND ADLS

## 2025-07-22 ASSESSMENT — PAIN SCALES - GENERAL
PAINLEVEL_OUTOF10: 8
PAINLEVEL_OUTOF10: 6
PAINLEVEL_OUTOF10: 8

## 2025-07-22 ASSESSMENT — PAIN DESCRIPTION - ORIENTATION
ORIENTATION: LEFT
ORIENTATION: MID

## 2025-07-22 NOTE — PROGRESS NOTES
[address] [address]   Progress Note  [facility] [address]      Date: 2025   Patient: Toi Briones   : 1965   DOA: 2025   MRN: [id number]   ROOM#: 1103/[id number]-A     Admit Date: 2025     Collaborating Urologist on Call at time of admission: Dr. FERNANDEZ   CC: Hematuria with clots   Reason for Consult: Hematuria with concerns about resistant infections     Subjective:     Pain: mild, no nausea and no vomiting,   Bowel Movement/Flatus: Yes  Voiding: easily,     Pt resting in bed, reports feeling better today.    Objective:    Vitals:   /74   Pulse 56   Temp 97.7 °F (36.5 °C) (Oral)   Resp 20   Ht 1.803 m (5' 11\")   Wt 109.1 kg (240 lb 8.4 oz)   SpO2 99%   BMI 33.55 kg/m²   Temp  Av.8 °F (36.6 °C)  Min: 97.5 °F (36.4 °C)  Max: 98.2 °F (36.8 °C)    Intake/Output Summary (Last 24 hours) at 2025 0729  Last data filed at 2025 0319  Gross per 24 hour   Intake --   Output 1200 ml   Net -1200 ml       Physical Exam:  Gen: Pleasant male, in NAD  Neuro: Non-focal  Resp: Unlabored breathing  CV: Regular rate and rhythm  Abd: Soft, non-distended, non-tender to palpation, no rebound  Back:   No CVAT  Ext: No edema of bilateral LEs    Labs:  WBC:    Lab Results   Component Value Date/Time    WBC 5.9 2025 05:56 PM     Hemoglobin/Hematocrit:    Lab Results   Component Value Date/Time    HGB 13.1 2025 05:56 PM    HCT 41.3 2025 05:56 PM     BMP:   Lab Results   Component Value Date/Time     2025 05:56 PM    K 4.2 2025 05:56 PM     2025 05:56 PM    CO2 23 2025 05:56 PM    BUN 15 2025 05:56 PM    CREATININE 0.8 2025 05:56 PM    CALCIUM 9.9 2025 05:56 PM    GFRAA >60 2022 06:09 PM    LABGLOM >90 2025 05:56 PM       Blood Culture: NGTD  Urine Culture 7/15/25:   Susceptibility     Staphylococcus haemolyticus (1)     Antibiotic Interpretation ADONIS Method Status     levofloxacin Resistant >=8 BACTERIAL

## 2025-07-22 NOTE — PLAN OF CARE
Problem: Chronic Conditions and Co-morbidities  Goal: Patient's chronic conditions and co-morbidity symptoms are monitored and maintained or improved  7/22/2025 1134 by Sumaya Lemus RN  Outcome: Progressing  7/22/2025 0218 by Kinsey Fenton RN  Outcome: Progressing     Problem: Discharge Planning  Goal: Discharge to home or other facility with appropriate resources  7/22/2025 1134 by Sumaya Lemus RN  Outcome: Progressing  7/22/2025 0218 by Kinsey Fenton RN  Outcome: Progressing     Problem: Pain  Goal: Verbalizes/displays adequate comfort level or baseline comfort level  7/22/2025 1134 by Sumaya Lemus RN  Outcome: Progressing  7/22/2025 0218 by Kinsey Fenton RN  Outcome: Progressing     Problem: Seizure Precautions  Goal: Remains free of injury related to seizures activity  7/22/2025 1134 by Sumaya Lemus RN  Outcome: Progressing  7/22/2025 0218 by Kinsey Fenton RN  Outcome: Progressing     Problem: Safety - Adult  Goal: Free from fall injury  7/22/2025 1134 by Sumaya Lemus RN  Outcome: Progressing  7/22/2025 0218 by Kinsey Fenton RN  Outcome: Progressing

## 2025-07-22 NOTE — DISCHARGE INSTRUCTIONS
Internal Medicine Discharge Instruction    Discharge to:  Home  Diet: ADULT DIET; Regular; 3 carb choices (45 gm/meal)  Activity: As tolerated       Be compliant with medications  Please take medications as prescribed   Your urine culture came back negative - your symptoms may be due to your prostate and not an infection based on the results.         Electronically signed by Omari Alejandro MD on 7/22/2025 at 8:11 AM

## 2025-07-22 NOTE — DISCHARGE SUMMARY
Please use this note as a progress note for the day if the patient does not discharge today      Discharge Summary    Name:  Toi Briones /Age/Sex: 1965  (59 y.o. male)   MRN & CSN:  [id number] & [id number] Admission Date/Time: 2025  5:24 PM   Attending:  Omari Alejandro MD Discharging Physician: Omari Alejandro MD       Admission Diagnosis:   Hematuria with Penile pain   Concern for Recurrent UTI   IDDM   Mood Disorder   HLD   GERD   Peripheral Neuropathy   H/O OM     Discharge Diagnosis, hospital course, assessment and plan:  Toi Briones is a 59 y.o.  male  who presents with Hematuria    Patient admitted on 2025  5:24 PM    Per H+P:    Toi Briones is a 59 y.o. male who presents with generalized weakness with blood clots and dysuria and frequency.  Patient had right upper extremity osteomyelitis with progressive bone destruction of the third metacarpal phalangeal joint along with fourth joint involvement s/p right MSK amputation through PIPJ with primary closure status post vancomycin and Zosyn with infectious disease recommending linezolid and doxycycline at that time.  Patient was given 30 days of doxycycline at that time from [facility].  Patient presents to the hospital because of hematuria concerns currently while taking linezolid and doxycycline.  To be admitted to the hospital with urology consultation for hematuria     #. Hematuria with Penile Pain - Improving.  #. UTI Ruled out. Urine culture -ve. D/C meropenem.     #. IDDM. Adjust insulin on d/c   #. H/O HLD   #. GERD   #. Peripheral Neuropathy   #. H/O OM B/L Hands s/p digit amputations     Patient stable enough to be discharged home today.     The patient expressed appropriate understanding of and agreement with the discharge recommendations, medications, and plan.     Consults this admission:  IP CONSULT TO UROLOGY  IP CONSULT TO VASCULAR ACCESS TEAM  IP CONSULT TO VASCULAR ACCESS TEAM  IP CONSULT TO VASCULAR ACCESS

## 2025-07-22 NOTE — PLAN OF CARE
Problem: Pain  Goal: Verbalizes/displays adequate comfort level or baseline comfort level  7/22/2025 0218 by Kinsey Fenton RN  Outcome: Progressing  7/21/2025 1512 by Anita Corral LPN  Outcome: Progressing     Problem: Seizure Precautions  Goal: Remains free of injury related to seizures activity  7/22/2025 0218 by Kinsey Fenton RN  Outcome: Progressing  7/21/2025 1512 by Anita Corral LPN  Outcome: Progressing     Problem: Safety - Adult  Goal: Free from fall injury  7/22/2025 0218 by Kinsey Fenton RN  Outcome: Progressing  7/21/2025 1512 by Anita Corral LPN  Outcome: Progressing      show

## 2025-07-22 NOTE — CONSULTS
[address] [address]   Consult Note  [facility] [address]    Date: 2025   Patient: Toi Briones   : 1965   DOA: 2025   MRN: [id number]   ROOM#: [id number]     Reason for Consult: Hematuria with concerns about resistant infections  Requesting Physician: Dr. Gallagher  Collaborating Urologist on Call at time of admission: Dr. FERNANDEZ  CHIEF COMPLAINT: Hematuria with clots    History Obtained From: patient, electronic medical record    HISTORY OF PRESENT ILLNESS:                The patient is a 59 y.o. male with significant past medical history of IDDM2, peripheral neuropathy, right hand osteomyelitis, GERD, HLD, and BPH who presented with hematuria, blood clots, and dysuria x1 week. He was diagnosed with a Staph haemolyticus UTI 7/15/25 and reports only mild improvement with antibiotics. This morning, his urine is clear yellow and he endorses improvement of his symptoms with the IV antibiotics.     Past Medical History:        Diagnosis Date    Absent finger     Left hand    Anxiety     Arthritis     Hands    Asthma     \"As a kid but outgrew this\" - no medications as of 20    Chronic back pain     Depression     Difficult intravenous access 2024    Unable to place PICC x2 admissions. Patient IS NOT a PICC candidate ()    Edema     Fibromyalgia     Headache(784.0)     Last: 20    Hernia, abdominal     History of difficult venous access 2021    No longer a candidate for bedside PICC placement, multiple failed attempts    Hx MRSA infection     positive culture 2014 from left foot    Hyperlipidemia     Nausea & vomiting     Neuropathy     Obesity, Class III, BMI 40-49.9 (morbid obesity) (HCC)     Osteomyelitis (HCC)     hx finger    Poor circulation     Restless leg syndrome     Shortness of breath on exertion     2016- pt denies any sob with this interview    Type II or unspecified type diabetes mellitus with other specified manifestations, not stated as 
Consult completed. Nexiva 20g 1.75\" peripheral IV initiated into left forearm x 1 attempt using ultrasound guided technique. Brisk blood return, flushes without resistance. Patient tolerated well. Primary nurse Nova notified.     Consult the Vascular Access Team for questions, concerns, or change in patient's condition.     
Consult completed. Procedure/rationale explained to pt & consent obtained. #20ga Nexiva extra-long, 1.75\" PIV initiated using UltraSound-guided technique without difficulty/complications. Pt tolerated well, asks about possibility of MedPort placement & questions answered/process explained and no other c/o or needs noted or reported.     
Consult completed. Procedure/rationale explained to pt & consent obtained. #22ga Nexiva Diffusics high-pressure-injectable extra-long, 1.75\" PIV initiated using UltraSound-guided technique without complications. Pt tolerated well and no other c/o or needs noted or reported.     
Consult completed. USGPIV placed earlier this am evaluated per nurse request and is patent, flushing easily without resistance/abnormalities noted. RN notified.   
Consult received, met with patient at bedside.  Current PIV functional, flushes easily and draws back blood.  No further intervention warranted at this time.    Consult the Vascular Access Team for questions, concerns, or change in patient's condition.    
Pressure Injury Partial thickness 07/21/25 0943   Number of days: 0       Response to treatment:  Well tolerated by patient.     Pain Assessment:  Severity:  none  Quality of pain:   Wound Pain Timing/Severity:   Premedicated: no    Plan:     Plan of Care: Wound 07/20/25 Toe (Comment  which one) Left;Anterior amp site-Dressing/Treatment: Open to air  Wound 07/20/25 Finger (Comment which one) Right 3rd finger-Dressing/Treatment: Hydrating gel with Ag, Collagen, Dry dressing  Wound 07/21/25 Finger (Comment which one) Right 1st-Dressing/Treatment: Open to air, Betadine swabs/povidone iodine  Wound 07/21/25 Pretibial Right-Dressing/Treatment: Betadine swabs/povidone iodine  Wound 07/21/25 Finger (Comment which one) Left little-Dressing/Treatment: Betadine swabs/povidone iodine    Patient alert in bed ok for wound care eval. Has chronic finger wounds burns/diabetic with osteomyelitis. Left foot amp site with sutures per patient was 5 months ago and did not f/u after. Rt leg traumatic wound. Fingers cleansed with NS measured and pictured. Painted eschar with betadine. Rt 3rd finger applied Silvasorb gel and collagen. Pt is generally not at risk for skin breakdown AEB reji.    Specialty Bed Required : no  [] Low Air Loss   [] Pressure Redistribution  [] Fluid Immersion  [] Bariatric  [] Total Pressure Relief  [] Other:     Discharge Plan:  Placement for patient upon discharge: tbd  Hospice Care: no  Patient appropriate for Outpatient Wound Care Center: tbd    Patient/Caregiver Teaching:  Level of patient/caregiver understanding able to: pt voiced understanding.          Electronically signed by Yoselin Cano RN,  on 7/21/2025 at 12:15 PM

## 2025-07-22 NOTE — PROGRESS NOTES
Outpatient Pharmacy Progress Note for Meds-to-Beds    Total number of Prescriptions Filled: 5    Additional Documentation:  Medication(s) were delivered to the patient's room prior to discharge      Thank you for letting us serve your patients.  [address] [address]    Phone: [phone]    Fax: [phone]         TRANSFER - OUT REPORT:    Verbal report given to Mague EAGLE on Margi Marrero  being transferred to phase 2 for routine progression of patient care       Report consisted of patient's Situation, Background, Assessment and   Recommendations(SBAR).     Information from the following report(s) Adult Overview, Surgery Report, Intake/Output, and MAR was reviewed with the receiving nurse.           Lines:   Peripheral IV 07/18/24 Posterior;Right Hand (Active)   Site Assessment Clean, dry & intact 07/18/24 1305   Line Status Infusing 07/18/24 1305   Line Care Connections checked and tightened 07/18/24 0941   Phlebitis Assessment No symptoms 07/18/24 1305   Infiltration Assessment 0 07/18/24 1305   Alcohol Cap Used No 07/18/24 0941   Dressing Status Clean, dry & intact 07/18/24 1305   Dressing Type Transparent 07/18/24 1305        Opportunity for questions and clarification was provided.      Patient transported with:  Registered Nurse

## 2025-07-23 ENCOUNTER — CARE COORDINATION (OUTPATIENT)
Dept: CASE MANAGEMENT | Age: 60
End: 2025-07-23

## 2025-07-23 NOTE — CARE COORDINATION
Care Transitions Note    Initial Call - Call within 2 business days of discharge: Yes    Attempted to reach patient for transitions of care follow up. Unable to reach patient as no answer, no vm option. No MyChart. .    Outreach Attempts:   Unable to leave message.     Patient: Toi Briones    Patient : 1965   MRN: [id number]    Reason for Admission: Ac Cystitis, Hematuria, Penile Pain   Discharge Date: 25  RURS: Readmission Risk Score: 14.6  Facility: [facility] 25-25    Last Discharge Facility       Date Complaint Diagnosis Description Type Department Provider    25 Urinary Tract Infection Acute UTI ... ED to Hosp-Admission (Discharged) (ADMITTED) [facility] Omari Alejandro MD; Viviane ...     Was this an external facility discharge? No    Follow Up Appointment:   Future Appointments         Provider Specialty Dept Phone    2025 10:50 AM Raiza Nicholson, DO Neurology [phone]    10/1/2025 2:15 PM Abdi Mchugh DO Orthopedic Surgery [phone]            Challenges to be reviewed by the provider   Patient: Toi Briones    Patient : 1965   MRN: [id number]    Reason for Admission: Ac Cystitis, Hematuria, Penile Pain   Facility: [facility] 25-25    Please contact for scheduling of 7 day hospital follow up/ TCM appt due by 25       Method of communication with provider : chart routing PCP and Dr Waters-Urology      Plan for follow-up on next business day.      Meghan Valente RN

## 2025-07-24 ENCOUNTER — CARE COORDINATION (OUTPATIENT)
Dept: CASE MANAGEMENT | Age: 60
End: 2025-07-24

## 2025-07-25 LAB
EKG ATRIAL RATE: 70 BPM
EKG DIAGNOSIS: NORMAL
EKG P AXIS: 26 DEGREES
EKG P-R INTERVAL: 188 MS
EKG Q-T INTERVAL: 360 MS
EKG QRS DURATION: 92 MS
EKG QTC CALCULATION (BAZETT): 388 MS
EKG R AXIS: 16 DEGREES
EKG T AXIS: 54 DEGREES
EKG VENTRICULAR RATE: 70 BPM

## 2025-07-26 LAB
MICROORGANISM SPEC CULT: NORMAL
SERVICE CMNT-IMP: NORMAL
SPECIMEN DESCRIPTION: NORMAL

## 2025-07-27 ENCOUNTER — APPOINTMENT (OUTPATIENT)
Dept: CT IMAGING | Age: 60
End: 2025-07-27
Payer: MEDICARE

## 2025-07-27 ENCOUNTER — HOSPITAL ENCOUNTER (EMERGENCY)
Age: 60
Discharge: HOME | End: 2025-07-27
Attending: EMERGENCY MEDICINE
Payer: MEDICARE

## 2025-07-27 VITALS
RESPIRATION RATE: 18 BRPM | OXYGEN SATURATION: 95 % | SYSTOLIC BLOOD PRESSURE: 117 MMHG | TEMPERATURE: 98.4 F | HEART RATE: 76 BPM | DIASTOLIC BLOOD PRESSURE: 72 MMHG

## 2025-07-27 DIAGNOSIS — R10.9 ABDOMINAL PAIN, UNSPECIFIED ABDOMINAL LOCATION: ICD-10-CM

## 2025-07-27 DIAGNOSIS — R30.0 DYSURIA: Primary | ICD-10-CM

## 2025-07-27 DIAGNOSIS — R31.9 HEMATURIA, UNSPECIFIED TYPE: ICD-10-CM

## 2025-07-27 DIAGNOSIS — N30.01 ACUTE CYSTITIS WITH HEMATURIA: ICD-10-CM

## 2025-07-27 DIAGNOSIS — R30.0 DIFFICULT OR PAINFUL URINATION: ICD-10-CM

## 2025-07-27 LAB
ALBUMIN SERPL-MCNC: 4.1 G/DL (ref 3.4–5)
ALBUMIN/GLOB SERPL: 1.7 {RATIO} (ref 1.1–2.2)
ALP SERPL-CCNC: 81 U/L (ref 40–129)
ALT SERPL-CCNC: 6 U/L (ref 10–40)
ANION GAP SERPL CALCULATED.3IONS-SCNC: 11 MMOL/L (ref 9–17)
AST SERPL-CCNC: 27 U/L (ref 15–37)
BASOPHILS # BLD: 0.05 K/UL
BASOPHILS NFR BLD: 1 % (ref 0–1)
BILIRUB SERPL-MCNC: 0.5 MG/DL (ref 0–1)
BILIRUB UR QL STRIP: NEGATIVE
BUN SERPL-MCNC: 22 MG/DL (ref 7–20)
CALCIUM SERPL-MCNC: 9 MG/DL (ref 8.3–10.6)
CHLORIDE SERPL-SCNC: 104 MMOL/L (ref 99–110)
CLARITY UR: CLEAR
CO2 SERPL-SCNC: 24 MMOL/L (ref 21–32)
COLOR UR: YELLOW
CREAT SERPL-MCNC: 0.9 MG/DL (ref 0.8–1.3)
EOSINOPHIL # BLD: 0.11 K/UL
EOSINOPHILS RELATIVE PERCENT: 2 % (ref 0–3)
EPI CELLS #/AREA URNS HPF: <1 /HPF
ERYTHROCYTE [DISTWIDTH] IN BLOOD BY AUTOMATED COUNT: 14.6 % (ref 11.7–14.9)
GFR, ESTIMATED: >90 ML/MIN/1.73M2
GLUCOSE SERPL-MCNC: 131 MG/DL (ref 74–99)
GLUCOSE UR STRIP-MCNC: NEGATIVE MG/DL
HCT VFR BLD AUTO: 38.5 % (ref 42–52)
HGB BLD-MCNC: 12.1 G/DL (ref 13.5–18)
HGB UR QL STRIP.AUTO: ABNORMAL
IMM GRANULOCYTES # BLD AUTO: 0.01 K/UL
IMM GRANULOCYTES NFR BLD: 0 %
KETONES UR STRIP-MCNC: NEGATIVE MG/DL
LEUKOCYTE ESTERASE UR QL STRIP: ABNORMAL
LIPASE SERPL-CCNC: 53 U/L (ref 13–60)
LYMPHOCYTES NFR BLD: 1.69 K/UL
LYMPHOCYTES RELATIVE PERCENT: 26 % (ref 24–44)
MCH RBC QN AUTO: 27.9 PG (ref 27–31)
MCHC RBC AUTO-ENTMCNC: 31.4 G/DL (ref 32–36)
MCV RBC AUTO: 88.9 FL (ref 78–100)
MONOCYTES NFR BLD: 0.35 K/UL
MONOCYTES NFR BLD: 6 % (ref 0–5)
MUCOUS THREADS URNS QL MICRO: ABNORMAL
NEUTROPHILS NFR BLD: 65 % (ref 36–66)
NEUTS SEG NFR BLD: 4.18 K/UL
NITRITE UR QL STRIP: NEGATIVE
PH UR STRIP: 6 [PH] (ref 5–8)
PLATELET # BLD AUTO: 193 K/UL (ref 140–440)
PMV BLD AUTO: 10.8 FL (ref 7.5–11.1)
POTASSIUM SERPL-SCNC: 4.5 MMOL/L (ref 3.5–5.1)
PROT SERPL-MCNC: 6.5 G/DL (ref 6.4–8.2)
PROT UR STRIP-MCNC: ABNORMAL MG/DL
RBC # BLD AUTO: 4.33 M/UL (ref 4.6–6.2)
RBC #/AREA URNS HPF: 1 /HPF (ref 0–2)
SODIUM SERPL-SCNC: 138 MMOL/L (ref 136–145)
SP GR UR STRIP: >1.03 (ref 1–1.03)
UROBILINOGEN UR STRIP-ACNC: 0.2 EU/DL (ref 0–1)
WBC #/AREA URNS HPF: 32 /HPF (ref 0–5)
WBC OTHER # BLD: 6.4 K/UL (ref 4–10.5)

## 2025-07-27 PROCEDURE — 81001 URINALYSIS AUTO W/SCOPE: CPT

## 2025-07-27 PROCEDURE — 96375 TX/PRO/DX INJ NEW DRUG ADDON: CPT

## 2025-07-27 PROCEDURE — 85025 COMPLETE CBC W/AUTO DIFF WBC: CPT

## 2025-07-27 PROCEDURE — 6370000000 HC RX 637 (ALT 250 FOR IP): Performed by: STUDENT IN AN ORGANIZED HEALTH CARE EDUCATION/TRAINING PROGRAM

## 2025-07-27 PROCEDURE — 51798 US URINE CAPACITY MEASURE: CPT

## 2025-07-27 PROCEDURE — 99284 EMERGENCY DEPT VISIT MOD MDM: CPT

## 2025-07-27 PROCEDURE — 83690 ASSAY OF LIPASE: CPT

## 2025-07-27 PROCEDURE — 6370000000 HC RX 637 (ALT 250 FOR IP): Performed by: EMERGENCY MEDICINE

## 2025-07-27 PROCEDURE — 80053 COMPREHEN METABOLIC PANEL: CPT

## 2025-07-27 PROCEDURE — 96374 THER/PROPH/DIAG INJ IV PUSH: CPT

## 2025-07-27 PROCEDURE — 6360000002 HC RX W HCPCS: Performed by: EMERGENCY MEDICINE

## 2025-07-27 PROCEDURE — 87086 URINE CULTURE/COLONY COUNT: CPT

## 2025-07-27 PROCEDURE — 2580000003 HC RX 258: Performed by: EMERGENCY MEDICINE

## 2025-07-27 PROCEDURE — 74176 CT ABD & PELVIS W/O CONTRAST: CPT

## 2025-07-27 RX ORDER — ONDANSETRON 2 MG/ML
4 INJECTION INTRAMUSCULAR; INTRAVENOUS EVERY 30 MIN PRN
Status: DISCONTINUED | OUTPATIENT
Start: 2025-07-27 | End: 2025-07-27 | Stop reason: HOSPADM

## 2025-07-27 RX ORDER — 0.9 % SODIUM CHLORIDE 0.9 %
1000 INTRAVENOUS SOLUTION INTRAVENOUS ONCE
Status: COMPLETED | OUTPATIENT
Start: 2025-07-27 | End: 2025-07-27

## 2025-07-27 RX ORDER — ACETAMINOPHEN 500 MG
1000 TABLET ORAL ONCE
Status: COMPLETED | OUTPATIENT
Start: 2025-07-27 | End: 2025-07-27

## 2025-07-27 RX ORDER — CIPROFLOXACIN 2 MG/ML
400 INJECTION, SOLUTION INTRAVENOUS EVERY 12 HOURS
Status: DISCONTINUED | OUTPATIENT
Start: 2025-07-27 | End: 2025-07-27

## 2025-07-27 RX ORDER — TAMSULOSIN HYDROCHLORIDE 0.4 MG/1
0.4 CAPSULE ORAL DAILY
Qty: 30 CAPSULE | Refills: 0 | Status: SHIPPED | OUTPATIENT
Start: 2025-07-27

## 2025-07-27 RX ORDER — CIPROFLOXACIN 500 MG/1
500 TABLET, FILM COATED ORAL 2 TIMES DAILY
Qty: 20 TABLET | Refills: 0 | Status: SHIPPED | OUTPATIENT
Start: 2025-07-27 | End: 2025-08-06

## 2025-07-27 RX ORDER — FENTANYL CITRATE 50 UG/ML
50 INJECTION, SOLUTION INTRAMUSCULAR; INTRAVENOUS
Status: DISCONTINUED | OUTPATIENT
Start: 2025-07-27 | End: 2025-07-27 | Stop reason: HOSPADM

## 2025-07-27 RX ORDER — CIPROFLOXACIN 500 MG/1
500 TABLET, FILM COATED ORAL ONCE
Status: COMPLETED | OUTPATIENT
Start: 2025-07-27 | End: 2025-07-27

## 2025-07-27 RX ADMIN — ONDANSETRON 4 MG: 2 INJECTION INTRAMUSCULAR; INTRAVENOUS at 16:30

## 2025-07-27 RX ADMIN — ACETAMINOPHEN 1000 MG: 500 TABLET ORAL at 16:31

## 2025-07-27 RX ADMIN — FENTANYL CITRATE 50 MCG: 50 INJECTION INTRAMUSCULAR; INTRAVENOUS at 16:29

## 2025-07-27 RX ADMIN — CIPROFLOXACIN HYDROCHLORIDE 500 MG: 500 TABLET, FILM COATED ORAL at 18:52

## 2025-07-27 RX ADMIN — SODIUM CHLORIDE 1000 ML: 0.9 INJECTION, SOLUTION INTRAVENOUS at 16:30

## 2025-07-27 ASSESSMENT — PAIN SCALES - GENERAL
PAINLEVEL_OUTOF10: 8
PAINLEVEL_OUTOF10: 6

## 2025-07-27 NOTE — ED TRIAGE NOTES
Says he has had prostate issues for several months. Says that he has been urinating blood for the last day, painful urination as well.

## 2025-07-27 NOTE — ED PROVIDER NOTES
[facility]      TRIAGE CHIEF COMPLAINT:   Hematuria and Dysuria      [place]:  Toi Briones is a 59 y.o. male that presents with complaint of hematuria dysuria abdominal pain.  Patient states for last month he has been having dysuria hematuria difficulty urinating.  States he has prostate issues he was on Flomax but ran out.  He called urology but cannot get an appointment for another month and does not have a refill.  No fevers nausea vomiting chest pain shortness of breath suprapubic abdominal pain difficulty urinating hematuria dysuria.  Denies any discharge STDs, no other trauma no other questions or concerns.  No history of cancer.  No history of kidney stones.  Did have UTI recently finished antibiotics already but now having symptoms again no rashes or lesions, constant pain.    REVIEW OF SYSTEMS:  At least 10 systems reviewed and otherwise acutely negative except as in the [place].    Review of Systems   Constitutional: Negative.    HENT: Negative.     Eyes: Negative.    Respiratory: Negative.     Cardiovascular: Negative.    Gastrointestinal:  Positive for abdominal pain.   Endocrine: Negative.    Genitourinary:  Positive for difficulty urinating, dysuria and hematuria.   Musculoskeletal: Negative.    Skin: Negative.    Allergic/Immunologic: Negative.    Neurological: Negative.    Hematological: Negative.    Psychiatric/Behavioral: Negative.     All other systems reviewed and are negative.      Past Medical History:   Diagnosis Date    Absent finger     Left hand    Anxiety     Arthritis     Hands    Asthma     \"As a kid but outgrew this\" - no medications as of 2/21/20    Chronic back pain     Depression     Difficult intravenous access 05/26/2024    Unable to place PICC x2 admissions. Patient IS NOT a PICC candidate (2024)    Edema     Fibromyalgia     Headache(784.0)     Last: 2/19/20    Hernia, abdominal     History of difficult venous access 12/21/2021    No longer a candidate for

## 2025-07-27 NOTE — ED PROVIDER NOTES
I received patient in signout from my colleague, see his note for initial history and physical exam details.  In brief patient presented for hematuria dysuria and abdominal pain.  He had no history of kidney stones but had some pyuria in his urine.  He also ran out of his Flomax.  He does not have an appointment with urology till next month.  At time of signout he had a CT of his abdomen pelvis pending.    No acute abnormalities on his CT scan of the abdomen pelvis.  He had ciprofloxacin sent into his pharmacy.  Flomax was refilled.  When I went to go discuss results with the patient he was no longer present in the lobby.  I tried looking around for him but could not find him in the ED.  Nursing staff did report that they were not aware that patient was leaving.  His prescriptions have been sent to his pharmacy but I was unable to tell the results of his CT scan to him.     Emerson Jameson,   07/27/25 7775

## 2025-07-28 LAB
MICROORGANISM SPEC CULT: NORMAL
SERVICE CMNT-IMP: NORMAL
SPECIMEN DESCRIPTION: NORMAL

## 2025-08-19 ENCOUNTER — PROCEDURE VISIT (OUTPATIENT)
Age: 60
End: 2025-08-19

## 2025-08-19 DIAGNOSIS — E11.42 DIABETIC PERIPHERAL NEUROPATHY (HCC): Primary | ICD-10-CM

## 2025-08-19 DIAGNOSIS — G56.23 ULNAR NEUROPATHY OF BOTH UPPER EXTREMITIES: ICD-10-CM

## (undated) DEVICE — CORD ES L15FT PT RET REUSE VALLEYLAB REM

## (undated) DEVICE — BRUSH CLN DIA5MM NYL SGL END CBL ASST FLEX DSTL TIP POLYPR

## (undated) DEVICE — ANESTHESIA CIRCUIT ADULT-LF: Brand: MEDLINE INDUSTRIES, INC.

## (undated) DEVICE — SOLUTION IV 1000ML 0.9% SOD CHL FOR IRRIG PLAS CONT

## (undated) DEVICE — TUBING, SUCTION, 3/16" X 10', STRAIGHT: Brand: MEDLINE

## (undated) DEVICE — GLOVE ORANGE PI 7   MSG9070

## (undated) DEVICE — [AGGRESSIVE PLUS CUTTER, ARTHROSCOPIC SHAVER BLADE,  DO NOT RESTERILIZE,  DO NOT USE IF PACKAGE IS DAMAGED,  KEEP DRY,  KEEP AWAY FROM SUNLIGHT]: Brand: FORMULA

## (undated) DEVICE — 3M™ MICROFOAM™ TAPE 1528-4: Brand: 3M™ MICROFOAM™

## (undated) DEVICE — SPONGE GZ W4XL8IN COT WVN 12 PLY

## (undated) DEVICE — LINER,SEMI-RIGID,3000CC,50EA/CS: Brand: MEDLINE

## (undated) DEVICE — COUNTER NDL 60 COUNT FOAM STRP SGL MAG

## (undated) DEVICE — PACK,BASIC,IX: Brand: MEDLINE

## (undated) DEVICE — PENCIL ES CRD L10FT HND SWCHING ROCK SWCH W/ EDGE COAT BLDE

## (undated) DEVICE — SOLUTION IV IRRIG POUR BRL 0.9% SODIUM CHL 2F7124

## (undated) DEVICE — NEEDLE SPNL L3.5IN PNK HUB S STL REG WALL FIT STYL W/ QNCKE

## (undated) DEVICE — SYRINGE, LUER LOCK, 10ML: Brand: MEDLINE

## (undated) DEVICE — 3M™ STERI-DRAPE™ U-DRAPE 1067 1067 5/BX 4BX/CS/CTN&#X20;: Brand: STERI-DRAPE™

## (undated) DEVICE — SPONGE LAP W18XL18IN WHT COT 4 PLY FLD STRUNG RADPQ DISP ST

## (undated) DEVICE — TRAY PREP DRY W/ PREM GLV 2 APPL 6 SPNG 2 UNDPD 1 OVERWRAP

## (undated) DEVICE — TUBING, SUCTION, 9/32" X 10', STRAIGHT: Brand: MEDLINE

## (undated) DEVICE — PAD,ABDOMINAL,5"X9",ST,LF,25/BX: Brand: MEDLINE INDUSTRIES, INC.

## (undated) DEVICE — SYRINGE IRRIG 60ML SFT PLIABLE BLB EZ TO GRP 1 HND USE W/

## (undated) DEVICE — DRAPE,U/ SHT,SPLIT,PLAS,STERIL: Brand: MEDLINE

## (undated) DEVICE — BANDAGE,GAUZE,BULKEE II,4.5"X4.1YD,STRL: Brand: MEDLINE

## (undated) DEVICE — TUBING PMP L16FT MAIN DISP FOR AR-6400 AR-6475 Â€“ ORDER MULTIPLES OF 10 EACH

## (undated) DEVICE — Z INACTIVE USE 2863041 SPONGE GZ W4XL4IN 100% COT 16 PLY RADPQ HIGHLY ABSRB

## (undated) DEVICE — TUBING, SUCTION, 3/16" X 6', STRAIGHT: Brand: MEDLINE

## (undated) DEVICE — MAT FLOOR ULTRA ABS 28X48IN

## (undated) DEVICE — DRESSING,GAUZE,XEROFORM,CURAD,1"X8",ST: Brand: CURAD

## (undated) DEVICE — GOWN,SIRUS,POLYRNF,BRTHSLV,XLN/XL,20/CS: Brand: MEDLINE

## (undated) DEVICE — GLOVE SURG SZ 7 L12IN FNGR THK87MIL WHT LTX FREE

## (undated) DEVICE — NEEDLE HYPO 23GA L1.5IN TURQ POLYPR HUB S STL THN WALL IM

## (undated) DEVICE — NEEDLE SCORPION HD MEGA LOADER

## (undated) DEVICE — APPLICATOR MEDICATED 26 CC SOLUTION HI LT ORNG CHLORAPREP

## (undated) DEVICE — GLOVE SURG SZ 65 THK91MIL LTX FREE SYN POLYISOPRENE

## (undated) DEVICE — Device

## (undated) DEVICE — THIN OFFSET (9.0 X 0.38 X 25.0MM)

## (undated) DEVICE — INTENDED FOR TISSUE SEPARATION, AND OTHER PROCEDURES THAT REQUIRE A SHARP SURGICAL BLADE TO PUNCTURE OR CUT.: Brand: BARD-PARKER ® STAINLESS STEEL BLADES

## (undated) DEVICE — GAUZE,SPONGE,4"X4",16PLY,XRAY,STRL,LF: Brand: MEDLINE

## (undated) DEVICE — SOLUTION IV IRRIG WATER 1000ML POUR BRL 2F7114

## (undated) DEVICE — GLOVE SURG SZ 65 L12IN FNGR THK79MIL GRN LTX FREE

## (undated) DEVICE — CANISTER VAC 500ML TBNG RESVR FOR INFOVAC W O GEL CLMP CONN

## (undated) DEVICE — SUTURE NONABSORBABLE MONOFILAMENT 4-0 FS-2 18 IN ETHILON 662H

## (undated) DEVICE — YANKAUER,FLEXIBLE HANDLE,REGLR CAPACITY: Brand: MEDLINE INDUSTRIES, INC.

## (undated) DEVICE — TOWEL,OR,DSP,ST,BLUE,STD,6/PK,12PK/CS: Brand: MEDLINE

## (undated) DEVICE — GOWN,ECLIPSE,POLYRNF,BRTHSLV,L,30/CS: Brand: MEDLINE

## (undated) DEVICE — CONTAINER,SPECIMEN,OR STERILE,4OZ: Brand: MEDLINE

## (undated) DEVICE — SOLUTION SURG PREP PVP IOD 10% 8 OZ BTL SCRB CARE

## (undated) DEVICE — GLOVE ORANGE PI 8   MSG9080

## (undated) DEVICE — DRAPE,EXTREMITY,89X128,STERILE: Brand: MEDLINE

## (undated) DEVICE — MARKER,SKIN,WI/RULER AND LABELS: Brand: MEDLINE

## (undated) DEVICE — SUTURE VICRYL SZ 3-0 L27IN ABSRB UD L36MM CT-1 1/2 CIR J258H

## (undated) DEVICE — GLOVE SURG SZ 8 L12IN FNGR THK87MIL WHT LTX FREE

## (undated) DEVICE — FORCEPS BX L240CM JAW DIA2.8MM L CAP W/ NDL MIC MESH TOOTH

## (undated) DEVICE — ELECTRODE ES AD CRDLSS PT RET REM POLYHESIVE

## (undated) DEVICE — LINER SUCT CANSTR 1500CC SEMI RIG W/ POR HYDROPHOBIC SHUT

## (undated) DEVICE — MAT ABSRB W28XL48IN C6L FLR ULT W POLY BK

## (undated) DEVICE — DRAPE SHEET ULTRAGARD: Brand: MEDLINE

## (undated) DEVICE — CANNULA ARTHSCP L7CM DIA5.75MM TRNSLUC PARTIALLY THRD BRL

## (undated) DEVICE — 3M™ IOBAN™ 2 ANTIMICROBIAL INCISE DRAPE 6650EZ: Brand: IOBAN™ 2

## (undated) DEVICE — STERILE LATEX POWDER-FREE SURGICAL GLOVESWITH NITRILE COATING: Brand: PROTEXIS

## (undated) DEVICE — SHOULDER STABILIZATION KIT,                                    DISPOSABLE 12 PER BOX

## (undated) DEVICE — IMMOBILIZER SHLDR SWTH UNIV DEV BLK P.A.D. III

## (undated) DEVICE — COUNTER NDL 30 COUNT FOAM STRP SGL MAG

## (undated) DEVICE — PROTECTOR EYE PT SELF ADH NS OPT GRD LF

## (undated) DEVICE — MARKER SURG SKIN UTIL REGULAR/FINE 2 TIP W/ RUL AND 9 LBL

## (undated) DEVICE — GLOVE SURG SZ 8 L12IN THK75MIL DK GRN LTX FREE

## (undated) DEVICE — CLASSIC CLD THER SYS

## (undated) DEVICE — JELLY LUBRICATING 3 GM BACTERIOSTATIC

## (undated) DEVICE — 3M™ STERI-DRAPE™ U-DRAPE 1015: Brand: STERI-DRAPE™

## (undated) DEVICE — Z DISCONTINUED (USE MFG CAT MVABO)  TUBING GAS SAMPLING STD 6.5 FT FEMALE CONN SMRT CAPNOLINE

## (undated) DEVICE — SUTURE ETHILON SZ 3-0 L30IN NONABSORBABLE BLK FS-1 L24MM 3/8 669H

## (undated) DEVICE — CANNULA ARTHSCP L7CM DIA7MM TRNSLUC THRD FLX W/ NO SQUIRT

## (undated) DEVICE — GLOVE SURG SZ 7 L12IN FNGR THK79MIL GRN LTX FREE

## (undated) DEVICE — WEREWOLF FLOW 90 COBLATION WAND: Brand: COBLATION